# Patient Record
Sex: MALE | Race: BLACK OR AFRICAN AMERICAN | Employment: OTHER | URBAN - METROPOLITAN AREA
[De-identification: names, ages, dates, MRNs, and addresses within clinical notes are randomized per-mention and may not be internally consistent; named-entity substitution may affect disease eponyms.]

---

## 2018-01-16 PROBLEM — R31.9 HEMATURIA: Status: ACTIVE | Noted: 2018-01-16

## 2018-01-16 PROBLEM — N13.8 HYPERTROPHY OF PROSTATE WITH URINARY OBSTRUCTION: Status: ACTIVE | Noted: 2018-01-16

## 2018-01-16 PROBLEM — N40.1 HYPERTROPHY OF PROSTATE WITH URINARY OBSTRUCTION: Status: ACTIVE | Noted: 2018-01-16

## 2018-01-16 PROBLEM — Z12.5 SCREENING FOR PROSTATE CANCER: Status: ACTIVE | Noted: 2018-01-16

## 2018-04-11 PROBLEM — Z12.5 SCREENING FOR PROSTATE CANCER: Status: RESOLVED | Noted: 2018-01-16 | Resolved: 2018-04-11

## 2021-02-14 ENCOUNTER — APPOINTMENT (OUTPATIENT)
Dept: GENERAL RADIOLOGY | Age: 71
DRG: 661 | End: 2021-02-14
Payer: MEDICARE

## 2021-02-14 ENCOUNTER — APPOINTMENT (OUTPATIENT)
Dept: CT IMAGING | Age: 71
DRG: 661 | End: 2021-02-14
Payer: MEDICARE

## 2021-02-14 ENCOUNTER — HOSPITAL ENCOUNTER (INPATIENT)
Age: 71
LOS: 5 days | Discharge: HOME | DRG: 661 | End: 2021-02-19
Attending: EMERGENCY MEDICINE | Admitting: INTERNAL MEDICINE
Payer: MEDICARE

## 2021-02-14 DIAGNOSIS — D64.9 ANEMIA REQUIRING TRANSFUSIONS: ICD-10-CM

## 2021-02-14 DIAGNOSIS — N17.9 ACUTE KIDNEY INJURY (HCC): Primary | ICD-10-CM

## 2021-02-14 DIAGNOSIS — R19.00 PELVIC MASS: ICD-10-CM

## 2021-02-14 DIAGNOSIS — N13.30 HYDRONEPHROSIS, UNSPECIFIED HYDRONEPHROSIS TYPE: ICD-10-CM

## 2021-02-14 LAB
ALBUMIN SERPL-MCNC: 3.4 G/DL (ref 3.5–5.2)
ALP BLD-CCNC: 99 U/L (ref 40–129)
ALT SERPL-CCNC: <5 U/L (ref 0–40)
ANION GAP SERPL CALCULATED.3IONS-SCNC: 13 MMOL/L (ref 7–16)
AST SERPL-CCNC: 10 U/L (ref 0–39)
BILIRUB SERPL-MCNC: 0.3 MG/DL (ref 0–1.2)
BUN BLDV-MCNC: 43 MG/DL (ref 8–23)
CALCIUM SERPL-MCNC: 8.9 MG/DL (ref 8.6–10.2)
CHLORIDE BLD-SCNC: 98 MMOL/L (ref 98–107)
CO2: 22 MMOL/L (ref 22–29)
CREAT SERPL-MCNC: 2.5 MG/DL (ref 0.7–1.2)
GFR AFRICAN AMERICAN: 31
GFR NON-AFRICAN AMERICAN: 31 ML/MIN/1.73
GLUCOSE BLD-MCNC: 123 MG/DL (ref 74–99)
INR BLD: 1.2
LACTIC ACID: 2.7 MMOL/L (ref 0.5–2.2)
POTASSIUM REFLEX MAGNESIUM: 4.2 MMOL/L (ref 3.5–5)
PRO-BNP: 2859 PG/ML (ref 0–125)
PROTHROMBIN TIME: 14.7 SEC (ref 9.3–12.4)
SODIUM BLD-SCNC: 133 MMOL/L (ref 132–146)
TOTAL PROTEIN: 7.4 G/DL (ref 6.4–8.3)
TROPONIN: <0.01 NG/ML (ref 0–0.03)
TSH SERPL DL<=0.05 MIU/L-ACNC: 3.63 UIU/ML (ref 0.27–4.2)

## 2021-02-14 PROCEDURE — 93005 ELECTROCARDIOGRAM TRACING: CPT | Performed by: EMERGENCY MEDICINE

## 2021-02-14 PROCEDURE — 80307 DRUG TEST PRSMV CHEM ANLYZR: CPT

## 2021-02-14 PROCEDURE — 85610 PROTHROMBIN TIME: CPT

## 2021-02-14 PROCEDURE — 80143 DRUG ASSAY ACETAMINOPHEN: CPT

## 2021-02-14 PROCEDURE — 99284 EMERGENCY DEPT VISIT MOD MDM: CPT

## 2021-02-14 PROCEDURE — 71045 X-RAY EXAM CHEST 1 VIEW: CPT

## 2021-02-14 PROCEDURE — 84443 ASSAY THYROID STIM HORMONE: CPT

## 2021-02-14 PROCEDURE — 70450 CT HEAD/BRAIN W/O DYE: CPT

## 2021-02-14 PROCEDURE — 1200000000 HC SEMI PRIVATE

## 2021-02-14 PROCEDURE — 87040 BLOOD CULTURE FOR BACTERIA: CPT

## 2021-02-14 PROCEDURE — 83880 ASSAY OF NATRIURETIC PEPTIDE: CPT

## 2021-02-14 PROCEDURE — 82533 TOTAL CORTISOL: CPT

## 2021-02-14 PROCEDURE — 83605 ASSAY OF LACTIC ACID: CPT

## 2021-02-14 PROCEDURE — 85025 COMPLETE CBC W/AUTO DIFF WBC: CPT

## 2021-02-14 PROCEDURE — 84484 ASSAY OF TROPONIN QUANT: CPT

## 2021-02-14 PROCEDURE — 0T768DZ DILATION OF RIGHT URETER WITH INTRALUMINAL DEVICE, VIA NATURAL OR ARTIFICIAL OPENING ENDOSCOPIC: ICD-10-PCS | Performed by: UROLOGY

## 2021-02-14 PROCEDURE — BT1D1ZZ FLUOROSCOPY OF RIGHT KIDNEY, URETER AND BLADDER USING LOW OSMOLAR CONTRAST: ICD-10-PCS | Performed by: UROLOGY

## 2021-02-14 PROCEDURE — U0002 COVID-19 LAB TEST NON-CDC: HCPCS

## 2021-02-14 PROCEDURE — 80179 DRUG ASSAY SALICYLATE: CPT

## 2021-02-14 PROCEDURE — 82077 ASSAY SPEC XCP UR&BREATH IA: CPT

## 2021-02-14 PROCEDURE — 80053 COMPREHEN METABOLIC PANEL: CPT

## 2021-02-15 ENCOUNTER — APPOINTMENT (OUTPATIENT)
Dept: CT IMAGING | Age: 71
DRG: 661 | End: 2021-02-15
Payer: MEDICARE

## 2021-02-15 ENCOUNTER — APPOINTMENT (OUTPATIENT)
Dept: ULTRASOUND IMAGING | Age: 71
DRG: 661 | End: 2021-02-15
Payer: MEDICARE

## 2021-02-15 LAB
ABO/RH: NORMAL
ACETAMINOPHEN LEVEL: <5 MCG/ML (ref 10–30)
ANION GAP SERPL CALCULATED.3IONS-SCNC: 11 MMOL/L (ref 7–16)
ANTIBODY SCREEN: NORMAL
BACTERIA: NORMAL /HPF
BASOPHILS ABSOLUTE: 0.04 E9/L (ref 0–0.2)
BASOPHILS RELATIVE PERCENT: 0.4 % (ref 0–2)
BILIRUBIN URINE: NEGATIVE
BLOOD BANK DISPENSE STATUS: NORMAL
BLOOD BANK DISPENSE STATUS: NORMAL
BLOOD BANK PRODUCT CODE: NORMAL
BLOOD BANK PRODUCT CODE: NORMAL
BLOOD, URINE: NEGATIVE
BPU ID: NORMAL
BPU ID: NORMAL
BUN BLDV-MCNC: 30 MG/DL (ref 8–23)
CALCIUM SERPL-MCNC: 8.9 MG/DL (ref 8.6–10.2)
CHLORIDE BLD-SCNC: 103 MMOL/L (ref 98–107)
CLARITY: CLEAR
CO2: 23 MMOL/L (ref 22–29)
COLOR: YELLOW
CORTISOL TOTAL: 30.63 MCG/DL (ref 2.68–18.4)
CREAT SERPL-MCNC: 1.9 MG/DL (ref 0.7–1.2)
DESCRIPTION BLOOD BANK: NORMAL
DESCRIPTION BLOOD BANK: NORMAL
EKG ATRIAL RATE: 63 BPM
EKG P AXIS: 119 DEGREES
EKG P-R INTERVAL: 152 MS
EKG Q-T INTERVAL: 460 MS
EKG QRS DURATION: 80 MS
EKG QTC CALCULATION (BAZETT): 470 MS
EKG R AXIS: 83 DEGREES
EKG T AXIS: 71 DEGREES
EKG VENTRICULAR RATE: 63 BPM
EOSINOPHILS ABSOLUTE: 0.38 E9/L (ref 0.05–0.5)
EOSINOPHILS RELATIVE PERCENT: 3.6 % (ref 0–6)
ETHANOL: <10 MG/DL (ref 0–0.08)
GFR AFRICAN AMERICAN: 43
GFR NON-AFRICAN AMERICAN: 43 ML/MIN/1.73
GLUCOSE BLD-MCNC: 118 MG/DL (ref 74–99)
GLUCOSE URINE: NEGATIVE MG/DL
HCT VFR BLD CALC: 20.9 % (ref 37–54)
HCT VFR BLD CALC: 22.1 % (ref 37–54)
HEMOGLOBIN: 6.2 G/DL (ref 12.5–16.5)
HEMOGLOBIN: 6.7 G/DL (ref 12.5–16.5)
IMMATURE GRANULOCYTES #: 0.06 E9/L
IMMATURE GRANULOCYTES %: 0.6 % (ref 0–5)
KETONES, URINE: NEGATIVE MG/DL
LACTIC ACID: 2.2 MMOL/L (ref 0.5–2.2)
LEUKOCYTE ESTERASE, URINE: NEGATIVE
LYMPHOCYTES ABSOLUTE: 1.02 E9/L (ref 1.5–4)
LYMPHOCYTES RELATIVE PERCENT: 9.8 % (ref 20–42)
MAGNESIUM: 1.9 MG/DL (ref 1.6–2.6)
MCH RBC QN AUTO: 26.6 PG (ref 26–35)
MCHC RBC AUTO-ENTMCNC: 30.3 % (ref 32–34.5)
MCV RBC AUTO: 87.7 FL (ref 80–99.9)
MONOCYTES ABSOLUTE: 0.88 E9/L (ref 0.1–0.95)
MONOCYTES RELATIVE PERCENT: 8.4 % (ref 2–12)
NEUTROPHILS ABSOLUTE: 8.07 E9/L (ref 1.8–7.3)
NEUTROPHILS RELATIVE PERCENT: 77.2 % (ref 43–80)
NITRITE, URINE: NEGATIVE
PDW BLD-RTO: 15.4 FL (ref 11.5–15)
PH UA: 5.5 (ref 5–9)
PLATELET # BLD: 237 E9/L (ref 130–450)
PMV BLD AUTO: 9.1 FL (ref 7–12)
POTASSIUM SERPL-SCNC: 4.2 MMOL/L (ref 3.5–5)
PROCALCITONIN: 0.14 NG/ML (ref 0–0.08)
PROTEIN UA: NEGATIVE MG/DL
RBC # BLD: 2.52 E12/L (ref 3.8–5.8)
RBC UA: NORMAL /HPF (ref 0–2)
SALICYLATE, SERUM: <0.3 MG/DL (ref 0–30)
SARS-COV-2, NAAT: NOT DETECTED
SODIUM BLD-SCNC: 137 MMOL/L (ref 132–146)
SPECIFIC GRAVITY UA: 1.02 (ref 1–1.03)
T4 FREE: 1.43 NG/DL (ref 0.93–1.7)
TRICYCLIC ANTIDEPRESSANTS SCREEN SERUM: NEGATIVE NG/ML
UROBILINOGEN, URINE: 0.2 E.U./DL
WBC # BLD: 10.5 E9/L (ref 4.5–11.5)
WBC UA: NORMAL /HPF (ref 0–5)

## 2021-02-15 PROCEDURE — 51798 US URINE CAPACITY MEASURE: CPT

## 2021-02-15 PROCEDURE — 6370000000 HC RX 637 (ALT 250 FOR IP): Performed by: INTERNAL MEDICINE

## 2021-02-15 PROCEDURE — 86901 BLOOD TYPING SEROLOGIC RH(D): CPT

## 2021-02-15 PROCEDURE — 86850 RBC ANTIBODY SCREEN: CPT

## 2021-02-15 PROCEDURE — 36415 COLL VENOUS BLD VENIPUNCTURE: CPT

## 2021-02-15 PROCEDURE — 6360000002 HC RX W HCPCS: Performed by: INTERNAL MEDICINE

## 2021-02-15 PROCEDURE — 80048 BASIC METABOLIC PNL TOTAL CA: CPT

## 2021-02-15 PROCEDURE — 86923 COMPATIBILITY TEST ELECTRIC: CPT

## 2021-02-15 PROCEDURE — 84145 PROCALCITONIN (PCT): CPT

## 2021-02-15 PROCEDURE — 84439 ASSAY OF FREE THYROXINE: CPT

## 2021-02-15 PROCEDURE — 83605 ASSAY OF LACTIC ACID: CPT

## 2021-02-15 PROCEDURE — 2580000003 HC RX 258: Performed by: INTERNAL MEDICINE

## 2021-02-15 PROCEDURE — 81001 URINALYSIS AUTO W/SCOPE: CPT

## 2021-02-15 PROCEDURE — P9016 RBC LEUKOCYTES REDUCED: HCPCS

## 2021-02-15 PROCEDURE — 1200000000 HC SEMI PRIVATE

## 2021-02-15 PROCEDURE — 74176 CT ABD & PELVIS W/O CONTRAST: CPT

## 2021-02-15 PROCEDURE — 36430 TRANSFUSION BLD/BLD COMPNT: CPT

## 2021-02-15 PROCEDURE — 86900 BLOOD TYPING SEROLOGIC ABO: CPT

## 2021-02-15 PROCEDURE — C9113 INJ PANTOPRAZOLE SODIUM, VIA: HCPCS | Performed by: INTERNAL MEDICINE

## 2021-02-15 PROCEDURE — 88112 CYTOPATH CELL ENHANCE TECH: CPT

## 2021-02-15 PROCEDURE — 85014 HEMATOCRIT: CPT

## 2021-02-15 PROCEDURE — 85018 HEMOGLOBIN: CPT

## 2021-02-15 PROCEDURE — 83735 ASSAY OF MAGNESIUM: CPT

## 2021-02-15 PROCEDURE — 76856 US EXAM PELVIC COMPLETE: CPT

## 2021-02-15 RX ORDER — DEXTROSE MONOHYDRATE 25 G/50ML
12.5 INJECTION, SOLUTION INTRAVENOUS PRN
Status: DISCONTINUED | OUTPATIENT
Start: 2021-02-15 | End: 2021-02-19 | Stop reason: HOSPADM

## 2021-02-15 RX ORDER — 0.9 % SODIUM CHLORIDE 0.9 %
1000 INTRAVENOUS SOLUTION INTRAVENOUS ONCE
Status: COMPLETED | OUTPATIENT
Start: 2021-02-15 | End: 2021-02-15

## 2021-02-15 RX ORDER — ACETAMINOPHEN 325 MG/1
650 TABLET ORAL EVERY 6 HOURS PRN
Status: DISCONTINUED | OUTPATIENT
Start: 2021-02-15 | End: 2021-02-19 | Stop reason: HOSPADM

## 2021-02-15 RX ORDER — FERROUS SULFATE 325(65) MG
325 TABLET ORAL 2 TIMES DAILY
Status: DISCONTINUED | OUTPATIENT
Start: 2021-02-15 | End: 2021-02-19 | Stop reason: HOSPADM

## 2021-02-15 RX ORDER — UBIDECARENONE 75 MG
50 CAPSULE ORAL DAILY
Status: DISCONTINUED | OUTPATIENT
Start: 2021-02-15 | End: 2021-02-19 | Stop reason: HOSPADM

## 2021-02-15 RX ORDER — SODIUM CHLORIDE 9 MG/ML
INJECTION, SOLUTION INTRAVENOUS CONTINUOUS
Status: ACTIVE | OUTPATIENT
Start: 2021-02-15 | End: 2021-02-16

## 2021-02-15 RX ORDER — NICOTINE POLACRILEX 4 MG
15 LOZENGE BUCCAL PRN
Status: DISCONTINUED | OUTPATIENT
Start: 2021-02-15 | End: 2021-02-19 | Stop reason: HOSPADM

## 2021-02-15 RX ORDER — SODIUM CHLORIDE 9 MG/ML
INJECTION, SOLUTION INTRAVENOUS PRN
Status: DISCONTINUED | OUTPATIENT
Start: 2021-02-15 | End: 2021-02-19 | Stop reason: HOSPADM

## 2021-02-15 RX ORDER — SODIUM CHLORIDE 0.9 % (FLUSH) 0.9 %
10 SYRINGE (ML) INJECTION PRN
Status: DISCONTINUED | OUTPATIENT
Start: 2021-02-15 | End: 2021-02-19 | Stop reason: HOSPADM

## 2021-02-15 RX ORDER — PANTOPRAZOLE SODIUM 40 MG/10ML
40 INJECTION, POWDER, LYOPHILIZED, FOR SOLUTION INTRAVENOUS ONCE
Status: DISCONTINUED | OUTPATIENT
Start: 2021-02-15 | End: 2021-02-15

## 2021-02-15 RX ORDER — UBIDECARENONE 75 MG
50 CAPSULE ORAL DAILY
COMMUNITY

## 2021-02-15 RX ORDER — TAMSULOSIN HYDROCHLORIDE 0.4 MG/1
0.4 CAPSULE ORAL DAILY
Status: DISCONTINUED | OUTPATIENT
Start: 2021-02-15 | End: 2021-02-19 | Stop reason: HOSPADM

## 2021-02-15 RX ORDER — PRAVASTATIN SODIUM 20 MG
20 TABLET ORAL NIGHTLY
Status: DISCONTINUED | OUTPATIENT
Start: 2021-02-15 | End: 2021-02-19 | Stop reason: HOSPADM

## 2021-02-15 RX ORDER — SOTALOL HYDROCHLORIDE 80 MG/1
40 TABLET ORAL 2 TIMES DAILY
Status: DISCONTINUED | OUTPATIENT
Start: 2021-02-15 | End: 2021-02-19 | Stop reason: HOSPADM

## 2021-02-15 RX ORDER — ACETAMINOPHEN 650 MG/1
650 SUPPOSITORY RECTAL EVERY 6 HOURS PRN
Status: DISCONTINUED | OUTPATIENT
Start: 2021-02-15 | End: 2021-02-19 | Stop reason: HOSPADM

## 2021-02-15 RX ORDER — POLYETHYLENE GLYCOL 3350 17 G/17G
17 POWDER, FOR SOLUTION ORAL DAILY PRN
Status: DISCONTINUED | OUTPATIENT
Start: 2021-02-15 | End: 2021-02-19 | Stop reason: HOSPADM

## 2021-02-15 RX ORDER — 0.9 % SODIUM CHLORIDE 0.9 %
500 INTRAVENOUS SOLUTION INTRAVENOUS ONCE
Status: DISCONTINUED | OUTPATIENT
Start: 2021-02-15 | End: 2021-02-18

## 2021-02-15 RX ORDER — SODIUM CHLORIDE 0.9 % (FLUSH) 0.9 %
10 SYRINGE (ML) INJECTION EVERY 12 HOURS SCHEDULED
Status: DISCONTINUED | OUTPATIENT
Start: 2021-02-15 | End: 2021-02-19 | Stop reason: HOSPADM

## 2021-02-15 RX ORDER — SENNA AND DOCUSATE SODIUM 50; 8.6 MG/1; MG/1
1 TABLET, FILM COATED ORAL 2 TIMES DAILY
COMMUNITY

## 2021-02-15 RX ORDER — DEXTROSE MONOHYDRATE 50 MG/ML
100 INJECTION, SOLUTION INTRAVENOUS PRN
Status: DISCONTINUED | OUTPATIENT
Start: 2021-02-15 | End: 2021-02-19 | Stop reason: HOSPADM

## 2021-02-15 RX ADMIN — SODIUM CHLORIDE, PRESERVATIVE FREE 10 ML: 5 INJECTION INTRAVENOUS at 09:35

## 2021-02-15 RX ADMIN — FERROUS SULFATE TAB 325 MG (65 MG ELEMENTAL FE) 325 MG: 325 (65 FE) TAB at 19:50

## 2021-02-15 RX ADMIN — VITAM B12 50 MCG: 100 TAB at 09:34

## 2021-02-15 RX ADMIN — PRAVASTATIN SODIUM 20 MG: 20 TABLET ORAL at 21:24

## 2021-02-15 RX ADMIN — PANTOPRAZOLE SODIUM 8 MG/HR: 40 INJECTION, POWDER, FOR SOLUTION INTRAVENOUS at 01:37

## 2021-02-15 RX ADMIN — SOTALOL HYDROCHLORIDE 40 MG: 80 TABLET ORAL at 21:25

## 2021-02-15 RX ADMIN — SODIUM CHLORIDE 1000 ML: 9 INJECTION, SOLUTION INTRAVENOUS at 00:30

## 2021-02-15 RX ADMIN — FERROUS SULFATE TAB 325 MG (65 MG ELEMENTAL FE) 325 MG: 325 (65 FE) TAB at 09:34

## 2021-02-15 RX ADMIN — SODIUM CHLORIDE: 9 INJECTION, SOLUTION INTRAVENOUS at 14:47

## 2021-02-15 RX ADMIN — PANTOPRAZOLE SODIUM 8 MG/HR: 40 INJECTION, POWDER, FOR SOLUTION INTRAVENOUS at 12:20

## 2021-02-15 RX ADMIN — TAMSULOSIN HYDROCHLORIDE 0.4 MG: 0.4 CAPSULE ORAL at 09:35

## 2021-02-15 RX ADMIN — PANTOPRAZOLE SODIUM 8 MG/HR: 40 INJECTION, POWDER, FOR SOLUTION INTRAVENOUS at 22:17

## 2021-02-15 RX ADMIN — SODIUM CHLORIDE 80 MG: 9 INJECTION, SOLUTION INTRAVENOUS at 01:31

## 2021-02-15 ASSESSMENT — ENCOUNTER SYMPTOMS
SINUS PRESSURE: 0
VOMITING: 0
BACK PAIN: 0
ABDOMINAL PAIN: 0
EYE PAIN: 0
NAUSEA: 0
SORE THROAT: 0
SHORTNESS OF BREATH: 0
EYE REDNESS: 0
EYE DISCHARGE: 0
COUGH: 0
DIARRHEA: 0
WHEEZING: 0

## 2021-02-15 ASSESSMENT — PAIN SCALES - GENERAL
PAINLEVEL_OUTOF10: 0
PAINLEVEL_OUTOF10: 0

## 2021-02-15 NOTE — ED NOTES
Bed: 04  Expected date:   Expected time:   Means of arrival:   Comments:     Melanie Morillo RN  02/14/21 4563

## 2021-02-15 NOTE — PROGRESS NOTES
New order received for varner catheter, told patient about order. He stated  \"You can take this as a formal order  . Luke Cantu \" NO\" \" now please exit my room.

## 2021-02-15 NOTE — ED TRIAGE NOTES
Pt arrived from home via EMS. Family called EMS and reported pt was unresponsive. Pt arrived to ED A&Ox4. Pt reported he felt like he was going to pass out and and has been feeling dizzy intermittently for the last week.

## 2021-02-15 NOTE — PROGRESS NOTES
Department of Internal Medicine  PN    PCP: Dr. Charlane Meckel  Admitting Physician: Dr. Charlane Meckel  Consultants: Dr. Bela Abbott: Lightheadedness    HISTORY OF PRESENT ILLNESS:    Patient is 80-year-old male who presented to the ED due to lightheadedness. Patient states that he has been feeling lightheaded for the past 1 to 2 weeks. However today he took Viagra and felt extra lightheaded. As such he presented to the ED. He does also complain of some epigastric pain that has moved to the right lower quadrant. He he denies fever or chills. He denies nausea or vomiting. He denies diarrhea or constipation. 2/15/2021  Patient seen examined on telemetry floor. Patient still with some lightheadedness but is receiving his second unit of packed RBC currently. Patient overall feels little bit better. Patient still has little bit of the right flank pain. Patient adamantly denies any change in his stool color with no evidence of any devora bleeding. Patient's had some dark bowel movements for 2 years secondary to his laxative. Patient denies any gross hematuria. CT the abdomen showed 3.5 cm mass or fluid collection. Patient's baseline BUN/creatinine 24/1.18 back in March 2020. Hemoglobin 6.2 this morning and patient has received 2 units of packed cells. Temperature 97.3 with heart rate of 67. Blood pressure ranges 97//60. O2 sats 97% on room air at rest.  ECG reviewed and was sinus rhythm.     PAST MEDICAL Hx:  Past Medical History:   Diagnosis Date    Anxiety     Arthritis     Coronary artery disease     Hyperlipidemia     Hypertension     Pneumonia     Sinus tachycardia 01/01/2002       PAST SURGICAL Hx:   Past Surgical History:   Procedure Laterality Date    ABLATION OF DYSRHYTHMIC FOCUS      AORTA SURGERY      aortic valve replacement    AORTIC VALVE REPLACEMENT      MITRAL VALVE REPLACEMENT      OTHER SURGICAL HISTORY  08/12/2016    CT Myelogram, Dr. Zoraida White 2002    PACEMAKER PLACEMENT  2014 new battery    last checked Dr Terry Amor 1/2016?  TONSILLECTOMY      UPPER GASTROINTESTINAL ENDOSCOPY      reflux    VASECTOMY         FAMILY Hx:  Family History   Problem Relation Age of Onset    Diabetes Mother     Stroke Mother     Diabetes Father     Heart Disease Father     Brain Cancer Sister     Stroke Sister     Stroke Brother     Cancer Maternal Grandfather        HOME MEDICATIONS:  Prior to Admission medications    Medication Sig Start Date End Date Taking? Authorizing Provider   vitamin B-12 (CYANOCOBALAMIN) 100 MCG tablet Take 50 mcg by mouth daily   Yes Historical Provider, MD   sennosides-docusate sodium (SENOKOT-S) 8.6-50 MG tablet Take 1 tablet by mouth 2 times daily   Yes Historical Provider, MD   sotalol (BETAPACE) 80 MG tablet Take 40 mg by mouth 4/10/17  Yes Historical Provider, MD   tamsulosin (FLOMAX) 0.4 MG capsule Take 0.4 mg by mouth daily  1/4/18  Yes Historical Provider, MD   ferrous sulfate 325 (65 Fe) MG tablet Take 325 mg by mouth 2 times daily  1/8/18  Yes Historical Provider, MD   albuterol sulfate  (90 BASE) MCG/ACT inhaler Inhale 2 puffs into the lungs as needed for Wheezing   Yes Historical Provider, MD   esomeprazole Magnesium (NEXIUM) 20 MG PACK Take 20 mg by mouth daily   Yes Historical Provider, MD   lisinopril (PRINIVIL;ZESTRIL) 40 MG tablet Take 40 mg by mouth daily   Yes Historical Provider, MD   HYDROCHLOROTHIAZIDE PO Take 25 mg by mouth daily    Yes Historical Provider, MD   PRAVASTATIN SODIUM PO Take 20 mg by mouth daily    Yes Historical Provider, MD   aspirin 325 MG EC tablet Take 325 mg by mouth daily Last dose 3/1/16   Yes Historical Provider, MD   albuterol (PROAIR HFA) 108 (90 BASE) MCG/ACT inhaler Inhale 2 puffs into the lungs every 6 hours as needed for Wheezing for up to 7 days.  2/9/15 2/15/21 Yes Reyna Artist, PA   XARELTO 20 MG TABS tablet daily (with breakfast)  12/21/17   Historical Provider, MD Diann Yoder any chest pain, irregular heartbeats, or palpitations. No paroxysmal nocturnal dyspnea. Respiratory:   Denies shortness of breath, coughing, sputum production, hemoptysis, or wheezing. No orthopnea. Gastrointestinal:   Denies nausea, vomiting, diarrhea, or constipation. Denies any abdominal pain. Denies change in bowel habits or stools. Genito-Urinary:    Denies any urgency, frequency, hematuria. Voiding without difficulty. Musculoskeletal:   Denies joint pain, joint stiffness, joint swelling or muscle pain    Neurology:    Denies any headache or focal neurological deficits. No weakness or paresthesia. Derm:    Denies any rashes, ulcers, or excoriations. Denies bruising. Extremities:   Denies any lower extremity swelling or edema. PHYSICAL EXAM:  VITALS:  Vitals:    02/15/21 1033   BP: (!) 97/51   Pulse: 67   Resp: 16   Temp: 97.3 °F (36.3 °C)   SpO2: 97%         CONSTITUTIONAL:    Awake, alert, cooperative, no apparent distress, and appears stated age    EYES:    PERRL, EOMI, sclera clear, conjunctiva normal    ENT:    Normocephalic, atraumatic, sinuses nontender on palpation. External ears without lesions. NECK:    Supple, symmetrical, trachea midline, no adenopathy, thyroid symmetric, not enlarged and no tenderness, skin normal, no bruits, no JVD    HEMATOLOGIC/LYMPHATICS:    No cervical lymphadenopathy and no supraclavicular lymphadenopathy    LUNGS:    Symmetric. No increased work of breathing, good air exchange, clear to auscultation bilaterally, no wheezes, rhonchi, or rales,     CARDIOVASCULAR:    Murmur is present and heart rhythm irregular    ABDOMEN:    No scars, normal bowel sounds, soft, non-distended, non-tender, no masses palpated, no hepatosplenomegaly, no rebound or guarding elicited on palpation     MUSCULOSKELETAL:    There is no redness, warmth, or swelling of the joints. Full range of motion noted. Motor strength is 5 out of 5 all extremities bilaterally. Tone is normal.    NEUROLOGIC:    Awake, alert, oriented to name, place and time. Cranial nerves II-XII are grossly intact. Motor is 5 out of 5 bilaterally. SKIN:    No bruising or bleeding. No redness, warmth, or swelling    EXTREMITIES:    Peripheral pulses present. No edema, cyanosis, or swelling. OSTEOPATHIC:    Examined in seated and supine positions. Normal thoracic kyphosis and lumbar lordosis. No acute somatic dysfunction. LINES/CATHETERS     LABORATORY DATA:  CBC with Differential:    Lab Results   Component Value Date    WBC 10.5 02/14/2021    RBC 2.52 02/14/2021    HGB 6.2 02/15/2021    HCT 20.9 02/15/2021     02/14/2021    MCV 87.7 02/14/2021    MCH 26.6 02/14/2021    MCHC 30.3 02/14/2021    RDW 15.4 02/14/2021    LYMPHOPCT 9.8 02/14/2021    MONOPCT 8.4 02/14/2021    BASOPCT 0.4 02/14/2021    MONOSABS 0.88 02/14/2021    LYMPHSABS 1.02 02/14/2021    EOSABS 0.38 02/14/2021    BASOSABS 0.04 02/14/2021     CMP:    Lab Results   Component Value Date     02/14/2021    K 4.2 02/14/2021    CL 98 02/14/2021    CO2 22 02/14/2021    BUN 43 02/14/2021    CREATININE 2.5 02/14/2021    GFRAA 31 02/14/2021    LABGLOM 31 02/14/2021    GLUCOSE 123 02/14/2021    GLUCOSE 100 01/28/2011    PROT 7.4 02/14/2021    LABALBU 3.4 02/14/2021    LABALBU 3.9 01/28/2011    CALCIUM 8.9 02/14/2021    BILITOT 0.3 02/14/2021    ALKPHOS 99 02/14/2021    AST 10 02/14/2021    ALT <5 02/14/2021       ASSESSMENT/PLAN:  1. Severe normocytic anemia secondary to possible GI bleed  2. SUNNY  3. Severe right hydronephrosis  4. Pelvic mass-3.5 cm right pelvis  5. Prostatomegaly  6. Coronary artery disease status post CABG  7. Aortic valve replacement  8. Mitral valve replacement  9. Hypertension  10. Hyperlipidemia  11. Pacemaker in situ with recent lead adjustment  12. Sick sinus syndrome  13. History of paroxysmal atrial fibrillation on Xarelto  14. History of tobacco abuse  15.  Currently vapes    Patient has been feeling lightheaded and has had intermittent epigastric and right lower quadrant abdominal pain. Patient states she has history of hiatal hernia and possible PUD. Blood work showed hemoglobin of 6.7 on admission with repeat of 6.2. He was started on Protonix drip and given IV fluid bolus. 2 units of blood was ordered for transfusion and are currently running. Continue to monitor H&H.    GI consulted. Patient also had lead adjustment for his pacemaker recently and follows up with cardiologist outside of [facility]. We will have pacemaker interrogated. Patient also was found to have SUNNY and right hydronephrosis with CT of the abdomen showing 3.5 cm mass/fluid collection in the right pelvis. Sow catheter will be replaced. Patient had repeat H&H 1 hour after last transfusion along with a repeat BMP. Patient will have repeat BMP and CBC in a.m. B12 level in a.m.        Felicita Park D.O.  10:44 AM  2/15/2021

## 2021-02-15 NOTE — ED PROVIDER NOTES
ED  Provider Note  Admit Date/RoomTime: 2/14/2021 10:03 PM  ED Room: 04/04     HPI:   Pooja Bender is a 72 y.o. male presenting to the ED for alteration in mental status, beginning a few hours ago ago. History comes primarily from the patient. Past medical history includes hyperlipidemia, hypertension, coronary artery disease, pacemaker placement. The complaint has been persistent, moderate in severity, improved by nothing and worsened by nothing. Associated symptoms include none. Elenita Pike had the wires of his pacemaker replaced as his old wires were approximately 8years old. This procedure was performed about a week and a half ago, and ever since that time he states that he has felt weak and fatigued. This evening, he took a Viagra as it was Marleni's Day, and shortly after doing so became very lightheaded and dizzy. He was noted to have slumped in his chair by family. When family tried to arouse him, the patient was generally somnolent. For this reason EMS was called. EMS transported to [address] [place] [address], and in transit the patient became more alert and responsive. On arrival, he is assessed with history, physical exam, imaging studies, laboratory studies, EKG, vital signs. Patient's vital signs were notable for an initial blood pressure of 79/40 that did respond to fluids. He was otherwise stable and afebrile. Review of Systems   Constitutional: Positive for activity change and appetite change. Negative for chills and fever. HENT: Negative for ear pain, sinus pressure and sore throat. Eyes: Negative for pain, discharge and redness. Respiratory: Negative for cough, shortness of breath and wheezing. Cardiovascular: Negative for chest pain. Gastrointestinal: Negative for abdominal pain, diarrhea, nausea and vomiting. Genitourinary: Negative for dysuria and frequency. Musculoskeletal: Negative for arthralgias and back pain. Skin: Negative for rash and wound. Neurological: Negative for weakness and headaches. Hematological: Negative for adenopathy. All other systems reviewed and are negative. Physical Exam  Vitals signs and nursing note reviewed. Constitutional:       General: He is not in acute distress. Appearance: He is well-developed. He is obese. He is not ill-appearing or diaphoretic. HENT:      Head: Normocephalic and atraumatic. Mouth/Throat:      Mouth: Mucous membranes are moist.      Dentition: Abnormal dentition. Eyes:      Pupils: Pupils are equal, round, and reactive to light. Neck:      Musculoskeletal: Normal range of motion. Vascular: No JVD. Trachea: No tracheal deviation. Cardiovascular:      Rate and Rhythm: Normal rate and regular rhythm. Heart sounds: No murmur. No friction rub. No gallop. Pulmonary:      Effort: Pulmonary effort is normal. No respiratory distress. Breath sounds: Normal breath sounds. No stridor. No wheezing or rales. Chest:      Chest wall: No tenderness. Abdominal:      General: Bowel sounds are normal. There is no distension. Palpations: Abdomen is soft. Tenderness: There is no abdominal tenderness. There is no guarding. Musculoskeletal: Normal range of motion. Skin:     General: Skin is warm and dry. Capillary Refill: Capillary refill takes less than 2 seconds. Neurological:      General: No focal deficit present. Mental Status: He is alert. Cranial Nerves: No cranial nerve deficit. Psychiatric:         Behavior: Behavior normal.          Procedures     MDM  Number of Diagnoses or Management Options  Acute kidney injury [facility])  Diagnosis management comments: Emergency department evaluation was notable for significant abnormalities in the setting of transient alteration of mental status. Patient was found to have an acute kidney injury with a renal function of 2.5, significantly up from the patient's baseline.   Patient also was noted to be anemic with a hemoglobin of 6.7, requiring transfusion. He was also very hypotensive on arrival, and given the patient's use of Viagra earlier today, it is thought that he may have suffered an ischemic insult to his kidneys. In addition, the patient was found to have severe right-sided hydronephrosis with a 3.5 x 3 x 5 cm soft tissue mass in his pelvis, and it is unclear at this time whether or not this mass is causing an obstructive process that may also be contributing to his renal dysfunction. For this reason he will require close observation and monitoring in the inpatient setting. This information was relayed to the patient who understood this plan of care and was amenable to the plan. Patient was discussed with the admitting service (Dr. Erin Almodovar for Dr. Patti Elkins) who concurred with the decision for admission, and have agreed to admit the patient to telemetry          --------------------------------------------- PAST HISTORY ---------------------------------------------  Past Medical History:  has a past medical history of Anxiety, Arthritis, Coronary artery disease, Hyperlipidemia, Hypertension, Pneumonia, and Sinus tachycardia. Past Surgical History:  has a past surgical history that includes Pacemaker insertion (2002); Tonsillectomy; Upper gastrointestinal endoscopy; pacemaker placement (2014 new battery); Vasectomy; other surgical history (08/12/2016); Aorta surgery; ablation of dysrhythmic focus; Aortic valve replacement; and Mitral valve replacement. Social History:  reports that he quit smoking about 6 years ago. He has a 44.00 pack-year smoking history. He has never used smokeless tobacco. He reports current alcohol use of about 2.0 standard drinks of alcohol per week. He reports that he does not use drugs.     Family History: family history includes Brain Cancer in his sister; Cancer in his maternal grandfather; Diabetes in his father and mother; Heart Disease in his father; Stroke in his brother, mother, and sister. The patients home medications have been reviewed. Allergies: Patient has no known allergies.     -------------------------------------------------- RESULTS -------------------------------------------------    LABS:  Results for orders placed or performed during the hospital encounter of 02/14/21   Comprehensive Metabolic Panel w/ Reflex to MG   Result Value Ref Range    Sodium 133 132 - 146 mmol/L    Potassium reflex Magnesium 4.2 3.5 - 5.0 mmol/L    Chloride 98 98 - 107 mmol/L    CO2 22 22 - 29 mmol/L    Anion Gap 13 7 - 16 mmol/L    Glucose 123 (H) 74 - 99 mg/dL    BUN 43 (H) 8 - 23 mg/dL    CREATININE 2.5 (H) 0.7 - 1.2 mg/dL    GFR Non-African American 31 >=60 mL/min/1.73    GFR African American 31     Calcium 8.9 8.6 - 10.2 mg/dL    Total Protein 7.4 6.4 - 8.3 g/dL    Albumin 3.4 (L) 3.5 - 5.2 g/dL    Total Bilirubin 0.3 0.0 - 1.2 mg/dL    Alkaline Phosphatase 99 40 - 129 U/L    ALT <5 0 - 40 U/L    AST 10 0 - 39 U/L   CBC Auto Differential   Result Value Ref Range    WBC 10.5 4.5 - 11.5 E9/L    RBC 2.52 (L) 3.80 - 5.80 E12/L    Hemoglobin 6.7 (LL) 12.5 - 16.5 g/dL    Hematocrit 22.1 (L) 37.0 - 54.0 %    MCV 87.7 80.0 - 99.9 fL    MCH 26.6 26.0 - 35.0 pg    MCHC 30.3 (L) 32.0 - 34.5 %    RDW 15.4 (H) 11.5 - 15.0 fL    Platelets 707 919 - 660 E9/L    MPV 9.1 7.0 - 12.0 fL    Neutrophils % 77.2 43.0 - 80.0 %    Immature Granulocytes % 0.6 0.0 - 5.0 %    Lymphocytes % 9.8 (L) 20.0 - 42.0 %    Monocytes % 8.4 2.0 - 12.0 %    Eosinophils % 3.6 0.0 - 6.0 %    Basophils % 0.4 0.0 - 2.0 %    Neutrophils Absolute 8.07 (H) 1.80 - 7.30 E9/L    Immature Granulocytes # 0.06 E9/L    Lymphocytes Absolute 1.02 (L) 1.50 - 4.00 E9/L    Monocytes Absolute 0.88 0.10 - 0.95 E9/L    Eosinophils Absolute 0.38 0.05 - 0.50 E9/L    Basophils Absolute 0.04 0.00 - 0.20 E9/L   Troponin   Result Value Ref Range    Troponin <0.01 0.00 - 0.03 ng/mL   CORTISOL   Result Value Ref Range    Cortisol 30.63 (H) 2.68 - 18.40 mcg/dL   TSH without Reflex   Result Value Ref Range    TSH 3.630 0.270 - 4.200 uIU/mL   Brain Natriuretic Peptide   Result Value Ref Range    Pro-BNP 2,859 (H) 0 - 125 pg/mL   Lactic Acid, Plasma   Result Value Ref Range    Lactic Acid 2.7 (H) 0.5 - 2.2 mmol/L   Protime-INR   Result Value Ref Range    Protime 14.7 (H) 9.3 - 12.4 sec    INR 1.2    Serum Drug Screen   Result Value Ref Range    Ethanol Lvl <10 mg/dL    Acetaminophen Level <5.0 (L) 10.0 - 56.1 mcg/mL    Salicylate, Serum <7.4 0.0 - 30.0 mg/dL    TCA Scrn NEGATIVE Cutoff:300 ng/mL   Urinalysis with Microscopic   Result Value Ref Range    Color, UA Yellow Straw/Yellow    Clarity, UA Clear Clear    Glucose, Ur Negative Negative mg/dL    Bilirubin Urine Negative Negative    Ketones, Urine Negative Negative mg/dL    Specific Gravity, UA 1.025 1.005 - 1.030    Blood, Urine Negative Negative    pH, UA 5.5 5.0 - 9.0    Protein, UA Negative Negative mg/dL    Urobilinogen, Urine 0.2 <2.0 E.U./dL    Nitrite, Urine Negative Negative    Leukocyte Esterase, Urine Negative Negative    WBC, UA 0-1 0 - 5 /HPF    RBC, UA 0-1 0 - 2 /HPF    Bacteria, UA NONE SEEN None Seen /HPF   COVID-19   Result Value Ref Range    SARS-CoV-2, NAAT Not Detected Not Detected   T4, free   Result Value Ref Range    T4 Free 1.43 0.93 - 1.70 ng/dL   Magnesium   Result Value Ref Range    Magnesium 1.9 1.6 - 2.6 mg/dL   Hemoglobin and Hematocrit, Blood   Result Value Ref Range    Hemoglobin 6.2 (L) 12.5 - 16.5 g/dL    Hematocrit 20.9 (L) 37.0 - 54.0 %   Procalcitonin   Result Value Ref Range    Procalcitonin 0.14 (H) 0.00 - 0.08 ng/mL   LACTIC ACID, PLASMA   Result Value Ref Range    Lactic Acid 2.2 0.5 - 2.2 mmol/L   Basic Metabolic Panel   Result Value Ref Range    Sodium 137 132 - 146 mmol/L    Potassium 4.2 3.5 - 5.0 mmol/L    Chloride 103 98 - 107 mmol/L    CO2 23 22 - 29 mmol/L    Anion Gap 11 7 - 16 mmol/L    Glucose 118 (H) 74 - 99 mg/dL    BUN 30 (H) 8 - 23 mg/dL CREATININE 1.9 (H) 0.7 - 1.2 mg/dL    GFR Non-African American 43 >=60 mL/min/1.73    GFR African American 43     Calcium 8.9 8.6 - 10.2 mg/dL   EKG 12 Lead   Result Value Ref Range    Ventricular Rate 63 BPM    Atrial Rate 63 BPM    P-R Interval 152 ms    QRS Duration 80 ms    Q-T Interval 460 ms    QTc Calculation (Bazett) 470 ms    P Axis 119 degrees    R Axis 83 degrees    T Axis 71 degrees   TYPE AND SCREEN   Result Value Ref Range    ABO/Rh O POS     Antibody Screen NEG    PREPARE RBC (CROSSMATCH), 2 Units   Result Value Ref Range    Product Code Blood Bank [id number]     Description Blood Bank Red Blood Cells, Leuko-reduced     Unit Number [id number]     Dispense Status Blood Bank issued     Product Code Blood Bank [id number]     Description Blood Bank Red Blood Cells, Leuko-reduced     Unit Number [id number]     Dispense Status Blood Bank issued        RADIOLOGY:  US PELVIS COMPLETE   Final Result   Irregular hypoechoic vascular mass in the right hemipelvis appears to   correspond to the findings on recent CT scan. (Probably represents abnormal lymphadenopathy. Imaging features are   concerning for malignancy.)   RECOMMENDATIONS:   Recommend CT scan of the abdomen and pelvis with IV and oral contrast.  In   particular recommend evaluation of the right hemicolon to exclude GI   malignancy. Ultimately tissue sampling would be warranted. CT ABDOMEN PELVIS WO CONTRAST Additional Contrast? None   Final Result   Normal appearing appendix. Severe right hydronephrosis and dilatation of the proximal right ureter. No   obstructing calculi. 3.5 cm x 3.5 cm soft tissue density/mass right mid   pelvis medial to the iliac vessels. Uncertain if this is the etiology of the   obstruction. Mild prostatomegaly. CT HEAD WO CONTRAST   Final Result   No acute intracranial abnormality. XR CHEST PORTABLE   Final Result   Early bibasilar infiltrates.       NM RENAL WITH LASIX    (Results Pending)       EKG #1:  Interpreted by emergency department physician unless otherwise noted. Time:  1537    Rate: 63 bpm  Rhythm: Paced rhythm. Interpretation: Pacemaker rhythm.          ------------------------- NURSING NOTES AND VITALS REVIEWED ---------------------------  Date / Time Roomed:  2/14/2021 10:03 PM  ED Bed Assignment:  [id number]    The nursing notes within the ED encounter and vital signs as below have been reviewed.      Patient Vitals for the past 24 hrs:   BP Temp Temp src Pulse Resp SpO2 Height Weight   02/15/21 1444 (!) 124/59 98.1 °F (36.7 °C) Oral 69 16 97 % -- --   02/15/21 1110 (!) 106/59 97.8 °F (36.6 °C) Oral 70 16 100 % -- --   02/15/21 1033 (!) 97/51 97.3 °F (36.3 °C) Oral 67 16 97 % -- --   02/15/21 1000 -- -- -- -- -- -- -- 214 lb 8 oz (97.3 kg)   02/15/21 0814 (!) 112/53 98 °F (36.7 °C) Oral 71 16 98 % -- --   02/15/21 0707 (!) 113/55 98.1 °F (36.7 °C) Oral 68 16 96 % -- --   02/15/21 0430 112/60 98.2 °F (36.8 °C) Oral 72 16 -- -- --   02/15/21 0410 (!) 90/50 98.3 °F (36.8 °C) Oral 70 15 96 % -- --   02/15/21 0226 (!) 105/52 98 °F (36.7 °C) Oral 70 14 96 % 5' 7\" (1.702 m) --   02/15/21 0118 (!) 108/51 -- -- 67 10 -- -- --   02/15/21 0049 116/63 -- -- 77 17 98 % -- --   02/15/21 0018 122/71 -- -- 73 22 100 % -- --   02/15/21 0004 117/68 -- -- 75 21 98 % -- --   02/14/21 2348 (!) 107/54 -- -- 83 14 96 % -- --   02/14/21 2333 (!) 100/55 -- -- 75 14 100 % -- --   02/14/21 2326 (!) 107/53 -- -- 69 12 91 % -- --   02/14/21 2319 (!) 75/38 -- -- 75 19 -- -- --   02/14/21 2222 97/62 -- -- 65 17 99 % -- --   02/14/21 2214 -- -- -- -- -- 99 % -- --       Oxygen Saturation Interpretation: Normal    ------------------------------------------ PROGRESS NOTES ------------------------------------------  Re-evaluation(s):  Patients symptoms show no change  Repeat physical examination is not changed    Counseling:  I have spoken with the patient and discussed todays results, in addition to providing specific details for the plan of care and counseling regarding the diagnosis and prognosis. Their questions are answered at this time and they are agreeable with the plan of admission.    --------------------------------- ADDITIONAL PROVIDER NOTES ---------------------------------  Consultations:  Spoke with Dr. Gus Darby for Dr. Nadine Cerrato. Discussed case. They will admit the patient. This patient's ED course included: a personal history and physicial examination, re-evaluation prior to disposition and multiple bedside re-evaluations    This patient has remained hemodynamically stable during their ED course. Diagnosis:  1. Acute kidney injury ([facility] [address]    2. Anemia requiring transfusions    3. Pelvic mass        Disposition:  Patient's disposition: Admit to telemetry  Patient's condition is fair.                    Linda Baires 44., DO  Resident  02/15/21 [id number]

## 2021-02-15 NOTE — CONSULTS
2/15/2021 10:00 AM  Service: Urology  Group: [facility] ([facility])    Wayne Bowden  [id number]     Chief Complaint:    Right Hydronephrosis     History of Present Illness: The patient is a 72 y.o. male patient who presented to the hospital yesterday for syncopal episode that occurred at home shortly after taking Viagra. He recently had pacemaker replacement at [facility] a few weeks ago. He complains of feeling fatigued and weak since that time. In the ED he was found to have SUNNY with a Creatinine of 2.5 and anemia with a hemoglobin of 6.7. He then had a CT abdomen pelvis performed that showed right hydronephrosis as well as a mass in the right mid pelvis. He does report intermittent right flank pain for the last few weeks. He denies any gross hematuria. He has seen Dr. Jeff Mcqueen at [facility] back in 2018. At that time he states he saw him for microscopic hematuria. He had an unremarkable office cystoscopy and was also diagnosed with BPH and prescribed Tamsulosin. He has not seen a Urologist since that time. He does still experience nocturia and occassional weak stream.     Past Medical History:   Diagnosis Date    Anxiety     Arthritis     Coronary artery disease     Hyperlipidemia     Hypertension     Pneumonia     Sinus tachycardia 01/01/2002         Past Surgical History:   Procedure Laterality Date    ABLATION OF DYSRHYTHMIC FOCUS      AORTA SURGERY      aortic valve replacement    AORTIC VALVE REPLACEMENT      MITRAL VALVE REPLACEMENT      OTHER SURGICAL HISTORY  08/12/2016    CT Myelogram, Dr. Argenis Ugarte  2014 new battery    last checked Dr Thaddeus Carrel 1/2016?     TONSILLECTOMY      UPPER GASTROINTESTINAL ENDOSCOPY      reflux    VASECTOMY         Medications Prior to Admission:    Medications Prior to Admission: vitamin B-12 (CYANOCOBALAMIN) 100 MCG tablet, Take 50 mcg by mouth daily  sennosides-docusate sodium (SENOKOT-S) 8.6-50 MG tablet, Take 1 tablet by mouth 2 times daily  sotalol (BETAPACE) 80 MG tablet, Take 40 mg by mouth  tamsulosin (FLOMAX) 0.4 MG capsule, Take 0.4 mg by mouth daily   ferrous sulfate 325 (65 Fe) MG tablet, Take 325 mg by mouth 2 times daily   albuterol sulfate  (90 BASE) MCG/ACT inhaler, Inhale 2 puffs into the lungs as needed for Wheezing  esomeprazole Magnesium (NEXIUM) 20 MG PACK, Take 20 mg by mouth daily  lisinopril (PRINIVIL;ZESTRIL) 40 MG tablet, Take 40 mg by mouth daily  HYDROCHLOROTHIAZIDE PO, Take 25 mg by mouth daily   PRAVASTATIN SODIUM PO, Take 20 mg by mouth daily   aspirin 325 MG EC tablet, Take 325 mg by mouth daily Last dose 3/1/16  albuterol (PROAIR HFA) 108 (90 BASE) MCG/ACT inhaler, Inhale 2 puffs into the lungs every 6 hours as needed for Wheezing for up to 7 days. XARELTO 20 MG TABS tablet, daily (with breakfast)   FLUZONE HIGH-DOSE 0.5 ML KOKO injection,   [DISCONTINUED] furosemide (LASIX) 40 MG tablet, Take 40 mg by mouth    Allergies:    Patient has no known allergies. Social History:    reports that he quit smoking about 6 years ago. He has a 44.00 pack-year smoking history. He has never used smokeless tobacco. He reports current alcohol use of about 2.0 standard drinks of alcohol per week. He reports that he does not use drugs. Family History:   Non-contributory to this Urological problem  family history includes Brain Cancer in his sister; Cancer in his maternal grandfather; Diabetes in his father and mother; Heart Disease in his father; Stroke in his brother, mother, and sister.     Review of Systems:  Constitutional: No fever or chills, weak   Respiratory: negative for cough and hemoptysis  Cardiovascular: negative for chest pain and dyspnea  Gastrointestinal: negative for abdominal pain, diarrhea, nausea and vomiting   Derm: negative for rash and skin lesion(s)  Neurological: negative for seizures and tremors, dizziness, lightheaded   Musculoskeletal: Negative Psychiatric: Negative   : As above in the HPI, otherwise negative  All other reviews are negative    Physical Exam:     Vitals:  BP (!) 112/53   Pulse 71   Temp 98 °F (36.7 °C) (Oral)   Resp 16   Ht 5' 7\" (1.702 m)   SpO2 98%   BMI 36.81 kg/m²     General:  Awake, alert, oriented X 3. No apparent distress. HEENT:  Normocephalic, atraumatic. Lungs:  Respirations symmetric and non-labored. Abdomen:  soft, nontender, no masses  Extremities:  No clubbing, cyanosis, or edema  Skin:  Warm and dry, no open lesions or rashes  Neuro: There are no motor or sensory deficits in the 4 quadrant extremities   Rectal: deferred  Genitourinary:  No varner     Labs:     Recent Labs     02/14/21 2216 02/15/21  0322   WBC 10.5  --    RBC 2.52*  --    HGB 6.7* 6.2*   HCT 22.1* 20.9*   MCV 87.7  --    MCH 26.6  --    MCHC 30.3*  --    RDW 15.4*  --      --    MPV 9.1  --          Recent Labs     02/14/21 2216   CREATININE 2.5*       Imaging:   Narrative   EXAMINATION:   CT OF THE ABDOMEN AND PELVIS WITHOUT CONTRAST 2/15/2021 12:04 am   TECHNIQUE:   CT of the abdomen and pelvis was performed without the administration of   intravenous contrast. Multiplanar reformatted images are provided for review. Dose modulation, iterative reconstruction, and/or weight based adjustment of   the mA/kV was utilized to reduce the radiation dose to as low as reasonably   achievable. COMPARISON:   None. HISTORY:   ORDERING SYSTEM PROVIDED HISTORY: [facility] abdominal pain   TECHNOLOGIST PROVIDED HISTORY:   Additional Contrast?->None   Reason for exam:->rlq abdominal pain   FINDINGS:   Lower Chest: The lung bases are unremarkable. Organs: The liver, spleen, adrenal glands, pancreas and gallbladder are   unremarkable.  Severe right hydronephrosis and dilatation of the proximal   right ureter.  No obstructing calculi.  3.5 cm x 3.5 cm soft tissue density   right mid pelvis medial to the iliac vessels. GI/Bowel:  The large small bowel and appendix are unremarkable. Pelvis: The urinary bladder is grossly normal.  Mild prostatomegaly. Peritoneum/Retroperitoneum: No free fluid or free air.  Calcified plaque   involving the abdominal aorta and iliac arteries. Bones/Soft Tissues: Degenerative changes thoracic spine and hip joints.       Impression   Normal appearing appendix. Severe right hydronephrosis and dilatation of the proximal right ureter.  No   obstructing calculi.  3.5 cm x 3.5 cm soft tissue density/mass right mid   pelvis medial to the iliac vessels.  Uncertain if this is the etiology of the   obstruction.    Mild prostatomegaly.                         Assessment/plan:  Right Hydronephrosis   Right sided pelvic mass   SUNNY   Anemia   ED    UA unremarkable  Urine cytology ordered  Cont to watch the creatinine  Cont to watch the hemoglobin  Transfusions per primary   GI following, EGD planned for 2/16 with cardiac clearance  CT reviewed, right hydronephrosis of unclear etiology, possible extrinsic compression from right sided pelvic mass  Bladder PVR ordered  MAG 3 Renal Lasix scan ordered to rule out obstruction   If evidence of high grade obstruction or worsening azotemia, will be added on for cysto, right stent insertion  This was explained in detail  He was advised to stop Viagra at this time until cleared by cardiology in the future   NPO after midnight  Will follow                     Electronically signed by TRACIE Tinsley CNP on 2/15/2021 at 10:00 AM   I have interviewed and examined the pt and agree with the above note and plan

## 2021-02-15 NOTE — CONSULTS
Cardiology Consult    Patient is 60-year-old male who presented to the ED due to lightheadedness. Patient states that he has been feeling lightheaded for the past 1 to 2 weeks. However today he took Viagra and felt extra lightheaded. As such he presented to the ED. He does also complain of some epigastric pain that has moved to the right lower quadrant. He he denies fever or chills. He denies nausea or vomiting. He denies diarrhea or constipation. Asked to see re MVR/AVR/ and recent pacemaker lead extraction and anticoagulation in face of severe blood loss anemia.     PAST MEDICAL Hx:  Past Medical History[]Expand by Default        Past Medical History:   Diagnosis Date    Anxiety      Arthritis      Hyperlipidemia      Hypertension      Pneumonia      Sinus tachycardia 2002            PAST SURGICAL Hx:   Past Surgical History[]Expand by Default         Past Surgical History:   Procedure Laterality Date    AORTA SURGERY        OTHER SURGICAL HISTORY   08/12/2016     CT Myelogram, Dr. Marino Kellogg   2014 new battery     last checked Dr Terry Amor 1/2016?  TONSILLECTOMY        UPPER GASTROINTESTINAL ENDOSCOPY         reflux    VASECTOMY                FAMILY Hx:  Family History[]Expand by Default         Family History   Problem Relation Age of Onset    Diabetes Mother      Stroke Mother      Diabetes Father      Heart Disease Father              HOME MEDICATIONS:  Home Medications[]Expand by Default           Prior to Admission medications    Medication Sig Start Date End Date Taking?  Authorizing Provider   furosemide (LASIX) 40 MG tablet Take 40 mg by mouth 4/17/17     Historical Provider, MD   sotalol (BETAPACE) 80 MG tablet Take 40 mg by mouth 4/10/17     Historical Provider, MD   XARELTO 20 MG TABS tablet   12/21/17     Historical Provider, MD   tamsulosin (FLOMAX) 0.4 MG capsule   1/4/18     Historical Provider, MD   FLUZONE HIGH-DOSE 0.5 ML KOKO injection   10/11/17     Historical Provider, MD   ferrous sulfate 325 (65 Fe) MG tablet   18     Historical Provider, MD   albuterol sulfate  (90 BASE) MCG/ACT inhaler Inhale 2 puffs into the lungs as needed for Wheezing       Historical Provider, MD   esomeprazole Magnesium (NEXIUM) 20 MG PACK Take 20 mg by mouth daily       Historical Provider, MD   lisinopril (PRINIVIL;ZESTRIL) 40 MG tablet Take 40 mg by mouth daily       Historical Provider, MD   HYDROCHLOROTHIAZIDE PO Take 25 mg by mouth daily        Historical Provider, MD   PRAVASTATIN SODIUM PO Take 20 mg by mouth daily        Historical Provider, MD   VERAPAMIL HCL PO Take 200 mg by mouth daily        Historical Provider, MD   aspirin 325 MG EC tablet Take 325 mg by mouth daily Last dose 3/1/16       Historical Provider, MD   albuterol (PROAIR HFA) 108 (90 BASE) MCG/ACT inhaler Inhale 2 puffs into the lungs every 6 hours as needed for Wheezing for up to 7 days. 2/9/15 2/16/15   VENITA Cagle            ALLERGIES:  Patient has no known allergies.     SOCIAL Hx:  Social History   []Expand by Default            Socioeconomic History    Marital status:        Spouse name: Not on file    Number of children: Not on file    Years of education: Not on file    Highest education level: Not on file   Occupational History    Not on file   Social Needs    Financial resource strain: Not on file    Food insecurity       Worry: Not on file       Inability: Not on file    Transportation needs       Medical: Not on file       Non-medical: Not on file   Tobacco Use    Smoking status: Former Smoker       Packs/day: 1.00       Years: 44.00       Pack years: 44.00       Quit date: 3/4/2014       Years since quittin.9    Smokeless tobacco: Never Used   Substance and Sexual Activity    Alcohol use:  Yes       Alcohol/week: 2.0 standard drinks       Types: 2 Shots of liquor per week       Comment: month    Drug use: No    Sexual activity: Not on file   Lifestyle    Physical activity       Days per week: Not on file       Minutes per session: Not on file    Stress: Not on file   Relationships    Social connections       Talks on phone: Not on file       Gets together: Not on file       Attends Church service: Not on file       Active member of club or organization: Not on file       Attends meetings of clubs or organizations: Not on file       Relationship status: Not on file    Intimate partner violence       Fear of current or ex partner: Not on file       Emotionally abused: Not on file       Physically abused: Not on file       Forced sexual activity: Not on file   Other Topics Concern    Not on file   Social History Narrative    Not on file            ROS: Positive in bold  General:   Denies chills, fatigue, fever, malaise, night sweats or weight loss     Psychological:   Denies anxiety, disorientation or hallucinations     ENT:    Denies epistaxis, headaches, vertigo or visual changes     Cardiovascular:   Denies any chest pain, irregular heartbeats, or palpitations. No paroxysmal nocturnal dyspnea.     Respiratory:   Denies shortness of breath, coughing, sputum production, hemoptysis, or wheezing. No orthopnea.     Gastrointestinal:   Denies nausea, vomiting, diarrhea, or constipation. Denies any abdominal pain. Denies change in bowel habits or stools.       Genito-Urinary:    Denies any urgency, frequency, hematuria. Voiding without difficulty.     Musculoskeletal:   Denies joint pain, joint stiffness, joint swelling or muscle pain     Neurology:    Denies any headache or focal neurological deficits. No weakness or paresthesia.     Derm:    Denies any rashes, ulcers, or excoriations. Denies bruising.       Extremities:   Denies any lower extremity swelling or edema.        PHYSICAL EXAM:  VITALS:      Vitals:     02/14/21 2222   BP: 97/62   Pulse: 65   Resp: 17   Temp:     SpO2: 99%            CONSTITUTIONAL:    Awake, alert, cooperative, no apparent distress, and appears stated age     EYES:    PERRL, EOMI, sclera clear, conjunctiva normal     ENT:    Normocephalic, atraumatic, sinuses nontender on palpation. External ears without lesions.      NECK:    Supple, symmetrical, trachea midline, no adenopathy, thyroid symmetric, not enlarged and no tenderness, skin normal, no bruits, no JVD     HEMATOLOGIC/LYMPHATICS:    No cervical lymphadenopathy and no supraclavicular lymphadenopathy     LUNGS:    Symmetric. No increased work of breathing, good air exchange, clear to auscultation bilaterally, no wheezes, rhonchi, or rales,      CARDIOVASCULAR:    Murmur is present and heart rhythm irregular     ABDOMEN:    No scars, normal bowel sounds, soft, non-distended, non-tender, no masses palpated, no hepatosplenomegaly, no rebound or guarding elicited on palpation      MUSCULOSKELETAL:    There is no redness, warmth, or swelling of the joints. Full range of motion noted. Motor strength is 5 out of 5 all extremities bilaterally. Tone is normal.     NEUROLOGIC:    Awake, alert, oriented to name, place and time. Cranial nerves II-XII are grossly intact. Motor is 5 out of 5 bilaterally.       SKIN:    No bruising or bleeding. No redness, warmth, or swelling     EXTREMITIES:    Peripheral pulses present.   No edema, cyanosis, or swelling.        LABORATORY DATA:  CBC with Differential:          Lab Results   Component Value Date     WBC 7.5 08/12/2016     RBC 4.26 08/12/2016     HGB 11.6 08/12/2016     HCT 35.2 08/12/2016      08/12/2016     MCV 82.5 08/12/2016     MCH 27.2 08/12/2016     MCHC 33.0 08/12/2016     RDW 14.1 08/12/2016     LYMPHOPCT 15 02/09/2015     MONOPCT 7 02/09/2015     BASOPCT 1 02/09/2015     MONOSABS 0.65 02/09/2015     LYMPHSABS 1.48 02/09/2015     EOSABS 0.24 02/09/2015     BASOSABS 0.05 02/09/2015      CMP:          Lab Results   Component Value Date      02/14/2021     K 4.2 02/14/2021     CL 98 02/14/2021     CO2 22 02/14/2021     BUN 43 02/14/2021     CREATININE 2.5 02/14/2021     GFRAA 31 02/14/2021     LABGLOM 31 02/14/2021     GLUCOSE 123 02/14/2021     GLUCOSE 100 01/28/2011     PROT 7.4 02/14/2021     LABALBU 3.4 02/14/2021     LABALBU 3.9 01/28/2011     CALCIUM 8.9 02/14/2021     BILITOT 0.3 02/14/2021     ALKPHOS 99 02/14/2021     AST 10 02/14/2021     ALT <5 02/14/2021 12/17/2020 [facility] ECHO:  LVH  MITRAL VALVE  Post mitral valve replacement. Biocor prosthetic valve size #29. There is trace   (trace - 1+) mitral valve regurgitation. The peak mitral valve gradient is 18     AORTIC VALVE  Trifecta prosthetic valve size #21. There is trace aortic valve regurgitation. The   peak gradient is 27 mmHg (peak velocity = 259.0 cm/s). The mean gradient is 13      Imp/Plan:  Anemia secondary to GI bleed  Was on DOAC (xarelto)  SUNNY  Right hydronephrosis  Pelvic mass  Prostatomegaly  Coronary artery disease status post CABG 2017 [facility]  Recent Lexiscan stress \"low risk small anteroapical defect\"  Aortic valve replacement-Trifecta #21 2017  Mitral valve replacement-Biocor #29; 2017; with mild MR  Hypertension  Hyperlipidemia  Pacemaker in situ with recent lead extractions and new RA and RV lead insertions and pulse generator change [facility]-12/18/20  Sick sinus syndrome  History of atrial fibrillation;  No recurrence on recent Pacer interrogation  History of tobacco abuse  Currently vapes    Low risk for endoscopy  Holding DOAC xarelto  No clear need for prophylactic Abx for EGD/Colonoscopy  Echocardiogram  Monitor closely  Restart anticoagulation once bleeding resolved

## 2021-02-15 NOTE — CONSULTS
[facility]  Dr. Krystin Brooks M.D., Dr. Thierry Callejas M.D., Dr. Ely Hunt D.O., Dr. Georges Zapata M.D., Dr. Izabel Braun D.O. Patient Name: Heriberto Phoenix  MRN: [id number]  : 1950 (72 y.o. male)  Allergies: has No Known Allergies. Date of Service: 2/15/2021       Reason for Consultation: Anemia    HISTORY OF PRESENT ILLNESS:    Patient is a 52-year-old male with a past medical history significant for history of sick sinus syndrome status post pacemaker placement, history of aortic valve replacement, history of mitral valve replacement, essential hypertension, BPH. History of A. fib. Patient presented emergency room main point of altered mental status that began several hours prior. Patient recently had pacemaker replaced approximately a week and a half ago. Patient states ever since that he has felt weak and fatigued. Patient also states night of discharge he did take Viagra approximately an hour prior to presenting to the emergency room and he felt very lightheaded and dizzy. Patient was found slumped in his chair by his family. In the ER patient underwent routine lab work patient found have a creatinine of 2.5 BUN of 43 CO2 22 chloride of 98 potassium of 4.2 and a sodium of 133 along with elevated proBNP 2859 with a troponin of less than 0.01. CBC was obtained patient was found to have a hemoglobin of 6.7 with an MCV of 87.7 and a platelet count of 983 with WBCs of 10,500. Patient underwent CT the abdomen which showed normal-appearing appendix with severe right hydronephrosis and dilation of the proximal right ureter and a 3.5 cm x 3.5 cm soft tissue density mass in the right mid pelvis. Patient was seen and evaluated on the fourth floor this AM.  Patient denies any melena hematochezia or hematemesis. Patient denies any dysphagia or reflux or heartburn at night. Patient denies any use of ibuprofen or Aleve at home.   Patient states he is on chronic Xarelto for history of A. fib at home with last dose approximately 2/13. Patient admits to remote history of EGD approximately 15 years prior unsure of results. Patient with colonoscopy in 2016 with Dr. Charles Mathias with 7 mm polyp found in the rectosigmoid junction. REVIEW OF SYSTEMS:   Constitutional: Denies fever, chills, or unintentional weight loss. HEENT: Denies double or blurry vision, headaches, ear pain or ringing in the ears. No drainage from the ears, nose or throat. Cardiovascular: Denies any chest pain, irregular heartbeats, or palpitations. Respiratory: Denies shortness of breath, coughing, sputum production, hemoptysis, or wheezing. Gastrointestinal: Denies nausea, vomiting, diarrhea, or constipation. Denies any abdominal pain, black or bloody stools. Genitourinary: Denies any urinary urgency, frequency, hematuria. Voiding without difficulty. Endocrine: Denies sensitivity to heat or cold. Denies changes in hair, skin or nails. Denies excessive thirst, hunger or going to the bathroom more frequently than usual.  Extremities: Denies swelling or calf pain. Musculoskeletal: Denies muscle or joint pain, stiffness, arthritis, gout, or instability. Dermatology / Skin: Denies any rashes, ulcers, or excoriations. Denies bruising. Neurology: Denies any bowel or bladder incontinence, headache or focal neurological deficits. No weakness or paresthesia. Denies numbness or tingling in the hands or feet. Psychiatric: Denies nervousness/anxiety, depression or memory loss.       Past Medical History:  Past Medical History:   Diagnosis Date    Anxiety     Arthritis     Coronary artery disease     Hyperlipidemia     Hypertension     Pneumonia     Sinus tachycardia 01/01/2002       Past Surgical History:  Past Surgical History:   Procedure Laterality Date    ABLATION OF DYSRHYTHMIC FOCUS      AORTA SURGERY      aortic valve replacement    AORTIC VALVE REPLACEMENT      MITRAL VALVE REPLACEMENT      OTHER SURGICAL HISTORY  08/12/2016    CT Myelogram, Dr. Bambi Chen  2014 new battery    last checked Dr Fawn Means 1/2016?  TONSILLECTOMY      UPPER GASTROINTESTINAL ENDOSCOPY      reflux    VASECTOMY         Home Medications:  Prior to Admission medications    Medication Sig Start Date End Date Taking? Authorizing Provider   vitamin B-12 (CYANOCOBALAMIN) 100 MCG tablet Take 50 mcg by mouth daily   Yes Historical Provider, MD   sennosides-docusate sodium (SENOKOT-S) 8.6-50 MG tablet Take 1 tablet by mouth 2 times daily   Yes Historical Provider, MD   sotalol (BETAPACE) 80 MG tablet Take 40 mg by mouth 4/10/17  Yes Historical Provider, MD   tamsulosin (FLOMAX) 0.4 MG capsule Take 0.4 mg by mouth daily  1/4/18  Yes Historical Provider, MD   ferrous sulfate 325 (65 Fe) MG tablet Take 325 mg by mouth 2 times daily  1/8/18  Yes Historical Provider, MD   albuterol sulfate  (90 BASE) MCG/ACT inhaler Inhale 2 puffs into the lungs as needed for Wheezing   Yes Historical Provider, MD   esomeprazole Magnesium (NEXIUM) 20 MG PACK Take 20 mg by mouth daily   Yes Historical Provider, MD   lisinopril (PRINIVIL;ZESTRIL) 40 MG tablet Take 40 mg by mouth daily   Yes Historical Provider, MD   HYDROCHLOROTHIAZIDE PO Take 25 mg by mouth daily    Yes Historical Provider, MD   PRAVASTATIN SODIUM PO Take 20 mg by mouth daily    Yes Historical Provider, MD   aspirin 325 MG EC tablet Take 325 mg by mouth daily Last dose 3/1/16   Yes Historical Provider, MD   albuterol (PROAIR HFA) 108 (90 BASE) MCG/ACT inhaler Inhale 2 puffs into the lungs every 6 hours as needed for Wheezing for up to 7 days. 2/9/15 2/15/21 Yes VENITA Bazzi   XARELTO 20 MG TABS tablet daily (with breakfast)  12/21/17   Historical Provider, MD   FLUZONE HIGH-DOSE 0.5 ML KOKO injection  10/11/17   Historical Provider, MD       Allergies: Patient has no known allergies.     Social History:  Social History     Socioeconomic History    Marital status:      Spouse name: Not on file    Number of children: Not on file    Years of education: Not on file    Highest education level: Not on file   Occupational History    Not on file   Social Needs    Financial resource strain: Not on file    Food insecurity     Worry: Not on file     Inability: Not on file    Transportation needs     Medical: Not on file     Non-medical: Not on file   Tobacco Use    Smoking status: Former Smoker     Packs/day: 1.00     Years: 44.00     Pack years: 44.00     Quit date: 3/4/2014     Years since quittin.9    Smokeless tobacco: Never Used   Substance and Sexual Activity    Alcohol use:  Yes     Alcohol/week: 2.0 standard drinks     Types: 2 Shots of liquor per week     Comment: month    Drug use: No    Sexual activity: Not on file   Lifestyle    Physical activity     Days per week: Not on file     Minutes per session: Not on file    Stress: Not on file   Relationships    Social connections     Talks on phone: Not on file     Gets together: Not on file     Attends Jainism service: Not on file     Active member of club or organization: Not on file     Attends meetings of clubs or organizations: Not on file     Relationship status: Not on file    Intimate partner violence     Fear of current or ex partner: Not on file     Emotionally abused: Not on file     Physically abused: Not on file     Forced sexual activity: Not on file   Other Topics Concern    Not on file   Social History Narrative    Not on file       Family History:  Family History   Problem Relation Age of Onset    Diabetes Mother     Stroke Mother     Diabetes Father     Heart Disease Father     Brain Cancer Sister     Stroke Sister     Stroke Brother     Cancer Maternal Grandfather          PHYSICAL EXAM:  Vital Signs: BP (!) 112/53   Pulse 71   Temp 98 °F (36.7 °C) (Oral)   Resp 16   Ht 5' 7\" (1.702 m)   SpO2 98%   BMI 36.81 kg/m²   GENERAL APPEARANCE: awake, alert, oriented, cooperative, and in no acute distress  EYES:  Lids and lashes normal, PERRLA, EOMI, sclera clear, conjunctiva normal  HENT:  Normocephalic, without obvious abnormality, atramatic, oral pharynx with moist mucus membranes, tonsils without erythema or exudates  NECK:  Supple with no carotid bruits, JVD or thyromegaly. No cervical adenopathy  LUNGS:  Clear to auscultation bilaterally with no wheezes, rales or rhonchi. No increased work of breathing, good air exchange. CARDIOVASCULAR: Regular rate and rhythm, no murmur  ABDOMEN:  normal bowel sounds in all 4 quadrants, soft, non-distended, non-tender, no masses palpated, no hepatosplenomegally  MUSCULOSKELETAL:  There is no redness, warmth, or swelling of the joints. Full range of motion noted. Motor strength is 5 out of 5 all extremities bilaterally. Tone is normal.  EXTREMITIES: No edema, 2+ pulses bilaterally (radial and dorsalis pedis)  NEUROLOGIC:  Awake, alert, oriented to name, place and time. Cranial nerves II-XII are grossly intact. Motor is 5 out of 5 bilaterally. SKIN: Normal skin color, texture, and turgor. There is no redness, warmth, or swelling. No bruising or bleeding, no mottling. PSYCH: Affect, behavior and insight are all within normal limits.       DATA:  Results for orders placed or performed during the hospital encounter of 02/14/21   Comprehensive Metabolic Panel w/ Reflex to MG   Result Value Ref Range    Sodium 133 132 - 146 mmol/L    Potassium reflex Magnesium 4.2 3.5 - 5.0 mmol/L    Chloride 98 98 - 107 mmol/L    CO2 22 22 - 29 mmol/L    Anion Gap 13 7 - 16 mmol/L    Glucose 123 (H) 74 - 99 mg/dL    BUN 43 (H) 8 - 23 mg/dL    CREATININE 2.5 (H) 0.7 - 1.2 mg/dL    GFR Non-African American 31 >=60 mL/min/1.73    GFR African American 31     Calcium 8.9 8.6 - 10.2 mg/dL    Total Protein 7.4 6.4 - 8.3 g/dL    Albumin 3.4 (L) 3.5 - 5.2 g/dL    Total Bilirubin 0.3 0.0 - 1.2 mg/dL    Alkaline Phosphatase 99 40 - 129 U/L    ALT <5 0 - 40 U/L    AST 10 0 - 39 U/L   CBC Auto Differential   Result Value Ref Range    WBC 10.5 4.5 - 11.5 E9/L    RBC 2.52 (L) 3.80 - 5.80 E12/L    Hemoglobin 6.7 (LL) 12.5 - 16.5 g/dL    Hematocrit 22.1 (L) 37.0 - 54.0 %    MCV 87.7 80.0 - 99.9 fL    MCH 26.6 26.0 - 35.0 pg    MCHC 30.3 (L) 32.0 - 34.5 %    RDW 15.4 (H) 11.5 - 15.0 fL    Platelets 236 677 - 725 E9/L    MPV 9.1 7.0 - 12.0 fL    Neutrophils % 77.2 43.0 - 80.0 %    Immature Granulocytes % 0.6 0.0 - 5.0 %    Lymphocytes % 9.8 (L) 20.0 - 42.0 %    Monocytes % 8.4 2.0 - 12.0 %    Eosinophils % 3.6 0.0 - 6.0 %    Basophils % 0.4 0.0 - 2.0 %    Neutrophils Absolute 8.07 (H) 1.80 - 7.30 E9/L    Immature Granulocytes # 0.06 E9/L    Lymphocytes Absolute 1.02 (L) 1.50 - 4.00 E9/L    Monocytes Absolute 0.88 0.10 - 0.95 E9/L    Eosinophils Absolute 0.38 0.05 - 0.50 E9/L    Basophils Absolute 0.04 0.00 - 0.20 E9/L   Troponin   Result Value Ref Range    Troponin <0.01 0.00 - 0.03 ng/mL   TSH without Reflex   Result Value Ref Range    TSH 3.630 0.270 - 4.200 uIU/mL   Brain Natriuretic Peptide   Result Value Ref Range    Pro-BNP 2,859 (H) 0 - 125 pg/mL   Lactic Acid, Plasma   Result Value Ref Range    Lactic Acid 2.7 (H) 0.5 - 2.2 mmol/L   Protime-INR   Result Value Ref Range    Protime 14.7 (H) 9.3 - 12.4 sec    INR 1.2    Serum Drug Screen   Result Value Ref Range    Ethanol Lvl <10 mg/dL    Acetaminophen Level <5.0 (L) 10.0 - 47.0 mcg/mL    Salicylate, Serum <3.8 0.0 - 30.0 mg/dL   Urinalysis with Microscopic   Result Value Ref Range    Color, UA Yellow Straw/Yellow    Clarity, UA Clear Clear    Glucose, Ur Negative Negative mg/dL    Bilirubin Urine Negative Negative    Ketones, Urine Negative Negative mg/dL    Specific Gravity, UA 1.025 1.005 - 1.030    Blood, Urine Negative Negative    pH, UA 5.5 5.0 - 9.0    Protein, UA Negative Negative mg/dL    Urobilinogen, Urine 0.2 <2.0 E.U./dL    Nitrite, Urine Negative Negative    Leukocyte Esterase, Urine Negative Negative    WBC, UA 0-1 0 - 5 /HPF    RBC, UA 0-1 0 - 2 /HPF    Bacteria, UA NONE SEEN None Seen /HPF   COVID-19   Result Value Ref Range    SARS-CoV-2, NAAT Not Detected Not Detected   T4, free   Result Value Ref Range    T4 Free 1.43 0.93 - 1.70 ng/dL   Magnesium   Result Value Ref Range    Magnesium 1.9 1.6 - 2.6 mg/dL   Hemoglobin and Hematocrit, Blood   Result Value Ref Range    Hemoglobin 6.2 (L) 12.5 - 16.5 g/dL    Hematocrit 20.9 (L) 37.0 - 54.0 %   EKG 12 Lead   Result Value Ref Range    Ventricular Rate 63 BPM    Atrial Rate 63 BPM    P-R Interval 152 ms    QRS Duration 80 ms    Q-T Interval 460 ms    QTc Calculation (Bazett) 470 ms    P Axis 119 degrees    R Axis 83 degrees    T Axis 71 degrees   TYPE AND SCREEN   Result Value Ref Range    ABO/Rh O POS     Antibody Screen NEG    PREPARE RBC (CROSSMATCH), 2 Units   Result Value Ref Range    Product Code Blood Bank [id number]     Description Blood Bank Red Blood Cells, Leuko-reduced     Unit Number [id number]     Dispense Status Blood Bank issued     Product Code Blood Bank [id number]     Description Blood Bank Red Blood Cells, Leuko-reduced     Unit Number [id number]     Dispense Status Blood Bank selected          IMAGING:  Ct Abdomen Pelvis Wo Contrast Additional Contrast? None    Result Date: 2/15/2021  EXAMINATION: CT OF THE ABDOMEN AND PELVIS WITHOUT CONTRAST 2/15/2021 12:04 am TECHNIQUE: CT of the abdomen and pelvis was performed without the administration of intravenous contrast. Multiplanar reformatted images are provided for review. Dose modulation, iterative reconstruction, and/or weight based adjustment of the mA/kV was utilized to reduce the radiation dose to as low as reasonably achievable. COMPARISON: None. HISTORY: ORDERING SYSTEM PROVIDED HISTORY: rlq abdominal pain TECHNOLOGIST PROVIDED HISTORY: Additional Contrast?->None Reason for exam:->rlq abdominal pain FINDINGS: Lower Chest: The lung bases are unremarkable. Organs:  The liver, spleen, adrenal glands, pancreas and gallbladder are unremarkable. Severe right hydronephrosis and dilatation of the proximal right ureter. No obstructing calculi. 3.5 cm x 3.5 cm soft tissue density right mid pelvis medial to the iliac vessels. GI/Bowel: The large small bowel and appendix are unremarkable. Pelvis: The urinary bladder is grossly normal.  Mild prostatomegaly. Peritoneum/Retroperitoneum: No free fluid or free air. Calcified plaque involving the abdominal aorta and iliac arteries. Bones/Soft Tissues: Degenerative changes thoracic spine and hip joints. Normal appearing appendix. Severe right hydronephrosis and dilatation of the proximal right ureter. No obstructing calculi. 3.5 cm x 3.5 cm soft tissue density/mass right mid pelvis medial to the iliac vessels. Uncertain if this is the etiology of the obstruction. Mild prostatomegaly. Ct Head Wo Contrast    Result Date: 2/14/2021  EXAMINATION: CT OF THE HEAD WITHOUT CONTRAST  2/14/2021 9:45 pm TECHNIQUE: CT of the head was performed without the administration of intravenous contrast. Dose modulation, iterative reconstruction, and/or weight based adjustment of the mA/kV was utilized to reduce the radiation dose to as low as reasonably achievable. COMPARISON: None. HISTORY: ORDERING SYSTEM PROVIDED HISTORY: mental status change TECHNOLOGIST PROVIDED HISTORY: Has a \"code stroke\" or \"stroke alert\" been called? ->No Reason for exam:->mental status change FINDINGS: BRAIN/VENTRICLES: There is no acute intracranial hemorrhage, mass effect or midline shift. No abnormal extra-axial fluid collection. The gray-white differentiation is maintained without evidence of an acute infarct. There is no evidence of hydrocephalus. Periventricular white matter changes consistent chronic microvascular disease. ORBITS: The visualized portion of the orbits demonstrate no acute abnormality.  SINUSES: The visualized paranasal sinuses and mastoid air cells demonstrate no acute abnormality. SOFT TISSUES/SKULL:  No acute abnormality of the visualized skull or soft tissues. No acute intracranial abnormality. Xr Chest Portable    Result Date: 2/14/2021  EXAMINATION: ONE XRAY VIEW OF THE CHEST 2/14/2021 10:29 pm COMPARISON: February 9, 2015 HISTORY: ORDERING SYSTEM PROVIDED HISTORY: Near Syncope TECHNOLOGIST PROVIDED HISTORY: Reason for exam:->Near Syncope FINDINGS: Mild opacity at the lung bases. The heart is not enlarged. Median sternotomy wires noted. Pacemaker present. No pneumothorax. The costophrenic angles are clear. Early bibasilar infiltrates. ASSESSMENT/PLAN:      1. Acute normocytic anemia  -Vital signs stable no overt signs of GI bleed on exam  -Hemoglobin 10.9 on 12/7/2020 at [facility]  - Hgb 6.7-->6.2 1 unit of PRBC, patient to receive additional unit this a.m.  -Iron studies likely of little value with recent blood transfusion  - Protonix gtt/q 6Hr Hgb transfuse for Hgb less than 8.0 per admitting/Elevate HOB   -Continue to hold Xarelto if feasible defer to admitting last dose 2/13  -Okay for clear liquid diet for today  -Tentatively plan on EGD 2/16 to evaluate would scope emergently if patient develops overt signs of GI bleed or develops hypotension     2. History of sick sinus syndrome status post pacemaker placement  -Per cardiology      3. History of aortic valve replacement/mitral valve replacement  -Per cardiology    4. 3.5 cm x 3.5 center soft tissue density mass in the right mid pelvis  -Per admitting            Patient will require cardiac clearance prior to any endoscopic evaluation with patient's significant cardiac history. Patient also with recent pacemaker exchange at [facility] at the end of December. Please see orders for further plan of care. Reviewed above with Dr. Shawn Salinas D.O.   GI Fellow  2/15/2021 at 8:56 AM    Pt seen and independently examined. Pertinent notes and lab work reviewed.   Monitor reviewed showing sinus rhythm. D/w Dr. Johny Michael with physical exam and A&P. Discussed with patient - all questions answered - agreeable with the plan as delineated. Thank you for the opportunity to see this patient in consultation.     Daphne Bearden MD  2/15/2021  1:39 PM

## 2021-02-15 NOTE — H&P
Department of Internal Medicine  History and Physical    PCP: Dr. James Sanchez  Admitting Physician: Dr. James Sanchez  Consultants: Dr. Ar Martinez: Lightheadedness    HISTORY OF PRESENT ILLNESS:    Patient is 71-year-old male who presented to the ED due to lightheadedness. Patient states that he has been feeling lightheaded for the past 1 to 2 weeks. However today he took Viagra and felt extra lightheaded. As such he presented to the ED. He does also complain of some epigastric pain that has moved to the right lower quadrant. He he denies fever or chills. He denies nausea or vomiting. He denies diarrhea or constipation. PAST MEDICAL Hx:  Past Medical History:   Diagnosis Date    Anxiety     Arthritis     Hyperlipidemia     Hypertension     Pneumonia     Sinus tachycardia 2002       PAST SURGICAL Hx:   Past Surgical History:   Procedure Laterality Date    AORTA SURGERY      OTHER SURGICAL HISTORY  08/12/2016    CT Myelogram, Dr. Jose Riley  2014 new battery    last checked Dr Alisson Garcia 1/2016?  TONSILLECTOMY      UPPER GASTROINTESTINAL ENDOSCOPY      reflux    VASECTOMY         FAMILY Hx:  Family History   Problem Relation Age of Onset    Diabetes Mother     Stroke Mother     Diabetes Father     Heart Disease Father        HOME MEDICATIONS:  Prior to Admission medications    Medication Sig Start Date End Date Taking?  Authorizing Provider   furosemide (LASIX) 40 MG tablet Take 40 mg by mouth 4/17/17   Historical Provider, MD   sotalol (BETAPACE) 80 MG tablet Take 40 mg by mouth 4/10/17   Historical Provider, MD   XARELTO 20 MG TABS tablet  12/21/17   Historical Provider, MD   tamsulosin (FLOMAX) 0.4 MG capsule  1/4/18   Historical Provider, MD   FLUZONE HIGH-DOSE 0.5 ML KOKO injection  10/11/17   Historical Provider, MD   ferrous sulfate 325 (65 Fe) MG tablet  1/8/18   Historical Provider, MD   albuterol sulfate  (90 BASE) MCG/ACT inhaler Inhale 2 puffs into the lungs as needed for Wheezing    Historical Provider, MD   esomeprazole Magnesium (NEXIUM) 20 MG PACK Take 20 mg by mouth daily    Historical Provider, MD   lisinopril (PRINIVIL;ZESTRIL) 40 MG tablet Take 40 mg by mouth daily    Historical Provider, MD   HYDROCHLOROTHIAZIDE PO Take 25 mg by mouth daily     Historical Provider, MD   PRAVASTATIN SODIUM PO Take 20 mg by mouth daily     Historical Provider, MD   VERAPAMIL HCL PO Take 200 mg by mouth daily     Historical Provider, MD   aspirin 325 MG EC tablet Take 325 mg by mouth daily Last dose 3/1/16    Historical Provider, MD   albuterol (PROAIR HFA) 108 (90 BASE) MCG/ACT inhaler Inhale 2 puffs into the lungs every 6 hours as needed for Wheezing for up to 7 days. 2/9/15 2/16/15  [place]       ALLERGIES:  Patient has no known allergies. SOCIAL Hx:  Social History     Socioeconomic History    Marital status:      Spouse name: Not on file    Number of children: Not on file    Years of education: Not on file    Highest education level: Not on file   Occupational History    Not on file   Social Needs    Financial resource strain: Not on file    Food insecurity     Worry: Not on file     Inability: Not on file    Transportation needs     Medical: Not on file     Non-medical: Not on file   Tobacco Use    Smoking status: Former Smoker     Packs/day: 1.00     Years: 44.00     Pack years: 44.00     Quit date: 3/4/2014     Years since quittin.9    Smokeless tobacco: Never Used   Substance and Sexual Activity    Alcohol use:  Yes     Alcohol/week: 2.0 standard drinks     Types: 2 Shots of liquor per week     Comment: month    Drug use: No    Sexual activity: Not on file   Lifestyle    Physical activity     Days per week: Not on file     Minutes per session: Not on file    Stress: Not on file   Relationships    Social connections     Talks on phone: Not on file     Gets together: Not on file     Attends Holiness service: Not on file     Active member of club or organization: Not on file     Attends meetings of clubs or organizations: Not on file     Relationship status: Not on file    Intimate partner violence     Fear of current or ex partner: Not on file     Emotionally abused: Not on file     Physically abused: Not on file     Forced sexual activity: Not on file   Other Topics Concern    Not on file   Social History Narrative    Not on file       ROS: Positive in bold  General:   Denies chills, fatigue, fever, malaise, night sweats or weight loss    Psychological:   Denies anxiety, disorientation or hallucinations    ENT:    Denies epistaxis, headaches, vertigo or visual changes    Cardiovascular:   Denies any chest pain, irregular heartbeats, or palpitations. No paroxysmal nocturnal dyspnea. Respiratory:   Denies shortness of breath, coughing, sputum production, hemoptysis, or wheezing. No orthopnea. Gastrointestinal:   Denies nausea, vomiting, diarrhea, or constipation. Denies any abdominal pain. Denies change in bowel habits or stools. Genito-Urinary:    Denies any urgency, frequency, hematuria. Voiding without difficulty. Musculoskeletal:   Denies joint pain, joint stiffness, joint swelling or muscle pain    Neurology:    Denies any headache or focal neurological deficits. No weakness or paresthesia. Derm:    Denies any rashes, ulcers, or excoriations. Denies bruising. Extremities:   Denies any lower extremity swelling or edema. PHYSICAL EXAM:  VITALS:  Vitals:    02/14/21 2222   BP: 97/62   Pulse: 65   Resp: 17   Temp:    SpO2: 99%         CONSTITUTIONAL:    Awake, alert, cooperative, no apparent distress, and appears stated age    EYES:    PERRL, EOMI, sclera clear, conjunctiva normal    ENT:    Normocephalic, atraumatic, sinuses nontender on palpation. External ears without lesions.      NECK:    Supple, symmetrical, trachea midline, no adenopathy, thyroid symmetric, not enlarged and no tenderness, skin normal, no bruits, no JVD    HEMATOLOGIC/LYMPHATICS:    No cervical lymphadenopathy and no supraclavicular lymphadenopathy    LUNGS:    Symmetric. No increased work of breathing, good air exchange, clear to auscultation bilaterally, no wheezes, rhonchi, or rales,     CARDIOVASCULAR:    Murmur is present and heart rhythm irregular    ABDOMEN:    No scars, normal bowel sounds, soft, non-distended, non-tender, no masses palpated, no hepatosplenomegaly, no rebound or guarding elicited on palpation     MUSCULOSKELETAL:    There is no redness, warmth, or swelling of the joints. Full range of motion noted. Motor strength is 5 out of 5 all extremities bilaterally. Tone is normal.    NEUROLOGIC:    Awake, alert, oriented to name, place and time. Cranial nerves II-XII are grossly intact. Motor is 5 out of 5 bilaterally. SKIN:    No bruising or bleeding. No redness, warmth, or swelling    EXTREMITIES:    Peripheral pulses present. No edema, cyanosis, or swelling. OSTEOPATHIC:    Examined in seated and supine positions. Normal thoracic kyphosis and lumbar lordosis. No acute somatic dysfunction.     LINES/CATHETERS     LABORATORY DATA:  CBC with Differential:    Lab Results   Component Value Date    WBC 7.5 08/12/2016    RBC 4.26 08/12/2016    HGB 11.6 08/12/2016    HCT 35.2 08/12/2016     08/12/2016    MCV 82.5 08/12/2016    MCH 27.2 08/12/2016    MCHC 33.0 08/12/2016    RDW 14.1 08/12/2016    LYMPHOPCT 15 02/09/2015    MONOPCT 7 02/09/2015    BASOPCT 1 02/09/2015    MONOSABS 0.65 02/09/2015    LYMPHSABS 1.48 02/09/2015    EOSABS 0.24 02/09/2015    BASOSABS 0.05 02/09/2015     CMP:    Lab Results   Component Value Date     02/14/2021    K 4.2 02/14/2021    CL 98 02/14/2021    CO2 22 02/14/2021    BUN 43 02/14/2021    CREATININE 2.5 02/14/2021    GFRAA 31 02/14/2021    LABGLOM 31 02/14/2021    GLUCOSE 123 02/14/2021    GLUCOSE 100 01/28/2011    PROT 7.4 02/14/2021 LABALBU 3.4 02/14/2021    LABALBU 3.9 01/28/2011    CALCIUM 8.9 02/14/2021    BILITOT 0.3 02/14/2021    ALKPHOS 99 02/14/2021    AST 10 02/14/2021    ALT <5 02/14/2021       ASSESSMENT/PLAN:  1. Anemia secondary to GI bleed  2. SUNNY  3. Right hydronephrosis  4. Pelvic mass  5. Prostatomegaly  6. Coronary artery disease status post CABG  7. Aortic valve replacement  8. Mitral valve replacement  9. Hypertension  10. Hyperlipidemia  11. Pacemaker in situ with recent lead adjustment  12. Sick sinus syndrome  13. History of atrial fibrillation  14. History of tobacco abuse  15. Currently vapes    Patient has been feeling lightheaded and has had intermittent abdominal pain. Patient states she has history of hiatal hernia and possible PUD. Blood work showed hemoglobin of 6.7. He was started on Protonix drip and given IV fluid bolus. 2 units of blood was ordered for transfusion. Continue to monitor H&H. GI will be consulted. Patient also had lead adjustment for his pacemaker recently. We will have pacemaker interrogated. Patient also was found to have SUNNY and right hydronephrosis. Sow catheter will be replaced. Rahul Nuno D.O.  11:37 PM  2/14/2021    Electronically signed by Rahul Nuno DO on 2/14/21 at 11:37 PM EST    Telehealth visit completed via physician liaison, Dr. Elias Guerra,  who personally saw and examined the patient. I personally participated in the case including review of pertinent history as augmented in the above medical record and medical decision making on the date of service and I agree with all pertinent clinical formation unless otherwise noted.     Rubi King D.O., F.A.C.O.I.  2/15/2021  11:53 PM

## 2021-02-16 ENCOUNTER — ANESTHESIA EVENT (OUTPATIENT)
Dept: ENDOSCOPY | Age: 71
DRG: 661 | End: 2021-02-16
Payer: MEDICARE

## 2021-02-16 ENCOUNTER — APPOINTMENT (OUTPATIENT)
Dept: NUCLEAR MEDICINE | Age: 71
DRG: 661 | End: 2021-02-16
Payer: MEDICARE

## 2021-02-16 ENCOUNTER — ANESTHESIA (OUTPATIENT)
Dept: ENDOSCOPY | Age: 71
DRG: 661 | End: 2021-02-16
Payer: MEDICARE

## 2021-02-16 ENCOUNTER — ANESTHESIA EVENT (OUTPATIENT)
Dept: OPERATING ROOM | Age: 71
DRG: 661 | End: 2021-02-16
Payer: MEDICARE

## 2021-02-16 VITALS
OXYGEN SATURATION: 98 % | RESPIRATION RATE: 17 BRPM | SYSTOLIC BLOOD PRESSURE: 103 MMHG | DIASTOLIC BLOOD PRESSURE: 67 MMHG

## 2021-02-16 LAB
ANION GAP SERPL CALCULATED.3IONS-SCNC: 13 MMOL/L (ref 7–16)
BASOPHILS ABSOLUTE: 0.04 E9/L (ref 0–0.2)
BASOPHILS RELATIVE PERCENT: 0.5 % (ref 0–2)
BUN BLDV-MCNC: 22 MG/DL (ref 8–23)
CALCIUM SERPL-MCNC: 8.8 MG/DL (ref 8.6–10.2)
CHLORIDE BLD-SCNC: 105 MMOL/L (ref 98–107)
CO2: 21 MMOL/L (ref 22–29)
CREAT SERPL-MCNC: 1.8 MG/DL (ref 0.7–1.2)
EOSINOPHILS ABSOLUTE: 0.37 E9/L (ref 0.05–0.5)
EOSINOPHILS RELATIVE PERCENT: 4.9 % (ref 0–6)
FOLATE: 9.1 NG/ML (ref 4.8–24.2)
GFR AFRICAN AMERICAN: 45
GFR NON-AFRICAN AMERICAN: 45 ML/MIN/1.73
GLUCOSE BLD-MCNC: 90 MG/DL (ref 74–99)
HCT VFR BLD CALC: 25.7 % (ref 37–54)
HCT VFR BLD CALC: 27.4 % (ref 37–54)
HCT VFR BLD CALC: 27.8 % (ref 37–54)
HEMOGLOBIN: 8 G/DL (ref 12.5–16.5)
HEMOGLOBIN: 8.6 G/DL (ref 12.5–16.5)
IMMATURE GRANULOCYTES #: 0.04 E9/L
IMMATURE GRANULOCYTES %: 0.5 % (ref 0–5)
IMMATURE RETIC FRACT: 23 % (ref 2.3–13.4)
LYMPHOCYTES ABSOLUTE: 1.02 E9/L (ref 1.5–4)
LYMPHOCYTES RELATIVE PERCENT: 13.6 % (ref 20–42)
MCH RBC QN AUTO: 27.4 PG (ref 26–35)
MCHC RBC AUTO-ENTMCNC: 31.1 % (ref 32–34.5)
MCV RBC AUTO: 88 FL (ref 80–99.9)
MONOCYTES ABSOLUTE: 0.81 E9/L (ref 0.1–0.95)
MONOCYTES RELATIVE PERCENT: 10.8 % (ref 2–12)
NEUTROPHILS ABSOLUTE: 5.22 E9/L (ref 1.8–7.3)
NEUTROPHILS RELATIVE PERCENT: 69.7 % (ref 43–80)
PDW BLD-RTO: 14.7 FL (ref 11.5–15)
PHOSPHORUS: 2.6 MG/DL (ref 2.5–4.5)
PLATELET # BLD: 210 E9/L (ref 130–450)
PMV BLD AUTO: 9.5 FL (ref 7–12)
POTASSIUM SERPL-SCNC: 4.1 MMOL/L (ref 3.5–5)
RBC # BLD: 2.92 E12/L (ref 3.8–5.8)
RETIC HGB EQUIVALENT: 25.5 PG (ref 28.2–36.6)
RETICULOCYTE ABSOLUTE COUNT: 0.1 E12/L
RETICULOCYTE COUNT PCT: 3.4 % (ref 0.4–1.9)
SODIUM BLD-SCNC: 139 MMOL/L (ref 132–146)
VITAMIN B-12: 1106 PG/ML (ref 211–946)
WBC # BLD: 7.5 E9/L (ref 4.5–11.5)

## 2021-02-16 PROCEDURE — 78708 K FLOW/FUNCT IMAGE W/DRUG: CPT

## 2021-02-16 PROCEDURE — 3700000000 HC ANESTHESIA ATTENDED CARE: Performed by: INTERNAL MEDICINE

## 2021-02-16 PROCEDURE — 3609012400 HC EGD TRANSORAL BIOPSY SINGLE/MULTIPLE: Performed by: INTERNAL MEDICINE

## 2021-02-16 PROCEDURE — A9562 TC99M MERTIATIDE: HCPCS | Performed by: RADIOLOGY

## 2021-02-16 PROCEDURE — 36415 COLL VENOUS BLD VENIPUNCTURE: CPT

## 2021-02-16 PROCEDURE — 88342 IMHCHEM/IMCYTCHM 1ST ANTB: CPT

## 2021-02-16 PROCEDURE — 82746 ASSAY OF FOLIC ACID SERUM: CPT

## 2021-02-16 PROCEDURE — 2580000003 HC RX 258: Performed by: NURSE ANESTHETIST, CERTIFIED REGISTERED

## 2021-02-16 PROCEDURE — 3430000000 HC RX DIAGNOSTIC RADIOPHARMACEUTICAL: Performed by: RADIOLOGY

## 2021-02-16 PROCEDURE — 85045 AUTOMATED RETICULOCYTE COUNT: CPT

## 2021-02-16 PROCEDURE — 2580000003 HC RX 258: Performed by: INTERNAL MEDICINE

## 2021-02-16 PROCEDURE — 0DB78ZX EXCISION OF STOMACH, PYLORUS, VIA NATURAL OR ARTIFICIAL OPENING ENDOSCOPIC, DIAGNOSTIC: ICD-10-PCS | Performed by: INTERNAL MEDICINE

## 2021-02-16 PROCEDURE — 84100 ASSAY OF PHOSPHORUS: CPT

## 2021-02-16 PROCEDURE — 3700000001 HC ADD 15 MINUTES (ANESTHESIA): Performed by: INTERNAL MEDICINE

## 2021-02-16 PROCEDURE — 85014 HEMATOCRIT: CPT

## 2021-02-16 PROCEDURE — 6360000002 HC RX W HCPCS: Performed by: NURSE ANESTHETIST, CERTIFIED REGISTERED

## 2021-02-16 PROCEDURE — 88305 TISSUE EXAM BY PATHOLOGIST: CPT

## 2021-02-16 PROCEDURE — 83550 IRON BINDING TEST: CPT

## 2021-02-16 PROCEDURE — 6370000000 HC RX 637 (ALT 250 FOR IP): Performed by: INTERNAL MEDICINE

## 2021-02-16 PROCEDURE — 2709999900 HC NON-CHARGEABLE SUPPLY: Performed by: INTERNAL MEDICINE

## 2021-02-16 PROCEDURE — 83540 ASSAY OF IRON: CPT

## 2021-02-16 PROCEDURE — 99222 1ST HOSP IP/OBS MODERATE 55: CPT | Performed by: SURGERY

## 2021-02-16 PROCEDURE — 6360000002 HC RX W HCPCS: Performed by: RADIOLOGY

## 2021-02-16 PROCEDURE — 7100000011 HC PHASE II RECOVERY - ADDTL 15 MIN: Performed by: INTERNAL MEDICINE

## 2021-02-16 PROCEDURE — 1200000000 HC SEMI PRIVATE

## 2021-02-16 PROCEDURE — 85018 HEMOGLOBIN: CPT

## 2021-02-16 PROCEDURE — 7100000010 HC PHASE II RECOVERY - FIRST 15 MIN: Performed by: INTERNAL MEDICINE

## 2021-02-16 PROCEDURE — 80048 BASIC METABOLIC PNL TOTAL CA: CPT

## 2021-02-16 PROCEDURE — 85025 COMPLETE CBC W/AUTO DIFF WBC: CPT

## 2021-02-16 PROCEDURE — 82607 VITAMIN B-12: CPT

## 2021-02-16 RX ORDER — PROPOFOL 10 MG/ML
INJECTION, EMULSION INTRAVENOUS PRN
Status: DISCONTINUED | OUTPATIENT
Start: 2021-02-16 | End: 2021-02-16 | Stop reason: SDUPTHER

## 2021-02-16 RX ORDER — SODIUM CHLORIDE 9 MG/ML
INJECTION, SOLUTION INTRAVENOUS CONTINUOUS PRN
Status: DISCONTINUED | OUTPATIENT
Start: 2021-02-16 | End: 2021-02-16 | Stop reason: SDUPTHER

## 2021-02-16 RX ORDER — MIDAZOLAM HYDROCHLORIDE 1 MG/ML
INJECTION INTRAMUSCULAR; INTRAVENOUS PRN
Status: DISCONTINUED | OUTPATIENT
Start: 2021-02-16 | End: 2021-02-16 | Stop reason: SDUPTHER

## 2021-02-16 RX ORDER — PANTOPRAZOLE SODIUM 40 MG/1
40 TABLET, DELAYED RELEASE ORAL
Status: DISCONTINUED | OUTPATIENT
Start: 2021-02-17 | End: 2021-02-19 | Stop reason: HOSPADM

## 2021-02-16 RX ORDER — SODIUM CHLORIDE 9 MG/ML
INJECTION, SOLUTION INTRAVENOUS CONTINUOUS
Status: ACTIVE | OUTPATIENT
Start: 2021-02-16 | End: 2021-02-17

## 2021-02-16 RX ADMIN — SOTALOL HYDROCHLORIDE 40 MG: 80 TABLET ORAL at 20:26

## 2021-02-16 RX ADMIN — PRAVASTATIN SODIUM 20 MG: 20 TABLET ORAL at 20:26

## 2021-02-16 RX ADMIN — FERROUS SULFATE TAB 325 MG (65 MG ELEMENTAL FE) 325 MG: 325 (65 FE) TAB at 20:26

## 2021-02-16 RX ADMIN — Medication 10 MILLICURIE: at 07:29

## 2021-02-16 RX ADMIN — PROPOFOL 100 MG: 10 INJECTION, EMULSION INTRAVENOUS at 14:09

## 2021-02-16 RX ADMIN — FUROSEMIDE 40 MG: 10 INJECTION, SOLUTION INTRAMUSCULAR; INTRAVENOUS at 07:52

## 2021-02-16 RX ADMIN — SODIUM CHLORIDE: 9 INJECTION, SOLUTION INTRAVENOUS at 15:42

## 2021-02-16 RX ADMIN — MIDAZOLAM 2 MG: 1 INJECTION INTRAMUSCULAR; INTRAVENOUS at 14:08

## 2021-02-16 RX ADMIN — SODIUM CHLORIDE: 9 INJECTION, SOLUTION INTRAVENOUS at 14:04

## 2021-02-16 ASSESSMENT — PAIN SCALES - GENERAL
PAINLEVEL_OUTOF10: 0
PAINLEVEL_OUTOF10: 0

## 2021-02-16 NOTE — PROGRESS NOTES
Per MRI-per the pacemaker rep-there is no one available to come for MRI tomorrow. They will have someone available at 1200 on Thursday 2-18-21. Charge nurse notified.   Electronically signed by Elfego Matute RN on 2/16/2021 at 5:47 PM

## 2021-02-16 NOTE — PROGRESS NOTES
P Quality Flow/Interdisciplinary Rounds Progress Note        Quality Flow Rounds held on February 16, 2021    Disciplines Attending:  Bedside Nurse, ,  and Nursing Unit Leadership    Heriberto Phoenix was admitted on 2/14/2021 10:03 PM    Anticipated Discharge Date:  Expected Discharge Date: 02/17/21    Disposition:    Osman Score:  Osman Scale Score: 22    Readmission Risk              Risk of Unplanned Readmission:        14           Discussed patient goal for the day, patient clinical progression, and barriers to discharge.   The following Goal(s) of the Day/Commitment(s) have been identified:  EGD today and NM Renal with lasix, Protonix gtt      Damien Mancilla  February 16, 2021

## 2021-02-16 NOTE — H&P
Interval H&P   Patient seen and evaluated on the fourth floor this AM.  Patient denies any melena medic easier hematemesis. H&P reviewed no changes. Vitals:    02/16/21 0810   BP: (!) 117/57   Pulse: 64   Resp: 18   Temp: 98.1 °F (36.7 °C)   SpO2: 97%        Plan: Proceed with EGD this afternoon to rule out upper source of patient's anemia.      Leslie Simms DO   GI Fellow   9:42 AM

## 2021-02-16 NOTE — PROGRESS NOTES
2/16/2021 7:58 AM  Service: Urology  Group: [facility] ([facility])    Andre Thorne  [id number]    Subjective:    Awake and alert  Just returned from [facility] Renal Scan   Still having some mild right flank   No fever or chills     Review of Systems  Constitutional: No fever or chills   Respiratory: negative for cough and hemoptysis  Cardiovascular: negative for chest pain and dyspnea  Gastrointestinal: negative for abdominal pain, diarrhea, nausea and vomiting   : See above  Derm: negative for rash and skin lesion(s)  Neurological: negative for seizures and tremors  Musculoskeletal: Negative    Psychiatric: Negative   All other reviews are negative      Scheduled Meds:   sodium chloride flush  10 mL Intravenous 2 times per day    sodium chloride  500 mL Intravenous Once    sotalol  40 mg Oral BID    tamsulosin  0.4 mg Oral Daily    vitamin B-12  50 mcg Oral Daily    pravastatin  20 mg Oral Nightly    ferrous sulfate  325 mg Oral BID       Objective:  Vitals:    02/15/21 1444   BP: (!) 124/59   Pulse: 69   Resp: 16   Temp: 98.1 °F (36.7 °C)   SpO2: 97%         Allergies: Patient has no known allergies. General Appearance: alert and oriented to person, place and time and in no acute distress  Skin: no rash or erythema  Head: normocephalic and atraumatic  Pulmonary/Chest: normal air movement, no respiratory distress  Abdomen: soft, non-tender, non-distended  Genitourinary: no varner   Extremities: no cyanosis, clubbing or edema         Labs:     Recent Labs     02/16/21  0538      K 4.1      CO2 21*   BUN 22   CREATININE 1.8*   GLUCOSE 90   CALCIUM 8.8       Lab Results   Component Value Date    HGB 8.0 02/16/2021    HCT 25.7 02/16/2021     Narrative   EXAMINATION:   NUCLEAR MEDICINE RENAL SCAN   2/16/2021   TECHNIQUE:   Following hydration, 10.7 mCi of Tc-99m-MAG3 was administered intravenously.    40 mg of lasix was administered at approximately 20 mins.  Dynamic flow and   standard sequential images of the kidneys were acquired in posterior   projection. Renograms of kidneys and cortices were generated and analyzed. COMPARISON:   CT abdomen and pelvis dated 02/15/2021   HISTORY:   ORDERING SYSTEM PROVIDED HISTORY: RULE OUT OBSTRUCTION   TECHNOLOGIST PROVIDED HISTORY:   MAG-3 LASIX RENAL SCAN.  PLEASE CALCULATE T1/2. Reason for exam:->RULE OUT OBSTRUCTION   What reading provider will be dictating this exam?->CRC   FINDINGS:   LEFT:   Prompt perfusion is present.  Prompt uptake and normal concentration into   normal caliber collecting systems.  There is normal time to peak activity of   3.7 minutes.  Spontaneous excretion and augmentation to Lasix is present. Half emptying time prior to Lasix administration is about 15 minutes.  Half   emptying time for the left kidney is 13 mins following Lasix administration. No significant cortical retention. RIGHT:   Delayed perfusion is present. Priya Spine is persistent increasing uptake in the   cortex with cortical retention.  No excretion into the collecting system is   demonstrated.  The time activity curve shows progressively increasing   activity within the right kidney with no washout.  Further increase is seen   following Lasix administration.  Half emptying time for the right kidney   could not be calculated. Split renal uptake is 76.6% on the left and 23.4% on the right.       Impression   Findings consistent with high-grade obstruction of the right kidney with no   washout and no augmentation with Lasix. Split function calculation shows 76.6% left and 23.4% right renal function. Normal flow and function of left kidney without obstruction.          Assessment/Plan:  Right hydronephrosis   Azotemia   Right sided pelvic mass  Anemia   ED    Urine cytology pending  Cont to watch the creatinine  MAG3 renal lasix scan +obstruction  NPO after midnight  Treatment consent for cystoscopy, retrograde pyelogram right stent insertion to be done tomorrow with Dr. Arin Sanchez surgery following, possible biopsy of right pelvic mass?    PVR yesterday 23ml  Continue without varner  We will cont to follow       TRACIE Jj - CNP   [facility]

## 2021-02-16 NOTE — PROCEDURES
Paula Garcia is a 72 y.o. male patient. 1. Acute kidney injury ([address]    2. Anemia requiring transfusions    3. Pelvic mass      Past Medical History:   Diagnosis Date    Anxiety     Arthritis     Coronary artery disease     Hyperlipidemia     Hypertension     Pneumonia     Sinus tachycardia 01/01/2002     Blood pressure (!) 117/57, pulse 64, temperature 98.1 °F (36.7 °C), temperature source Oral, resp. rate 18, height 5' 7\" (1.702 m), weight 216 lb 1.6 oz (98 kg), SpO2 97 %. Procedures       Procedure:  Esophagogastroduodenoscopy    Indication:  Acute on Chronic Normocytic Anemia, Chronic Anticoagulation     Consent:   Informed consent was obtained from the patient including and not limited to risk of perforation, aspiration of gastric contents or teeth, bleeding, infection, dental breakage, ileus, need for surgery, or worst case death. Sedation:  MAC    Estimated Blood Loss: <5cc     Endoscope was advanced easily through mouth to second portion of duodenum      Oropharynx views are limited but grossly normal.    Esophagus:   Mucosa is normal.  GEJ at 40 cm. Stomach:   Antrum with mild gastritis present, biopsy taken to rule out H. Pylori     Gastric body is normal.    Retroflexed views show normal fundus and cardia. Duodenum: Bulb with mild duodenitis present     Second portion of duodenum is normal.    No fresh or old blood present    IMPRESSION AND PLAN:     1. Antrum with mild gastritis present, biopsy taken to rule out H. Pylori . Can DC IV Protonix placed on oral Protonix 40 mg daily. 2.  Mild duodenitis present biopsies as above. 3.  No source of patient's acute anemia was identified on EGD. 4.  Patient will require inpatient versus outpatient colonoscopy pending on hospital course and tentative other procedures from  other consultants.         Pt was seen and procedure was performed with Dr. Nusrat Phelps present for the entire procedure    Drew Rubalcava DO   GI Fellow  2/16/2021     I was present for entire duration of procedure; discussed findings with resident and agree with recommendations above    Brittney Ashley MD  Gastroenterology

## 2021-02-16 NOTE — ANESTHESIA POSTPROCEDURE EVALUATION
Department of Anesthesiology  Postprocedure Note    Patient: Yoshi Roy  MRN: [id number]  YOB: 1950  Date of evaluation: 2/16/2021  Time:  3:17 PM     Procedure Summary     Date: 02/16/21 Room / Location: [facility] ENDO 01 / [address]    Anesthesia Start: 3372 Anesthesia Stop: 5255    Procedure: EGD BIOPSY (N/A ) Diagnosis: (GI BLEED)    Surgeons: Jose Solis MD Responsible Provider: Elena Nation MD    Anesthesia Type: MAC ASA Status: 3          Anesthesia Type: MAC    Effie Phase I:      Effie Phase II: Effie Score: 10    Last vitals: Reviewed and per EMR flowsheets.        Anesthesia Post Evaluation    Patient location during evaluation: PACU  Patient participation: complete - patient participated  Level of consciousness: awake and alert  Pain score: 0  Airway patency: patent  Nausea & Vomiting: no nausea and no vomiting  Complications: no  Cardiovascular status: hemodynamically stable  Respiratory status: acceptable  Hydration status: euvolemic

## 2021-02-16 NOTE — PROGRESS NOTES
Department of Internal Medicine  PN    PCP: Dr. Yuni Shannon  Admitting Physician: Dr. Yuni Shannon  Consultants: Dr. Mulu Alcaraz: Lightheadedness    HISTORY OF PRESENT ILLNESS:    Patient is 78-year-old male who presented to the ED due to lightheadedness. Patient states that he has been feeling lightheaded for the past 1 to 2 weeks. However today he took Viagra and felt extra lightheaded. As such he presented to the ED. He does also complain of some epigastric pain that has moved to the right lower quadrant. He he denies fever or chills. He denies nausea or vomiting. He denies diarrhea or constipation. 2/15/2021  Patient seen examined on telemetry floor. Patient still with some lightheadedness but is receiving his second unit of packed RBC currently. Patient overall feels little bit better. Patient still has little bit of the right flank pain. Patient adamantly denies any change in his stool color with no evidence of any devora bleeding. Patient's had some dark bowel movements for 2 years secondary to his laxative. Patient denies any gross hematuria. CT the abdomen showed 3.5 cm mass or fluid collection. Patient's baseline BUN/creatinine 24/1.18 back in March 2020. Hemoglobin 6.2 this morning and patient has received 2 units of packed cells. Temperature 97.3 with heart rate of 67. Blood pressure ranges 97//60. O2 sats 97% on room air at rest.  ECG reviewed and was sinus rhythm. 2/16/2021  Patient seen and examined on telemetry floor. Patient with multiple questions again today. Lab work and test reviewed with patient today. Patient states his abdominal and right flank pain has improved but still there off and on. Patient with poor appetite but denies any epigastric pain or any chest pain. There is no unusual shortness of breath. Case discussed with GI today. Patient set up for EGD. Urology and general surgery note reviewed.   BUN/creatinine is improved to 22/1.8 but is not back to baseline. Procalcitonin was 0.14. Hemoglobin was 8.0 this morning after receiving 2 units of packed RBC yesterday with a WBC 7.4. Temperature is 98.1 with heart rate of 64. Blood pressure currently 117/57. O2 sats 97% on room air at rest.  Urine output is adequate. PAST MEDICAL Hx:  Past Medical History:   Diagnosis Date    Anxiety     Arthritis     Coronary artery disease     Hyperlipidemia     Hypertension     Pneumonia     Sinus tachycardia 01/01/2002       PAST SURGICAL Hx:   Past Surgical History:   Procedure Laterality Date    ABLATION OF DYSRHYTHMIC FOCUS      AORTA SURGERY      aortic valve replacement    AORTIC VALVE REPLACEMENT      MITRAL VALVE REPLACEMENT      OTHER SURGICAL HISTORY  08/12/2016    CT Myelogram, Dr. Chanell Frias  2014 new battery    last checked Dr Vinicio Spence 1/2016?  TONSILLECTOMY      UPPER GASTROINTESTINAL ENDOSCOPY      reflux    VASECTOMY         FAMILY Hx:  Family History   Problem Relation Age of Onset    Diabetes Mother     Stroke Mother     Diabetes Father     Heart Disease Father     Brain Cancer Sister     Stroke Sister     Stroke Brother     Cancer Maternal Grandfather        HOME MEDICATIONS:  Prior to Admission medications    Medication Sig Start Date End Date Taking?  Authorizing Provider   vitamin B-12 (CYANOCOBALAMIN) 100 MCG tablet Take 50 mcg by mouth daily   Yes Historical Provider, MD   sennosides-docusate sodium (SENOKOT-S) 8.6-50 MG tablet Take 1 tablet by mouth 2 times daily   Yes Historical Provider, MD   sotalol (BETAPACE) 80 MG tablet Take 40 mg by mouth 4/10/17  Yes Historical Provider, MD   tamsulosin (FLOMAX) 0.4 MG capsule Take 0.4 mg by mouth daily  1/4/18  Yes Historical Provider, MD   ferrous sulfate 325 (65 Fe) MG tablet Take 325 mg by mouth 2 times daily  1/8/18  Yes Historical Provider, MD   albuterol sulfate  (90 BASE) MCG/ACT inhaler Inhale 2 puffs into the lungs as needed for Wheezing   Yes Historical Provider, MD   esomeprazole Magnesium (NEXIUM) 20 MG PACK Take 20 mg by mouth daily   Yes Historical Provider, MD   lisinopril (PRINIVIL;ZESTRIL) 40 MG tablet Take 40 mg by mouth daily   Yes Historical Provider, MD   HYDROCHLOROTHIAZIDE PO Take 25 mg by mouth daily    Yes Historical Provider, MD   PRAVASTATIN SODIUM PO Take 20 mg by mouth daily    Yes Historical Provider, MD   aspirin 325 MG EC tablet Take 325 mg by mouth daily Last dose 3/1/16   Yes Historical Provider, MD   albuterol (PROAIR HFA) 108 (90 BASE) MCG/ACT inhaler Inhale 2 puffs into the lungs every 6 hours as needed for Wheezing for up to 7 days. 2/9/15 2/15/21 Yes VENITA Gardiner   XARELTO 20 MG TABS tablet daily (with breakfast)  17   Historical Provider, MD   FLUZONE HIGH-DOSE 0.5 ML KOKO injection  10/11/17   Historical Provider, MD       ALLERGIES:  Patient has no known allergies. SOCIAL Hx:  Social History     Socioeconomic History    Marital status:      Spouse name: Not on file    Number of children: Not on file    Years of education: Not on file    Highest education level: Not on file   Occupational History    Not on file   Social Needs    Financial resource strain: Not on file    Food insecurity     Worry: Not on file     Inability: Not on file    Transportation needs     Medical: Not on file     Non-medical: Not on file   Tobacco Use    Smoking status: Former Smoker     Packs/day: 1.00     Years: 44.00     Pack years: 44.00     Quit date: 3/4/2014     Years since quittin.9    Smokeless tobacco: Never Used   Substance and Sexual Activity    Alcohol use:  Yes     Alcohol/week: 2.0 standard drinks     Types: 2 Shots of liquor per week     Comment: month    Drug use: No    Sexual activity: Not on file   Lifestyle    Physical activity     Days per week: Not on file     Minutes per session: Not on file    Stress: Not on file   Relationships    Social connections Talks on phone: Not on file     Gets together: Not on file     Attends Evangelical service: Not on file     Active member of club or organization: Not on file     Attends meetings of clubs or organizations: Not on file     Relationship status: Not on file    Intimate partner violence     Fear of current or ex partner: Not on file     Emotionally abused: Not on file     Physically abused: Not on file     Forced sexual activity: Not on file   Other Topics Concern    Not on file   Social History Narrative    Not on file       ROS: Positive in bold  General:   Denies chills, fatigue, fever, malaise, night sweats or weight loss    Psychological:   Denies anxiety, disorientation or hallucinations    ENT:    Denies epistaxis, headaches, vertigo or visual changes    Cardiovascular:   Denies any chest pain, irregular heartbeats, or palpitations. No paroxysmal nocturnal dyspnea. Respiratory:   Denies shortness of breath, coughing, sputum production, hemoptysis, or wheezing. No orthopnea. Gastrointestinal:   Denies nausea, vomiting, diarrhea, or constipation. Denies any abdominal pain. Denies change in bowel habits or stools. Genito-Urinary:    Denies any urgency, frequency, hematuria. Voiding without difficulty. Musculoskeletal:   Denies joint pain, joint stiffness, joint swelling or muscle pain    Neurology:    Denies any headache or focal neurological deficits. No weakness or paresthesia. Derm:    Denies any rashes, ulcers, or excoriations. Denies bruising. Extremities:   Denies any lower extremity swelling or edema. PHYSICAL EXAM:  VITALS:  Vitals:    02/16/21 0810   BP: (!) 117/57   Pulse: 64   Resp: 18   Temp: 98.1 °F (36.7 °C)   SpO2: 97%         CONSTITUTIONAL:    Awake, alert, cooperative, no apparent distress, and appears stated age    EYES:    PERRL, EOMI, sclera clear, conjunctiva normal    ENT:    Normocephalic, atraumatic, sinuses nontender on palpation.  External ears without lesions. NECK:    Supple, symmetrical, trachea midline, no adenopathy, thyroid symmetric, not enlarged and no tenderness, skin normal, no bruits, no JVD    HEMATOLOGIC/LYMPHATICS:    No cervical lymphadenopathy and no supraclavicular lymphadenopathy    LUNGS:    Symmetric. No increased work of breathing, good air exchange, clear to auscultation bilaterally, no wheezes, rhonchi, or rales,     CARDIOVASCULAR:    Murmur is present and heart rhythm irregular    ABDOMEN:    No scars, normal bowel sounds, soft, non-distended, non-tender, no masses palpated, no hepatosplenomegaly, no rebound or guarding elicited on palpation     MUSCULOSKELETAL:    There is no redness, warmth, or swelling of the joints. Full range of motion noted. Motor strength is 5 out of 5 all extremities bilaterally. Tone is normal.    NEUROLOGIC:    Awake, alert, oriented to name, place and time. Cranial nerves II-XII are grossly intact. Motor is 5 out of 5 bilaterally. SKIN:    No bruising or bleeding. No redness, warmth, or swelling    EXTREMITIES:    Peripheral pulses present. No edema, cyanosis, or swelling. OSTEOPATHIC:    Examined in seated and supine positions. Normal thoracic kyphosis and lumbar lordosis. No acute somatic dysfunction.     LINES/CATHETERS     LABORATORY DATA:  CBC with Differential:    Lab Results   Component Value Date    WBC 7.5 02/16/2021    RBC 2.92 02/16/2021    HGB 8.0 02/16/2021    HCT 25.7 02/16/2021     02/16/2021    MCV 88.0 02/16/2021    MCH 27.4 02/16/2021    MCHC 31.1 02/16/2021    RDW 14.7 02/16/2021    LYMPHOPCT 13.6 02/16/2021    MONOPCT 10.8 02/16/2021    BASOPCT 0.5 02/16/2021    MONOSABS 0.81 02/16/2021    LYMPHSABS 1.02 02/16/2021    EOSABS 0.37 02/16/2021    BASOSABS 0.04 02/16/2021     CMP:    Lab Results   Component Value Date     02/16/2021    K 4.1 02/16/2021    K 4.2 02/14/2021     02/16/2021    CO2 21 02/16/2021    BUN 22 02/16/2021    CREATININE 1.8 02/16/2021 GFRAA 45 02/16/2021    LABGLOM 45 02/16/2021    GLUCOSE 90 02/16/2021    GLUCOSE 100 01/28/2011    PROT 7.4 02/14/2021    LABALBU 3.4 02/14/2021    LABALBU 3.9 01/28/2011    CALCIUM 8.8 02/16/2021    BILITOT 0.3 02/14/2021    ALKPHOS 99 02/14/2021    AST 10 02/14/2021    ALT <5 02/14/2021       ASSESSMENT/PLAN:  1. Severe normocytic anemia secondary to possible GI bleed  2. SUNNY  3. Severe right hydronephrosis  4. Pelvic mass-3.5 cm right pelvis  5. Prostatomegaly  6. Coronary artery disease status post CABG  7. Aortic valve replacement  8. Mitral valve replacement  9. Hypertension  10. Hyperlipidemia  11. Pacemaker in situ with recent lead adjustment  12. Sick sinus syndrome  13. History of paroxysmal atrial fibrillation on Xarelto  14. History of tobacco abuse  15. Currently vapes    Blood work showed hemoglobin of 6.7 on admission with repeat of 6.2. He was started on Protonix drip and given IV fluid bolus. 2 units of blood was admission and hemoglobin 8.0 on 2/16 a.m.  GI consulted. Patient also had lead adjustment for his pacemaker recently and follows up with cardiologist outside of [facility]. We will have pacemaker interrogated and cardiology consult. Patient also was found to have SUNNY and right hydronephrosis with CT of the abdomen showing 3.5 cm mass/fluid collection in the right pelvis. Patient was set up for cystoscopy possible stent placement 2/17     EGD today  Urology for cystoscopy tomorrow  General surgery following with possible laparoscopic evaluation for pelvic mass  BMP and H&H in a.m.   H&H 6 PM today         Tin Matthews D.O.  11:11 AM  2/16/2021

## 2021-02-16 NOTE — CONSULTS
GENERAL SURGERY  CONSULT NOTE  2/16/2021    Physician Consulted: Dr. Kati Walker  Reason for Consult: Pelvic Mass  Referring Physician: Dr. Karla GARNER  Elijah Saez is a 72 y.o. male with an extensive cardiac history with prior AVR, MVR, SSS s/p pacer and recent lead extraction, AFib on [address] who presented for AMS/Syncope. Work-up revealed anemia with dark stools, started on PPI gtt and GI consulted for GIB. Work-up also revealed an right pelvic mass in close proximity to ureter causing hydronephrosis and obstruction, urology planning on cysto today. Surgery consulted for Right Pelvic Mass. Pelvic US revealed ~4cm soft tissue vascular mass in right hemipelvis suggestive of lymphadenopathy. Afebrile, Hemodynamically stable. Hgb responded to transfusion. Complains of some vague lower abdominal pain and melena. Denies any personal history of cancer. Admits to over 10lb unintentional weight loss over the past month but no night sweats, fevers, chills. Last EGD 15 years ago. CScope 2016 with 7mm polyp rectosigmoid      Past Medical History:   Diagnosis Date    Anxiety     Arthritis     Coronary artery disease     Hyperlipidemia     Hypertension     Pneumonia     Sinus tachycardia 01/01/2002       Past Surgical History:   Procedure Laterality Date    ABLATION OF DYSRHYTHMIC FOCUS      AORTA SURGERY      aortic valve replacement    AORTIC VALVE REPLACEMENT      MITRAL VALVE REPLACEMENT      OTHER SURGICAL HISTORY  08/12/2016    CT Myelogram, Dr. Bambi Chen  2014 new battery    last checked Dr Fawn Means 1/2016?  TONSILLECTOMY      UPPER GASTROINTESTINAL ENDOSCOPY      reflux    VASECTOMY         Medications Prior to Admission    Prior to Admission medications    Medication Sig Start Date End Date Taking?  Authorizing Provider   vitamin B-12 (CYANOCOBALAMIN) 100 MCG tablet Take 50 mcg by mouth daily   Yes Historical Provider, MD   sennosides-docusate sodium (SENOKOT-S) 8.6-50 MG tablet Take 1 tablet by mouth 2 times daily   Yes Historical Provider, MD   sotalol (BETAPACE) 80 MG tablet Take 40 mg by mouth 4/10/17  Yes Historical Provider, MD   tamsulosin (FLOMAX) 0.4 MG capsule Take 0.4 mg by mouth daily  18  Yes Historical Provider, MD   ferrous sulfate 325 (65 Fe) MG tablet Take 325 mg by mouth 2 times daily  18  Yes Historical Provider, MD   albuterol sulfate  (90 BASE) MCG/ACT inhaler Inhale 2 puffs into the lungs as needed for Wheezing   Yes Historical Provider, MD   esomeprazole Magnesium (NEXIUM) 20 MG PACK Take 20 mg by mouth daily   Yes Historical Provider, MD   lisinopril (PRINIVIL;ZESTRIL) 40 MG tablet Take 40 mg by mouth daily   Yes Historical Provider, MD   HYDROCHLOROTHIAZIDE PO Take 25 mg by mouth daily    Yes Historical Provider, MD   PRAVASTATIN SODIUM PO Take 20 mg by mouth daily    Yes Historical Provider, MD   aspirin 325 MG EC tablet Take 325 mg by mouth daily Last dose 3/1/16   Yes Historical Provider, MD   albuterol (PROAIR HFA) 108 (90 BASE) MCG/ACT inhaler Inhale 2 puffs into the lungs every 6 hours as needed for Wheezing for up to 7 days. 2/9/15 2/15/21 Yes VENITA Johnson   XARELTO 20 MG TABS tablet daily (with breakfast)  17   Historical Provider, MD   FLUZONE HIGH-DOSE 0.5 ML KOKO injection  10/11/17   Historical Provider, MD       No Known Allergies    Family History   Problem Relation Age of Onset    Diabetes Mother     Stroke Mother     Diabetes Father     Heart Disease Father     Brain Cancer Sister     Stroke Sister     Stroke Brother     Cancer Maternal Grandfather        Social History     Tobacco Use    Smoking status: Former Smoker     Packs/day: 1.00     Years: 44.00     Pack years: 44.00     Quit date: 3/4/2014     Years since quittin.9    Smokeless tobacco: Never Used   Substance Use Topics    Alcohol use:  Yes     Alcohol/week: 2.0 standard drinks     Types: 2 Shots of liquor per week Comment: month    Drug use: No         Review of Systems: pertinent ROS listed in HPI, all others negative       PHYSICAL EXAM:    Vitals:    02/15/21 1444   BP: (!) 124/59   Pulse: 69   Resp: 16   Temp: 98.1 °F (36.7 °C)   SpO2: 97%       GENERAL:  NAD. A&Ox3. HEAD:  Normocephalic. Atraumatic. EYES:   No scleral icterus. PERRL. LUNGS:  No increased work of breathing. CARDIOVASCULAR: RR  ABDOMEN:  Soft, non-distended, non-tender. No guarding, rigidity, rebound. EXTREMITIES:   MAEx4. Atraumatic. No LE edema. SKIN:  Warm and dry  NEURO: GCS 15. ASSESSMENT/PLAN:  72 y.o. male with 4cm mass right hemipelvis causing obstruction and hydronephrosis. No personal history of cancer, no family history abdominal cancers  Urology following -- planning for cysto today  If cysto unrevealing or unable to sample mass then may require surgical biopsy of mass -- will continue to follow  GI following -- for EGD today  Significant Cardiac History -- cardiology following    Plan will be discussed with Dr. Flakita Rosen. Greg Martinez, DO  Resident, PGY-3  2/16/2021  6:35 AM    General Surgery Consult Note  Morgan Plascencia MD, MS    Patient's Name/Date of Birth: Dionne Gray / 1950    Date: February 16, 2021     Surgeon: Flakita Rosen MD    Requesting Physician:     Chief Complaint: right pelvic mass    Patient Active Problem List   Diagnosis    Hematuria    Hypertrophy of prostate with urinary obstruction    SUNNY (acute kidney injury) ([facility] [address]       Subjective: As above. I saw and examined the patient and discussed the above HPI with the resident and agree with documented positive and negative findings.      Objective:  BP (!) 115/57   Pulse 61   Temp 97.4 °F (36.3 °C) (Oral)   Resp 18   Ht 5' 7\" (1.702 m)   Wt 216 lb 1.6 oz (98 kg)   SpO2 97%   BMI 33.85 kg/m²   Labs:  Reviewed by me and relevant abnormalities noted       A complete 10 system review was performed and are otherwise negative unless mentioned in the above HPI. Specific negatives are listed below but may not include all those reviewed. General ROS: negative obtundation, AMS  ENT ROS: negative rhinorrhea, epistaxis  Allergy and Immunology ROS: negative itchy/watery eyes or nasal congestion  Hematological and Lymphatic ROS: negative spontaneous bleeding or bruising  Endocrine ROS: negative  lethargy, mood swings, palpitations or polydipsia/polyuria  Respiratory ROS: negative sputum changes, stridor, tachypnea or wheezing  Cardiovascular ROS: negative for - loss of consciousness, murmur or orthopnea  Gastrointestinal ROS: negative for - hematochezia or hematemesis  Genito-Urinary ROS: negative for -  genital discharge or hematuria  Musculoskeletal ROS: negative for - focal weakness, gangrene  Psych/Neuro ROS: negative for - visual or auditory hallucinations, suicidal ideation    Physical exam:   BP (!) 115/57   Pulse 61   Temp 97.4 °F (36.3 °C) (Oral)   Resp 18   Ht 5' 7\" (1.702 m)   Wt 216 lb 1.6 oz (98 kg)   SpO2 97%   BMI 33.85 kg/m²   General appearance:  NAD, appears stated age  Head: NCAT, PERRLA, EOMI, red conjunctiva  Neck: supple, no masses, trachea midline  Lungs: Equal chest rise bilateral, no retractions, no wheezing  Heart: Reg rate  Abdomen: soft, nontender  Skin; warm and dry, no cyanosis  Gu: no cva tenderness  Extremities: atraumatic, no focal motor deficits, no open wounds  Psych: No tremor, visual hallucinations        Radiology: I reviewed relevant abdominal imaging from this admission and that available in the EMR including CT abd/pel from admission.  My assessment is right pelvic mass, appears involved in ureter    Assessment/Plan:  Conrad Blanchard is a 72 y.o. male with right pelvic mass obstructive uropathy, SUNNY with severe anemia  Patient Active Problem List   Diagnosis    Hematuria    Hypertrophy of prostate with urinary obstruction    SUNNY (acute kidney injury) ([facility] [address]       We will await GI

## 2021-02-16 NOTE — ANESTHESIA PRE PROCEDURE
Facility-Administered Medications   Medication Dose Route Frequency Provider Last Rate Last Admin    0.9 % sodium chloride infusion   Intravenous PRN Garret Cradle, DO        pantoprazole (PROTONIX) 80 mg in sodium chloride 0.9 % 100 mL infusion  8 mg/hr Intravenous Continuous Garret Cradle, DO 10 mL/hr at 02/15/21 2217 8 mg/hr at 02/15/21 2217    glucose (GLUTOSE) 40 % oral gel 15 g  15 g Oral PRN Garret Cradle, DO        dextrose 50 % IV solution  12.5 g Intravenous PRN Garret Cradle, DO        glucagon (rDNA) injection 1 mg  1 mg Intramuscular PRN Garret Cradle, DO        dextrose 5 % solution  100 mL/hr Intravenous PRN Garret Cradle, DO        sodium chloride flush 0.9 % injection 10 mL  10 mL Intravenous 2 times per day Garret Cradle, DO   10 mL at 02/15/21 0935    sodium chloride flush 0.9 % injection 10 mL  10 mL Intravenous PRN Garret Cradle, DO        polyethylene glycol (GLYCOLAX) packet 17 g  17 g Oral Daily PRN Garret Cradle, DO        acetaminophen (TYLENOL) tablet 650 mg  650 mg Oral Q6H PRN Garret Cradle, DO        Or    acetaminophen (TYLENOL) suppository 650 mg  650 mg Rectal Q6H PRN Garret Cradle, DO        0.9 % sodium chloride bolus  500 mL Intravenous Once Garret Cradle, DO        sotalol (BETAPACE) tablet 40 mg  40 mg Oral BID [facility], DO   40 mg at 02/15/21 2125    tamsulosin (FLOMAX) capsule 0.4 mg  0.4 mg Oral Daily Garret Cradle, DO   0.4 mg at 02/15/21 0935    vitamin B-12 (CYANOCOBALAMIN) tablet 50 mcg  50 mcg Oral Daily Garret Cradle, DO   50 mcg at 02/15/21 7745    pravastatin (PRAVACHOL) tablet 20 mg  20 mg Oral Nightly Garret Cradle, DO   20 mg at 02/15/21 2124    ferrous sulfate (IRON 325) tablet 325 mg  325 mg Oral BID Garret Cradle, DO   325 mg at 02/15/21 1950       Allergies:  No Known Allergies    Problem List:    Patient Active Problem List   Diagnosis Code    Hematuria R31.9    Hypertrophy of prostate with urinary Date    WBC 7.5 02/16/2021    RBC 2.92 02/16/2021    HGB 8.0 02/16/2021    HCT 25.7 02/16/2021    MCV 88.0 02/16/2021    RDW 14.7 02/16/2021     02/16/2021       CMP:   Lab Results   Component Value Date     02/16/2021    K 4.1 02/16/2021    K 4.2 02/14/2021     02/16/2021    CO2 21 02/16/2021    BUN 22 02/16/2021    CREATININE 1.8 02/16/2021    GFRAA 45 02/16/2021    LABGLOM 45 02/16/2021    GLUCOSE 90 02/16/2021    GLUCOSE 100 01/28/2011    PROT 7.4 02/14/2021    CALCIUM 8.8 02/16/2021    BILITOT 0.3 02/14/2021    ALKPHOS 99 02/14/2021    AST 10 02/14/2021    ALT <5 02/14/2021       POC Tests: No results for input(s): POCGLU, POCNA, POCK, POCCL, POCBUN, POCHEMO, POCHCT in the last 72 hours. Coags:   Lab Results   Component Value Date    PROTIME 14.7 02/14/2021    INR 1.2 02/14/2021    APTT 34.0 08/12/2016       HCG (If Applicable): No results found for: PREGTESTUR, PREGSERUM, HCG, HCGQUANT     ABGs: No results found for: PHART, PO2ART, DSV4SOZ, OIP3VMT, BEART, K0WJHDUC     Type & Screen (If Applicable):  No results found for: LABABO, LABRH    Drug/Infectious Status (If Applicable):  No results found for: HIV, HEPCAB    COVID-19 Screening (If Applicable):   Lab Results   Component Value Date    COVID19 Not Detected 02/14/2021         Anesthesia Evaluation  Patient summary reviewed   history of anesthetic complications ( Difficult airway determined at the [facility].): difficult airway. Airway: Mallampati: III  TM distance: >3 FB   Neck ROM: full  Mouth opening: > = 3 FB Dental:      Comment: Dentition intact.     Pulmonary: breath sounds clear to auscultation  (+) pneumonia:                            ROS comment: Former smoker 1.5 PPD x 20 years quit 1990   Cardiovascular:  Exercise tolerance: good (>4 METS),   (+) hypertension:, pacemaker: pacemaker, CAD: obstructive, dysrhythmias (H/O Atrial fibrillation treated by cardiac ablation):, murmur (Grade 2/6 murmur),         Rhythm: regular  Rate: normal                    Neuro/Psych:   Negative Neuro/Psych ROS              GI/Hepatic/Renal:   (+) GERD:,          ROS comment: Anemia secondary to probable GI bleed. .   Endo/Other:                     Abdominal:   (+) obese,         Vascular: negative vascular ROS. Anesthesia Plan      MAC     ASA 3       Induction: intravenous. Anesthetic plan and risks discussed with patient. Plan discussed with CRNA.                   Huong Sam MD   2/16/2021

## 2021-02-17 ENCOUNTER — ANESTHESIA (OUTPATIENT)
Dept: OPERATING ROOM | Age: 71
DRG: 661 | End: 2021-02-17
Payer: MEDICARE

## 2021-02-17 ENCOUNTER — APPOINTMENT (OUTPATIENT)
Dept: GENERAL RADIOLOGY | Age: 71
DRG: 661 | End: 2021-02-17
Payer: MEDICARE

## 2021-02-17 VITALS
OXYGEN SATURATION: 100 % | DIASTOLIC BLOOD PRESSURE: 63 MMHG | SYSTOLIC BLOOD PRESSURE: 113 MMHG | RESPIRATION RATE: 22 BRPM

## 2021-02-17 LAB
ANION GAP SERPL CALCULATED.3IONS-SCNC: 11 MMOL/L (ref 7–16)
BUN BLDV-MCNC: 16 MG/DL (ref 8–23)
CALCIUM SERPL-MCNC: 8.7 MG/DL (ref 8.6–10.2)
CHLORIDE BLD-SCNC: 106 MMOL/L (ref 98–107)
CO2: 22 MMOL/L (ref 22–29)
CREAT SERPL-MCNC: 1.7 MG/DL (ref 0.7–1.2)
GFR AFRICAN AMERICAN: 48
GFR NON-AFRICAN AMERICAN: 48 ML/MIN/1.73
GLUCOSE BLD-MCNC: 89 MG/DL (ref 74–99)
HCT VFR BLD CALC: 26.5 % (ref 37–54)
HEMOGLOBIN: 8.2 G/DL (ref 12.5–16.5)
IRON SATURATION: 15 % (ref 20–55)
IRON: 38 MCG/DL (ref 59–158)
POTASSIUM SERPL-SCNC: 3.9 MMOL/L (ref 3.5–5)
SODIUM BLD-SCNC: 139 MMOL/L (ref 132–146)
TOTAL IRON BINDING CAPACITY: 248 MCG/DL (ref 250–450)

## 2021-02-17 PROCEDURE — C1769 GUIDE WIRE: HCPCS | Performed by: UROLOGY

## 2021-02-17 PROCEDURE — 2709999900 HC NON-CHARGEABLE SUPPLY: Performed by: UROLOGY

## 2021-02-17 PROCEDURE — 85014 HEMATOCRIT: CPT

## 2021-02-17 PROCEDURE — C1758 CATHETER, URETERAL: HCPCS | Performed by: UROLOGY

## 2021-02-17 PROCEDURE — 3700000000 HC ANESTHESIA ATTENDED CARE: Performed by: UROLOGY

## 2021-02-17 PROCEDURE — 3600000013 HC SURGERY LEVEL 3 ADDTL 15MIN: Performed by: UROLOGY

## 2021-02-17 PROCEDURE — 1200000000 HC SEMI PRIVATE

## 2021-02-17 PROCEDURE — 36415 COLL VENOUS BLD VENIPUNCTURE: CPT

## 2021-02-17 PROCEDURE — 87088 URINE BACTERIA CULTURE: CPT

## 2021-02-17 PROCEDURE — 7100000001 HC PACU RECOVERY - ADDTL 15 MIN: Performed by: UROLOGY

## 2021-02-17 PROCEDURE — 6370000000 HC RX 637 (ALT 250 FOR IP): Performed by: UROLOGY

## 2021-02-17 PROCEDURE — 2580000003 HC RX 258: Performed by: NURSE ANESTHETIST, CERTIFIED REGISTERED

## 2021-02-17 PROCEDURE — 88112 CYTOPATH CELL ENHANCE TECH: CPT

## 2021-02-17 PROCEDURE — 6360000002 HC RX W HCPCS: Performed by: NURSE ANESTHETIST, CERTIFIED REGISTERED

## 2021-02-17 PROCEDURE — 74400 UROGRAPHY IV +-KUB TOMOG: CPT

## 2021-02-17 PROCEDURE — 2580000003 HC RX 258: Performed by: UROLOGY

## 2021-02-17 PROCEDURE — 3700000001 HC ADD 15 MINUTES (ANESTHESIA): Performed by: UROLOGY

## 2021-02-17 PROCEDURE — 7100000000 HC PACU RECOVERY - FIRST 15 MIN: Performed by: UROLOGY

## 2021-02-17 PROCEDURE — 6370000000 HC RX 637 (ALT 250 FOR IP): Performed by: INTERNAL MEDICINE

## 2021-02-17 PROCEDURE — C2617 STENT, NON-COR, TEM W/O DEL: HCPCS | Performed by: UROLOGY

## 2021-02-17 PROCEDURE — 85018 HEMOGLOBIN: CPT

## 2021-02-17 PROCEDURE — 6360000004 HC RX CONTRAST MEDICATION: Performed by: UROLOGY

## 2021-02-17 PROCEDURE — 2700000000 HC OXYGEN THERAPY PER DAY

## 2021-02-17 PROCEDURE — 3600000003 HC SURGERY LEVEL 3 BASE: Performed by: UROLOGY

## 2021-02-17 PROCEDURE — 80048 BASIC METABOLIC PNL TOTAL CA: CPT

## 2021-02-17 DEVICE — UNIVERSA FIRM URETERAL STENT AND POSITIONER WITH HYDROPHILIC COATING
Type: IMPLANTABLE DEVICE | Site: URETER | Status: FUNCTIONAL
Brand: UNIVERSA

## 2021-02-17 RX ORDER — FENTANYL CITRATE 50 UG/ML
INJECTION, SOLUTION INTRAMUSCULAR; INTRAVENOUS PRN
Status: DISCONTINUED | OUTPATIENT
Start: 2021-02-17 | End: 2021-02-17 | Stop reason: SDUPTHER

## 2021-02-17 RX ORDER — CEFAZOLIN SODIUM 2 G/50ML
SOLUTION INTRAVENOUS PRN
Status: DISCONTINUED | OUTPATIENT
Start: 2021-02-17 | End: 2021-02-17 | Stop reason: SDUPTHER

## 2021-02-17 RX ORDER — CEFAZOLIN SODIUM 2 G/50ML
SOLUTION INTRAVENOUS
Status: COMPLETED
Start: 2021-02-17 | End: 2021-02-17

## 2021-02-17 RX ORDER — MIDAZOLAM HYDROCHLORIDE 1 MG/ML
INJECTION INTRAMUSCULAR; INTRAVENOUS PRN
Status: DISCONTINUED | OUTPATIENT
Start: 2021-02-17 | End: 2021-02-17 | Stop reason: SDUPTHER

## 2021-02-17 RX ORDER — POTASSIUM CHLORIDE 20 MEQ/1
20 TABLET, EXTENDED RELEASE ORAL 2 TIMES DAILY WITH MEALS
Status: DISCONTINUED | OUTPATIENT
Start: 2021-02-17 | End: 2021-02-18

## 2021-02-17 RX ORDER — ATROPA BELLADONNA AND OPIUM 16.2; 6 MG/1; MG/1
SUPPOSITORY RECTAL PRN
Status: DISCONTINUED | OUTPATIENT
Start: 2021-02-17 | End: 2021-02-17 | Stop reason: ALTCHOICE

## 2021-02-17 RX ORDER — LIDOCAINE HYDROCHLORIDE 20 MG/ML
INJECTION, SOLUTION INTRAVENOUS PRN
Status: DISCONTINUED | OUTPATIENT
Start: 2021-02-17 | End: 2021-02-17 | Stop reason: SDUPTHER

## 2021-02-17 RX ORDER — ONDANSETRON 2 MG/ML
INJECTION INTRAMUSCULAR; INTRAVENOUS PRN
Status: DISCONTINUED | OUTPATIENT
Start: 2021-02-17 | End: 2021-02-17 | Stop reason: SDUPTHER

## 2021-02-17 RX ORDER — PROPOFOL 10 MG/ML
INJECTION, EMULSION INTRAVENOUS CONTINUOUS PRN
Status: DISCONTINUED | OUTPATIENT
Start: 2021-02-17 | End: 2021-02-17 | Stop reason: SDUPTHER

## 2021-02-17 RX ORDER — SODIUM CHLORIDE 9 MG/ML
INJECTION, SOLUTION INTRAVENOUS CONTINUOUS PRN
Status: DISCONTINUED | OUTPATIENT
Start: 2021-02-17 | End: 2021-02-17 | Stop reason: SDUPTHER

## 2021-02-17 RX ADMIN — MIDAZOLAM 2 MG: 1 INJECTION INTRAMUSCULAR; INTRAVENOUS at 07:37

## 2021-02-17 RX ADMIN — ONDANSETRON 4 MG: 2 INJECTION INTRAMUSCULAR; INTRAVENOUS at 07:41

## 2021-02-17 RX ADMIN — LIDOCAINE HYDROCHLORIDE 50 MG: 20 INJECTION, SOLUTION INTRAVENOUS at 07:41

## 2021-02-17 RX ADMIN — FERROUS SULFATE TAB 325 MG (65 MG ELEMENTAL FE) 325 MG: 325 (65 FE) TAB at 20:15

## 2021-02-17 RX ADMIN — SOTALOL HYDROCHLORIDE 40 MG: 80 TABLET ORAL at 10:08

## 2021-02-17 RX ADMIN — SODIUM CHLORIDE: 9 INJECTION, SOLUTION INTRAVENOUS at 07:36

## 2021-02-17 RX ADMIN — FENTANYL CITRATE 50 MCG: 50 INJECTION, SOLUTION INTRAMUSCULAR; INTRAVENOUS at 07:41

## 2021-02-17 RX ADMIN — TAMSULOSIN HYDROCHLORIDE 0.4 MG: 0.4 CAPSULE ORAL at 10:08

## 2021-02-17 RX ADMIN — SODIUM CHLORIDE: 9 INJECTION, SOLUTION INTRAVENOUS at 07:46

## 2021-02-17 RX ADMIN — SODIUM CHLORIDE, PRESERVATIVE FREE 10 ML: 5 INJECTION INTRAVENOUS at 20:17

## 2021-02-17 RX ADMIN — FENTANYL CITRATE 50 MCG: 50 INJECTION, SOLUTION INTRAMUSCULAR; INTRAVENOUS at 07:53

## 2021-02-17 RX ADMIN — VITAM B12 50 MCG: 100 TAB at 10:09

## 2021-02-17 RX ADMIN — PROPOFOL 90 MCG/KG/MIN: 10 INJECTION, EMULSION INTRAVENOUS at 07:41

## 2021-02-17 RX ADMIN — SODIUM CHLORIDE, PRESERVATIVE FREE 10 ML: 5 INJECTION INTRAVENOUS at 10:09

## 2021-02-17 RX ADMIN — POTASSIUM CHLORIDE 20 MEQ: 1500 TABLET, EXTENDED RELEASE ORAL at 17:55

## 2021-02-17 RX ADMIN — PRAVASTATIN SODIUM 20 MG: 20 TABLET ORAL at 20:15

## 2021-02-17 RX ADMIN — SOTALOL HYDROCHLORIDE 40 MG: 80 TABLET ORAL at 20:15

## 2021-02-17 RX ADMIN — POTASSIUM CHLORIDE 20 MEQ: 1500 TABLET, EXTENDED RELEASE ORAL at 12:56

## 2021-02-17 RX ADMIN — CEFAZOLIN SODIUM 2 G: 2 SOLUTION INTRAVENOUS at 07:41

## 2021-02-17 ASSESSMENT — PAIN SCALES - GENERAL
PAINLEVEL_OUTOF10: 0

## 2021-02-17 ASSESSMENT — PULMONARY FUNCTION TESTS
PIF_VALUE: 0
PIF_VALUE: 1
PIF_VALUE: 0
PIF_VALUE: 1
PIF_VALUE: 0
PIF_VALUE: 1

## 2021-02-17 NOTE — ANESTHESIA PRE PROCEDURE
Department of Anesthesiology  Preprocedure Note       Name:  Elijah Saez   Age:  72 y.o.  :  1950                                          MRN:  [id number]         Date:  2021      Surgeon: Haresh Hoffmann):  Gaudencio Magallanes MD    Procedure: Procedure(s):  CYSTOSCOPY RETROGRADE PYELOGRAM RIGHT STENT INSERTION    Medications prior to admission:   Prior to Admission medications    Medication Sig Start Date End Date Taking? Authorizing Provider   vitamin B-12 (CYANOCOBALAMIN) 100 MCG tablet Take 50 mcg by mouth daily   Yes Historical Provider, MD   sennosides-docusate sodium (SENOKOT-S) 8.6-50 MG tablet Take 1 tablet by mouth 2 times daily   Yes Historical Provider, MD   sotalol (BETAPACE) 80 MG tablet Take 40 mg by mouth 4/10/17  Yes Historical Provider, MD   tamsulosin (FLOMAX) 0.4 MG capsule Take 0.4 mg by mouth daily  18  Yes Historical Provider, MD   ferrous sulfate 325 (65 Fe) MG tablet Take 325 mg by mouth 2 times daily  18  Yes Historical Provider, MD   albuterol sulfate  (90 BASE) MCG/ACT inhaler Inhale 2 puffs into the lungs as needed for Wheezing   Yes Historical Provider, MD   esomeprazole Magnesium (NEXIUM) 20 MG PACK Take 20 mg by mouth daily   Yes Historical Provider, MD   lisinopril (PRINIVIL;ZESTRIL) 40 MG tablet Take 40 mg by mouth daily   Yes Historical Provider, MD   HYDROCHLOROTHIAZIDE PO Take 25 mg by mouth daily    Yes Historical Provider, MD   PRAVASTATIN SODIUM PO Take 20 mg by mouth daily    Yes Historical Provider, MD   aspirin 325 MG EC tablet Take 325 mg by mouth daily Last dose 3/1/16   Yes Historical Provider, MD   albuterol (PROAIR HFA) 108 (90 BASE) MCG/ACT inhaler Inhale 2 puffs into the lungs every 6 hours as needed for Wheezing for up to 7 days.  2/9/15 2/15/21 Yes VENITA Bazzi   XARELTO 20 MG TABS tablet daily (with breakfast)  17   Historical Provider, MD   FLUZONE HIGH-DOSE 0.5 ML KOKO injection  10/11/17   Historical Provider, MD       Current medications:    Current Facility-Administered Medications   Medication Dose Route Frequency Provider Last Rate Last Admin    pantoprazole (PROTONIX) tablet 40 mg  40 mg Oral QAM AC Neville Garcia, DO        0.9 % sodium chloride infusion   Intravenous Continuous Awanda Picket,  mL/hr at 02/16/21 1542 New Bag at 02/16/21 1542    0.9 % sodium chloride infusion   Intravenous PRN Coretha Gunning, DO        glucose (GLUTOSE) 40 % oral gel 15 g  15 g Oral PRN Coretha Gunning, DO        dextrose 50 % IV solution  12.5 g Intravenous PRN Coretha Gunning, DO        glucagon (rDNA) injection 1 mg  1 mg Intramuscular PRN [facility]        dextrose 5 % solution  100 mL/hr Intravenous PRN [facility] DO        sodium chloride flush 0.9 % injection 10 mL  10 mL Intravenous 2 times per day [facility], [facility]   10 mL at 02/15/21 0935    sodium chloride flush 0.9 % injection 10 mL  10 mL Intravenous PRN [facility], DO        polyethylene glycol (GLYCOLAX) packet 17 g  17 g Oral Daily PRN Coretha Gunning, DO        acetaminophen (TYLENOL) tablet 650 mg  650 mg Oral Q6H PRN [facility], [facility]        Or    acetaminophen (TYLENOL) suppository 650 mg  650 mg Rectal Q6H PRN Coretha Gunning, DO        0.9 % sodium chloride bolus  500 mL Intravenous Once Coretha Gunning, DO        sotalol (BETAPACE) tablet 40 mg  40 mg Oral BID Awanda Picket, DO   40 mg at 02/16/21 2026    tamsulosin (FLOMAX) capsule 0.4 mg  0.4 mg Oral Daily Coretha Gunning, DO   0.4 mg at 02/15/21 0935    vitamin B-12 (CYANOCOBALAMIN) tablet 50 mcg  50 mcg Oral Daily Coretha Gunning, DO   50 mcg at 02/15/21 6902    pravastatin (PRAVACHOL) tablet 20 mg  20 mg Oral Nightly Coretha Gunning, DO   20 mg at 02/16/21 2026    ferrous sulfate (IRON 325) tablet 325 mg  325 mg Oral BID Coretha Gunning, DO   325 mg at 02/16/21 2026       Allergies:  No Known Allergies    Problem List:    Patient Active Problem List   Diagnosis Code  Hematuria R31.9    Hypertrophy of prostate with urinary obstruction N40.1, N13.8    SUNNY (acute kidney injury) ([facility] [address] N17.9       Past Medical History:        Diagnosis Date    Anxiety     Arthritis     Coronary artery disease     Hyperlipidemia     Hypertension     Pneumonia     Sinus tachycardia 2002       Past Surgical History:        Procedure Laterality Date    ABLATION OF DYSRHYTHMIC FOCUS      AORTA SURGERY      aortic valve replacement    AORTIC VALVE REPLACEMENT      MITRAL VALVE REPLACEMENT      OTHER SURGICAL HISTORY  2016    CT Myelogram, Dr. Jasbir Castillo   new battery    last checked Dr Kelby Arguelles 2016?  TONSILLECTOMY      UPPER GASTROINTESTINAL ENDOSCOPY      reflux    VASECTOMY         Social History:    Social History     Tobacco Use    Smoking status: Former Smoker     Packs/day: 1.00     Years: 44.00     Pack years: 44.00     Quit date: 3/4/2014     Years since quittin.9    Smokeless tobacco: Never Used   Substance Use Topics    Alcohol use:  Yes     Alcohol/week: 2.0 standard drinks     Types: 2 Shots of liquor per week     Comment: month                                Counseling given: Not Answered      Vital Signs (Current):   Vitals:    21 1454 21 1540 21 2026 21 0602   BP: 105/72 (!) 115/57 (!) 104/54    Pulse: 72 61 82    Resp: 23 18 18    Temp:  97.4 °F (36.3 °C) 99 °F (37.2 °C)    TempSrc:  Oral Oral    SpO2: 99% 97% 99%    Weight:    213 lb 9.6 oz (96.9 kg)   Height:                                                  BP Readings from Last 3 Encounters:   21 (!) 104/54   21 103/67   18 (!) 106/90       NPO Status:                                                                                 BMI:   Wt Readings from Last 3 Encounters:   21 213 lb 9.6 oz (96.9 kg)   18 235 lb (106.6 kg)   16 237 lb (107.5 kg)     Body mass index is 33.45 kg/m².    CBC:   Lab Results   Component Value Date    WBC 7.5 02/16/2021    RBC 2.92 02/16/2021    HGB 8.6 02/16/2021    HCT 27.8 02/16/2021    MCV 88.0 02/16/2021    RDW 14.7 02/16/2021     02/16/2021       CMP:   Lab Results   Component Value Date     02/16/2021    K 4.1 02/16/2021    K 4.2 02/14/2021     02/16/2021    CO2 21 02/16/2021    BUN 22 02/16/2021    CREATININE 1.8 02/16/2021    GFRAA 45 02/16/2021    LABGLOM 45 02/16/2021    GLUCOSE 90 02/16/2021    GLUCOSE 100 01/28/2011    PROT 7.4 02/14/2021    CALCIUM 8.8 02/16/2021    BILITOT 0.3 02/14/2021    ALKPHOS 99 02/14/2021    AST 10 02/14/2021    ALT <5 02/14/2021       POC Tests: No results for input(s): POCGLU, POCNA, POCK, POCCL, POCBUN, POCHEMO, POCHCT in the last 72 hours. Coags:   Lab Results   Component Value Date    PROTIME 14.7 02/14/2021    INR 1.2 02/14/2021    APTT 34.0 08/12/2016       HCG (If Applicable): No results found for: PREGTESTUR, PREGSERUM, HCG, HCGQUANT     ABGs: No results found for: PHART, PO2ART, ULJ5AJY, LCP8KXB, BEART, N4GVOSCK     Type & Screen (If Applicable):  No results found for: LABABO, LABRH    Drug/Infectious Status (If Applicable):  No results found for: HIV, HEPCAB    COVID-19 Screening (If Applicable):   Lab Results   Component Value Date    COVID19 Not Detected 02/14/2021         Anesthesia Evaluation  Patient summary reviewed  Airway: Mallampati: III  TM distance: >3 FB   Neck ROM: full  Mouth opening: > = 3 FB Dental: normal exam     Comment: Dentition intact. Patient denies any loose teeth.     Pulmonary:normal exam  breath sounds clear to auscultation  (+) pneumonia:                            ROS comment: Former 1 PPD x 44 years quit 2014   Cardiovascular:  Exercise tolerance: good (>4 METS),   (+) hypertension:, valvular problems/murmurs ( AORTIC VALVE REPLACEMENT MITRAL VALVE REPLACEMENT):, pacemaker: pacemaker, CAD: obstructive, dysrhythmias ( ABLATION OF DYSRHYTHMIC FOCUS):, hyperlipidemia    Murmur: Grade 2/6 murmur. Rhythm: regular  Rate: normal                    Neuro/Psych:   Negative Neuro/Psych ROS              GI/Hepatic/Renal:   (+) GERD:, renal disease (SUNNY, BPH with urinary obstructiuon):,          ROS comment: Anemia secondary to probable GI bleed. .. Endo/Other: Negative Endo/Other ROS   (+) : arthritis: OA., .                 Abdominal:   (+) obese,         Vascular: negative vascular ROS. Anesthesia Plan      MAC     ASA 3       Induction: intravenous. Anesthetic plan and risks discussed with patient. Plan discussed with CRNA.                   Edmund Berry MD   2/17/2021

## 2021-02-17 NOTE — PROGRESS NOTES
Subjective: The patient is awake and alert. No problems overnight. Denies chest pain, angina, and dyspnea. Denies abdominal pain. Tolerating diet. No nausea or vomiting. Current Facility-Administered Medications: potassium chloride (KLOR-CON M) extended release tablet 20 mEq, 20 mEq, Oral, BID WC  pantoprazole (PROTONIX) tablet 40 mg, 40 mg, Oral, QAM AC  0.9 % sodium chloride infusion, , Intravenous, PRN  glucose (GLUTOSE) 40 % oral gel 15 g, 15 g, Oral, PRN  dextrose 50 % IV solution, 12.5 g, Intravenous, PRN  glucagon (rDNA) injection 1 mg, 1 mg, Intramuscular, PRN  dextrose 5 % solution, 100 mL/hr, Intravenous, PRN  sodium chloride flush 0.9 % injection 10 mL, 10 mL, Intravenous, 2 times per day  sodium chloride flush 0.9 % injection 10 mL, 10 mL, Intravenous, PRN  polyethylene glycol (GLYCOLAX) packet 17 g, 17 g, Oral, Daily PRN  acetaminophen (TYLENOL) tablet 650 mg, 650 mg, Oral, Q6H PRN **OR** acetaminophen (TYLENOL) suppository 650 mg, 650 mg, Rectal, Q6H PRN  0.9 % sodium chloride bolus, 500 mL, Intravenous, Once  sotalol (BETAPACE) tablet 40 mg, 40 mg, Oral, BID  tamsulosin (FLOMAX) capsule 0.4 mg, 0.4 mg, Oral, Daily  vitamin B-12 (CYANOCOBALAMIN) tablet 50 mcg, 50 mcg, Oral, Daily  pravastatin (PRAVACHOL) tablet 20 mg, 20 mg, Oral, Nightly  ferrous sulfate (IRON 325) tablet 325 mg, 325 mg, Oral, BID    Objective:    BP (!) 110/55   Pulse 60   Temp 98 °F (36.7 °C) (Oral)   Resp 12   Ht 5' 7\" (1.702 m)   Wt 213 lb 9.6 oz (96.9 kg)   SpO2 97%   BMI 33.45 kg/m²   In: 1260 [P.O.:360; I.V.:900]  Out: 750    In: 1260   Out: 750 [Urine:750]   RRR, no murmurs, no gallops, or rubs.   CTA bilaterally, no wheeze, rales or rhonchi  bowel sounds present, nontender, nondistended, no masses  No clubbing, cyanosis, or edema  No neuro changes     CBC:   Lab Results   Component Value Date    WBC 7.5 02/16/2021    RBC 2.92 02/16/2021    HGB 8.2 02/17/2021    HCT 26.5 02/17/2021    MCV 88.0 02/16/2021 MCH 27.4 02/16/2021    MCHC 31.1 02/16/2021    RDW 14.7 02/16/2021     02/16/2021    MPV 9.5 02/16/2021     CBC with Differential:    Lab Results   Component Value Date    WBC 7.5 02/16/2021    RBC 2.92 02/16/2021    HGB 8.2 02/17/2021    HCT 26.5 02/17/2021     02/16/2021    MCV 88.0 02/16/2021    MCH 27.4 02/16/2021    MCHC 31.1 02/16/2021    RDW 14.7 02/16/2021    LYMPHOPCT 13.6 02/16/2021    MONOPCT 10.8 02/16/2021    BASOPCT 0.5 02/16/2021    MONOSABS 0.81 02/16/2021    LYMPHSABS 1.02 02/16/2021    EOSABS 0.37 02/16/2021    BASOSABS 0.04 02/16/2021     Hemoglobin/Hematocrit:    Lab Results   Component Value Date    HGB 8.2 02/17/2021    HCT 26.5 02/17/2021     CMP:    Lab Results   Component Value Date     02/17/2021    K 3.9 02/17/2021    K 4.2 02/14/2021     02/17/2021    CO2 22 02/17/2021    BUN 16 02/17/2021    CREATININE 1.7 02/17/2021    GFRAA 48 02/17/2021    LABGLOM 48 02/17/2021    GLUCOSE 89 02/17/2021    GLUCOSE 100 01/28/2011    PROT 7.4 02/14/2021    LABALBU 3.4 02/14/2021    LABALBU 3.9 01/28/2011    CALCIUM 8.7 02/17/2021    BILITOT 0.3 02/14/2021    ALKPHOS 99 02/14/2021    AST 10 02/14/2021    ALT <5 02/14/2021     BMP:    Lab Results   Component Value Date     02/17/2021    K 3.9 02/17/2021    K 4.2 02/14/2021     02/17/2021    CO2 22 02/17/2021    BUN 16 02/17/2021    LABALBU 3.4 02/14/2021    LABALBU 3.9 01/28/2011    CREATININE 1.7 02/17/2021    CALCIUM 8.7 02/17/2021    GFRAA 48 02/17/2021    LABGLOM 48 02/17/2021    GLUCOSE 89 02/17/2021    GLUCOSE 100 01/28/2011     Albumin:    Lab Results   Component Value Date    LABALBU 3.4 02/14/2021    LABALBU 3.9 01/28/2011     FLP:  No results found for: TRIG, HDL, LDLCALC, LDLDIRECT, LABVLDL  TSH:    Lab Results   Component Value Date    TSH 3.630 02/14/2021     VITAMIN B12: No components found for: B12  FOLATE:    Lab Results   Component Value Date    FOLATE 9.1 02/16/2021     IRON:    Lab Results

## 2021-02-17 NOTE — PROGRESS NOTES
Department of Internal Medicine  PN    PCP: Dr. Abril Marin  Admitting Physician: Dr. Abril Marin  Consultants: Dr. Patricio Swenson: Lightheadedness    HISTORY OF PRESENT ILLNESS:    Patient is 81-year-old male who presented to the ED due to lightheadedness. Patient states that he has been feeling lightheaded for the past 1 to 2 weeks. However today he took Viagra and felt extra lightheaded. As such he presented to the ED. He does also complain of some epigastric pain that has moved to the right lower quadrant. He he denies fever or chills. He denies nausea or vomiting. He denies diarrhea or constipation. 2/15/2021  Patient seen examined on telemetry floor. Patient still with some lightheadedness but is receiving his second unit of packed RBC currently. Patient overall feels little bit better. Patient still has little bit of the right flank pain. Patient adamantly denies any change in his stool color with no evidence of any devora bleeding. Patient's had some dark bowel movements for 2 years secondary to his laxative. Patient denies any gross hematuria. CT the abdomen showed 3.5 cm mass or fluid collection. Patient's baseline BUN/creatinine 24/1.18 back in March 2020. Hemoglobin 6.2 this morning and patient has received 2 units of packed cells. Temperature 97.3 with heart rate of 67. Blood pressure ranges 97//60. O2 sats 97% on room air at rest.  ECG reviewed and was sinus rhythm. 2/16/2021  Patient seen and examined on telemetry floor. Patient with multiple questions again today. Lab work and test reviewed with patient today. Patient states his abdominal and right flank pain has improved but still there off and on. Patient with poor appetite but denies any epigastric pain or any chest pain. There is no unusual shortness of breath. Case discussed with GI today. Patient set up for EGD. Urology and general surgery note reviewed.   BUN/creatinine is improved to 22/1.8 but is not back to baseline. Procalcitonin was 0.14. Hemoglobin was 8.0 this morning after receiving 2 units of packed RBC yesterday with a WBC 7.4. Temperature is 98.1 with heart rate of 64. Blood pressure currently 117/57. O2 sats 97% on room air at rest.  Urine output is adequate. 2/17/2021  Patient seen and examined on telemetry floor. Patient feels little bit better today. He denies any flank pain or abdominal pain today. Patient's urine still looks concentrated or may be related to the dry blood. Case discussed with urologist this morning. Patient had cystoscopy today with right stent insertion. BUN/creatinine was 16/1.7 today with hemoglobin 8.2 today. Temperature was 98 with heart rate of 60 and blood pressure currently 110/55. O2 sats 98% on room air at rest.  Cardiology and general surgery notes reviewed. PAST MEDICAL Hx:  Past Medical History:   Diagnosis Date    Anxiety     Arthritis     Coronary artery disease     Hyperlipidemia     Hypertension     Pneumonia     Sinus tachycardia 01/01/2002       PAST SURGICAL Hx:   Past Surgical History:   Procedure Laterality Date    ABLATION OF DYSRHYTHMIC FOCUS      AORTA SURGERY      aortic valve replacement    AORTIC VALVE REPLACEMENT      MITRAL VALVE REPLACEMENT      OTHER SURGICAL HISTORY  08/12/2016    CT Myelogram, Dr. Ignacio Townsend  2014 new battery    last checked Dr Dagmar Toribio 1/2016?     TONSILLECTOMY      UPPER GASTROINTESTINAL ENDOSCOPY      reflux    UPPER GASTROINTESTINAL ENDOSCOPY N/A 2/16/2021    EGD BIOPSY performed by Lili Guerra MD at [address] Hx:  Family History   Problem Relation Age of Onset    Diabetes Mother     Stroke Mother     Diabetes Father     Heart Disease Father     Brain Cancer Sister     Stroke Sister     Stroke Brother     Cancer Maternal Grandfather        HOME MEDICATIONS:  Prior to Admission medications    Medication Sig Start Date End Date Taking? Authorizing Provider   vitamin B-12 (CYANOCOBALAMIN) 100 MCG tablet Take 50 mcg by mouth daily   Yes Historical Provider, MD   sennosides-docusate sodium (SENOKOT-S) 8.6-50 MG tablet Take 1 tablet by mouth 2 times daily   Yes Historical Provider, MD   sotalol (BETAPACE) 80 MG tablet Take 40 mg by mouth 4/10/17  Yes Historical Provider, MD   tamsulosin (FLOMAX) 0.4 MG capsule Take 0.4 mg by mouth daily  1/4/18  Yes Historical Provider, MD   ferrous sulfate 325 (65 Fe) MG tablet Take 325 mg by mouth 2 times daily  1/8/18  Yes Historical Provider, MD   albuterol sulfate  (90 BASE) MCG/ACT inhaler Inhale 2 puffs into the lungs as needed for Wheezing   Yes Historical Provider, MD   esomeprazole Magnesium (NEXIUM) 20 MG PACK Take 20 mg by mouth daily   Yes Historical Provider, MD   lisinopril (PRINIVIL;ZESTRIL) 40 MG tablet Take 40 mg by mouth daily   Yes Historical Provider, MD   HYDROCHLOROTHIAZIDE PO Take 25 mg by mouth daily    Yes Historical Provider, MD   PRAVASTATIN SODIUM PO Take 20 mg by mouth daily    Yes Historical Provider, MD   aspirin 325 MG EC tablet Take 325 mg by mouth daily Last dose 3/1/16   Yes Historical Provider, MD   albuterol (PROAIR HFA) 108 (90 BASE) MCG/ACT inhaler Inhale 2 puffs into the lungs every 6 hours as needed for Wheezing for up to 7 days. 2/9/15 2/15/21 Yes VENITA Vizcaino   XARELTO 20 MG TABS tablet daily (with breakfast)  12/21/17   Historical Provider, MD   FLUZONE HIGH-DOSE 0.5 ML KOKO injection  10/11/17   Historical Provider, MD       ALLERGIES:  Patient has no known allergies.     SOCIAL Hx:  Social History     Socioeconomic History    Marital status:      Spouse name: Not on file    Number of children: Not on file    Years of education: Not on file    Highest education level: Not on file   Occupational History    Not on file   Social Needs    Financial resource strain: Not on file    Food insecurity     Worry: Not on file

## 2021-02-17 NOTE — PROGRESS NOTES
Unable to have MRI due to pacer. Once Cr improved would do CT with IV and oral contrast pelvis to better qualify mass relation to vessels.      Nimisha MD Elaine, MS  Minimally Invasive and Bariatric Surgery  [phone] (p)  2/17/2021  8:25 AM

## 2021-02-17 NOTE — ANESTHESIA POSTPROCEDURE EVALUATION
Department of Anesthesiology  Postprocedure Note    Patient: Michelle Cordova  MRN: [id number]  YOB: 1950  Date of evaluation: 2/17/2021  Time:  9:12 AM     Procedure Summary     Date: 02/17/21 Room / Location: [facility] [address]  [address] [facility]    Anesthesia Start: 6239 Anesthesia Stop: 2419    Procedure: CYSTOSCOPY BILATERAL RETROGRADE PYELOGRAM RIGHT STENT INSERTION (Right Bladder) Diagnosis: (RIGHT URETERAL STONE)    Surgeons: Agnel Campos MD Responsible Provider: Cam Todd MD    Anesthesia Type: MAC ASA Status: 3          Anesthesia Type: MAC    Effie Phase I: Effie Score: 10    Effie Phase II: Effie Score: 10    Last vitals: Reviewed and per EMR flowsheets.        Anesthesia Post Evaluation    Patient location during evaluation: bedside  Patient participation: complete - patient participated  Level of consciousness: awake and alert  Pain score: 0  Airway patency: patent  Nausea & Vomiting: no nausea and no vomiting  Complications: no  Cardiovascular status: hemodynamically stable  Respiratory status: acceptable  Hydration status: euvolemic

## 2021-02-17 NOTE — OP NOTE
[address]                                OPERATIVE REPORT    PATIENT NAME: Simran Tejada                    :        1950  MED REC NO:   [id number]                            ROOM:       0421  ACCOUNT NO:   [de-identified]                           ADMIT DATE: 2021  PROVIDER:     Talon Garcia MD    DATE OF PROCEDURE:  2021    PREOPERATIVE DIAGNOSES:  Right hydronephrosis with pain, extrinsic mass  in the right pelvis accounting for the obstruction, assess the left side  for obstruction. POSTOPERATIVE DIAGNOSES:  Normal left collecting system, right  hydronephrosis with obstruction in the true pelvis. OPERATION PERFORMED:  Cystopanendoscopy, bilateral retrograde  pyelograms, and right stent insertion. ANESTHESIA:  Monitored sedation and B and O suppository at the end of  the case. CONDITION:  Stable. BLOOD LOSS:  Minimal, less than 5 mL. DISPOSITION:  Back to his room without a Sow catheter with stent and  no string. SURGEON:  Talon Garcia MD    OPERATIVE PROCEDURE:  The time-out was read by me, the anesthesia and  operating staff reviewed history, physical, allergy and medication, all  were in agreement. A 2 gm of Ancef was given upon induction. Placed in  lithotomy position with no undue tension at hips, knees or buttocks. A  #21 panendoscope with 30-degree angle lens under direct vision with  obturator was used to examine the urethra. No strictures, false  passage, abrasions, ulcerations, cystic or solid lesions. Verumontanum  was intact. Lateral lobes were not obstructing the midline. No median  lobe hypertrophy. No bladder urine was sent for culture. No stones,  clots or foreign bodies in the lumen. Mild trabeculation of cellule or  diverticular formation.   Bladder capacity is over 300 mL which is  normal.  No cystitis cystica, enterovesical fistula, extrinsic imprints. The trigone was well developed. Both ureteral orifices appear to be  singular and non-refluxing.  film of the abdomen demonstrates no  stones in the estimated area of kidneys and ureters. Left retrograde  pyelogram was eventually done and showed no intrinsic or extrinsic  abnormalities of the ureters, the UPJ, the infundibulum, the calyces. The calyces were delicate as were the infundibula. No splaying. Normal  left collecting system. On the right side, five open-ended catheter and Glidewire met  obstruction at about 10 cm and then was able to navigate beyond this to  get the ureteral catheter into the renal pelvis. The urine was grossly  clear. 20 mL of urine was sent for culture and cytology. Retrograde  pyelogram demonstrates significant hydronephrosis. There was _____  clubbing of the calyces and dilatation of the infundibulum but no  splaying. The patient then had a 6 x 28 Universal stent inserted. It  was very tight at the pelvic outlet area. I was able to advance this 25  cm renal in position with fluoroscopy of the bladder under direct  visualization. Bladder was emptied. Rectal, good anal tone. No  hemorrhoids, no masses, no impaction. Prostate is about 25 gm size,  normal 15-20. There are no nodules. No clinical suspicion of prostatic  cancer. Blood loss in this case was minimal, less than 5 mL. The  patient was back to the ICU setting in satisfactory condition.         Abundio Lorenzo MD    D: 02/17/2021 8:19:26       T: 02/17/2021 8:26:01     [email or web address]  Job#: [id number]     Doc#: [id number]    CC:  Alex Walters MD

## 2021-02-17 NOTE — PROGRESS NOTES
Subjective: The patient is awake and alert. No problems overnight. Denies chest pain, angina, and dyspnea. Denies abdominal pain. Tolerating diet. No nausea or vomiting. Current Facility-Administered Medications: [START ON 2/17/2021] pantoprazole (PROTONIX) tablet 40 mg, 40 mg, Oral, QAM AC  0.9 % sodium chloride infusion, , Intravenous, Continuous  0.9 % sodium chloride infusion, , Intravenous, PRN  glucose (GLUTOSE) 40 % oral gel 15 g, 15 g, Oral, PRN  dextrose 50 % IV solution, 12.5 g, Intravenous, PRN  glucagon (rDNA) injection 1 mg, 1 mg, Intramuscular, PRN  dextrose 5 % solution, 100 mL/hr, Intravenous, PRN  sodium chloride flush 0.9 % injection 10 mL, 10 mL, Intravenous, 2 times per day  sodium chloride flush 0.9 % injection 10 mL, 10 mL, Intravenous, PRN  polyethylene glycol (GLYCOLAX) packet 17 g, 17 g, Oral, Daily PRN  acetaminophen (TYLENOL) tablet 650 mg, 650 mg, Oral, Q6H PRN **OR** acetaminophen (TYLENOL) suppository 650 mg, 650 mg, Rectal, Q6H PRN  0.9 % sodium chloride bolus, 500 mL, Intravenous, Once  sotalol (BETAPACE) tablet 40 mg, 40 mg, Oral, BID  tamsulosin (FLOMAX) capsule 0.4 mg, 0.4 mg, Oral, Daily  vitamin B-12 (CYANOCOBALAMIN) tablet 50 mcg, 50 mcg, Oral, Daily  pravastatin (PRAVACHOL) tablet 20 mg, 20 mg, Oral, Nightly  ferrous sulfate (IRON 325) tablet 325 mg, 325 mg, Oral, BID    Objective:    BP (!) 115/57   Pulse 61   Temp 97.4 °F (36.3 °C) (Oral)   Resp 18   Ht 5' 7\" (1.702 m)   Wt 216 lb 1.6 oz (98 kg)   SpO2 97%   BMI 33.85 kg/m²   In: 368 [I.V.:368]  Out: 600    In: 368   Out: 600 [Urine:600]   RRR, no murmurs, no gallops, or rubs.   CTA bilaterally, no wheeze, rales or rhonchi  bowel sounds present, nontender, nondistended, no masses  No clubbing, cyanosis, or edema  No neuro changes     CBC:   Lab Results   Component Value Date    WBC 7.5 02/16/2021    RBC 2.92 02/16/2021    HGB 8.6 02/16/2021    HCT 27.4 02/16/2021    MCV 88.0 02/16/2021    MCH 27.4 02/16/2021 MCHC 31.1 02/16/2021    RDW 14.7 02/16/2021     02/16/2021    MPV 9.5 02/16/2021     CBC with Differential:    Lab Results   Component Value Date    WBC 7.5 02/16/2021    RBC 2.92 02/16/2021    HGB 8.6 02/16/2021    HCT 27.4 02/16/2021     02/16/2021    MCV 88.0 02/16/2021    MCH 27.4 02/16/2021    MCHC 31.1 02/16/2021    RDW 14.7 02/16/2021    LYMPHOPCT 13.6 02/16/2021    MONOPCT 10.8 02/16/2021    BASOPCT 0.5 02/16/2021    MONOSABS 0.81 02/16/2021    LYMPHSABS 1.02 02/16/2021    EOSABS 0.37 02/16/2021    BASOSABS 0.04 02/16/2021     Hemoglobin/Hematocrit:    Lab Results   Component Value Date    HGB 8.6 02/16/2021    HCT 27.4 02/16/2021     CMP:    Lab Results   Component Value Date     02/16/2021    K 4.1 02/16/2021    K 4.2 02/14/2021     02/16/2021    CO2 21 02/16/2021    BUN 22 02/16/2021    CREATININE 1.8 02/16/2021    GFRAA 45 02/16/2021    LABGLOM 45 02/16/2021    GLUCOSE 90 02/16/2021    GLUCOSE 100 01/28/2011    PROT 7.4 02/14/2021    LABALBU 3.4 02/14/2021    LABALBU 3.9 01/28/2011    CALCIUM 8.8 02/16/2021    BILITOT 0.3 02/14/2021    ALKPHOS 99 02/14/2021    AST 10 02/14/2021    ALT <5 02/14/2021     BMP:    Lab Results   Component Value Date     02/16/2021    K 4.1 02/16/2021    K 4.2 02/14/2021     02/16/2021    CO2 21 02/16/2021    BUN 22 02/16/2021    LABALBU 3.4 02/14/2021    LABALBU 3.9 01/28/2011    CREATININE 1.8 02/16/2021    CALCIUM 8.8 02/16/2021    GFRAA 45 02/16/2021    LABGLOM 45 02/16/2021    GLUCOSE 90 02/16/2021    GLUCOSE 100 01/28/2011     Albumin:    Lab Results   Component Value Date    LABALBU 3.4 02/14/2021    LABALBU 3.9 01/28/2011     FLP:  No results found for: TRIG, HDL, LDLCALC, LDLDIRECT, LABVLDL  TSH:    Lab Results   Component Value Date    TSH 3.630 02/14/2021     VITAMIN B12: No components found for: B12  FOLATE:    Lab Results   Component Value Date    FOLATE 9.1 02/16/2021     IRON:  No results found for: IRON  Iron Saturation: No components found for: PERCENTFE  TIBC:  No results found for: TIBC         Patient Active Problem List   Diagnosis    Hematuria    Hypertrophy of prostate with urinary obstruction    SUNNY (acute kidney injury) ([address]     Imp/Plan:  Anemia secondary to GI bleed  Was on DOAC (xarelto)  SUNNY  Right hydronephrosis  Pelvic mass  Prostatomegaly  Coronary artery disease status post CABG 2017 [facility]  Recent Lexiscan stress \"low risk small anteroapical defect\"  Aortic valve replacement-Trifecta #21 2017  Mitral valve replacement-Biocor #29; 2017; with mild MR  Hypertension  Hyperlipidemia  Pacemaker in situ with recent lead extractions and new RA and RV lead insertions and pulse generator change [facility]-12/18/20  Sick sinus syndrome  History of atrial fibrillation;  No recurrence on recent Pacer interrogation  History of tobacco abuse  Currently vapes     Low risk for endoscopy  Holding DOAC xarelto  No clear need for prohospitalphylactic Abx for EGD/Colonoscopy should have in   Echocardiogram  Monitor closely  Restart anticoagulation once bleeding resolved  Check iron/retics  Cystoscopy for severe R hydronephrosis  Pacer magnet check by me shows nl funx  DOO mode rate 100

## 2021-02-17 NOTE — PROGRESS NOTES
PROGRESS NOTE    By Makayla Umaña D.O GI Fellow    [facility]  Dr. James Araiza MD, Dr. Gely Moura MD, Dr David Paez, Dr. Michael Gerber MD, Dr. Roxy Robertson, DO      Irl Muta  72 y.o.  male    SUBJECTIVE:    Patient resting comfortably this AM.  Patient denies any melena medic easier hematemesis. Findings on EGD explained to the patient in detail he voiced understanding. OBJECTIVE:    BP (!) 110/55   Pulse 60   Temp 98 °F (36.7 °C) (Oral)   Resp 12   Ht 5' 7\" (1.702 m)   Wt 213 lb 9.6 oz (96.9 kg)   SpO2 97%   BMI 33.45 kg/m²     Gen: NAD, AAO x 3  HEENT:PEERL, no icterus  Heart: RRR, no M/R/G  Lungs: CTAB  Abd.: soft, NT, ND, BS +, no G/R, no HSM  Extr.: no C/C/E, no bruising         Lab Results   Component Value Date    WBC 7.5 02/16/2021    WBC 10.5 02/14/2021    WBC 7.5 08/12/2016    HGB 8.2 02/17/2021    HGB 8.6 02/16/2021    HGB 8.0 02/16/2021    HCT 26.5 02/17/2021    MCV 88.0 02/16/2021    RDW 14.7 02/16/2021     02/16/2021     02/14/2021     08/12/2016     Lab Results   Component Value Date     02/17/2021    K 3.9 02/17/2021    K 4.2 02/14/2021     02/17/2021    CO2 22 02/17/2021    BUN 16 02/17/2021    CREATININE 1.7 02/17/2021    CALCIUM 8.7 02/17/2021    PROT 7.4 02/14/2021    LABALBU 3.4 02/14/2021    LABALBU 3.9 01/28/2011    BILITOT 0.3 02/14/2021    BILITOT 0.3 01/28/2011    ALKPHOS 99 02/14/2021    ALKPHOS 111 01/28/2011    AST 10 02/14/2021    AST 17 01/28/2011    ALT <5 02/14/2021    ALT 11 01/28/2011     No results found for: LIPASE  No results found for: AMYLASE      ASSESSMENT/PLAN:    1.  Acute normocytic anemia  -Vital signs stable no overt signs of GI bleed on exam  -Hemoglobin 10.9 on 12/7/2020 at [facility]  -  Hgb stable at 8.2 this a.m. 2 units packed red blood cells for the admission  -EGD 2/16 antral gastritis biopsy taken out H. pylori no source of anemia identified on EGD  -Okay for Protonix 40 mg oral

## 2021-02-17 NOTE — BRIEF OP NOTE
Brief Postoperative Note      Patient: Phong Ramos  YOB: 1950  MRN: [id number]    Date of Procedure: 2/17/2021    Pre-Op Diagnosis: right ureteral obstruction due to extrinsic pelvic mass/hydronephrosis and flank pain    Post-Op Diagnosis: same       Procedure: cysto:retrograde pyelograms/right stent insertion    Surgeon(s):  Jerryl Homans, MD    Assistant:  none    Anesthesia: Monitor Anesthesia Care and a B&O supp at the end of the case    Estimated Blood Loss (mL): Minimal less than 5 cc    Complications: none    Specimens:   Urine from right renal pelvis for C&S    Implants:  none      Drains: 6x28 Universa stent  Findings: as above    Electronically signed by Jerryl Homans, MD on 2/17/2021 at 7:43 AM

## 2021-02-18 ENCOUNTER — ANESTHESIA EVENT (OUTPATIENT)
Dept: ENDOSCOPY | Age: 71
DRG: 661 | End: 2021-02-18
Payer: MEDICARE

## 2021-02-18 ENCOUNTER — APPOINTMENT (OUTPATIENT)
Dept: CT IMAGING | Age: 71
DRG: 661 | End: 2021-02-18
Payer: MEDICARE

## 2021-02-18 LAB
ANION GAP SERPL CALCULATED.3IONS-SCNC: 10 MMOL/L (ref 7–16)
BUN BLDV-MCNC: 13 MG/DL (ref 8–23)
CALCIUM SERPL-MCNC: 8.9 MG/DL (ref 8.6–10.2)
CHLORIDE BLD-SCNC: 105 MMOL/L (ref 98–107)
CO2: 23 MMOL/L (ref 22–29)
CREAT SERPL-MCNC: 1.5 MG/DL (ref 0.7–1.2)
GFR AFRICAN AMERICAN: 56
GFR NON-AFRICAN AMERICAN: 56 ML/MIN/1.73
GLUCOSE BLD-MCNC: 88 MG/DL (ref 74–99)
HCT VFR BLD CALC: 27.6 % (ref 37–54)
HEMOGLOBIN: 8.5 G/DL (ref 12.5–16.5)
POTASSIUM SERPL-SCNC: 4.5 MMOL/L (ref 3.5–5)
SODIUM BLD-SCNC: 138 MMOL/L (ref 132–146)

## 2021-02-18 PROCEDURE — 2580000003 HC RX 258: Performed by: UROLOGY

## 2021-02-18 PROCEDURE — 1200000000 HC SEMI PRIVATE

## 2021-02-18 PROCEDURE — 6370000000 HC RX 637 (ALT 250 FOR IP): Performed by: HOSPITALIST

## 2021-02-18 PROCEDURE — 80048 BASIC METABOLIC PNL TOTAL CA: CPT

## 2021-02-18 PROCEDURE — 36415 COLL VENOUS BLD VENIPUNCTURE: CPT

## 2021-02-18 PROCEDURE — 6370000000 HC RX 637 (ALT 250 FOR IP): Performed by: UROLOGY

## 2021-02-18 PROCEDURE — 99232 SBSQ HOSP IP/OBS MODERATE 35: CPT | Performed by: SURGERY

## 2021-02-18 PROCEDURE — 6360000004 HC RX CONTRAST MEDICATION: Performed by: RADIOLOGY

## 2021-02-18 PROCEDURE — 2580000003 HC RX 258: Performed by: INTERNAL MEDICINE

## 2021-02-18 PROCEDURE — 85014 HEMATOCRIT: CPT

## 2021-02-18 PROCEDURE — 6370000000 HC RX 637 (ALT 250 FOR IP): Performed by: INTERNAL MEDICINE

## 2021-02-18 PROCEDURE — 85018 HEMOGLOBIN: CPT

## 2021-02-18 PROCEDURE — 74177 CT ABD & PELVIS W/CONTRAST: CPT

## 2021-02-18 RX ORDER — SODIUM CHLORIDE 9 MG/ML
INJECTION, SOLUTION INTRAVENOUS CONTINUOUS
Status: DISCONTINUED | OUTPATIENT
Start: 2021-02-18 | End: 2021-02-19

## 2021-02-18 RX ORDER — POTASSIUM CHLORIDE 20 MEQ/1
20 TABLET, EXTENDED RELEASE ORAL
Status: DISCONTINUED | OUTPATIENT
Start: 2021-02-19 | End: 2021-02-19 | Stop reason: HOSPADM

## 2021-02-18 RX ORDER — POLYETHYLENE GLYCOL 3350, SODIUM CHLORIDE, SODIUM BICARBONATE, POTASSIUM CHLORIDE 420; 11.2; 5.72; 1.48 G/4L; G/4L; G/4L; G/4L
4000 POWDER, FOR SOLUTION ORAL ONCE
Status: COMPLETED | OUTPATIENT
Start: 2021-02-18 | End: 2021-02-18

## 2021-02-18 RX ADMIN — PANTOPRAZOLE SODIUM 40 MG: 40 TABLET, DELAYED RELEASE ORAL at 05:30

## 2021-02-18 RX ADMIN — FERROUS SULFATE TAB 325 MG (65 MG ELEMENTAL FE) 325 MG: 325 (65 FE) TAB at 21:41

## 2021-02-18 RX ADMIN — IOPAMIDOL 75 ML: 755 INJECTION, SOLUTION INTRAVENOUS at 15:56

## 2021-02-18 RX ADMIN — VITAM B12 50 MCG: 100 TAB at 07:59

## 2021-02-18 RX ADMIN — TAMSULOSIN HYDROCHLORIDE 0.4 MG: 0.4 CAPSULE ORAL at 07:59

## 2021-02-18 RX ADMIN — BISACODYL 10 MG: 5 TABLET, COATED ORAL at 16:15

## 2021-02-18 RX ADMIN — IOHEXOL 50 ML: 240 INJECTION, SOLUTION INTRATHECAL; INTRAVASCULAR; INTRAVENOUS; ORAL at 15:56

## 2021-02-18 RX ADMIN — SOTALOL HYDROCHLORIDE 40 MG: 80 TABLET ORAL at 07:59

## 2021-02-18 RX ADMIN — POLYETHYLENE GLYCOL 3350, SODIUM CHLORIDE, SODIUM BICARBONATE AND POTASSIUM CHLORIDE 4000 ML: 420; 5.72; 11.2; 1.48 POWDER, FOR SOLUTION ORAL at 16:16

## 2021-02-18 RX ADMIN — PRAVASTATIN SODIUM 20 MG: 20 TABLET ORAL at 21:41

## 2021-02-18 RX ADMIN — SOTALOL HYDROCHLORIDE 40 MG: 80 TABLET ORAL at 21:41

## 2021-02-18 RX ADMIN — SODIUM CHLORIDE, PRESERVATIVE FREE 10 ML: 5 INJECTION INTRAVENOUS at 08:02

## 2021-02-18 RX ADMIN — FERROUS SULFATE TAB 325 MG (65 MG ELEMENTAL FE) 325 MG: 325 (65 FE) TAB at 07:59

## 2021-02-18 RX ADMIN — SODIUM CHLORIDE: 9 INJECTION, SOLUTION INTRAVENOUS at 23:57

## 2021-02-18 RX ADMIN — SODIUM CHLORIDE: 9 INJECTION, SOLUTION INTRAVENOUS at 14:29

## 2021-02-18 ASSESSMENT — PAIN SCALES - GENERAL: PAINLEVEL_OUTOF10: 0

## 2021-02-18 NOTE — ANESTHESIA PRE PROCEDURE
Department of Anesthesiology  Preprocedure Note       Name:  Maite Arroyo   Age:  72 y.o.  :  1950                                          MRN:  [id number]         Date:  2021      Surgeon: Yahaira Dutta):  Jayda Zurita DO    Procedure: Procedure(s):  COLONOSCOPY    Medications prior to admission:   Prior to Admission medications    Medication Sig Start Date End Date Taking? Authorizing Provider   vitamin B-12 (CYANOCOBALAMIN) 100 MCG tablet Take 50 mcg by mouth daily    Historical Provider, MD   sennosides-docusate sodium (SENOKOT-S) 8.6-50 MG tablet Take 1 tablet by mouth 2 times daily    Historical Provider, MD   sotalol (BETAPACE) 80 MG tablet Take 40 mg by mouth 4/10/17   Historical Provider, MD   XARELTO 20 MG TABS tablet daily (with breakfast)  17   Historical Provider, MD   tamsulosin (FLOMAX) 0.4 MG capsule Take 0.4 mg by mouth daily  18   Historical Provider, MD   FLUZONE HIGH-DOSE 0.5 ML KOKO injection  10/11/17   Historical Provider, MD   ferrous sulfate 325 (65 Fe) MG tablet Take 325 mg by mouth 2 times daily  18   Historical Provider, MD   albuterol sulfate  (90 BASE) MCG/ACT inhaler Inhale 2 puffs into the lungs as needed for Wheezing    Historical Provider, MD   esomeprazole Magnesium (NEXIUM) 20 MG PACK Take 20 mg by mouth daily    Historical Provider, MD   lisinopril (PRINIVIL;ZESTRIL) 40 MG tablet Take 40 mg by mouth daily    Historical Provider, MD   HYDROCHLOROTHIAZIDE PO Take 25 mg by mouth daily     Historical Provider, MD   PRAVASTATIN SODIUM PO Take 20 mg by mouth daily     Historical Provider, MD   aspirin 325 MG EC tablet Take 325 mg by mouth daily Last dose 3/1/16    Historical Provider, MD   albuterol (PROAIR HFA) 108 (90 BASE) MCG/ACT inhaler Inhale 2 puffs into the lungs every 6 hours as needed for Wheezing for up to 7 days. 2/9/15 2/15/21  VENITA Catherine       Current medications:    No current facility-administered medications for this visit. No current outpatient medications on file.      Facility-Administered Medications Ordered in Other Visits   Medication Dose Route Frequency Provider Last Rate Last Admin    [START ON 2/19/2021] potassium chloride (KLOR-CON M) extended release tablet 20 mEq  20 mEq Oral Daily with breakfast Buelah Loge, DO        0.9 % sodium chloride infusion   Intravenous Continuous Buelah Loge,  mL/hr at 02/18/21 1429 New Bag at 02/18/21 1429    bisacodyl (DULCOLAX) EC tablet 10 mg  10 mg Oral Once Natanael Rivas MD        pantoprazole (PROTONIX) tablet 40 mg  40 mg Oral QAM AC Pepe Magallanes MD   40 mg at 02/18/21 0530    0.9 % sodium chloride infusion   Intravenous PRN Marleny Lyle MD        glucose (GLUTOSE) 40 % oral gel 15 g  15 g Oral PRN Marleny Lyle MD        dextrose 50 % IV solution  12.5 g Intravenous PRN Marleny Lyle MD        glucagon (rDNA) injection 1 mg  1 mg Intramuscular PRN Marleny Lyle MD        dextrose 5 % solution  100 mL/hr Intravenous PRN Marleny Lyle MD        sodium chloride flush 0.9 % injection 10 mL  10 mL Intravenous 2 times per day Marleny Lyle MD   10 mL at 02/18/21 0802    sodium chloride flush 0.9 % injection 10 mL  10 mL Intravenous PRN Marleny Lyle MD        polyethylene glycol (GLYCOLAX) packet 17 g  17 g Oral Daily PRN Marleny Lyle MD        acetaminophen (TYLENOL) tablet 650 mg  650 mg Oral Q6H PRN Marleny Lyle MD        Or    acetaminophen (TYLENOL) suppository 650 mg  650 mg Rectal Q6H PRN Marleny Lyle MD        sotalol (BETAPACE) tablet 40 mg  40 mg Oral BID Marleny Lyle MD   40 mg at 02/18/21 0759    tamsulosin (FLOMAX) capsule 0.4 mg  0.4 mg Oral Daily Iris Magallanes MD   0.4 mg at 02/18/21 0759    vitamin B-12 (CYANOCOBALAMIN) tablet 50 mcg  50 mcg Oral Daily Iris Magallanes MD   50 mcg at 02/18/21 0759    pravastatin (PRAVACHOL) tablet 20 mg  20 mg Oral Nightly Marleny Lyle MD   20 mg at 02/17/21 2015  ferrous sulfate (IRON 325) tablet 325 mg  325 mg Oral BID Jak Eric MD   325 mg at 21 7913       Allergies:  No Known Allergies    Problem List:    Patient Active Problem List   Diagnosis Code    Hematuria R31.9    Hypertrophy of prostate with urinary obstruction N40.1, N13.8    SUNNY (acute kidney injury) ([facility] [address] N17.9       Past Medical History:        Diagnosis Date    Anxiety     Arthritis     Coronary artery disease     Hyperlipidemia     Hypertension     Pneumonia     Sinus tachycardia 2002       Past Surgical History:        Procedure Laterality Date    ABLATION OF DYSRHYTHMIC FOCUS      AORTA SURGERY      aortic valve replacement    AORTIC VALVE REPLACEMENT      BLADDER SURGERY Right 2021    CYSTOSCOPY BILATERAL RETROGRADE PYELOGRAM RIGHT STENT INSERTION performed by Jak Eric MD at [facility] OTHER SURGICAL HISTORY  2016    CT Myelogram, Dr. Nura Evangelista   new battery    last checked Dr Reena Centeno 2016?  TONSILLECTOMY      UPPER GASTROINTESTINAL ENDOSCOPY      reflux    UPPER GASTROINTESTINAL ENDOSCOPY N/A 2021    EGD BIOPSY performed by Marjan Olivier MD at [address] History:    Social History     Tobacco Use    Smoking status: Former Smoker     Packs/day: 1.00     Years: 44.00     Pack years: 44.00     Quit date: 3/4/2014     Years since quittin.9    Smokeless tobacco: Never Used   Substance Use Topics    Alcohol use: Yes     Alcohol/week: 2.0 standard drinks     Types: 2 Shots of liquor per week     Comment: month                                Counseling given: Not Answered      Vital Signs (Current): There were no vitals filed for this visit.                                            BP Readings from Last 3 Encounters:   21 129/60   21 113/63   21 103/67       NPO Status: BMI:   Wt Readings from Last 3 Encounters:   02/18/21 214 lb 6.4 oz (97.3 kg)   01/16/18 235 lb (106.6 kg)   08/12/16 237 lb (107.5 kg)     There is no height or weight on file to calculate BMI.    CBC:   Lab Results   Component Value Date    WBC 7.5 02/16/2021    RBC 2.92 02/16/2021    HGB 8.5 02/18/2021    HCT 27.6 02/18/2021    MCV 88.0 02/16/2021    RDW 14.7 02/16/2021     02/16/2021       CMP:   Lab Results   Component Value Date     02/18/2021    K 4.5 02/18/2021    K 4.2 02/14/2021     02/18/2021    CO2 23 02/18/2021    BUN 13 02/18/2021    CREATININE 1.5 02/18/2021    GFRAA 56 02/18/2021    LABGLOM 56 02/18/2021    GLUCOSE 88 02/18/2021    GLUCOSE 100 01/28/2011    PROT 7.4 02/14/2021    CALCIUM 8.9 02/18/2021    BILITOT 0.3 02/14/2021    ALKPHOS 99 02/14/2021    AST 10 02/14/2021    ALT <5 02/14/2021       POC Tests: No results for input(s): POCGLU, POCNA, POCK, POCCL, POCBUN, POCHEMO, POCHCT in the last 72 hours. Coags:   Lab Results   Component Value Date    PROTIME 14.7 02/14/2021    INR 1.2 02/14/2021    APTT 34.0 08/12/2016       HCG (If Applicable): No results found for: PREGTESTUR, PREGSERUM, HCG, HCGQUANT     ABGs: No results found for: PHART, PO2ART, AJH4CLM, FRM4HHL, BEART, C8OWBPWA     Type & Screen (If Applicable):  No results found for: LABABO, LABRH    Drug/Infectious Status (If Applicable):  No results found for: HIV, HEPCAB    COVID-19 Screening (If Applicable):   Lab Results   Component Value Date    COVID19 Not Detected 02/14/2021         Anesthesia Evaluation  Patient summary reviewed  Airway: Mallampati: III  TM distance: >3 FB   Neck ROM: full  Mouth opening: > = 3 FB Dental: normal exam     Comment: Dentition intact. Patient denies any loose teeth.     Pulmonary:normal exam  breath sounds clear to auscultation  (+) pneumonia:                            ROS comment: Former 1 PPD x 44 years quit 2014 Cardiovascular:  Exercise tolerance: good (>4 METS),   (+) hypertension:, valvular problems/murmurs ( AORTIC VALVE REPLACEMENT MITRAL VALVE REPLACEMENT):, pacemaker: pacemaker, CAD: obstructive, dysrhythmias ( ABLATION OF DYSRHYTHMIC FOCUS):, hyperlipidemia    Murmur: Grade 2/6 murmur. Rhythm: regular  Rate: normal                    Neuro/Psych:   Negative Neuro/Psych ROS              GI/Hepatic/Renal:   (+) GERD:, renal disease (SUNNY, BPH with urinary obstructiuon):, bowel prep,          ROS comment: Anemia secondary to probable GI bleed. .. Endo/Other: Negative Endo/Other ROS   (+) blood dyscrasia: anemia, arthritis: OA., .                 Abdominal:   (+) obese,         Vascular: negative vascular ROS. Anesthesia Plan      MAC     ASA 3       Induction: intravenous. Anesthetic plan and risks discussed with patient. Plan discussed with CRNA. PAT Chart Review:  Chart reviewed per routine on February 18, 2021 at 4:55 PM by Yoon Palencia DO.  (Final assessment and plan per day of surgery team.)      DOS STAFF ADDENDUM:    Pt seen and examined, chart reviewed (including anesthesia, drug and allergy history). Anesthetic plan, risks, benefits, alternatives, and personnel involved discussed with patient. Patient verbalized an understanding and agrees to proceed. Plan discussed with care team members and agreed upon.     Yvonne Candelario MD  Staff Anesthesiologist  2:59 PM

## 2021-02-18 NOTE — PROGRESS NOTES
General Surgery Progress Note  Sanford King MD, MS    Patient's Name/Date of Birth: Michelle Cordova / 1950    Date: February 18, 2021     Surgeon: Uli Lainez MD    Chief Complaint: anemia, pelvic mass    Patient Active Problem List   Diagnosis    Hematuria    Hypertrophy of prostate with urinary obstruction    SUNNY (acute kidney injury) ([facility] [address]       Subjective: Denies bleeding denies abdominal pain doing well after stent placement  Objective:  /60   Pulse 72   Temp 97.3 °F (36.3 °C) (Infrared)   Resp 16   Ht 5' 7\" (1.702 m)   Wt 214 lb 6.4 oz (97.3 kg)   SpO2 98%   BMI 33.58 kg/m²   Labs:  Recent Labs     02/16/21  0538 02/16/21  1839 02/16/21  2102 02/17/21  0607 02/18/21  0555   WBC 7.5  --   --   --   --    HGB 8.0* 8.6*  --  8.2* 8.5*   HCT 25.7* 27.4* 27.8* 26.5* 27.6*     Lab Results   Component Value Date    CREATININE 1.5 (H) 02/18/2021    BUN 13 02/18/2021     02/18/2021    K 4.5 02/18/2021     02/18/2021    CO2 23 02/18/2021     No results for input(s): LIPASE, AMYLASE in the last 72 hours.       General appearance:  NAD  Head: NCAT, PERRLA, EOMI, red conjunctiva  Neck: supple, no masses  Lungs: CTAB, equal chest rise bilateral  Heart: Reg rate  Abdomen: soft, nondistended, nontender  Skin; no lesions  Gu: no cva tenderness  Extremities: extremities normal, atraumatic, no cyanosis or edema    CT abdomen pelvis with right-sided colon mass intricately involving pelvic lesion in the sidewall surrounding the iliac artery and vein as well as the right ureter, concerning for colon malignancy with local advancement and metastasis    Assessment/Plan:  Michelle Cordova is a 72 y.o. male with pelvic mass and now findings on CT concerning from right colon mass, anemia    We will await colonoscopy tomorrow per GI  If biopsy obtained from right colon mass would avoid diagnostic lap or pelvic biopsy  PET scan would be more favorable after tissue biopsy obtained  We will follow  I discussed and reviewed the CT scan with the patient and family member and they both understand and agree with the plan proceeding forward, they both visualized the images on the CT scan with explanation    Physician Signature: Electronically signed by Dr. Marck Kraft  [phone] (p)  2/18/2021  4:32 PM

## 2021-02-18 NOTE — PROGRESS NOTES
PROGRESS NOTE  By Laurence Shaver M.D. [facility]  Dr. Sia Shaw M.D.,  Dr. Brittney Ashley M.D.,   Dr. Grant Slater D.O.,  Dr. Chanel Perez M.D.,  Dr. Smitha Moseley D.O.,  Nick Salazar D.O. Jamia Manual  72 y.o.  male    SUBJECTIVE:  Denies abdominal pain. Denies nausea vomiting    OBJECTIVE:    /61   Pulse 78   Temp 98 °F (36.7 °C) (Infrared)   Resp 18   Ht 5' 7\" (1.702 m)   Wt 214 lb 6.4 oz (97.3 kg)   SpO2 96%   BMI 33.58 kg/m²     General: NAD/-American male  HEENT: Anicteric sclera/moist oral mucosa  Neck: Supple/trachea midline   Chest:symmetrical excursion/nonlabored respirations  Cor: Irregular  Abd.: Soft/NT  Extr.: Decreased muscle tone and bulk throughout  Skin: Warm and dry      DATA:    Monitor data reviewed - AFib noted.     Stool (measured) : 0 mL  Lab Results   Component Value Date    WBC 7.5 02/16/2021    RBC 2.92 02/16/2021    HGB 8.5 02/18/2021    HCT 27.6 02/18/2021    MCV 88.0 02/16/2021    MCH 27.4 02/16/2021    MCHC 31.1 02/16/2021    RDW 14.7 02/16/2021     02/16/2021    MPV 9.5 02/16/2021     Lab Results   Component Value Date     02/18/2021    K 4.5 02/18/2021    K 4.2 02/14/2021     02/18/2021    CO2 23 02/18/2021    BUN 13 02/18/2021    CREATININE 1.5 02/18/2021    CALCIUM 8.9 02/18/2021    PROT 7.4 02/14/2021    LABALBU 3.4 02/14/2021    LABALBU 3.9 01/28/2011    BILITOT 0.3 02/14/2021    ALKPHOS 99 02/14/2021    AST 10 02/14/2021    ALT <5 02/14/2021     No results found for: LIPASE  No results found for: AMYLASE      ASSESSMENT/PLAN:  1. Acute normocytic anemia  -Vital signs stable no overt signs of GI bleed on exam  -Hemoglobin 10.9 on 12/7/2020 at [facility]  -  Hgb stable at 8.2 this a.m. 2 units packed red blood cells for the admission  -EGD 2/16 antral gastritis biopsy taken out H. pylori no source of anemia identified on EGD  -Okay for Protonix 40 mg oral daily  -Require inpatient versus outpatient colonoscopy depending on hospital course         2.  History of sick sinus syndrome status post pacemaker placement  -Per cardiology      3.  History of aortic valve replacement/mitral valve replacement  -Per cardiology     4.  3.5 cm x 3.5 center soft tissue density mass in the right mid pelvis  -Repeat CT abdomen IV and oral contrast to be obtained once patient's kidney function is improved  -Timing of colonoscopy depend on this testing  -Possible laparoscopic evaluation by GEN surge for tissue biopsy    Discussed with patient and his wife over the phone need for colonoscopy because of acute on chronic anemia, EGD negative for bleeding source and need for oral anticoagulation. Patient and his wife will discuss it further and will let us know regarding timing of colonoscopy. Alexandre Richey MD  2/18/2021  12:32 PM    NOTE:  This report was transcribed using voice recognition software. Every effort was made to ensure accuracy; however, inadvertent computerized transcription errors may be present.

## 2021-02-18 NOTE — PROGRESS NOTES
Department of Internal Medicine  PN    PCP: Dr. Kb Lee  Admitting Physician: Dr. Kb Lee  Consultants: Dr. Lexie Mckoy: Lightheadedness    HISTORY OF PRESENT ILLNESS:    Patient is 67-year-old male who presented to the ED due to lightheadedness. Patient states that he has been feeling lightheaded for the past 1 to 2 weeks. However today he took Viagra and felt extra lightheaded. As such he presented to the ED. He does also complain of some epigastric pain that has moved to the right lower quadrant. He he denies fever or chills. He denies nausea or vomiting. He denies diarrhea or constipation. 2/15/2021  Patient seen examined on telemetry floor. Patient still with some lightheadedness but is receiving his second unit of packed RBC currently. Patient overall feels little bit better. Patient still has little bit of the right flank pain. Patient adamantly denies any change in his stool color with no evidence of any devora bleeding. Patient's had some dark bowel movements for 2 years secondary to his laxative. Patient denies any gross hematuria. CT the abdomen showed 3.5 cm mass or fluid collection. Patient's baseline BUN/creatinine 24/1.18 back in March 2020. Hemoglobin 6.2 this morning and patient has received 2 units of packed cells. Temperature 97.3 with heart rate of 67. Blood pressure ranges 97//60. O2 sats 97% on room air at rest.  ECG reviewed and was sinus rhythm. 2/16/2021  Patient seen and examined on telemetry floor. Patient with multiple questions again today. Lab work and test reviewed with patient today. Patient states his abdominal and right flank pain has improved but still there off and on. Patient with poor appetite but denies any epigastric pain or any chest pain. There is no unusual shortness of breath. Case discussed with GI today. Patient set up for EGD. Urology and general surgery note reviewed.   BUN/creatinine is improved to 22/1.8 but is not back to baseline. Procalcitonin was 0.14. Hemoglobin was 8.0 this morning after receiving 2 units of packed RBC yesterday with a WBC 7.4. Temperature is 98.1 with heart rate of 64. Blood pressure currently 117/57. O2 sats 97% on room air at rest.  Urine output is adequate. 2/17/2021  Patient seen and examined on telemetry floor. Patient feels little bit better today. He denies any flank pain or abdominal pain today. Patient's urine still looks concentrated or may be related to the dry blood. Case discussed with urologist this morning. Patient had cystoscopy today with right stent insertion. BUN/creatinine was 16/1.7 today with hemoglobin 8.2 today. Temperature was 98 with heart rate of 60 and blood pressure currently 110/55. O2 sats 98% on room air at rest.  Cardiology and general surgery notes reviewed. 2/18/2021  Patient seen and examined on telemetry floor. Patient feels little bit better again today. Patient denies any chest or abdominal pain. Reviewed case again with with the patient and did discuss case with GI today. BUN/creatinine 13/1.5 today. Serum iron is only 38 with hemoglobin 8.5. Urine output is inaccurate. Temperature 98.3 with heart rate 78 patient sinus rhythm. Blood pressure currently 123/61. O2 sats 96% on room air at rest.  Currently waiting for general surgery to evaluate to see if patient will have a CT with IV contrast.  The patient is consented to have colonoscopy today.     PAST MEDICAL Hx:  Past Medical History:   Diagnosis Date    Anxiety     Arthritis     Coronary artery disease     Hyperlipidemia     Hypertension     Pneumonia     Sinus tachycardia 01/01/2002       PAST SURGICAL Hx:   Past Surgical History:   Procedure Laterality Date    ABLATION OF DYSRHYTHMIC FOCUS      AORTA SURGERY      aortic valve replacement    AORTIC VALVE REPLACEMENT      BLADDER SURGERY Right 2/17/2021    CYSTOSCOPY BILATERAL RETROGRADE PYELOGRAM RIGHT STENT INSERTION performed by Emilee Sands MD at [address] [facility] OTHER SURGICAL HISTORY  08/12/2016    CT Myelogram, Dr. Lorraine Riggs  2014 new battery    last checked Dr Lula Jacobsen 1/2016?  TONSILLECTOMY      UPPER GASTROINTESTINAL ENDOSCOPY      reflux    UPPER GASTROINTESTINAL ENDOSCOPY N/A 2/16/2021    EGD BIOPSY performed by Karyle Broody, MD at [address] Hx:  Family History   Problem Relation Age of Onset    Diabetes Mother     Stroke Mother     Diabetes Father     Heart Disease Father     Brain Cancer Sister     Stroke Sister     Stroke Brother     Cancer Maternal Grandfather        HOME MEDICATIONS:  Prior to Admission medications    Medication Sig Start Date End Date Taking?  Authorizing Provider   vitamin B-12 (CYANOCOBALAMIN) 100 MCG tablet Take 50 mcg by mouth daily   Yes Historical Provider, MD   sennosides-docusate sodium (SENOKOT-S) 8.6-50 MG tablet Take 1 tablet by mouth 2 times daily   Yes Historical Provider, MD   sotalol (BETAPACE) 80 MG tablet Take 40 mg by mouth 4/10/17  Yes Historical Provider, MD   tamsulosin (FLOMAX) 0.4 MG capsule Take 0.4 mg by mouth daily  1/4/18  Yes Historical Provider, MD   ferrous sulfate 325 (65 Fe) MG tablet Take 325 mg by mouth 2 times daily  1/8/18  Yes Historical Provider, MD   albuterol sulfate  (90 BASE) MCG/ACT inhaler Inhale 2 puffs into the lungs as needed for Wheezing   Yes Historical Provider, MD   esomeprazole Magnesium (NEXIUM) 20 MG PACK Take 20 mg by mouth daily   Yes Historical Provider, MD   lisinopril (PRINIVIL;ZESTRIL) 40 MG tablet Take 40 mg by mouth daily   Yes Historical Provider, MD   HYDROCHLOROTHIAZIDE PO Take 25 mg by mouth daily    Yes Historical Provider, MD   PRAVASTATIN SODIUM PO Take 20 mg by mouth daily    Yes Historical Provider, MD   aspirin 325 MG EC tablet Take 325 mg by mouth daily Last dose 3/1/16   Yes Historical Provider, MD file       ROS: Positive in bold  General:   Denies chills, fatigue, fever, malaise, night sweats or weight loss    Psychological:   Denies anxiety, disorientation or hallucinations    ENT:    Denies epistaxis, headaches, vertigo or visual changes    Cardiovascular:   Denies any chest pain, irregular heartbeats, or palpitations. No paroxysmal nocturnal dyspnea. Respiratory:   Denies shortness of breath, coughing, sputum production, hemoptysis, or wheezing. No orthopnea. Gastrointestinal:   Denies nausea, vomiting, diarrhea, or constipation. Denies any abdominal pain. Denies change in bowel habits or stools. Genito-Urinary:    Denies any urgency, frequency, hematuria. Voiding without difficulty. Musculoskeletal:   Denies joint pain, joint stiffness, joint swelling or muscle pain    Neurology:    Denies any headache or focal neurological deficits. No weakness or paresthesia. Derm:    Denies any rashes, ulcers, or excoriations. Denies bruising. Extremities:   Denies any lower extremity swelling or edema. PHYSICAL EXAM:  VITALS:  Vitals:    02/18/21 0820   BP: 123/61   Pulse: 78   Resp: 18   Temp: 98 °F (36.7 °C)   SpO2: 96%         CONSTITUTIONAL:    Awake, alert, cooperative, no apparent distress, and appears stated age    EYES:    PERRL, EOMI, sclera clear, conjunctiva normal    ENT:    Normocephalic, atraumatic, sinuses nontender on palpation. External ears without lesions. NECK:    Supple, symmetrical, trachea midline, no adenopathy, thyroid symmetric, not enlarged and no tenderness, skin normal, no bruits, no JVD    HEMATOLOGIC/LYMPHATICS:    No cervical lymphadenopathy and no supraclavicular lymphadenopathy    LUNGS:    Symmetric.  No increased work of breathing, good air exchange, clear to auscultation bilaterally, no wheezes, rhonchi, or rales,     CARDIOVASCULAR:    Murmur is present and heart rhythm irregular    ABDOMEN:    No scars, normal bowel sounds, soft, non-distended, non-tender, no masses palpated, no hepatosplenomegaly, no rebound or guarding elicited on palpation     MUSCULOSKELETAL:    There is no redness, warmth, or swelling of the joints. Full range of motion noted. Motor strength is 5 out of 5 all extremities bilaterally. Tone is normal.    NEUROLOGIC:    Awake, alert, oriented to name, place and time. Cranial nerves II-XII are grossly intact. Motor is 5 out of 5 bilaterally. SKIN:    No bruising or bleeding. No redness, warmth, or swelling    EXTREMITIES:    Peripheral pulses present. No edema, cyanosis, or swelling. OSTEOPATHIC:    Examined in seated and supine positions. Normal thoracic kyphosis and lumbar lordosis. No acute somatic dysfunction. LINES/CATHETERS     LABORATORY DATA:  CBC with Differential:    Lab Results   Component Value Date    WBC 7.5 02/16/2021    RBC 2.92 02/16/2021    HGB 8.5 02/18/2021    HCT 27.6 02/18/2021     02/16/2021    MCV 88.0 02/16/2021    MCH 27.4 02/16/2021    MCHC 31.1 02/16/2021    RDW 14.7 02/16/2021    LYMPHOPCT 13.6 02/16/2021    MONOPCT 10.8 02/16/2021    BASOPCT 0.5 02/16/2021    MONOSABS 0.81 02/16/2021    LYMPHSABS 1.02 02/16/2021    EOSABS 0.37 02/16/2021    BASOSABS 0.04 02/16/2021     CMP:    Lab Results   Component Value Date     02/18/2021    K 4.5 02/18/2021    K 4.2 02/14/2021     02/18/2021    CO2 23 02/18/2021    BUN 13 02/18/2021    CREATININE 1.5 02/18/2021    GFRAA 56 02/18/2021    LABGLOM 56 02/18/2021    GLUCOSE 88 02/18/2021    GLUCOSE 100 01/28/2011    PROT 7.4 02/14/2021    LABALBU 3.4 02/14/2021    LABALBU 3.9 01/28/2011    CALCIUM 8.9 02/18/2021    BILITOT 0.3 02/14/2021    ALKPHOS 99 02/14/2021    AST 10 02/14/2021    ALT <5 02/14/2021       ASSESSMENT/PLAN:  1. Severe normocytic anemia secondary to possible GI bleed  2. SUNNY  3. Severe right hydronephrosis  4. Pelvic mass-3.5 cm right pelvis  5. Prostatomegaly  6. Coronary artery disease status post CABG  7.  Aortic valve replacement  8. Mitral valve replacement  9. Hypertension  10. Hyperlipidemia  11. Pacemaker in situ with recent lead adjustment  12. Sick sinus syndrome  13. History of paroxysmal atrial fibrillation on Xarelto  14. History of tobacco abuse  15. Currently vapes    Blood work showed hemoglobin of 6.7 on admission with repeat of 6.2. He was started on Protonix drip and given IV fluid bolus. 2 units of blood was admission and hemoglobin 8.0 on 2/16 a.m.  GI consulted. Patient also had lead adjustment for his pacemaker recently and follows up with cardiologist outside of [facility]. We will have pacemaker interrogated and cardiology consult. Patient also was found to have SUNNY and right hydronephrosis with CT of the abdomen showing 3.5 cm mass/fluid collection in the right pelvis. Patient was set up for cystoscopy possible stent placement 2/17     EGD today  Cystoscopy 2/17/2021-extrinsic pressure cause obstruction of right ureter    General surgery following   BMP and H&H in a.m.   IV fluids normal saline 100 cc an hour-restart  Colonoscopy scheduled for tomorrow  Possible CT with IV contrast of abdomen pelvis today or tomorrow         Jorge Luis Chaudhary D.O.  10:31 AM  2/18/2021

## 2021-02-19 ENCOUNTER — ANESTHESIA (OUTPATIENT)
Dept: ENDOSCOPY | Age: 71
DRG: 661 | End: 2021-02-19
Payer: MEDICARE

## 2021-02-19 VITALS
RESPIRATION RATE: 21 BRPM | DIASTOLIC BLOOD PRESSURE: 66 MMHG | OXYGEN SATURATION: 100 % | SYSTOLIC BLOOD PRESSURE: 121 MMHG

## 2021-02-19 VITALS
RESPIRATION RATE: 18 BRPM | DIASTOLIC BLOOD PRESSURE: 94 MMHG | SYSTOLIC BLOOD PRESSURE: 138 MMHG | HEIGHT: 67 IN | WEIGHT: 214.6 LBS | OXYGEN SATURATION: 100 % | TEMPERATURE: 98 F | BODY MASS INDEX: 33.68 KG/M2 | HEART RATE: 71 BPM

## 2021-02-19 LAB
ANION GAP SERPL CALCULATED.3IONS-SCNC: 11 MMOL/L (ref 7–16)
BUN BLDV-MCNC: 11 MG/DL (ref 8–23)
CALCIUM SERPL-MCNC: 8.6 MG/DL (ref 8.6–10.2)
CEA: 1 NG/ML (ref 0–5.2)
CEA: 1 NG/ML (ref 0–5.2)
CHLORIDE BLD-SCNC: 104 MMOL/L (ref 98–107)
CO2: 22 MMOL/L (ref 22–29)
CREAT SERPL-MCNC: 1.2 MG/DL (ref 0.7–1.2)
GFR AFRICAN AMERICAN: >60
GFR NON-AFRICAN AMERICAN: >60 ML/MIN/1.73
GLUCOSE BLD-MCNC: 82 MG/DL (ref 74–99)
HCT VFR BLD CALC: 27.4 % (ref 37–54)
HEMOGLOBIN: 8.4 G/DL (ref 12.5–16.5)
POTASSIUM SERPL-SCNC: 3.7 MMOL/L (ref 3.5–5)
SODIUM BLD-SCNC: 137 MMOL/L (ref 132–146)
URINE CULTURE, ROUTINE: NORMAL

## 2021-02-19 PROCEDURE — 88305 TISSUE EXAM BY PATHOLOGIST: CPT

## 2021-02-19 PROCEDURE — 3609010200 HC COLONOSCOPY ABLATION TUMOR POLYP/OTHER LES: Performed by: INTERNAL MEDICINE

## 2021-02-19 PROCEDURE — 85014 HEMATOCRIT: CPT

## 2021-02-19 PROCEDURE — 36415 COLL VENOUS BLD VENIPUNCTURE: CPT

## 2021-02-19 PROCEDURE — 6370000000 HC RX 637 (ALT 250 FOR IP): Performed by: UROLOGY

## 2021-02-19 PROCEDURE — 3609010300 HC COLONOSCOPY W/BIOPSY SINGLE/MULTIPLE: Performed by: INTERNAL MEDICINE

## 2021-02-19 PROCEDURE — 80048 BASIC METABOLIC PNL TOTAL CA: CPT

## 2021-02-19 PROCEDURE — 2709999900 HC NON-CHARGEABLE SUPPLY: Performed by: INTERNAL MEDICINE

## 2021-02-19 PROCEDURE — 7100000011 HC PHASE II RECOVERY - ADDTL 15 MIN: Performed by: INTERNAL MEDICINE

## 2021-02-19 PROCEDURE — 3700000000 HC ANESTHESIA ATTENDED CARE: Performed by: INTERNAL MEDICINE

## 2021-02-19 PROCEDURE — 82378 CARCINOEMBRYONIC ANTIGEN: CPT

## 2021-02-19 PROCEDURE — 6360000002 HC RX W HCPCS: Performed by: INTERNAL MEDICINE

## 2021-02-19 PROCEDURE — 85018 HEMOGLOBIN: CPT

## 2021-02-19 PROCEDURE — 6360000002 HC RX W HCPCS: Performed by: NURSE ANESTHETIST, CERTIFIED REGISTERED

## 2021-02-19 PROCEDURE — 3700000001 HC ADD 15 MINUTES (ANESTHESIA): Performed by: INTERNAL MEDICINE

## 2021-02-19 PROCEDURE — 7100000010 HC PHASE II RECOVERY - FIRST 15 MIN: Performed by: INTERNAL MEDICINE

## 2021-02-19 PROCEDURE — 99232 SBSQ HOSP IP/OBS MODERATE 35: CPT | Performed by: SURGERY

## 2021-02-19 PROCEDURE — 6370000000 HC RX 637 (ALT 250 FOR IP): Performed by: INTERNAL MEDICINE

## 2021-02-19 PROCEDURE — 2580000003 HC RX 258: Performed by: INTERNAL MEDICINE

## 2021-02-19 PROCEDURE — 0DBK8ZX EXCISION OF ASCENDING COLON, VIA NATURAL OR ARTIFICIAL OPENING ENDOSCOPIC, DIAGNOSTIC: ICD-10-PCS | Performed by: INTERNAL MEDICINE

## 2021-02-19 RX ORDER — PROPOFOL 10 MG/ML
INJECTION, EMULSION INTRAVENOUS PRN
Status: DISCONTINUED | OUTPATIENT
Start: 2021-02-19 | End: 2021-02-19 | Stop reason: SDUPTHER

## 2021-02-19 RX ORDER — SODIUM CHLORIDE AND POTASSIUM CHLORIDE .9; .15 G/100ML; G/100ML
SOLUTION INTRAVENOUS CONTINUOUS
Status: DISCONTINUED | OUTPATIENT
Start: 2021-02-19 | End: 2021-02-19 | Stop reason: HOSPADM

## 2021-02-19 RX ADMIN — POTASSIUM CHLORIDE AND SODIUM CHLORIDE: 900; 150 INJECTION, SOLUTION INTRAVENOUS at 14:21

## 2021-02-19 RX ADMIN — PROPOFOL 30 MG: 10 INJECTION, EMULSION INTRAVENOUS at 15:19

## 2021-02-19 RX ADMIN — TAMSULOSIN HYDROCHLORIDE 0.4 MG: 0.4 CAPSULE ORAL at 08:17

## 2021-02-19 RX ADMIN — PROPOFOL 20 MG: 10 INJECTION, EMULSION INTRAVENOUS at 15:30

## 2021-02-19 RX ADMIN — PROPOFOL 20 MG: 10 INJECTION, EMULSION INTRAVENOUS at 15:20

## 2021-02-19 RX ADMIN — PROPOFOL 20 MG: 10 INJECTION, EMULSION INTRAVENOUS at 15:22

## 2021-02-19 RX ADMIN — POTASSIUM CHLORIDE 20 MEQ: 1500 TABLET, EXTENDED RELEASE ORAL at 08:17

## 2021-02-19 RX ADMIN — PROPOFOL 30 MG: 10 INJECTION, EMULSION INTRAVENOUS at 15:28

## 2021-02-19 RX ADMIN — PROPOFOL 30 MG: 10 INJECTION, EMULSION INTRAVENOUS at 15:17

## 2021-02-19 RX ADMIN — SODIUM CHLORIDE: 9 INJECTION, SOLUTION INTRAVENOUS at 08:21

## 2021-02-19 RX ADMIN — PROPOFOL 80 MG: 10 INJECTION, EMULSION INTRAVENOUS at 15:14

## 2021-02-19 RX ADMIN — PANTOPRAZOLE SODIUM 40 MG: 40 TABLET, DELAYED RELEASE ORAL at 06:01

## 2021-02-19 RX ADMIN — SOTALOL HYDROCHLORIDE 40 MG: 80 TABLET ORAL at 08:18

## 2021-02-19 RX ADMIN — PROPOFOL 20 MG: 10 INJECTION, EMULSION INTRAVENOUS at 15:24

## 2021-02-19 RX ADMIN — FERROUS SULFATE TAB 325 MG (65 MG ELEMENTAL FE) 325 MG: 325 (65 FE) TAB at 08:18

## 2021-02-19 RX ADMIN — VITAM B12 50 MCG: 100 TAB at 08:18

## 2021-02-19 RX ADMIN — PROPOFOL 30 MG: 10 INJECTION, EMULSION INTRAVENOUS at 15:26

## 2021-02-19 RX ADMIN — PROPOFOL 20 MG: 10 INJECTION, EMULSION INTRAVENOUS at 15:18

## 2021-02-19 RX ADMIN — PROPOFOL 30 MG: 10 INJECTION, EMULSION INTRAVENOUS at 15:15

## 2021-02-19 ASSESSMENT — PAIN SCALES - GENERAL: PAINLEVEL_OUTOF10: 0

## 2021-02-19 NOTE — PROGRESS NOTES
Subjective: The patient is awake and alert. No problems overnight. Denies chest pain, angina, and dyspnea. Denies abdominal pain. Tolerating diet. No nausea or vomiting. Current Facility-Administered Medications: 0.9% NaCl with KCl 20 mEq infusion, , Intravenous, Continuous  potassium chloride (KLOR-CON M) extended release tablet 20 mEq, 20 mEq, Oral, Daily with breakfast  bisacodyl (DULCOLAX) EC tablet 10 mg, 10 mg, Oral, Once  pantoprazole (PROTONIX) tablet 40 mg, 40 mg, Oral, QAM AC  0.9 % sodium chloride infusion, , Intravenous, PRN  glucose (GLUTOSE) 40 % oral gel 15 g, 15 g, Oral, PRN  dextrose 50 % IV solution, 12.5 g, Intravenous, PRN  glucagon (rDNA) injection 1 mg, 1 mg, Intramuscular, PRN  dextrose 5 % solution, 100 mL/hr, Intravenous, PRN  sodium chloride flush 0.9 % injection 10 mL, 10 mL, Intravenous, 2 times per day  sodium chloride flush 0.9 % injection 10 mL, 10 mL, Intravenous, PRN  polyethylene glycol (GLYCOLAX) packet 17 g, 17 g, Oral, Daily PRN  acetaminophen (TYLENOL) tablet 650 mg, 650 mg, Oral, Q6H PRN **OR** acetaminophen (TYLENOL) suppository 650 mg, 650 mg, Rectal, Q6H PRN  sotalol (BETAPACE) tablet 40 mg, 40 mg, Oral, BID  tamsulosin (FLOMAX) capsule 0.4 mg, 0.4 mg, Oral, Daily  vitamin B-12 (CYANOCOBALAMIN) tablet 50 mcg, 50 mcg, Oral, Daily  pravastatin (PRAVACHOL) tablet 20 mg, 20 mg, Oral, Nightly  ferrous sulfate (IRON 325) tablet 325 mg, 325 mg, Oral, BID    Objective:    /66   Pulse 80   Temp 97.5 °F (36.4 °C) (Oral)   Resp 18   Ht 5' 7\" (1.702 m)   Wt 214 lb 9.6 oz (97.3 kg)   SpO2 97%   BMI 33.61 kg/m²   In: 723.3 [P.O.:10; I.V.:713.3]  Out: 600    In: 723.3   Out: 600 [Urine:600]   RRR, no murmurs, no gallops, or rubs.   CTA bilaterally, no wheeze, rales or rhonchi  bowel sounds present, nontender, nondistended, no masses  No clubbing, cyanosis, or edema  No neuro changes     CBC:   Lab Results   Component Value Date    WBC 7.5 02/16/2021 RBC 2.92 02/16/2021    HGB 8.4 02/19/2021    HCT 27.4 02/19/2021    MCV 88.0 02/16/2021    MCH 27.4 02/16/2021    MCHC 31.1 02/16/2021    RDW 14.7 02/16/2021     02/16/2021    MPV 9.5 02/16/2021     CBC with Differential:    Lab Results   Component Value Date    WBC 7.5 02/16/2021    RBC 2.92 02/16/2021    HGB 8.4 02/19/2021    HCT 27.4 02/19/2021     02/16/2021    MCV 88.0 02/16/2021    MCH 27.4 02/16/2021    MCHC 31.1 02/16/2021    RDW 14.7 02/16/2021    LYMPHOPCT 13.6 02/16/2021    MONOPCT 10.8 02/16/2021    BASOPCT 0.5 02/16/2021    MONOSABS 0.81 02/16/2021    LYMPHSABS 1.02 02/16/2021    EOSABS 0.37 02/16/2021    BASOSABS 0.04 02/16/2021     Hemoglobin/Hematocrit:    Lab Results   Component Value Date    HGB 8.4 02/19/2021    HCT 27.4 02/19/2021     CMP:    Lab Results   Component Value Date     02/19/2021    K 3.7 02/19/2021    K 4.2 02/14/2021     02/19/2021    CO2 22 02/19/2021    BUN 11 02/19/2021    CREATININE 1.2 02/19/2021    GFRAA >60 02/19/2021    LABGLOM >60 02/19/2021    GLUCOSE 82 02/19/2021    GLUCOSE 100 01/28/2011    PROT 7.4 02/14/2021    LABALBU 3.4 02/14/2021    LABALBU 3.9 01/28/2011    CALCIUM 8.6 02/19/2021    BILITOT 0.3 02/14/2021    ALKPHOS 99 02/14/2021    AST 10 02/14/2021    ALT <5 02/14/2021     BMP:    Lab Results   Component Value Date     02/19/2021    K 3.7 02/19/2021    K 4.2 02/14/2021     02/19/2021    CO2 22 02/19/2021    BUN 11 02/19/2021    LABALBU 3.4 02/14/2021    LABALBU 3.9 01/28/2011    CREATININE 1.2 02/19/2021    CALCIUM 8.6 02/19/2021    GFRAA >60 02/19/2021    LABGLOM >60 02/19/2021    GLUCOSE 82 02/19/2021    GLUCOSE 100 01/28/2011     Albumin:    Lab Results   Component Value Date    LABALBU 3.4 02/14/2021    LABALBU 3.9 01/28/2011     FLP:  No results found for: TRIG, HDL, LDLCALC, LDLDIRECT, LABVLDL  TSH:    Lab Results   Component Value Date    TSH 3.630 02/14/2021     VITAMIN B12: No components found for: B12  FOLATE: Lab Results   Component Value Date    FOLATE 9.1 02/16/2021     IRON:    Lab Results   Component Value Date    IRON 38 02/16/2021     Iron Saturation:  No components found for: PERCENTFE  TIBC:    Lab Results   Component Value Date    TIBC 248 02/16/2021            Patient Active Problem List   Diagnosis    Hematuria    Hypertrophy of prostate with urinary obstruction    SUNNY (acute kidney injury) ([facility] [address]     Imp/Plan:  Anemia secondary to GI bleed  Was on DOAC (xarelto)  SUNNY  Right hydronephrosis  ? Colonic mass with pelvic extension  Prostatomegaly  Coronary artery disease status post CABG 2017 [facility]  Recent Lexiscan stress \"low risk small anteroapical defect\"  Aortic valve replacement-Trifecta #21 2017  Mitral valve replacement-Biocor #29; 2017; with mild MR  Hypertension  Hyperlipidemia  Pacemaker in situ with recent lead extractions and new RA and RV lead insertions and pulse generator change [facility]-12/18/20  Sick sinus syndrome  History of atrial fibrillation; No recurrence on recent Pacer interrogation  History of tobacco abuse  Currently vapes  [place] PAF with bundle/aberrancy 2/17       Holding DOAC xarelto  No clear need for prophylactic Abx for EGD/Colonoscopy    Monitor closely  Iron low; retics elevated  Iron started  Ureteral stent placed by Dr Jemal Tierney for severe R hydronephrosis  Pacer magnet check by me shows nl funx  DOO mode rate 100  Sinus rhythm  Low risk for colonoscopy  Pt says he is going home after colonoscopy  CV stable with no new issues  Restart anticoagulation once bleeding resolved  I will sign off.  Call if any problems  Can see in office followup if needed

## 2021-02-19 NOTE — H&P
Interval H&P  H&P reviewed no changes CT the abdomen concerning for possible cecal mass. Proceed with colonoscopy this afternoon    Vitals:    02/19/21 0820   BP: 137/66   Pulse: 80   Resp: 18   Temp: 97.5 °F (36.4 °C)   SpO2: 97%        Plan: Proceed with colonoscopy as afternoon to evaluate abnormal CT abdomen.        Adriana Simmons DO   GI Fellow   10:06 AM

## 2021-02-19 NOTE — DISCHARGE SUMMARY
Department of Internal Medicine  PN    PCP: Dr. Stephan Ott  Admitting Physician: Dr. Stephan Ott  Consultants: Dr. Anayeli Torres: Lightheadedness    HISTORY OF PRESENT ILLNESS:    Patient is 61-year-old male who presented to the ED due to lightheadedness. Patient states that he has been feeling lightheaded for the past 1 to 2 weeks. However today he took Viagra and felt extra lightheaded. As such he presented to the ED. He does also complain of some epigastric pain that has moved to the right lower quadrant. He he denies fever or chills. He denies nausea or vomiting. He denies diarrhea or constipation. 2/15/2021  Patient seen examined on telemetry floor. Patient still with some lightheadedness but is receiving his second unit of packed RBC currently. Patient overall feels little bit better. Patient still has little bit of the right flank pain. Patient adamantly denies any change in his stool color with no evidence of any devora bleeding. Patient's had some dark bowel movements for 2 years secondary to his laxative. Patient denies any gross hematuria. CT the abdomen showed 3.5 cm mass or fluid collection. Patient's baseline BUN/creatinine 24/1.18 back in March 2020. Hemoglobin 6.2 this morning and patient has received 2 units of packed cells. Temperature 97.3 with heart rate of 67. Blood pressure ranges 97//60. O2 sats 97% on room air at rest.  ECG reviewed and was sinus rhythm. 2/16/2021  Patient seen and examined on telemetry floor. Patient with multiple questions again today. Lab work and test reviewed with patient today. Patient states his abdominal and right flank pain has improved but still there off and on. Patient with poor appetite but denies any epigastric pain or any chest pain. There is no unusual shortness of breath. Case discussed with GI today. Patient set up for EGD. Urology and general surgery note reviewed.   BUN/creatinine is improved to 22/1.8 but is not back to baseline. Procalcitonin was 0.14. Hemoglobin was 8.0 this morning after receiving 2 units of packed RBC yesterday with a WBC 7.4. Temperature is 98.1 with heart rate of 64. Blood pressure currently 117/57. O2 sats 97% on room air at rest.  Urine output is adequate. 2/17/2021  Patient seen and examined on telemetry floor. Patient feels little bit better today. He denies any flank pain or abdominal pain today. Patient's urine still looks concentrated or may be related to the dry blood. Case discussed with urologist this morning. Patient had cystoscopy today with right stent insertion. BUN/creatinine was 16/1.7 today with hemoglobin 8.2 today. Temperature was 98 with heart rate of 60 and blood pressure currently 110/55. O2 sats 98% on room air at rest.  Cardiology and general surgery notes reviewed. 2/18/2021  Patient seen and examined on telemetry floor. Patient feels little bit better again today. Patient denies any chest or abdominal pain. Reviewed case again with with the patient and did discuss case with GI today. BUN/creatinine 13/1.5 today. Serum iron is only 38 with hemoglobin 8.5. Urine output is inaccurate. Temperature 98.3 with heart rate 78 patient sinus rhythm. Blood pressure currently 123/61. O2 sats 96% on room air at rest.  Currently waiting for general surgery to evaluate to see if patient will have a CT with IV contrast.  The patient is consented to have colonoscopy today. 2/19/2021  Patient seen and examined on telemetry floor. CT the abdomen pelvis with IV and oral contrast yesterday afternoon now shows large mass involving the cecum and ascending colon measuring up to 7.4 x 6.3 x 5.3 cm suspicion for colon CA. There is a right-sided pelvic mass noted medial to the iliac vessels measuring 4.2 x 3.8 cm. The case was discussed with the patient in detail again today. Patient is very anxious at this time awaiting for colonoscopy.   Patient's last colonoscopy was March 2016 was unremarkable. BUN/creatinine was 11/1.2 today with hemoglobin 8.4. Patient had colonoscopy performed Case discussed with GI. Patient had a large obstructing lesion in the proximal ascending colon. Biopsy was obtained. Case also discussed with general surgery. Everybody is okay for patient be discharged home. My office will get in touch with pathology and when the path report comes back we will contact the insurance company we will set up a PET scan outpatient. The patient at that time will decide if he wants a second opinion or have the surgery done at [facility]  Patient was discussed about the risk and benefit of taking the Xarelto with the large ascending colon mass with his hemoglobin still 8.4. Patient has been in sinus rhythm so after talking with him we have decided to hold the Xarelto at this time. Patient stable for discharge    PAST MEDICAL Hx:  Past Medical History:   Diagnosis Date    Anxiety     Arthritis     Coronary artery disease     Hyperlipidemia     Hypertension     Pneumonia     Sinus tachycardia 01/01/2002       PAST SURGICAL Hx:   Past Surgical History:   Procedure Laterality Date    ABLATION OF DYSRHYTHMIC FOCUS      AORTA SURGERY      aortic valve replacement    AORTIC VALVE REPLACEMENT      BLADDER SURGERY Right 2/17/2021    CYSTOSCOPY BILATERAL RETROGRADE PYELOGRAM RIGHT STENT INSERTION performed by Ceasar Bernardo MD at [facility] OTHER SURGICAL HISTORY  08/12/2016    CT Myelogram, Dr. Rachana Dominique  2014 new battery    last checked Dr Kenroy Bradley 1/2016?     TONSILLECTOMY      UPPER GASTROINTESTINAL ENDOSCOPY      reflux    UPPER GASTROINTESTINAL ENDOSCOPY N/A 2/16/2021    EGD BIOPSY performed by Jose Solis MD at [address] Hx:  Family History   Problem Relation Age of Onset    Diabetes Mother     Stroke Mother     Diabetes Father  Heart Disease Father     Brain Cancer Sister     Stroke Sister     Stroke Brother     Cancer Maternal Grandfather        HOME MEDICATIONS:  Prior to Admission medications    Medication Sig Start Date End Date Taking? Authorizing Provider   vitamin B-12 (CYANOCOBALAMIN) 100 MCG tablet Take 50 mcg by mouth daily   Yes Historical Provider, MD   sennosides-docusate sodium (SENOKOT-S) 8.6-50 MG tablet Take 1 tablet by mouth 2 times daily   Yes Historical Provider, MD   sotalol (BETAPACE) 80 MG tablet Take 40 mg by mouth 4/10/17  Yes Historical Provider, MD   tamsulosin (FLOMAX) 0.4 MG capsule Take 0.4 mg by mouth daily  1/4/18  Yes Historical Provider, MD   ferrous sulfate 325 (65 Fe) MG tablet Take 325 mg by mouth 2 times daily  1/8/18  Yes Historical Provider, MD   albuterol sulfate  (90 BASE) MCG/ACT inhaler Inhale 2 puffs into the lungs as needed for Wheezing   Yes Historical Provider, MD   esomeprazole Magnesium (NEXIUM) 20 MG PACK Take 20 mg by mouth daily   Yes Historical Provider, MD   lisinopril (PRINIVIL;ZESTRIL) 40 MG tablet Take 40 mg by mouth daily   Yes Historical Provider, MD   HYDROCHLOROTHIAZIDE PO Take 25 mg by mouth daily    Yes Historical Provider, MD   PRAVASTATIN SODIUM PO Take 20 mg by mouth daily    Yes Historical Provider, MD   aspirin 325 MG EC tablet Take 325 mg by mouth daily Last dose 3/1/16   Yes Historical Provider, MD   albuterol (PROAIR HFA) 108 (90 BASE) MCG/ACT inhaler Inhale 2 puffs into the lungs every 6 hours as needed for Wheezing for up to 7 days. 2/9/15 2/15/21 Yes VENITA Beard   FLUZONE HIGH-DOSE 0.5 ML KOKO injection  10/11/17   Historical Provider, MD       ALLERGIES:  Patient has no known allergies.     SOCIAL Hx:  Social History     Socioeconomic History    Marital status:      Spouse name: Not on file    Number of children: Not on file    Years of education: Not on file    Highest education level: Not on file   Occupational History    Not on file   Social Needs    Financial resource strain: Not on file    Food insecurity     Worry: Not on file     Inability: Not on file    Transportation needs     Medical: Not on file     Non-medical: Not on file   Tobacco Use    Smoking status: Former Smoker     Packs/day: 1.00     Years: 44.00     Pack years: 44.00     Quit date: 3/4/2014     Years since quittin.9    Smokeless tobacco: Never Used   Substance and Sexual Activity    Alcohol use: Yes     Alcohol/week: 2.0 standard drinks     Types: 2 Shots of liquor per week     Comment: month    Drug use: No    Sexual activity: Not on file   Lifestyle    Physical activity     Days per week: Not on file     Minutes per session: Not on file    Stress: Not on file   Relationships    Social connections     Talks on phone: Not on file     Gets together: Not on file     Attends Restorationist service: Not on file     Active member of club or organization: Not on file     Attends meetings of clubs or organizations: Not on file     Relationship status: Not on file    Intimate partner violence     Fear of current or ex partner: Not on file     Emotionally abused: Not on file     Physically abused: Not on file     Forced sexual activity: Not on file   Other Topics Concern    Not on file   Social History Narrative    Not on file       ROS: Positive in bold  General:   Denies chills, fatigue, fever, malaise, night sweats or weight loss    Psychological:   Denies anxiety, disorientation or hallucinations    ENT:    Denies epistaxis, headaches, vertigo or visual changes    Cardiovascular:   Denies any chest pain, irregular heartbeats, or palpitations. No paroxysmal nocturnal dyspnea. Respiratory:   Denies shortness of breath, coughing, sputum production, hemoptysis, or wheezing. No orthopnea. Gastrointestinal:   Denies nausea, vomiting, diarrhea, or constipation. Denies any abdominal pain. Denies change in bowel habits or stools.       Genito-Urinary: Denies any urgency, frequency, hematuria. Voiding without difficulty. Musculoskeletal:   Denies joint pain, joint stiffness, joint swelling or muscle pain    Neurology:    Denies any headache or focal neurological deficits. No weakness or paresthesia. Derm:    Denies any rashes, ulcers, or excoriations. Denies bruising. Extremities:   Denies any lower extremity swelling or edema. PHYSICAL EXAM:  VITALS:  Vitals:    02/19/21 1615   BP:    Pulse: 71   Resp: 18   Temp: 98 °F (36.7 °C)   SpO2: 100%         CONSTITUTIONAL:    Awake, alert, cooperative, no apparent distress, and appears stated age    EYES:    PERRL, EOMI, sclera clear, conjunctiva normal    ENT:    Normocephalic, atraumatic, sinuses nontender on palpation. External ears without lesions. NECK:    Supple, symmetrical, trachea midline, no adenopathy, thyroid symmetric, not enlarged and no tenderness, skin normal, no bruits, no JVD    HEMATOLOGIC/LYMPHATICS:    No cervical lymphadenopathy and no supraclavicular lymphadenopathy    LUNGS:    Symmetric. No increased work of breathing, good air exchange, clear to auscultation bilaterally, no wheezes, rhonchi, or rales,     CARDIOVASCULAR:    Murmur is present and heart rhythm irregular    ABDOMEN:    No scars, normal bowel sounds, soft, non-distended, non-tender, no masses palpated, no hepatosplenomegaly, no rebound or guarding elicited on palpation     MUSCULOSKELETAL:    There is no redness, warmth, or swelling of the joints. Full range of motion noted. Motor strength is 5 out of 5 all extremities bilaterally. Tone is normal.    NEUROLOGIC:    Awake, alert, oriented to name, place and time. Cranial nerves II-XII are grossly intact. Motor is 5 out of 5 bilaterally. SKIN:    No bruising or bleeding. No redness, warmth, or swelling    EXTREMITIES:    Peripheral pulses present. No edema, cyanosis, or swelling. OSTEOPATHIC:    Examined in seated and supine positions.   Normal thoracic kyphosis and lumbar lordosis. No acute somatic dysfunction. LINES/CATHETERS     LABORATORY DATA:  CBC with Differential:    Lab Results   Component Value Date    WBC 7.5 02/16/2021    RBC 2.92 02/16/2021    HGB 8.4 02/19/2021    HCT 27.4 02/19/2021     02/16/2021    MCV 88.0 02/16/2021    MCH 27.4 02/16/2021    MCHC 31.1 02/16/2021    RDW 14.7 02/16/2021    LYMPHOPCT 13.6 02/16/2021    MONOPCT 10.8 02/16/2021    BASOPCT 0.5 02/16/2021    MONOSABS 0.81 02/16/2021    LYMPHSABS 1.02 02/16/2021    EOSABS 0.37 02/16/2021    BASOSABS 0.04 02/16/2021     CMP:    Lab Results   Component Value Date     02/19/2021    K 3.7 02/19/2021    K 4.2 02/14/2021     02/19/2021    CO2 22 02/19/2021    BUN 11 02/19/2021    CREATININE 1.2 02/19/2021    GFRAA >60 02/19/2021    LABGLOM >60 02/19/2021    GLUCOSE 82 02/19/2021    GLUCOSE 100 01/28/2011    PROT 7.4 02/14/2021    LABALBU 3.4 02/14/2021    LABALBU 3.9 01/28/2011    CALCIUM 8.6 02/19/2021    BILITOT 0.3 02/14/2021    ALKPHOS 99 02/14/2021    AST 10 02/14/2021    ALT <5 02/14/2021       ASSESSMENT/PLAN:  1. Severe normocytic anemia secondary to GI bleed from ascending colon tumor  2. SUNNY  3. Severe right hydronephrosis secondary to obstructing mass in the colon causing extrinsic pressure on the ureter  4. Pelvic mass-3.5 cm right pelvis and Right sided colon tumor  5. Prostatomegaly  6. Coronary artery disease status post CABG  7. Aortic valve replacement  8. Mitral valve replacement  9. Hypertension  10. Hyperlipidemia  11. Pacemaker in situ with recent lead adjustment  12. Sick sinus syndrome  13. History of paroxysmal atrial fibrillation on Xarelto-currently sinus rhythm  14. History of tobacco abuse  15.  Currently vapes    Plan:  Discharge home today  Hold Xarelto at this time  Continue other home medication    Patient will be set up with outpatient oncology and outpatient PET scan once pathology report is back    CBC, BMP next week Leeann Plascencia D.O.  5:46 PM  2/19/2021

## 2021-02-19 NOTE — PROCEDURES
Michelle Cordova is a 72 y.o. male patient. 1. Acute kidney injury ([address]    2. Anemia requiring transfusions    3. Pelvic mass    4. Hydronephrosis, unspecified hydronephrosis type      Past Medical History:   Diagnosis Date    Anxiety     Arthritis     Coronary artery disease     Hyperlipidemia     Hypertension     Pneumonia     Sinus tachycardia 01/01/2002     Blood pressure 137/66, pulse 80, temperature 97.5 °F (36.4 °C), temperature source Oral, resp. rate 18, height 5' 7\" (1.702 m), weight 214 lb 9.6 oz (97.3 kg), SpO2 97 %. Procedures     Colonoscopy note    Indication: Acute on chronic anemia abnormal CT abdomen    Sedation:  MAC    Estimated Blood Loss: Less than 5 cc    Endoscope was advanced through anus to 80cm  most likely the hepatic flexure/ascending colon         Preparation is fair large amount of liquid stool present this was with successfully irrigated and suctioned. Patient tolerated procedure well. Ascending colon at 80cm there is a lumen obstructing mass overtly malignant appearing that unable to be transversed with the colonoscope. 5cm distally was tattooed with 5 cc of spot ink multiple biopsies were taken  Transverse colon is normal  Descending colon is normal  Sigmoid colon have few small widely scattered diverticula  Rectum direct views are normal  Retroflexion in rectum shows normal mucosa and dentate line    No fresh or old blood present    IMPRESSION AND PLAN:     1. Ascending colon at 80 cm there is a lumen obstructing mass that is overtly malignant appearing there is unable to be traversed with the colonoscope, 5 cm distal to the mass was tattooed with 5 cc of spot ink with multiple biopsies taken. This correlates with the abnormal CT abdomen findings. 2.  Patient with require evaluation by oncology for neoadjuvant chemotherapy. 3.  Okay to DC from GI point of view patient will require close outpatient follow-up with oncology and general surgery.        Pt was seen and procedure was performed with Dr. Francesca Hawkins present for the entire procedure. Mukul Clark DO   GI Fellow   2/19/2021     I was present for entire duration of procedure and participated in key and non-key portions. Discussed findings with the fellow and agree with recommendations above.     Pattie King DO  2/19/2021  3:48 PM

## 2021-02-19 NOTE — PROGRESS NOTES
P Quality Flow/Interdisciplinary Rounds Progress Note        Quality Flow Rounds held on February 19, 2021    Disciplines Attending:  Bedside Nurse, ,  and Nursing Unit Leadership    Gage Saab was admitted on 2/14/2021 10:03 PM    Anticipated Discharge Date:  Expected Discharge Date: 02/18/21    Disposition:    Osman Score:  Osman Scale Score: 22    Readmission Risk              Risk of Unplanned Readmission:        12           Discussed patient goal for the day, patient clinical progression, and barriers to discharge.   The following Goal(s) of the Day/Commitment(s) have been identified:  colonoscopy today      Claire Crawley  February 19, 2021

## 2021-02-19 NOTE — PROGRESS NOTES
Nutrition Assessment     Type and Reason for Visit: Initial, RD Nutrition Re-Screen/LOS    Nutrition Recommendations/Plan: Continue with current diet and monitor. Nutrition Assessment:  Pt admits w/Dx:Acute kidney injury r/o Pelvic Mass, s/p Rt Ureteral stent 2/17 PMH: HLD, HTN. Pt tolerating diet w/ improved meal intakes >75%.  Will continue to monitor    Malnutrition Assessment:  Malnutrition Status: No malnutrition    Nutrition Related Findings: A/ox4, +BS, Abd soft/round, No edema, +I/O +3.0L      Current Nutrition Therapies:    Diet NPO, After Midnight Exceptions are: Sips with Meds      Nutrition Diagnosis:   No nutrition diagnosis at this time     Nutrition Interventions:   Food and/or Nutrient Delivery:  Continue Current Diet  Nutrition Education/Counseling:  No recommendation at this time   Coordination of Nutrition Care:  Continue to monitor while inpatient    Goals:  Continue to consume >75% most meals       Nutrition Monitoring and Evaluation:   Behavioral-Environmental Outcomes:  None Identified   Food/Nutrient Intake Outcomes:  Food and Nutrient Intake  Physical Signs/Symptoms Outcomes:  Biochemical Data, Fluid Status or Edema, Nutrition Focused Physical Findings, Skin, Weight     Discharge Planning:    Continue current diet     Electronically signed by Juan Carlos Camargo RD, LD on 2/19/21 at 9:19 AM EST    Contact: [phone]

## 2021-02-19 NOTE — PROGRESS NOTES
2/19/2021 7:37 AM  Service: Urology  Group: [facility] ([facility])    Jamia Manual  [id number]    Subjective:    Awake and alert  Pending colonoscopy today  Hematuria improving, urine tea colored this morning  Denies any right flank pain  No fever or chills    Review of Systems  Constitutional: No fever or chills   Respiratory: negative for cough and hemoptysis  Cardiovascular: negative for chest pain and dyspnea  Gastrointestinal: negative for abdominal pain, diarrhea, nausea and vomiting   : See above  Derm: negative for rash and skin lesion(s)  Neurological: negative for seizures and tremors  Musculoskeletal: Negative    Psychiatric: Negative   All other reviews are negative      Scheduled Meds:   potassium chloride  20 mEq Oral Daily with breakfast    bisacodyl  10 mg Oral Once    pantoprazole  40 mg Oral QAM AC    sodium chloride flush  10 mL Intravenous 2 times per day    sotalol  40 mg Oral BID    tamsulosin  0.4 mg Oral Daily    vitamin B-12  50 mcg Oral Daily    pravastatin  20 mg Oral Nightly    ferrous sulfate  325 mg Oral BID       Objective:  Vitals:    02/18/21 2140   BP: (!) 140/72   Pulse:    Resp:    Temp:    SpO2:          Allergies: Patient has no known allergies.     General Appearance: alert and oriented to person, place and time and in no acute distress  Skin: no rash or erythema  Head: normocephalic and atraumatic  Pulmonary/Chest: normal air movement, no respiratory distress  Abdomen: soft, non-tender, non-distended  Genitourinary: No Sow  Extremities: no cyanosis, clubbing or edema         Labs:     Recent Labs     02/19/21  0536      K 3.7      CO2 22   BUN 11   CREATININE 1.2   GLUCOSE 82   CALCIUM 8.6       Lab Results   Component Value Date    HGB 8.4 02/19/2021    HCT 27.4 02/19/2021         Assessment/Plan:  POD#2 cystoscopy, retrograde pyelogram, right stent insertion   Right hydronephrosis secondary to extrinsic compression on ureter from right pelvic mass      -Creatinine continues to trend down s/p stent placement   -hemoglobin stable   -Urine Cx no growth  -Antibiotics per primary   -CT concerning for right sided colon mass  -General surgery is following   -GI following, colonoscopy planned for today, possible biopsy at that time.   -Cont the right ureteral stent  -This will be left indwelling and changed vs removed in 4 to 6 months pending definitive management of mass/biopsy results  -There are no further  interventions planned at this time  -Please call us with further questions or concerns   -Thank you for allowing us to participate in his care     Maurilio Villalta, [address] [place] [address]  Urology

## 2021-02-19 NOTE — PROGRESS NOTES
Subjective: The patient is awake and alert. No problems overnight. Denies chest pain, angina, and dyspnea. Denies abdominal pain. Tolerating diet. No nausea or vomiting. Current Facility-Administered Medications: [START ON 2/19/2021] potassium chloride (KLOR-CON M) extended release tablet 20 mEq, 20 mEq, Oral, Daily with breakfast  0.9 % sodium chloride infusion, , Intravenous, Continuous  bisacodyl (DULCOLAX) EC tablet 10 mg, 10 mg, Oral, Once  pantoprazole (PROTONIX) tablet 40 mg, 40 mg, Oral, QAM AC  0.9 % sodium chloride infusion, , Intravenous, PRN  glucose (GLUTOSE) 40 % oral gel 15 g, 15 g, Oral, PRN  dextrose 50 % IV solution, 12.5 g, Intravenous, PRN  glucagon (rDNA) injection 1 mg, 1 mg, Intramuscular, PRN  dextrose 5 % solution, 100 mL/hr, Intravenous, PRN  sodium chloride flush 0.9 % injection 10 mL, 10 mL, Intravenous, 2 times per day  sodium chloride flush 0.9 % injection 10 mL, 10 mL, Intravenous, PRN  polyethylene glycol (GLYCOLAX) packet 17 g, 17 g, Oral, Daily PRN  acetaminophen (TYLENOL) tablet 650 mg, 650 mg, Oral, Q6H PRN **OR** acetaminophen (TYLENOL) suppository 650 mg, 650 mg, Rectal, Q6H PRN  sotalol (BETAPACE) tablet 40 mg, 40 mg, Oral, BID  tamsulosin (FLOMAX) capsule 0.4 mg, 0.4 mg, Oral, Daily  vitamin B-12 (CYANOCOBALAMIN) tablet 50 mcg, 50 mcg, Oral, Daily  pravastatin (PRAVACHOL) tablet 20 mg, 20 mg, Oral, Nightly  ferrous sulfate (IRON 325) tablet 325 mg, 325 mg, Oral, BID    Objective:    /60   Pulse 72   Temp 97.3 °F (36.3 °C) (Infrared)   Resp 16   Ht 5' 7\" (1.702 m)   Wt 214 lb 6.4 oz (97.3 kg)   SpO2 98%   BMI 33.58 kg/m²   In: 1460 [P.O.:1460]  Out: 1040    In: 1460   Out: 1040 [Urine:1040]   RRR, no murmurs, no gallops, or rubs.   CTA bilaterally, no wheeze, rales or rhonchi  bowel sounds present, nontender, nondistended, no masses  No clubbing, cyanosis, or edema  No neuro changes     CBC:   Lab Results   Component Value Date    WBC 7.5 02/16/2021 RBC 2.92 02/16/2021    HGB 8.5 02/18/2021    HCT 27.6 02/18/2021    MCV 88.0 02/16/2021    MCH 27.4 02/16/2021    MCHC 31.1 02/16/2021    RDW 14.7 02/16/2021     02/16/2021    MPV 9.5 02/16/2021     CBC with Differential:    Lab Results   Component Value Date    WBC 7.5 02/16/2021    RBC 2.92 02/16/2021    HGB 8.5 02/18/2021    HCT 27.6 02/18/2021     02/16/2021    MCV 88.0 02/16/2021    MCH 27.4 02/16/2021    MCHC 31.1 02/16/2021    RDW 14.7 02/16/2021    LYMPHOPCT 13.6 02/16/2021    MONOPCT 10.8 02/16/2021    BASOPCT 0.5 02/16/2021    MONOSABS 0.81 02/16/2021    LYMPHSABS 1.02 02/16/2021    EOSABS 0.37 02/16/2021    BASOSABS 0.04 02/16/2021     Hemoglobin/Hematocrit:    Lab Results   Component Value Date    HGB 8.5 02/18/2021    HCT 27.6 02/18/2021     CMP:    Lab Results   Component Value Date     02/18/2021    K 4.5 02/18/2021    K 4.2 02/14/2021     02/18/2021    CO2 23 02/18/2021    BUN 13 02/18/2021    CREATININE 1.5 02/18/2021    GFRAA 56 02/18/2021    LABGLOM 56 02/18/2021    GLUCOSE 88 02/18/2021    GLUCOSE 100 01/28/2011    PROT 7.4 02/14/2021    LABALBU 3.4 02/14/2021    LABALBU 3.9 01/28/2011    CALCIUM 8.9 02/18/2021    BILITOT 0.3 02/14/2021    ALKPHOS 99 02/14/2021    AST 10 02/14/2021    ALT <5 02/14/2021     BMP:    Lab Results   Component Value Date     02/18/2021    K 4.5 02/18/2021    K 4.2 02/14/2021     02/18/2021    CO2 23 02/18/2021    BUN 13 02/18/2021    LABALBU 3.4 02/14/2021    LABALBU 3.9 01/28/2011    CREATININE 1.5 02/18/2021    CALCIUM 8.9 02/18/2021    GFRAA 56 02/18/2021    LABGLOM 56 02/18/2021    GLUCOSE 88 02/18/2021    GLUCOSE 100 01/28/2011     Albumin:    Lab Results   Component Value Date    LABALBU 3.4 02/14/2021    LABALBU 3.9 01/28/2011     FLP:  No results found for: TRIG, HDL, LDLCALC, LDLDIRECT, LABVLDL  TSH:    Lab Results   Component Value Date    TSH 3.630 02/14/2021     VITAMIN B12: No components found for: B12  FOLATE:    Lab Results   Component Value Date    FOLATE 9.1 02/16/2021     IRON:    Lab Results   Component Value Date    IRON 38 02/16/2021     Iron Saturation:  No components found for: PERCENTFE  TIBC:    Lab Results   Component Value Date    TIBC 248 02/16/2021            Patient Active Problem List   Diagnosis    Hematuria    Hypertrophy of prostate with urinary obstruction    SUNNY (acute kidney injury) ([facility] [address]     Imp/Plan:  Anemia secondary to GI bleed  Was on DOAC (xarelto)  SUNNY  Right hydronephrosis  ? Colonic mass with pelvic extension  Prostatomegaly  Coronary artery disease status post CABG 2017 [facility]  Recent Lexiscan stress \"low risk small anteroapical defect\"  Aortic valve replacement-Trifecta #21 2017  Mitral valve replacement-Biocor #29; 2017; with mild MR  Hypertension  Hyperlipidemia  Pacemaker in situ with recent lead extractions and new RA and RV lead insertions and pulse generator change [facility]-12/18/20  Sick sinus syndrome  History of atrial fibrillation;  No recurrence on recent Pacer interrogation  History of tobacco abuse  Currently vapes  [place] PAF with bundle  Post op-none further       Holding DOAC xarelto  No clear need for prophylactic Abx for EGD/Colonoscopy    Monitor closely  Restart anticoagulation once bleeding resolved  Iron low; retics elevated  Iron started  Ureteral stent placed by Dr Oglesby Soldpoornima for severe R hydronephrosis  Pacer magnet check by me shows nl funx  DOO mode rate 100  Sinus rhythm  Low risk for colonoscopy

## 2021-02-19 NOTE — PROGRESS NOTES
colon mass, anemia    Once biopsy is done from colonoscopy would be okay from surgical standpoint for discharge and outpatient work-up once pathology results  He would need likely a PET scan prior to surgical intervention to evaluate for evidence of metastasis in the pelvis  Signs of obstruction or bleeding would result in earlier surgical resection  Given the fact that his blood count has remained stable and he really has no obstructive symptoms and tolerated his prep I would anticipate short-term follow-up as appropriate  We will await colonoscopy results discussed with GI    Physician Signature: Electronically signed by Dr. Mary Kate Marcelino  [phone] (p)  2/19/2021  4:44 PM

## 2021-02-19 NOTE — CARE COORDINATION
2/15/21 1100 CM note: COVID (-) 2/14/21. Met with patient at the bedside to discuss transition of care at discharge. Patient resides with his fiance in a 1 floor with basement home, 3 steps to enter, @ 10 steps to the basement. Patient is independent with ADLs, drives, and has no DME. Pts PCP is Dr Mary Gonzalez and his pharmacy is [facility] in [place]. Patient has no hx HHC or BESSY. Discharge plan is home and patient does not anticipate any needs. Pts fiance will provide transportation home.  Electronically signed by Sonia Isbell RN on 2/15/2021 at 11:04 AM
2/16/21 1457 CM note: COVID (-) 2/14/21. Plan for EGD today and cystoscopy tomorrow. General surgery following for possible laposcopic eval of pelvic mass. Discharge plan remains home and patient does not anticipate any needs at this time. Pts fiance will provide transportation home.  Electronically signed by Felicita De La O RN on 2/16/2021 at 3:00 PM
2/17/21 1518 CM note: COVID (-) 2/14/21. Patient had cystoscopy with R stent insertion today. General surgery following for possible laposcopic eval of pelvic mass. Discharge plan remains home and patient does not anticipate any needs at this time. Pts fiance will provide transportation home.  Electronically signed by King Mancia RN on 2/17/2021 at 3:22 PM
2/19/21 1600 CM note: COVID (-) 2/14/21. Attempted to meet with patient in his room; however he is off the unit for c-scope; patient has obstructing mass. Patient had cystoscopy with R stent insertion 2/17/21. Discharge plan is home and patient does not anticipate any needs. Pts fiance will provide transportation home.  Electronically signed by Josafat Burton RN on 2/19/2021 at 4:04 PM
Patient Needs Assistance to Leave Residence...

## 2021-02-19 NOTE — PROGRESS NOTES
Department of Internal Medicine  PN    PCP: Dr. Stoddard Records  Admitting Physician: Dr. Stoddard Records  Consultants: Dr. Calvin Ronquillo: Lightheadedness    HISTORY OF PRESENT ILLNESS:    Patient is 71-year-old male who presented to the ED due to lightheadedness. Patient states that he has been feeling lightheaded for the past 1 to 2 weeks. However today he took Viagra and felt extra lightheaded. As such he presented to the ED. He does also complain of some epigastric pain that has moved to the right lower quadrant. He he denies fever or chills. He denies nausea or vomiting. He denies diarrhea or constipation. 2/15/2021  Patient seen examined on telemetry floor. Patient still with some lightheadedness but is receiving his second unit of packed RBC currently. Patient overall feels little bit better. Patient still has little bit of the right flank pain. Patient adamantly denies any change in his stool color with no evidence of any devora bleeding. Patient's had some dark bowel movements for 2 years secondary to his laxative. Patient denies any gross hematuria. CT the abdomen showed 3.5 cm mass or fluid collection. Patient's baseline BUN/creatinine 24/1.18 back in March 2020. Hemoglobin 6.2 this morning and patient has received 2 units of packed cells. Temperature 97.3 with heart rate of 67. Blood pressure ranges 97//60. O2 sats 97% on room air at rest.  ECG reviewed and was sinus rhythm. 2/16/2021  Patient seen and examined on telemetry floor. Patient with multiple questions again today. Lab work and test reviewed with patient today. Patient states his abdominal and right flank pain has improved but still there off and on. Patient with poor appetite but denies any epigastric pain or any chest pain. There is no unusual shortness of breath. Case discussed with GI today. Patient set up for EGD. Urology and general surgery note reviewed.   BUN/creatinine is improved to 22/1.8 but is not back to baseline. Procalcitonin was 0.14. Hemoglobin was 8.0 this morning after receiving 2 units of packed RBC yesterday with a WBC 7.4. Temperature is 98.1 with heart rate of 64. Blood pressure currently 117/57. O2 sats 97% on room air at rest.  Urine output is adequate. 2/17/2021  Patient seen and examined on telemetry floor. Patient feels little bit better today. He denies any flank pain or abdominal pain today. Patient's urine still looks concentrated or may be related to the dry blood. Case discussed with urologist this morning. Patient had cystoscopy today with right stent insertion. BUN/creatinine was 16/1.7 today with hemoglobin 8.2 today. Temperature was 98 with heart rate of 60 and blood pressure currently 110/55. O2 sats 98% on room air at rest.  Cardiology and general surgery notes reviewed. 2/18/2021  Patient seen and examined on telemetry floor. Patient feels little bit better again today. Patient denies any chest or abdominal pain. Reviewed case again with with the patient and did discuss case with GI today. BUN/creatinine 13/1.5 today. Serum iron is only 38 with hemoglobin 8.5. Urine output is inaccurate. Temperature 98.3 with heart rate 78 patient sinus rhythm. Blood pressure currently 123/61. O2 sats 96% on room air at rest.  Currently waiting for general surgery to evaluate to see if patient will have a CT with IV contrast.  The patient is consented to have colonoscopy today. 2/19/2021  Patient seen and examined on telemetry floor. CT the abdomen pelvis with IV and oral contrast yesterday afternoon now shows large mass involving the cecum and ascending colon measuring up to 7.4 x 6.3 x 5.3 cm suspicion for colon CA. There is a right-sided pelvic mass noted medial to the iliac vessels measuring 4.2 x 3.8 cm. The case was discussed with the patient in detail again today. Patient is very anxious at this time awaiting for colonoscopy.   Patient's last colonoscopy was March 2016 was unremarkable. BUN/creatinine was 11/1.2 today with hemoglobin 8.4. PAST MEDICAL Hx:  Past Medical History:   Diagnosis Date    Anxiety     Arthritis     Coronary artery disease     Hyperlipidemia     Hypertension     Pneumonia     Sinus tachycardia 01/01/2002       PAST SURGICAL Hx:   Past Surgical History:   Procedure Laterality Date    ABLATION OF DYSRHYTHMIC FOCUS      AORTA SURGERY      aortic valve replacement    AORTIC VALVE REPLACEMENT      BLADDER SURGERY Right 2/17/2021    CYSTOSCOPY BILATERAL RETROGRADE PYELOGRAM RIGHT STENT INSERTION performed by Danny Callahan MD at [address] OTHER SURGICAL HISTORY  08/12/2016    CT Myelogram, Dr. Gay Chavira  2014 new battery    last checked Dr Fariba Coates 1/2016?  TONSILLECTOMY      UPPER GASTROINTESTINAL ENDOSCOPY      reflux    UPPER GASTROINTESTINAL ENDOSCOPY N/A 2/16/2021    EGD BIOPSY performed by Bridger Kaplan MD at [address] Hx:  Family History   Problem Relation Age of Onset    Diabetes Mother     Stroke Mother     Diabetes Father     Heart Disease Father     Brain Cancer Sister     Stroke Sister     Stroke Brother     Cancer Maternal Grandfather        HOME MEDICATIONS:  Prior to Admission medications    Medication Sig Start Date End Date Taking?  Authorizing Provider   vitamin B-12 (CYANOCOBALAMIN) 100 MCG tablet Take 50 mcg by mouth daily   Yes Historical Provider, MD   sennosides-docusate sodium (SENOKOT-S) 8.6-50 MG tablet Take 1 tablet by mouth 2 times daily   Yes Historical Provider, MD   sotalol (BETAPACE) 80 MG tablet Take 40 mg by mouth 4/10/17  Yes Historical Provider, MD   tamsulosin (FLOMAX) 0.4 MG capsule Take 0.4 mg by mouth daily  1/4/18  Yes Historical Provider, MD   ferrous sulfate 325 (65 Fe) MG tablet Take 325 mg by mouth 2 times daily  1/8/18  Yes Historical Provider, MD   albuterol sulfate  (90 BASE) MCG/ACT inhaler Inhale 2 puffs into the lungs as needed for Wheezing   Yes Historical Provider, MD   esomeprazole Magnesium (NEXIUM) 20 MG PACK Take 20 mg by mouth daily   Yes Historical Provider, MD   lisinopril (PRINIVIL;ZESTRIL) 40 MG tablet Take 40 mg by mouth daily   Yes Historical Provider, MD   HYDROCHLOROTHIAZIDE PO Take 25 mg by mouth daily    Yes Historical Provider, MD   PRAVASTATIN SODIUM PO Take 20 mg by mouth daily    Yes Historical Provider, MD   aspirin 325 MG EC tablet Take 325 mg by mouth daily Last dose 3/1/16   Yes Historical Provider, MD   albuterol (PROAIR HFA) 108 (90 BASE) MCG/ACT inhaler Inhale 2 puffs into the lungs every 6 hours as needed for Wheezing for up to 7 days. 2/9/15 2/15/21 Yes VENITA Solomon   XARELTO 20 MG TABS tablet daily (with breakfast)  17   Historical Provider, MD   FLUZONE HIGH-DOSE 0.5 ML KOKO injection  10/11/17   Historical Provider, MD       ALLERGIES:  Patient has no known allergies. SOCIAL Hx:  Social History     Socioeconomic History    Marital status:      Spouse name: Not on file    Number of children: Not on file    Years of education: Not on file    Highest education level: Not on file   Occupational History    Not on file   Social Needs    Financial resource strain: Not on file    Food insecurity     Worry: Not on file     Inability: Not on file    Transportation needs     Medical: Not on file     Non-medical: Not on file   Tobacco Use    Smoking status: Former Smoker     Packs/day: 1.00     Years: 44.00     Pack years: 44.00     Quit date: 3/4/2014     Years since quittin.9    Smokeless tobacco: Never Used   Substance and Sexual Activity    Alcohol use:  Yes     Alcohol/week: 2.0 standard drinks     Types: 2 Shots of liquor per week     Comment: month    Drug use: No    Sexual activity: Not on file   Lifestyle    Physical activity     Days per week: Not on file     Minutes per session: Not on file    Stress: Not on file   Relationships    Social connections     Talks on phone: Not on file     Gets together: Not on file     Attends Orthodoxy service: Not on file     Active member of club or organization: Not on file     Attends meetings of clubs or organizations: Not on file     Relationship status: Not on file    Intimate partner violence     Fear of current or ex partner: Not on file     Emotionally abused: Not on file     Physically abused: Not on file     Forced sexual activity: Not on file   Other Topics Concern    Not on file   Social History Narrative    Not on file       ROS: Positive in bold  General:   Denies chills, fatigue, fever, malaise, night sweats or weight loss    Psychological:   Denies anxiety, disorientation or hallucinations    ENT:    Denies epistaxis, headaches, vertigo or visual changes    Cardiovascular:   Denies any chest pain, irregular heartbeats, or palpitations. No paroxysmal nocturnal dyspnea. Respiratory:   Denies shortness of breath, coughing, sputum production, hemoptysis, or wheezing. No orthopnea. Gastrointestinal:   Denies nausea, vomiting, diarrhea, or constipation. Denies any abdominal pain. Denies change in bowel habits or stools. Genito-Urinary:    Denies any urgency, frequency, hematuria. Voiding without difficulty. Musculoskeletal:   Denies joint pain, joint stiffness, joint swelling or muscle pain    Neurology:    Denies any headache or focal neurological deficits. No weakness or paresthesia. Derm:    Denies any rashes, ulcers, or excoriations. Denies bruising. Extremities:   Denies any lower extremity swelling or edema.       PHYSICAL EXAM:  VITALS:  Vitals:    02/19/21 0820   BP: 137/66   Pulse: 80   Resp: 18   Temp: 97.5 °F (36.4 °C)   SpO2: 97%         CONSTITUTIONAL:    Awake, alert, cooperative, no apparent distress, and appears stated age    EYES:    PERRL, EOMI, sclera clear, conjunctiva normal    ENT:    Normocephalic, atraumatic, sinuses nontender on palpation. External ears without lesions. NECK:    Supple, symmetrical, trachea midline, no adenopathy, thyroid symmetric, not enlarged and no tenderness, skin normal, no bruits, no JVD    HEMATOLOGIC/LYMPHATICS:    No cervical lymphadenopathy and no supraclavicular lymphadenopathy    LUNGS:    Symmetric. No increased work of breathing, good air exchange, clear to auscultation bilaterally, no wheezes, rhonchi, or rales,     CARDIOVASCULAR:    Murmur is present and heart rhythm irregular    ABDOMEN:    No scars, normal bowel sounds, soft, non-distended, non-tender, no masses palpated, no hepatosplenomegaly, no rebound or guarding elicited on palpation     MUSCULOSKELETAL:    There is no redness, warmth, or swelling of the joints. Full range of motion noted. Motor strength is 5 out of 5 all extremities bilaterally. Tone is normal.    NEUROLOGIC:    Awake, alert, oriented to name, place and time. Cranial nerves II-XII are grossly intact. Motor is 5 out of 5 bilaterally. SKIN:    No bruising or bleeding. No redness, warmth, or swelling    EXTREMITIES:    Peripheral pulses present. No edema, cyanosis, or swelling. OSTEOPATHIC:    Examined in seated and supine positions. Normal thoracic kyphosis and lumbar lordosis. No acute somatic dysfunction.     LINES/CATHETERS     LABORATORY DATA:  CBC with Differential:    Lab Results   Component Value Date    WBC 7.5 02/16/2021    RBC 2.92 02/16/2021    HGB 8.4 02/19/2021    HCT 27.4 02/19/2021     02/16/2021    MCV 88.0 02/16/2021    MCH 27.4 02/16/2021    MCHC 31.1 02/16/2021    RDW 14.7 02/16/2021    LYMPHOPCT 13.6 02/16/2021    MONOPCT 10.8 02/16/2021    BASOPCT 0.5 02/16/2021    MONOSABS 0.81 02/16/2021    LYMPHSABS 1.02 02/16/2021    EOSABS 0.37 02/16/2021    BASOSABS 0.04 02/16/2021     CMP:    Lab Results   Component Value Date     02/19/2021    K 3.7 02/19/2021    K 4.2 02/14/2021     02/19/2021    CO2 22 02/19/2021    BUN 11 02/19/2021    CREATININE 1.2 02/19/2021    GFRAA >60 02/19/2021    LABGLOM >60 02/19/2021    GLUCOSE 82 02/19/2021    GLUCOSE 100 01/28/2011    PROT 7.4 02/14/2021    LABALBU 3.4 02/14/2021    LABALBU 3.9 01/28/2011    CALCIUM 8.6 02/19/2021    BILITOT 0.3 02/14/2021    ALKPHOS 99 02/14/2021    AST 10 02/14/2021    ALT <5 02/14/2021       ASSESSMENT/PLAN:  1. Severe normocytic anemia secondary to possible GI bleed  2. SUNNY  3. Severe right hydronephrosis  4. Pelvic mass-3.5 cm right pelvis and Right sided colon mass  5. Prostatomegaly  6. Coronary artery disease status post CABG  7. Aortic valve replacement  8. Mitral valve replacement  9. Hypertension  10. Hyperlipidemia  11. Pacemaker in situ with recent lead adjustment  12. Sick sinus syndrome  13. History of paroxysmal atrial fibrillation on Xarelto  14. History of tobacco abuse  15. Currently vapes    Blood work showed hemoglobin of 6.7 on admission with repeat of 6.2. He was started on Protonix drip and given IV fluid bolus. 2 units of blood was admission and hemoglobin 8.0 on 2/16 a.m.  GI consulted. Patient also had lead adjustment for his pacemaker recently and follows up with cardiologist outside of [facility]. We will have pacemaker interrogated and cardiology consult. Patient also was found to have SUNNY and right hydronephrosis with CT of the abdomen showing 3.5 cm mass/fluid collection in the right pelvis.   Patient was set up for cystoscopy possible stent placement 2/17     EGD today  Cystoscopy 2/17/2021-extrinsic pressure cause obstruction of right ureter    General surgery following   CT of the abdomen pelvis with IV and oral contrast-see progress note 2/19  IV fluids normal saline 100 cc an hour-restart  Colonoscopy scheduled for today         Jorge Luis Chaudhary D.O.  1:48 PM  2/19/2021

## 2021-02-20 LAB
BLOOD CULTURE, ROUTINE: NORMAL
CULTURE, BLOOD 2: NORMAL

## 2021-03-01 ENCOUNTER — OFFICE VISIT (OUTPATIENT)
Dept: SURGERY | Age: 71
End: 2021-03-01
Payer: MEDICARE

## 2021-03-01 ENCOUNTER — TELEPHONE (OUTPATIENT)
Dept: SURGERY | Age: 71
End: 2021-03-01

## 2021-03-01 VITALS
HEIGHT: 67 IN | RESPIRATION RATE: 16 BRPM | BODY MASS INDEX: 33.59 KG/M2 | TEMPERATURE: 97.5 F | HEART RATE: 67 BPM | WEIGHT: 214 LBS | DIASTOLIC BLOOD PRESSURE: 66 MMHG | SYSTOLIC BLOOD PRESSURE: 142 MMHG

## 2021-03-01 DIAGNOSIS — C18.2 MALIGNANT NEOPLASM OF ASCENDING COLON (HCC): Primary | ICD-10-CM

## 2021-03-01 PROCEDURE — 99214 OFFICE O/P EST MOD 30 MIN: CPT | Performed by: SURGERY

## 2021-03-01 RX ORDER — ASPIRIN 81 MG/1
81 TABLET ORAL DAILY
COMMUNITY
End: 2021-06-15

## 2021-03-01 NOTE — TELEPHONE ENCOUNTER
Attempted to reach patient to schedule consult for colon mass. VM left with call back information.   Electronically signed by Blas Agrawal RN on 3/1/2021 at 10:51 AM

## 2021-03-01 NOTE — PROGRESS NOTES
General Surgery History and Physical    Patient's Name/Date of Birth: Jyoti Moreno / 1950    Date: 2021     Surgeon: Edie Alejandre M.D.    PCP: Gopi Moseley DO     Chief Complaint: colon mass likely cancer    HPI:   Jyoti Moreno is a 72 y.o. male who presents for evaluation of colon mass that was found on recent colonoscopy and is located at ascending colon. Does  have other symptoms. Past Medical History:   Diagnosis Date    Anxiety     Arthritis     Coronary artery disease     Hyperlipidemia     Hypertension     Pneumonia     Sinus tachycardia 2002       Past Surgical History:   Procedure Laterality Date    ABLATION OF DYSRHYTHMIC FOCUS      AORTA SURGERY      aortic valve replacement    AORTIC VALVE REPLACEMENT      BLADDER SURGERY Right 2021    CYSTOSCOPY BILATERAL RETROGRADE PYELOGRAM RIGHT STENT INSERTION performed by Carolina Spangler MD at [address] [place] [address]  2021    COLONOSCOPY WITH BIOPSY performed by Jared Cameron DO at [facility]  2021    COLONOSCOPY W/ ENDOSCOPIC MUCOSAL RESECTION performed by Jared Cameron DO at [place] [address] OTHER SURGICAL HISTORY  2016    CT Myelogram, Dr. Chuck Agarwal  2014 new battery    last checked Dr Lizabeth Hall 2016?     TONSILLECTOMY      UPPER GASTROINTESTINAL ENDOSCOPY      reflux    UPPER GASTROINTESTINAL ENDOSCOPY N/A 2021    EGD BIOPSY performed by Hanh Ortega MD at [facility]         Current Outpatient Medications   Medication Sig Dispense Refill    aspirin 81 MG EC tablet Take 81 mg by mouth daily      vitamin B-12 (CYANOCOBALAMIN) 100 MCG tablet Take 50 mcg by mouth daily      sennosides-docusate sodium (SENOKOT-S) 8.6-50 MG tablet Take 1 tablet by mouth 2 times daily      sotalol (BETAPACE) 80 MG tablet Take 40 mg by mouth      tamsulosin (FLOMAX) 0.4 MG capsule Take 0.4 mg by mouth daily       FLUZONE HIGH-DOSE 0.5 ML KOKO injection       ferrous sulfate 325 (65 Fe) MG tablet Take 325 mg by mouth 2 times daily       albuterol sulfate  (90 BASE) MCG/ACT inhaler Inhale 2 puffs into the lungs as needed for Wheezing      esomeprazole Magnesium (NEXIUM) 20 MG PACK Take 20 mg by mouth daily      lisinopril (PRINIVIL;ZESTRIL) 40 MG tablet Take 40 mg by mouth daily      PRAVASTATIN SODIUM PO Take 20 mg by mouth daily        No current facility-administered medications for this visit. No Known Allergies    The patient has a family history that is negative for severe cardiovascular or respiratory issues, negative for reaction to anesthesia. Social History     Socioeconomic History    Marital status:      Spouse name: Not on file    Number of children: Not on file    Years of education: Not on file    Highest education level: Not on file   Occupational History    Not on file   Social Needs    Financial resource strain: Not on file    Food insecurity     Worry: Not on file     Inability: Not on file    Transportation needs     Medical: Not on file     Non-medical: Not on file   Tobacco Use    Smoking status: Former Smoker     Packs/day: 1.00     Years: 44.00     Pack years: 44.00     Quit date: 3/4/2014     Years since quittin.9    Smokeless tobacco: Never Used   Substance and Sexual Activity    Alcohol use:  Yes     Alcohol/week: 2.0 standard drinks     Types: 2 Shots of liquor per week     Comment: month    Drug use: No    Sexual activity: Not on file   Lifestyle    Physical activity     Days per week: Not on file     Minutes per session: Not on file    Stress: Not on file   Relationships    Social connections     Talks on phone: Not on file     Gets together: Not on file     Attends [employer] service: Not on file     Active member of club or organization: Not on file     Attends meetings of clubs or organizations: Not on file Relationship status: Not on file    Intimate partner violence     Fear of current or ex partner: Not on file     Emotionally abused: Not on file     Physically abused: Not on file     Forced sexual activity: Not on file   Other Topics Concern    Not on file   Social History Narrative    Not on file           Review of Systems  Review of Systems -  General ROS: negative for - chills, fatigue or malaise  ENT ROS: negative for - hearing change, nasal congestion or nasal discharge  Allergy and Immunology ROS: negative for - hives, itchy/watery eyes or nasal congestion  Hematological and Lymphatic ROS: negative for - blood clots, blood transfusions, bruising or fatigue  Endocrine ROS: negative for - malaise/lethargy, mood swings, palpitations or polydipsia/polyuria  Respiratory ROS: negative for - sputum changes, stridor, tachypnea or wheezing  Cardiovascular ROS: negative for - irregular heartbeat, loss of consciousness, murmur or orthopnea  Gastrointestinal ROS: negative for - constipation, diarrhea, gas/bloating, heartburn or hematemesis  Genito-Urinary ROS: negative for -  genital discharge, genital ulcers or hematuria  Musculoskeletal ROS: negative for - gait disturbance, muscle pain or muscular weakness    Physical exam:   BP (!) 142/66   Pulse 67   Temp 97.5 °F (36.4 °C) (Temporal)   Resp 16   Ht 5' 7\" (1.702 m)   Wt 214 lb (97.1 kg)   BMI 33.52 kg/m²   General appearance:  NAD  Pyscho/social: negative for tremors and hallucinations  Head: NCAT, PERRLA, EOMI, red conjunctiva  Neck: supple, no masses  Lungs: CTAB, equal chest rise bilateral  Heart: Reg rate  Abdomen: soft, nontender, nondistended  Skin; no lesions  Gu: no cva tenderness  Extremities: extremities normal, atraumatic, no cyanosis or edema      Radiology:  CT abdomen/pelvis: colon mass    Assessment:  72 y.o. male with colon mass likely cancer at ascending colon    Plan:   Will plan for lap resection possible open possible ostomy  Discussed the risk, benefits and alternatives of surgery including wound infections, bleeding, scar and hernia formation and the risks of general anesthetic including MI, CVA, sudden death or reactions to anesthetic medications. The patient understands the risks and alternatives and the possibility of converting to an open procedure. All questions were answered to the patient's satisfaction and they freely signed the consent.       Clementina Kaplan MD  2:42 PM  3/1/2021

## 2021-03-02 ENCOUNTER — TELEPHONE (OUTPATIENT)
Dept: SURGERY | Age: 71
End: 2021-03-02

## 2021-03-02 NOTE — TELEPHONE ENCOUNTER
Patient provided with surgery instructions during office visit. Patient scheduled for follow up visit in [facility] with Dr. Zohra Agrawal on 03/25/2021. Surgery scheduling form faxed and confirmation received.     Electronically signed by Emy Berrios MA on 3/2/2021 at 2:20 PM

## 2021-03-04 ENCOUNTER — PREP FOR PROCEDURE (OUTPATIENT)
Dept: SURGERY | Age: 71
End: 2021-03-04

## 2021-03-04 RX ORDER — SODIUM CHLORIDE 0.9 % (FLUSH) 0.9 %
10 SYRINGE (ML) INJECTION PRN
Status: CANCELLED | OUTPATIENT
Start: 2021-03-04

## 2021-03-04 RX ORDER — SODIUM CHLORIDE, SODIUM LACTATE, POTASSIUM CHLORIDE, CALCIUM CHLORIDE 600; 310; 30; 20 MG/100ML; MG/100ML; MG/100ML; MG/100ML
INJECTION, SOLUTION INTRAVENOUS CONTINUOUS
Status: CANCELLED | OUTPATIENT
Start: 2021-03-04

## 2021-03-04 RX ORDER — SODIUM CHLORIDE 0.9 % (FLUSH) 0.9 %
10 SYRINGE (ML) INJECTION EVERY 12 HOURS SCHEDULED
Status: CANCELLED | OUTPATIENT
Start: 2021-03-04

## 2021-03-05 ENCOUNTER — HOSPITAL ENCOUNTER (OUTPATIENT)
Age: 71
Discharge: HOME | End: 2021-03-07
Payer: MEDICARE

## 2021-03-05 DIAGNOSIS — Z01.818 PREOP TESTING: ICD-10-CM

## 2021-03-05 PROCEDURE — U0003 INFECTIOUS AGENT DETECTION BY NUCLEIC ACID (DNA OR RNA); SEVERE ACUTE RESPIRATORY SYNDROME CORONAVIRUS 2 (SARS-COV-2) (CORONAVIRUS DISEASE [COVID-19]), AMPLIFIED PROBE TECHNIQUE, MAKING USE OF HIGH THROUGHPUT TECHNOLOGIES AS DESCRIBED BY CMS-2020-01-R: HCPCS

## 2021-03-07 LAB
SARS-COV-2: NOT DETECTED
SOURCE: NORMAL

## 2021-03-08 ENCOUNTER — HOSPITAL ENCOUNTER (OUTPATIENT)
Dept: PREADMISSION TESTING | Age: 71
Discharge: HOME | DRG: 821 | End: 2021-03-08
Payer: MEDICARE

## 2021-03-08 VITALS
TEMPERATURE: 97.5 F | SYSTOLIC BLOOD PRESSURE: 103 MMHG | RESPIRATION RATE: 18 BRPM | HEART RATE: 76 BPM | OXYGEN SATURATION: 97 % | DIASTOLIC BLOOD PRESSURE: 55 MMHG | HEIGHT: 67 IN | WEIGHT: 213 LBS | BODY MASS INDEX: 33.43 KG/M2

## 2021-03-08 LAB
ANION GAP SERPL CALCULATED.3IONS-SCNC: 9 MMOL/L (ref 7–16)
BUN BLDV-MCNC: 21 MG/DL (ref 8–23)
CALCIUM SERPL-MCNC: 9 MG/DL (ref 8.6–10.2)
CHLORIDE BLD-SCNC: 99 MMOL/L (ref 98–107)
CO2: 30 MMOL/L (ref 22–29)
CREAT SERPL-MCNC: 1.6 MG/DL (ref 0.7–1.2)
GFR AFRICAN AMERICAN: 52
GFR NON-AFRICAN AMERICAN: 52 ML/MIN/1.73
GLUCOSE BLD-MCNC: 115 MG/DL (ref 74–99)
HCT VFR BLD CALC: 29.1 % (ref 37–54)
HEMOGLOBIN: 8.7 G/DL (ref 12.5–16.5)
MCH RBC QN AUTO: 25.3 PG (ref 26–35)
MCHC RBC AUTO-ENTMCNC: 29.9 % (ref 32–34.5)
MCV RBC AUTO: 84.6 FL (ref 80–99.9)
PDW BLD-RTO: 14.2 FL (ref 11.5–15)
PLATELET # BLD: 289 E9/L (ref 130–450)
PMV BLD AUTO: 9.4 FL (ref 7–12)
POTASSIUM SERPL-SCNC: 3.1 MMOL/L (ref 3.5–5)
RBC # BLD: 3.44 E12/L (ref 3.8–5.8)
SODIUM BLD-SCNC: 138 MMOL/L (ref 132–146)
WBC # BLD: 7.3 E9/L (ref 4.5–11.5)

## 2021-03-08 PROCEDURE — 87081 CULTURE SCREEN ONLY: CPT

## 2021-03-08 PROCEDURE — 36415 COLL VENOUS BLD VENIPUNCTURE: CPT

## 2021-03-08 PROCEDURE — 85027 COMPLETE CBC AUTOMATED: CPT

## 2021-03-08 PROCEDURE — 80048 BASIC METABOLIC PNL TOTAL CA: CPT

## 2021-03-08 RX ORDER — LISINOPRIL AND HYDROCHLOROTHIAZIDE 12.5; 1 MG/1; MG/1
1 TABLET ORAL DAILY
Status: ON HOLD | COMMUNITY
End: 2021-09-03 | Stop reason: HOSPADM

## 2021-03-08 RX ORDER — AMLODIPINE BESYLATE 5 MG/1
5 TABLET ORAL DAILY
Status: ON HOLD | COMMUNITY
End: 2021-09-03 | Stop reason: SDUPTHER

## 2021-03-08 NOTE — PROGRESS NOTES
Abnormal lab results reviewed with Dr. Madeleine Sands. Orders placed for repeat CBC and BMP on morning of surgery.

## 2021-03-08 NOTE — PROGRESS NOTES
Have you been tested for COVID  Yes           Have you been told you were positive for COVID No  Have you had any known exposure to someone that is positive for COVID No  Do you have a cough                   No              Do you have shortness of breath No                 Do you have a sore throat            No                Are you having chills                    No                Are you having muscle aches. No                    Please come to the hospital wearing a mask and have your significant other wear a mask as well. Both of you should check your temperature before leaving to come here,  if it is 100 or higher please call [phone] for instruction. Lucy PRE-ADMISSION TESTING INSTRUCTIONS    The Preadmission Testing patient is instructed accordingly using the following criteria (check applicable):    ARRIVAL INSTRUCTIONS:  [x] Parking the day of Surgery is located in the Main Entrance lot. Upon entering the door, make an immediate right to the surgery reception desk    [x] Bring photo ID and insurance card    [] Bring in a copy of Living will or Durable Power of  papers.     [x] Please be sure to arrange for responsible adult to provide transportation to and from the hospital    [x] Please arrange for responsible adult to be with you for the 24 hour period post procedure due to having anesthesia      GENERAL INSTRUCTIONS:    [x] Nothing by mouth after midnight, including gum, candy, mints or water    [x] You may brush your teeth, but do not swallow any water    [x] Take medications as instructed with 1-2 oz of water    [x] Stop herbal supplements and vitamins 5 days prior to procedure    [x] Follow preop dosing of blood thinners per physician instructions    [] Take 1/2 dose of evening insulin, but no insulin after midnight    [] No oral diabetic medications after midnight    [] If diabetic and have low blood sugar or feel symptomatic, take 1-2oz apple

## 2021-03-09 LAB — MRSA CULTURE ONLY: NORMAL

## 2021-03-09 RX ORDER — NEOMYCIN SULFATE 500 MG/1
TABLET ORAL
Qty: 6 TABLET | Refills: 0 | Status: ON HOLD
Start: 2021-03-09 | End: 2021-03-13 | Stop reason: HOSPADM

## 2021-03-09 RX ORDER — ERYTHROMYCIN 500 MG/1
TABLET, COATED ORAL
Qty: 6 TABLET | Refills: 0 | Status: ON HOLD
Start: 2021-03-09 | End: 2021-03-13 | Stop reason: HOSPADM

## 2021-03-11 ENCOUNTER — ANESTHESIA (OUTPATIENT)
Dept: OPERATING ROOM | Age: 71
DRG: 821 | End: 2021-03-11
Payer: MEDICARE

## 2021-03-11 ENCOUNTER — ANESTHESIA EVENT (OUTPATIENT)
Dept: OPERATING ROOM | Age: 71
DRG: 821 | End: 2021-03-11
Payer: MEDICARE

## 2021-03-11 ENCOUNTER — HOSPITAL ENCOUNTER (INPATIENT)
Age: 71
LOS: 2 days | Discharge: HOME | DRG: 821 | End: 2021-03-13
Attending: SURGERY | Admitting: SURGERY
Payer: MEDICARE

## 2021-03-11 VITALS
DIASTOLIC BLOOD PRESSURE: 55 MMHG | RESPIRATION RATE: 2 BRPM | SYSTOLIC BLOOD PRESSURE: 93 MMHG | OXYGEN SATURATION: 100 % | TEMPERATURE: 98.1 F

## 2021-03-11 DIAGNOSIS — Z01.818 PREOP TESTING: Primary | ICD-10-CM

## 2021-03-11 PROBLEM — K63.89 COLONIC MASS: Status: ACTIVE | Noted: 2021-03-11

## 2021-03-11 PROBLEM — N17.9 AKI (ACUTE KIDNEY INJURY) (HCC): Status: RESOLVED | Noted: 2021-02-14 | Resolved: 2021-03-11

## 2021-03-11 PROBLEM — N40.0 BPH (BENIGN PROSTATIC HYPERPLASIA): Status: ACTIVE | Noted: 2018-01-16

## 2021-03-11 LAB
ANION GAP SERPL CALCULATED.3IONS-SCNC: 12 MMOL/L (ref 7–16)
BUN BLDV-MCNC: 16 MG/DL (ref 8–23)
CALCIUM SERPL-MCNC: 9.3 MG/DL (ref 8.6–10.2)
CHLORIDE BLD-SCNC: 95 MMOL/L (ref 98–107)
CO2: 25 MMOL/L (ref 22–29)
CREAT SERPL-MCNC: 1.5 MG/DL (ref 0.7–1.2)
GFR AFRICAN AMERICAN: 56
GFR NON-AFRICAN AMERICAN: 56 ML/MIN/1.73
GLUCOSE BLD-MCNC: 109 MG/DL (ref 74–99)
HCT VFR BLD CALC: 33.8 % (ref 37–54)
HEMOGLOBIN: 10.7 G/DL (ref 12.5–16.5)
MCH RBC QN AUTO: 25.9 PG (ref 26–35)
MCHC RBC AUTO-ENTMCNC: 31.7 % (ref 32–34.5)
MCV RBC AUTO: 81.8 FL (ref 80–99.9)
PDW BLD-RTO: 13.9 FL (ref 11.5–15)
PLATELET # BLD: 357 E9/L (ref 130–450)
PMV BLD AUTO: 9.5 FL (ref 7–12)
POTASSIUM SERPL-SCNC: 4.2 MMOL/L (ref 3.5–5)
RBC # BLD: 4.13 E12/L (ref 3.8–5.8)
SODIUM BLD-SCNC: 132 MMOL/L (ref 132–146)
WBC # BLD: 12.3 E9/L (ref 4.5–11.5)

## 2021-03-11 PROCEDURE — 6360000002 HC RX W HCPCS: Performed by: ANESTHESIOLOGY

## 2021-03-11 PROCEDURE — 2780000010 HC IMPLANT OTHER: Performed by: SURGERY

## 2021-03-11 PROCEDURE — 6360000002 HC RX W HCPCS: Performed by: SURGERY

## 2021-03-11 PROCEDURE — 2709999900 HC NON-CHARGEABLE SUPPLY: Performed by: SURGERY

## 2021-03-11 PROCEDURE — 88360 TUMOR IMMUNOHISTOCHEM/MANUAL: CPT

## 2021-03-11 PROCEDURE — 36415 COLL VENOUS BLD VENIPUNCTURE: CPT

## 2021-03-11 PROCEDURE — 2580000003 HC RX 258: Performed by: NURSE ANESTHETIST, CERTIFIED REGISTERED

## 2021-03-11 PROCEDURE — 7100000000 HC PACU RECOVERY - FIRST 15 MIN: Performed by: SURGERY

## 2021-03-11 PROCEDURE — 3700000000 HC ANESTHESIA ATTENDED CARE: Performed by: SURGERY

## 2021-03-11 PROCEDURE — 44207 L COLECTOMY/COLOPROCTOSTOMY: CPT | Performed by: SURGERY

## 2021-03-11 PROCEDURE — 6360000002 HC RX W HCPCS: Performed by: NURSE ANESTHETIST, CERTIFIED REGISTERED

## 2021-03-11 PROCEDURE — 88342 IMHCHEM/IMCYTCHM 1ST ANTB: CPT

## 2021-03-11 PROCEDURE — 85027 COMPLETE CBC AUTOMATED: CPT

## 2021-03-11 PROCEDURE — 3600000013 HC SURGERY LEVEL 3 ADDTL 15MIN: Performed by: SURGERY

## 2021-03-11 PROCEDURE — 88341 IMHCHEM/IMCYTCHM EA ADD ANTB: CPT

## 2021-03-11 PROCEDURE — 6370000000 HC RX 637 (ALT 250 FOR IP): Performed by: INTERNAL MEDICINE

## 2021-03-11 PROCEDURE — 80048 BASIC METABOLIC PNL TOTAL CA: CPT

## 2021-03-11 PROCEDURE — 6360000002 HC RX W HCPCS: Performed by: STUDENT IN AN ORGANIZED HEALTH CARE EDUCATION/TRAINING PROGRAM

## 2021-03-11 PROCEDURE — 6360000002 HC RX W HCPCS: Performed by: INTERNAL MEDICINE

## 2021-03-11 PROCEDURE — 1200000000 HC SEMI PRIVATE

## 2021-03-11 PROCEDURE — 7100000001 HC PACU RECOVERY - ADDTL 15 MIN: Performed by: SURGERY

## 2021-03-11 PROCEDURE — 49905 OMENTAL FLAP INTRA-ABDOM: CPT | Performed by: SURGERY

## 2021-03-11 PROCEDURE — 2580000003 HC RX 258: Performed by: SURGERY

## 2021-03-11 PROCEDURE — 2500000003 HC RX 250 WO HCPCS: Performed by: SURGERY

## 2021-03-11 PROCEDURE — 2720000010 HC SURG SUPPLY STERILE: Performed by: SURGERY

## 2021-03-11 PROCEDURE — 6370000000 HC RX 637 (ALT 250 FOR IP): Performed by: SURGERY

## 2021-03-11 PROCEDURE — 0DTF4ZZ RESECTION OF RIGHT LARGE INTESTINE, PERCUTANEOUS ENDOSCOPIC APPROACH: ICD-10-PCS | Performed by: SURGERY

## 2021-03-11 PROCEDURE — 88309 TISSUE EXAM BY PATHOLOGIST: CPT

## 2021-03-11 PROCEDURE — 0WUF47Z SUPPLEMENT ABDOMINAL WALL WITH AUTOLOGOUS TISSUE SUBSTITUTE, PERCUTANEOUS ENDOSCOPIC APPROACH: ICD-10-PCS | Performed by: SURGERY

## 2021-03-11 PROCEDURE — 88275 CYTOGENETICS 100-300: CPT

## 2021-03-11 PROCEDURE — 3700000001 HC ADD 15 MINUTES (ANESTHESIA): Performed by: SURGERY

## 2021-03-11 PROCEDURE — 2500000003 HC RX 250 WO HCPCS: Performed by: NURSE ANESTHETIST, CERTIFIED REGISTERED

## 2021-03-11 PROCEDURE — 88271 CYTOGENETICS DNA PROBE: CPT

## 2021-03-11 PROCEDURE — 3600000003 HC SURGERY LEVEL 3 BASE: Performed by: SURGERY

## 2021-03-11 RX ORDER — SODIUM CHLORIDE 0.9 % (FLUSH) 0.9 %
10 SYRINGE (ML) INJECTION EVERY 12 HOURS SCHEDULED
Status: DISCONTINUED | OUTPATIENT
Start: 2021-03-11 | End: 2021-03-13 | Stop reason: HOSPADM

## 2021-03-11 RX ORDER — OXYCODONE HYDROCHLORIDE 5 MG/1
5 TABLET ORAL EVERY 4 HOURS PRN
Status: DISCONTINUED | OUTPATIENT
Start: 2021-03-11 | End: 2021-03-13 | Stop reason: HOSPADM

## 2021-03-11 RX ORDER — DIPHENHYDRAMINE HYDROCHLORIDE 50 MG/ML
12.5 INJECTION INTRAMUSCULAR; INTRAVENOUS
Status: DISCONTINUED | OUTPATIENT
Start: 2021-03-11 | End: 2021-03-11 | Stop reason: HOSPADM

## 2021-03-11 RX ORDER — FENTANYL CITRATE 50 UG/ML
INJECTION, SOLUTION INTRAMUSCULAR; INTRAVENOUS PRN
Status: DISCONTINUED | OUTPATIENT
Start: 2021-03-11 | End: 2021-03-11 | Stop reason: SDUPTHER

## 2021-03-11 RX ORDER — ONDANSETRON 2 MG/ML
INJECTION INTRAMUSCULAR; INTRAVENOUS PRN
Status: DISCONTINUED | OUTPATIENT
Start: 2021-03-11 | End: 2021-03-11 | Stop reason: SDUPTHER

## 2021-03-11 RX ORDER — SODIUM CHLORIDE 0.9 % (FLUSH) 0.9 %
10 SYRINGE (ML) INJECTION PRN
Status: DISCONTINUED | OUTPATIENT
Start: 2021-03-11 | End: 2021-03-13 | Stop reason: HOSPADM

## 2021-03-11 RX ORDER — AMLODIPINE BESYLATE 5 MG/1
5 TABLET ORAL DAILY
Status: DISCONTINUED | OUTPATIENT
Start: 2021-03-11 | End: 2021-03-12

## 2021-03-11 RX ORDER — ALBUTEROL SULFATE 2.5 MG/3ML
2.5 SOLUTION RESPIRATORY (INHALATION) EVERY 4 HOURS PRN
Status: DISCONTINUED | OUTPATIENT
Start: 2021-03-11 | End: 2021-03-13 | Stop reason: HOSPADM

## 2021-03-11 RX ORDER — MORPHINE SULFATE 2 MG/ML
2 INJECTION, SOLUTION INTRAMUSCULAR; INTRAVENOUS
Status: DISCONTINUED | OUTPATIENT
Start: 2021-03-11 | End: 2021-03-13 | Stop reason: HOSPADM

## 2021-03-11 RX ORDER — SODIUM CHLORIDE, SODIUM LACTATE, POTASSIUM CHLORIDE, CALCIUM CHLORIDE 600; 310; 30; 20 MG/100ML; MG/100ML; MG/100ML; MG/100ML
INJECTION, SOLUTION INTRAVENOUS CONTINUOUS
Status: DISCONTINUED | OUTPATIENT
Start: 2021-03-11 | End: 2021-03-13 | Stop reason: HOSPADM

## 2021-03-11 RX ORDER — ONDANSETRON 2 MG/ML
4 INJECTION INTRAMUSCULAR; INTRAVENOUS EVERY 6 HOURS PRN
Status: DISCONTINUED | OUTPATIENT
Start: 2021-03-11 | End: 2021-03-11

## 2021-03-11 RX ORDER — PANTOPRAZOLE SODIUM 40 MG/1
40 TABLET, DELAYED RELEASE ORAL DAILY
Status: DISCONTINUED | OUTPATIENT
Start: 2021-03-11 | End: 2021-03-13 | Stop reason: HOSPADM

## 2021-03-11 RX ORDER — KETOROLAC TROMETHAMINE 30 MG/ML
15 INJECTION, SOLUTION INTRAMUSCULAR; INTRAVENOUS EVERY 6 HOURS
Status: COMPLETED | OUTPATIENT
Start: 2021-03-11 | End: 2021-03-12

## 2021-03-11 RX ORDER — SODIUM CHLORIDE, SODIUM LACTATE, POTASSIUM CHLORIDE, CALCIUM CHLORIDE 600; 310; 30; 20 MG/100ML; MG/100ML; MG/100ML; MG/100ML
INJECTION, SOLUTION INTRAVENOUS CONTINUOUS PRN
Status: DISCONTINUED | OUTPATIENT
Start: 2021-03-11 | End: 2021-03-11 | Stop reason: SDUPTHER

## 2021-03-11 RX ORDER — OXYCODONE HYDROCHLORIDE 5 MG/1
10 TABLET ORAL EVERY 4 HOURS PRN
Status: DISCONTINUED | OUTPATIENT
Start: 2021-03-11 | End: 2021-03-13 | Stop reason: HOSPADM

## 2021-03-11 RX ORDER — MORPHINE SULFATE 4 MG/ML
4 INJECTION, SOLUTION INTRAMUSCULAR; INTRAVENOUS
Status: DISCONTINUED | OUTPATIENT
Start: 2021-03-11 | End: 2021-03-13 | Stop reason: HOSPADM

## 2021-03-11 RX ORDER — LIDOCAINE HYDROCHLORIDE 20 MG/ML
INJECTION, SOLUTION EPIDURAL; INFILTRATION; INTRACAUDAL; PERINEURAL PRN
Status: DISCONTINUED | OUTPATIENT
Start: 2021-03-11 | End: 2021-03-11 | Stop reason: SDUPTHER

## 2021-03-11 RX ORDER — MEPERIDINE HYDROCHLORIDE 25 MG/ML
12.5 INJECTION INTRAMUSCULAR; INTRAVENOUS; SUBCUTANEOUS EVERY 5 MIN PRN
Status: DISCONTINUED | OUTPATIENT
Start: 2021-03-11 | End: 2021-03-11 | Stop reason: HOSPADM

## 2021-03-11 RX ORDER — ALBUTEROL SULFATE 90 UG/1
2 AEROSOL, METERED RESPIRATORY (INHALATION) PRN
Status: DISCONTINUED | OUTPATIENT
Start: 2021-03-11 | End: 2021-03-11 | Stop reason: CLARIF

## 2021-03-11 RX ORDER — ONDANSETRON 4 MG/1
4 TABLET, ORALLY DISINTEGRATING ORAL EVERY 8 HOURS PRN
Status: DISCONTINUED | OUTPATIENT
Start: 2021-03-11 | End: 2021-03-11

## 2021-03-11 RX ORDER — PRAVASTATIN SODIUM 20 MG
20 TABLET ORAL NIGHTLY
Status: DISCONTINUED | OUTPATIENT
Start: 2021-03-11 | End: 2021-03-13 | Stop reason: HOSPADM

## 2021-03-11 RX ORDER — SODIUM CHLORIDE 0.9 % (FLUSH) 0.9 %
10 SYRINGE (ML) INJECTION PRN
Status: DISCONTINUED | OUTPATIENT
Start: 2021-03-11 | End: 2021-03-11 | Stop reason: HOSPADM

## 2021-03-11 RX ORDER — SODIUM CHLORIDE 9 MG/ML
INJECTION, SOLUTION INTRAVENOUS CONTINUOUS PRN
Status: DISCONTINUED | OUTPATIENT
Start: 2021-03-11 | End: 2021-03-11 | Stop reason: SDUPTHER

## 2021-03-11 RX ORDER — ROCURONIUM BROMIDE 10 MG/ML
INJECTION, SOLUTION INTRAVENOUS PRN
Status: DISCONTINUED | OUTPATIENT
Start: 2021-03-11 | End: 2021-03-11 | Stop reason: SDUPTHER

## 2021-03-11 RX ORDER — SOTALOL HYDROCHLORIDE 80 MG/1
40 TABLET ORAL DAILY
Status: DISCONTINUED | OUTPATIENT
Start: 2021-03-11 | End: 2021-03-13 | Stop reason: HOSPADM

## 2021-03-11 RX ORDER — PROPOFOL 10 MG/ML
INJECTION, EMULSION INTRAVENOUS PRN
Status: DISCONTINUED | OUTPATIENT
Start: 2021-03-11 | End: 2021-03-11 | Stop reason: SDUPTHER

## 2021-03-11 RX ORDER — BUPIVACAINE HYDROCHLORIDE AND EPINEPHRINE 2.5; 5 MG/ML; UG/ML
INJECTION, SOLUTION EPIDURAL; INFILTRATION; INTRACAUDAL; PERINEURAL PRN
Status: DISCONTINUED | OUTPATIENT
Start: 2021-03-11 | End: 2021-03-11 | Stop reason: ALTCHOICE

## 2021-03-11 RX ORDER — TAMSULOSIN HYDROCHLORIDE 0.4 MG/1
0.4 CAPSULE ORAL DAILY
Status: DISCONTINUED | OUTPATIENT
Start: 2021-03-11 | End: 2021-03-13 | Stop reason: HOSPADM

## 2021-03-11 RX ORDER — CIPROFLOXACIN 2 MG/ML
400 INJECTION, SOLUTION INTRAVENOUS EVERY 12 HOURS
Status: COMPLETED | OUTPATIENT
Start: 2021-03-11 | End: 2021-03-11

## 2021-03-11 RX ORDER — SODIUM CHLORIDE, SODIUM LACTATE, POTASSIUM CHLORIDE, CALCIUM CHLORIDE 600; 310; 30; 20 MG/100ML; MG/100ML; MG/100ML; MG/100ML
INJECTION, SOLUTION INTRAVENOUS CONTINUOUS
Status: DISCONTINUED | OUTPATIENT
Start: 2021-03-11 | End: 2021-03-11

## 2021-03-11 RX ORDER — SODIUM CHLORIDE 0.9 % (FLUSH) 0.9 %
10 SYRINGE (ML) INJECTION EVERY 12 HOURS SCHEDULED
Status: DISCONTINUED | OUTPATIENT
Start: 2021-03-11 | End: 2021-03-11 | Stop reason: HOSPADM

## 2021-03-11 RX ADMIN — PHENYLEPHRINE HYDROCHLORIDE 100 MCG: 10 INJECTION INTRAVENOUS at 12:59

## 2021-03-11 RX ADMIN — FENTANYL CITRATE 50 MCG: 50 INJECTION, SOLUTION INTRAMUSCULAR; INTRAVENOUS at 13:14

## 2021-03-11 RX ADMIN — PHENYLEPHRINE HYDROCHLORIDE 100 MCG: 10 INJECTION INTRAVENOUS at 13:21

## 2021-03-11 RX ADMIN — PHENYLEPHRINE HYDROCHLORIDE 200 MCG: 10 INJECTION INTRAVENOUS at 13:54

## 2021-03-11 RX ADMIN — SODIUM CHLORIDE: 9 INJECTION, SOLUTION INTRAVENOUS at 11:57

## 2021-03-11 RX ADMIN — SODIUM CHLORIDE, POTASSIUM CHLORIDE, SODIUM LACTATE AND CALCIUM CHLORIDE: 600; 310; 30; 20 INJECTION, SOLUTION INTRAVENOUS at 15:56

## 2021-03-11 RX ADMIN — TRIMETHOBENZAMIDE HYDROCHLORIDE 200 MG: 100 INJECTION INTRAMUSCULAR at 18:30

## 2021-03-11 RX ADMIN — METRONIDAZOLE 500 MG: 500 INJECTION, SOLUTION INTRAVENOUS at 16:11

## 2021-03-11 RX ADMIN — SUGAMMADEX 350 MG: 100 INJECTION, SOLUTION INTRAVENOUS at 14:19

## 2021-03-11 RX ADMIN — PROPOFOL 30 MG: 10 INJECTION, EMULSION INTRAVENOUS at 13:16

## 2021-03-11 RX ADMIN — ROCURONIUM BROMIDE 40 MG: 10 INJECTION, SOLUTION INTRAVENOUS at 12:53

## 2021-03-11 RX ADMIN — KETOROLAC TROMETHAMINE 15 MG: 30 INJECTION, SOLUTION INTRAMUSCULAR; INTRAVENOUS at 16:08

## 2021-03-11 RX ADMIN — PHENYLEPHRINE HYDROCHLORIDE 100 MCG: 10 INJECTION INTRAVENOUS at 13:56

## 2021-03-11 RX ADMIN — ONDANSETRON 4 MG: 2 INJECTION INTRAMUSCULAR; INTRAVENOUS at 13:08

## 2021-03-11 RX ADMIN — AMLODIPINE BESYLATE 5 MG: 5 TABLET ORAL at 18:31

## 2021-03-11 RX ADMIN — PHENYLEPHRINE HYDROCHLORIDE 100 MCG: 10 INJECTION INTRAVENOUS at 13:10

## 2021-03-11 RX ADMIN — PROPOFOL 170 MG: 10 INJECTION, EMULSION INTRAVENOUS at 12:53

## 2021-03-11 RX ADMIN — TAMSULOSIN HYDROCHLORIDE 0.4 MG: 0.4 CAPSULE ORAL at 18:31

## 2021-03-11 RX ADMIN — HYDROMORPHONE HYDROCHLORIDE 0.5 MG: 1 INJECTION, SOLUTION INTRAMUSCULAR; INTRAVENOUS; SUBCUTANEOUS at 15:17

## 2021-03-11 RX ADMIN — FENTANYL CITRATE 50 MCG: 50 INJECTION, SOLUTION INTRAMUSCULAR; INTRAVENOUS at 14:11

## 2021-03-11 RX ADMIN — LIDOCAINE HYDROCHLORIDE 100 MG: 20 INJECTION, SOLUTION EPIDURAL; INFILTRATION; INTRACAUDAL; PERINEURAL at 12:53

## 2021-03-11 RX ADMIN — KETOROLAC TROMETHAMINE 15 MG: 30 INJECTION, SOLUTION INTRAMUSCULAR; INTRAVENOUS at 23:00

## 2021-03-11 RX ADMIN — FENTANYL CITRATE 50 MCG: 50 INJECTION, SOLUTION INTRAMUSCULAR; INTRAVENOUS at 13:16

## 2021-03-11 RX ADMIN — PRAVASTATIN SODIUM 20 MG: 20 TABLET ORAL at 23:01

## 2021-03-11 RX ADMIN — FENTANYL CITRATE 100 MCG: 50 INJECTION, SOLUTION INTRAMUSCULAR; INTRAVENOUS at 12:53

## 2021-03-11 RX ADMIN — PHENYLEPHRINE HYDROCHLORIDE 100 MCG: 10 INJECTION INTRAVENOUS at 13:49

## 2021-03-11 RX ADMIN — Medication 2000 MG: at 12:47

## 2021-03-11 RX ADMIN — SODIUM CHLORIDE, POTASSIUM CHLORIDE, SODIUM LACTATE AND CALCIUM CHLORIDE: 600; 310; 30; 20 INJECTION, SOLUTION INTRAVENOUS at 14:05

## 2021-03-11 RX ADMIN — PANTOPRAZOLE SODIUM 40 MG: 40 TABLET, DELAYED RELEASE ORAL at 16:07

## 2021-03-11 RX ADMIN — CIPROFLOXACIN 400 MG: 2 INJECTION, SOLUTION INTRAVENOUS at 16:12

## 2021-03-11 ASSESSMENT — PAIN - FUNCTIONAL ASSESSMENT: PAIN_FUNCTIONAL_ASSESSMENT: 0-10

## 2021-03-11 ASSESSMENT — PULMONARY FUNCTION TESTS
PIF_VALUE: 23
PIF_VALUE: 20
PIF_VALUE: 22
PIF_VALUE: 1
PIF_VALUE: 1
PIF_VALUE: 23
PIF_VALUE: 0
PIF_VALUE: 1
PIF_VALUE: 1
PIF_VALUE: 22
PIF_VALUE: 17
PIF_VALUE: 25
PIF_VALUE: 24
PIF_VALUE: 27
PIF_VALUE: 3
PIF_VALUE: 8
PIF_VALUE: 17
PIF_VALUE: 25
PIF_VALUE: 18
PIF_VALUE: 23
PIF_VALUE: 26
PIF_VALUE: 26
PIF_VALUE: 18
PIF_VALUE: 17
PIF_VALUE: 18
PIF_VALUE: 26
PIF_VALUE: 16
PIF_VALUE: 27
PIF_VALUE: 26
PIF_VALUE: 18
PIF_VALUE: 17
PIF_VALUE: 26
PIF_VALUE: 26
PIF_VALUE: 27
PIF_VALUE: 22
PIF_VALUE: 24
PIF_VALUE: 26
PIF_VALUE: 16
PIF_VALUE: 18
PIF_VALUE: 25
PIF_VALUE: 1
PIF_VALUE: 18
PIF_VALUE: 27
PIF_VALUE: 27
PIF_VALUE: 17
PIF_VALUE: 24
PIF_VALUE: 17
PIF_VALUE: 17
PIF_VALUE: 27
PIF_VALUE: 18
PIF_VALUE: 27
PIF_VALUE: 17
PIF_VALUE: 16

## 2021-03-11 ASSESSMENT — PAIN SCALES - GENERAL: PAINLEVEL_OUTOF10: 2

## 2021-03-11 ASSESSMENT — PAIN DESCRIPTION - LOCATION: LOCATION: ABDOMEN

## 2021-03-11 ASSESSMENT — PAIN DESCRIPTION - FREQUENCY: FREQUENCY: CONTINUOUS

## 2021-03-11 ASSESSMENT — PAIN DESCRIPTION - PAIN TYPE: TYPE: SURGICAL PAIN

## 2021-03-11 ASSESSMENT — PAIN DESCRIPTION - DESCRIPTORS: DESCRIPTORS: ACHING;DISCOMFORT;DULL

## 2021-03-11 NOTE — OP NOTE
DATE OF PROCEDURE: 3/11/2021  SURGEON: FABIO Beauchamp M.D. PREOPERATIVE DIAGNOSIS: Right colon mass. POSTOPERATIVE DIAGNOSIS: same. Pelvic retroperitoneal mass  OPERATION:   1)Laparoscopic right hemicolectomy. 2)mobilization and placement of omental flap  ANESTHESIA: General.  ESTIMATED BLOOD LOSS: 100 mL. COMPLICATIONS: None. FLUIDS: Crystalloid. Specimen: right colon  DISPOSITION: To be admitted in for routine postoperative care. INDICATION: This is a 81-year-old male with the aforementioned diagnosis. I explained the risks, benefits, potential outcomes, and alternative  treatment to the aforementioned procedure, and she agreed to proceed  understanding those risks and potential outcomes. PROCEDURE: The patient was brought into the operative suite, was placed  under general anesthesia, had bilateral PCDs placed, was given preoperative  antibiotics within 30 minutes of incision, had a Sow catheter placed. Once  this was done, a 5-mm incision was made supraumbilically after local  anesthetic was infiltrated into the abdominal wall. A Veress needle was  passed into the peritoneum. CO2 was used to insufflate the abdomen to a  pressure of 15 mmHg. At this time, the Veress needle was removed and a 5-mm  trocar was put in its place. The laparoscope was introduced through this  trocar and 2 additional trocars were placed; 1 in the suprapubic, 1 in the  left lower quadrant of the abdomen. An additional trocar was placed in the  left upper quadrant of the abdomen. Once this was done, we were able to identify the ileocolic vessel and this  was exposed and skeletonized with the hook cautery. This was taken then with  the LigaSure device. We then bluntly dissected from the mediolateral  approach around the right colon, exposing up to the hepatic flexure and  taking that down as well with the LigaSure device.  There was a large amount of desmoplastic changes and hard mass grown into the

## 2021-03-11 NOTE — H&P
General Surgery History and Physical    Patient's Name/Date of Birth: Nathaniel Garcia / 1950    Date: March 11, 2021     Surgeon: Simón Rowan M.D.    PCP: Sergo Stockton DO     Chief Complaint: colon mass likely cancer    HPI:   Nathaniel Garcia is a 72 y.o. male who presents for evaluation of colon mass that was found on recent colonoscopy and is located at ascending colon. Does  have other symptoms. Past Medical History:   Diagnosis Date    Arthritis     KNEES HIPS BACK    Coronary artery disease     Difficult airway     PT STATES HE WAS TOLD THIS TWICE AT [facility]    Difficult intubation 04/2017    note in care everywhere \"Clinical Summary\" 03/10/2021    Hyperlipidemia     Hypertension     Sinus tachycardia 01/01/2002       Past Surgical History:   Procedure Laterality Date    ABLATION OF DYSRHYTHMIC FOCUS      AORTA SURGERY      aortic valve replacement    AORTIC VALVE REPLACEMENT      BLADDER SURGERY Right 2/17/2021    CYSTOSCOPY BILATERAL RETROGRADE PYELOGRAM RIGHT STENT INSERTION performed by Airam Dennis MD at [facility]  2/19/2021    COLONOSCOPY WITH BIOPSY performed by Laverne Hernandez DO at [facility]  2/19/2021    COLONOSCOPY W/ ENDOSCOPIC MUCOSAL RESECTION performed by Laverne Hernandez DO at [place] [address] OTHER SURGICAL HISTORY  08/12/2016    CT Myelogram, Dr. Shashank Naqvi  2014 new battery    last checked Dr Michelle Sharma 1/2016?     TONSILLECTOMY      UPPER GASTROINTESTINAL ENDOSCOPY      reflux    UPPER GASTROINTESTINAL ENDOSCOPY N/A 2/16/2021    EGD BIOPSY performed by Teddy Schaumann, MD at [facility]         Current Facility-Administered Medications   Medication Dose Route Frequency Provider Last Rate Last Admin    lactated ringers infusion   Intravenous Continuous Clementina Kaplan MD   New Bag at 03/11/21 1157    sodium chloride flush 0.9 % injection 10 mL  10 mL Intravenous 2 times per day Arsalan Rojas MD        sodium chloride flush 0.9 % injection 10 mL  10 mL Intravenous PRN Arsalan Rojas MD        meperidine (DEMEROL) injection 12.5 mg  12.5 mg Intravenous Q5 Min PRN Ezzard Seat, MD        diphenhydrAMINE (BENADRYL) injection 12.5 mg  12.5 mg Intravenous Once PRN Ezzard Seat, MD        HYDROmorphone (DILAUDID) injection 0.5 mg  0.5 mg Intravenous Q5 Min PRN Ezzard Seat, MD        HYDROmorphone (DILAUDID) injection 0.25 mg  0.25 mg Intravenous Q5 Min PRN Ezzard Seat, MD           No Known Allergies    The patient has a family history that is negative for severe cardiovascular or respiratory issues, negative for reaction to anesthesia.     Social History     Socioeconomic History    Marital status:      Spouse name: Not on file    Number of children: Not on file    Years of education: Not on file    Highest education level: Not on file   Occupational History    Not on file   Social Needs    Financial resource strain: Not on file    Food insecurity     Worry: Not on file     Inability: Not on file    Transportation needs     Medical: Not on file     Non-medical: Not on file   Tobacco Use    Smoking status: Former Smoker     Packs/day: 1.00     Years: 44.00     Pack years: 44.00     Quit date: 3/4/2014     Years since quittin.0    Smokeless tobacco: Never Used   Substance and Sexual Activity    Alcohol use: Not Currently     Alcohol/week: 2.0 standard drinks     Types: 2 Shots of liquor per week     Comment: month    Drug use: No    Sexual activity: Not on file   Lifestyle    Physical activity     Days per week: Not on file     Minutes per session: Not on file    Stress: Not on file   Relationships    Social connections     Talks on phone: Not on file     Gets together: Not on file     Attends Jew service: Not on file     Active member of club or organization: Not on file     Attends meetings of clubs or organizations: Not on file     Relationship status: Not on file    Intimate partner violence     Fear of current or ex partner: Not on file     Emotionally abused: Not on file     Physically abused: Not on file     Forced sexual activity: Not on file   Other Topics Concern    Not on file   Social History Narrative    Not on file           Review of Systems  Review of Systems -  General ROS: negative for - chills, fatigue or malaise  ENT ROS: negative for - hearing change, nasal congestion or nasal discharge  Allergy and Immunology ROS: negative for - hives, itchy/watery eyes or nasal congestion  Hematological and Lymphatic ROS: negative for - blood clots, blood transfusions, bruising or fatigue  Endocrine ROS: negative for - malaise/lethargy, mood swings, palpitations or polydipsia/polyuria  Respiratory ROS: negative for - sputum changes, stridor, tachypnea or wheezing  Cardiovascular ROS: negative for - irregular heartbeat, loss of consciousness, murmur or orthopnea  Gastrointestinal ROS: negative for - constipation, diarrhea, gas/bloating, heartburn or hematemesis  Genito-Urinary ROS: negative for -  genital discharge, genital ulcers or hematuria  Musculoskeletal ROS: negative for - gait disturbance, muscle pain or muscular weakness    Physical exam:   BP (!) 94/51   Pulse 61   Temp 96.6 °F (35.9 °C) (Temporal)   Resp 17   Ht 5' 7\" (1.702 m)   Wt 215 lb (97.5 kg)   SpO2 97%   BMI 33.67 kg/m²   General appearance:  NAD  Pyscho/social: negative for tremors and hallucinations  Head: NCAT, PERRLA, EOMI, red conjunctiva  Neck: supple, no masses  Lungs: CTAB, equal chest rise bilateral  Heart: Reg rate  Abdomen: soft, nontender, nondistended  Skin; no lesions  Gu: no cva tenderness  Extremities: extremities normal, atraumatic, no cyanosis or edema      Radiology:  CT abdomen/pelvis: colon mass    Assessment:  72 y.o. male with colon mass likely cancer at ascending colon    Plan:   Will plan for lap resection possible open possible ostomy  Discussed the risk, benefits and alternatives of surgery including wound infections, bleeding, scar and hernia formation and the risks of general anesthetic including MI, CVA, sudden death or reactions to anesthetic medications. The patient understands the risks and alternatives and the possibility of converting to an open procedure. All questions were answered to the patient's satisfaction and they freely signed the consent.       Susan Oliveira MD  12:25 PM  3/11/2021

## 2021-03-11 NOTE — ANESTHESIA POSTPROCEDURE EVALUATION
Department of Anesthesiology  Postprocedure Note    Patient: Jo Ann Willams  MRN: [id number]  YOB: 1950  Date of evaluation: 3/11/2021  Time:  6:59 PM     Procedure Summary     Date: 03/11/21 Room / Location: [facility] [place] 09 / [facility]    Anesthesia Start: 1247 Anesthesia Stop: 2974    Procedure: LAPAROSCOPIC RIGHT HEMICOLECTOMY (N/A Abdomen) Diagnosis: (COLON CANCER)    Surgeons: Azeem Todd MD Responsible Provider: Spenser Ojeda MD    Anesthesia Type: general ASA Status: 4          Anesthesia Type: general    Effie Phase I: Effie Score: 8    Effie Phase II:      Last vitals: Reviewed and per EMR flowsheets.        Anesthesia Post Evaluation    Patient location during evaluation: PACU  Patient participation: complete - patient participated  Level of consciousness: awake and alert  Airway patency: patent  Nausea & Vomiting: no nausea and no vomiting  Complications: no  Cardiovascular status: hemodynamically stable  Respiratory status: acceptable  Hydration status: euvolemic

## 2021-03-11 NOTE — ANESTHESIA PRE PROCEDURE
Department of Anesthesiology  Preprocedure Note       Name:  Jo Ann Willams   Age:  72 y.o.  :  1950                                          MRN:  [id number]         Date:  3/11/2021      Surgeon: Vicki Hernandez):  Azeem Todd MD    Procedure: Procedure(s):  LAPAROSCOPIC POSSIBLE OPEN RIGHT HEMICOLECTOMY POSSIBLE OSTOMY    Medications prior to admission:   Prior to Admission medications    Medication Sig Start Date End Date Taking? Authorizing Provider   erythromycin base (E-MYCIN) 500 MG tablet Take 2 tabs by mouth at 1pm, 2pm, and 10pm the day before your surgery. 3/9/21   Azeem Todd MD   neomycin (MYCIFRADIN) 500 MG tablet Take 2 tabs by mouth at 1pm, 2pm, and 10pm the day before your surgery.  3/9/21   Azeem Todd MD   lisinopril-hydroCHLOROthiazide (PRINZIDE;ZESTORETIC) 10-12.5 MG per tablet Take 1 tablet by mouth daily    Historical Provider, MD   amLODIPine (NORVASC) 5 MG tablet Take 5 mg by mouth daily    Historical Provider, MD   aspirin 81 MG EC tablet Take 81 mg by mouth daily    Historical Provider, MD   vitamin B-12 (CYANOCOBALAMIN) 100 MCG tablet Take 50 mcg by mouth daily    Historical Provider, MD   sennosides-docusate sodium (SENOKOT-S) 8.6-50 MG tablet Take 1 tablet by mouth 2 times daily    Historical Provider, MD   sotalol (BETAPACE) 80 MG tablet Take 40 mg by mouth 4/10/17   Historical Provider, MD   tamsulosin (FLOMAX) 0.4 MG capsule Take 0.4 mg by mouth daily  18   Historical Provider, MD   ferrous sulfate 325 (65 Fe) MG tablet Take 325 mg by mouth 2 times daily  18   Historical Provider, MD   albuterol sulfate  (90 BASE) MCG/ACT inhaler Inhale 2 puffs into the lungs as needed for Wheezing    Historical Provider, MD   esomeprazole Magnesium (NEXIUM) 20 MG PACK Take 20 mg by mouth daily    Historical Provider, MD   PRAVASTATIN SODIUM PO Take 20 mg by mouth daily     Historical Provider, MD       Current medications:    No current facility-administered medications for this visit. No current outpatient medications on file.      Facility-Administered Medications Ordered in Other Visits   Medication Dose Route Frequency Provider Last Rate Last Admin    lactated ringers infusion   Intravenous Continuous Zachariah Sanchez MD        sodium chloride flush 0.9 % injection 10 mL  10 mL Intravenous 2 times per day Zachariah Sanchez MD        sodium chloride flush 0.9 % injection 10 mL  10 mL Intravenous PRN Zachariah Sanchez MD        meperidine (DEMEROL) injection 12.5 mg  12.5 mg Intravenous Q5 Min PRN Riki Fish MD        diphenhydrAMINE (BENADRYL) injection 12.5 mg  12.5 mg Intravenous Once PRN Riki Fish MD        HYDROmorphone (DILAUDID) injection 0.5 mg  0.5 mg Intravenous Q5 Min PRN Riki Fish MD        HYDROmorphone (DILAUDID) injection 0.25 mg  0.25 mg Intravenous Q5 Min PRN Riki Fish MD           Allergies:  No Known Allergies    Problem List:    Patient Active Problem List   Diagnosis Code    Hematuria R31.9    Hypertrophy of prostate with urinary obstruction N40.1, N13.8    SUNNY (acute kidney injury) ([facility] [address] N17.9       Past Medical History:        Diagnosis Date    Arthritis     KNEES HIPS BACK    Coronary artery disease     Difficult airway     PT STATES HE WAS TOLD THIS TWICE AT F    Difficult intubation 04/2017    note in care everywhere \"Clinical Summary\" 03/10/2021    Hyperlipidemia     Hypertension     Sinus tachycardia 01/01/2002       Past Surgical History:        Procedure Laterality Date    ABLATION OF DYSRHYTHMIC FOCUS      AORTA SURGERY      aortic valve replacement    AORTIC VALVE REPLACEMENT      BLADDER SURGERY Right 2/17/2021    CYSTOSCOPY BILATERAL RETROGRADE PYELOGRAM RIGHT STENT INSERTION performed by Desiree Reynaga MD at [address] [facility]  2/19/2021    COLONOSCOPY WITH BIOPSY performed by Gertrudis Richter DO at [facility]  2/19/2021    COLONOSCOPY W/ ENDOSCOPIC MUCOSAL RESECTION performed by Astrid Perez DO at [place] [address] OTHER SURGICAL HISTORY  2016    CT Myelogram, Dr. Ese Alex   new battery    last checked Dr Jodie Gardiner 2016?  TONSILLECTOMY      UPPER GASTROINTESTINAL ENDOSCOPY      reflux    UPPER GASTROINTESTINAL ENDOSCOPY N/A 2021    EGD BIOPSY performed by Lucero Arguelles MD at [address] History:    Social History     Tobacco Use    Smoking status: Former Smoker     Packs/day: 1.00     Years: 44.00     Pack years: 44.00     Quit date: 3/4/2014     Years since quittin.0    Smokeless tobacco: Never Used   Substance Use Topics    Alcohol use: Not Currently     Alcohol/week: 2.0 standard drinks     Types: 2 Shots of liquor per week     Comment: month                                Counseling given: Not Answered      Vital Signs (Current): There were no vitals filed for this visit.                                            BP Readings from Last 3 Encounters:   21 (!) 94/51   21 (!) 103/55   21 (!) 142/66       NPO Status:                                                                                 BMI:   Wt Readings from Last 3 Encounters:   21 215 lb (97.5 kg)   21 213 lb (96.6 kg)   21 214 lb (97.1 kg)     There is no height or weight on file to calculate BMI.    CBC:   Lab Results   Component Value Date    WBC 12.3 2021    RBC 4.13 2021    HGB 10.7 2021    HCT 33.8 2021    MCV 81.8 2021    RDW 13.9 2021     2021       CMP:   Lab Results   Component Value Date     2021    K 3.1 2021    K 4.2 2021    CL 99 2021    CO2 30 2021    BUN 21 2021    CREATININE 1.6 2021    GFRAA 52 2021    LABGLOM 52 2021    GLUCOSE 115 2021    GLUCOSE 100 2011    PROT 7.4 2021    CALCIUM 9.0 03/08/2021    BILITOT 0.3 02/14/2021    ALKPHOS 99 02/14/2021    AST 10 02/14/2021    ALT <5 02/14/2021       POC Tests: No results for input(s): POCGLU, POCNA, POCK, POCCL, POCBUN, POCHEMO, POCHCT in the last 72 hours. Coags:   Lab Results   Component Value Date    PROTIME 14.7 02/14/2021    INR 1.2 02/14/2021    APTT 34.0 08/12/2016       HCG (If Applicable): No results found for: PREGTESTUR, PREGSERUM, HCG, HCGQUANT     ABGs: No results found for: PHART, PO2ART, SOW0HPO, TIB3XZD, BEART, U9DCWDTI     Type & Screen (If Applicable):  No results found for: LABABO, LABRH    Drug/Infectious Status (If Applicable):  No results found for: HIV, HEPCAB    COVID-19 Screening (If Applicable):   Lab Results   Component Value Date    COVID19 Not Detected 03/05/2021         Anesthesia Evaluation  Patient summary reviewed   history of anesthetic complications: difficult airway. Airway: Mallampati: III  TM distance: >3 FB   Neck ROM: full  Mouth opening: > = 3 FB Dental: normal exam     Comment: Dentition intact. Patient denies any loose teeth. Pulmonary: breath sounds clear to auscultation  (+) pneumonia:                            ROS comment: Former 1 PPD x 44 years quit 2014   Cardiovascular:  Exercise tolerance: good (>4 METS),   (+) hypertension:, valvular problems/murmurs ( AORTIC VALVE REPLACEMENT MITRAL VALVE REPLACEMENT):, pacemaker: pacemaker, CAD: obstructive, dysrhythmias ( ABLATION OF DYSRHYTHMIC FOCUS):, hyperlipidemia    Murmur: Grade 2/6 murmur. ECG reviewed  Rhythm: regular  Rate: normal                    Neuro/Psych:   Negative Neuro/Psych ROS              GI/Hepatic/Renal:   (+) GERD:, renal disease (SUNNY, BPH with urinary obstructiuon):, bowel prep,          ROS comment: Anemia secondary to probable GI bleed. .. Endo/Other:    (+) blood dyscrasia: anemia, arthritis: OA., .                  ROS comment: obese Abdominal:           Vascular: negative vascular ROS. Anesthesia Plan      general     ASA 4     (Glidescope)  Induction: intravenous. arterial line  MIPS: Postoperative opioids intended and Prophylactic antiemetics administered. Anesthetic plan and risks discussed with patient. Plan discussed with CRNA.               Hiren Clifton MD  Staff Anesthesiologist  12:13 PM

## 2021-03-12 LAB
ALBUMIN SERPL-MCNC: 2.6 G/DL (ref 3.5–5.2)
ALP BLD-CCNC: 77 U/L (ref 40–129)
ALT SERPL-CCNC: <5 U/L (ref 0–40)
ANION GAP SERPL CALCULATED.3IONS-SCNC: 11 MMOL/L (ref 7–16)
AST SERPL-CCNC: 13 U/L (ref 0–39)
BASOPHILS ABSOLUTE: 0.04 E9/L (ref 0–0.2)
BASOPHILS RELATIVE PERCENT: 0.4 % (ref 0–2)
BILIRUB SERPL-MCNC: 0.3 MG/DL (ref 0–1.2)
BUN BLDV-MCNC: 18 MG/DL (ref 8–23)
CALCIUM SERPL-MCNC: 8.2 MG/DL (ref 8.6–10.2)
CHLORIDE BLD-SCNC: 98 MMOL/L (ref 98–107)
CO2: 25 MMOL/L (ref 22–29)
CREAT SERPL-MCNC: 1.6 MG/DL (ref 0.7–1.2)
EOSINOPHILS ABSOLUTE: 0.09 E9/L (ref 0.05–0.5)
EOSINOPHILS RELATIVE PERCENT: 1 % (ref 0–6)
GFR AFRICAN AMERICAN: 52
GFR NON-AFRICAN AMERICAN: 52 ML/MIN/1.73
GLUCOSE BLD-MCNC: 96 MG/DL (ref 74–99)
HCT VFR BLD CALC: 25.7 % (ref 37–54)
HCT VFR BLD CALC: 27 % (ref 37–54)
HEMOGLOBIN: 7.6 G/DL (ref 12.5–16.5)
HEMOGLOBIN: 8.2 G/DL (ref 12.5–16.5)
IMMATURE GRANULOCYTES #: 0.05 E9/L
IMMATURE GRANULOCYTES %: 0.5 % (ref 0–5)
LYMPHOCYTES ABSOLUTE: 0.64 E9/L (ref 1.5–4)
LYMPHOCYTES RELATIVE PERCENT: 6.9 % (ref 20–42)
MCH RBC QN AUTO: 24.8 PG (ref 26–35)
MCHC RBC AUTO-ENTMCNC: 29.6 % (ref 32–34.5)
MCV RBC AUTO: 83.7 FL (ref 80–99.9)
MONOCYTES ABSOLUTE: 1.05 E9/L (ref 0.1–0.95)
MONOCYTES RELATIVE PERCENT: 11.4 % (ref 2–12)
NEUTROPHILS ABSOLUTE: 7.34 E9/L (ref 1.8–7.3)
NEUTROPHILS RELATIVE PERCENT: 79.8 % (ref 43–80)
PDW BLD-RTO: 14 FL (ref 11.5–15)
PLATELET # BLD: 273 E9/L (ref 130–450)
PMV BLD AUTO: 9.4 FL (ref 7–12)
POTASSIUM REFLEX MAGNESIUM: 3.6 MMOL/L (ref 3.5–5)
RBC # BLD: 3.07 E12/L (ref 3.8–5.8)
SODIUM BLD-SCNC: 134 MMOL/L (ref 132–146)
TOTAL PROTEIN: 6.1 G/DL (ref 6.4–8.3)
WBC # BLD: 9.2 E9/L (ref 4.5–11.5)

## 2021-03-12 PROCEDURE — 1200000000 HC SEMI PRIVATE

## 2021-03-12 PROCEDURE — 85025 COMPLETE CBC W/AUTO DIFF WBC: CPT

## 2021-03-12 PROCEDURE — 6370000000 HC RX 637 (ALT 250 FOR IP): Performed by: SURGERY

## 2021-03-12 PROCEDURE — 85014 HEMATOCRIT: CPT

## 2021-03-12 PROCEDURE — 6360000002 HC RX W HCPCS: Performed by: SURGERY

## 2021-03-12 PROCEDURE — 6370000000 HC RX 637 (ALT 250 FOR IP): Performed by: INTERNAL MEDICINE

## 2021-03-12 PROCEDURE — 85018 HEMOGLOBIN: CPT

## 2021-03-12 PROCEDURE — 36415 COLL VENOUS BLD VENIPUNCTURE: CPT

## 2021-03-12 PROCEDURE — 2500000003 HC RX 250 WO HCPCS: Performed by: SURGERY

## 2021-03-12 PROCEDURE — 6360000002 HC RX W HCPCS: Performed by: INTERNAL MEDICINE

## 2021-03-12 PROCEDURE — 2580000003 HC RX 258: Performed by: INTERNAL MEDICINE

## 2021-03-12 PROCEDURE — 80053 COMPREHEN METABOLIC PANEL: CPT

## 2021-03-12 PROCEDURE — 97161 PT EVAL LOW COMPLEX 20 MIN: CPT

## 2021-03-12 RX ORDER — DOCUSATE SODIUM 100 MG/1
100 CAPSULE, LIQUID FILLED ORAL DAILY
Status: DISCONTINUED | OUTPATIENT
Start: 2021-03-12 | End: 2021-03-13 | Stop reason: HOSPADM

## 2021-03-12 RX ORDER — SODIUM CHLORIDE, SODIUM LACTATE, POTASSIUM CHLORIDE, AND CALCIUM CHLORIDE .6; .31; .03; .02 G/100ML; G/100ML; G/100ML; G/100ML
1000 INJECTION, SOLUTION INTRAVENOUS ONCE
Status: COMPLETED | OUTPATIENT
Start: 2021-03-12 | End: 2021-03-12

## 2021-03-12 RX ADMIN — KETOROLAC TROMETHAMINE 15 MG: 30 INJECTION, SOLUTION INTRAMUSCULAR; INTRAVENOUS at 17:41

## 2021-03-12 RX ADMIN — ENOXAPARIN SODIUM 40 MG: 100 INJECTION SUBCUTANEOUS at 09:35

## 2021-03-12 RX ADMIN — KETOROLAC TROMETHAMINE 15 MG: 30 INJECTION, SOLUTION INTRAMUSCULAR; INTRAVENOUS at 11:01

## 2021-03-12 RX ADMIN — METRONIDAZOLE 500 MG: 500 INJECTION, SOLUTION INTRAVENOUS at 01:05

## 2021-03-12 RX ADMIN — DOCUSATE SODIUM 100 MG: 100 CAPSULE, LIQUID FILLED ORAL at 11:01

## 2021-03-12 RX ADMIN — TRIMETHOBENZAMIDE HYDROCHLORIDE 200 MG: 100 INJECTION INTRAMUSCULAR at 05:07

## 2021-03-12 RX ADMIN — SODIUM CHLORIDE, POTASSIUM CHLORIDE, SODIUM LACTATE AND CALCIUM CHLORIDE 1000 ML: 600; 310; 30; 20 INJECTION, SOLUTION INTRAVENOUS at 02:20

## 2021-03-12 RX ADMIN — PRAVASTATIN SODIUM 20 MG: 20 TABLET ORAL at 21:42

## 2021-03-12 RX ADMIN — KETOROLAC TROMETHAMINE 15 MG: 30 INJECTION, SOLUTION INTRAMUSCULAR; INTRAVENOUS at 04:57

## 2021-03-12 RX ADMIN — SOTALOL HYDROCHLORIDE 40 MG: 80 TABLET ORAL at 09:35

## 2021-03-12 RX ADMIN — TAMSULOSIN HYDROCHLORIDE 0.4 MG: 0.4 CAPSULE ORAL at 09:35

## 2021-03-12 RX ADMIN — PANTOPRAZOLE SODIUM 40 MG: 40 TABLET, DELAYED RELEASE ORAL at 09:36

## 2021-03-12 ASSESSMENT — PAIN DESCRIPTION - ORIENTATION: ORIENTATION: MID

## 2021-03-12 ASSESSMENT — PAIN SCALES - GENERAL
PAINLEVEL_OUTOF10: 5
PAINLEVEL_OUTOF10: 3

## 2021-03-12 ASSESSMENT — PAIN DESCRIPTION - DESCRIPTORS: DESCRIPTORS: ACHING;SORE;DISCOMFORT

## 2021-03-12 ASSESSMENT — PAIN DESCRIPTION - PAIN TYPE: TYPE: SURGICAL PAIN

## 2021-03-12 ASSESSMENT — PAIN DESCRIPTION - FREQUENCY: FREQUENCY: CONTINUOUS

## 2021-03-12 NOTE — PLAN OF CARE
Problem: Inadequate oral food/beverage intake (NI-2.1)  Goal: Food and/or Nutrient Delivery  Continue Diet. Will Start Ensure High Nii ONS BID. Description: Individualized approach for food/nutrient provision.   Outcome: Met This Shift

## 2021-03-12 NOTE — PLAN OF CARE
Problem: Pain:  Goal: Pain level will decrease  Description: Pain level will decrease  Outcome: Met This Shift  Goal: Control of acute pain  Description: Control of acute pain  Outcome: Met This Shift     Problem: Falls - Risk of:  Goal: Will remain free from falls  Description: Will remain free from falls  Outcome: Met This Shift  Goal: Absence of physical injury  Description: Absence of physical injury  Outcome: Met This Shift     Problem: Skin Integrity:  Goal: Will show no infection signs and symptoms  Description: Will show no infection signs and symptoms  Outcome: Met This Shift

## 2021-03-12 NOTE — CARE COORDINATION
3/12/2021  Social Work Discharge Planning:SW spoke to Pt. He resides with sergio in a  1 story home. Pt is on room air and forsees no needs at discharge. Independent. Pharmacy is [facility] and PCP is Dr.D. Austin Irwin.  Electronically signed by JOSE Adan on 3/12/2021 at 3:14 PM

## 2021-03-12 NOTE — CONSULTS
Internal Medicine Consultation    Name: Denise Carbone  : 1950  Chief Complaint: Colon mass  Primary Care Physician: Duc Reich DO  Admission date: 3/11/2021  Date of service: 3/12/2021     History of Present Illness  Tio Farrar is a 72y.o. year old male with history of BPH, hypertension, hyperlipidemia, coronary artery disease, with history of CABG. he presented to the hospital yesterday for elective surgical resection of colonic mass. Patient states that he has had about a year of intermittent right-sided flank pain that has been worse for the past 4 months. He states that about 2 weeks ago he had an episode of lightheadedness, disorientation, presented to the ED at [facility] for concern of possible stroke, was found to be anemic requiring transfusions, and this was when colon mass was found. Right flank pain was constant, initially dull and achy, became sharper over the past couple weeks. The pain is worsened with pressure, states that wearing pants and belt became painful and uncomfortable. Patient had surgical intervention with resection of mass yesterday, today has intermittent pain and nausea that is well controlled with medications. He has not yet passed gas, feels tired but well. Patient has history of pacemaker placement, previous CABG, follows with Dr. Bridger Johnson for cardiology, states that he is retiring. Denies any chest pain, shortness of breath, fever, chills at this time. There are no family or friends at bedside. The history is provided by the patient, medical records. Patient is felt to be a good historian.       Past Medical History:   Diagnosis Date    Arthritis     KNEES HIPS BACK    BPH (benign prostatic hyperplasia)     Coronary artery disease     Difficult airway     PT STATES HE WAS TOLD THIS TW[facility] AT [facility]    Difficult intubation 2017    note in care everywhere \"Clinical Summary\" 03/10/2021    Hyperlipidemia     Hypertension     Sinus tachycardia 01/01/2002       Past Surgical History:   Procedure Laterality Date    ABLATION OF DYSRHYTHMIC FOCUS      AORTA SURGERY      aortic valve replacement    AORTIC VALVE REPLACEMENT      BLADDER SURGERY Right 2/17/2021    CYSTOSCOPY BILATERAL RETROGRADE PYELOGRAM RIGHT STENT INSERTION performed by Juan Salazar MD at [address]  2/19/2021    COLONOSCOPY WITH BIOPSY performed by Yessy Del Real DO at [address]  2/19/2021    COLONOSCOPY W/ ENDOSCOPIC MUCOSAL RESECTION performed by Yessy Del Real DO at [address] N/A 3/11/2021    LAPAROSCOPIC RIGHT HEMICOLECTOMY performed by Chun Matson MD at [address] OTHER SURGICAL HISTORY  08/12/2016    CT Myelogram, Dr. Cagle Post  2014 new battery    last checked Dr Eduin Mcbride 1/2016?  TONSILLECTOMY      UPPER GASTROINTESTINAL ENDOSCOPY      reflux    UPPER GASTROINTESTINAL ENDOSCOPY N/A 2/16/2021    EGD BIOPSY performed by Ravinder Matta MD at [facility]         Family History   Problem Relation Age of Onset    Diabetes Mother     Stroke Mother     Diabetes Father     Heart Disease Father     Brain Cancer Sister     Stroke Sister     Stroke Brother     Cancer Maternal Grandfather        Social History  Patient lives at home with his Alex Bui. Employment: Retired in December 4710  Illicit drug use- denies  TOBACCO:   reports that he quit smoking about 7 years ago. He has a 44.00 pack-year smoking history. He has never used smokeless tobacco.  He states he occasionally vapes nicotine  ETOH:   reports previous alcohol use of about 2.0 standard drinks of alcohol per week. Denies any current alcohol use    Home Medications  Prior to Admission medications    Medication Sig Start Date End Date Taking?  Authorizing Provider   erythromycin base (E-MYCIN) 500 MG tablet Take 2 tabs by mouth at 1pm, 2pm, dysuria, hematuria, increased frequency  Musculoskeletal: lower extremity edema, myalgias, arthralgias, back pain  Integumentary: rashes, itching   Neurological: headache, lightheadedness, dizziness, confusion, syncope, numbness, tingling, focal weakness  Psychiatric: depression, suicidal ideation, anxiety  Endocrine: unintentional weight change  Hematologic/Lymphatic: lymphadenopathy, easy bruising, easy bleeding   Allergic/Immunologic: recurrent infections      Objective  VITALS:  BP (!) 105/55   Pulse 79   Temp 97.7 °F (36.5 °C) (Oral)   Resp 16   Ht 5' 7\" (1.702 m)   Wt 215 lb (97.5 kg)   SpO2 98%   BMI 33.67 kg/m²     Physical Exam:  General: awake, alert, oriented to person, place, time, and purpose, appears stated age, cooperative, no acute distress, pleasant, appropriate mood  Eyes: conjunctivae/corneas clear, sclera non icteric, EOMI  Ears: no obvious scars, no lesions, no masses, hearing intact  Mouth: mucous membranes moist, no obvious oral sores  Head: normocephalic, atraumatic  Neck: no JVD, no adenopathy, no thyromegaly, neck is supple, trachea is midline  Back: ROM normal, no CVA tenderness. Chest: no pain on palpation, pacemaker noted left upper chest, sternotomy scar noted.   Lungs: clear to auscultation bilaterally, without rhonchi, crackle, wheezing, or rale, no retractions or use of accessory muscles  Heart: regular rate and regular rhythm, no murmur, normal S1, S2  Abdomen: post-surgical changes for right hemicolectomy, soft, non-tender; bowel sounds normal; no masses, no organomegaly  : Deferred   Extremities: no lower extremity edema, extremities atraumatic, no cyanosis, no clubbing, 2+ pedal pulses palpated  Skin: normal color, normal texture, normal turgor, no rashes, no lesions  Neurologic:5/5 muscle strength throughout, normal muscle tone throughout, face symmetric, hearing intact, tongue midline, speech appropriate without slurring, sensation to fine touch intact in upper and was transcribed using voice recognition software. Every effort was made to ensure accuracy; however, inadvertent computerized transcription errors may be present. Addendum: I have personally participated in a face-to-face history and physical exam on the date of service with the patient. I have discussed the case with the resident. I also participated in medical decision making with the resident on the date of service and I agree with all of the pertinent clinical information unless indicated in my editing of the note. I have reviewed and edited the note above based on my findings during my history, exam, and decision making on the same day of service. My additional thoughts:    Recheck hemoglobin later today   Advance diet per general surgery recommendations   Follow blood pressure closely-this improved after bolus early this morning   PT/OT    Electronically signed by Lanny Martinez DO on 3/12/2021 at 1:06 PM    I can be reached through [employer].

## 2021-03-13 VITALS
DIASTOLIC BLOOD PRESSURE: 53 MMHG | HEART RATE: 88 BPM | HEIGHT: 67 IN | WEIGHT: 219.6 LBS | TEMPERATURE: 98.2 F | OXYGEN SATURATION: 96 % | BODY MASS INDEX: 34.47 KG/M2 | RESPIRATION RATE: 16 BRPM | SYSTOLIC BLOOD PRESSURE: 103 MMHG

## 2021-03-13 LAB
ALBUMIN SERPL-MCNC: 2.8 G/DL (ref 3.5–5.2)
ALP BLD-CCNC: 81 U/L (ref 40–129)
ALT SERPL-CCNC: <5 U/L (ref 0–40)
ANION GAP SERPL CALCULATED.3IONS-SCNC: 8 MMOL/L (ref 7–16)
AST SERPL-CCNC: 14 U/L (ref 0–39)
BASOPHILS ABSOLUTE: 0.05 E9/L (ref 0–0.2)
BASOPHILS RELATIVE PERCENT: 0.6 % (ref 0–2)
BILIRUB SERPL-MCNC: 0.3 MG/DL (ref 0–1.2)
BUN BLDV-MCNC: 15 MG/DL (ref 8–23)
CALCIUM SERPL-MCNC: 8.7 MG/DL (ref 8.6–10.2)
CHLORIDE BLD-SCNC: 99 MMOL/L (ref 98–107)
CO2: 27 MMOL/L (ref 22–29)
CREAT SERPL-MCNC: 1.4 MG/DL (ref 0.7–1.2)
EOSINOPHILS ABSOLUTE: 0.23 E9/L (ref 0.05–0.5)
EOSINOPHILS RELATIVE PERCENT: 2.7 % (ref 0–6)
GFR AFRICAN AMERICAN: >60
GFR NON-AFRICAN AMERICAN: >60 ML/MIN/1.73
GLUCOSE BLD-MCNC: 97 MG/DL (ref 74–99)
HCT VFR BLD CALC: 27.1 % (ref 37–54)
HEMOGLOBIN: 8.3 G/DL (ref 12.5–16.5)
IMMATURE GRANULOCYTES #: 0.07 E9/L
IMMATURE GRANULOCYTES %: 0.8 % (ref 0–5)
LYMPHOCYTES ABSOLUTE: 0.73 E9/L (ref 1.5–4)
LYMPHOCYTES RELATIVE PERCENT: 8.6 % (ref 20–42)
MAGNESIUM: 2.1 MG/DL (ref 1.6–2.6)
MCH RBC QN AUTO: 25.4 PG (ref 26–35)
MCHC RBC AUTO-ENTMCNC: 30.6 % (ref 32–34.5)
MCV RBC AUTO: 82.9 FL (ref 80–99.9)
MONOCYTES ABSOLUTE: 1.05 E9/L (ref 0.1–0.95)
MONOCYTES RELATIVE PERCENT: 12.4 % (ref 2–12)
NEUTROPHILS ABSOLUTE: 6.33 E9/L (ref 1.8–7.3)
NEUTROPHILS RELATIVE PERCENT: 74.9 % (ref 43–80)
PDW BLD-RTO: 14 FL (ref 11.5–15)
PLATELET # BLD: 243 E9/L (ref 130–450)
PMV BLD AUTO: 9.3 FL (ref 7–12)
POTASSIUM REFLEX MAGNESIUM: 3.4 MMOL/L (ref 3.5–5)
RBC # BLD: 3.27 E12/L (ref 3.8–5.8)
SODIUM BLD-SCNC: 134 MMOL/L (ref 132–146)
TOTAL PROTEIN: 6.6 G/DL (ref 6.4–8.3)
WBC # BLD: 8.5 E9/L (ref 4.5–11.5)

## 2021-03-13 PROCEDURE — 6370000000 HC RX 637 (ALT 250 FOR IP): Performed by: SURGERY

## 2021-03-13 PROCEDURE — 85025 COMPLETE CBC W/AUTO DIFF WBC: CPT

## 2021-03-13 PROCEDURE — 83735 ASSAY OF MAGNESIUM: CPT

## 2021-03-13 PROCEDURE — 2580000003 HC RX 258: Performed by: SURGERY

## 2021-03-13 PROCEDURE — 6360000002 HC RX W HCPCS: Performed by: SURGERY

## 2021-03-13 PROCEDURE — 99024 POSTOP FOLLOW-UP VISIT: CPT | Performed by: SURGERY

## 2021-03-13 PROCEDURE — 36415 COLL VENOUS BLD VENIPUNCTURE: CPT

## 2021-03-13 PROCEDURE — 6370000000 HC RX 637 (ALT 250 FOR IP): Performed by: INTERNAL MEDICINE

## 2021-03-13 PROCEDURE — 80053 COMPREHEN METABOLIC PANEL: CPT

## 2021-03-13 RX ORDER — IBUPROFEN 800 MG/1
800 TABLET ORAL EVERY 6 HOURS PRN
Qty: 20 TABLET | Refills: 0 | Status: SHIPPED | OUTPATIENT
Start: 2021-03-13 | End: 2021-03-15

## 2021-03-13 RX ORDER — ONDANSETRON 4 MG/1
4 TABLET, ORALLY DISINTEGRATING ORAL EVERY 8 HOURS PRN
Qty: 30 TABLET | Refills: 0 | Status: SHIPPED | OUTPATIENT
Start: 2021-03-13 | End: 2021-09-13

## 2021-03-13 RX ORDER — POTASSIUM CHLORIDE 20 MEQ/1
40 TABLET, EXTENDED RELEASE ORAL ONCE
Status: COMPLETED | OUTPATIENT
Start: 2021-03-13 | End: 2021-03-13

## 2021-03-13 RX ORDER — IBUPROFEN 800 MG/1
800 TABLET ORAL EVERY 6 HOURS PRN
Qty: 20 TABLET | Refills: 0 | Status: SHIPPED | OUTPATIENT
Start: 2021-03-13 | End: 2021-03-13 | Stop reason: SDUPTHER

## 2021-03-13 RX ADMIN — ENOXAPARIN SODIUM 40 MG: 100 INJECTION SUBCUTANEOUS at 10:40

## 2021-03-13 RX ADMIN — POTASSIUM CHLORIDE 40 MEQ: 1500 TABLET, EXTENDED RELEASE ORAL at 11:46

## 2021-03-13 RX ADMIN — TAMSULOSIN HYDROCHLORIDE 0.4 MG: 0.4 CAPSULE ORAL at 10:41

## 2021-03-13 RX ADMIN — PANTOPRAZOLE SODIUM 40 MG: 40 TABLET, DELAYED RELEASE ORAL at 10:41

## 2021-03-13 RX ADMIN — SOTALOL HYDROCHLORIDE 40 MG: 80 TABLET ORAL at 11:46

## 2021-03-13 RX ADMIN — SODIUM CHLORIDE, POTASSIUM CHLORIDE, SODIUM LACTATE AND CALCIUM CHLORIDE: 600; 310; 30; 20 INJECTION, SOLUTION INTRAVENOUS at 07:33

## 2021-03-13 RX ADMIN — DOCUSATE SODIUM 100 MG: 100 CAPSULE, LIQUID FILLED ORAL at 10:41

## 2021-03-13 NOTE — DISCHARGE SUMMARY
Physician Discharge Summary     Sam Tellez  [id number]    Admit date: 3/11/2021    Discharge date and time: No discharge date for patient encounter. Admitting Physician: Jakob Patten MD     Admission Diagnoses: Colonic mass [K63.89]    Condition at discharge: stable   Huber Clause   Home Medication Instructions BWN:[id number]    Printed on:03/13/21 9190   Medication Information                      albuterol sulfate  (90 BASE) MCG/ACT inhaler  Inhale 2 puffs into the lungs as needed for Wheezing             amLODIPine (NORVASC) 5 MG tablet  Take 5 mg by mouth daily             aspirin 81 MG EC tablet  Take 81 mg by mouth daily             esomeprazole Magnesium (NEXIUM) 20 MG PACK  Take 20 mg by mouth daily             ferrous sulfate 325 (65 Fe) MG tablet  Take 325 mg by mouth 2 times daily              ibuprofen (ADVIL;MOTRIN) 800 MG tablet  Take 1 tablet by mouth every 6 hours as needed for Pain             lisinopril-hydroCHLOROthiazide (PRINZIDE;ZESTORETIC) 10-12.5 MG per tablet  Take 1 tablet by mouth daily             PRAVASTATIN SODIUM PO  Take 20 mg by mouth daily              sennosides-docusate sodium (SENOKOT-S) 8.6-50 MG tablet  Take 1 tablet by mouth 2 times daily             sotalol (BETAPACE) 80 MG tablet  Take 40 mg by mouth             tamsulosin (FLOMAX) 0.4 MG capsule  Take 0.4 mg by mouth daily              vitamin B-12 (CYANOCOBALAMIN) 100 MCG tablet  Take 50 mcg by mouth daily                   Hospital Course: Had uncomplicated lap right hemicolectomy and uncomplicated post operative course and was discharged tolerating a general diet, having good bowel function, ambulating independently and with adequate analgesia.     Discharge Exam: BP (!) 148/69   Pulse 83   Temp 97.7 °F (36.5 °C) (Temporal)   Resp 16   Ht 5' 7\" (1.702 m)   Wt 219 lb 9.6 oz (99.6 kg)   SpO2 96%   BMI 34.39 kg/m²     General appearance: alert, appears stated age and cooperative  Head:

## 2021-03-13 NOTE — PROGRESS NOTES
Consult for Medical Management called to Dr. Lalitha Joaquin
Contacted Dr. Faby Martin regarding blood pressure of 88/46, see new orders.
Discharge teaching done at bedside.
Dr. Kim Yarbrough notified of diffucult airway. Note reviewed from [facility] 03/10/2021. No further orders.
Educated patient on the importance of Incentive Spirometer, Patient returned demonstration of  1500, tolerated well.
GENERAL SURGERY  DAILY PROGRESS NOTE  3/12/2021    Subjective:  Patient states he is feeling ok, a little light headed and had a little nausea overnight. Became hypotensive to 80s/40s but responded with a 2L fluid bolus. He denies passing gas or BM. Hgb this morning 7.6 from 10.7. Objective:  BP (!) 105/55   Pulse 79   Temp 97.7 °F (36.5 °C) (Oral)   Resp 16   Ht 5' 7\" (1.702 m)   Wt 215 lb (97.5 kg)   SpO2 98%   BMI 33.67 kg/m²     GENERAL:  Laying in bed, awake, alert, cooperative, no apparent distress  LUNGS:  No increased work of breathing  CARDIOVASCULAR:  RR  ABDOMEN:  Soft, appropriately tender, non-distended, incisions C/D/I  : Varner w/ clear urin  EXTREMITIES: No edema or swelling  SKIN: Warm and dry    Assessment/Plan:  72 y.o. male s/p R hemicolectomy for ascending colon mass 3/11    - Advance to general diet  - Remove varner  - Pain/nausea control prn  - ambulate, OOB    Electronically signed by Fei Angulo MD on 3/12/2021 at 5:23 AM     As above  Varner out  Advance diet, mild ileus with hiccups today I discussed self limitation for his diet until hiccups resolve and flatus begins  Ambulate  Acute on chronic anemia suspect secondary to intraoperative loss but his hemoglobin was as low as 8 during his previous admission.   Suspect baseline is somewhere around 8-1/2  I discussed at length the intraoperative findings and plan to wait for pathology final diagnosis  Patient is in understanding and agrees    Serene Alvarenga MD, MS  Minimally Invasive and Bariatric Surgery  [phone] (p)  3/12/2021  7:34 AM
Notified Dr. Solis Ramirez for Hgb level of 7.6
Notified surgical residents of hemoglobin of 7.6
Nursing Transfer Note    Data:  Summary of patients progress: general anesthesia recovery    Reason for transfer: PACU discharge criteria met, transferred to next level of care. Action:  Explained reason for transfer to Patient/Family  Report given to:rn, using RN Handoff Navigator.   Mode of transportation: Cart    Response:  RN Recommendations:continuation of care and pain control
Reviewed with Dr Calixto Howard, pt cardiac hx as documented , recent hospitalization as documented in epic, cariology clearance for colonoscopy per Dr Maya Dempsey 02/18/21, [facility] notes. Per Dr Kia Lee, be sure cardiology aware of hemicolectomy and are there further cardiac needs. Lu Caceres at Dr Carline Gordon office notified of above  And need for clearance.
Ideal Body Weight     · BMI: 33.7  · Adjusted Body Weight:  ; No Adjustment   · Adjusted BMI:      · BMI Categories: Obese Class 1 (BMI 30.0-34. 9)       Nutrition Diagnosis:   · Inadequate oral intake related to catabolic GYWUDUP(5/5 Colonic Mass w/ suspected Colon CA (Bx Pend)) as evidenced by intake 0-25%, poor intake prior to admission, weight loss, nausea      Nutrition Interventions:   Food and/or Nutrient Delivery:  Continue Current Diet, Start Oral Nutrition Supplement(Continue Diet. Will Start Ensure High Nii ONS BID.)  Nutrition Education/Counseling:  No recommendation at this time   Coordination of Nutrition Care:  Continue to monitor while inpatient    Goals:  PO intake >75% of meals/ONS. Nutrition Monitoring and Evaluation:   Behavioral-Environmental Outcomes:  None Identified   Food/Nutrient Intake Outcomes:  Diet Advancement/Tolerance, Food and Nutrient Intake, Supplement Intake  Physical Signs/Symptoms Outcomes:  Biochemical Data, Chewing or Swallowing, Constipation, GI Status, Nausea or Vomiting, Fluid Status or Edema, Nutrition Focused Physical Findings, Skin, Weight     Discharge Planning:     Too soon to determine     Electronically signed by Matti Sarmiento RD, LD on 3/12/21 at 2:27 PM EST    Contact: ext [phone]
provided in accordance with the objectives noted above. Exercises and functional mobility practice will be used as well as appropriate assistive devices or modalities to obtain goals. Patient and family education will also be administered as needed. Frequency of treatments: 2-5x/week x 1-2 weeks. Time in  15:00  Time out  15:15    Evaluation Time includes thorough review of current medical information, gathering information on past medical history/social history and prior level of function, completion of standardized testing/informal observation of tasks, assessment of data and education on plan of care and goals. CPT codes:  [x] Low Complexity PT evaluation 56548  [] Moderate Complexity PT evaluation 65420  [] High Complexity PT evaluation 13924  [] PT Re-evaluation 82174  [] Gait training 28112 ** minutes  [] Manual therapy 65676 ** minutes  [] Therapeutic activities 26823 ** minutes  [] Therapeutic exercises 64412 ** minutes  [] Neuromuscular reeducation 80351 ** minutes     Chun Arambula., P.T.   License Number: [id number]

## 2021-03-13 NOTE — CONSULTS
Internal Medicine Consultation Progress Note    Name: Bertrum Saint  : 1950  Chief Complaint: Colon mass  Primary Care Physician: Mary Johnson DO  Admission date: 3/11/2021  Date of service: 3/13/2021     Subjective: Overall he feels great. Improving, wants to go home. Past Medical History:   Diagnosis Date    Arthritis     KNEES HIPS BACK    BPH (benign prostatic hyperplasia)     Coronary artery disease     Difficult airway     PT STATES HE WAS TOLD THIS TWICE AT [facility]    Difficult intubation 2017    note in care everywhere \"Clinical Summary\" 03/10/2021    Hyperlipidemia     Hypertension     Sinus tachycardia 2002       Past Surgical History:   Procedure Laterality Date    ABLATION OF DYSRHYTHMIC FOCUS      AORTA SURGERY      aortic valve replacement    AORTIC VALVE REPLACEMENT      BLADDER SURGERY Right 2021    CYSTOSCOPY BILATERAL RETROGRADE PYELOGRAM RIGHT STENT INSERTION performed by Radha Lagunas MD at [facility]  2021    COLONOSCOPY WITH BIOPSY performed by April Coon DO at [address]  2021    COLONOSCOPY W/ ENDOSCOPIC MUCOSAL RESECTION performed by April Coon DO at [address] N/A 3/11/2021    LAPAROSCOPIC RIGHT HEMICOLECTOMY performed by Dipak Dunbar MD at [address] OTHER SURGICAL HISTORY  2016    CT Myelogram, Dr. Maral Hinojosa   new battery    last checked Dr Nicolette Lanier 2016?     TONSILLECTOMY      UPPER GASTROINTESTINAL ENDOSCOPY      reflux    UPPER GASTROINTESTINAL ENDOSCOPY N/A 2021    EGD BIOPSY performed by Melvin Young MD at [facility]         Family History   Problem Relation Age of Onset    Diabetes Mother     Stroke Mother     Diabetes Father     Heart Disease Father     Brain Cancer Sister     Stroke Sister     Stroke Brother     Cancer Maternal Grandfather        Social History  Patient lives at home with his Katie Frias. Employment: Retired in December 2512  Illicit drug use- denies  TOBACCO:   reports that he quit smoking about 7 years ago. He has a 44.00 pack-year smoking history. He has never used smokeless tobacco.  He states he occasionally vapes nicotine  ETOH:   reports previous alcohol use of about 2.0 standard drinks of alcohol per week. Denies any current alcohol use    Home Medications  Prior to Admission medications    Medication Sig Start Date End Date Taking?  Authorizing Provider   ibuprofen (ADVIL;MOTRIN) 800 MG tablet Take 1 tablet by mouth every 6 hours as needed for Pain 3/13/21  Yes Shahnaz Arthur MD   ondansetron (ZOFRAN ODT) 4 MG disintegrating tablet Take 1 tablet by mouth every 8 hours as needed for Nausea or Vomiting 3/13/21  Yes Shahnaz Arthur MD   amLODIPine (NORVASC) 5 MG tablet Take 5 mg by mouth daily   Yes Historical Provider, MD   sotalol (BETAPACE) 80 MG tablet Take 40 mg by mouth 4/10/17  Yes Historical Provider, MD   albuterol sulfate  (90 BASE) MCG/ACT inhaler Inhale 2 puffs into the lungs as needed for Wheezing   Yes Historical Provider, MD   esomeprazole Magnesium (NEXIUM) 20 MG PACK Take 20 mg by mouth daily   Yes Historical Provider, MD   lisinopril-hydroCHLOROthiazide (PRINZIDE;ZESTORETIC) 10-12.5 MG per tablet Take 1 tablet by mouth daily    Historical Provider, MD   aspirin 81 MG EC tablet Take 81 mg by mouth daily    Historical Provider, MD   vitamin B-12 (CYANOCOBALAMIN) 100 MCG tablet Take 50 mcg by mouth daily    Historical Provider, MD   sennosides-docusate sodium (SENOKOT-S) 8.6-50 MG tablet Take 1 tablet by mouth 2 times daily    Historical Provider, MD   tamsulosin (FLOMAX) 0.4 MG capsule Take 0.4 mg by mouth daily  1/4/18   Historical Provider, MD   ferrous sulfate 325 (65 Fe) MG tablet Take 325 mg by mouth 2 times daily  1/8/18   Historical Provider, MD   PRAVASTATIN SODIUM PO Take 20 mg by mouth daily     Historical Provider, MD       Allergies  No Known Allergies    Review of Systems  Please see HPI above. All bolded are positive. All un-bolded are negative. Constitutional Symptoms: fever, chills, fatigue, generalized weakness, diaphoresis, increase in thirst, loss of appetite  Eyes: vision change   Ears, Nose, Mouth, Throat: hearing loss, nasal congestion, sores in the mouth  Cardiovascular: chest pain, chest heaviness, palpitations  Respiratory: shortness of breath, wheezing, coughing  Gastrointestinal: abdominal pain, nausea, vomiting, diarrhea, constipation, melena, hematochezia, hematemesis  Genitourinary: dysuria, hematuria, increased frequency  Musculoskeletal: lower extremity edema, myalgias, arthralgias, back pain  Integumentary: rashes, itching   Neurological: headache, lightheadedness, dizziness, confusion, syncope, numbness, tingling, focal weakness  Psychiatric: depression, suicidal ideation, anxiety  Endocrine: unintentional weight change  Hematologic/Lymphatic: lymphadenopathy, easy bruising, easy bleeding   Allergic/Immunologic: recurrent infections      Objective  VITALS:  BP (!) 103/53   Pulse 88   Temp 98.2 °F (36.8 °C) (Oral)   Resp 16   Ht 5' 7\" (1.702 m)   Wt 219 lb 9.6 oz (99.6 kg)   SpO2 96%   BMI 34.39 kg/m²     Physical Exam:  General: awake, alert, oriented to person, place, time, and purpose, appears stated age, cooperative, no acute distress, pleasant, appropriate mood  Eyes: conjunctivae/corneas clear, sclera non icteric, EOMI  Ears: no obvious scars, no lesions, no masses, hearing intact  Mouth: mucous membranes moist, no obvious oral sores  Head: normocephalic, atraumatic  Neck: no JVD, no adenopathy, no thyromegaly, neck is supple, trachea is midline  Back: ROM normal, no CVA tenderness. Chest: no pain on palpation, pacemaker noted left upper chest, sternotomy scar noted.   Lungs: clear to auscultation bilaterally, without rhonchi, crackle, wheezing, or rale, no retractions or use of accessory muscles  Heart: regular rate and regular rhythm, no murmur, normal S1, S2  Abdomen: post-surgical changes for right hemicolectomy, soft, non-tender; bowel sounds normal; no masses, no organomegaly  : Deferred   Extremities: no lower extremity edema, extremities atraumatic, no cyanosis, no clubbing, 2+ pedal pulses palpated  Skin: normal color, normal texture, normal turgor, no rashes, no lesions  Neurologic:5/5 muscle strength throughout, normal muscle tone throughout, face symmetric, hearing intact, tongue midline, speech appropriate without slurring, sensation to fine touch intact in upper and lower extremities    Labs-   Lab Results   Component Value Date    WBC 8.5 03/13/2021    HGB 8.3 (L) 03/13/2021    HCT 27.1 (L) 03/13/2021     03/13/2021     03/13/2021    K 3.4 (L) 03/13/2021    CL 99 03/13/2021    CREATININE 1.4 (H) 03/13/2021    BUN 15 03/13/2021    CO2 27 03/13/2021    GLUCOSE 97 03/13/2021    ALT <5 03/13/2021    AST 14 03/13/2021    INR 1.2 02/14/2021     Lab Results   Component Value Date    TROPONINI <0.01 02/14/2021       Recent Radiological Studies:  No orders to display       Assessment  Active Hospital Problems    Diagnosis    Colonic mass [K63.89]    Hypertension [I10]    Hyperlipidemia [E78.5]    Coronary artery disease [I25.10]    BPH (benign prostatic hyperplasia) [N40.0]       Patient Active Problem List    Diagnosis Date Noted    Colonic mass 03/11/2021    Hypertension     Hyperlipidemia     Coronary artery disease     Hematuria 01/16/2018    BPH (benign prostatic hyperplasia) 01/16/2018       Plan  · Ascending colon mass: General surgery admitted patient. SP right hemicolectomy on 3/11-biopsy report pending. Postoperative pain medications per general surgery. Advance diet per general surgery recommendations. · Relative hypotension, h/o HTN:  Resuming home medications.    · Acute blood loss anemia, postsurgical: Stable Follow CBC. Transfuse as needed. · Continue home medications otherwise  · PT/OT  · Follow labs  · DVT prophylaxis. · Please see orders for further management and care. ·  for discharge planning  · Discharge plan:   OK to discharge home from medicine standpoint        Electronically signed by Fei Negrete MD on 3/13/2021 at 1:34 PM    I can be reached through [employer].

## 2021-03-15 RX ORDER — IBUPROFEN 800 MG/1
800 TABLET ORAL EVERY 6 HOURS PRN
Qty: 20 TABLET | Refills: 0 | Status: SHIPPED
Start: 2021-03-15 | End: 2021-03-24

## 2021-03-24 RX ORDER — IBUPROFEN 800 MG/1
800 TABLET ORAL EVERY 6 HOURS PRN
Qty: 20 TABLET | Refills: 0 | Status: SHIPPED
Start: 2021-03-24 | End: 2021-03-26

## 2021-03-26 RX ORDER — IBUPROFEN 800 MG/1
800 TABLET ORAL EVERY 6 HOURS PRN
Qty: 20 TABLET | Refills: 0 | Status: SHIPPED
Start: 2021-03-26 | End: 2021-06-15

## 2021-03-29 ENCOUNTER — OFFICE VISIT (OUTPATIENT)
Dept: SURGERY | Age: 71
End: 2021-03-29

## 2021-03-29 ENCOUNTER — HOSPITAL ENCOUNTER (INPATIENT)
Age: 71
LOS: 2 days | Discharge: ANOTHER HOSPITAL | DRG: 683 | End: 2021-03-31
Attending: SURGERY | Admitting: SURGERY
Payer: MEDICARE

## 2021-03-29 ENCOUNTER — APPOINTMENT (OUTPATIENT)
Dept: CT IMAGING | Age: 71
DRG: 683 | End: 2021-03-29
Attending: SURGERY
Payer: MEDICARE

## 2021-03-29 VITALS
TEMPERATURE: 98.2 F | SYSTOLIC BLOOD PRESSURE: 71 MMHG | HEIGHT: 67 IN | BODY MASS INDEX: 34.37 KG/M2 | HEART RATE: 64 BPM | DIASTOLIC BLOOD PRESSURE: 49 MMHG | WEIGHT: 219 LBS

## 2021-03-29 DIAGNOSIS — C18.2 MALIGNANT NEOPLASM OF ASCENDING COLON (HCC): Primary | ICD-10-CM

## 2021-03-29 PROBLEM — R94.31 PROLONGED Q-T INTERVAL ON ECG: Status: ACTIVE | Noted: 2021-03-29

## 2021-03-29 PROBLEM — N18.30 CKD (CHRONIC KIDNEY DISEASE) STAGE 3, GFR 30-59 ML/MIN (HCC): Status: ACTIVE | Noted: 2021-03-29

## 2021-03-29 PROBLEM — I48.91 ATRIAL FIBRILLATION (HCC): Chronic | Status: ACTIVE | Noted: 2021-03-29

## 2021-03-29 PROBLEM — I95.9 HYPOTENSION: Status: ACTIVE | Noted: 2021-03-29

## 2021-03-29 PROBLEM — E86.0 DEHYDRATION: Status: ACTIVE | Noted: 2021-03-29

## 2021-03-29 LAB
ALBUMIN SERPL-MCNC: 2.4 G/DL (ref 3.5–5.2)
ALP BLD-CCNC: 73 U/L (ref 40–129)
ALT SERPL-CCNC: <5 U/L (ref 0–40)
ANION GAP SERPL CALCULATED.3IONS-SCNC: 11 MMOL/L (ref 7–16)
AST SERPL-CCNC: 11 U/L (ref 0–39)
BASOPHILS ABSOLUTE: 0.05 E9/L (ref 0–0.2)
BASOPHILS RELATIVE PERCENT: 0.6 % (ref 0–2)
BILIRUB SERPL-MCNC: 0.3 MG/DL (ref 0–1.2)
BUN BLDV-MCNC: 28 MG/DL (ref 8–23)
CALCIUM SERPL-MCNC: 8.1 MG/DL (ref 8.6–10.2)
CHLORIDE BLD-SCNC: 96 MMOL/L (ref 98–107)
CO2: 26 MMOL/L (ref 22–29)
CREAT SERPL-MCNC: 1.7 MG/DL (ref 0.7–1.2)
EOSINOPHILS ABSOLUTE: 0.18 E9/L (ref 0.05–0.5)
EOSINOPHILS RELATIVE PERCENT: 2.2 % (ref 0–6)
GFR AFRICAN AMERICAN: 48
GFR NON-AFRICAN AMERICAN: 48 ML/MIN/1.73
GLUCOSE BLD-MCNC: 95 MG/DL (ref 74–99)
HCT VFR BLD CALC: 23.9 % (ref 37–54)
HEMOGLOBIN: 7.2 G/DL (ref 12.5–16.5)
IMMATURE GRANULOCYTES #: 0.07 E9/L
IMMATURE GRANULOCYTES %: 0.9 % (ref 0–5)
LACTIC ACID: 1.8 MMOL/L (ref 0.5–2.2)
LYMPHOCYTES ABSOLUTE: 0.73 E9/L (ref 1.5–4)
LYMPHOCYTES RELATIVE PERCENT: 9 % (ref 20–42)
MAGNESIUM: 1.9 MG/DL (ref 1.6–2.6)
MCH RBC QN AUTO: 24.7 PG (ref 26–35)
MCHC RBC AUTO-ENTMCNC: 30.1 % (ref 32–34.5)
MCV RBC AUTO: 81.8 FL (ref 80–99.9)
MONOCYTES ABSOLUTE: 0.88 E9/L (ref 0.1–0.95)
MONOCYTES RELATIVE PERCENT: 10.8 % (ref 2–12)
NEUTROPHILS ABSOLUTE: 6.23 E9/L (ref 1.8–7.3)
NEUTROPHILS RELATIVE PERCENT: 76.5 % (ref 43–80)
PDW BLD-RTO: 15 FL (ref 11.5–15)
PLATELET # BLD: 293 E9/L (ref 130–450)
PMV BLD AUTO: 9.4 FL (ref 7–12)
POTASSIUM REFLEX MAGNESIUM: 4.6 MMOL/L (ref 3.5–5)
RBC # BLD: 2.92 E12/L (ref 3.8–5.8)
SODIUM BLD-SCNC: 133 MMOL/L (ref 132–146)
TOTAL PROTEIN: 6.1 G/DL (ref 6.4–8.3)
WBC # BLD: 8.1 E9/L (ref 4.5–11.5)

## 2021-03-29 PROCEDURE — 85025 COMPLETE CBC W/AUTO DIFF WBC: CPT

## 2021-03-29 PROCEDURE — 83605 ASSAY OF LACTIC ACID: CPT

## 2021-03-29 PROCEDURE — 83735 ASSAY OF MAGNESIUM: CPT

## 2021-03-29 PROCEDURE — 99024 POSTOP FOLLOW-UP VISIT: CPT | Performed by: SURGERY

## 2021-03-29 PROCEDURE — 2580000003 HC RX 258: Performed by: INTERNAL MEDICINE

## 2021-03-29 PROCEDURE — 6360000002 HC RX W HCPCS: Performed by: SURGERY

## 2021-03-29 PROCEDURE — 2580000003 HC RX 258: Performed by: SURGERY

## 2021-03-29 PROCEDURE — 74177 CT ABD & PELVIS W/CONTRAST: CPT

## 2021-03-29 PROCEDURE — 36415 COLL VENOUS BLD VENIPUNCTURE: CPT

## 2021-03-29 PROCEDURE — 80053 COMPREHEN METABOLIC PANEL: CPT

## 2021-03-29 PROCEDURE — 6360000004 HC RX CONTRAST MEDICATION: Performed by: RADIOLOGY

## 2021-03-29 PROCEDURE — C9113 INJ PANTOPRAZOLE SODIUM, VIA: HCPCS | Performed by: SURGERY

## 2021-03-29 PROCEDURE — 93005 ELECTROCARDIOGRAM TRACING: CPT | Performed by: INTERNAL MEDICINE

## 2021-03-29 PROCEDURE — 1200000000 HC SEMI PRIVATE

## 2021-03-29 RX ORDER — ONDANSETRON 4 MG/1
4 TABLET, ORALLY DISINTEGRATING ORAL EVERY 8 HOURS PRN
Status: DISCONTINUED | OUTPATIENT
Start: 2021-03-29 | End: 2021-03-29

## 2021-03-29 RX ORDER — SODIUM CHLORIDE, SODIUM LACTATE, POTASSIUM CHLORIDE, CALCIUM CHLORIDE 600; 310; 30; 20 MG/100ML; MG/100ML; MG/100ML; MG/100ML
INJECTION, SOLUTION INTRAVENOUS CONTINUOUS
Status: DISCONTINUED | OUTPATIENT
Start: 2021-03-29 | End: 2021-03-31 | Stop reason: HOSPADM

## 2021-03-29 RX ORDER — SODIUM CHLORIDE, SODIUM LACTATE, POTASSIUM CHLORIDE, AND CALCIUM CHLORIDE .6; .31; .03; .02 G/100ML; G/100ML; G/100ML; G/100ML
1000 INJECTION, SOLUTION INTRAVENOUS ONCE
Status: COMPLETED | OUTPATIENT
Start: 2021-03-29 | End: 2021-03-29

## 2021-03-29 RX ORDER — SODIUM CHLORIDE 0.9 % (FLUSH) 0.9 %
10 SYRINGE (ML) INJECTION EVERY 12 HOURS SCHEDULED
Status: DISCONTINUED | OUTPATIENT
Start: 2021-03-29 | End: 2021-03-31 | Stop reason: HOSPADM

## 2021-03-29 RX ORDER — SODIUM CHLORIDE 9 MG/ML
25 INJECTION, SOLUTION INTRAVENOUS PRN
Status: DISCONTINUED | OUTPATIENT
Start: 2021-03-29 | End: 2021-03-31 | Stop reason: HOSPADM

## 2021-03-29 RX ORDER — SODIUM CHLORIDE 0.9 % (FLUSH) 0.9 %
10 SYRINGE (ML) INJECTION PRN
Status: DISCONTINUED | OUTPATIENT
Start: 2021-03-29 | End: 2021-03-31 | Stop reason: HOSPADM

## 2021-03-29 RX ORDER — OXYCODONE HYDROCHLORIDE 5 MG/1
5 TABLET ORAL EVERY 4 HOURS PRN
Status: DISCONTINUED | OUTPATIENT
Start: 2021-03-29 | End: 2021-03-31 | Stop reason: HOSPADM

## 2021-03-29 RX ORDER — OXYCODONE HYDROCHLORIDE 5 MG/1
10 TABLET ORAL EVERY 4 HOURS PRN
Status: DISCONTINUED | OUTPATIENT
Start: 2021-03-29 | End: 2021-03-31 | Stop reason: HOSPADM

## 2021-03-29 RX ORDER — ACETAMINOPHEN 325 MG/1
650 TABLET ORAL EVERY 4 HOURS PRN
Status: DISCONTINUED | OUTPATIENT
Start: 2021-03-29 | End: 2021-03-31 | Stop reason: HOSPADM

## 2021-03-29 RX ORDER — PANTOPRAZOLE SODIUM 40 MG/10ML
40 INJECTION, POWDER, LYOPHILIZED, FOR SOLUTION INTRAVENOUS DAILY
Status: DISCONTINUED | OUTPATIENT
Start: 2021-03-29 | End: 2021-03-31 | Stop reason: HOSPADM

## 2021-03-29 RX ORDER — SOTALOL HYDROCHLORIDE 80 MG/1
40 TABLET ORAL 2 TIMES DAILY
Status: DISCONTINUED | OUTPATIENT
Start: 2021-03-29 | End: 2021-03-31 | Stop reason: HOSPADM

## 2021-03-29 RX ORDER — ONDANSETRON 2 MG/ML
4 INJECTION INTRAMUSCULAR; INTRAVENOUS EVERY 6 HOURS PRN
Status: DISCONTINUED | OUTPATIENT
Start: 2021-03-29 | End: 2021-03-29

## 2021-03-29 RX ORDER — SODIUM CHLORIDE 9 MG/ML
10 INJECTION INTRAVENOUS DAILY
Status: DISCONTINUED | OUTPATIENT
Start: 2021-03-29 | End: 2021-03-31 | Stop reason: HOSPADM

## 2021-03-29 RX ADMIN — SODIUM CHLORIDE, POTASSIUM CHLORIDE, SODIUM LACTATE AND CALCIUM CHLORIDE 1000 ML: 600; 310; 30; 20 INJECTION, SOLUTION INTRAVENOUS at 18:30

## 2021-03-29 RX ADMIN — IOPAMIDOL 75 ML: 755 INJECTION, SOLUTION INTRAVENOUS at 22:27

## 2021-03-29 RX ADMIN — IOHEXOL 50 ML: 240 INJECTION, SOLUTION INTRATHECAL; INTRAVASCULAR; INTRAVENOUS; ORAL at 22:26

## 2021-03-29 RX ADMIN — Medication 10 ML: at 18:25

## 2021-03-29 RX ADMIN — ENOXAPARIN SODIUM 40 MG: 40 INJECTION SUBCUTANEOUS at 20:08

## 2021-03-29 RX ADMIN — PANTOPRAZOLE SODIUM 40 MG: 40 INJECTION, POWDER, FOR SOLUTION INTRAVENOUS at 20:08

## 2021-03-29 RX ADMIN — SODIUM CHLORIDE, PRESERVATIVE FREE 10 ML: 5 INJECTION INTRAVENOUS at 20:08

## 2021-03-29 RX ADMIN — SODIUM CHLORIDE, POTASSIUM CHLORIDE, SODIUM LACTATE AND CALCIUM CHLORIDE: 600; 310; 30; 20 INJECTION, SOLUTION INTRAVENOUS at 20:07

## 2021-03-29 NOTE — LETTER
Jose Randhawa accompanied Karol White to the emergency department on 3/29/2021 where he was admitted to [facility] until further notice. If you have any questions or concerns, please don't hesitate to call.       [address]

## 2021-03-29 NOTE — PROGRESS NOTES
Sent perfect serv to DR. Micah Cantu on call.   Patient is a Direct Admit   Need admission orders per RN TM

## 2021-03-29 NOTE — PROGRESS NOTES
Surgery Progress Note            Chief complaint:   Chief Complaint   Patient presents with    Post-Op Check    Nausea    Dizziness      Patient Active Problem List   Diagnosis    Hematuria    BPH (benign prostatic hyperplasia)    Colonic mass    Hypertension    Hyperlipidemia    Coronary artery disease       S: having increased pain and fatigue, not eating    O:   Vitals:    03/29/21 1447   BP: (!) 71/49   Pulse: 64   Temp: 98.2 °F (36.8 °C)     No intake or output data in the 24 hours ending 03/29/21 1509        Labs:  Lab Results   Component Value Date    WBC 8.5 03/13/2021    WBC 9.2 03/12/2021    WBC 12.3 03/11/2021    HGB 8.3 03/13/2021    HGB 8.2 03/12/2021    HGB 7.6 03/12/2021    HCT 27.1 03/13/2021    HCT 27.0 03/12/2021    HCT 25.7 03/12/2021     Lab Results   Component Value Date    CREATININE 1.4 (H) 03/13/2021    BUN 15 03/13/2021     03/13/2021    K 3.4 (L) 03/13/2021    CL 99 03/13/2021    CO2 27 03/13/2021     No results found for: LIPASE, AMYLASE      Physical exam:   BP (!) 71/49   Pulse 64   Temp 98.2 °F (36.8 °C) (Temporal)   Ht 5' 7\" (1.702 m)   Wt 219 lb (99.3 kg)   BMI 34.30 kg/m²   General appearance: NAD  Head: NCAT  Neck: supple, no masses  Lungs: equal chest rise bilateral  Heart: S1S2 present  Abdomen: soft, nontender, nondistended,  Incisions clean and intact  Skin; no lesions  Gu: no cva tenderness  Extremities: extremities normal, atraumatic, no cyanosis or edema    A:  1. Malignant neoplasm of ascending colon (HCC)       P: will have patient admitted to hospital for labs and CT and oncology evaluation.     Hunter Peter MD  3/29/2021

## 2021-03-30 ENCOUNTER — APPOINTMENT (OUTPATIENT)
Dept: GENERAL RADIOLOGY | Age: 71
DRG: 683 | End: 2021-03-30
Attending: SURGERY
Payer: MEDICARE

## 2021-03-30 PROBLEM — C83.30 DIFFUSE LARGE B-CELL LYMPHOMA (HCC): Status: ACTIVE | Noted: 2021-03-30

## 2021-03-30 PROBLEM — N18.30 CKD (CHRONIC KIDNEY DISEASE) STAGE 3, GFR 30-59 ML/MIN (HCC): Chronic | Status: ACTIVE | Noted: 2021-03-29

## 2021-03-30 PROBLEM — I48.0 PAROXYSMAL ATRIAL FIBRILLATION (HCC): Status: ACTIVE | Noted: 2021-03-29

## 2021-03-30 LAB
ALBUMIN SERPL-MCNC: 2.6 G/DL (ref 3.5–5.2)
ALP BLD-CCNC: 73 U/L (ref 40–129)
ALT SERPL-CCNC: <5 U/L (ref 0–40)
ANION GAP SERPL CALCULATED.3IONS-SCNC: 11 MMOL/L (ref 7–16)
AST SERPL-CCNC: 13 U/L (ref 0–39)
BASOPHILS ABSOLUTE: 0.07 E9/L (ref 0–0.2)
BASOPHILS RELATIVE PERCENT: 0.8 % (ref 0–2)
BILIRUB SERPL-MCNC: 0.3 MG/DL (ref 0–1.2)
BILIRUBIN DIRECT: <0.2 MG/DL (ref 0–0.3)
BILIRUBIN, INDIRECT: ABNORMAL MG/DL (ref 0–1)
BUN BLDV-MCNC: 26 MG/DL (ref 8–23)
CALCIUM SERPL-MCNC: 8.9 MG/DL (ref 8.6–10.2)
CHLORIDE BLD-SCNC: 95 MMOL/L (ref 98–107)
CO2: 27 MMOL/L (ref 22–29)
CREAT SERPL-MCNC: 1.6 MG/DL (ref 0.7–1.2)
EKG ATRIAL RATE: 75 BPM
EKG ATRIAL RATE: 76 BPM
EKG P AXIS: 77 DEGREES
EKG P AXIS: 89 DEGREES
EKG P-R INTERVAL: 146 MS
EKG P-R INTERVAL: 158 MS
EKG Q-T INTERVAL: 420 MS
EKG Q-T INTERVAL: 458 MS
EKG QRS DURATION: 78 MS
EKG QRS DURATION: 80 MS
EKG QTC CALCULATION (BAZETT): 472 MS
EKG QTC CALCULATION (BAZETT): 511 MS
EKG R AXIS: 86 DEGREES
EKG R AXIS: 86 DEGREES
EKG T AXIS: 62 DEGREES
EKG T AXIS: 62 DEGREES
EKG VENTRICULAR RATE: 75 BPM
EKG VENTRICULAR RATE: 76 BPM
EOSINOPHILS ABSOLUTE: 0.25 E9/L (ref 0.05–0.5)
EOSINOPHILS RELATIVE PERCENT: 2.9 % (ref 0–6)
GFR AFRICAN AMERICAN: 52
GFR NON-AFRICAN AMERICAN: 52 ML/MIN/1.73
GLUCOSE BLD-MCNC: 79 MG/DL (ref 74–99)
HCT VFR BLD CALC: 27.5 % (ref 37–54)
HEMOGLOBIN: 8.4 G/DL (ref 12.5–16.5)
IMMATURE GRANULOCYTES #: 0.07 E9/L
IMMATURE GRANULOCYTES %: 0.8 % (ref 0–5)
LYMPHOCYTES ABSOLUTE: 0.84 E9/L (ref 1.5–4)
LYMPHOCYTES RELATIVE PERCENT: 9.8 % (ref 20–42)
MCH RBC QN AUTO: 24.6 PG (ref 26–35)
MCHC RBC AUTO-ENTMCNC: 30.5 % (ref 32–34.5)
MCV RBC AUTO: 80.6 FL (ref 80–99.9)
MONOCYTES ABSOLUTE: 1 E9/L (ref 0.1–0.95)
MONOCYTES RELATIVE PERCENT: 11.7 % (ref 2–12)
NEUTROPHILS ABSOLUTE: 6.31 E9/L (ref 1.8–7.3)
NEUTROPHILS RELATIVE PERCENT: 74 % (ref 43–80)
PDW BLD-RTO: 14.9 FL (ref 11.5–15)
PLATELET # BLD: 321 E9/L (ref 130–450)
PMV BLD AUTO: 9.8 FL (ref 7–12)
POTASSIUM REFLEX MAGNESIUM: 4.7 MMOL/L (ref 3.5–5)
RBC # BLD: 3.41 E12/L (ref 3.8–5.8)
SODIUM BLD-SCNC: 133 MMOL/L (ref 132–146)
TOTAL PROTEIN: 6.1 G/DL (ref 6.4–8.3)
WBC # BLD: 8.5 E9/L (ref 4.5–11.5)

## 2021-03-30 PROCEDURE — 80076 HEPATIC FUNCTION PANEL: CPT

## 2021-03-30 PROCEDURE — 36415 COLL VENOUS BLD VENIPUNCTURE: CPT

## 2021-03-30 PROCEDURE — 80048 BASIC METABOLIC PNL TOTAL CA: CPT

## 2021-03-30 PROCEDURE — 99222 1ST HOSP IP/OBS MODERATE 55: CPT | Performed by: SURGERY

## 2021-03-30 PROCEDURE — 74018 RADEX ABDOMEN 1 VIEW: CPT

## 2021-03-30 PROCEDURE — 6360000002 HC RX W HCPCS: Performed by: SURGERY

## 2021-03-30 PROCEDURE — 1200000000 HC SEMI PRIVATE

## 2021-03-30 PROCEDURE — 99223 1ST HOSP IP/OBS HIGH 75: CPT | Performed by: INTERNAL MEDICINE

## 2021-03-30 PROCEDURE — 2580000003 HC RX 258: Performed by: SURGERY

## 2021-03-30 PROCEDURE — 93010 ELECTROCARDIOGRAM REPORT: CPT | Performed by: INTERNAL MEDICINE

## 2021-03-30 PROCEDURE — C9113 INJ PANTOPRAZOLE SODIUM, VIA: HCPCS | Performed by: SURGERY

## 2021-03-30 PROCEDURE — 93005 ELECTROCARDIOGRAM TRACING: CPT | Performed by: NURSE PRACTITIONER

## 2021-03-30 PROCEDURE — APPSS180 APP SPLIT SHARED TIME > 60 MINUTES: Performed by: NURSE PRACTITIONER

## 2021-03-30 PROCEDURE — 6370000000 HC RX 637 (ALT 250 FOR IP): Performed by: SURGERY

## 2021-03-30 PROCEDURE — 85025 COMPLETE CBC W/AUTO DIFF WBC: CPT

## 2021-03-30 RX ORDER — LANOLIN ALCOHOL/MO/W.PET/CERES
4.5 CREAM (GRAM) TOPICAL NIGHTLY PRN
Status: DISCONTINUED | OUTPATIENT
Start: 2021-03-30 | End: 2021-03-31 | Stop reason: HOSPADM

## 2021-03-30 RX ADMIN — SODIUM CHLORIDE, PRESERVATIVE FREE 10 ML: 5 INJECTION INTRAVENOUS at 08:07

## 2021-03-30 RX ADMIN — PANTOPRAZOLE SODIUM 40 MG: 40 INJECTION, POWDER, FOR SOLUTION INTRAVENOUS at 08:05

## 2021-03-30 RX ADMIN — ACETAMINOPHEN 650 MG: 325 TABLET ORAL at 17:31

## 2021-03-30 RX ADMIN — Medication 4.5 MG: at 21:00

## 2021-03-30 RX ADMIN — Medication 10 ML: at 08:05

## 2021-03-30 RX ADMIN — ENOXAPARIN SODIUM 40 MG: 40 INJECTION SUBCUTANEOUS at 21:00

## 2021-03-30 ASSESSMENT — PAIN DESCRIPTION - LOCATION: LOCATION: ABDOMEN;FLANK

## 2021-03-30 ASSESSMENT — PAIN SCALES - GENERAL
PAINLEVEL_OUTOF10: 8
PAINLEVEL_OUTOF10: 9

## 2021-03-30 NOTE — CONSULTS
LAD. RHS consistent with Severe pulmonary venous HTN. 8. 4/2017 CABG x 1 LIMA to LAD with AVR (Triflecta prosthetic #21). MVR (Bicor #29), MAZE and LAAC at [facility]  9. PAF ? diagnosis 2017. On Sotalol  10. [address] with Xarelto  11. Patient denies any DCCV  12. 12/14/2020 TTE [facility]: EF 66%. MVR with Peak gradient 21 and Mean gradient 6. AVR with P gradient 34 and M gradient 19 with DI 0.36  13. 12/14/2020 Lexiscan MPS; Mild ischemia in LAD territory (<10%). EF 71%. Low risk scan  14. 2/17/2021 R hydronephrosis  S/p R renal stenting  15. 2/19/2021 Colonoscopy; obstructing mass in ascending colon  16. COVDI Vaccine completed 2/2021  17. 3/14/2021 R hemicolectomy--> B-cell lymphoma        Medications Prior to admit:  Prior to Admission medications    Medication Sig Start Date End Date Taking?  Authorizing Provider   ibuprofen (ADVIL;MOTRIN) 800 MG tablet TAKE 1 TABLET BY MOUTH EVERY 6 HOURS AS NEEDED FOR PAIN 3/26/21  Yes Silvia Babcock MD   ondansetron (ZOFRAN ODT) 4 MG disintegrating tablet Take 1 tablet by mouth every 8 hours as needed for Nausea or Vomiting 3/13/21  Yes Silvia Babcock MD   lisinopril-hydroCHLOROthiazide (PRINZIDE;ZESTORETIC) 10-12.5 MG per tablet Take 1 tablet by mouth daily   Yes Historical Provider, MD   amLODIPine (NORVASC) 5 MG tablet Take 5 mg by mouth daily   Yes Historical Provider, MD   aspirin 81 MG EC tablet Take 81 mg by mouth daily   Yes Historical Provider, MD   vitamin B-12 (CYANOCOBALAMIN) 100 MCG tablet Take 50 mcg by mouth daily   Yes Historical Provider, MD   sennosides-docusate sodium (SENOKOT-S) 8.6-50 MG tablet Take 1 tablet by mouth 2 times daily   Yes Historical Provider, MD   sotalol (BETAPACE) 80 MG tablet Take 40 mg by mouth 4/10/17  Yes Historical Provider, MD   tamsulosin (FLOMAX) 0.4 MG capsule Take 0.4 mg by mouth daily  1/4/18  Yes Historical Provider, MD   ferrous sulfate 325 (65 Fe) MG tablet Take 325 mg by mouth 2 times daily  1/8/18  Yes Historical Provider, MD esomeprazole Magnesium (NEXIUM) 20 MG PACK Take 20 mg by mouth daily   Yes Historical Provider, MD   PRAVASTATIN SODIUM PO Take 20 mg by mouth daily    Yes Historical Provider, MD   albuterol sulfate  (90 BASE) MCG/ACT inhaler Inhale 2 puffs into the lungs as needed for Wheezing    Historical Provider, MD       Current Medications:    Current Facility-Administered Medications: melatonin tablet 4.5 mg, 4.5 mg, Oral, Nightly PRN  perflutren lipid microspheres (DEFINITY) injection 1.65 mg, 1.5 mL, Intravenous, ONCE PRN  lactated ringers infusion, , Intravenous, Continuous  sodium chloride flush 0.9 % injection 10 mL, 10 mL, Intravenous, 2 times per day  sodium chloride flush 0.9 % injection 10 mL, 10 mL, Intravenous, PRN  0.9 % sodium chloride infusion, 25 mL, Intravenous, PRN  pantoprazole (PROTONIX) injection 40 mg, 40 mg, Intravenous, Daily **AND** sodium chloride (PF) 0.9 % injection 10 mL, 10 mL, Intravenous, Daily  enoxaparin (LOVENOX) injection 40 mg, 40 mg, Subcutaneous, Daily  oxyCODONE (ROXICODONE) immediate release tablet 5 mg, 5 mg, Oral, Q4H PRN **OR** oxyCODONE (ROXICODONE) immediate release tablet 10 mg, 10 mg, Oral, Q4H PRN  acetaminophen (TYLENOL) tablet 650 mg, 650 mg, Oral, Q4H PRN  [Held by provider] sotalol (BETAPACE) tablet 40 mg, 40 mg, Oral, BID  trimethobenzamide (TIGAN) injection 200 mg, 200 mg, Intramuscular, Q6H PRN    Allergies:  NKDA per patient report    Social History:    25 pack years quit in 2014  Denies ETOH  Using friend's marijuana gummies over last couple of weeks  Caffeine: denies  Activity: Lives with wife and 22 yo step daughter in 1 story home with basement. Unsteady gait since surgery uses golf umbrella when out walking  Code Status: full Code      Family History: Non contributory secondary to age      REVIEW OF SYSTEMS:     · Constitutional: + fatigue, weight loss.  Denies fevers, chills or night sweats  · Eyes: Denies visual changes or drainage  · ENT: Denies person, place and time. Speech clear and appropriate. Follows all commands. Flat affect     DATA:    ECG as per Dr Adal Sorensen interpretation  Tele strips: non monitored floor    Diagnostic:    CT Abdomen/Pelvis W IV 3/30/2021: Extensive new/worsened lymphadenopathy throughout the retroperitoneum, in the mesentery and celiac region. Kreg Bull is also large mass in the iliac region of the pelvis which is larger as compared to prior.  Findings likely relate to provided history of lymphoma. Innumerable masses involving the small bowel which are new since the prior.  This also probably reflects lymphoma. Short segment intussusception involving small bowel in the anterior left   abdomen.  No associated obstruction. Worsened right hydronephrosis. This likely indicates malfunction of the right ureteral stent.  Note that the pelvic mass indicated above surrounds the distal right ureter/stent. New 11 mm pleural base nodule in the left lower lobe could be related to   metastatic disease/malignancy. KUB 3/30/3021:Right ureteral stent in place with no calcification noted along its course. Residual contrast in nondilated colon     Intake/Output Summary (Last 24 hours) at 3/30/2021 1847  Last data filed at 3/30/2021 0600  Gross per 24 hour   Intake 2100 ml   Output 1600 ml   Net 500 ml       Labs:   CBC:   Recent Labs     03/29/21  1855 03/30/21  0443   WBC 8.1 8.5   HGB 7.2* 8.4*   HCT 23.9* 27.5*    321     BMP:   Recent Labs     03/29/21  1855 03/30/21  0443    133   K 4.6 4.7   CO2 26 27   BUN 28* 26*   CREATININE 1.7* 1.6*   LABGLOM 48 52   CALCIUM 8.1* 8.9     Mag:   Recent Labs     03/29/21  1855   MG 1.9       FASTING LIPID PANEL:  Lab Results   Component Value Date    CHOL 136 01/28/2011     LIVER PROFILE:  Recent Labs     03/29/21  1855 03/30/21  0443   AST 11 13   ALT <5 <5   LABALBU 2.4* 2.6*   Electronically signed by Samantha Arita.  NICK Umanzor on 3/30/2021 at 6:47 PM     The above documentation has been prepared under my direction and personally reviewed by me in its entirety. I confirm that the note above accurately reflects all work, treatment, procedures, and medical decision making performed by me.     The patient's history was independently obtained. The patient was independently examined. Electrocardiogram, prior and present cardiovascular assessment, and laboratory studies were reviewed.     The patient is a 78-year-old black male with no previous association to [address] [place] [address] Cardiology and previous cardiovascular care predominately provided by [facility] as well as that of [address] [place] [address]. The details of his prior cardiovascular care are incompletely available for review with previous combined surgical revascularization inclusive of a left internal mammary graft to left anterior descending and bioprosthetic aortic and mitral valve replacement with associated modified Maze procedure and left atrial appendage occlusion. He additionally has a history of paroxysmal atrial fibrillation with associated sinus node dysfunction and previous placement of a dual-chamber pacemaker. He has most recently undergone objective assessment inclusive of a gated vasodilator myocardial perfusion imaging study in December, 2020 suggesting evidence of mild ischemia of the left anterior sending coronary distribution in the face of normal left ventricular systolic function on echocardiogram demonstrating evidence of normal left ventricular systolic function with appropriate function of both his mitral and aortic bioprosthesis with mean gradients of 6 and 19 mmHg respectively. He additionally has a previous history of paroxysmal atrial fibrillation presently maintained on sotalol. He had recently been hospitalized for abdominal discomfort with surgical resection of a colonic mass with pathology demonstrating evidence of a B-cell lymphoma with pending oncology evaluation.   His present hospitalization was prompted by follow-up with his surgeon at which time nausea, anorexia and lightheadedness were noted with additional evaluation recommended and time of emergency room evaluation evidence of a progressive anemia as well as that of a component of acute kidney injury and hypoalbuminemia were noted. He was initially noted to be hypotensive with subsequent stabilization with fluid administration. He denies any active cardiovascular symptoms of anginal-like chest discomfort or other ischemic equivalents, decompensated left ventricular systolic dysfunction or volume overload and most recent pacemaker interrogation within the past 2 months demonstrated no evidence of atrial arrhythmias. A resting electrocardiogram time evaluation reviewed at the time of his assessment demonstrates evidence sinus rhythm occasional supraventricular ectopy with a prolonged corrected QT interval approximately 510 ms increased from that of his most recent electrocardiographic tracing.     At time of his evaluation, his medications and allergies were reviewed as well as that of his past medical history and review of systems as documented.     On examination, he presently appears in no discomfort nor distress and is hemodynamically stable with vital signs as documented. Jugular venous pressure appears normal with no identified carotid bruits. Lung fields are clear to auscultation. Cardiac examination is no for a regular rate and rhythm with no gallop rhythm and the presence of an early peaking systolic ejection murmur at the right upper sternal border. A benign abdominal examination is present no peripheral edema identified.     Diagnostic Assessment and Plan: On a clinical basis, the patient presently appears compensated from a cardiovascular standpoint with no significant atrial arrhythmias.   On this basis in view of the extremely low dose of his sotalol, its discontinuation appears appropriate with ongoing arrhythmia monitoring in part completed in conjunction with follow-up with his permanent pacemaker. At the time of his present evaluation, no additional cardiovascular assessment he is indicated and we will further evaluate him during hospitalization should additional cardiovascular difficulties or concerns arise with additional management deferred to primary care as well as that of the surgical service and oncology service. On long-term basis, continued aggressive risk factor modification of blood pressure and serum lipids remain essential to reducing risk of future atherosclerotic development as well as that of appropriate antibiotic prophylaxis time of all dental interventions to reduce risk of bacterial endocarditis. Arrangements will be made for outpatient cardiovascular follow-up following his hospital discharge including management of his permanent pacemaker.     Thank you for allowing me to participate in your patient's care. Please feel free to contact me if you have any questions or concerns.     Jerson Nelson, [address]

## 2021-03-30 NOTE — H&P
 TONSILLECTOMY      UPPER GASTROINTESTINAL ENDOSCOPY      reflux    UPPER GASTROINTESTINAL ENDOSCOPY N/A 2/16/2021    EGD BIOPSY performed by Jakub Gonzalez MD at [facility]          Medications Prior to Admission     Prior to Admission medications    Medication Sig Start Date End Date Taking? Authorizing Provider   ibuprofen (ADVIL;MOTRIN) 800 MG tablet TAKE 1 TABLET BY MOUTH EVERY 6 HOURS AS NEEDED FOR PAIN 3/26/21  Yes Stephanie Carlos MD   ondansetron (ZOFRAN ODT) 4 MG disintegrating tablet Take 1 tablet by mouth every 8 hours as needed for Nausea or Vomiting 3/13/21  Yes Stephanie Carlos MD   lisinopril-hydroCHLOROthiazide (PRINZIDE;ZESTORETIC) 10-12.5 MG per tablet Take 1 tablet by mouth daily   Yes Historical Provider, MD   amLODIPine (NORVASC) 5 MG tablet Take 5 mg by mouth daily   Yes Historical Provider, MD   aspirin 81 MG EC tablet Take 81 mg by mouth daily   Yes Historical Provider, MD   vitamin B-12 (CYANOCOBALAMIN) 100 MCG tablet Take 50 mcg by mouth daily   Yes Historical Provider, MD   sennosides-docusate sodium (SENOKOT-S) 8.6-50 MG tablet Take 1 tablet by mouth 2 times daily   Yes Historical Provider, MD   sotalol (BETAPACE) 80 MG tablet Take 40 mg by mouth 4/10/17  Yes Historical Provider, MD   tamsulosin (FLOMAX) 0.4 MG capsule Take 0.4 mg by mouth daily  1/4/18  Yes Historical Provider, MD   ferrous sulfate 325 (65 Fe) MG tablet Take 325 mg by mouth 2 times daily  1/8/18  Yes Historical Provider, MD   esomeprazole Magnesium (NEXIUM) 20 MG PACK Take 20 mg by mouth daily   Yes Historical Provider, MD   PRAVASTATIN SODIUM PO Take 20 mg by mouth daily    Yes Historical Provider, MD   albuterol sulfate  (90 BASE) MCG/ACT inhaler Inhale 2 puffs into the lungs as needed for Wheezing    Historical Provider, MD        Allergies   Patient has no known allergies.     Social History     Social History     Tobacco History     Smoking Status  Former Smoker Quit date  3/4/2014 Smoking Frequency  1 pack/day for 44 years (40 pk yrs)    Smokeless Tobacco Use  Never Used          Alcohol History     Alcohol Use Status  Not Currently Drinks/Week  2 Shots of liquor per week Amount  2.0 standard drinks of alcohol/wk Comment  month          Drug Use     Drug Use Status  No          Sexual Activity     Sexually Active  Not Asked                Family History     Family History   Problem Relation Age of Onset    Diabetes Mother     Stroke Mother     Diabetes Father     Heart Disease Father     Brain Cancer Sister     Stroke Sister     Stroke Brother     Cancer Maternal Grandfather        Review of Systems   Pertinent ROS listed in HPI, all others negative    Physical Exam   BP (!) 91/55   Pulse 88   Temp 97.8 °F (36.6 °C) (Oral)   Resp 19   Ht 5' 7\" (1.702 m)   Wt 200 lb 1.6 oz (90.8 kg)   SpO2 98%   BMI 31.34 kg/m²     GENERAL:  No acute distress. Alert and conversational.   HEAD:  Normocephalic, atraumatic. EYES:  No scleral icterus. Conjugate gaze. ENT/NECK:  Trachea midline, mucous membranes dry. LUNGS:  No cough. Nonlabored breathing on room air. CARDIOVASC:  Normal rate, no cyanosis. ABDOMEN:  Soft, non-distended, non-tender. No guarding / rigidity / rebound. LIMBS:  No deformities, no edema. NEURO:  Face symmetric, moves all extremities  SKIN:  Warm, dry. Labs    CBC  Recent Labs     03/30/21  0443   WBC 8.5   HGB 8.4*   HCT 27.5*        BMP  Recent Labs     03/29/21  1855      K 4.6   CL 96*   CO2 26   BUN 28*   CREATININE 1.7*   CALCIUM 8.1*     Liver Function  Recent Labs     03/29/21  1855   BILITOT 0.3   AST 11   ALT <5   ALKPHOS 73   PROT 6.1*   LABALBU 2.4*     No results for input(s): LACTATE in the last 72 hours. No results for input(s): INR, PTT in the last 72 hours.     Invalid input(s): PT    Imaging/Diagnostics Last 24 Hours   Ct Abdomen Pelvis W Iv Contrast Additional Contrast? Oral    Result Date: 3/30/2021  EXAMINATION: CT OF THE ABDOMEN AND PELVIS WITH CONTRAST 3/29/2021 10:28 pm TECHNIQUE: CT of the abdomen and pelvis was performed with the administration of intravenous contrast. Multiplanar reformatted images are provided for review. Dose modulation, iterative reconstruction, and/or weight based adjustment of the mA/kV was utilized to reduce the radiation dose to as low as reasonably achievable. COMPARISON: 02/18/2021 HISTORY: ORDERING SYSTEM PROVIDED HISTORY: eval postop right hemicolectomy for lymphoma, rule out leak, dehydration TECHNOLOGIST PROVIDED HISTORY: Reason for exam:->eval postop right hemicolectomy for lymphoma, rule out leak, dehydration Additional Contrast?->Oral FINDINGS: Lower Chest: There is pleural base nodularity in the left lower lobe which is not seen on the prior. I cannot exclude a metastatic lesion. This measures about 11 mm. Abdomen: The liver, spleen, pancreas, adrenal glands and left kidney are unremarkable. There is moderate right hydronephrosis which has worsened. There is a right ureteral stent present. This extends into the bladder. Worsening hydronephrosis suggest stent malfunction. There are innumerable nodular masslike areas of density involving small bowel loops. Findings are consistent with multiple masses. There is a short segment small bowel intussusception in the anterior left abdomen. There are no findings of intestinal obstruction. Patient is status post right hemicolectomy. There is extensive worsened lymphadenopathy in the retroperitoneum, celiac region, mesentery. There are aortoiliac atherosclerotic calcification. There is no abdominal aortic aneurysm. There is no free intraperitoneal air. There is no abnormal fluid collection. Pelvis:  Bladder is unremarkable in appearance. There is a large mass in the right iliac region surrounding the right ureter/stent. This is probably lymphadenopathy given patient's history of lymphoma. Bones/Soft Tissues: No acute abnormality     1. Extensive new/worsened lymphadenopathy throughout the retroperitoneum, in the mesentery and celiac region. There is also large mass in the iliac region of the pelvis which is larger as compared to prior. Findings likely relate to provided history of lymphoma. 2. Innumerable masses involving the small bowel which are new since the prior. This also probably reflects lymphoma. 3. Short segment intussusception involving small bowel in the anterior left abdomen. No associated obstruction. 4. Worsened right hydronephrosis. This likely indicates malfunction of the right ureteral stent. Note that the pelvic mass indicated above surrounds the distal right ureter/stent. 5. New 11 mm pleural base nodule in the left lower lobe could be related to metastatic disease/malignancy. Assessment      Hospital Problems           Last Modified POA    * (Principal) Dehydration 3/29/2021 Yes    Coronary artery disease 3/29/2021 Yes    Prolonged Q-T interval on ECG 3/29/2021 Yes    Hypotension 3/29/2021 Yes    CKD (chronic kidney disease) stage 3, GFR 30-59 ml/min 3/29/2021 Yes    Atrial fibrillation (HCC) (Chronic) 3/29/2021 Yes          80 y.o. male with diffuse large B-cell lymphoma, admitted for SUNNY/dehydration 2.5 weeks after right hemicolectomy. R hydronephrosis    Plan   - follow labs & resuscitate  - heme/onc recommendations appreciated  - Urology consult for hydronephrosis, reportedly had stenting already    Plan will be discussed with Dr. Zakia Calderón.     Electronically signed by Ame Cutler MD on 3/30/21 at 5:28 AM EDT    General Surgery Progress Note  [facility]    Patient's Name/Date of Birth: Aniyah Salinas / 1950    Date: March 30, 2021     Surgeon: Magan Carolina MD    Chief Complaint: Dehydration    Patient Active Problem List   Diagnosis    Hematuria    BPH (benign prostatic hyperplasia)    Colonic mass    Hypertension    Hyperlipidemia    Coronary artery disease    Dehydration    Prolonged Q-T interval on ECG    Hypotension    CKD (chronic kidney disease) stage 3, GFR 30-59 ml/min    Paroxysmal atrial fibrillation (HCC)    Diffuse large B-cell lymphoma (HCC)       Subjective: Feels better today. Having adequate urine output. Nausea is improving. Minimal abdominal pain. Objective:  BP (!) 105/49   Pulse 78   Temp 98.1 °F (36.7 °C) (Oral)   Resp 18   Ht 5' 7\" (1.702 m)   Wt 200 lb 1.6 oz (90.8 kg)   SpO2 100%   BMI 31.34 kg/m²   Labs:  Recent Labs     03/29/21  1855 03/30/21  0443   WBC 8.1 8.5   HGB 7.2* 8.4*   HCT 23.9* 27.5*     Lab Results   Component Value Date    CREATININE 1.6 (H) 03/30/2021    BUN 26 (H) 03/30/2021     03/30/2021    K 4.7 03/30/2021    CL 95 (L) 03/30/2021    CO2 27 03/30/2021     No results for input(s): LIPASE, AMYLASE in the last 72 hours. CBC with Differential:    Lab Results   Component Value Date    WBC 8.5 03/30/2021    RBC 3.41 03/30/2021    HGB 8.4 03/30/2021    HCT 27.5 03/30/2021     03/30/2021    MCV 80.6 03/30/2021    MCH 24.6 03/30/2021    MCHC 30.5 03/30/2021    RDW 14.9 03/30/2021    LYMPHOPCT 9.8 03/30/2021    MONOPCT 11.7 03/30/2021    BASOPCT 0.8 03/30/2021    MONOSABS 1.00 03/30/2021    LYMPHSABS 0.84 03/30/2021    EOSABS 0.25 03/30/2021    BASOSABS 0.07 03/30/2021     BMP:    Lab Results   Component Value Date     03/30/2021    K 4.7 03/30/2021    CL 95 03/30/2021    CO2 27 03/30/2021    BUN 26 03/30/2021    LABALBU 2.6 03/30/2021    LABALBU 3.9 01/28/2011    CREATININE 1.6 03/30/2021    CALCIUM 8.9 03/30/2021    GFRAA 52 03/30/2021    LABGLOM 52 03/30/2021    GLUCOSE 79 03/30/2021    GLUCOSE 100 01/28/2011       General appearance:  NAD  Head: NCAT, PERRLA, EOMI, red conjunctiva  Neck: supple, no masses  Lungs: CTAB, equal chest rise bilateral  Heart: Reg rate  Abdomen: soft, nondistended, mild right abdomen tenderness.   Incisions healing well  Skin; no lesions  Gu: no cva tenderness  Extremities: extremities normal, atraumatic, no cyanosis or edema      Assessment/Plan:  Beti Galan is a 80 y.o. male with diffuse large B-cell lymphoma with extensive abdominal component, hydronephrosis, dehydration    Diet as tolerated  Continue hydration and monitor urine output and creatinine  Await oncology recommendations  Mediport prior to DC versus outpatient pending oncology recommendations    Devon Ramirez MD  3/30/2021  3:31 PM

## 2021-03-30 NOTE — PROGRESS NOTES
Pt is AAO X 4. VSS. Pt is on RA. IVF infusing. Cardiology consult was called; this Ginger Divine spoke to Dr Kalin Kilgore who is covering for Dr Ellen Brooks. Dr Kalin Kilgore stated that patient will be seen in the morning. Consult for urology as well as were also called. EKG showed prolong QT interval; April, NP notified. Sotalol held due to prolong QT interval per provider's order. Will continue to monitor patient.

## 2021-03-30 NOTE — CONSULTS
3/30/2021 9:34 AM  Service: Urology  Group: [facility])    Daniel Galeana  [id number]     Chief Complaint:    Worsening hydronephrosis    History of Present Illness: The patient is a 80 y.o. male patient who was admitted with complaints of nausea, no appetite, and dizziness. He was admitted for SUNNY/dehydration. He was seen by our practice on 2/15/21 where he was admitted for syncope. It was during this admission that he was found to have a right pelvic mass that was placing extrinsic compression on the right ureter. We then placed a right ureteral stent on 2/17/21. He then had colonoscopy performed by GI on 2/19/21 that showed colonic mass. He recently underwent a right hemicolectomy on 3/11 with general surgery. Pathology from this revealed large B-cell lymphoma. CT performed yesterday shows extensive worsening lymphadenopathy throughout the retroperitoneum since surgery, a mass in the iliac region that is larger when compared to previous studies, and worsening right hydronephrosis with right ureteral stent in good position.      Past Medical History:   Diagnosis Date    Arthritis     KNEES HIPS BACK    BPH (benign prostatic hyperplasia)     Coronary artery disease     Difficult airway     PT STATES HE WAS TOLD THIS TWICE AT [facility]    Difficult intubation 04/2017    note in care everywhere \"Clinical Summary\" 03/10/2021    Hyperlipidemia     Hypertension     Sinus tachycardia 01/01/2002         Past Surgical History:   Procedure Laterality Date    ABLATION OF DYSRHYTHMIC FOCUS      AORTA SURGERY      aortic valve replacement    AORTIC VALVE REPLACEMENT      BLADDER SURGERY Right 2/17/2021    CYSTOSCOPY BILATERAL RETROGRADE PYELOGRAM RIGHT STENT INSERTION performed by Cleveland Levy MD at [facility]  2/19/2021    COLONOSCOPY WITH BIOPSY performed by Brenda Juares DO at [address] [place]  2/19/2021    COLONOSCOPY W/ ENDOSCOPIC MUCOSAL RESECTION reports that he does not use drugs. Family History:   Non-contributory to this Urological problem  family history includes Brain Cancer in his sister; Cancer in his maternal grandfather; Diabetes in his father and mother; Heart Disease in his father; Stroke in his brother, mother, and sister. Review of Systems:  Constitutional: No fever or chills, no appetite, weight loss, weak    Respiratory: negative for cough and hemoptysis  Cardiovascular: negative for chest pain and dyspnea  Gastrointestinal: generalized abdominal pain with nausea   Derm: negative for rash and skin lesion(s)  Neurological: negative for seizures and tremors  Musculoskeletal: Negative    Psychiatric: Negative   : As above in the HPI, otherwise negative  All other reviews are negative    Physical Exam:     Vitals:  BP (!) 105/49   Pulse 78   Temp 98.1 °F (36.7 °C) (Oral)   Resp 18   Ht 5' 7\" (1.702 m)   Wt 200 lb 1.6 oz (90.8 kg)   SpO2 100%   BMI 31.34 kg/m²     General:  Awake, alert, oriented X 3. No apparent distress. HEENT:  Normocephalic, atraumatic. Lungs:  Respirations symmetric and non-labored. Abdomen:  soft, nontender, no masses  Extremities:  No clubbing, cyanosis, or edema  Skin:  Warm and dry, no open lesions or rashes  Neuro: There are no motor or sensory deficits in the 4 quadrant extremities   Rectal: deferred  Genitourinary:  No varner, urine yellow in urinal     Labs:     Recent Labs     03/29/21  1855 03/30/21  0443   WBC 8.1 8.5   RBC 2.92* 3.41*   HGB 7.2* 8.4*   HCT 23.9* 27.5*   MCV 81.8 80.6   MCH 24.7* 24.6*   MCHC 30.1* 30.5*   RDW 15.0 14.9    321   MPV 9.4 9.8         Recent Labs     03/29/21  1855   CREATININE 1.7*   Diagnosis:   Right hemicolectomy: Diffuse large B-cell lymphoma, non-germinal center   (non-GCB) type, involving large intestine and regional lymph nodes, see   comment.      Comment:   Diffuse large B-cell lymphoma involves the colonic mucosa, the   full thickness of the bowel wall, mesentery and at least 13 regional   lymph nodes.  Ileum and appendix are uninvolved.  Proximal and distal   margins of excision are uninvolved. By immunohistochemistry the neoplastic cells are negative for   pancytokeratin, CD5, CD3, cyclin D1, CD23, CD10 and C-Myc.  The cells are   positive for leukocyte common antigen, CD20, BCL-2, and MUM-1; Ki-67 rate   is >90%. The morphology and this immunostain pattern support the above   interpretation.  Specimen will be submitted for double/triple hit   molecular assay and separately reported.  Intradepartmental consultation   is obtained. Imaging:   Narrative   EXAMINATION:   CT OF THE ABDOMEN AND PELVIS WITH CONTRAST 3/29/2021 10:28 pm       TECHNIQUE:   CT of the abdomen and pelvis was performed with the administration of   intravenous contrast. Multiplanar reformatted images are provided for review. Dose modulation, iterative reconstruction, and/or weight based adjustment of   the mA/kV was utilized to reduce the radiation dose to as low as reasonably   achievable.       COMPARISON:   02/18/2021       HISTORY:   ORDERING SYSTEM PROVIDED HISTORY: eval postop right hemicolectomy for   lymphoma, rule out leak, dehydration   TECHNOLOGIST PROVIDED HISTORY:   Reason for exam:->eval postop right hemicolectomy for lymphoma, rule out   leak, dehydration   Additional Contrast?->Oral       FINDINGS:   Lower Chest: There is pleural base nodularity in the left lower lobe which is   not seen on the prior.  I cannot exclude a metastatic lesion.  This measures   about 11 mm.       Abdomen:  The liver, spleen, pancreas, adrenal glands and left kidney are   unremarkable.       There is moderate right hydronephrosis which has worsened. Cleo Renee is a right   ureteral stent present.  This extends into the bladder.  Worsening   hydronephrosis suggest stent malfunction.       There are innumerable nodular masslike areas of density involving small bowel   loops.  Findings are consistent with multiple masses. Yudy Cifuentes is a short   segment small bowel intussusception in the anterior left abdomen.  There are   no findings of intestinal obstruction.  Patient is status post right   hemicolectomy.       There is extensive worsened lymphadenopathy in the retroperitoneum, celiac   region, mesentery.       There are aortoiliac atherosclerotic calcification.  There is no abdominal   aortic aneurysm.  There is no free intraperitoneal air.  There is no abnormal   fluid collection.       Pelvis:  Bladder is unremarkable in appearance.  There is a large mass in the   right iliac region surrounding the right ureter/stent.  This is probably   lymphadenopathy given patient's history of lymphoma.       Bones/Soft Tissues: No acute abnormality           Impression   1. Extensive new/worsened lymphadenopathy throughout the retroperitoneum, in   the mesentery and celiac region. Yudy Cifuentes is also large mass in the iliac   region of the pelvis which is larger as compared to prior.  Findings likely   relate to provided history of lymphoma. 2. Innumerable masses involving the small bowel which are new since the   prior.  This also probably reflects lymphoma. 3. Short segment intussusception involving small bowel in the anterior left   abdomen.  No associated obstruction. 4. Worsened right hydronephrosis.  This likely indicates malfunction of the   right ureteral stent.  Note that the pelvic mass indicated above surrounds   the distal right ureter/stent.    5. New 11 mm pleural base nodule in the left lower lobe could be related to   metastatic disease/malignancy.                       Assessment/plan:  Right hydronephrosis secondary to extrinsic compression of right ureter from pelvic mass s/p right stent insertion 2/17/21  S/p right hemicolectomy 3/11  Large B-cell Lymphoma   Azotemia     -general surgery following  -oncology has been consulted  -Cont to watch the creatinine, appears to be in baseline range -Cont the IVFs  -CT abdomen pelvis reviewed, right ureteral stent in good position. There is worsening right hydronephrosis when compared to previous imaging. The mass in the pelvis is larger when compared to previous studies and likely places compression on distal right ureter.  -He does not report significant right flank pain at this time  -Hold on stent change at this time  -If he has worsening azotemia or worsening right flank pain, stent exchange would likely not be of benefit due to the increased size of the mass and continued extrinsic compression on t he ureter. He would be better managed with a right percutaneous nephrostomy tube should he have worsening azotemia.   -For now, we will cont to follow, monitoring the creatinine        Electronically signed by TRACIE Escamilla CNP on 3/30/2021 at 9:34 AM     I agree with the assessment and plan of Crista Cotto CNP-NICK. I personally evaluated the patient and made any changes to reflect my impression and plan. Will check a KUB for stent encrustation. However the more likely scenario is that the stent is not encrusted and that extrinsic compression on the ureter is leading to hydronephrosis despite the presence of a stent. In this situation and nephrostomy tube might be beneficial now or in the future.

## 2021-03-30 NOTE — PLAN OF CARE
Problem: Falls - Risk of:  Goal: Will remain free from falls  Description: Will remain free from falls  Outcome: Met This Shift  Goal: Absence of physical injury  Description: Absence of physical injury  Outcome: Met This Shift     Problem: Cardiac Output - Decreased:  Goal: Hemodynamic stability will improve  Description: Hemodynamic stability will improve  Outcome: Met This Shift     Problem: Fluid Volume - Imbalance:  Goal: Absence of imbalanced fluid volume signs and symptoms  Description: Absence of imbalanced fluid volume signs and symptoms  Outcome: Met This Shift     Problem: Tissue Perfusion - Cardiopulmonary, Altered:  Goal: Absence of angina  Description: Absence of angina  Outcome: Met This Shift  Goal: Hemodynamic stability will improve  Description: Hemodynamic stability will improve  Outcome: Met This Shift

## 2021-03-30 NOTE — CONSULTS
The above documentation has been prepared under my direction and personally reviewed by me in its entirety. I confirm that the note above accurately reflects all work, treatment, procedures, and medical decision making performed by me. The patient's history was independently obtained. The patient was independently examined. Electrocardiogram, prior and present cardiovascular assessment, and laboratory studies were reviewed. The patient is a 71-year-old black male with no previous association to [address] [place] [address] Cardiology and previous cardiovascular care predominately provided by [facility] as well as that of [address] [place] [address]. The details of his prior cardiovascular care are incompletely available for review with previous combined surgical revascularization inclusive of a left internal mammary graft to left anterior descending and bioprosthetic aortic and mitral valve replacement with associated modified Maze procedure and left atrial appendage occlusion. He additionally has a history of paroxysmal atrial fibrillation with associated sinus node dysfunction and previous placement of a dual-chamber pacemaker. He has most recently undergone objective assessment inclusive of a gated vasodilator myocardial perfusion imaging study in December, 2020 suggesting evidence of mild ischemia of the left anterior sending coronary distribution in the face of normal left ventricular systolic function on echocardiogram demonstrating evidence of normal left ventricular systolic function with appropriate function of both his mitral and aortic bioprosthesis with mean gradients of 6 and 19 mmHg respectively. He additionally has a previous history of paroxysmal atrial fibrillation presently maintained on sotalol. He had recently been hospitalized for abdominal discomfort with surgical resection of a colonic mass with pathology demonstrating evidence of a B-cell lymphoma with pending oncology evaluation.   His present hospitalization was prompted by follow-up with his surgeon at which time nausea, anorexia and lightheadedness were noted with additional evaluation recommended and time of emergency room evaluation evidence of a progressive anemia as well as that of a component of acute kidney injury and hypoalbuminemia were noted. He was initially noted to be hypotensive with subsequent stabilization with fluid administration. He denies any active cardiovascular symptoms of anginal-like chest discomfort or other ischemic equivalents, decompensated left ventricular systolic dysfunction or volume overload and most recent pacemaker interrogation within the past 2 months demonstrated no evidence of atrial arrhythmias. A resting electrocardiogram time evaluation reviewed at the time of his assessment demonstrates evidence sinus rhythm occasional supraventricular ectopy with a prolonged corrected QT interval approximately 510 ms increased from that of his most recent electrocardiographic tracing. At time of his evaluation, his medications and allergies were reviewed as well as that of his past medical history and review of systems as documented. On examination, he presently appears in no discomfort nor distress and is hemodynamically stable with vital signs as documented. Jugular venous pressure appears normal with no identified carotid bruits. Lung fields are clear to auscultation. Cardiac examination is no for a regular rate and rhythm with no gallop rhythm and the presence of an early peaking systolic ejection murmur at the right upper sternal border. A benign abdominal examination is present no peripheral edema identified. Diagnostic Assessment and Plan: On a clinical basis, the patient presently appears compensated from a cardiovascular standpoint with no significant atrial arrhythmias.   On this basis in view of the extremely low dose of his sotalol, its discontinuation appears appropriate with ongoing arrhythmia monitoring in part completed in conjunction with follow-up with his permanent pacemaker. At the time of his present evaluation, no additional cardiovascular assessment he is indicated and we will further evaluate him during hospitalization should additional cardiovascular difficulties or concerns arise with additional management deferred to primary care as well as that of the surgical service and oncology service. On long-term basis, continued aggressive risk factor modification of blood pressure and serum lipids remain essential to reducing risk of future atherosclerotic development as well as that of appropriate antibiotic prophylaxis time of all dental interventions to reduce risk of bacterial endocarditis. Arrangements will be made for outpatient cardiovascular follow-up following his hospital discharge including management of his permanent pacemaker. Thank you for allowing me to participate in your patient's care. Please feel free to contact me if you have any questions or concerns. Allyson Pretty.  Devon Harvey, [address] [place] [address] Cardiology

## 2021-03-30 NOTE — CONSULTS
[facility]  Hematology/Oncology  Consult      Patient Name: Rhoda Rey  YOB: 1950  PCP: Prem Montgomery DO   Referring Provider: [address] [address]     Reason for Consultation: No chief complaint on file. History of Present Illness: This is a 26-year-old male patient with a PMH history of BPH, HTN, and CAD who presented with with nausea as a direct admission by surgery. Patient has history of recent right hemicolectomy for colon mass on 3/11/21. Final pathology came back consistent with diffuse large B-cell lymphoma. Patient is now having nausea and dizziness since procedure. CT A/P showed extensive new/worsened lymphadenopathy throughout the retroperitoneum, in the mesentery and celiac region. There is also large mass in the iliac region of the pelvis which is larger as compared to prior. Innumerable masses involving the small bowel which are new since the prior. Short segment intussusception involving small bowel in the anterior left abdomen with no associated obstruction. Worsened right hydronephrosis. Note that the pelvic mass indicated above surrounds the distal right ureter/stent. New 11 mm pleural base nodule in the left lower lobe. Consultation for evaluation and recommendations for new dx of lymphoma.        Diagnostic Data:     Past Medical History:   Diagnosis Date    Arthritis     KNEES HIPS BACK    BPH (benign prostatic hyperplasia)     Coronary artery disease     Difficult airway     PT STATES HE WAS TOLD THIS TWICE AT [facility]    Difficult intubation 04/2017    note in care everywhere \"Clinical Summary\" 03/10/2021    Hyperlipidemia     Hypertension     Sinus tachycardia 01/01/2002       Patient Active Problem List    Diagnosis Date Noted    Diffuse large B-cell lymphoma ([facility] [address] 03/30/2021    Dehydration 03/29/2021    Prolonged Q-T interval on ECG 03/29/2021    Hypotension 03/29/2021    CKD (chronic kidney disease) stage 3, GFR 30-59 ml/min 03/29/2021    Paroxysmal atrial fibrillation ([address] 03/29/2021    Colonic mass 03/11/2021    Hypertension     Hyperlipidemia     Coronary artery disease     Hematuria 01/16/2018    BPH (benign prostatic hyperplasia) 01/16/2018        Past Surgical History:   Procedure Laterality Date    ABLATION OF DYSRHYTHMIC FOCUS      AORTA SURGERY      aortic valve replacement    AORTIC VALVE REPLACEMENT      BLADDER SURGERY Right 2/17/2021    CYSTOSCOPY BILATERAL RETROGRADE PYELOGRAM RIGHT STENT INSERTION performed by Doretha Rodriguez MD at [address] [facility]  2/19/2021    COLONOSCOPY WITH BIOPSY performed by Yassine Wall DO at [address]  2/19/2021    COLONOSCOPY W/ ENDOSCOPIC MUCOSAL RESECTION performed by Yassine Wall DO at [address] N/A 3/11/2021    LAPAROSCOPIC RIGHT HEMICOLECTOMY performed by Oniel Byers MD at [address] OTHER SURGICAL HISTORY  08/12/2016    CT Myelogram, Dr. Anupam Ornelas  2014 new battery    last checked Dr Jose Acosta 1/2016?  TONSILLECTOMY      UPPER GASTROINTESTINAL ENDOSCOPY      reflux    UPPER GASTROINTESTINAL ENDOSCOPY N/A 2/16/2021    EGD BIOPSY performed by Raymundo Spencer MD at [facility]         Family History  Family History   Problem Relation Age of Onset    Diabetes Mother     Stroke Mother     Diabetes Father     Heart Disease Father     Brain Cancer Sister     Stroke Sister     Stroke Brother     Cancer Maternal Grandfather        Social History    TOBACCO:   reports that he quit smoking about 7 years ago. He has a 44.00 pack-year smoking history. He has never used smokeless tobacco.  ETOH:   reports previous alcohol use of about 2.0 standard drinks of alcohol per week. Home Medications  Prior to Admission medications    Medication Sig Start Date End Date Taking?  Authorizing Provider   ibuprofen (ADVIL;MOTRIN) 800 MG tablet TAKE 1 TABLET BY MOUTH EVERY 6 HOURS AS NEEDED FOR PAIN 3/26/21  Yes Yuniel Alegria MD   ondansetron (ZOFRAN ODT) 4 MG disintegrating tablet Take 1 tablet by mouth every 8 hours as needed for Nausea or Vomiting 3/13/21  Yes Yuniel Alegria MD   lisinopril-hydroCHLOROthiazide (PRINZIDE;ZESTORETIC) 10-12.5 MG per tablet Take 1 tablet by mouth daily   Yes Historical Provider, MD   amLODIPine (NORVASC) 5 MG tablet Take 5 mg by mouth daily   Yes Historical Provider, MD   aspirin 81 MG EC tablet Take 81 mg by mouth daily   Yes Historical Provider, MD   vitamin B-12 (CYANOCOBALAMIN) 100 MCG tablet Take 50 mcg by mouth daily   Yes Historical Provider, MD   sennosides-docusate sodium (SENOKOT-S) 8.6-50 MG tablet Take 1 tablet by mouth 2 times daily   Yes Historical Provider, MD   sotalol (BETAPACE) 80 MG tablet Take 40 mg by mouth 4/10/17  Yes Historical Provider, MD   tamsulosin (FLOMAX) 0.4 MG capsule Take 0.4 mg by mouth daily  1/4/18  Yes Historical Provider, MD   ferrous sulfate 325 (65 Fe) MG tablet Take 325 mg by mouth 2 times daily  1/8/18  Yes Historical Provider, MD   esomeprazole Magnesium (NEXIUM) 20 MG PACK Take 20 mg by mouth daily   Yes Historical Provider, MD   PRAVASTATIN SODIUM PO Take 20 mg by mouth daily    Yes Historical Provider, MD   albuterol sulfate  (90 BASE) MCG/ACT inhaler Inhale 2 puffs into the lungs as needed for Wheezing    Historical Provider, MD       Allergies  No Known Allergies    Review of Systems: +Fatigue, weakness, abdominal discomfort and tenderness, nausea, dizziness and lightheadedness.  Constitutional:  No fever chills or rigors.  Eyes: No changes in vision, discharge, or pain   ENT: No Headaches, hearing loss or vertigo. No mouth sores or sore throat. No change in taste or smell.  Cardiovascular: No chest discomfort, dyspnea on exertion, palpitations or loss of consciousness. or phlebitis.     Respiratory: Has no cough or wheezing, Has no sputum production. Has no hemoptysis, Has no pleuritic pain, .  Gastrointestinal: No abdominal pain, appetite loss, blood in stools. No change in bowel habits. No hematemesis    Genitourinary: Patient acknowledges no dysuria, trouble voiding, or hematuria. No nocturia or increased frequency.  Musculoskeletal: No gait disturbance, weakness or joint complaints.  Integumentary: No rash or pruritis.  Neurological: No headache, diplopia, change in muscle strength, numbness or tingling. No change in gait, balance, coordination, mood, affect, memory, mentation, behavior.  Psychiatric: No anxiety, or depression.  Endocrine: No temperature intolerance. No excessive thirst, fluid intake, or urination. No tremor.  Hematologic/Lymphatic: No abnormal bruising or bleeding, blood clots or swollen lymph nodes.  Allergic/Immunologic: No nasal congestion or hives. Objective  BP (!) 105/49   Pulse 78   Temp 98.1 °F (36.7 °C) (Oral)   Resp 18   Ht 5' 7\" (1.702 m)   Wt 200 lb 1.6 oz (90.8 kg)   SpO2 100%   BMI 31.34 kg/m²     Physical Exam:     General: AAO to person, place, time, in no acute distress,   Head and neck : PERRLA, EOMI . Sclera non icteric. Oropharynx : Clear  Neck: no JVD,  no adenopathy  Heart: Regular rate and regular rhythm, no murmur  Lungs: Clear to auscultation   Extremities: No edema,no cyanosis, no clubbing. Abdomen: Soft, tenderness;no masses, no organomegaly  Skin:  No rash very dry and flaky. Neurologic:Cranial nerves grossly intact. No focal motor or sensory deficits .     Recent Laboratory Data-   Lab Results   Component Value Date    WBC 8.5 03/30/2021    HGB 8.4 (L) 03/30/2021    HCT 27.5 (L) 03/30/2021    MCV 80.6 03/30/2021     03/30/2021    LYMPHOPCT 9.8 (L) 03/30/2021    RBC 3.41 (L) 03/30/2021    MCH 24.6 (L) 03/30/2021    MCHC 30.5 (L) 03/30/2021    RDW 14.9 03/30/2021    NEUTOPHILPCT 74.0 03/30/2021    MONOPCT 11.7 03/30/2021    BASOPCT 0.8 03/30/2021    NEUTROABS 6.31 03/30/2021    LYMPHSABS 0.84 (L) 03/30/2021    MONOSABS 1.00 (H) 03/30/2021    EOSABS 0.25 03/30/2021    BASOSABS 0.07 03/30/2021       Lab Results   Component Value Date     03/29/2021    K 4.6 03/29/2021    CL 96 (L) 03/29/2021    CO2 26 03/29/2021    BUN 28 (H) 03/29/2021    CREATININE 1.7 (H) 03/29/2021    GLUCOSE 95 03/29/2021    CALCIUM 8.1 (L) 03/29/2021    PROT 6.1 (L) 03/29/2021    LABALBU 2.4 (L) 03/29/2021    BILITOT 0.3 03/29/2021    ALKPHOS 73 03/29/2021    AST 11 03/29/2021    ALT <5 03/29/2021    LABGLOM 48 03/29/2021    GFRAA 48 03/29/2021       Lab Results   Component Value Date    IRON 38 (L) 02/16/2021    TIBC 248 (L) 02/16/2021           Radiology-    Ct Abdomen Pelvis W Iv Contrast Additional Contrast? Oral    Result Date: 3/30/2021  EXAMINATION: CT OF THE ABDOMEN AND PELVIS WITH CONTRAST 3/29/2021 10:28 pm TECHNIQUE: CT of the abdomen and pelvis was performed with the administration of intravenous contrast. Multiplanar reformatted images are provided for review. Dose modulation, iterative reconstruction, and/or weight based adjustment of the mA/kV was utilized to reduce the radiation dose to as low as reasonably achievable. COMPARISON: 02/18/2021 HISTORY: ORDERING SYSTEM PROVIDED HISTORY: eval postop right hemicolectomy for lymphoma, rule out leak, dehydration TECHNOLOGIST PROVIDED HISTORY: Reason for exam:->eval postop right hemicolectomy for lymphoma, rule out leak, dehydration Additional Contrast?->Oral FINDINGS: Lower Chest: There is pleural base nodularity in the left lower lobe which is not seen on the prior. I cannot exclude a metastatic lesion. This measures about 11 mm. Abdomen: The liver, spleen, pancreas, adrenal glands and left kidney are unremarkable. There is moderate right hydronephrosis which has worsened. There is a right ureteral stent present. This extends into the bladder. Worsening hydronephrosis suggest stent malfunction.  There are innumerable nodular masslike areas of density involving small bowel loops. Findings are consistent with multiple masses. There is a short segment small bowel intussusception in the anterior left abdomen. There are no findings of intestinal obstruction. Patient is status post right hemicolectomy. There is extensive worsened lymphadenopathy in the retroperitoneum, celiac region, mesentery. There are aortoiliac atherosclerotic calcification. There is no abdominal aortic aneurysm. There is no free intraperitoneal air. There is no abnormal fluid collection. Pelvis:  Bladder is unremarkable in appearance. There is a large mass in the right iliac region surrounding the right ureter/stent. This is probably lymphadenopathy given patient's history of lymphoma. Bones/Soft Tissues: No acute abnormality     1. Extensive new/worsened lymphadenopathy throughout the retroperitoneum, in the mesentery and celiac region. There is also large mass in the iliac region of the pelvis which is larger as compared to prior. Findings likely relate to provided history of lymphoma. 2. Innumerable masses involving the small bowel which are new since the prior. This also probably reflects lymphoma. 3. Short segment intussusception involving small bowel in the anterior left abdomen. No associated obstruction. 4. Worsened right hydronephrosis. This likely indicates malfunction of the right ureteral stent. Note that the pelvic mass indicated above surrounds the distal right ureter/stent. 5. New 11 mm pleural base nodule in the left lower lobe could be related to metastatic disease/malignancy. ASSESSMENT/PLAN :  62-year-old male     -BPH, HTN, and CAD  -Newly dx DLBCL    -S/p right hemicolectomy for colon mass on 3/11/21.    -Final pathology consistent with diffuse large B-cell lymphoma.   Non-germinal cell type    -CT A/P showed extensive new/worsened lymphadenopathy throughout the retroperitoneum, in the mesentery and celiac

## 2021-03-30 NOTE — PROGRESS NOTES
Dr. Fernanda Fagan, covering for Dr. William Plunkett, notified of Dr. Curt Montgomery orders to transfer pt to [facility] to oncology unit 8W extension.

## 2021-03-30 NOTE — CONSULTS
maternal grandfather; Diabetes in his father and mother; Heart Disease in his father; Stroke in his brother, mother, and sister. REVIEW OF SYSTEMS:  As above in the HPI, otherwise negative    PHYSICAL EXAM:    Vitals:  BP (!) 91/55   Pulse 88   Temp 97.8 °F (36.6 °C) (Oral)   Resp 19   Ht 5' 7\" (1.702 m)   Wt 200 lb 1.6 oz (90.8 kg)   SpO2 98%   BMI 31.34 kg/m²     General:  Awake, alert, oriented X 3. Well developed, well nourished, well groomed. No apparent distress. HEENT:  Normocephalic, atraumatic. Pupils equal, round, reactive to light. No scleral icterus. No conjunctival injection. Normal lips, teeth, and gums. No nasal discharge. Neck:  Supple  Heart:  RRR, no murmurs, gallops, rubs  Lungs:  CTA bilaterally, bilat symmetrical expansion, no wheeze, rales, or rhonchi  Abdomen:   Bowel sounds present, soft, nontender, no masses, no organomegaly, no peritoneal signs  Extremities:  No clubbing, cyanosis, or edema  Skin:  Warm and dry, no open lesions or rash  Neuro:  Cranial nerves 2-12 intact, no focal deficits  Breast: deferred  Rectal: deferred  Genitalia:  deferred    LABS:    CBC with Differential:    Lab Results   Component Value Date    WBC 8.5 03/30/2021    RBC 3.41 03/30/2021    HGB 8.4 03/30/2021    HCT 27.5 03/30/2021     03/30/2021    MCV 80.6 03/30/2021    MCH 24.6 03/30/2021    MCHC 30.5 03/30/2021    RDW 14.9 03/30/2021    LYMPHOPCT 9.8 03/30/2021    MONOPCT 11.7 03/30/2021    BASOPCT 0.8 03/30/2021    MONOSABS 1.00 03/30/2021    LYMPHSABS 0.84 03/30/2021    EOSABS 0.25 03/30/2021    BASOSABS 0.07 03/30/2021     CMP:    Lab Results   Component Value Date     03/29/2021    K 4.6 03/29/2021    CL 96 03/29/2021    CO2 26 03/29/2021    BUN 28 03/29/2021    CREATININE 1.7 03/29/2021    GFRAA 48 03/29/2021    LABGLOM 48 03/29/2021    GLUCOSE 95 03/29/2021    GLUCOSE 100 01/28/2011    PROT 6.1 03/29/2021    LABALBU 2.4 03/29/2021    LABALBU 3.9 01/28/2011    CALCIUM 8.1 03/29/2021    BILITOT 0.3 03/29/2021    ALKPHOS 73 03/29/2021    AST 11 03/29/2021    ALT <5 03/29/2021     Magnesium:    Lab Results   Component Value Date    MG 1.9 03/29/2021     Phosphorus:    Lab Results   Component Value Date    PHOS 2.6 02/16/2021     Troponin:    Lab Results   Component Value Date    TROPONINI <0.01 02/14/2021     U/A:    Lab Results   Component Value Date    NITRITE NEG 01/16/2018    COLORU Yellow 02/15/2021    PROTEINU Negative 02/15/2021    PHUR 5.5 02/15/2021    WBCUA 0-1 02/15/2021    RBCUA 0-1 02/15/2021    BACTERIA NONE SEEN 02/15/2021    CLARITYU Clear 02/15/2021    SPECGRAV 1.025 02/15/2021    LEUKOCYTESUR Negative 02/15/2021    UROBILINOGEN 0.2 02/15/2021    BILIRUBINUR Negative 02/15/2021    BILIRUBINUR NEG 01/16/2018    BLOODU Negative 02/15/2021    GLUCOSEU Negative 02/15/2021     HgBA1c:  No results found for: LABA1C  FLP:  No results found for: TRIG, HDL, LDLCALC, LDLDIRECT, LABVLDL  TSH:    Lab Results   Component Value Date    TSH 3.630 02/14/2021       ASSESSMENT:      Principal Problem:    Dehydration  Active Problems:    Coronary artery disease    Prolonged Q-T interval on ECG    Hypotension    CKD (chronic kidney disease) stage 3, GFR 30-59 ml/min    Paroxysmal atrial fibrillation (HCC)    Diffuse large B-cell lymphoma ([facility] [address]  Resolved Problems:    * No resolved hospital problems. *      PLAN:    28-year-old male history of recent right colectomy for colonic mass found to be diffuse B-cell lymphoma    Admitted to surgery service  IV fluids  Monitor labs closely   Antiemetics  Pain control  Bowel regimen  Oncology has been consulted for lymphoma  Urology for hydronephrosis  PT/OT  Sotalol currently held for prolonged QT  Cardiology consult  Medications for other co morbidities cont as appropriate w dosage adjustments as necessary     Thank you for allowing me to participate in the care of this patient.       Rebcea Felix MD  7:50 AM  3/30/2021

## 2021-03-30 NOTE — PROGRESS NOTES
Paged April Machelle Pruitt NP, covering for Dr. Senia Huntley, to notify of 7.2 hgb. States will notify Dr. Senia Huntley and place any new orders if needed.

## 2021-03-31 ENCOUNTER — HOSPITAL ENCOUNTER (INPATIENT)
Age: 71
LOS: 3 days | Discharge: HOME | DRG: 829 | End: 2021-04-03
Attending: SURGERY | Admitting: INTERNAL MEDICINE
Payer: MEDICARE

## 2021-03-31 VITALS
TEMPERATURE: 97.8 F | OXYGEN SATURATION: 95 % | SYSTOLIC BLOOD PRESSURE: 138 MMHG | WEIGHT: 200.1 LBS | RESPIRATION RATE: 20 BRPM | HEIGHT: 67 IN | DIASTOLIC BLOOD PRESSURE: 76 MMHG | BODY MASS INDEX: 31.4 KG/M2 | HEART RATE: 90 BPM

## 2021-03-31 DIAGNOSIS — C83.30 DIFFUSE LARGE B-CELL LYMPHOMA, UNSPECIFIED BODY REGION (HCC): Primary | ICD-10-CM

## 2021-03-31 LAB
ALBUMIN SERPL-MCNC: 2.5 G/DL (ref 3.5–5.2)
ALP BLD-CCNC: 75 U/L (ref 40–129)
ALT SERPL-CCNC: <5 U/L (ref 0–40)
ANION GAP SERPL CALCULATED.3IONS-SCNC: 9 MMOL/L (ref 7–16)
AST SERPL-CCNC: 19 U/L (ref 0–39)
BASOPHILS ABSOLUTE: 0.07 E9/L (ref 0–0.2)
BASOPHILS RELATIVE PERCENT: 0.7 % (ref 0–2)
BILIRUB SERPL-MCNC: 0.3 MG/DL (ref 0–1.2)
BILIRUBIN DIRECT: <0.2 MG/DL (ref 0–0.3)
BILIRUBIN, INDIRECT: ABNORMAL MG/DL (ref 0–1)
BUN BLDV-MCNC: 18 MG/DL (ref 8–23)
CALCIUM SERPL-MCNC: 8.9 MG/DL (ref 8.6–10.2)
CHLORIDE BLD-SCNC: 97 MMOL/L (ref 98–107)
CO2: 28 MMOL/L (ref 22–29)
CREAT SERPL-MCNC: 1.3 MG/DL (ref 0.7–1.2)
EOSINOPHILS ABSOLUTE: 0.23 E9/L (ref 0.05–0.5)
EOSINOPHILS RELATIVE PERCENT: 2.4 % (ref 0–6)
GFR AFRICAN AMERICAN: >60
GFR NON-AFRICAN AMERICAN: >60 ML/MIN/1.73
GLUCOSE BLD-MCNC: 101 MG/DL (ref 74–99)
HCT VFR BLD CALC: 28.8 % (ref 37–54)
HEMOGLOBIN: 8.9 G/DL (ref 12.5–16.5)
IMMATURE GRANULOCYTES #: 0.04 E9/L
IMMATURE GRANULOCYTES %: 0.4 % (ref 0–5)
LYMPHOCYTES ABSOLUTE: 0.65 E9/L (ref 1.5–4)
LYMPHOCYTES RELATIVE PERCENT: 6.8 % (ref 20–42)
MCH RBC QN AUTO: 24.6 PG (ref 26–35)
MCHC RBC AUTO-ENTMCNC: 30.9 % (ref 32–34.5)
MCV RBC AUTO: 79.6 FL (ref 80–99.9)
MONOCYTES ABSOLUTE: 1.09 E9/L (ref 0.1–0.95)
MONOCYTES RELATIVE PERCENT: 11.5 % (ref 2–12)
NEUTROPHILS ABSOLUTE: 7.41 E9/L (ref 1.8–7.3)
NEUTROPHILS RELATIVE PERCENT: 78.2 % (ref 43–80)
PDW BLD-RTO: 14.8 FL (ref 11.5–15)
PLATELET # BLD: 333 E9/L (ref 130–450)
PMV BLD AUTO: 8.9 FL (ref 7–12)
POTASSIUM REFLEX MAGNESIUM: 4.8 MMOL/L (ref 3.5–5)
RBC # BLD: 3.62 E12/L (ref 3.8–5.8)
SODIUM BLD-SCNC: 134 MMOL/L (ref 132–146)
TOTAL PROTEIN: 6.3 G/DL (ref 6.4–8.3)
WBC # BLD: 9.5 E9/L (ref 4.5–11.5)

## 2021-03-31 PROCEDURE — 1200000000 HC SEMI PRIVATE

## 2021-03-31 PROCEDURE — 6360000002 HC RX W HCPCS: Performed by: SURGERY

## 2021-03-31 PROCEDURE — 36415 COLL VENOUS BLD VENIPUNCTURE: CPT

## 2021-03-31 PROCEDURE — 85025 COMPLETE CBC W/AUTO DIFF WBC: CPT

## 2021-03-31 PROCEDURE — 2580000003 HC RX 258: Performed by: NURSE PRACTITIONER

## 2021-03-31 PROCEDURE — 6370000000 HC RX 637 (ALT 250 FOR IP): Performed by: SURGERY

## 2021-03-31 PROCEDURE — 2580000003 HC RX 258: Performed by: SURGERY

## 2021-03-31 PROCEDURE — 80076 HEPATIC FUNCTION PANEL: CPT

## 2021-03-31 PROCEDURE — 80048 BASIC METABOLIC PNL TOTAL CA: CPT

## 2021-03-31 PROCEDURE — C9113 INJ PANTOPRAZOLE SODIUM, VIA: HCPCS | Performed by: SURGERY

## 2021-03-31 RX ORDER — SODIUM CHLORIDE 9 MG/ML
25 INJECTION, SOLUTION INTRAVENOUS PRN
Status: CANCELLED | OUTPATIENT
Start: 2021-03-31

## 2021-03-31 RX ORDER — SODIUM CHLORIDE, SODIUM LACTATE, POTASSIUM CHLORIDE, CALCIUM CHLORIDE 600; 310; 30; 20 MG/100ML; MG/100ML; MG/100ML; MG/100ML
INJECTION, SOLUTION INTRAVENOUS CONTINUOUS
Status: DISCONTINUED | OUTPATIENT
Start: 2021-03-31 | End: 2021-04-03 | Stop reason: HOSPADM

## 2021-03-31 RX ORDER — SODIUM CHLORIDE 9 MG/ML
10 INJECTION INTRAVENOUS DAILY
Status: CANCELLED | OUTPATIENT
Start: 2021-03-31

## 2021-03-31 RX ORDER — OXYCODONE HYDROCHLORIDE 5 MG/1
5 TABLET ORAL EVERY 4 HOURS PRN
Status: DISCONTINUED | OUTPATIENT
Start: 2021-03-31 | End: 2021-04-03 | Stop reason: HOSPADM

## 2021-03-31 RX ORDER — LANOLIN ALCOHOL/MO/W.PET/CERES
4.5 CREAM (GRAM) TOPICAL NIGHTLY PRN
Status: CANCELLED | OUTPATIENT
Start: 2021-03-31

## 2021-03-31 RX ORDER — SODIUM CHLORIDE 0.9 % (FLUSH) 0.9 %
10 SYRINGE (ML) INJECTION PRN
Status: CANCELLED | OUTPATIENT
Start: 2021-03-31

## 2021-03-31 RX ORDER — ACETAMINOPHEN 325 MG/1
650 TABLET ORAL EVERY 4 HOURS PRN
Status: DISCONTINUED | OUTPATIENT
Start: 2021-03-31 | End: 2021-04-03 | Stop reason: HOSPADM

## 2021-03-31 RX ORDER — SODIUM CHLORIDE 9 MG/ML
10 INJECTION INTRAVENOUS DAILY
Status: DISCONTINUED | OUTPATIENT
Start: 2021-04-01 | End: 2021-04-03 | Stop reason: HOSPADM

## 2021-03-31 RX ORDER — LANOLIN ALCOHOL/MO/W.PET/CERES
4.5 CREAM (GRAM) TOPICAL NIGHTLY PRN
Status: DISCONTINUED | OUTPATIENT
Start: 2021-03-31 | End: 2021-04-03 | Stop reason: HOSPADM

## 2021-03-31 RX ORDER — SODIUM CHLORIDE 0.9 % (FLUSH) 0.9 %
10 SYRINGE (ML) INJECTION EVERY 12 HOURS SCHEDULED
Status: DISCONTINUED | OUTPATIENT
Start: 2021-03-31 | End: 2021-04-03 | Stop reason: HOSPADM

## 2021-03-31 RX ORDER — SODIUM CHLORIDE 0.9 % (FLUSH) 0.9 %
10 SYRINGE (ML) INJECTION PRN
Status: DISCONTINUED | OUTPATIENT
Start: 2021-03-31 | End: 2021-04-03 | Stop reason: HOSPADM

## 2021-03-31 RX ORDER — SODIUM CHLORIDE 9 MG/ML
25 INJECTION, SOLUTION INTRAVENOUS PRN
Status: DISCONTINUED | OUTPATIENT
Start: 2021-03-31 | End: 2021-04-03 | Stop reason: HOSPADM

## 2021-03-31 RX ORDER — SODIUM CHLORIDE, SODIUM LACTATE, POTASSIUM CHLORIDE, CALCIUM CHLORIDE 600; 310; 30; 20 MG/100ML; MG/100ML; MG/100ML; MG/100ML
INJECTION, SOLUTION INTRAVENOUS CONTINUOUS
Status: CANCELLED | OUTPATIENT
Start: 2021-03-31

## 2021-03-31 RX ORDER — OXYCODONE HYDROCHLORIDE 5 MG/1
10 TABLET ORAL EVERY 4 HOURS PRN
Status: CANCELLED | OUTPATIENT
Start: 2021-03-31

## 2021-03-31 RX ORDER — PANTOPRAZOLE SODIUM 40 MG/10ML
40 INJECTION, POWDER, LYOPHILIZED, FOR SOLUTION INTRAVENOUS DAILY
Status: CANCELLED | OUTPATIENT
Start: 2021-03-31

## 2021-03-31 RX ORDER — SOTALOL HYDROCHLORIDE 80 MG/1
40 TABLET ORAL 2 TIMES DAILY
Status: DISCONTINUED | OUTPATIENT
Start: 2021-03-31 | End: 2021-04-03 | Stop reason: HOSPADM

## 2021-03-31 RX ORDER — OXYCODONE HYDROCHLORIDE 5 MG/1
5 TABLET ORAL EVERY 4 HOURS PRN
Status: CANCELLED | OUTPATIENT
Start: 2021-03-31

## 2021-03-31 RX ORDER — PANTOPRAZOLE SODIUM 40 MG/10ML
40 INJECTION, POWDER, LYOPHILIZED, FOR SOLUTION INTRAVENOUS DAILY
Status: DISCONTINUED | OUTPATIENT
Start: 2021-04-01 | End: 2021-04-03 | Stop reason: HOSPADM

## 2021-03-31 RX ORDER — SODIUM CHLORIDE 0.9 % (FLUSH) 0.9 %
10 SYRINGE (ML) INJECTION EVERY 12 HOURS SCHEDULED
Status: CANCELLED | OUTPATIENT
Start: 2021-03-31

## 2021-03-31 RX ORDER — SOTALOL HYDROCHLORIDE 80 MG/1
40 TABLET ORAL 2 TIMES DAILY
Status: CANCELLED | OUTPATIENT
Start: 2021-03-31

## 2021-03-31 RX ORDER — ACETAMINOPHEN 325 MG/1
650 TABLET ORAL EVERY 4 HOURS PRN
Status: CANCELLED | OUTPATIENT
Start: 2021-03-31

## 2021-03-31 RX ORDER — OXYCODONE HYDROCHLORIDE 10 MG/1
10 TABLET ORAL EVERY 4 HOURS PRN
Status: DISCONTINUED | OUTPATIENT
Start: 2021-03-31 | End: 2021-04-03 | Stop reason: HOSPADM

## 2021-03-31 RX ADMIN — ACETAMINOPHEN 650 MG: 325 TABLET ORAL at 03:12

## 2021-03-31 RX ADMIN — SODIUM CHLORIDE, POTASSIUM CHLORIDE, SODIUM LACTATE AND CALCIUM CHLORIDE: 600; 310; 30; 20 INJECTION, SOLUTION INTRAVENOUS at 06:21

## 2021-03-31 RX ADMIN — PANTOPRAZOLE SODIUM 40 MG: 40 INJECTION, POWDER, FOR SOLUTION INTRAVENOUS at 08:49

## 2021-03-31 RX ADMIN — SODIUM CHLORIDE, PRESERVATIVE FREE 10 ML: 5 INJECTION INTRAVENOUS at 08:49

## 2021-03-31 RX ADMIN — ACETAMINOPHEN 650 MG: 325 TABLET ORAL at 19:40

## 2021-03-31 RX ADMIN — SOTALOL HYDROCHLORIDE 40 MG: 80 TABLET ORAL at 22:01

## 2021-03-31 RX ADMIN — SODIUM CHLORIDE, POTASSIUM CHLORIDE, SODIUM LACTATE AND CALCIUM CHLORIDE: 600; 310; 30; 20 INJECTION, SOLUTION INTRAVENOUS at 19:28

## 2021-03-31 RX ADMIN — ACETAMINOPHEN 650 MG: 325 TABLET ORAL at 12:51

## 2021-03-31 ASSESSMENT — PAIN SCALES - GENERAL
PAINLEVEL_OUTOF10: 7
PAINLEVEL_OUTOF10: 5
PAINLEVEL_OUTOF10: 0
PAINLEVEL_OUTOF10: 7
PAINLEVEL_OUTOF10: 9
PAINLEVEL_OUTOF10: 0
PAINLEVEL_OUTOF10: 5
PAINLEVEL_OUTOF10: 7
PAINLEVEL_OUTOF10: 5

## 2021-03-31 ASSESSMENT — PAIN DESCRIPTION - FREQUENCY
FREQUENCY: CONTINUOUS
FREQUENCY: CONTINUOUS

## 2021-03-31 ASSESSMENT — PAIN DESCRIPTION - PROGRESSION
CLINICAL_PROGRESSION: NOT CHANGED

## 2021-03-31 ASSESSMENT — PAIN DESCRIPTION - PAIN TYPE
TYPE: ACUTE PAIN
TYPE: ACUTE PAIN

## 2021-03-31 ASSESSMENT — PAIN DESCRIPTION - LOCATION
LOCATION: FLANK
LOCATION: FLANK

## 2021-03-31 ASSESSMENT — PAIN DESCRIPTION - ONSET: ONSET: ON-GOING

## 2021-03-31 ASSESSMENT — PAIN DESCRIPTION - ORIENTATION: ORIENTATION: RIGHT

## 2021-03-31 NOTE — PLAN OF CARE
Problem: Falls - Risk of:  Goal: Will remain free from falls  Description: Will remain free from falls  Outcome: Ongoing  Goal: Absence of physical injury  Description: Absence of physical injury  Outcome: Ongoing     Problem: Fluid Volume - Imbalance:  Goal: Absence of imbalanced fluid volume signs and symptoms  Description: Absence of imbalanced fluid volume signs and symptoms  Outcome: Ongoing   Aox4, Vss stable. Patient complained of significant pain however, he declined narcotic pain meds. He was visible uncomfortable, constantly changing positions to try and find comfort. Ice packs used as non-pharmacological interventions. Nursing cares continues.

## 2021-03-31 NOTE — PLAN OF CARE
Problem: Falls - Risk of:  Goal: Will remain free from falls  Description: Will remain free from falls  3/31/2021 0930 by Ara Garcia RN  Outcome: Met This Shift     Problem: Falls - Risk of:  Goal: Absence of physical injury  Description: Absence of physical injury  3/31/2021 0930 by Ara Garcia RN  Outcome: Met This Shift     Problem: Discharge Planning:  Goal: Discharged to appropriate level of care  Description: Discharged to appropriate level of care  Outcome: Met This Shift     Problem: Fluid Volume - Imbalance:  Goal: Absence of imbalanced fluid volume signs and symptoms  Description: Absence of imbalanced fluid volume signs and symptoms  3/31/2021 0930 by Ara Garcia RN  Outcome: Met This Shift     Problem: Tissue Perfusion, Altered:  Goal: Circulatory function within specified parameters  Description: Circulatory function within specified parameters  Outcome: Met This Shift     Problem: Tissue Perfusion - Cardiopulmonary, Altered:  Goal: Absence of angina  Description: Absence of angina  Outcome: Met This Shift     Problem: Pain:  Goal: Pain level will decrease  Description: Pain level will decrease  Outcome: Met This Shift

## 2021-03-31 NOTE — PROGRESS NOTES
Called transfer/access line for update, stated they had everything they needed and would be in touch with a bed when available

## 2021-03-31 NOTE — CARE COORDINATION
Prepared face sheet and ambulance transfer form for patient's transfer to [facility]. Placed in lite chart. Ambulance form routed to physician's ambulance via [address].  Otis MSN, RN  [facility] Case Management  [phone]

## 2021-03-31 NOTE — PROGRESS NOTES
3/31/2021 10:14 AM  Service: Urology  Group: [facility] (Elpidio/David Batista)    Luis Carlos [place]  [id number]    Chief urologic compliant: Right hydronephrosis  HPI:  Patient is doing ok    Review of Systems:  Respiratory: negative for cough and hemoptysis  Cardiovascular: negative for chest pain and dyspnea  Gastrointestinal: negative for abdominal pain, diarrhea, nausea and vomiting  Derm: negative for rash and skin lesion(s)  Neurological: negative for seizures and tremors  Endocrine: negative for diabetic symptoms including polydipsia and polyuria  : As above in the HPI, otherwise negative  All other reviews are negative     Allergies: Patient has no known allergies.     Objective:   Vitals:    03/31/21 0800   BP: 122/61   Pulse: 83   Resp: 18   Temp: 98.4 °F (36.9 °C)   SpO2: 95%       Neuro: A/A/O x3  Respiratory: non labored breathing  ABD: soft non-tender, non-distended  : varner no  Ext: no clubbing, cyanosis, edema    Labs:   Recent Labs     03/29/21 1855 03/30/21 0443 03/31/21  0323   WBC 8.1 8.5 9.5   RBC 2.92* 3.41* 3.62*   HGB 7.2* 8.4* 8.9*   HCT 23.9* 27.5* 28.8*   MCV 81.8 80.6 79.6*   MCH 24.7* 24.6* 24.6*   MCHC 30.1* 30.5* 30.9*   RDW 15.0 14.9 14.8    321 333   MPV 9.4 9.8 8.9       Recent Labs     03/29/21 1855 03/30/21 0443 03/31/21  0323   CREATININE 1.7* 1.6* 1.3*       Assessment: Josieord [place] 80 y.o. male     Right hydronephrosis post right stent secondary to mass from B cell lymphoma   ARF, improving    Plan:    CT was reviewed  Cont the stent  Cont to watch the Cr  Hold on PNT  Chemo per oncology     Tabatha Bullard, DO   [facility]

## 2021-03-31 NOTE — DISCHARGE SUMMARY
Physician Discharge Summary     Patient ID:  Rika Dk  [id number]  32 y.o.  1950    Admit date: 3/29/2021  Discharge date and time: 3/31/21    Admitting Physician: Oniel Byers MD     Admission Diagnoses: Dehydration [E86.0]  Discharge Diagnoses: Principal Problem:    Dehydration  Active Problems:    Coronary artery disease    Prolonged Q-T interval on ECG    Hypotension    CKD (chronic kidney disease) stage 3, GFR 30-59 ml/min    Paroxysmal atrial fibrillation (HCC)    Diffuse large B-cell lymphoma ([facility] [address]  Resolved Problems:    * No resolved hospital problems. *      Admission Condition: fair  Discharged Condition: stable    Indication for Admission: SUNNY     Hospital Course/Procedures/Operation/Treatments:   Patient presented 3/29/21 for SUNNY after laparoscopic right hemicolectomy on 3/13/2021. He was also found to have worsening hydronephrosis and increasing abdominal tumor burden. Medicine/cardiology was consulted to aid in medical management. Urology was consulted who recommended potential nephrostomy tube placement pending labs and urine output. Oncology was consulted who were concerned for third hit and risk of developing tumor lysis syndrome, so patient is being transferred downtown to oncology unit and planning port placement for inpatient chemo as soon as able. Surgeries/Procedures Performed:       Consults:   IP CONSULT TO HOSPITALIST  IP CONSULT TO ONCOLOGY  IP CONSULT TO CARDIOLOGY  IP CONSULT TO UROLOGY  IP CONSULT TO HOSPITALIST  IP CONSULT TO UROLOGY    Time Spent on Discharge:  20 minutes were spent in patient examination, evaluation, counseling as well as medication reconciliation, prescriptions for required medications, discharge plan and follow up.       Significant Diagnostic Studies:   Recent Labs:  CBC:   Lab Results   Component Value Date    WBC 9.5 03/31/2021    RBC 3.62 03/31/2021    HGB 8.9 03/31/2021    HCT 28.8 03/31/2021    MCV 79.6 03/31/2021    MCH 24.6 03/31/2021 MCHC 30.9 03/31/2021    RDW 14.8 03/31/2021     03/31/2021     BMP:    Lab Results   Component Value Date    GLUCOSE 101 03/31/2021    GLUCOSE 100 01/28/2011     03/31/2021    K 4.8 03/31/2021    CL 97 03/31/2021    CO2 28 03/31/2021    ANIONGAP 9 03/31/2021    BUN 18 03/31/2021    CREATININE 1.3 03/31/2021    CALCIUM 8.9 03/31/2021    LABGLOM >60 03/31/2021    GFRAA >60 03/31/2021     Radiology:  Xr Abdomen (kub) (single Ap View)    Result Date: 3/30/2021  EXAMINATION: ONE SUPINE XRAY VIEW(S) OF THE ABDOMEN 3/30/2021 3:24 pm COMPARISON: Correlation is made with the prior CT of the abdomen and pelvis HISTORY: ORDERING SYSTEM PROVIDED HISTORY: Assess for calcification/encrustation of ureteral stent TECHNOLOGIST PROVIDED HISTORY: Reason for exam:->Assess for calcification/encrustation of ureteral stent FINDINGS: Right ureteral stent in place. No calcification noted along the course of the stent. Residual contrast in nondilated colon. Atherosclerotic calcifications. Prominent degenerative changes of the lumbar spine. Right ureteral stent in place with no calcification noted along its course. Residual contrast in nondilated colon. Ct Abdomen Pelvis W Iv Contrast Additional Contrast? Oral    Result Date: 3/30/2021  EXAMINATION: CT OF THE ABDOMEN AND PELVIS WITH CONTRAST 3/29/2021 10:28 pm TECHNIQUE: CT of the abdomen and pelvis was performed with the administration of intravenous contrast. Multiplanar reformatted images are provided for review. Dose modulation, iterative reconstruction, and/or weight based adjustment of the mA/kV was utilized to reduce the radiation dose to as low as reasonably achievable.  COMPARISON: 02/18/2021 HISTORY: ORDERING SYSTEM PROVIDED HISTORY: eval postop right hemicolectomy for lymphoma, rule out leak, dehydration TECHNOLOGIST PROVIDED HISTORY: Reason for exam:->eval postop right hemicolectomy for lymphoma, rule out leak, dehydration Additional Contrast?->Oral FINDINGS: Lower Chest: There is pleural base nodularity in the left lower lobe which is not seen on the prior. I cannot exclude a metastatic lesion. This measures about 11 mm. Abdomen: The liver, spleen, pancreas, adrenal glands and left kidney are unremarkable. There is moderate right hydronephrosis which has worsened. There is a right ureteral stent present. This extends into the bladder. Worsening hydronephrosis suggest stent malfunction. There are innumerable nodular masslike areas of density involving small bowel loops. Findings are consistent with multiple masses. There is a short segment small bowel intussusception in the anterior left abdomen. There are no findings of intestinal obstruction. Patient is status post right hemicolectomy. There is extensive worsened lymphadenopathy in the retroperitoneum, celiac region, mesentery. There are aortoiliac atherosclerotic calcification. There is no abdominal aortic aneurysm. There is no free intraperitoneal air. There is no abnormal fluid collection. Pelvis:  Bladder is unremarkable in appearance. There is a large mass in the right iliac region surrounding the right ureter/stent. This is probably lymphadenopathy given patient's history of lymphoma. Bones/Soft Tissues: No acute abnormality     1. Extensive new/worsened lymphadenopathy throughout the retroperitoneum, in the mesentery and celiac region. There is also large mass in the iliac region of the pelvis which is larger as compared to prior. Findings likely relate to provided history of lymphoma. 2. Innumerable masses involving the small bowel which are new since the prior. This also probably reflects lymphoma. 3. Short segment intussusception involving small bowel in the anterior left abdomen. No associated obstruction. 4. Worsened right hydronephrosis. This likely indicates malfunction of the right ureteral stent.   Note that the pelvic mass indicated above surrounds the distal right ureter/stent. 5. New 11 mm pleural base nodule in the left lower lobe could be related to metastatic disease/malignancy. Discharge Exam:  /74   Pulse 81   Temp 97.6 °F (36.4 °C) (Axillary)   Resp 20   Ht 5' 7\" (1.702 m)   Wt 200 lb 1.6 oz (90.8 kg)   SpO2 97%   BMI 31.34 kg/m²   Temp (24hrs), Av.9 °F (36.6 °C), Min:97.6 °F (36.4 °C), Max:98.1 °F (36.7 °C)        I/O (24Hr):  I/O last 3 completed shifts: In: 2100 [I.V.:2100]  Out: 1400 [Urine:1400]      GENERAL:  No acute distress. Alert and interactive. LUNGS:  No cough. Nonlabored breathing on room air. CARDIOVASC:  Normal rate, no cyanosis. ABDOMEN:  Soft, non-distended, R flank tender. EXTREMITIES:  No edema, no deformities. Disposition: [facility] oncology unit    In process/preliminary results:  Outstanding Order Results     No orders found from 2021 to 3/30/2021.           Patient Instructions:   Current Discharge Medication List           Details   ibuprofen (ADVIL;MOTRIN) 800 MG tablet TAKE 1 TABLET BY MOUTH EVERY 6 HOURS AS NEEDED FOR PAIN  Qty: 20 tablet, Refills: 0      ondansetron (ZOFRAN ODT) 4 MG disintegrating tablet Take 1 tablet by mouth every 8 hours as needed for Nausea or Vomiting  Qty: 30 tablet, Refills: 0      lisinopril-hydroCHLOROthiazide (PRINZIDE;ZESTORETIC) 10-12.5 MG per tablet Take 1 tablet by mouth daily      amLODIPine (NORVASC) 5 MG tablet Take 5 mg by mouth daily      aspirin 81 MG EC tablet Take 81 mg by mouth daily      vitamin B-12 (CYANOCOBALAMIN) 100 MCG tablet Take 50 mcg by mouth daily      sennosides-docusate sodium (SENOKOT-S) 8.6-50 MG tablet Take 1 tablet by mouth 2 times daily      sotalol (BETAPACE) 80 MG tablet Take 40 mg by mouth      tamsulosin (FLOMAX) 0.4 MG capsule Take 0.4 mg by mouth daily       ferrous sulfate 325 (65 Fe) MG tablet Take 325 mg by mouth 2 times daily       esomeprazole Magnesium (NEXIUM) 20 MG PACK Take 20 mg by mouth daily      PRAVASTATIN SODIUM PO Take 20 mg by mouth daily       albuterol sulfate  (90 BASE) MCG/ACT inhaler Inhale 2 puffs into the lungs as needed for Wheezing           Discharge/readmit.     Signed:  David Marrero MD  3/31/2021  7:19 AM

## 2021-03-31 NOTE — CARE COORDINATION
[place] Department Medicaid  CERTIFICATION OF NECESSITY  FOR NON-EMERGENCY TRANSPORTATION   BY GROUND AMBULANCE      Individual Information   1. Name: Phillip Gutierrez 2. [place] Medicaid Billing Number:     3. Address: [address] [place] [address]      Transportation Provider Information   4. Provider Name:     5. [place] Medicaid Provider Number:   National Provider Identifier (NPI):       Certification  7. Criteria:  During transport, this individual requires:  [x] Medical treatment or continuous     supervision by an EMT. [] The administration or regulation of oxygen by another person. [] Supervised protective restraint. 8. Period Beginning Date: 3/31/21   9. Length  [x] Not more than 1 day(s)  [] One Year     Additional Information Relevant to Certification   10. Comments or Explanations, If Necessary or Appropriate     Acute hospital to hospital transfer, Diffuse B-Cell [address] Practitioner Information   11. Name of Practitioner: Dr. Cody Frias   12. [place] Medicaid Provider Number, If Applicable:   [address] Provider Identifier (NPI): [id number]     Signature Information   14. Date of Signature: 3/31/21 15. Name of Person Signing: Maine Petersen RN   16. Signature and Professional Designation: Sada Berg, MSN, RN  [facility] Case Management - Discharge Planner  [phone]       [facility] [address]  Rev. 7/2015

## 2021-03-31 NOTE — PROGRESS NOTES
Dr Nicho Hussein notified that access center still had questions regarding transfer and is awaiting a call back for information. Number provided to call.

## 2021-03-31 NOTE — PROGRESS NOTES
GENERAL SURGERY  DAILY PROGRESS NOTE    Date:3/31/2021       LJED:[id number]  Patient Name:Khoi Chaudhary     YOB: 1950     Age:71 y.o. Chief Complaint:  Nausea, dizziness    Subjective:  Still having the same right flank pain. Had a bowel movement. Objective:  /74   Pulse 81   Temp 97.6 °F (36.4 °C) (Axillary)   Resp 20   Ht 5' 7\" (1.702 m)   Wt 200 lb 1.6 oz (90.8 kg)   SpO2 97%   BMI 31.34 kg/m²   Temp (24hrs), Av.9 °F (36.6 °C), Min:97.6 °F (36.4 °C), Max:98.1 °F (36.7 °C)      I/O (24Hr):  I/O last 3 completed shifts: In: 2100 [I.V.:2100]  Out: 1400 [Urine:1400]     GENERAL:  No acute distress. Alert and interactive. LUNGS:  No cough. Nonlabored breathing on room air. CARDIOVASC:  Normal rate, no cyanosis. ABDOMEN:  Soft, non-distended, R flank tender. EXTREMITIES:  No edema, no deformities. Cr almost normalized, BUN is clear  Urine output 0.4 per shift    Assessment:  80 y.o. male with diffuse large B-cell lymphoma with extensive abdominal component, hydronephrosis, dehydration/SUNNY, s/p R hemicolectomy 3/13.     Plan:  Diet as tolerated, NPO today for possible procedure  Possible nephrostomy pending progress, per urology  Needs mediport placed as inpatient as soon as able, per oncology  Transferring downtown to oncology unit  Appreciate medicine/cardiology aid in management    Electronically signed by [place] MD on 3/31/2021 at 6:37 AM

## 2021-03-31 NOTE — PROGRESS NOTES
Room number and phone number provided by access line. Attempted to call nurse to nurse to room 718 A. Stated they already have a patient in that room. Called access line to update. apologized for miscommunication and stated they would call back with a new room number when available.

## 2021-03-31 NOTE — PROGRESS NOTES
[facility]  Hematology/Oncology  Consult      Patient Name: Rhoda Rey  YOB: 1950  PCP: Prem Montgomery DO   Referring Provider: [place] [facility] [address] [facility] [id number]     Reason for Consultation: No chief complaint on file. Subjective: Feels ok today. Still with back and abdominal pain. He is also very fatigued. History of Present Illness: This is a 71-year-old male patient with a PMH history of BPH, HTN, and CAD who presented with with nausea as a direct admission by surgery. Patient has history of recent right hemicolectomy for colon mass on 3/11/21. Final pathology came back consistent with diffuse large B-cell lymphoma. Patient is now having nausea and dizziness since procedure. CT A/P showed extensive new/worsened lymphadenopathy throughout the retroperitoneum, in the mesentery and celiac region. There is also large mass in the iliac region of the pelvis which is larger as compared to prior. Innumerable masses involving the small bowel which are new since the prior. Short segment intussusception involving small bowel in the anterior left abdomen with no associated obstruction. Worsened right hydronephrosis. Note that the pelvic mass indicated above surrounds the distal right ureter/stent. New 11 mm pleural base nodule in the left lower lobe. Consultation for evaluation and recommendations for new dx of lymphoma.        Diagnostic Data:     Past Medical History:   Diagnosis Date    Arthritis     KNEES HIPS BACK    BPH (benign prostatic hyperplasia)     Coronary artery disease     Difficult airway     PT STATES HE WAS TOLD THIS TWICE AT [facility]    Difficult intubation 04/2017    note in care everywhere \"Clinical Summary\" 03/10/2021    Hyperlipidemia     Hypertension     Sinus tachycardia 01/01/2002       Patient Active Problem List    Diagnosis Date Noted    Diffuse large B cell lymphoma ([facility] [address] 03/31/2021    Diffuse large B-cell lymphoma ([facility] [address] 03/30/2021    Dehydration 03/29/2021    Prolonged Q-T interval on ECG 03/29/2021    Hypotension 03/29/2021    CKD (chronic kidney disease) stage 3, GFR 30-59 ml/min 03/29/2021    Paroxysmal atrial fibrillation ([address] 03/29/2021    Colonic mass 03/11/2021    Hypertension     Hyperlipidemia     Coronary artery disease     Hematuria 01/16/2018    BPH (benign prostatic hyperplasia) 01/16/2018        Past Surgical History:   Procedure Laterality Date    ABLATION OF DYSRHYTHMIC FOCUS      AORTA SURGERY      aortic valve replacement    AORTIC VALVE REPLACEMENT      BLADDER SURGERY Right 2/17/2021    CYSTOSCOPY BILATERAL RETROGRADE PYELOGRAM RIGHT STENT INSERTION performed by Alex Tinsley MD at [address]  2/19/2021    COLONOSCOPY WITH BIOPSY performed by Vane Lira DO at [facility] 9  2/19/2021    COLONOSCOPY W/ ENDOSCOPIC MUCOSAL RESECTION performed by Vane Lira DO at [address] N/A 3/11/2021    LAPAROSCOPIC RIGHT HEMICOLECTOMY performed by Amisha Ca MD at [address] OTHER SURGICAL HISTORY  08/12/2016    CT Myelogram, Dr. Ben Lorenzo  2014 new battery    last checked Dr Rodriguez [place] 1/2016?  TONSILLECTOMY      UPPER GASTROINTESTINAL ENDOSCOPY      reflux    UPPER GASTROINTESTINAL ENDOSCOPY N/A 2/16/2021    EGD BIOPSY performed by Veva Paget, MD at [address] [facility]         Family History  Family History   Problem Relation Age of Onset    Diabetes Mother     Stroke Mother     Diabetes Father     Heart Disease Father     Brain Cancer Sister     Stroke Sister     Stroke Brother     Cancer Maternal Grandfather        Social History    TOBACCO:   reports that he quit smoking about 7 years ago. He has a 44.00 pack-year smoking history.  He has never used smokeless tobacco.  ETOH:   reports previous alcohol use of about 2.0 standard drinks of  Cardiovascular: No chest discomfort, dyspnea on exertion, palpitations or loss of consciousness. or phlebitis.  Respiratory: Has no cough or wheezing, Has no sputum production. Has no hemoptysis, Has no pleuritic pain, .  Gastrointestinal: No abdominal pain, appetite loss, blood in stools. No change in bowel habits. No hematemesis    Genitourinary: Patient acknowledges no dysuria, trouble voiding, or hematuria. No nocturia or increased frequency.  Musculoskeletal: No gait disturbance, weakness or joint complaints.  Integumentary: No rash or pruritis.  Neurological: No headache, diplopia, change in muscle strength, numbness or tingling. No change in gait, balance, coordination, mood, affect, memory, mentation, behavior.  Psychiatric: No anxiety, or depression.  Endocrine: No temperature intolerance. No excessive thirst, fluid intake, or urination. No tremor.  Hematologic/Lymphatic: No abnormal bruising or bleeding, blood clots or swollen lymph nodes.  Allergic/Immunologic: No nasal congestion or hives. Objective  /76   Pulse 90   Temp 97.8 °F (36.6 °C) (Oral)   Resp 20   Ht 5' 7\" (1.702 m)   Wt 200 lb 1.6 oz (90.8 kg)   SpO2 95%   BMI 31.34 kg/m²     Physical Exam:     General: AAO to person, place, time, in no acute distress,   Head and neck : PERRLA, EOMI . Sclera non icteric. Oropharynx : Clear  Neck: no JVD,  no adenopathy  Heart: Regular rate and regular rhythm, no murmur  Lungs: Clear to auscultation   Extremities: No edema,no cyanosis, no clubbing. Abdomen: Soft, tenderness;no masses, no organomegaly  Skin:  No rash very dry and flaky. Neurologic:Cranial nerves grossly intact. No focal motor or sensory deficits .     Recent Laboratory Data-   Lab Results   Component Value Date    WBC 9.5 03/31/2021    HGB 8.9 (L) 03/31/2021    HCT 28.8 (L) 03/31/2021    MCV 79.6 (L) 03/31/2021     03/31/2021    LYMPHOPCT 6.8 (L) 03/31/2021    RBC 3.62 (L) 03/31/2021 MCH 24.6 (L) 03/31/2021    MCHC 30.9 (L) 03/31/2021    RDW 14.8 03/31/2021    NEUTOPHILPCT 78.2 03/31/2021    MONOPCT 11.5 03/31/2021    BASOPCT 0.7 03/31/2021    NEUTROABS 7.41 (H) 03/31/2021    LYMPHSABS 0.65 (L) 03/31/2021    MONOSABS 1.09 (H) 03/31/2021    EOSABS 0.23 03/31/2021    BASOSABS 0.07 03/31/2021       Lab Results   Component Value Date     03/31/2021    K 4.8 03/31/2021    CL 97 (L) 03/31/2021    CO2 28 03/31/2021    BUN 18 03/31/2021    CREATININE 1.3 (H) 03/31/2021    GLUCOSE 101 (H) 03/31/2021    CALCIUM 8.9 03/31/2021    PROT 6.3 (L) 03/31/2021    LABALBU 2.5 (L) 03/31/2021    BILITOT 0.3 03/31/2021    ALKPHOS 75 03/31/2021    AST 19 03/31/2021    ALT <5 03/31/2021    LABGLOM >60 03/31/2021    GFRAA >60 03/31/2021       Lab Results   Component Value Date    IRON 38 (L) 02/16/2021    TIBC 248 (L) 02/16/2021           Radiology-    Ct Abdomen Pelvis W Iv Contrast Additional Contrast? Oral    Result Date: 3/30/2021  EXAMINATION: CT OF THE ABDOMEN AND PELVIS WITH CONTRAST 3/29/2021 10:28 pm TECHNIQUE: CT of the abdomen and pelvis was performed with the administration of intravenous contrast. Multiplanar reformatted images are provided for review. Dose modulation, iterative reconstruction, and/or weight based adjustment of the mA/kV was utilized to reduce the radiation dose to as low as reasonably achievable. COMPARISON: 02/18/2021 HISTORY: ORDERING SYSTEM PROVIDED HISTORY: eval postop right hemicolectomy for lymphoma, rule out leak, dehydration TECHNOLOGIST PROVIDED HISTORY: Reason for exam:->eval postop right hemicolectomy for lymphoma, rule out leak, dehydration Additional Contrast?->Oral FINDINGS: Lower Chest: There is pleural base nodularity in the left lower lobe which is not seen on the prior. I cannot exclude a metastatic lesion. This measures about 11 mm. Abdomen: The liver, spleen, pancreas, adrenal glands and left kidney are unremarkable.  There is moderate right hydronephrosis which has worsened. There is a right ureteral stent present. This extends into the bladder. Worsening hydronephrosis suggest stent malfunction. There are innumerable nodular masslike areas of density involving small bowel loops. Findings are consistent with multiple masses. There is a short segment small bowel intussusception in the anterior left abdomen. There are no findings of intestinal obstruction. Patient is status post right hemicolectomy. There is extensive worsened lymphadenopathy in the retroperitoneum, celiac region, mesentery. There are aortoiliac atherosclerotic calcification. There is no abdominal aortic aneurysm. There is no free intraperitoneal air. There is no abnormal fluid collection. Pelvis:  Bladder is unremarkable in appearance. There is a large mass in the right iliac region surrounding the right ureter/stent. This is probably lymphadenopathy given patient's history of lymphoma. Bones/Soft Tissues: No acute abnormality     1. Extensive new/worsened lymphadenopathy throughout the retroperitoneum, in the mesentery and celiac region. There is also large mass in the iliac region of the pelvis which is larger as compared to prior. Findings likely relate to provided history of lymphoma. 2. Innumerable masses involving the small bowel which are new since the prior. This also probably reflects lymphoma. 3. Short segment intussusception involving small bowel in the anterior left abdomen. No associated obstruction. 4. Worsened right hydronephrosis. This likely indicates malfunction of the right ureteral stent. Note that the pelvic mass indicated above surrounds the distal right ureter/stent. 5. New 11 mm pleural base nodule in the left lower lobe could be related to metastatic disease/malignancy.          ASSESSMENT/PLAN :  67-year-old male     -BPH, HTN, and CAD  -Newly dx DLBCL    -S/p right hemicolectomy for colon mass on 3/11/21.    -Final pathology consistent with diffuse large B-cell 3/31/2021 at 1:33 PM   Patient seen and examined, note edited to reflect above.   Tip Lawrence MD

## 2021-04-01 ENCOUNTER — ANESTHESIA (OUTPATIENT)
Dept: OPERATING ROOM | Age: 71
DRG: 829 | End: 2021-04-01
Payer: MEDICARE

## 2021-04-01 ENCOUNTER — ANESTHESIA EVENT (OUTPATIENT)
Dept: OPERATING ROOM | Age: 71
DRG: 829 | End: 2021-04-01
Payer: MEDICARE

## 2021-04-01 ENCOUNTER — APPOINTMENT (OUTPATIENT)
Dept: GENERAL RADIOLOGY | Age: 71
DRG: 829 | End: 2021-04-01
Attending: SURGERY
Payer: MEDICARE

## 2021-04-01 VITALS — SYSTOLIC BLOOD PRESSURE: 127 MMHG | DIASTOLIC BLOOD PRESSURE: 71 MMHG | OXYGEN SATURATION: 100 %

## 2021-04-01 PROBLEM — E43 SEVERE PROTEIN-CALORIE MALNUTRITION (HCC): Status: ACTIVE | Noted: 2021-04-01

## 2021-04-01 LAB
ALBUMIN SERPL-MCNC: 2.3 G/DL (ref 3.5–5.2)
ALP BLD-CCNC: 62 U/L (ref 40–129)
ALT SERPL-CCNC: <5 U/L (ref 0–40)
ANION GAP SERPL CALCULATED.3IONS-SCNC: 15 MMOL/L (ref 7–16)
AST SERPL-CCNC: 13 U/L (ref 0–39)
BASOPHILS ABSOLUTE: 0.05 E9/L (ref 0–0.2)
BASOPHILS RELATIVE PERCENT: 0.5 % (ref 0–2)
BILIRUB SERPL-MCNC: 0.4 MG/DL (ref 0–1.2)
BILIRUBIN DIRECT: <0.2 MG/DL (ref 0–0.3)
BILIRUBIN, INDIRECT: ABNORMAL MG/DL (ref 0–1)
BUN BLDV-MCNC: 16 MG/DL (ref 8–23)
CALCIUM SERPL-MCNC: 8.7 MG/DL (ref 8.6–10.2)
CHLORIDE BLD-SCNC: 97 MMOL/L (ref 98–107)
CO2: 25 MMOL/L (ref 22–29)
CREAT SERPL-MCNC: 1.2 MG/DL (ref 0.7–1.2)
EOSINOPHILS ABSOLUTE: 0.22 E9/L (ref 0.05–0.5)
EOSINOPHILS RELATIVE PERCENT: 2.2 % (ref 0–6)
GFR AFRICAN AMERICAN: >60
GFR NON-AFRICAN AMERICAN: >60 ML/MIN/1.73
GLUCOSE BLD-MCNC: 63 MG/DL (ref 74–99)
HCT VFR BLD CALC: 28.5 % (ref 37–54)
HEMOGLOBIN: 8.7 G/DL (ref 12.5–16.5)
IMMATURE GRANULOCYTES #: 0.08 E9/L
IMMATURE GRANULOCYTES %: 0.8 % (ref 0–5)
LACTATE DEHYDROGENASE: 328 U/L (ref 135–225)
LYMPHOCYTES ABSOLUTE: 0.7 E9/L (ref 1.5–4)
LYMPHOCYTES RELATIVE PERCENT: 7.1 % (ref 20–42)
MCH RBC QN AUTO: 24.9 PG (ref 26–35)
MCHC RBC AUTO-ENTMCNC: 30.5 % (ref 32–34.5)
MCV RBC AUTO: 81.7 FL (ref 80–99.9)
MONOCYTES ABSOLUTE: 1.12 E9/L (ref 0.1–0.95)
MONOCYTES RELATIVE PERCENT: 11.4 % (ref 2–12)
NEUTROPHILS ABSOLUTE: 7.64 E9/L (ref 1.8–7.3)
NEUTROPHILS RELATIVE PERCENT: 78 % (ref 43–80)
PDW BLD-RTO: 14.8 FL (ref 11.5–15)
PHOSPHORUS: 3.1 MG/DL (ref 2.5–4.5)
PLATELET # BLD: 310 E9/L (ref 130–450)
PMV BLD AUTO: 9.6 FL (ref 7–12)
POTASSIUM REFLEX MAGNESIUM: 4.9 MMOL/L (ref 3.5–5)
RBC # BLD: 3.49 E12/L (ref 3.8–5.8)
SODIUM BLD-SCNC: 137 MMOL/L (ref 132–146)
TOTAL PROTEIN: 6 G/DL (ref 6.4–8.3)
URIC ACID, SERUM: 9 MG/DL (ref 3.4–7)
WBC # BLD: 9.8 E9/L (ref 4.5–11.5)

## 2021-04-01 PROCEDURE — B518ZZZ FLUOROSCOPY OF SUPERIOR VENA CAVA: ICD-10-PCS | Performed by: SURGERY

## 2021-04-01 PROCEDURE — 36415 COLL VENOUS BLD VENIPUNCTURE: CPT

## 2021-04-01 PROCEDURE — 2500000003 HC RX 250 WO HCPCS: Performed by: SURGERY

## 2021-04-01 PROCEDURE — 2709999900 HC NON-CHARGEABLE SUPPLY: Performed by: SURGERY

## 2021-04-01 PROCEDURE — 6360000002 HC RX W HCPCS: Performed by: NURSE ANESTHETIST, CERTIFIED REGISTERED

## 2021-04-01 PROCEDURE — 71045 X-RAY EXAM CHEST 1 VIEW: CPT

## 2021-04-01 PROCEDURE — 84100 ASSAY OF PHOSPHORUS: CPT

## 2021-04-01 PROCEDURE — 83615 LACTATE (LD) (LDH) ENZYME: CPT

## 2021-04-01 PROCEDURE — 02HV33Z INSERTION OF INFUSION DEVICE INTO SUPERIOR VENA CAVA, PERCUTANEOUS APPROACH: ICD-10-PCS | Performed by: SURGERY

## 2021-04-01 PROCEDURE — 6360000002 HC RX W HCPCS: Performed by: SURGERY

## 2021-04-01 PROCEDURE — C9113 INJ PANTOPRAZOLE SODIUM, VIA: HCPCS | Performed by: SURGERY

## 2021-04-01 PROCEDURE — 1200000000 HC SEMI PRIVATE

## 2021-04-01 PROCEDURE — 2580000003 HC RX 258: Performed by: NURSE ANESTHETIST, CERTIFIED REGISTERED

## 2021-04-01 PROCEDURE — 6360000004 HC RX CONTRAST MEDICATION

## 2021-04-01 PROCEDURE — 6370000000 HC RX 637 (ALT 250 FOR IP): Performed by: STUDENT IN AN ORGANIZED HEALTH CARE EDUCATION/TRAINING PROGRAM

## 2021-04-01 PROCEDURE — 2580000003 HC RX 258: Performed by: SURGERY

## 2021-04-01 PROCEDURE — 2580000003 HC RX 258: Performed by: INTERNAL MEDICINE

## 2021-04-01 PROCEDURE — 3600000003 HC SURGERY LEVEL 3 BASE: Performed by: SURGERY

## 2021-04-01 PROCEDURE — 85025 COMPLETE CBC W/AUTO DIFF WBC: CPT

## 2021-04-01 PROCEDURE — 7100000001 HC PACU RECOVERY - ADDTL 15 MIN: Performed by: SURGERY

## 2021-04-01 PROCEDURE — 3209999900 FLUORO FOR SURGICAL PROCEDURES

## 2021-04-01 PROCEDURE — 6370000000 HC RX 637 (ALT 250 FOR IP): Performed by: INTERNAL MEDICINE

## 2021-04-01 PROCEDURE — 3600000013 HC SURGERY LEVEL 3 ADDTL 15MIN: Performed by: SURGERY

## 2021-04-01 PROCEDURE — 7100000000 HC PACU RECOVERY - FIRST 15 MIN: Performed by: SURGERY

## 2021-04-01 PROCEDURE — 80076 HEPATIC FUNCTION PANEL: CPT

## 2021-04-01 PROCEDURE — 84550 ASSAY OF BLOOD/URIC ACID: CPT

## 2021-04-01 PROCEDURE — 6360000004 HC RX CONTRAST MEDICATION: Performed by: INTERNAL MEDICINE

## 2021-04-01 PROCEDURE — 0JH60WZ INSERTION OF TOTALLY IMPLANTABLE VASCULAR ACCESS DEVICE INTO CHEST SUBCUTANEOUS TISSUE AND FASCIA, OPEN APPROACH: ICD-10-PCS | Performed by: SURGERY

## 2021-04-01 PROCEDURE — 6360000002 HC RX W HCPCS: Performed by: STUDENT IN AN ORGANIZED HEALTH CARE EDUCATION/TRAINING PROGRAM

## 2021-04-01 PROCEDURE — 71046 X-RAY EXAM CHEST 2 VIEWS: CPT

## 2021-04-01 PROCEDURE — 3700000001 HC ADD 15 MINUTES (ANESTHESIA): Performed by: SURGERY

## 2021-04-01 PROCEDURE — C1788 PORT, INDWELLING, IMP: HCPCS | Performed by: SURGERY

## 2021-04-01 PROCEDURE — 80048 BASIC METABOLIC PNL TOTAL CA: CPT

## 2021-04-01 PROCEDURE — 6360000002 HC RX W HCPCS: Performed by: INTERNAL MEDICINE

## 2021-04-01 PROCEDURE — 6370000000 HC RX 637 (ALT 250 FOR IP): Performed by: SURGERY

## 2021-04-01 PROCEDURE — 3700000000 HC ANESTHESIA ATTENDED CARE: Performed by: SURGERY

## 2021-04-01 PROCEDURE — 93308 TTE F-UP OR LMTD: CPT

## 2021-04-01 PROCEDURE — 2580000003 HC RX 258: Performed by: STUDENT IN AN ORGANIZED HEALTH CARE EDUCATION/TRAINING PROGRAM

## 2021-04-01 DEVICE — PORT INFUS 8FR PWR INJ CT FOR VASC ACCS CATH: Type: IMPLANTABLE DEVICE | Site: CHEST | Status: FUNCTIONAL

## 2021-04-01 RX ORDER — FENTANYL CITRATE 50 UG/ML
INJECTION, SOLUTION INTRAMUSCULAR; INTRAVENOUS PRN
Status: DISCONTINUED | OUTPATIENT
Start: 2021-04-01 | End: 2021-04-01 | Stop reason: SDUPTHER

## 2021-04-01 RX ORDER — MEPERIDINE HYDROCHLORIDE 25 MG/ML
12.5 INJECTION INTRAMUSCULAR; INTRAVENOUS; SUBCUTANEOUS ONCE
Status: DISCONTINUED | OUTPATIENT
Start: 2021-04-01 | End: 2021-04-01

## 2021-04-01 RX ORDER — SODIUM CHLORIDE 9 MG/ML
INJECTION, SOLUTION INTRAVENOUS CONTINUOUS PRN
Status: DISCONTINUED | OUTPATIENT
Start: 2021-04-01 | End: 2021-04-01 | Stop reason: SDUPTHER

## 2021-04-01 RX ORDER — DEXAMETHASONE SODIUM PHOSPHATE 4 MG/ML
10 INJECTION, SOLUTION INTRA-ARTICULAR; INTRALESIONAL; INTRAMUSCULAR; INTRAVENOUS; SOFT TISSUE ONCE
Status: COMPLETED | OUTPATIENT
Start: 2021-04-01 | End: 2021-04-01

## 2021-04-01 RX ORDER — ACETAMINOPHEN 325 MG/1
650 TABLET ORAL ONCE
Status: DISCONTINUED | OUTPATIENT
Start: 2021-04-01 | End: 2021-04-01

## 2021-04-01 RX ORDER — MIDAZOLAM HYDROCHLORIDE 1 MG/ML
INJECTION INTRAMUSCULAR; INTRAVENOUS PRN
Status: DISCONTINUED | OUTPATIENT
Start: 2021-04-01 | End: 2021-04-01 | Stop reason: SDUPTHER

## 2021-04-01 RX ORDER — ACETAMINOPHEN 325 MG/1
650 TABLET ORAL ONCE
Status: COMPLETED | OUTPATIENT
Start: 2021-04-01 | End: 2021-04-01

## 2021-04-01 RX ORDER — DIPHENHYDRAMINE HYDROCHLORIDE 50 MG/ML
25 INJECTION INTRAMUSCULAR; INTRAVENOUS ONCE
Status: COMPLETED | OUTPATIENT
Start: 2021-04-01 | End: 2021-04-01

## 2021-04-01 RX ORDER — DEXAMETHASONE SODIUM PHOSPHATE 4 MG/ML
10 INJECTION, SOLUTION INTRA-ARTICULAR; INTRALESIONAL; INTRAMUSCULAR; INTRAVENOUS; SOFT TISSUE ONCE
Status: DISCONTINUED | OUTPATIENT
Start: 2021-04-01 | End: 2021-04-01

## 2021-04-01 RX ORDER — PREDNISONE 20 MG/1
100 TABLET ORAL DAILY
Status: DISCONTINUED | OUTPATIENT
Start: 2021-04-01 | End: 2021-04-01

## 2021-04-01 RX ORDER — SODIUM CHLORIDE 9 MG/ML
20 INJECTION, SOLUTION INTRAVENOUS ONCE
Status: DISCONTINUED | OUTPATIENT
Start: 2021-04-01 | End: 2021-04-01

## 2021-04-01 RX ORDER — BUPIVACAINE HYDROCHLORIDE 2.5 MG/ML
INJECTION, SOLUTION EPIDURAL; INFILTRATION; INTRACAUDAL PRN
Status: DISCONTINUED | OUTPATIENT
Start: 2021-04-01 | End: 2021-04-01 | Stop reason: ALTCHOICE

## 2021-04-01 RX ORDER — PROPOFOL 10 MG/ML
INJECTION, EMULSION INTRAVENOUS CONTINUOUS PRN
Status: DISCONTINUED | OUTPATIENT
Start: 2021-04-01 | End: 2021-04-01 | Stop reason: SDUPTHER

## 2021-04-01 RX ORDER — SODIUM CHLORIDE 9 MG/ML
INJECTION, SOLUTION INTRAVENOUS CONTINUOUS
Status: DISCONTINUED | OUTPATIENT
Start: 2021-04-01 | End: 2021-04-01

## 2021-04-01 RX ORDER — PALONOSETRON HYDROCHLORIDE 0.05 MG/ML
0.25 INJECTION, SOLUTION INTRAVENOUS ONCE
Status: DISCONTINUED | OUTPATIENT
Start: 2021-04-01 | End: 2021-04-01

## 2021-04-01 RX ORDER — SODIUM CHLORIDE 9 MG/ML
20 INJECTION, SOLUTION INTRAVENOUS ONCE
Status: COMPLETED | OUTPATIENT
Start: 2021-04-01 | End: 2021-04-02

## 2021-04-01 RX ORDER — PREDNISONE 20 MG/1
100 TABLET ORAL DAILY
Status: DISCONTINUED | OUTPATIENT
Start: 2021-04-01 | End: 2021-04-03 | Stop reason: HOSPADM

## 2021-04-01 RX ORDER — DOXORUBICIN HYDROCHLORIDE 2 MG/ML
50 INJECTION, SOLUTION INTRAVENOUS ONCE
Status: DISCONTINUED | OUTPATIENT
Start: 2021-04-01 | End: 2021-04-01

## 2021-04-01 RX ORDER — DIPHENHYDRAMINE HYDROCHLORIDE 50 MG/ML
50 INJECTION INTRAMUSCULAR; INTRAVENOUS ONCE
Status: DISCONTINUED | OUTPATIENT
Start: 2021-04-01 | End: 2021-04-01

## 2021-04-01 RX ORDER — DIPHENHYDRAMINE HYDROCHLORIDE 50 MG/ML
25 INJECTION INTRAMUSCULAR; INTRAVENOUS ONCE
Status: DISCONTINUED | OUTPATIENT
Start: 2021-04-01 | End: 2021-04-01

## 2021-04-01 RX ADMIN — MIDAZOLAM 2 MG: 1 INJECTION INTRAMUSCULAR; INTRAVENOUS at 16:12

## 2021-04-01 RX ADMIN — PANTOPRAZOLE SODIUM 40 MG: 40 INJECTION, POWDER, FOR SOLUTION INTRAVENOUS at 10:28

## 2021-04-01 RX ADMIN — SODIUM CHLORIDE, PRESERVATIVE FREE 10 ML: 5 INJECTION INTRAVENOUS at 10:28

## 2021-04-01 RX ADMIN — SODIUM CHLORIDE 800 MG: 9 INJECTION, SOLUTION INTRAVENOUS at 18:12

## 2021-04-01 RX ADMIN — SODIUM CHLORIDE 6 MG: 9 INJECTION, SOLUTION INTRAVENOUS at 11:47

## 2021-04-01 RX ADMIN — ACETAMINOPHEN 650 MG: 325 TABLET ORAL at 14:03

## 2021-04-01 RX ADMIN — FENTANYL CITRATE 50 MCG: 50 INJECTION, SOLUTION INTRAMUSCULAR; INTRAVENOUS at 16:15

## 2021-04-01 RX ADMIN — SODIUM CHLORIDE 20 ML/HR: 9 INJECTION, SOLUTION INTRAVENOUS at 11:48

## 2021-04-01 RX ADMIN — Medication 2 G: at 16:19

## 2021-04-01 RX ADMIN — DIPHENHYDRAMINE HYDROCHLORIDE 25 MG: 50 INJECTION, SOLUTION INTRAMUSCULAR; INTRAVENOUS at 14:02

## 2021-04-01 RX ADMIN — PREDNISONE 100 MG: 20 TABLET ORAL at 18:12

## 2021-04-01 RX ADMIN — TRIMETHOBENZAMIDE HYDROCHLORIDE 200 MG: 100 INJECTION INTRAMUSCULAR at 20:31

## 2021-04-01 RX ADMIN — PROPOFOL 50 MCG/KG/MIN: 10 INJECTION, EMULSION INTRAVENOUS at 16:15

## 2021-04-01 RX ADMIN — SODIUM CHLORIDE: 9 INJECTION, SOLUTION INTRAVENOUS at 16:10

## 2021-04-01 RX ADMIN — PERFLUTREN 0.35 MG: 6.52 INJECTION, SUSPENSION INTRAVENOUS at 13:19

## 2021-04-01 RX ADMIN — ACETAMINOPHEN 650 MG: 325 TABLET ORAL at 07:53

## 2021-04-01 RX ADMIN — FENTANYL CITRATE 50 MCG: 50 INJECTION, SOLUTION INTRAMUSCULAR; INTRAVENOUS at 16:24

## 2021-04-01 RX ADMIN — SOTALOL HYDROCHLORIDE 40 MG: 80 TABLET ORAL at 10:28

## 2021-04-01 RX ADMIN — SOTALOL HYDROCHLORIDE 40 MG: 80 TABLET ORAL at 20:33

## 2021-04-01 RX ADMIN — SODIUM CHLORIDE, PRESERVATIVE FREE 10 ML: 5 INJECTION INTRAVENOUS at 20:33

## 2021-04-01 RX ADMIN — DEXAMETHASONE SODIUM PHOSPHATE 10 MG: 4 INJECTION, SOLUTION INTRA-ARTICULAR; INTRALESIONAL; INTRAMUSCULAR; INTRAVENOUS; SOFT TISSUE at 14:02

## 2021-04-01 ASSESSMENT — PAIN SCALES - GENERAL
PAINLEVEL_OUTOF10: 0
PAINLEVEL_OUTOF10: 3
PAINLEVEL_OUTOF10: 0

## 2021-04-01 ASSESSMENT — PULMONARY FUNCTION TESTS
PIF_VALUE: 0
PIF_VALUE: 1
PIF_VALUE: 0

## 2021-04-01 ASSESSMENT — PAIN DESCRIPTION - PAIN TYPE: TYPE: ACUTE PAIN

## 2021-04-01 ASSESSMENT — PAIN DESCRIPTION - ONSET: ONSET: ON-GOING

## 2021-04-01 ASSESSMENT — PAIN DESCRIPTION - ORIENTATION: ORIENTATION: LOWER

## 2021-04-01 ASSESSMENT — PAIN DESCRIPTION - PROGRESSION
CLINICAL_PROGRESSION: GRADUALLY WORSENING

## 2021-04-01 ASSESSMENT — PAIN - FUNCTIONAL ASSESSMENT: PAIN_FUNCTIONAL_ASSESSMENT: ACTIVITIES ARE NOT PREVENTED

## 2021-04-01 ASSESSMENT — LIFESTYLE VARIABLES: SMOKING_STATUS: 0

## 2021-04-01 ASSESSMENT — PAIN DESCRIPTION - DESCRIPTORS: DESCRIPTORS: ACHING;CONSTANT;DISCOMFORT;SORE

## 2021-04-01 ASSESSMENT — PAIN DESCRIPTION - LOCATION: LOCATION: ABDOMEN

## 2021-04-01 ASSESSMENT — PAIN DESCRIPTION - FREQUENCY: FREQUENCY: INTERMITTENT

## 2021-04-01 NOTE — PROGRESS NOTES
[facility]  Hematology/Oncology  Consult      Patient Name: Antione Gandara  YOB: 1950  PCP: Leandra Pena DO   Referring Provider: [address] [place] [address],[address] / Mobile [id number]     Reason for Consultation: No chief complaint on file. Subjective: Continues with the abdominal pain and fatigue. He is very frustrated with having to put things on hold today. History of Present Illness: This is a 81-year-old male patient with a PMH history of BPH, HTN, and CAD who presented with with nausea as a direct admission by surgery. Patient has history of recent right hemicolectomy for colon mass on 3/11/21. Final pathology came back consistent with diffuse large B-cell lymphoma. Patient is now having nausea and dizziness since procedure. CT A/P showed extensive new/worsened lymphadenopathy throughout the retroperitoneum, in the mesentery and celiac region. There is also large mass in the iliac region of the pelvis which is larger as compared to prior. Innumerable masses involving the small bowel which are new since the prior. Short segment intussusception involving small bowel in the anterior left abdomen with no associated obstruction. Worsened right hydronephrosis. Note that the pelvic mass indicated above surrounds the distal right ureter/stent. New 11 mm pleural base nodule in the left lower lobe. Consultation for evaluation and recommendations for new dx of lymphoma.        Diagnostic Data:     Past Medical History:   Diagnosis Date    Arthritis     KNEES HIPS BACK    BPH (benign prostatic hyperplasia)     Coronary artery disease     Difficult airway     PT STATES HE WAS TOLD THIS TWICE AT [facility]    Difficult intubation 04/2017    note in care everywhere \"Clinical Summary\" 03/10/2021    Hyperlipidemia     Hypertension     Sinus tachycardia 01/01/2002       Patient Active Problem List    Diagnosis Date Noted    Diffuse large B cell lymphoma ([facility] 03/31/2021    Diffuse large B-cell lymphoma ([facility] 03/30/2021    Dehydration 03/29/2021    Prolonged Q-T interval on ECG 03/29/2021    Hypotension 03/29/2021    CKD (chronic kidney disease) stage 3, GFR 30-59 ml/min 03/29/2021    Paroxysmal atrial fibrillation ([facility] 03/29/2021    Colonic mass 03/11/2021    Hypertension     Hyperlipidemia     Coronary artery disease     SUNNY (acute kidney injury) ([facility] 02/14/2021    Hematuria 01/16/2018    BPH (benign prostatic hyperplasia) 01/16/2018        Past Surgical History:   Procedure Laterality Date    ABLATION OF DYSRHYTHMIC FOCUS      AORTA SURGERY      aortic valve replacement    AORTIC VALVE REPLACEMENT      BLADDER SURGERY Right 2/17/2021    CYSTOSCOPY BILATERAL RETROGRADE PYELOGRAM RIGHT STENT INSERTION performed by Cuba Gaston MD at [facility]  2/19/2021    COLONOSCOPY WITH BIOPSY performed by Gulshan Lopez DO at [address]  2/19/2021    COLONOSCOPY W/ ENDOSCOPIC MUCOSAL RESECTION performed by Gulshan Lopez DO at [address] [place] [address] N/A 3/11/2021    LAPAROSCOPIC RIGHT HEMICOLECTOMY performed by Tania Vazquez MD at [address] OTHER SURGICAL HISTORY  08/12/2016    CT Myelogram, Dr. Chadwick Sousa  2014 new battery    last checked Dr Gregoria Arana 1/2016?  TONSILLECTOMY      UPPER GASTROINTESTINAL ENDOSCOPY      reflux    UPPER GASTROINTESTINAL ENDOSCOPY N/A 2/16/2021    EGD BIOPSY performed by Eulalia Landeros MD at [facility]         Family History  Family History   Problem Relation Age of Onset    Diabetes Mother     Stroke Mother     Diabetes Father     Heart Disease Father     Brain Cancer Sister     Stroke Sister     Stroke Brother     Cancer Maternal Grandfather        Social History    TOBACCO:   reports that he quit smoking about 7 years ago. He has a 44.00 pack-year smoking history.  He has never used smokeless tobacco.  ETOH:   reports previous alcohol use of about 2.0 standard drinks of alcohol per week. Home Medications  Prior to Admission medications    Medication Sig Start Date End Date Taking? Authorizing Provider   ibuprofen (ADVIL;MOTRIN) 800 MG tablet TAKE 1 TABLET BY MOUTH EVERY 6 HOURS AS NEEDED FOR PAIN 3/26/21  Yes Dianelys Washburn MD   ondansetron (ZOFRAN ODT) 4 MG disintegrating tablet Take 1 tablet by mouth every 8 hours as needed for Nausea or Vomiting 3/13/21  Yes Dianelys Washburn MD   lisinopril-hydroCHLOROthiazide (PRINZIDE;ZESTORETIC) 10-12.5 MG per tablet Take 1 tablet by mouth daily   Yes Historical Provider, MD   amLODIPine (NORVASC) 5 MG tablet Take 5 mg by mouth daily   Yes Historical Provider, MD   aspirin 81 MG EC tablet Take 81 mg by mouth daily   Yes Historical Provider, MD   vitamin B-12 (CYANOCOBALAMIN) 100 MCG tablet Take 50 mcg by mouth daily   Yes Historical Provider, MD   sennosides-docusate sodium (SENOKOT-S) 8.6-50 MG tablet Take 1 tablet by mouth 2 times daily   Yes Historical Provider, MD   sotalol (BETAPACE) 80 MG tablet Take 40 mg by mouth 4/10/17  Yes Historical Provider, MD   tamsulosin (FLOMAX) 0.4 MG capsule Take 0.4 mg by mouth daily  1/4/18  Yes Historical Provider, MD   ferrous sulfate 325 (65 Fe) MG tablet Take 325 mg by mouth 2 times daily  1/8/18  Yes Historical Provider, MD   albuterol sulfate  (90 BASE) MCG/ACT inhaler Inhale 2 puffs into the lungs as needed for Wheezing   Yes Historical Provider, MD   esomeprazole Magnesium (NEXIUM) 20 MG PACK Take 20 mg by mouth daily   Yes Historical Provider, MD   PRAVASTATIN SODIUM PO Take 20 mg by mouth daily    Yes Historical Provider, MD       Allergies  No Known Allergies    Review of Systems: +Fatigue, weakness, abdominal discomfort and tenderness, nausea, dizziness and lightheadedness.  Constitutional:  No fever chills or rigors.     Eyes: No changes in vision, discharge, or pain   ENT: No Headaches, hearing loss or vertigo. No mouth sores or sore throat. No change in taste or smell.  Cardiovascular: No chest discomfort, dyspnea on exertion, palpitations or loss of consciousness. or phlebitis.  Respiratory: Has no cough or wheezing, Has no sputum production. Has no hemoptysis, Has no pleuritic pain, .  Gastrointestinal: No abdominal pain, appetite loss, blood in stools. No change in bowel habits. No hematemesis    Genitourinary: Patient acknowledges no dysuria, trouble voiding, or hematuria. No nocturia or increased frequency.  Musculoskeletal: No gait disturbance, weakness or joint complaints.  Integumentary: No rash or pruritis.  Neurological: No headache, diplopia, change in muscle strength, numbness or tingling. No change in gait, balance, coordination, mood, affect, memory, mentation, behavior.  Psychiatric: No anxiety, or depression.  Endocrine: No temperature intolerance. No excessive thirst, fluid intake, or urination. No tremor.  Hematologic/Lymphatic: No abnormal bruising or bleeding, blood clots or swollen lymph nodes.  Allergic/Immunologic: No nasal congestion or hives. Objective  /64   Pulse 74   Temp 98.2 °F (36.8 °C) (Temporal)   Resp 18   Ht 5' 7\" (1.702 m)   Wt 200 lb (90.7 kg)   SpO2 98%   BMI 31.32 kg/m²     Physical Exam:     General: AAO to person, place, time, in no acute distress,   Head and neck : PERRLA, EOMI . Sclera non icteric. Oropharynx : Clear  Neck: no JVD,  no adenopathy  Heart: Regular rate and regular rhythm, no murmur  Lungs: Clear to auscultation   Extremities: No edema,no cyanosis, no clubbing. Abdomen: Soft, tenderness;no masses, no organomegaly  Skin:  No rash very dry and flaky. Neurologic:Cranial nerves grossly intact. No focal motor or sensory deficits .     Recent Laboratory Data-   Lab Results   Component Value Date    WBC 9.8 04/01/2021    HGB 8.7 (L) 04/01/2021    HCT 28.5 (L) 04/01/2021    MCV 81.7 04/01/2021     04/01/2021    LYMPHOPCT 7.1 (L) 04/01/2021    RBC 3.49 (L) 04/01/2021    MCH 24.9 (L) 04/01/2021    MCHC 30.5 (L) 04/01/2021    RDW 14.8 04/01/2021    NEUTOPHILPCT 78.0 04/01/2021    MONOPCT 11.4 04/01/2021    BASOPCT 0.5 04/01/2021    NEUTROABS 7.64 (H) 04/01/2021    LYMPHSABS 0.70 (L) 04/01/2021    MONOSABS 1.12 (H) 04/01/2021    EOSABS 0.22 04/01/2021    BASOSABS 0.05 04/01/2021       Lab Results   Component Value Date     04/01/2021    K 4.9 04/01/2021    CL 97 (L) 04/01/2021    CO2 25 04/01/2021    BUN 16 04/01/2021    CREATININE 1.2 04/01/2021    GLUCOSE 63 (L) 04/01/2021    CALCIUM 8.7 04/01/2021    PROT 6.0 (L) 04/01/2021    LABALBU 2.3 (L) 04/01/2021    BILITOT 0.4 04/01/2021    ALKPHOS 62 04/01/2021    AST 13 04/01/2021    ALT <5 04/01/2021    LABGLOM >60 04/01/2021    GFRAA >60 04/01/2021       Lab Results   Component Value Date    IRON 38 (L) 02/16/2021    TIBC 248 (L) 02/16/2021           Radiology-    Ct Abdomen Pelvis W Iv Contrast Additional Contrast? Oral    Result Date: 3/30/2021  EXAMINATION: CT OF THE ABDOMEN AND PELVIS WITH CONTRAST 3/29/2021 10:28 pm TECHNIQUE: CT of the abdomen and pelvis was performed with the administration of intravenous contrast. Multiplanar reformatted images are provided for review. Dose modulation, iterative reconstruction, and/or weight based adjustment of the mA/kV was utilized to reduce the radiation dose to as low as reasonably achievable. COMPARISON: 02/18/2021 HISTORY: ORDERING SYSTEM PROVIDED HISTORY: eval postop right hemicolectomy for lymphoma, rule out leak, dehydration TECHNOLOGIST PROVIDED HISTORY: Reason for exam:->eval postop right hemicolectomy for lymphoma, rule out leak, dehydration Additional Contrast?->Oral FINDINGS: Lower Chest: There is pleural base nodularity in the left lower lobe which is not seen on the prior. I cannot exclude a metastatic lesion. This measures about 11 mm. Abdomen:  The liver, spleen, pancreas, adrenal glands and left tertiary care center    4/1/2021  - CBC with continued mild anemia  - SUNNY resolved. Has ureteral stent. Urology following, to remain until chemotherapy completed  - Patient for Mediport placement and to begin chemotherapy today. Was started  - TTE with LVEF 65-70%  - Prophylaxis for TLS, IV hydration and Elitek. - R-CHOP/lenalidomide pending FISH interphase.   - If double hit or triple hit lymphoma confirmed he will require DA R EPOCH and we will then transfer him to a tertiary care center      TRACIE Acosta - CNP  Electronically signed 4/1/2021 at 10:52 AM   Patient seen and examined, note edited to reflect above.   Zuleika Bills MD

## 2021-04-01 NOTE — CONSULTS
[address] [facility]  History and Physical      REASON FOR CONSULTATION:  Medical management    ATTENDING/ADMITTING [email or web address]     HISTORY OF PRESENT ILLNESS:      The patient is a 80 y.o. male patient of HTN, CAD, BPH, and hyperlipidemia who presents with diffuse large B-cell lymphoma. In February patient was not feeling well and he went to the [address] [place] [address] on 2/14/2021 for lightheadedness. During that admission patient's hemoglobin was low and he was transfused with blood. Patient had a colonoscopy and a lesion was present and biopsy was obtained. On 3/11 patient was diagnosed with diffuse large B-cell lymphoma involving the colonic mucosa. Patient was also diagnosed with hydronephrosis and had stent placement by [facility] urology. Patient is present today for a Mediport placement and to receive first chemotherapy treatment inpatient. We will continue to follow for any medical needs during this admission.     Past Medical History:    Past Medical History:   Diagnosis Date    Arthritis     KNEES HIPS BACK    BPH (benign prostatic hyperplasia)     Coronary artery disease     Difficult airway     PT STATES HE WAS TOLD THIS TWICE AT [facility]    Difficult intubation 04/2017    note in care everywhere \"Clinical Summary\" 03/10/2021    Hyperlipidemia     Hypertension     Sinus tachycardia 01/01/2002       Past Surgical History:    Past Surgical History:   Procedure Laterality Date    ABLATION OF DYSRHYTHMIC FOCUS      AORTA SURGERY      aortic valve replacement    AORTIC VALVE REPLACEMENT      BLADDER SURGERY Right 2/17/2021    CYSTOSCOPY BILATERAL RETROGRADE PYELOGRAM RIGHT STENT INSERTION performed by Buddy Cerna MD at [address] [place] [address]  2/19/2021    COLONOSCOPY WITH BIOPSY performed by Simon July, DO at [place] [address]  2/19/2021    COLONOSCOPY W/ ENDOSCOPIC MUCOSAL RESECTION performed by Simon July, DO at [address] N/A 3/11/2021 LAPAROSCOPIC RIGHT HEMICOLECTOMY performed by Rosario Jaramillo MD at [facility] OTHER SURGICAL HISTORY  08/12/2016    CT Myelogram, Dr. Doris Sarmiento  2014 new battery    last checked Dr Carolyne Easton 1/2016?  TONSILLECTOMY      UPPER GASTROINTESTINAL ENDOSCOPY      reflux    UPPER GASTROINTESTINAL ENDOSCOPY N/A 2/16/2021    EGD BIOPSY performed by Emy Larkin MD at [place] [address]         Medications Prior to Admission:    Medications Prior to Admission: ibuprofen (ADVIL;MOTRIN) 800 MG tablet, TAKE 1 TABLET BY MOUTH EVERY 6 HOURS AS NEEDED FOR PAIN  ondansetron (ZOFRAN ODT) 4 MG disintegrating tablet, Take 1 tablet by mouth every 8 hours as needed for Nausea or Vomiting  lisinopril-hydroCHLOROthiazide (PRINZIDE;ZESTORETIC) 10-12.5 MG per tablet, Take 1 tablet by mouth daily  amLODIPine (NORVASC) 5 MG tablet, Take 5 mg by mouth daily  aspirin 81 MG EC tablet, Take 81 mg by mouth daily  vitamin B-12 (CYANOCOBALAMIN) 100 MCG tablet, Take 50 mcg by mouth daily  sennosides-docusate sodium (SENOKOT-S) 8.6-50 MG tablet, Take 1 tablet by mouth 2 times daily  sotalol (BETAPACE) 80 MG tablet, Take 40 mg by mouth  tamsulosin (FLOMAX) 0.4 MG capsule, Take 0.4 mg by mouth daily   ferrous sulfate 325 (65 Fe) MG tablet, Take 325 mg by mouth 2 times daily   albuterol sulfate  (90 BASE) MCG/ACT inhaler, Inhale 2 puffs into the lungs as needed for Wheezing  esomeprazole Magnesium (NEXIUM) 20 MG PACK, Take 20 mg by mouth daily  PRAVASTATIN SODIUM PO, Take 20 mg by mouth daily     Allergies:    Patient has no known allergies. Social History:    reports that he quit smoking about 7 years ago. He has a 44.00 pack-year smoking history. He has never used smokeless tobacco. He reports previous alcohol use of about 2.0 standard drinks of alcohol per week. He reports that he does not use drugs.     Family History:   family history includes Brain Cancer in his sister; Cancer in his maternal grandfather; Diabetes in his father and mother; Heart Disease in his father; Stroke in his brother, mother, and sister. REVIEW OF SYSTEMS:  As above in the HPI, otherwise negative    PHYSICAL EXAM:    Vitals:  BP (!) 141/92   Pulse 93   Temp 97.9 °F (36.6 °C) (Temporal)   Resp 18   Ht 5' 7\" (1.702 m)   Wt 200 lb (90.7 kg)   SpO2 96%   BMI 31.32 kg/m²     General:  Awake, alert, oriented X 3. Well developed, well nourished, well groomed. No apparent distress. HEENT:  Normocephalic, atraumatic. Pupils equal, round, reactive to light. No scleral icterus. No conjunctival injection. Normal lips, teeth, and gums. No nasal discharge. Neck:  Supple  Heart:  RRR, no murmurs, gallops, rubs, pacemaker  Lungs:  CTA bilaterally, bilat symmetrical expansion, no wheeze, rales, or rhonchi  Abdomen:   Bowel sounds present, soft, nontender, no masses, no organomegaly, no peritoneal signs  Extremities:  No clubbing, cyanosis, or edema  Skin:  Warm and dry, no open lesions or rash  Neuro:  Cranial nerves 2-12 intact, no focal deficits    LABS:    CBC:   Lab Results   Component Value Date    WBC 9.8 04/01/2021    RBC 3.49 04/01/2021    HGB 8.7 04/01/2021    HCT 28.5 04/01/2021    MCV 81.7 04/01/2021    MCH 24.9 04/01/2021    MCHC 30.5 04/01/2021    RDW 14.8 04/01/2021     04/01/2021    MPV 9.6 04/01/2021     CMP:    Lab Results   Component Value Date     04/01/2021    K 4.9 04/01/2021    CL 97 04/01/2021    CO2 25 04/01/2021    BUN 16 04/01/2021    CREATININE 1.2 04/01/2021    GFRAA >60 04/01/2021    LABGLOM >60 04/01/2021    GLUCOSE 63 04/01/2021    GLUCOSE 100 01/28/2011    PROT 6.0 04/01/2021    LABALBU 2.3 04/01/2021    LABALBU 3.9 01/28/2011    CALCIUM 8.7 04/01/2021    BILITOT 0.4 04/01/2021    ALKPHOS 62 04/01/2021    AST 13 04/01/2021    ALT <5 04/01/2021       ASSESSMENT:      Principal Problem:    Diffuse large B cell lymphoma (HCC)  Active

## 2021-04-01 NOTE — OP NOTE
Operative Note      Patient: Nick Quispe  YOB: 1950  MRN: [id number]    Date of Procedure: 4/1/2021    Pre-Op Diagnosis: Lymphoma    Post-Op Diagnosis: Same       Procedure(s): Insertion of right internal jugular vein central venous catheter with subcutaneous reservoir, fluoroscopy    Surgeon(s):  Arabella Nichols MD    Assistant:   Resident: Carter Parra MD PGY4    Anesthesia: General    Estimated Blood Loss (mL): 5    Complications: None    Specimens:   * No specimens in log *    Implants:  Implant Name Type Inv. Item Serial No.  Lot No. LRB No. Used Action   PORT INFUS 8FR PWR INJ CT FOR VASC ACCS CATH  PORT INFUS 8FR PWR INJ CT FOR VASC ACCS CATH  [employer] [id number] Right 1 Implanted       Drains: none    Findings: right internal jugular mediport    History: This is a 80year old male diagnosed with lymphoma who presents for port insertion for urgent chemotherapy. Risks, benefits, and alternatives were discussed with the patient who understood and gave informed consent    Detailed Description of Procedure: The patient was identified and the procedure was confirmed. The neck and chest were prepped and draped in the usual sterile fashion. Next, 0.25% Marcaine was used for local anesthesia. The right internal jugular vein was percutaneously entered and a guidewire was advanced into the superior vena cava under fluoroscopic guidance. A small incision was made around the wire. A second skin incision was made below the clavicle and a pocket was made in the subcutaneous tissue for the reservoir. The catheter was pulled through a subcutaneous tunnel from the inferior chest incision to the neck incision. The introducer was passed over the wire then the catheter was passed through the introducer and the tip was positioned in the superior vena cava right atrial junction under fluoroscopic guidance. The catheter was connected to the reservoir and the locking hub was engaged.   The port was noted to withdraw and flush blood easily and was flushed with heparinized saline solution. The reservoir was secured in the pocket with a Vicryl suture. Hemostasis was assured. The incisions were approximated in layers with deep dermal Vicryl and subcuticular Monocryl and Dermabond was applied to the incisions in the operating room. Needle, sponge, and instrument counts were reported as correct. The patient tolerated the procedure and was transferred to the recovery area in satisfactory condition. A chest xray will be obtained in PACU. Dr. Nata Cadena was present for the procedure.     Electronically signed by Denice García MD on 4/1/2021 at 5:05 PM

## 2021-04-01 NOTE — CONSULTS
[address]                  [address] [facility], [address] [place] [address]                                  CONSULTATION    PATIENT NAME: Sanford Bagley                  :        1950  MED REC NO:   [id number]                            ROOM:       [id number]  ACCOUNT NO:   [de-identified]                           ADMIT DATE: 2021  PROVIDER:     Lynne Fallon MD    CONSULT DATE:  2021    CHIEF COMPLAINT:  Left ureteral stent. HISTORY OF PRESENT ILLNESS:  This 80-year-old male who has been  diagnosed with a B-cell lymphoma was seen in 2021, when he was found  to have left hydronephrosis. A double-J stent was put in place. The  patient is now being prepared for chemotherapy. A CAT scan that was  done on the  showed the stent to be in good position. The patient  is voiding without any difficulty. His serum creatinine is 1.2 and his  urine is clear. PHYSICAL EXAMINATION:  ABDOMEN:  Soft. There is no apparent tenderness. IMPRESSION:  Stable left ureteral obstruction, status post stenting. PLAN:  The plan is to continue with the stent until the patient's  chemotherapy has been completed. He will require stent removal or  exchange in 2021 or 2021.         Pee Chopra MD    D: 2021 14:08:36       T: 2021 14:38:48     [email or web address]  Job#: [id number]     Doc#: [id number]    CC:

## 2021-04-01 NOTE — PROGRESS NOTES
A limited 2 decho  Was performed along with 2 cc of imaging agent definitty given by the [facility]

## 2021-04-01 NOTE — ANESTHESIA PRE PROCEDURE
Medication Dose Route Frequency Provider Last Rate Last Admin    ceFAZolin (ANCEF) 2000 mg in sterile water 20 mL IV syringe  2,000 mg Intravenous See Admin Instructions Micaela Oconnor MD        0.9 % sodium chloride infusion  20 mL/hr Intravenous Once Kiki Larry MD 20 mL/hr at 04/01/21 1148 20 mL/hr at 04/01/21 1148    riTUXimab-pvvr ([facility]) 800 mg in sodium chloride 0.9 % chemo IVPB  800 mg Intravenous Titrated Kiki Larry MD        predniSONE (DELTASONE) tablet 100 mg  100 mg Oral Daily Kiki Larry MD        0.9 % sodium chloride infusion  25 mL Intravenous PRN Jenise Butler MD        acetaminophen (TYLENOL) tablet 650 mg  650 mg Oral Q4H PRN Jenise Butler MD   650 mg at 04/01/21 0753    enoxaparin (LOVENOX) injection 40 mg  40 mg Subcutaneous Daily Jenise Butler MD        lactated ringers infusion   Intravenous Continuous Jenise Butler MD 75 mL/hr at 03/31/21 1928 New Bag at 03/31/21 1928    oxyCODONE (ROXICODONE) immediate release tablet 5 mg  5 mg Oral Q4H PRN Jenise Butler MD        Or   [facility] oxyCODONE HCl (OXY-IR) immediate release tablet 10 mg  10 mg Oral Q4H PRN Jenise Butler MD        pantoprazole (PROTONIX) injection 40 mg  40 mg Intravenous Daily Jenise Butler MD   40 mg at 04/01/21 1028    And    sodium chloride (PF) 0.9 % injection 10 mL  10 mL Intravenous Daily Jenise Butler MD   10 mL at 04/01/21 1028    sodium chloride flush 0.9 % injection 10 mL  10 mL Intravenous 2 times per day Jenise Butler MD   10 mL at 04/01/21 1028    sodium chloride flush 0.9 % injection 10 mL  10 mL Intravenous PRN Jenise Butler MD        melatonin tablet 4.5 mg  4.5 mg Oral Nightly PRN Jenise Butler MD        perflutren lipid microspheres (DEFINITY) injection 1.65 mg  1.5 mL Intravenous ONCE PRN Jenise Butler MD        sotalol (BETAPACE) tablet 40 mg  40 mg Oral BID Jenise Butler MD   40 mg at 04/01/21 1028    trimethobenzamide (TIGAN) injection 200 mg  200 mg Intramuscular Q6H PRN Jenise Butler MD Allergies:  No Known Allergies    Problem List:    Patient Active Problem List   Diagnosis Code    Hematuria R31.9    BPH (benign prostatic hyperplasia) N40.0    SUNNY (acute kidney injury) ([facility] [address] N17.9    Colonic mass K63.89    Hypertension I10    Hyperlipidemia E78.5    Coronary artery disease I25.10    Dehydration E86.0    Prolonged Q-T interval on ECG R94.31    Hypotension I95.9    CKD (chronic kidney disease) stage 3, GFR 30-59 ml/min N18.30    Paroxysmal atrial fibrillation (HCC) I48.0    Diffuse large B-cell lymphoma (HCC) C83.30    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    Severe protein-calorie malnutrition ([facility] [address] E43       Past Medical History:        Diagnosis Date    Arthritis     KNEES HIPS BACK    BPH (benign prostatic hyperplasia)     Coronary artery disease     Difficult airway     PT STATES HE WAS TOLD THIS TWICE AT [facility]    Difficult intubation 04/2017    note in care everywhere \"Clinical Summary\" 03/10/2021    Hyperlipidemia     Hypertension     Sinus tachycardia 01/01/2002       Past Surgical History:        Procedure Laterality Date    ABLATION OF DYSRHYTHMIC FOCUS      AORTA SURGERY      aortic valve replacement    AORTIC VALVE REPLACEMENT      BLADDER SURGERY Right 2/17/2021    CYSTOSCOPY BILATERAL RETROGRADE PYELOGRAM RIGHT STENT INSERTION performed by Serena Ann MD at [facility]  2/19/2021    COLONOSCOPY WITH BIOPSY performed by Pablo Land DO at [facility]  2/19/2021    COLONOSCOPY W/ ENDOSCOPIC MUCOSAL RESECTION performed by Pablo Land DO at [address]  N/A 3/11/2021    LAPAROSCOPIC RIGHT HEMICOLECTOMY performed by Meredith Saleem MD at [address] OTHER SURGICAL HISTORY  08/12/2016    CT Myelogram, Dr. Reymundo Lopez  2014 new battery    last checked Dr Macy Bryan 1/2016?     TONSILLECTOMY      UPPER GASTROINTESTINAL GLUCOSE 100 01/28/2011    PROT 6.0 04/01/2021    CALCIUM 8.7 04/01/2021    BILITOT 0.4 04/01/2021    ALKPHOS 62 04/01/2021    AST 13 04/01/2021    ALT <5 04/01/2021       POC Tests: No results for input(s): POCGLU, POCNA, POCK, POCCL, POCBUN, POCHEMO, POCHCT in the last 72 hours.     Coags:   Lab Results   Component Value Date    PROTIME 14.7 02/14/2021    INR 1.2 02/14/2021    APTT 34.0 08/12/2016       HCG (If Applicable): No results found for: PREGTESTUR, PREGSERUM, HCG, HCGQUANT     ABGs: No results found for: PHART, PO2ART, NKH1YIH, AHW0OSX, BEART, T6CDPXPO     Type & Screen (If Applicable):  No results found for: LABABO, LABRH    Drug/Infectious Status (If Applicable):  No results found for: HIV, HEPCAB    COVID-19 Screening (If Applicable):   Lab Results   Component Value Date    COVID19 Not Detected 03/05/2021       EKG:   3/30/2021  4:53 PM - Bhupendra, Mhy Incoming Ekg Results From [facility]    Component Value Ref Range & Units Status Collected Lab   Ventricular Rate 76  BPM Final 03/30/2021  9:30 AM [facility]   Atrial Rate 76  BPM Final 03/30/2021  9:30 AM [facility]   P-R Interval 146  ms Final 03/30/2021  9:30 AM [facility]   QRS Duration 78  ms Final 03/30/2021  9:30 AM [facility]   Q-T Interval 420  ms Final 03/30/2021  9:30 AM [facility]   QTc Calculation (Bazett) 472  ms Final 03/30/2021  9:30 AM [facility]   P Amalia 89  degrees Final 03/30/2021  9:30 AM [facility]   R Amalia 86  degrees Final 03/30/2021  9:30 AM [facility]   T Amalia 62  degrees Final 03/30/2021  9:30 AM [facility]   Testing Performed By    Lab - [address] Name Director Address Valid Date Range   360-[facility] MUSE Unknown Unknown 04/18/16 0721-Present   Narrative & Impression    Sinus rhythm with premature supraventricular complexes  Abnormal ECG  When compared with ECG of 29-MAR-2021 20:34,  No significant change was found  Confirmed by Aspen Banks ([id number]) on 3/30/2021 4:53:45 PM         Anesthesia Evaluation  Patient summary reviewed and Nursing notes reviewed   history of anesthetic complications: difficult airway. Airway: Mallampati: II  TM distance: >3 FB   Neck ROM: limited  Mouth opening: > = 3 FB Dental:          Pulmonary:   (+) decreased breath sounds,      (-) not a current smoker          Patient did not smoke on day of surgery. Cardiovascular:  Exercise tolerance: poor (<4 METS),   (+) hypertension:, CAD:, CABG/stent (in 2017 3 vessel, 1 stent, ):, dysrhythmias (history of afib ): atrial fibrillation, murmur, hyperlipidemia      ECG reviewed  Rhythm: regular  Rate: normal           Beta Blocker:  Dose within 24 Hrs      ROS comment: Ablation done      Neuro/Psych:   Negative Neuro/Psych ROS              GI/Hepatic/Renal:   (+) GERD: well controlled, renal disease (recent stent): CRI,           Endo/Other:    (+) : arthritis: OA., malignancy/cancer. Pt had no PAT visit       Abdominal:           Vascular: negative vascular ROS. Anesthesia Plan      MAC     ASA 4       Induction: intravenous. Anesthetic plan and risks discussed with patient. Plan discussed with CRNA and attending. Tania Vergara RN   4/1/2021    Pt seen, examined, chart reviewed, plan discussed.   [facility] [place]  4/1/2021  3:38 PM

## 2021-04-01 NOTE — H&P
GENERAL SURGERY  HISTORY AND PHYSICAL  3/31/2021    No chief complaint on file. PATSY Hines is a 80 y.o. male with recent R hemicolectomy on 3/13 for a mass that was found to be lymphoma who was transferred from [address] [place] [address] for mediport placement so he may start chemotherapy. He was seen by oncology and nephrology at [facility] for dehydration after postop follow up in clinic on 3/29. He reports nausea, but no vomiting. He has been having BMs. Oncology would like to start chemotherapy as soon as possible, so he was transferred to [facility] for placement of mediport ASAP. Past Medical History:   Diagnosis Date    Arthritis     KNEES HIPS BACK    BPH (benign prostatic hyperplasia)     Coronary artery disease     Difficult airway     PT STATES HE WAS TOLD THIS TWICE AT [facility]    Difficult intubation 04/2017    note in care everywhere \"Clinical Summary\" 03/10/2021    Hyperlipidemia     Hypertension     Sinus tachycardia 01/01/2002       Past Surgical History:   Procedure Laterality Date    ABLATION OF DYSRHYTHMIC FOCUS      AORTA SURGERY      aortic valve replacement    AORTIC VALVE REPLACEMENT      BLADDER SURGERY Right 2/17/2021    CYSTOSCOPY BILATERAL RETROGRADE PYELOGRAM RIGHT STENT INSERTION performed by Duncan Odonnell MD at [facility]  2/19/2021    COLONOSCOPY WITH BIOPSY performed by Alejandro Sheridan DO at [address]  2/19/2021    COLONOSCOPY W/ ENDOSCOPIC MUCOSAL RESECTION performed by Alejandro Sheridan DO at [address] N/A 3/11/2021    LAPAROSCOPIC RIGHT HEMICOLECTOMY performed by Rosario Jaramillo MD at [address] OTHER SURGICAL HISTORY  08/12/2016    CT Myelogram, Dr. Doris Sarmiento  2014 new battery    last checked Dr Carolyne Easton 1/2016?     TONSILLECTOMY      UPPER GASTROINTESTINAL ENDOSCOPY      reflux    UPPER GASTROINTESTINAL ENDOSCOPY N/A 2/16/2021    EGD BIOPSY performed by Vishal Paige MD at [facility]         Prior to Admission medications    Medication Sig Start Date End Date Taking?  Authorizing Provider   ibuprofen (ADVIL;MOTRIN) 800 MG tablet TAKE 1 TABLET BY MOUTH EVERY 6 HOURS AS NEEDED FOR PAIN 3/26/21  Yes Lili Cuenca MD   ondansetron (ZOFRAN ODT) 4 MG disintegrating tablet Take 1 tablet by mouth every 8 hours as needed for Nausea or Vomiting 3/13/21  Yes Lili Cuenca MD   lisinopril-hydroCHLOROthiazide (PRINZIDE;ZESTORETIC) 10-12.5 MG per tablet Take 1 tablet by mouth daily   Yes Historical Provider, MD   amLODIPine (NORVASC) 5 MG tablet Take 5 mg by mouth daily   Yes Historical Provider, MD   aspirin 81 MG EC tablet Take 81 mg by mouth daily   Yes Historical Provider, MD   vitamin B-12 (CYANOCOBALAMIN) 100 MCG tablet Take 50 mcg by mouth daily   Yes Historical Provider, MD   sennosides-docusate sodium (SENOKOT-S) 8.6-50 MG tablet Take 1 tablet by mouth 2 times daily   Yes Historical Provider, MD   sotalol (BETAPACE) 80 MG tablet Take 40 mg by mouth 4/10/17  Yes Historical Provider, MD   tamsulosin (FLOMAX) 0.4 MG capsule Take 0.4 mg by mouth daily  1/4/18  Yes Historical Provider, MD   ferrous sulfate 325 (65 Fe) MG tablet Take 325 mg by mouth 2 times daily  1/8/18  Yes Historical Provider, MD   albuterol sulfate  (90 BASE) MCG/ACT inhaler Inhale 2 puffs into the lungs as needed for Wheezing   Yes Historical Provider, MD   esomeprazole Magnesium (NEXIUM) 20 MG PACK Take 20 mg by mouth daily   Yes Historical Provider, MD   PRAVASTATIN SODIUM PO Take 20 mg by mouth daily    Yes Historical Provider, MD       No Known Allergies    Family History   Problem Relation Age of Onset    Diabetes Mother     Stroke Mother     Diabetes Father     Heart Disease Father     Brain Cancer Sister     Stroke Sister     Stroke Brother     Cancer Maternal Grandfather        Social History     Tobacco Use    Smoking status: Former Smoker Packs/day: 1.00     Years: 44.00     Pack years: 44.00     Quit date: 3/4/2014     Years since quittin.0    Smokeless tobacco: Never Used   Substance Use Topics    Alcohol use: Not Currently     Alcohol/week: 2.0 standard drinks     Types: 2 Shots of liquor per week     Comment: month    Drug use: No         Review of Systems - History obtained from the patient  General ROS: negative  Psychological ROS: negative  Ophthalmic ROS: negative  ENT ROS: negative  Allergy and Immunology ROS: negative  Hematological and Lymphatic ROS: negative  Endocrine ROS: negative  Breast ROS: negative  Respiratory ROS: negative  Cardiovascular ROS: negative  Gastrointestinal ROS: As above  Genito-Urinary ROS: negative  Musculoskeletal ROS: negative  Neurological ROS: negative  Dermatological ROS: negative      PHYSICAL EXAM:    Vitals:    21 2315   BP: (!) 141/92   Pulse: 93   Resp: 18   Temp: 97.9 °F (36.6 °C)   SpO2: 96%       General Appearance:  awake, alert, oriented, in no acute distress  Skin:  Skin color, texture, turgor normal. No rashes or lesions. Head/face:  NCAT  Eyes:  No gross abnormalities. Lungs:  No increased work of breathing on room air. Pacemaker implanted on left side  Heart:  RR and normotensive  Abdomen:  Soft, nontender, nondistended  Extremities: Extremities warm to touch, pink, with no edema. LABS:  CBC  Recent Labs     21  0323   WBC 9.5   HGB 8.9*   HCT 28.8*        BMP  Recent Labs     21  0323      K 4.8   CL 97*   CO2 28   BUN 18   CREATININE 1.3*   CALCIUM 8.9     Liver Function  Recent Labs     21  0323   BILITOT 0.3   BILIDIR <0.2   AST 19   ALT <5   ALKPHOS 75   PROT 6.3*   LABALBU 2.5*     No results for input(s): LACTATE in the last 72 hours. No results for input(s): INR, PTT in the last 72 hours. Invalid input(s): PT    RADIOLOGY    No results found.       ASSESSMENT:  80 y.o. male with diffuse large B cell lymphoma in need of mediport placement    PLAN:  - NPO, IVFs  - Plan for Mediport placement 4/1  - Obtain consent  - Preop Abx  - Discussed with Dr. Tasneem Jackson     Electronically signed by Frida Mata MD on 3/31/21 at 11:46 PM EDT

## 2021-04-01 NOTE — PROGRESS NOTES
GENERAL SURGERY  DAILY PROGRESS NOTE  4/1/2021    Subjective:  Patient feeling ok this morning. Agreeable to port placement today, all questions answered.     Objective:  BP (!) 141/92   Pulse 93   Temp 97.9 °F (36.6 °C) (Temporal)   Resp 18   Ht 5' 7\" (1.702 m)   Wt 200 lb (90.7 kg)   SpO2 96%   BMI 31.32 kg/m²     GENERAL:  Laying in bed, awake, alert, cooperative, no apparent distress  HEAD: Normocephalic, atraumatic  EYES: No sclera icterus, pupils equal  LUNGS:  No increased work of breathing  CARDIOVASCULAR:  RR  ABDOMEN:  Soft, non-tender, non-distended  EXTREMITIES: No edema or swelling  SKIN: Warm and dry    Assessment/Plan:  80 y.o. male with diffuse large B-cell lymphoma requiring urgent chemotherapy    - for port placement today  - NPO for procedure  - can advance diet after procedure    Electronically signed by Rj Moore MD on 4/1/2021 at 6:50 AM

## 2021-04-01 NOTE — H&P
GENERAL SURGERY  HISTORY AND PHYSICAL  4/1/2021        HPI  Yasmany Mcfadden is a 80 y.o. male with newly diagnosed diffuse large B-cell lymphoma s/p R hemicolectomy for colon mass on 3/11/21 who was transferred from [facility] for Mediport placement for inpatient chemotherapy. Pt was admitted to [facility] for dehydration on 3/29/21. Urology and Oncology were consulted for worsening hydronephrosis and increasing abdominal tumor burden, respectively. Oncology recommended Mediport placement for chemo ASAP with concern for third hit. Pt c/o nausea, but denies vomiting, constipation, diarrhea, changes in stools, fever, chills, CP or SOB. Past Medical History:   Diagnosis Date    Arthritis     KNEES HIPS BACK    BPH (benign prostatic hyperplasia)     Coronary artery disease     Difficult airway     PT STATES HE WAS TOLD THIS TW[facility] AT [facility]    Difficult intubation 04/2017    note in care everywhere \"Clinical Summary\" 03/10/2021    Hyperlipidemia     Hypertension     Sinus tachycardia 01/01/2002       Past Surgical History:   Procedure Laterality Date    ABLATION OF DYSRHYTHMIC FOCUS      AORTA SURGERY      aortic valve replacement    AORTIC VALVE REPLACEMENT      BLADDER SURGERY Right 2/17/2021    CYSTOSCOPY BILATERAL RETROGRADE PYELOGRAM RIGHT STENT INSERTION performed by Santy Castro MD at [facility]  2/19/2021    [facility]  2/19[facility]2021    COLONOSCOPY W/ ENDOSCOPIC MUCOSAL RESECTION performed by Josué White DO at [address] N/A 3/11/2021    LAPAROSCOPIC RIGHT HEMICOLECTOMY performed by Kobi Mayes MD at [address] OTHER SURGICAL HISTORY  08/12/2016    CT Myelogram, Dr. Teddy Gore  2014 new battery    last checked Dr Rosio Adan 1/2016?     TONSILLECTOMY      UPPER GASTROINTESTINAL ENDOSCOPY      reflux    UPPER GASTROINTESTINAL ENDOSCOPY N/A 2/16/2021    EGD BIOPSY performed by Opal Acevedo MD at [facility]         Prior to Admission medications    Medication Sig Start Date End Date Taking?  Authorizing Provider   ibuprofen (ADVIL;MOTRIN) 800 MG tablet TAKE 1 TABLET BY MOUTH EVERY 6 HOURS AS NEEDED FOR PAIN 3/26/21  Yes Nazario Ac MD   ondansetron (ZOFRAN ODT) 4 MG disintegrating tablet Take 1 tablet by mouth every 8 hours as needed for Nausea or Vomiting 3/13/21  Yes Nazario Ac MD   lisinopril-hydroCHLOROthiazide (PRINZIDE;ZESTORETIC) 10-12.5 MG per tablet Take 1 tablet by mouth daily   Yes Historical Provider, MD   amLODIPine (NORVASC) 5 MG tablet Take 5 mg by mouth daily   Yes Historical Provider, MD   aspirin 81 MG EC tablet Take 81 mg by mouth daily   Yes Historical Provider, MD   vitamin B-12 (CYANOCOBALAMIN) 100 MCG tablet Take 50 mcg by mouth daily   Yes Historical Provider, MD   sennosides-docusate sodium (SENOKOT-S) 8.6-50 MG tablet Take 1 tablet by mouth 2 times daily   Yes Historical Provider, MD   sotalol (BETAPACE) 80 MG tablet Take 40 mg by mouth 4/10/17  Yes Historical Provider, MD   tamsulosin (FLOMAX) 0.4 MG capsule Take 0.4 mg by mouth daily  1/4/18  Yes Historical Provider, MD   ferrous sulfate 325 (65 Fe) MG tablet Take 325 mg by mouth 2 times daily  1/8/18  Yes Historical Provider, MD   albuterol sulfate  (90 BASE) MCG/ACT inhaler Inhale 2 puffs into the lungs as needed for Wheezing   Yes Historical Provider, MD   esomeprazole Magnesium (NEXIUM) 20 MG PACK Take 20 mg by mouth daily   Yes Historical Provider, MD   PRAVASTATIN SODIUM PO Take 20 mg by mouth daily    Yes Historical Provider, MD       No Known Allergies    Family History   Problem Relation Age of Onset    Diabetes Mother     Stroke Mother     Diabetes Father     Heart Disease Father     Brain Cancer Sister     Stroke Sister     Stroke Brother     Cancer Maternal Grandfather        Social History Tobacco Use    Smoking status: Former Smoker     Packs/day: 1.00     Years: 44.00     Pack years: 44.00     Quit date: 3/4/2014     Years since quittin.0    Smokeless tobacco: Never Used   Substance Use Topics    Alcohol use: Not Currently     Alcohol/week: 2.0 standard drinks     Types: 2 Shots of liquor per week     Comment: month    Drug use: No         Review of Systems - History obtained from the patient  General ROS: negative for - chills or fever  Hematological and Lymphatic ROS: negative for - bleeding problems, blood clots or bruising  Endocrine ROS: negative   Breast ROS: negative  Respiratory ROS: no cough, shortness of breath, or wheezing  Cardiovascular ROS: no chest pain or dyspnea on exertion  Gastrointestinal ROS: no abdominal pain, change in bowel habits, or black or bloody stools  Genito-Urinary ROS: no dysuria, trouble voiding, or hematuria  Musculoskeletal ROS: negative  Neurological ROS: negative  Dermatological ROS: negative      PHYSICAL EXAM:    Vitals:    21 2315   BP: (!) 141/92   Pulse: 93   Resp: 18   Temp: 97.9 °F (36.6 °C)   SpO2: 96%       General Appearance:  awake, alert, oriented, in no acute distress  Skin:  No rashes  Head/face:  NCAT  Eyes:  No gross abnormalities. Lungs:  Breathing Pattern: regular, no distress  Heart:  Heart regular rate and rhythm  Abdomen:  Soft, diffuse mild tenderness to palpation, no organomegaly  Extremities: Extremities warm to touch, pink, with no edema.   Neurologic:  negative      LABS:  CBC  Recent Labs     21  0323   WBC 9.5   HGB 8.9*   HCT 28.8*        BMP  Recent Labs     21  0323      K 4.8   CL 97*   CO2 28   BUN 18   CREATININE 1.3*   CALCIUM 8.9     Liver Function  Recent Labs     21  0323   BILITOT 0.3   BILIDIR <0.2   AST 19   ALT <5   ALKPHOS 75   PROT 6.3*   LABALBU 2.5*     Lab Results   Component Value Date    LACTA 1.8 2021         No results for input(s): INR, PTT in the last 72 hours.    Invalid input(s): PT    RADIOLOGY  No results found. ASSESSMENT:  80 y.o. male with newly diagnosed diffuse large B-cell lymphoma s/p R hemicolectomy for colon mass on 3/11/21 who was transferred from [facility] for Mediport placement for inpatient chemotherapy    PLAN:  1. Plan for Mediport placement today 04/01/21  2. Keep pt NPO  3. Continue IV LR at 75 cc/hr  4.  Preop abx     DVT prophylaxis: Lovenox  GI prophylaxis: Protonix    Electronically signed by Naya Willams on 4/1/21 at 5:51 AM EDT

## 2021-04-01 NOTE — CONSULTS
Comprehensive Nutrition Assessment    Type and Reason for Visit:  Initial, Consult    Nutrition Recommendations/Plan: Advance nutrition as medically appropriate    Nutrition Assessment:  Pt severely malnourished w/ new lymphoma pending chemo initiation. Pt s/p recent R hemicolectomy for colon mass. Will monitor for nutrition progression    Malnutrition Assessment:  Malnutrition Status:  Severe malnutrition    Context:  Acute Illness     Findings of the 6 clinical characteristics of malnutrition:  Energy Intake:  7 - 50% or less of estimated energy requirements for 5 or more days  Weight Loss:  7 - Greater than 5% over 1 month     Body Fat Loss:  Unable to assess     Muscle Mass Loss:  Unable to assess    Fluid Accumulation:  No significant fluid accumulation     Strength:  Not Performed    Estimated Daily Nutrient Needs:  Energy (kcal):  (MSJ 1626 x 1.2 SF); Weight Used for Energy Requirements:  Current     Protein (g):  ; Weight Used for Protein Requirements:  Ideal(1.3-1.5)        Fluid (ml/day):  ; Method Used for Fluid Requirements:  1 ml/kcal      Nutrition Related Findings:  pt alert, abd tenderness, active BS, no noted edema, fluids WNL      Wounds:  None       Current Nutrition Therapies:    Diet NPO Effective Now Exceptions are: Ice Chips, Sips of Water with Meds    Anthropometric Measures:  · Height: 5' 7\" (170.2 cm)  · Current Body Weight: 200 lb (90.7 kg)(measured @ [facility] 3/29/21, UTO updated wt)   · Usual Body Weight: 214 lb (97.1 kg)(actual per EMR 02/2021)     · Ideal Body Weight: 148 lbs; % Ideal Body Weight 135.1 %   · BMI: 31.3  · BMI Categories: Obese Class 1 (BMI 30.0-34. 9)       Nutrition Diagnosis:   · Severe malnutrition, In context of acute illness or injury related to catabolic illness(new lymphoma) as evidenced by NPO or clear liquid status due to medical condition, poor intake prior to admission, weight loss greater than or equal to 5% in 1 month      Nutrition Interventions:   Food and/or Nutrient Delivery:  Continue NPO(ADAT)  Nutrition Education/Counseling:  Education not indicated   Coordination of Nutrition Care:  Continue to monitor while inpatient    Goals:  Nutrition progression       Nutrition Monitoring and Evaluation:   Food/Nutrient Intake Outcomes:  Diet Advancement/Tolerance  Physical Signs/Symptoms Outcomes:  Biochemical Data, GI Status, Fluid Status or Edema, Nutrition Focused Physical Findings, Skin, Weight     Discharge Planning:     Too soon to determine     Electronically signed by Silvia Conley MS, RD, LD on 4/1/21 at 12:39 PM EDT    Contact: [phone]

## 2021-04-01 NOTE — PLAN OF CARE
Problem: Falls - Risk of:  Goal: Will remain free from falls  Description: Will remain free from falls  4/1/2021 1046 by Narinder Inman RN  Outcome: Met This Shift  3/31/2021 2324 by Laura Wiggins RN  Outcome: Met This Shift  Goal: Absence of physical injury  Description: Absence of physical injury  4/1/2021 1046 by Narinder Inman RN  Outcome: Met This Shift  3/31/2021 2324 by Laura Wiggins RN  Outcome: Met This Shift

## 2021-04-02 LAB
ANION GAP SERPL CALCULATED.3IONS-SCNC: 14 MMOL/L (ref 7–16)
BASOPHILS ABSOLUTE: 0.01 E9/L (ref 0–0.2)
BASOPHILS RELATIVE PERCENT: 0.1 % (ref 0–2)
BUN BLDV-MCNC: 22 MG/DL (ref 8–23)
BURR CELLS: ABNORMAL
CALCIUM SERPL-MCNC: 9 MG/DL (ref 8.6–10.2)
CHLORIDE BLD-SCNC: 98 MMOL/L (ref 98–107)
CO2: 25 MMOL/L (ref 22–29)
CREAT SERPL-MCNC: 1.3 MG/DL (ref 0.7–1.2)
EOSINOPHILS ABSOLUTE: 0 E9/L (ref 0.05–0.5)
EOSINOPHILS RELATIVE PERCENT: 0 % (ref 0–6)
GFR AFRICAN AMERICAN: >60
GFR NON-AFRICAN AMERICAN: >60 ML/MIN/1.73
GLUCOSE BLD-MCNC: 121 MG/DL (ref 74–99)
HCT VFR BLD CALC: 29.9 % (ref 37–54)
HEMOGLOBIN: 8.9 G/DL (ref 12.5–16.5)
HYPOCHROMIA: ABNORMAL
IMMATURE GRANULOCYTES #: 0.08 E9/L
IMMATURE GRANULOCYTES %: 0.6 % (ref 0–5)
LYMPHOCYTES ABSOLUTE: 0.4 E9/L (ref 1.5–4)
LYMPHOCYTES RELATIVE PERCENT: 3.1 % (ref 20–42)
MCH RBC QN AUTO: 23.9 PG (ref 26–35)
MCHC RBC AUTO-ENTMCNC: 29.8 % (ref 32–34.5)
MCV RBC AUTO: 80.4 FL (ref 80–99.9)
MONOCYTES ABSOLUTE: 0.54 E9/L (ref 0.1–0.95)
MONOCYTES RELATIVE PERCENT: 4.2 % (ref 2–12)
NEUTROPHILS ABSOLUTE: 11.92 E9/L (ref 1.8–7.3)
NEUTROPHILS RELATIVE PERCENT: 92 % (ref 43–80)
OVALOCYTES: ABNORMAL
PDW BLD-RTO: 14.8 FL (ref 11.5–15)
PHOSPHORUS: 2.8 MG/DL (ref 2.5–4.5)
PLATELET # BLD: 336 E9/L (ref 130–450)
PMV BLD AUTO: 8.9 FL (ref 7–12)
POIKILOCYTES: ABNORMAL
POLYCHROMASIA: ABNORMAL
POTASSIUM SERPL-SCNC: 4.5 MMOL/L (ref 3.5–5)
RBC # BLD: 3.72 E12/L (ref 3.8–5.8)
SCHISTOCYTES: ABNORMAL
SODIUM BLD-SCNC: 137 MMOL/L (ref 132–146)
URIC ACID, SERUM: 2.6 MG/DL (ref 3.4–7)
WBC # BLD: 13 E9/L (ref 4.5–11.5)

## 2021-04-02 PROCEDURE — 6360000002 HC RX W HCPCS: Performed by: INTERNAL MEDICINE

## 2021-04-02 PROCEDURE — 84100 ASSAY OF PHOSPHORUS: CPT

## 2021-04-02 PROCEDURE — 1200000000 HC SEMI PRIVATE

## 2021-04-02 PROCEDURE — 80048 BASIC METABOLIC PNL TOTAL CA: CPT

## 2021-04-02 PROCEDURE — 6370000000 HC RX 637 (ALT 250 FOR IP): Performed by: STUDENT IN AN ORGANIZED HEALTH CARE EDUCATION/TRAINING PROGRAM

## 2021-04-02 PROCEDURE — 2580000003 HC RX 258: Performed by: STUDENT IN AN ORGANIZED HEALTH CARE EDUCATION/TRAINING PROGRAM

## 2021-04-02 PROCEDURE — 84550 ASSAY OF BLOOD/URIC ACID: CPT

## 2021-04-02 PROCEDURE — 85025 COMPLETE CBC W/AUTO DIFF WBC: CPT

## 2021-04-02 PROCEDURE — 2580000003 HC RX 258: Performed by: INTERNAL MEDICINE

## 2021-04-02 PROCEDURE — C9113 INJ PANTOPRAZOLE SODIUM, VIA: HCPCS | Performed by: STUDENT IN AN ORGANIZED HEALTH CARE EDUCATION/TRAINING PROGRAM

## 2021-04-02 PROCEDURE — 6360000002 HC RX W HCPCS: Performed by: STUDENT IN AN ORGANIZED HEALTH CARE EDUCATION/TRAINING PROGRAM

## 2021-04-02 PROCEDURE — 77001 FLUOROGUIDE FOR VEIN DEVICE: CPT | Performed by: SURGERY

## 2021-04-02 PROCEDURE — 36561 INSERT TUNNELED CV CATH: CPT | Performed by: SURGERY

## 2021-04-02 PROCEDURE — 36415 COLL VENOUS BLD VENIPUNCTURE: CPT

## 2021-04-02 RX ORDER — DOXORUBICIN HYDROCHLORIDE 2 MG/ML
50 INJECTION, SOLUTION INTRAVENOUS ONCE
Status: COMPLETED | OUTPATIENT
Start: 2021-04-02 | End: 2021-04-02

## 2021-04-02 RX ORDER — DIPHENHYDRAMINE HYDROCHLORIDE 50 MG/ML
50 INJECTION INTRAMUSCULAR; INTRAVENOUS ONCE
Status: CANCELLED | OUTPATIENT
Start: 2021-04-02 | End: 2021-04-02

## 2021-04-02 RX ORDER — SODIUM CHLORIDE 9 MG/ML
20 INJECTION, SOLUTION INTRAVENOUS ONCE
Status: COMPLETED | OUTPATIENT
Start: 2021-04-02 | End: 2021-04-03

## 2021-04-02 RX ORDER — SODIUM CHLORIDE 9 MG/ML
INJECTION, SOLUTION INTRAVENOUS CONTINUOUS
Status: CANCELLED | OUTPATIENT
Start: 2021-04-02

## 2021-04-02 RX ORDER — SODIUM CHLORIDE 0.9 % (FLUSH) 0.9 %
10 SYRINGE (ML) INJECTION PRN
Status: CANCELLED | OUTPATIENT
Start: 2021-04-02

## 2021-04-02 RX ORDER — HEPARIN SODIUM (PORCINE) LOCK FLUSH IV SOLN 100 UNIT/ML 100 UNIT/ML
500 SOLUTION INTRAVENOUS PRN
Status: ACTIVE | OUTPATIENT
Start: 2021-04-02 | End: 2021-04-03

## 2021-04-02 RX ORDER — METHYLPREDNISOLONE SODIUM SUCCINATE 125 MG/2ML
125 INJECTION, POWDER, LYOPHILIZED, FOR SOLUTION INTRAMUSCULAR; INTRAVENOUS ONCE
Status: CANCELLED | OUTPATIENT
Start: 2021-04-02 | End: 2021-04-02

## 2021-04-02 RX ORDER — MEPERIDINE HYDROCHLORIDE 25 MG/ML
12.5 INJECTION INTRAMUSCULAR; INTRAVENOUS; SUBCUTANEOUS ONCE
Status: CANCELLED | OUTPATIENT
Start: 2021-04-02 | End: 2021-04-02

## 2021-04-02 RX ORDER — EPINEPHRINE 1 MG/ML
0.3 INJECTION, SOLUTION, CONCENTRATE INTRAVENOUS PRN
Status: CANCELLED | OUTPATIENT
Start: 2021-04-02

## 2021-04-02 RX ORDER — SODIUM CHLORIDE 9 MG/ML
25 INJECTION, SOLUTION INTRAVENOUS PRN
Status: CANCELLED | OUTPATIENT
Start: 2021-04-02

## 2021-04-02 RX ORDER — SODIUM CHLORIDE 0.9 % (FLUSH) 0.9 %
5 SYRINGE (ML) INJECTION PRN
Status: CANCELLED | OUTPATIENT
Start: 2021-04-02

## 2021-04-02 RX ORDER — DEXAMETHASONE SODIUM PHOSPHATE 4 MG/ML
8 INJECTION, SOLUTION INTRA-ARTICULAR; INTRALESIONAL; INTRAMUSCULAR; INTRAVENOUS; SOFT TISSUE ONCE
Status: COMPLETED | OUTPATIENT
Start: 2021-04-02 | End: 2021-04-02

## 2021-04-02 RX ORDER — PALONOSETRON HYDROCHLORIDE 0.05 MG/ML
0.25 INJECTION, SOLUTION INTRAVENOUS ONCE
Status: COMPLETED | OUTPATIENT
Start: 2021-04-02 | End: 2021-04-02

## 2021-04-02 RX ADMIN — CYCLOPHOSPHAMIDE 1500 MG: 200 INJECTION, SOLUTION INTRAVENOUS at 13:15

## 2021-04-02 RX ADMIN — SODIUM CHLORIDE 20 ML/HR: 9 INJECTION, SOLUTION INTRAVENOUS at 11:15

## 2021-04-02 RX ADMIN — PANTOPRAZOLE SODIUM 40 MG: 40 INJECTION, POWDER, FOR SOLUTION INTRAVENOUS at 09:02

## 2021-04-02 RX ADMIN — VINCRISTINE SULFATE 2 MG: 1 INJECTION, SOLUTION INTRAVENOUS at 15:47

## 2021-04-02 RX ADMIN — DOXORUBICIN HYDROCHLORIDE 100 MG: 2 INJECTION, SOLUTION INTRAVENOUS at 14:26

## 2021-04-02 RX ADMIN — SOTALOL HYDROCHLORIDE 40 MG: 80 TABLET ORAL at 22:06

## 2021-04-02 RX ADMIN — DEXAMETHASONE SODIUM PHOSPHATE 8 MG: 4 INJECTION, SOLUTION INTRA-ARTICULAR; INTRALESIONAL; INTRAMUSCULAR; INTRAVENOUS; SOFT TISSUE at 12:07

## 2021-04-02 RX ADMIN — PALONOSETRON 0.25 MG: 0.25 INJECTION, SOLUTION INTRAVENOUS at 12:07

## 2021-04-02 RX ADMIN — FOSAPREPITANT 150 MG: 150 INJECTION, POWDER, LYOPHILIZED, FOR SOLUTION INTRAVENOUS at 12:07

## 2021-04-02 RX ADMIN — SODIUM CHLORIDE, PRESERVATIVE FREE 10 ML: 5 INJECTION INTRAVENOUS at 09:06

## 2021-04-02 RX ADMIN — SODIUM CHLORIDE, PRESERVATIVE FREE 10 ML: 5 INJECTION INTRAVENOUS at 09:02

## 2021-04-02 RX ADMIN — SOTALOL HYDROCHLORIDE 40 MG: 80 TABLET ORAL at 09:02

## 2021-04-02 RX ADMIN — PREDNISONE 100 MG: 20 TABLET ORAL at 09:02

## 2021-04-02 NOTE — PROGRESS NOTES
GENERAL SURGERY  DAILY PROGRESS NOTE  4/2/2021    Subjective:  Patient doing well today after port placement. No issues overnight. Objective:  /74   Pulse 90   Temp 97.5 °F (36.4 °C) (Temporal)   Resp 16   Ht 5' 7\" (1.702 m)   Wt 200 lb (90.7 kg)   SpO2 98%   BMI 31.32 kg/m²     GENERAL:  Laying in bed, awake, alert, cooperative, no apparent distress  HEAD: Normocephalic, atraumatic  EYES: No sclera icterus, pupils equal  LUNGS:  No increased work of breathing  CARDIOVASCULAR:  RR  ABDOMEN:  Soft, non-tender, non-distended  EXTREMITIES: No edema or swelling  SKIN: Warm and dry.  R IJ port in place, no surrounding erythema, incision intact     Assessment/Plan:  80 y.o. male with diffuse large B-cell lymphoma s/p port placement 4/1    - ok to use port as needed  - overall care per primary team and consultants  - surgery will be available as needed    Electronically signed by Andre Nash MD on 4/2/2021 at 6:45 AM      As above  No pain at the port, functioning well  We will see as needed  Follow-up in office as needed    Jared Bernal MD, MS  Minimally Invasive and Bariatric Surgery  [phone] (p)  4/2/2021  1:25 PM

## 2021-04-02 NOTE — CARE COORDINATION
Social Work Discharge Planning:  SW met with patient discussed transition of care. Patient lives in a one story home with significant other and stepdaughter. PTA patient independent with no DME. Patient has no hx with [facility] or [facility]. Patient's PCP is Dr. Trini Merida and Pharmacy is [facility] in [address] [place] [address]. Patient's plan is home with no needs. SW following.   Electronically signed by JOSE Colvin on 4/2/2021 at 11:22 AM

## 2021-04-02 NOTE — PLAN OF CARE
Problem: Pain:  Goal: Pain level will decrease  Description: Pain level will decrease  Outcome: Met This Shift     Problem: Nausea/Vomiting:  Goal: Absence of nausea/vomiting  Description: Absence of nausea/vomiting  Outcome: Met This Shift     Problem: Nausea/Vomiting:  Goal: Able to drink  Description: Able to drink  Outcome: Met This Shift     Problem: Nausea/Vomiting:  Goal: Able to eat  Description: Able to eat  Outcome: Met This Shift

## 2021-04-02 NOTE — PLAN OF CARE
Problem: Falls - Risk of:  Goal: Will remain free from falls  Description: Will remain free from falls  4/1/2021 2331 by Saintclair Klippel, RN  Outcome: Met This Shift  4/1/2021 2041 by Otto Phoenix RN  Outcome: Met This Shift  4/1/2021 1046 by Otto Phoenix RN  Outcome: Met This Shift  Goal: Absence of physical injury  Description: Absence of physical injury  4/1/2021 2331 by Saintclair Klippel, RN  Outcome: Met This Shift  4/1/2021 2041 by Otto Phoenix RN  Outcome: Met This Shift  4/1/2021 1046 by Otto Phoenix RN  Outcome: Met This Shift

## 2021-04-02 NOTE — H&P
[address]  Consultants  History and Physical      CHIEF COMPLAINT:    Patient of Abel McclellandDO presents with:  Diffuse large B cell lymphoma ([address]    History of Present Illness:   Patient reports several weeks of generalized nonradiating dull, vague moderate abdominal pain as well as poor appetite and associated 15 pounds of weight loss. Subsequent work-up revealed diffuse large B-cell lymphoma. He is admitted for port placement and initiation of chemotherapy. REVIEW OF SYSTEMS:  Pertinent negatives are above in HPI. 10 point ROS otherwise negative. Past Medical History:   Diagnosis Date    Arthritis     KNEES HIPS BACK    BPH (benign prostatic hyperplasia)     Coronary artery disease     Difficult airway     PT STATES HE WAS TOLD THIS TW[facility] AT [facility]    Difficult intubation 04/2017    note in care everywhere \"Clinical Summary\" 03/10/2021    Hyperlipidemia     Hypertension     Sinus tachycardia 01/01/2002         Past Surgical History:   Procedure Laterality Date    ABLATION OF DYSRHYTHMIC FOCUS      AORTA SURGERY      aortic valve replacement    AORTIC VALVE REPLACEMENT      BLADDER SURGERY Right 2/17/2021    CYSTOSCOPY BILATERAL RETROGRADE PYELOGRAM RIGHT STENT INSERTION performed by German Ruelas MD at [address]  2/19/2021    COLONOSCOPY WITH BIOPSY performed by Hermilo Devi DO at [address] [facility]  2/19/2021    COLONOSCOPY W/ ENDOSCOPIC MUCOSAL RESECTION performed by Hermilo Devi DO at [address]  N/A 3/11/2021    LAPAROSCOPIC RIGHT HEMICOLECTOMY performed by Grady Carpenter MD at [address] [facility] OTHER SURGICAL HISTORY  08/12/2016    CT Myelogram, Dr. Jim Tamayo  2014 new battery    last checked Dr Sharron Hamlin 1/2016?     PORT SURGERY Right 4/1/2021    MEDI PORT INSERTION performed by Grady Carpenter MD at [facility] GASTROINTESTINAL ENDOSCOPY      reflux    UPPER GASTROINTESTINAL ENDOSCOPY N/A 2/16/2021    EGD BIOPSY performed by Richard Gaspar MD at [address] [facility]         Medications Prior to Admission:    Medications Prior to Admission: ibuprofen (ADVIL;MOTRIN) 800 MG tablet, TAKE 1 TABLET BY MOUTH EVERY 6 HOURS AS NEEDED FOR PAIN  ondansetron (ZOFRAN ODT) 4 MG disintegrating tablet, Take 1 tablet by mouth every 8 hours as needed for Nausea or Vomiting  lisinopril-hydroCHLOROthiazide (PRINZIDE;ZESTORETIC) 10-12.5 MG per tablet, Take 1 tablet by mouth daily  amLODIPine (NORVASC) 5 MG tablet, Take 5 mg by mouth daily  aspirin 81 MG EC tablet, Take 81 mg by mouth daily  vitamin B-12 (CYANOCOBALAMIN) 100 MCG tablet, Take 50 mcg by mouth daily  sennosides-docusate sodium (SENOKOT-S) 8.6-50 MG tablet, Take 1 tablet by mouth 2 times daily  sotalol (BETAPACE) 80 MG tablet, Take 40 mg by mouth  tamsulosin (FLOMAX) 0.4 MG capsule, Take 0.4 mg by mouth daily   ferrous sulfate 325 (65 Fe) MG tablet, Take 325 mg by mouth 2 times daily   albuterol sulfate  (90 BASE) MCG/ACT inhaler, Inhale 2 puffs into the lungs as needed for Wheezing  esomeprazole Magnesium (NEXIUM) 20 MG PACK, Take 20 mg by mouth daily  PRAVASTATIN SODIUM PO, Take 20 mg by mouth daily     Note that the patient's home medications were reviewed and the above list is accurate to the best of my knowledge at the time of the exam.    Allergies:    Patient has no known allergies. Social History:    reports that he quit smoking about 7 years ago. He has a 44.00 pack-year smoking history. He has never used smokeless tobacco. He reports previous alcohol use of about 2.0 standard drinks of alcohol per week. He reports that he does not use drugs.     Family History:   family history includes Brain Cancer in his sister; Cancer in his maternal grandfather; Diabetes in his father and mother; Heart Disease in his father; Stroke in his brother, mother, and sister. PHYSICAL EXAM:    Vitals:  /74   Pulse 90   Temp 97.5 °F (36.4 °C) (Temporal)   Resp 16   Ht 5' 7\" (1.702 m)   Wt 200 lb (90.7 kg)   SpO2 98%   BMI 31.32 kg/m²       General appearance: NAD, conversant  Eyes: Sclerae anicteric, PERRLA  HEENT: AT/NC, MMM  Neck: FROM, supple, no thyromegaly  Lymph: No cervical / supraclavicular lymphadenopathy  Lungs: Clear to auscultation, WOB normal  CV: RRR, no MRGs, no lower extremity edema  Abdomen: Soft, non-tender; no masses or HSM, +BS  Extremities: FROM without synovitis. No clubbing or cyanosis of the hands. Skin: no rash, induration, lesions, or ulcers  Psych: Calm and cooperative. Normal judgement and insight. Normal mood and affect. Neuro: Alert and interactive, face symmetric, speech fluent. LABS:  All labs reviewed. Of note:  CBC with Differential:    Lab Results   Component Value Date    WBC 9.8 04/01/2021    RBC 3.49 04/01/2021    HGB 8.7 04/01/2021    HCT 28.5 04/01/2021     04/01/2021    MCV 81.7 04/01/2021    MCH 24.9 04/01/2021    MCHC 30.5 04/01/2021    RDW 14.8 04/01/2021    LYMPHOPCT 7.1 04/01/2021    MONOPCT 11.4 04/01/2021    BASOPCT 0.5 04/01/2021    MONOSABS 1.12 04/01/2021    LYMPHSABS 0.70 04/01/2021    EOSABS 0.22 04/01/2021    BASOSABS 0.05 04/01/2021     CMP:    Lab Results   Component Value Date     04/01/2021    K 4.9 04/01/2021    CL 97 04/01/2021    CO2 25 04/01/2021    BUN 16 04/01/2021    CREATININE 1.2 04/01/2021    GFRAA >60 04/01/2021    LABGLOM >60 04/01/2021    GLUCOSE 63 04/01/2021    GLUCOSE 100 01/28/2011    PROT 6.0 04/01/2021    LABALBU 2.3 04/01/2021    LABALBU 3.9 01/28/2011    CALCIUM 8.7 04/01/2021    BILITOT 0.4 04/01/2021    ALKPHOS 62 04/01/2021    AST 13 04/01/2021    ALT <5 04/01/2021       I've personally reviewed the patient's CT AP  1.  Extensive new/worsened lymphadenopathy throughout the retroperitoneum, in   the mesentery and celiac region. Irisa Tomas is also large mass in the iliac   region of the pelvis which is larger as compared to prior.  Findings likely   relate to provided history of lymphoma. 2. Innumerable masses involving the small bowel which are new since the   prior.  This also probably reflects lymphoma. 3. Short segment intussusception involving small bowel in the anterior left   abdomen.  No associated obstruction. 4. Worsened right hydronephrosis.  This likely indicates malfunction of the   right ureteral stent.  Note that the pelvic mass indicated above surrounds   the distal right ureter/stent. 5. New 11 mm pleural base nodule in the left lower lobe could be related to   metastatic disease/malignancy.      I've personally reviewed the patient's EKG: NSR  QTc 470 no acute ischemic changes. ASSESSMENT/PLAN:  Principal Problem:    Diffuse large B cell lymphoma (HCC)  Active Problems:    SUNNY (acute kidney injury) ([facility]    CKD (chronic kidney disease) stage 3, GFR 30-59 ml/min    Paroxysmal atrial fibrillation (HCC)    Severe protein-calorie malnutrition (HCC)  Resolved Problems:    * No resolved hospital problems.  *         Port placement today     Urgent initiation of inpatient chemo     Monitor for tumor lysis upon initiation of chemo     SUNNY improving; possible stent malfunction with hydronephrosis.  Urology consult.     Continue sotalol; not anticoagulated at baseline?     Hold lisinopril/HCTZ and hold ibuprofen     Code status: Full  Requires inpatient level of care  Angel Bashir    7:02 AM  4/1/21

## 2021-04-02 NOTE — PROGRESS NOTES
[facility]  Hematology/Oncology  Consult      Patient Name: Pricila Huertas  YOB: 1950  PCP: Doretha Robbins DO   Referring Provider: [address] [place] [address],[address] / Mobile [id number]     Reason for Consultation: No chief complaint on file. Subjective: Tolerated PORT placement. Tolerating therapy well so far     History of Present Illness: This is a 78-year-old male patient with a PMH history of BPH, HTN, and CAD who presented with with nausea as a direct admission by surgery. Patient has history of recent right hemicolectomy for colon mass on 3/11/21. Final pathology came back consistent with diffuse large B-cell lymphoma. Patient is now having nausea and dizziness since procedure. CT A/P showed extensive new/worsened lymphadenopathy throughout the retroperitoneum, in the mesentery and celiac region. There is also large mass in the iliac region of the pelvis which is larger as compared to prior. Innumerable masses involving the small bowel which are new since the prior. Short segment intussusception involving small bowel in the anterior left abdomen with no associated obstruction. Worsened right hydronephrosis. Note that the pelvic mass indicated above surrounds the distal right ureter/stent. New 11 mm pleural base nodule in the left lower lobe. Consultation for evaluation and recommendations for new dx of lymphoma.        Diagnostic Data:     Past Medical History:   Diagnosis Date    Arthritis     KNEES HIPS BACK    BPH (benign prostatic hyperplasia)     Coronary artery disease     Difficult airway     PT STATES HE WAS TOLD THIS TWICE AT [facility]    Difficult intubation 04/2017    note in care everywhere \"Clinical Summary\" 03/10/2021    Hyperlipidemia     Hypertension     Sinus tachycardia 01/01/2002       Patient Active Problem List    Diagnosis Date Noted    Severe protein-calorie malnutrition ([facility] 04/01/2021    Diffuse large B cell lymphoma ([facility] 03/31/2021    Diffuse large B-cell lymphoma ([facility] 03/30/2021    Dehydration 03/29/2021    Prolonged Q-T interval on ECG 03/29/2021    Hypotension 03/29/2021    CKD (chronic kidney disease) stage 3, GFR 30-59 ml/min 03/29/2021    Paroxysmal atrial fibrillation ([facility] 03/29/2021    Colonic mass 03/11/2021    Hypertension     Hyperlipidemia     Coronary artery disease     SUNNY (acute kidney injury) ([facility] 02/14/2021    Hematuria 01/16/2018    BPH (benign prostatic hyperplasia) 01/16/2018        Past Surgical History:   Procedure Laterality Date    ABLATION OF DYSRHYTHMIC FOCUS      AORTA SURGERY      aortic valve replacement    AORTIC VALVE REPLACEMENT      BLADDER SURGERY Right 2/17/2021    CYSTOSCOPY BILATERAL RETROGRADE PYELOGRAM RIGHT STENT INSERTION performed by Lanny Teague MD at [address] [facility]  2/19/2021    COLONOSCOPY WITH BIOPSY performed by Isac Puckett DO at [address]  2/19/2021    COLONOSCOPY W/ ENDOSCOPIC MUCOSAL RESECTION performed by Isac Puckett DO at [address] N/A 3/11/2021    LAPAROSCOPIC RIGHT HEMICOLECTOMY performed by Laverne Bradley MD at [address] OTHER SURGICAL HISTORY  08/12/2016    CT Myelogram, Dr. Lorne Garza  2014 new battery    last checked Dr Jp Beltran 1/2016?     PORT SURGERY Right 4/1/2021    MEDI PORT INSERTION performed by Laverne Bradley MD at [address]  [facility] ENDOSCOPY      reflux    UPPER GASTROINTESTINAL ENDOSCOPY N/A 2/16/2021    EGD BIOPSY performed by Juwan Garcia MD at [address] [facility]         Family History  Family History   Problem Relation Age of Onset    Diabetes Mother     Stroke Mother     Diabetes Father     Heart Disease Father     Brain Cancer Sister     Stroke Sister     Stroke Brother     Cancer Maternal Grandfather        Social History    TOBACCO:   reports that he quit smoking about 7 years ago. He has a 44.00 pack-year smoking history. He has never used smokeless tobacco.  ETOH:   reports previous alcohol use of about 2.0 standard drinks of alcohol per week. Home Medications  Prior to Admission medications    Medication Sig Start Date End Date Taking? Authorizing Provider   ibuprofen (ADVIL;MOTRIN) 800 MG tablet TAKE 1 TABLET BY MOUTH EVERY 6 HOURS AS NEEDED FOR PAIN 3/26/21  Yes Manuel Bowles MD   ondansetron (ZOFRAN ODT) 4 MG disintegrating tablet Take 1 tablet by mouth every 8 hours as needed for Nausea or Vomiting 3/13/21  Yes Manuel Bowles MD   lisinopril-hydroCHLOROthiazide (PRINZIDE;ZESTORETIC) 10-12.5 MG per tablet Take 1 tablet by mouth daily   Yes Historical Provider, MD   amLODIPine (NORVASC) 5 MG tablet Take 5 mg by mouth daily   Yes Historical Provider, MD   aspirin 81 MG EC tablet Take 81 mg by mouth daily   Yes Historical Provider, MD   vitamin B-12 (CYANOCOBALAMIN) 100 MCG tablet Take 50 mcg by mouth daily   Yes Historical Provider, MD   sennosides-docusate sodium (SENOKOT-S) 8.6-50 MG tablet Take 1 tablet by mouth 2 times daily   Yes Historical Provider, MD   sotalol (BETAPACE) 80 MG tablet Take 40 mg by mouth 4/10/17  Yes Historical Provider, MD   tamsulosin (FLOMAX) 0.4 MG capsule Take 0.4 mg by mouth daily  1/4/18  Yes Historical Provider, MD   ferrous sulfate 325 (65 Fe) MG tablet Take 325 mg by mouth 2 times daily  1/8/18  Yes Historical Provider, MD   albuterol sulfate  (90 BASE) MCG/ACT inhaler Inhale 2 puffs into the lungs as needed for Wheezing   Yes Historical Provider, MD   esomeprazole Magnesium (NEXIUM) 20 MG PACK Take 20 mg by mouth daily   Yes Historical Provider, MD   PRAVASTATIN SODIUM PO Take 20 mg by mouth daily    Yes Historical Provider, MD       Allergies  No Known Allergies    Review of Systems: +Fatigue, weakness, abdominal discomfort and tenderness, nausea, dizziness and lightheadedness.       Constitutional:  No fever chills or rigors.  Eyes: No changes in vision, discharge, or pain   ENT: No Headaches, hearing loss or vertigo. No mouth sores or sore throat. No change in taste or smell.  Cardiovascular: No chest discomfort, dyspnea on exertion, palpitations or loss of consciousness. or phlebitis.  Respiratory: Has no cough or wheezing, Has no sputum production. Has no hemoptysis, Has no pleuritic pain, .  Gastrointestinal: No abdominal pain, appetite loss, blood in stools. No change in bowel habits. No hematemesis    Genitourinary: Patient acknowledges no dysuria, trouble voiding, or hematuria. No nocturia or increased frequency.  Musculoskeletal: No gait disturbance, weakness or joint complaints.  Integumentary: No rash or pruritis.  Neurological: No headache, diplopia, change in muscle strength, numbness or tingling. No change in gait, balance, coordination, mood, affect, memory, mentation, behavior.  Psychiatric: No anxiety, or depression.  Endocrine: No temperature intolerance. No excessive thirst, fluid intake, or urination. No tremor.  Hematologic/Lymphatic: No abnormal bruising or bleeding, blood clots or swollen lymph nodes.  Allergic/Immunologic: No nasal congestion or hives. Objective  /77   Pulse 70   Temp 96.8 °F (36 °C) (Temporal)   Resp 18   Ht 5' 7\" (1.702 m)   Wt 200 lb (90.7 kg)   SpO2 95%   BMI 31.32 kg/m²     Physical Exam:     General: AAO to person, place, time, in no acute distress,   Head and neck : PERRLA, EOMI . Sclera non icteric. Oropharynx : Clear  Neck: no JVD,  no adenopathy  Heart: Regular rate and regular rhythm, no murmur  Lungs: Clear to auscultation   Extremities: No edema,no cyanosis, no clubbing. Abdomen: Soft, tenderness;no masses, no organomegaly  Skin:  No rash very dry and flaky. Neurologic:Cranial nerves grossly intact. No focal motor or sensory deficits .     Recent Laboratory Data-   Lab Results   Component Value Date    WBC 13.0 (H) 04/02/2021    HGB 8.9 (L) 04/02/2021    HCT 29.9 (L) 04/02/2021    MCV 80.4 04/02/2021     04/02/2021    LYMPHOPCT 7.1 (L) 04/01/2021    RBC 3.72 (L) 04/02/2021    MCH 23.9 (L) 04/02/2021    MCHC 29.8 (L) 04/02/2021    RDW 14.8 04/02/2021    NEUTOPHILPCT 78.0 04/01/2021    MONOPCT 11.4 04/01/2021    BASOPCT 0.5 04/01/2021    NEUTROABS 7.64 (H) 04/01/2021    LYMPHSABS 0.70 (L) 04/01/2021    MONOSABS 1.12 (H) 04/01/2021    EOSABS 0.22 04/01/2021    BASOSABS 0.05 04/01/2021       Lab Results   Component Value Date     04/01/2021    K 4.9 04/01/2021    CL 97 (L) 04/01/2021    CO2 25 04/01/2021    BUN 16 04/01/2021    CREATININE 1.2 04/01/2021    GLUCOSE 63 (L) 04/01/2021    CALCIUM 8.7 04/01/2021    PROT 6.0 (L) 04/01/2021    LABALBU 2.3 (L) 04/01/2021    BILITOT 0.4 04/01/2021    ALKPHOS 62 04/01/2021    AST 13 04/01/2021    ALT <5 04/01/2021    LABGLOM >60 04/01/2021    GFRAA >60 04/01/2021       Lab Results   Component Value Date    IRON 38 (L) 02/16/2021    TIBC 248 (L) 02/16/2021           Radiology-    Ct Abdomen Pelvis W Iv Contrast Additional Contrast? Oral    Result Date: 3/30/2021  EXAMINATION: CT OF THE ABDOMEN AND PELVIS WITH CONTRAST 3/29/2021 10:28 pm TECHNIQUE: CT of the abdomen and pelvis was performed with the administration of intravenous contrast. Multiplanar reformatted images are provided for review. Dose modulation, iterative reconstruction, and/or weight based adjustment of the mA/kV was utilized to reduce the radiation dose to as low as reasonably achievable. COMPARISON: 02/18/2021 HISTORY: ORDERING SYSTEM PROVIDED HISTORY: eval postop right hemicolectomy for lymphoma, rule out leak, dehydration TECHNOLOGIST PROVIDED HISTORY: Reason for exam:->eval postop right hemicolectomy for lymphoma, rule out leak, dehydration Additional Contrast?->Oral FINDINGS: Lower Chest: There is pleural base nodularity in the left lower lobe which is not seen on the prior.   I cannot exclude a metastatic lesion. This measures about 11 mm. Abdomen: The liver, spleen, pancreas, adrenal glands and left kidney are unremarkable. There is moderate right hydronephrosis which has worsened. There is a right ureteral stent present. This extends into the bladder. Worsening hydronephrosis suggest stent malfunction. There are innumerable nodular masslike areas of density involving small bowel loops. Findings are consistent with multiple masses. There is a short segment small bowel intussusception in the anterior left abdomen. There are no findings of intestinal obstruction. Patient is status post right hemicolectomy. There is extensive worsened lymphadenopathy in the retroperitoneum, celiac region, mesentery. There are aortoiliac atherosclerotic calcification. There is no abdominal aortic aneurysm. There is no free intraperitoneal air. There is no abnormal fluid collection. Pelvis:  Bladder is unremarkable in appearance. There is a large mass in the right iliac region surrounding the right ureter/stent. This is probably lymphadenopathy given patient's history of lymphoma. Bones/Soft Tissues: No acute abnormality     1. Extensive new/worsened lymphadenopathy throughout the retroperitoneum, in the mesentery and celiac region. There is also large mass in the iliac region of the pelvis which is larger as compared to prior. Findings likely relate to provided history of lymphoma. 2. Innumerable masses involving the small bowel which are new since the prior. This also probably reflects lymphoma. 3. Short segment intussusception involving small bowel in the anterior left abdomen. No associated obstruction. 4. Worsened right hydronephrosis. This likely indicates malfunction of the right ureteral stent. Note that the pelvic mass indicated above surrounds the distal right ureter/stent. 5. New 11 mm pleural base nodule in the left lower lobe could be related to metastatic disease/malignancy.          ASSESSMENT/PLAN :  76-year-old male     -BPH, HTN, and CAD  -Newly dx DLBCL    -S/p right hemicolectomy for colon mass on 3/11/21.    -Final pathology consistent with diffuse large B-cell lymphoma. Non-germinal cell type    -CT A/P showed extensive new/worsened lymphadenopathy throughout the retroperitoneum, in the mesentery and celiac region. There is also large mass in the iliac region of the pelvis which is larger as compared to prior. Innumerable masses involving the small bowel which are new since the prior. Short segment intussusception involving small bowel in the anterior left abdomen with no associated obstruction. Worsened right hydronephrosis. Note that the pelvic mass indicated above surrounds the distal right ureter/stent. New 11 mm pleural base nodule in the left lower lobe. Patient with very aggressive and bulky stage IV diffuse large B-cell lymphoma, ABC non-germinal cell type. In view of rapid disease progression and aggressive behavior must rule out double hit or triple hit lymphoma. FISH interphase still pending    Patient with nonfunctioning right ureteral stent and worsening right-sided hydronephrosis. He will need Mediport placement as soon as possible for initiation of systemic chemotherapy to be done as inpatient prior to discharge he will also need a 2D echo to assess his LVEF. He will be at risk of tumor lysis syndrome and will require IV hydration and Elitek. Would initially recommend R-CHOP/lenalidomide pending FISH interphase  If double hit or triple hit lymphoma confirmed he will require DA R EPOCH and we will then transfer him to a tertiary care center    To transfer to [place] [address] for chemo      3/31/21  - CBC with stable anemia. Kidney function improving  - Patient still needs Mediport placement  - To transfer to [facility] for chemo   - He will be at risk of tumor lysis syndrome and will require IV hydration and Elitek. - R-CHOP/lenalidomide pending FISH interphase.  If double hit or triple hit lymphoma confirmed he will require DA R EPOCH and we will then transfer him to a tertiary care center    4/1/2021  - CBC with continued mild anemia  - SUNNY resolved. Has ureteral stent. Urology following, to remain until chemotherapy completed  - Patient for Mediport placement and to begin chemotherapy today. Was started  - TTE with LVEF 65-70%  - Prophylaxis for TLS, IV hydration and Elitek. - R-CHOP/lenalidomide pending FISH interphase.   - If double hit or triple hit lymphoma confirmed he will require DA R EPOCH and we will then transfer him to a tertiary care center    4/2/21  - CBC with continued mild anemia  - SUNNY resolved. Has ureteral stent. Urology following, to remain until chemotherapy completed  - S/P Mediport placement  - Received Rituxan yesterday. To continue treatment today with CHOP  - TTE with LVEF 65-70%  - Prophylaxis for TLS with IV hydration and prn Elitek  - R-CHOP/lenalidomide for ABC. FISH interphase still pending      TRACIE Hinojosa - CNP  Electronically signed 4/2/2021 at 10:10 AM   Patient seen and examined, note edited to reflect above.   Larissa Kaiser MD

## 2021-04-02 NOTE — PLAN OF CARE
Problem: Falls - Risk of:  Goal: Will remain free from falls  Description: Will remain free from falls  4/1/2021 2041 by Candance Carota, RN  Outcome: Met This Shift  4/1/2021 1046 by Candance Carota, RN  Outcome: Met This Shift  Goal: Absence of physical injury  Description: Absence of physical injury  4/1/2021 2041 by Candance Carota, RN  Outcome: Met This Shift  4/1/2021 1046 by Candance Carota, RN  Outcome: Met This Shift

## 2021-04-02 NOTE — PROGRESS NOTES
Chief Complaint:    Diffuse large B cell lymphoma ([facility]     Subjective:  Patient sitting up in bed receiving chemo through Mediport  Patient denies any pain  Patient denies any chest pain, fever, chills, and shortness of breath      Objective:    BP (!) 127/90   Pulse 74   Temp 97.3 °F (36.3 °C) (Temporal)   Resp 20   Ht 5' 7\" (1.702 m)   Wt 200 lb (90.7 kg)   SpO2 97%   BMI 31.32 kg/m²     Current medications that patient is taking have been reviewed.     General appearance: NAD, conversant, pleasant  HEENT: AT/NC, MMM  Neck: FROM, supple  Lungs: Clear to auscultation  CV: RRR, no MRGs, pacemaker  Abdomen: Soft, non-tender; no masses or HSM, +BS  Extremities: No peripheral edema or digital cyanosis  Skin: no rash, lesions or ulcers  Psych: Calm and cooperative  Neuro: Alert and interactive, nonfocal    Labs:  CBC with Differential:    Lab Results   Component Value Date    WBC 13.0 04/02/2021    RBC 3.72 04/02/2021    HGB 8.9 04/02/2021    HCT 29.9 04/02/2021     04/02/2021    MCV 80.4 04/02/2021    MCH 23.9 04/02/2021    MCHC 29.8 04/02/2021    RDW 14.8 04/02/2021    LYMPHOPCT 3.1 04/02/2021    MONOPCT 4.2 04/02/2021    BASOPCT 0.1 04/02/2021    MONOSABS 0.54 04/02/2021    LYMPHSABS 0.40 04/02/2021    EOSABS 0.00 04/02/2021    BASOSABS 0.01 04/02/2021     BMP:    Lab Results   Component Value Date     04/01/2021    K 4.9 04/01/2021    CL 97 04/01/2021    CO2 25 04/01/2021    BUN 16 04/01/2021    LABALBU 2.3 04/01/2021    LABALBU 3.9 01/28/2011    CREATININE 1.2 04/01/2021    CALCIUM 8.7 04/01/2021    GFRAA >60 04/01/2021    LABGLOM >60 04/01/2021    GLUCOSE 63 04/01/2021    GLUCOSE 100 01/28/2011        Imaging:    CXR: Stable pacemaker    Assessment/Plan:  Principal Problem:    Diffuse large B cell lymphoma ([facility] [address]  Active Problems:    SUNNY (acute kidney injury) (HCC)    CKD (chronic kidney disease) stage 3, GFR 30-59 ml/min    Paroxysmal atrial fibrillation (HCC)    Severe protein-calorie malnutrition ([facility] [address] [address]  Resolved Problems:    * No resolved hospital problems.  *     Port placement 04/01/2021 accessed and receiving chemo     Urgent initiation of inpatient chemo     Monitor for tumor lysis upon initiation of chemo     SUNNY improving; possible stent malfunction with hydronephrosis.  Urology consult.     Continue sotalol; not anticoagulated at baseline?     Hold lisinopril/HCTZ and hold ibuprofen       Yulisasnow HODGES-CNP  2:27 PM  4/2/2021

## 2021-04-03 VITALS
DIASTOLIC BLOOD PRESSURE: 71 MMHG | RESPIRATION RATE: 18 BRPM | OXYGEN SATURATION: 99 % | HEIGHT: 67 IN | TEMPERATURE: 97.3 F | WEIGHT: 200 LBS | HEART RATE: 80 BPM | BODY MASS INDEX: 31.39 KG/M2 | SYSTOLIC BLOOD PRESSURE: 122 MMHG

## 2021-04-03 LAB
ALBUMIN SERPL-MCNC: 2.5 G/DL (ref 3.5–5.2)
ALP BLD-CCNC: 67 U/L (ref 40–129)
ALT SERPL-CCNC: 5 U/L (ref 0–40)
ANION GAP SERPL CALCULATED.3IONS-SCNC: 11 MMOL/L (ref 7–16)
ANISOCYTOSIS: ABNORMAL
AST SERPL-CCNC: 17 U/L (ref 0–39)
BASOPHILS ABSOLUTE: 0 E9/L (ref 0–0.2)
BASOPHILS RELATIVE PERCENT: 0.1 % (ref 0–2)
BILIRUB SERPL-MCNC: 0.2 MG/DL (ref 0–1.2)
BUN BLDV-MCNC: 32 MG/DL (ref 8–23)
CALCIUM SERPL-MCNC: 7.8 MG/DL (ref 8.6–10.2)
CHLORIDE BLD-SCNC: 100 MMOL/L (ref 98–107)
CO2: 27 MMOL/L (ref 22–29)
CREAT SERPL-MCNC: 1.2 MG/DL (ref 0.7–1.2)
EOSINOPHILS ABSOLUTE: 0 E9/L (ref 0.05–0.5)
EOSINOPHILS RELATIVE PERCENT: 0 % (ref 0–6)
GFR AFRICAN AMERICAN: >60
GFR NON-AFRICAN AMERICAN: >60 ML/MIN/1.73
GLUCOSE BLD-MCNC: 118 MG/DL (ref 74–99)
HCT VFR BLD CALC: 26.9 % (ref 37–54)
HEMOGLOBIN: 8.1 G/DL (ref 12.5–16.5)
HYPOCHROMIA: ABNORMAL
LYMPHOCYTES ABSOLUTE: 0 E9/L (ref 1.5–4)
LYMPHOCYTES RELATIVE PERCENT: 2.2 % (ref 20–42)
MCH RBC QN AUTO: 24.2 PG (ref 26–35)
MCHC RBC AUTO-ENTMCNC: 30.1 % (ref 32–34.5)
MCV RBC AUTO: 80.3 FL (ref 80–99.9)
MONOCYTES ABSOLUTE: 0.61 E9/L (ref 0.1–0.95)
MONOCYTES RELATIVE PERCENT: 4.3 % (ref 2–12)
NEUTROPHILS ABSOLUTE: 14.69 E9/L (ref 1.8–7.3)
NEUTROPHILS RELATIVE PERCENT: 95.7 % (ref 43–80)
PDW BLD-RTO: 14.6 FL (ref 11.5–15)
PHOSPHORUS: 3.2 MG/DL (ref 2.5–4.5)
PLATELET # BLD: 302 E9/L (ref 130–450)
PMV BLD AUTO: 9.5 FL (ref 7–12)
POIKILOCYTES: ABNORMAL
POLYCHROMASIA: ABNORMAL
POTASSIUM SERPL-SCNC: 4.4 MMOL/L (ref 3.5–5)
RBC # BLD: 3.35 E12/L (ref 3.8–5.8)
SODIUM BLD-SCNC: 138 MMOL/L (ref 132–146)
TARGET CELLS: ABNORMAL
TOTAL PROTEIN: 5.7 G/DL (ref 6.4–8.3)
URIC ACID, SERUM: 3.4 MG/DL (ref 3.4–7)
WBC # BLD: 15.3 E9/L (ref 4.5–11.5)

## 2021-04-03 PROCEDURE — 6360000002 HC RX W HCPCS: Performed by: STUDENT IN AN ORGANIZED HEALTH CARE EDUCATION/TRAINING PROGRAM

## 2021-04-03 PROCEDURE — 85025 COMPLETE CBC W/AUTO DIFF WBC: CPT

## 2021-04-03 PROCEDURE — 6370000000 HC RX 637 (ALT 250 FOR IP): Performed by: STUDENT IN AN ORGANIZED HEALTH CARE EDUCATION/TRAINING PROGRAM

## 2021-04-03 PROCEDURE — C9113 INJ PANTOPRAZOLE SODIUM, VIA: HCPCS | Performed by: STUDENT IN AN ORGANIZED HEALTH CARE EDUCATION/TRAINING PROGRAM

## 2021-04-03 PROCEDURE — 6360000002 HC RX W HCPCS: Performed by: INTERNAL MEDICINE

## 2021-04-03 PROCEDURE — 84550 ASSAY OF BLOOD/URIC ACID: CPT

## 2021-04-03 PROCEDURE — 80053 COMPREHEN METABOLIC PANEL: CPT

## 2021-04-03 PROCEDURE — 2580000003 HC RX 258: Performed by: STUDENT IN AN ORGANIZED HEALTH CARE EDUCATION/TRAINING PROGRAM

## 2021-04-03 PROCEDURE — 84100 ASSAY OF PHOSPHORUS: CPT

## 2021-04-03 RX ORDER — SOTALOL HYDROCHLORIDE 80 MG/1
40 TABLET ORAL 2 TIMES DAILY
Qty: 60 TABLET | Refills: 3 | Status: ON HOLD
Start: 2021-04-03 | End: 2021-07-14 | Stop reason: HOSPADM

## 2021-04-03 RX ORDER — HEPARIN SODIUM (PORCINE) LOCK FLUSH IV SOLN 100 UNIT/ML 100 UNIT/ML
100 SOLUTION INTRAVENOUS PRN
Status: DISCONTINUED | OUTPATIENT
Start: 2021-04-03 | End: 2021-04-03 | Stop reason: HOSPADM

## 2021-04-03 RX ORDER — PREDNISONE 50 MG/1
100 TABLET ORAL DAILY
Qty: 20 TABLET | Refills: 0 | Status: SHIPPED | OUTPATIENT
Start: 2021-04-04 | End: 2021-04-14

## 2021-04-03 RX ADMIN — PREDNISONE 100 MG: 20 TABLET ORAL at 10:19

## 2021-04-03 RX ADMIN — PANTOPRAZOLE SODIUM 40 MG: 40 INJECTION, POWDER, FOR SOLUTION INTRAVENOUS at 10:24

## 2021-04-03 RX ADMIN — SODIUM CHLORIDE, PRESERVATIVE FREE 10 ML: 5 INJECTION INTRAVENOUS at 10:24

## 2021-04-03 RX ADMIN — HEPARIN 100 UNITS: 100 SYRINGE at 14:56

## 2021-04-03 RX ADMIN — SOTALOL HYDROCHLORIDE 40 MG: 80 TABLET ORAL at 10:19

## 2021-04-03 ASSESSMENT — PAIN SCALES - GENERAL: PAINLEVEL_OUTOF10: 0

## 2021-04-03 NOTE — DISCHARGE SUMMARY
Physician Discharge Summary     Patient ID:  Myrtice Dakins  [id number]  1950  Admit date: 3/31/2021  Discharge date and time: No discharge date for patient encounter.   Admitting Physician: Laurel Velasquez MD   Admission Diagnoses:   Diffuse large B cell lymphoma ([facility] [C83.30]  Medications Prior to Admission:    Medications Prior to Admission: ibuprofen (ADVIL;MOTRIN) 800 MG tablet, TAKE 1 TABLET BY MOUTH EVERY 6 HOURS AS NEEDED FOR PAIN  ondansetron (ZOFRAN ODT) 4 MG disintegrating tablet, Take 1 tablet by mouth every 8 hours as needed for Nausea or Vomiting  lisinopril-hydroCHLOROthiazide (PRINZIDE;ZESTORETIC) 10-12.5 MG per tablet, Take 1 tablet by mouth daily  amLODIPine (NORVASC) 5 MG tablet, Take 5 mg by mouth daily  aspirin 81 MG EC tablet, Take 81 mg by mouth daily  vitamin B-12 (CYANOCOBALAMIN) 100 MCG tablet, Take 50 mcg by mouth daily  sennosides-docusate sodium (SENOKOT-S) 8.6-50 MG tablet, Take 1 tablet by mouth 2 times daily  sotalol (BETAPACE) 80 MG tablet, Take 40 mg by mouth  tamsulosin (FLOMAX) 0.4 MG capsule, Take 0.4 mg by mouth daily   ferrous sulfate 325 (65 Fe) MG tablet, Take 325 mg by mouth 2 times daily   albuterol sulfate  (90 BASE) MCG/ACT inhaler, Inhale 2 puffs into the lungs as needed for Wheezing  esomeprazole Magnesium (NEXIUM) 20 MG PACK, Take 20 mg by mouth daily  PRAVASTATIN SODIUM PO, Take 20 mg by mouth daily     Discharge Diagnoses:   Diffuse large B-cell lymphoma  Acute kidney injury  Atrial fibrillation  Hydronephrosis L  HTN    Discharge Medcations:      Medication List      ASK your doctor about these medications    albuterol sulfate  (90 Base) MCG/ACT inhaler     amLODIPine 5 MG tablet  Commonly known as: NORVASC     aspirin 81 MG EC tablet     esomeprazole Magnesium 20 MG Pack  Commonly known as: NEXIUM     ferrous sulfate 325 (65 Fe) MG tablet  Commonly known as: IRON 325     ibuprofen 800 MG tablet  Commonly known as: ADVIL;MOTRIN  TAKE 1 TABLET BY MOUTH EVERY 6 HOURS AS NEEDED FOR PAIN     lisinopril-hydroCHLOROthiazide 10-12.5 MG per tablet  Commonly known as: PRINZIDE;ZESTORETIC     ondansetron 4 MG disintegrating tablet  Commonly known as: Zofran ODT  Take 1 tablet by mouth every 8 hours as needed for Nausea or Vomiting     PRAVASTATIN SODIUM PO     sennosides-docusate sodium 8.6-50 MG tablet  Commonly known as: SENOKOT-S     sotalol 80 MG tablet  Commonly known as: BETAPACE     tamsulosin 0.4 MG capsule  Commonly known as: FLOMAX     vitamin B-12 100 MCG tablet  Commonly known as: CYANOCOBALAMIN          Admission Condition:Fair  Discharged Condition: stable  Physical Examination:  Patient Vitals for the past 24 hrs:   BP Temp Temp src Pulse Resp SpO2   04/03/21 0800 122/71 97.3 °F (36.3 °C) Temporal 80 18 99 %   04/03/21 0039 132/78 98 °F (36.7 °C) Temporal 73 17 98 %   04/02/21 1530 132/69 97.1 °F (36.2 °C) Temporal 80 18 98 %     HEAD: Atraumatic, normo cephalic  LUNGS:  Clear to auscultation bilaterally. CARDIOVASCULAR:  S1 and S2 regular to auscultate. ABDOMEN: Soft, non-tender. Bowel sounds present  NEUROLOGIC:  Awake, alert, oriented. No gross focal deficit  EXTREMETIES: unremarkable.     Hospital Course:u/r    Recent Labs     04/01/21 0630 04/02/21  0929 04/03/21  0648   WBC 9.8 13.0* 15.3*   HGB 8.7* 8.9* 8.1*    336 302     Recent Labs     04/01/21 0630 04/02/21  0929 04/03/21  0648    137 138   K 4.9 4.5 4.4   CL 97* 98 100   CO2 25 25 27   PHOS 3.1 2.8 3.2   BUN 16 22 32*   CREATININE 1.2 1.3* 1.2   GLUCOSE 63* 121* 118*     Recent Labs     04/01/21  0630 04/03/21  0648   AST 13 17   ALT <5 5   ALKPHOS 62 67   BILIDIR <0.2  --    BILITOT 0.4 0.2     Echo Limited    Result Date: 4/1/2021  Transthoracic Echocardiography Report (TTE)  Demographics   Patient Name    Crow Velazquez        Gender            Male                  [id number] Record  [id number]       Room Number       [address]  Number   Account #       [de-identified] pericardial effusion. Pleural Effusion  No evidence of pleural effusion. Aorta  Aorta was not clearly visualized. Miscellaneous  The inferior vena cava diameter is normal with normal respiratory  variation. Conclusions   Summary  Normal left ventricle size and systolic function. Ejection fraction is visually estimated at 65-70%. Septal motion consistent with post bypass surgery. Normal left ventricle wall thickness. Indeterminate diastolic function. Left atrium was not clearly visualized. Normal right ventricular size and function. 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 Physiologic and/or trace tricuspid regurgitation. Physiologic and/or trace tricuspid regurgitation. RVSP is 39 mmHg. Top normal PA systolic pressure. No evidence for hemodynamically significant pericardial effusion.    Signature   ----------------------------------------------------------------  Electronically signed by Debbi Monaco MD(Interpreting  physician) on 2021 02:12 PM  ----------------------------------------------------------------  M-Mode/2D Measurements & Calculations   LV Diastolic           LV Systolic Dimension: 2.7 cm  Dimension: 3.2 cm      LV Volume Diastolic: 10.7 ml  LV F.1 %           LV Volume Systolic: 72.7 ml  LV PW Diastolic: 1.2   LV EDV/LV EDV Index: 42.2 ml/21 ml/m^2LV ESV/LV  cm                     ESV Index: 27.5 ml/14ml/ m^2  LV PW Systolic: 1 cm   EF Calculated: 34.8 %  Septum Diastolic: 1.3  LV Mass Index: 63 l/min*m^2  cm                     LV Length: 7.8 cm  Septum Systolic: 0.9  cm   LV Mass: 127.42 g  Doppler Measurements & Calculations    AV Peak Velocity: 2.03 m/s   AV Peak Gradient: 16.55 mmHg               Estimated RVSP: 39 mmHg   AV Mean Velocity: 1.47 m/s                 Estimated RAP:3 mmHg   AV Mean Gradient: 9.5 mmHg   AV VTI: 44.5 cm                                              TR Velocity:3.01 m/s TR Gradient:36.24 mmHg    Estimated PASP: 39.24 mmHg  [email or web address] [id number]    Xr Chest (2 Vw)    Result Date: 4/1/2021  EXAMINATION: TWO XRAY VIEWS OF THE CHEST 4/1/2021 2:58 pm COMPARISON: February 14, 2021 HISTORY: ORDERING SYSTEM PROVIDED HISTORY: preop TECHNOLOGIST PROVIDED HISTORY: Reason for exam:->preop What reading provider will be dictating this exam?->CRC FINDINGS: Stable pacemaker and postoperative changes. The lungs are without acute focal process. There is no effusion or pneumothorax. The cardiomediastinal silhouette is without acute process. The osseous structures are without acute process. No acute process. Xr Chest Portable    Result Date: 4/1/2021  EXAMINATION: ONE XRAY VIEW OF THE CHEST 4/1/2021 5:28 pm COMPARISON: Comparison April 1st same-day performed early. HISTORY: ORDERING SYSTEM PROVIDED HISTORY: line placement TECHNOLOGIST PROVIDED HISTORY: Reason for exam:->line placement What reading provider will be dictating this exam?->CRC FINDINGS: Right internal jugular central venous catheter tip in the SVC. No pneumothorax on the right on the left. Heart is normal size. Patient has a permanent pacemaker with 2 wires placed left subclavian vein. Patient had previous mid sternotomy. There is a left atrial appendage clipping. Lungs are clear, no infiltrates, consolidates or pleural effusions. There is no perihilar vascular congestion. Right internal jugular central venous catheter tip in the SVC. No pneumothorax on the right on the left. No acute cardiopulmonary process. Fluoro For Surgical Procedures    Result Date: 4/1/2021  EXAMINATION: 1 X-RAY VIEW SPOT FLUOROSCOPIC IMAGES 4/1/2021 4:56 pm TECHNIQUE: Fluoroscopy was provided by the radiology department for procedure. Radiologist was not present during examination.  FLUOROSCOPY DOSE AND TYPE OR TIME AND EXPOSURES: 1 spot image upper chest, 65.4 seconds fluoroscopy time, COMPARISON: None HISTORY: ORDERING SYSTEM PROVIDED HISTORY: port placement TECHNOLOGIST PROVIDED HISTORY: Reason for exam:-> port placement What reading provider will be dictating this exam?-> CRC Intraprocedural imaging. FINDINGS: IJ MediPort placement. On this single spot image I cannot exclude kinking. Correlate with postoperative chest radiograph. Intraprocedural fluoroscopic spot images as above. See separate procedure report for more information. On this single spot image I cannot exclude kinking. Correlate with postoperative chest radiograph. Consults: Hematology oncology, urology, general surgery  Significant Diagnostic Studies: Started chemo, monitor labs  Treatments: Initiation of inpatient chemo, port  Disposition: Home  Patient Instructions: As tolerated  Activity: As tolerated  Diet[de-identified] Regular  Wound Care: Follow-up with Rudolph Szymanski DO within 1 week.   F/u blood work as advised/needed  Follow-up with consultants as recommended by them    Note that over 30 minutes was spent in preparing discharge papers, discussing discharge, medication review, prescription if needed etc.      Electronically signed by Karine Rosario MD on 4/3/2021 at 2:30 PM

## 2021-04-03 NOTE — PROGRESS NOTES
[facility]  Hematology/Oncology  Consult      Patient Name: Faith Graves  YOB: 1950  PCP: Concepcion Beckwith DO   Referring Provider: [address] [place] [address],[address] / [place] [id number]     Reason for Consultation: No chief complaint on file. Subjective: Tolerated PORT placement. Tolerated chemotherapy with R-CHOP well    History of Present Illness: This is a 71-year-old male patient with a PMH history of BPH, HTN, and CAD who presented with with nausea as a direct admission by surgery. Patient has history of recent right hemicolectomy for colon mass on 3/11/21. Final pathology came back consistent with diffuse large B-cell lymphoma. Patient is now having nausea and dizziness since procedure. CT A/P showed extensive new/worsened lymphadenopathy throughout the retroperitoneum, in the mesentery and celiac region. There is also large mass in the iliac region of the pelvis which is larger as compared to prior. Innumerable masses involving the small bowel which are new since the prior. Short segment intussusception involving small bowel in the anterior left abdomen with no associated obstruction. Worsened right hydronephrosis. Note that the pelvic mass indicated above surrounds the distal right ureter/stent. New 11 mm pleural base nodule in the left lower lobe. Consultation for evaluation and recommendations for new dx of lymphoma.        Diagnostic Data:     Past Medical History:   Diagnosis Date    Arthritis     KNEES HIPS BACK    BPH (benign prostatic hyperplasia)     Coronary artery disease     Difficult airway     PT STATES HE [facility] THIS TW[facility]    Difficult intubation 04/2017    note in care everywhere \"Clinical Summary\" 03/10/2021    Hyperlipidemia     Hypertension     Sinus tachycardia 01/01/2002       Patient Active Problem List    Diagnosis Date Noted    Severe protein-calorie malnutrition ([facility] 04/01/2021    Diffuse large B cell lymphoma ([facility] 03/31/2021    Diffuse large B-cell lymphoma ([facility] 03/30/2021    Dehydration 03/29/2021    Prolonged Q-T interval on ECG 03/29/2021    Hypotension 03/29/2021    CKD (chronic kidney disease) stage 3, GFR 30-59 ml/min 03/29/2021    Paroxysmal atrial fibrillation ([facility] 03/29/2021    Colonic mass 03/11/2021    Hypertension     Hyperlipidemia     Coronary artery disease     SUNNY (acute kidney injury) ([facility] 02/14/2021    Hematuria 01/16/2018    BPH (benign prostatic hyperplasia) 01/16/2018        Past Surgical History:   Procedure Laterality Date    ABLATION OF DYSRHYTHMIC FOCUS      AORTA SURGERY      aortic valve replacement    AORTIC VALVE REPLACEMENT      BLADDER SURGERY Right 2/17/2021    CYSTOSCOPY BILATERAL RETROGRADE PYELOGRAM RIGHT [facility]  2[facility]19[facility]2021    COLONOSCOPY WITH BIOPSY performed by Matt Queen DO at [address]  2/19/2021    COLONOSCOPY W/ ENDOSCOPIC MUCOSAL RESECTION performed by Matt Queen DO at [address] N/A 3/11/2021    LAPAROSCOPIC RIGHT HEMICOLECTOMY performed by Mildred Treviño MD at [address] OTHER SURGICAL HISTORY  08/12/2016    CT Myelogram, Dr. Jostin Sutton  2014 new battery    last checked Dr Sandra Almanzar 1/2016?     PORT SURGERY Right 4/1/2021    MEDI PORT INSERTION performed by Midlred Treviño MD at [address] ENDOSCOPY      reflux    UPPER GASTROINTESTINAL ENDOSCOPY N/A 2/16/2021    EGD BIOPSY performed by David Barahona MD at [facility]         Family History  Family History   Problem Relation Age of Onset    Diabetes Mother     Stroke Mother     Diabetes Father     Heart Disease Father     Brain Cancer Sister     Stroke Sister     Stroke Brother     Cancer Maternal Grandfather        Social History    TOBACCO: reports that he quit smoking about 7 years ago. He has a 44.00 pack-year smoking history. He has never used smokeless tobacco.  ETOH:   reports previous alcohol use of about 2.0 standard drinks of alcohol per week. Home Medications  Prior to Admission medications    Medication Sig Start Date End Date Taking? Authorizing Provider   ibuprofen (ADVIL;MOTRIN) 800 MG tablet TAKE 1 TABLET BY MOUTH EVERY 6 HOURS AS NEEDED FOR PAIN 3/26/21  Yes Lili Cuenca MD   ondansetron (ZOFRAN ODT) 4 MG disintegrating tablet Take 1 tablet by mouth every 8 hours as needed for Nausea or Vomiting 3/13/21  Yes Lili Cuenca MD   lisinopril-hydroCHLOROthiazide (PRINZIDE;ZESTORETIC) 10-12.5 MG per tablet Take 1 tablet by mouth daily   Yes Historical Provider, MD   amLODIPine (NORVASC) 5 MG tablet Take 5 mg by mouth daily   Yes Historical Provider, MD   aspirin 81 MG EC tablet Take 81 mg by mouth daily   Yes Historical Provider, MD   vitamin B-12 (CYANOCOBALAMIN) 100 MCG tablet Take 50 mcg by mouth daily   Yes Historical Provider, MD   sennosides-docusate sodium (SENOKOT-S) 8.6-50 MG tablet Take 1 tablet by mouth 2 times daily   Yes Historical Provider, MD   sotalol (BETAPACE) 80 MG tablet Take 40 mg by mouth 4/10/17  Yes Historical Provider, MD   tamsulosin (FLOMAX) 0.4 MG capsule Take 0.4 mg by mouth daily  1/4/18  Yes Historical Provider, MD   ferrous sulfate 325 (65 Fe) MG tablet Take 325 mg by mouth 2 times daily  1/8/18  Yes Historical Provider, MD   albuterol sulfate  (90 BASE) MCG/ACT inhaler Inhale 2 puffs into the lungs as needed for Wheezing   Yes Historical Provider, MD   esomeprazole Magnesium (NEXIUM) 20 MG PACK Take 20 mg by mouth daily   Yes Historical Provider, MD   PRAVASTATIN SODIUM PO Take 20 mg by mouth daily    Yes Historical Provider, MD       Allergies  No Known Allergies    Review of Systems: +Fatigue, weakness, abdominal discomfort and tenderness, nausea, dizziness and lightheadedness.  Constitutional:  No fever chills or rigors.  Eyes: No changes in vision, discharge, or pain   ENT: No Headaches, hearing loss or vertigo. No mouth sores or sore throat. No change in taste or smell.  Cardiovascular: No chest discomfort, dyspnea on exertion, palpitations or loss of consciousness. or phlebitis.  Respiratory: Has no cough or wheezing, Has no sputum production. Has no hemoptysis, Has no pleuritic pain, .  Gastrointestinal: No abdominal pain, appetite loss, blood in stools. No change in bowel habits. No hematemesis    Genitourinary: Patient acknowledges no dysuria, trouble voiding, or hematuria. No nocturia or increased frequency.  Musculoskeletal: No gait disturbance, weakness or joint complaints.  Integumentary: No rash or pruritis.  Neurological: No headache, diplopia, change in muscle strength, numbness or tingling. No change in gait, balance, coordination, mood, affect, memory, mentation, behavior.  Psychiatric: No anxiety, or depression.  Endocrine: No temperature intolerance. No excessive thirst, fluid intake, or urination. No tremor.  Hematologic/Lymphatic: No abnormal bruising or bleeding, blood clots or swollen lymph nodes.  Allergic/Immunologic: No nasal congestion or hives. Objective  /71   Pulse 80   Temp 97.3 °F (36.3 °C) (Temporal)   Resp 18   Ht 5' 7\" (1.702 m)   Wt 200 lb (90.7 kg)   SpO2 99%   BMI 31.32 kg/m²     Physical Exam:     General: AAO to person, place, time, in no acute distress,   Head and neck : PERRLA, EOMI . Sclera non icteric. Oropharynx : Clear  Neck: no JVD,  no adenopathy  Heart: Regular rate and regular rhythm, no murmur  Lungs: Clear to auscultation   Extremities: No edema,no cyanosis, no clubbing. Abdomen: Soft, tenderness;no masses, no organomegaly  Skin:  No rash very dry and flaky. Neurologic:Cranial nerves grossly intact. No focal motor or sensory deficits .     Recent Laboratory Data-   Lab Results on the prior. I cannot exclude a metastatic lesion. This measures about 11 mm. Abdomen: The liver, spleen, pancreas, adrenal glands and left kidney are unremarkable. There is moderate right hydronephrosis which has worsened. There is a right ureteral stent present. This extends into the bladder. Worsening hydronephrosis suggest stent malfunction. There are innumerable nodular masslike areas of density involving small bowel loops. Findings are consistent with multiple masses. There is a short segment small bowel intussusception in the anterior left abdomen. There are no findings of intestinal obstruction. Patient is status post right hemicolectomy. There is extensive worsened lymphadenopathy in the retroperitoneum, celiac region, mesentery. There are aortoiliac atherosclerotic calcification. There is no abdominal aortic aneurysm. There is no free intraperitoneal air. There is no abnormal fluid collection. Pelvis:  Bladder is unremarkable in appearance. There is a large mass in the right iliac region surrounding the right ureter/stent. This is probably lymphadenopathy given patient's history of lymphoma. Bones/Soft Tissues: No acute abnormality     1. Extensive new/worsened lymphadenopathy throughout the retroperitoneum, in the mesentery and celiac region. There is also large mass in the iliac region of the pelvis which is larger as compared to prior. Findings likely relate to provided history of lymphoma. 2. Innumerable masses involving the small bowel which are new since the prior. This also probably reflects lymphoma. 3. Short segment intussusception involving small bowel in the anterior left abdomen. No associated obstruction. 4. Worsened right hydronephrosis. This likely indicates malfunction of the right ureteral stent. Note that the pelvic mass indicated above surrounds the distal right ureter/stent.  5. New 11 mm pleural base nodule in the left lower lobe could be related to metastatic disease/malignancy. ASSESSMENT/PLAN :  71-year-old male     -BPH, HTN, and CAD  -Newly dx DLBCL    -S/p right hemicolectomy for colon mass on 3/11/21.    -Final pathology consistent with diffuse large B-cell lymphoma. Non-germinal cell type    -CT A/P showed extensive new/worsened lymphadenopathy throughout the retroperitoneum, in the mesentery and celiac region. There is also large mass in the iliac region of the pelvis which is larger as compared to prior. Innumerable masses involving the small bowel which are new since the prior. Short segment intussusception involving small bowel in the anterior left abdomen with no associated obstruction. Worsened right hydronephrosis. Note that the pelvic mass indicated above surrounds the distal right ureter/stent. New 11 mm pleural base nodule in the left lower lobe. Patient with very aggressive and bulky stage IV diffuse large B-cell lymphoma, ABC non-germinal cell type. In view of rapid disease progression and aggressive behavior must rule out double hit or triple hit lymphoma. FISH interphase still pending    Patient with nonfunctioning right ureteral stent and worsening right-sided hydronephrosis. He will need Mediport placement as soon as possible for initiation of systemic chemotherapy to be done as inpatient prior to discharge he will also need a 2D echo to assess his LVEF. He will be at risk of tumor lysis syndrome and will require IV hydration and Elitek. Would initially recommend R-CHOP/lenalidomide pending FISH interphase  If double hit or triple hit lymphoma confirmed he will require DA R EPOCH and we will then transfer him to a tertiary care center    To transfer to [place] [address] for chemo      3/31/21  - CBC with stable anemia. Kidney function improving  - Patient still needs Mediport placement  - To transfer to [facility]   - He will be at risk of tumor lysis syndrome and will require IV hydration and Elitek.   - R-CHOP/lenalidomide pending FISH interphase. If double hit or triple hit lymphoma confirmed he will require DA R EPOCH and we will then transfer him to a tertiary care center    4/1/2021  - CBC with continued mild anemia  - SUNNY resolved. Has ureteral stent. Urology following, to remain until chemotherapy completed  - Patient for Mediport placement and to begin chemotherapy today. Was started  - TTE with LVEF 65-70%  - Prophylaxis for TLS, IV hydration and Elitek. - R-CHOP/lenalidomide pending FISH interphase.   - If double hit or triple hit lymphoma confirmed he will require DA R EPOCH and we will then transfer him to a tertiary care center    4/2/21  - CBC with continued mild anemia  - SUNNY resolved. Has ureteral stent. Urology following, to remain until chemotherapy completed  - S/P Mediport placement  - Received Rituxan yesterday. To continue treatment today with CHOP  - TTE with LVEF 65-70%  - Prophylaxis for TLS with IV hydration and prn Elitek  - R-CHOP/lenalidomide for ABC. FISH interphase still pending    4/3/21  Tolerated chemo well. Renal function improving. Uric acid down to normal  Hemoglobin down to 8.1. Transient neutrophilic leukocytosis related to prednisone.       Attempt to procure him lenalidomide  FISH interphase still pending    For discharge home today, follow up in office Monday for G CSF prophylaxis    Evelyne Salcedo MD  Electronically signed 4/3/2021 at 11:02 AM

## 2021-04-05 ENCOUNTER — TELEPHONE (OUTPATIENT)
Dept: INFUSION THERAPY | Age: 71
End: 2021-04-05

## 2021-04-05 NOTE — TELEPHONE ENCOUNTER
Per Dr. Cuong Goldman, \"This patient has not decided where he would prefer treatment at [facility] or [address] [place] [address]. So I will let you know if he transfers to this building for further treatment. \" Voiced understanding.

## 2021-04-05 NOTE — PROGRESS NOTES
CLINICAL PHARMACY NOTE: MEDS TO [address] [facility] Patient?: No  Total # of Prescriptions Filled: 1   The following medications were delivered to the patient:  · Prednisone 50 mg  Total # of Interventions Completed: 2  Time Spent (min): 30    Additional Documentation:

## 2021-04-05 NOTE — TELEPHONE ENCOUNTER
Per Darrell Alvarez, from [facility] office, [phone], patient did have his injection at this office today but unknown to plan following today as patient lives closer to [address] \" Voiced understanding.

## 2021-04-16 ENCOUNTER — TELEPHONE (OUTPATIENT)
Dept: INFUSION THERAPY | Age: 71
End: 2021-04-16

## 2021-04-16 NOTE — TELEPHONE ENCOUNTER
Spoke to Aury from the [facility] office, [phone], with regards to patient's plan for outpatient chemotherapy. She voiced understanding and will speak to Dr. Marilyn Moseley for clarification and will return call to [phone]. Voiced understanding and awaiting return call.

## 2021-04-16 NOTE — TELEPHONE ENCOUNTER
Per blood and cancer office, Kassidy Yañez, \"This patient will have his treatment here at [facility] office. \" Voiced understanding.

## 2021-04-20 LAB
Lab: NORMAL
REPORT: NORMAL
THIS TEST SENT TO: NORMAL

## 2021-04-28 PROBLEM — E86.0 DEHYDRATION: Status: RESOLVED | Noted: 2021-03-29 | Resolved: 2021-04-28

## 2021-04-29 LAB — URINE CULTURE, ROUTINE: NORMAL

## 2021-05-06 DIAGNOSIS — D61.810 ANTINEOPLASTIC CHEMOTHERAPY INDUCED PANCYTOPENIA (HCC): ICD-10-CM

## 2021-05-06 DIAGNOSIS — T45.1X5A ANTINEOPLASTIC CHEMOTHERAPY INDUCED PANCYTOPENIA (HCC): ICD-10-CM

## 2021-05-06 PROBLEM — D64.9 ANEMIA: Status: ACTIVE | Noted: 2021-05-06

## 2021-05-06 LAB
ABO/RH: NORMAL
ANTIBODY SCREEN: NORMAL

## 2021-05-06 RX ORDER — DIPHENHYDRAMINE HCL 25 MG
25 TABLET ORAL ONCE
Status: CANCELLED | OUTPATIENT
Start: 2021-05-07 | End: 2021-05-07

## 2021-05-06 RX ORDER — DIPHENHYDRAMINE HYDROCHLORIDE 50 MG/ML
50 INJECTION INTRAMUSCULAR; INTRAVENOUS ONCE
Status: CANCELLED | OUTPATIENT
Start: 2021-05-06 | End: 2021-05-06

## 2021-05-06 RX ORDER — SODIUM CHLORIDE 9 MG/ML
INJECTION, SOLUTION INTRAVENOUS CONTINUOUS
Status: CANCELLED | OUTPATIENT
Start: 2021-05-06

## 2021-05-06 RX ORDER — METHYLPREDNISOLONE SODIUM SUCCINATE 125 MG/2ML
125 INJECTION, POWDER, LYOPHILIZED, FOR SOLUTION INTRAMUSCULAR; INTRAVENOUS ONCE
Status: CANCELLED | OUTPATIENT
Start: 2021-05-07 | End: 2021-05-07

## 2021-05-06 RX ORDER — EPINEPHRINE 1 MG/ML
0.3 INJECTION, SOLUTION, CONCENTRATE INTRAVENOUS PRN
Status: CANCELLED | OUTPATIENT
Start: 2021-05-07

## 2021-05-06 RX ORDER — DIPHENHYDRAMINE HYDROCHLORIDE 50 MG/ML
50 INJECTION INTRAMUSCULAR; INTRAVENOUS ONCE
Status: CANCELLED | OUTPATIENT
Start: 2021-05-07 | End: 2021-05-07

## 2021-05-06 RX ORDER — ACETAMINOPHEN 325 MG/1
650 TABLET ORAL ONCE
Status: CANCELLED | OUTPATIENT
Start: 2021-05-07 | End: 2021-05-07

## 2021-05-06 RX ORDER — EPINEPHRINE 1 MG/ML
0.3 INJECTION, SOLUTION, CONCENTRATE INTRAVENOUS PRN
Status: CANCELLED | OUTPATIENT
Start: 2021-05-06

## 2021-05-06 RX ORDER — SODIUM CHLORIDE 9 MG/ML
INJECTION, SOLUTION INTRAVENOUS CONTINUOUS
Status: CANCELLED | OUTPATIENT
Start: 2021-05-07

## 2021-05-06 RX ORDER — FUROSEMIDE 10 MG/ML
20 INJECTION INTRAMUSCULAR; INTRAVENOUS ONCE
Status: CANCELLED | OUTPATIENT
Start: 2021-05-07 | End: 2021-05-07

## 2021-05-06 RX ORDER — SODIUM CHLORIDE 9 MG/ML
25 INJECTION, SOLUTION INTRAVENOUS PRN
Status: CANCELLED | OUTPATIENT
Start: 2021-05-07

## 2021-05-06 RX ORDER — METHYLPREDNISOLONE SODIUM SUCCINATE 125 MG/2ML
125 INJECTION, POWDER, LYOPHILIZED, FOR SOLUTION INTRAMUSCULAR; INTRAVENOUS ONCE
Status: CANCELLED | OUTPATIENT
Start: 2021-05-06 | End: 2021-05-06

## 2021-05-06 RX ORDER — SODIUM CHLORIDE 0.9 % (FLUSH) 0.9 %
5-40 SYRINGE (ML) INJECTION PRN
Status: CANCELLED | OUTPATIENT
Start: 2021-05-07

## 2021-05-07 ENCOUNTER — HOSPITAL ENCOUNTER (OUTPATIENT)
Dept: INFUSION THERAPY | Age: 71
Discharge: HOME | End: 2021-05-07
Payer: MEDICARE

## 2021-05-07 VITALS
RESPIRATION RATE: 18 BRPM | HEART RATE: 76 BPM | DIASTOLIC BLOOD PRESSURE: 51 MMHG | SYSTOLIC BLOOD PRESSURE: 116 MMHG | TEMPERATURE: 98.4 F

## 2021-05-07 DIAGNOSIS — D61.810 ANTINEOPLASTIC CHEMOTHERAPY INDUCED PANCYTOPENIA (HCC): Primary | ICD-10-CM

## 2021-05-07 DIAGNOSIS — C83.33 DIFFUSE LARGE B-CELL LYMPHOMA OF INTRA-ABDOMINAL LYMPH NODES (HCC): ICD-10-CM

## 2021-05-07 DIAGNOSIS — T45.1X5A ANTINEOPLASTIC CHEMOTHERAPY INDUCED PANCYTOPENIA (HCC): Primary | ICD-10-CM

## 2021-05-07 LAB
BLOOD BANK DISPENSE STATUS: NORMAL
BLOOD BANK PRODUCT CODE: NORMAL
BPU ID: NORMAL
DESCRIPTION BLOOD BANK: NORMAL

## 2021-05-07 PROCEDURE — 36430 TRANSFUSION BLD/BLD COMPNT: CPT

## 2021-05-07 PROCEDURE — 96374 THER/PROPH/DIAG INJ IV PUSH: CPT

## 2021-05-07 PROCEDURE — 2580000003 HC RX 258: Performed by: INTERNAL MEDICINE

## 2021-05-07 PROCEDURE — 6370000000 HC RX 637 (ALT 250 FOR IP): Performed by: INTERNAL MEDICINE

## 2021-05-07 PROCEDURE — 96375 TX/PRO/DX INJ NEW DRUG ADDON: CPT

## 2021-05-07 PROCEDURE — 6360000002 HC RX W HCPCS: Performed by: INTERNAL MEDICINE

## 2021-05-07 PROCEDURE — P9016 RBC LEUKOCYTES REDUCED: HCPCS

## 2021-05-07 RX ORDER — SODIUM CHLORIDE 0.9 % (FLUSH) 0.9 %
5-40 SYRINGE (ML) INJECTION PRN
Status: DISCONTINUED | OUTPATIENT
Start: 2021-05-07 | End: 2021-05-08 | Stop reason: HOSPADM

## 2021-05-07 RX ORDER — DIPHENHYDRAMINE HCL 25 MG
25 TABLET ORAL ONCE
Status: CANCELLED | OUTPATIENT
Start: 2021-05-07 | End: 2021-05-07

## 2021-05-07 RX ORDER — DIPHENHYDRAMINE HYDROCHLORIDE 50 MG/ML
50 INJECTION INTRAMUSCULAR; INTRAVENOUS ONCE
Status: CANCELLED | OUTPATIENT
Start: 2021-05-07 | End: 2021-05-07

## 2021-05-07 RX ORDER — DIPHENHYDRAMINE HCL 25 MG
25 TABLET ORAL ONCE
Status: COMPLETED | OUTPATIENT
Start: 2021-05-07 | End: 2021-05-07

## 2021-05-07 RX ORDER — FUROSEMIDE 10 MG/ML
20 INJECTION INTRAMUSCULAR; INTRAVENOUS ONCE
Status: CANCELLED | OUTPATIENT
Start: 2021-05-07 | End: 2021-05-07

## 2021-05-07 RX ORDER — SODIUM CHLORIDE 9 MG/ML
25 INJECTION, SOLUTION INTRAVENOUS PRN
Status: CANCELLED | OUTPATIENT
Start: 2021-05-07

## 2021-05-07 RX ORDER — SODIUM CHLORIDE 9 MG/ML
25 INJECTION, SOLUTION INTRAVENOUS PRN
Status: DISCONTINUED | OUTPATIENT
Start: 2021-05-07 | End: 2021-05-08 | Stop reason: HOSPADM

## 2021-05-07 RX ORDER — SODIUM CHLORIDE 0.9 % (FLUSH) 0.9 %
5-40 SYRINGE (ML) INJECTION PRN
Status: CANCELLED | OUTPATIENT
Start: 2021-05-07

## 2021-05-07 RX ORDER — ACETAMINOPHEN 325 MG/1
650 TABLET ORAL ONCE
Status: CANCELLED | OUTPATIENT
Start: 2021-05-07 | End: 2021-05-07

## 2021-05-07 RX ORDER — ACETAMINOPHEN 325 MG/1
650 TABLET ORAL ONCE
Status: COMPLETED | OUTPATIENT
Start: 2021-05-07 | End: 2021-05-07

## 2021-05-07 RX ORDER — FUROSEMIDE 10 MG/ML
20 INJECTION INTRAMUSCULAR; INTRAVENOUS ONCE
Status: COMPLETED | OUTPATIENT
Start: 2021-05-07 | End: 2021-05-07

## 2021-05-07 RX ORDER — METHYLPREDNISOLONE SODIUM SUCCINATE 125 MG/2ML
125 INJECTION, POWDER, LYOPHILIZED, FOR SOLUTION INTRAMUSCULAR; INTRAVENOUS ONCE
Status: CANCELLED | OUTPATIENT
Start: 2021-05-07 | End: 2021-05-07

## 2021-05-07 RX ORDER — SODIUM CHLORIDE 9 MG/ML
INJECTION, SOLUTION INTRAVENOUS CONTINUOUS
Status: CANCELLED | OUTPATIENT
Start: 2021-05-07

## 2021-05-07 RX ORDER — EPINEPHRINE 1 MG/ML
0.3 INJECTION, SOLUTION, CONCENTRATE INTRAVENOUS PRN
Status: CANCELLED | OUTPATIENT
Start: 2021-05-07

## 2021-05-07 RX ORDER — HEPARIN SODIUM (PORCINE) LOCK FLUSH IV SOLN 100 UNIT/ML 100 UNIT/ML
5 SOLUTION INTRAVENOUS PRN
Status: DISCONTINUED | OUTPATIENT
Start: 2021-05-07 | End: 2021-05-08 | Stop reason: HOSPADM

## 2021-05-07 RX ADMIN — ACETAMINOPHEN 650 MG: 325 TABLET ORAL at 08:34

## 2021-05-07 RX ADMIN — SODIUM CHLORIDE 25 ML: 9 INJECTION, SOLUTION INTRAVENOUS at 08:32

## 2021-05-07 RX ADMIN — HEPARIN 500 UNITS: 100 SYRINGE at 11:17

## 2021-05-07 RX ADMIN — HYDROCORTISONE SODIUM SUCCINATE 25 MG: 100 INJECTION, POWDER, FOR SOLUTION INTRAMUSCULAR; INTRAVENOUS at 08:35

## 2021-05-07 RX ADMIN — DIPHENHYDRAMINE HYDROCHLORIDE 25 MG: 25 TABLET ORAL at 08:34

## 2021-05-07 RX ADMIN — SODIUM CHLORIDE, PRESERVATIVE FREE 10 ML: 5 INJECTION INTRAVENOUS at 08:32

## 2021-05-07 RX ADMIN — SODIUM CHLORIDE, PRESERVATIVE FREE 10 ML: 5 INJECTION INTRAVENOUS at 11:17

## 2021-05-07 RX ADMIN — FUROSEMIDE 20 MG: 10 INJECTION, SOLUTION INTRAMUSCULAR; INTRAVENOUS at 11:17

## 2021-05-07 NOTE — PROGRESS NOTES
Patient tolerated transfusion well without complaint. Port flushed per protocol and de-accessed. Patient discharged ambulatory.

## 2021-05-18 LAB
ABO/RH: NORMAL
ANTIBODY SCREEN: NORMAL

## 2021-05-20 ENCOUNTER — HOSPITAL ENCOUNTER (OUTPATIENT)
Dept: INFUSION THERAPY | Age: 71
Discharge: HOME | End: 2021-05-20
Payer: MEDICARE

## 2021-05-20 VITALS
RESPIRATION RATE: 16 BRPM | TEMPERATURE: 97.2 F | OXYGEN SATURATION: 99 % | HEART RATE: 78 BPM | SYSTOLIC BLOOD PRESSURE: 102 MMHG | DIASTOLIC BLOOD PRESSURE: 52 MMHG

## 2021-05-20 DIAGNOSIS — D61.810 ANTINEOPLASTIC CHEMOTHERAPY INDUCED PANCYTOPENIA (HCC): Primary | ICD-10-CM

## 2021-05-20 DIAGNOSIS — T45.1X5A ANTINEOPLASTIC CHEMOTHERAPY INDUCED PANCYTOPENIA (HCC): Primary | ICD-10-CM

## 2021-05-20 DIAGNOSIS — C83.33 DIFFUSE LARGE B-CELL LYMPHOMA OF INTRA-ABDOMINAL LYMPH NODES (HCC): ICD-10-CM

## 2021-05-20 PROCEDURE — 6370000000 HC RX 637 (ALT 250 FOR IP): Performed by: NURSE PRACTITIONER

## 2021-05-20 PROCEDURE — 36430 TRANSFUSION BLD/BLD COMPNT: CPT

## 2021-05-20 PROCEDURE — 6360000002 HC RX W HCPCS: Performed by: NURSE PRACTITIONER

## 2021-05-20 PROCEDURE — 6360000002 HC RX W HCPCS: Performed by: INTERNAL MEDICINE

## 2021-05-20 PROCEDURE — P9016 RBC LEUKOCYTES REDUCED: HCPCS

## 2021-05-20 PROCEDURE — 96374 THER/PROPH/DIAG INJ IV PUSH: CPT

## 2021-05-20 PROCEDURE — 2580000003 HC RX 258: Performed by: NURSE PRACTITIONER

## 2021-05-20 RX ORDER — METHYLPREDNISOLONE SODIUM SUCCINATE 125 MG/2ML
125 INJECTION, POWDER, LYOPHILIZED, FOR SOLUTION INTRAMUSCULAR; INTRAVENOUS ONCE
Status: CANCELLED | OUTPATIENT
Start: 2021-05-20 | End: 2021-05-20

## 2021-05-20 RX ORDER — DIPHENHYDRAMINE HCL 25 MG
25 TABLET ORAL ONCE
Status: CANCELLED | OUTPATIENT
Start: 2021-05-20 | End: 2021-05-20

## 2021-05-20 RX ORDER — DIPHENHYDRAMINE HYDROCHLORIDE 50 MG/ML
50 INJECTION INTRAMUSCULAR; INTRAVENOUS ONCE
Status: CANCELLED | OUTPATIENT
Start: 2021-05-20 | End: 2021-05-20

## 2021-05-20 RX ORDER — HEPARIN SODIUM (PORCINE) LOCK FLUSH IV SOLN 100 UNIT/ML 100 UNIT/ML
500 SOLUTION INTRAVENOUS PRN
Status: DISCONTINUED | OUTPATIENT
Start: 2021-05-20 | End: 2021-05-21 | Stop reason: HOSPADM

## 2021-05-20 RX ORDER — SODIUM CHLORIDE 0.9 % (FLUSH) 0.9 %
5-40 SYRINGE (ML) INJECTION PRN
Status: CANCELLED | OUTPATIENT
Start: 2021-05-20

## 2021-05-20 RX ORDER — ACETAMINOPHEN 325 MG/1
650 TABLET ORAL ONCE
Status: CANCELLED | OUTPATIENT
Start: 2021-05-20 | End: 2021-05-20

## 2021-05-20 RX ORDER — EPINEPHRINE 1 MG/ML
0.3 INJECTION, SOLUTION, CONCENTRATE INTRAVENOUS PRN
Status: CANCELLED | OUTPATIENT
Start: 2021-05-20

## 2021-05-20 RX ORDER — SODIUM CHLORIDE 9 MG/ML
INJECTION, SOLUTION INTRAVENOUS CONTINUOUS
Status: CANCELLED | OUTPATIENT
Start: 2021-05-20

## 2021-05-20 RX ORDER — SODIUM CHLORIDE 9 MG/ML
25 INJECTION, SOLUTION INTRAVENOUS PRN
Status: CANCELLED | OUTPATIENT
Start: 2021-05-20

## 2021-05-20 RX ORDER — ACETAMINOPHEN 325 MG/1
650 TABLET ORAL ONCE
Status: COMPLETED | OUTPATIENT
Start: 2021-05-20 | End: 2021-05-20

## 2021-05-20 RX ORDER — SODIUM CHLORIDE 9 MG/ML
25 INJECTION, SOLUTION INTRAVENOUS PRN
Status: DISCONTINUED | OUTPATIENT
Start: 2021-05-20 | End: 2021-05-21 | Stop reason: HOSPADM

## 2021-05-20 RX ORDER — DIPHENHYDRAMINE HCL 25 MG
25 TABLET ORAL ONCE
Status: COMPLETED | OUTPATIENT
Start: 2021-05-20 | End: 2021-05-20

## 2021-05-20 RX ORDER — SODIUM CHLORIDE 0.9 % (FLUSH) 0.9 %
5-40 SYRINGE (ML) INJECTION PRN
Status: DISCONTINUED | OUTPATIENT
Start: 2021-05-20 | End: 2021-05-21 | Stop reason: HOSPADM

## 2021-05-20 RX ORDER — HEPARIN SODIUM (PORCINE) LOCK FLUSH IV SOLN 100 UNIT/ML 100 UNIT/ML
500 SOLUTION INTRAVENOUS PRN
Status: CANCELLED | OUTPATIENT
Start: 2021-05-20

## 2021-05-20 RX ADMIN — HYDROCORTISONE SODIUM SUCCINATE 25 MG: 100 INJECTION, POWDER, FOR SOLUTION INTRAMUSCULAR; INTRAVENOUS at 09:16

## 2021-05-20 RX ADMIN — SODIUM CHLORIDE, PRESERVATIVE FREE 10 ML: 5 INJECTION INTRAVENOUS at 11:47

## 2021-05-20 RX ADMIN — DIPHENHYDRAMINE HYDROCHLORIDE 25 MG: 25 TABLET ORAL at 09:16

## 2021-05-20 RX ADMIN — HEPARIN 500 UNITS: 100 SYRINGE at 12:04

## 2021-05-20 RX ADMIN — SODIUM CHLORIDE, PRESERVATIVE FREE 10 ML: 5 INJECTION INTRAVENOUS at 09:17

## 2021-05-20 RX ADMIN — ACETAMINOPHEN 650 MG: 325 TABLET ORAL at 09:16

## 2021-05-27 LAB
ABO/RH: NORMAL
ANTIBODY SCREEN: NORMAL

## 2021-05-27 RX ORDER — SODIUM CHLORIDE 9 MG/ML
INJECTION, SOLUTION INTRAVENOUS CONTINUOUS
Status: CANCELLED | OUTPATIENT
Start: 2021-05-28

## 2021-05-27 RX ORDER — EPINEPHRINE 1 MG/ML
0.3 INJECTION, SOLUTION, CONCENTRATE INTRAVENOUS PRN
Status: CANCELLED | OUTPATIENT
Start: 2021-05-27

## 2021-05-27 RX ORDER — ACETAMINOPHEN 325 MG/1
650 TABLET ORAL ONCE
Status: CANCELLED | OUTPATIENT
Start: 2021-05-28 | End: 2021-05-28

## 2021-05-27 RX ORDER — SODIUM CHLORIDE 9 MG/ML
INJECTION, SOLUTION INTRAVENOUS CONTINUOUS
Status: CANCELLED | OUTPATIENT
Start: 2021-05-27

## 2021-05-27 RX ORDER — EPINEPHRINE 1 MG/ML
0.3 INJECTION, SOLUTION, CONCENTRATE INTRAVENOUS PRN
Status: CANCELLED | OUTPATIENT
Start: 2021-05-28

## 2021-05-27 RX ORDER — DIPHENHYDRAMINE HCL 25 MG
25 TABLET ORAL ONCE
Status: CANCELLED | OUTPATIENT
Start: 2021-05-28 | End: 2021-05-28

## 2021-05-27 RX ORDER — DIPHENHYDRAMINE HYDROCHLORIDE 50 MG/ML
50 INJECTION INTRAMUSCULAR; INTRAVENOUS ONCE
Status: CANCELLED | OUTPATIENT
Start: 2021-05-28 | End: 2021-05-28

## 2021-05-27 RX ORDER — SODIUM CHLORIDE 0.9 % (FLUSH) 0.9 %
5-40 SYRINGE (ML) INJECTION PRN
Status: CANCELLED | OUTPATIENT
Start: 2021-05-28

## 2021-05-27 RX ORDER — METHYLPREDNISOLONE SODIUM SUCCINATE 125 MG/2ML
125 INJECTION, POWDER, LYOPHILIZED, FOR SOLUTION INTRAMUSCULAR; INTRAVENOUS ONCE
Status: CANCELLED | OUTPATIENT
Start: 2021-05-27 | End: 2021-05-27

## 2021-05-27 RX ORDER — DIPHENHYDRAMINE HYDROCHLORIDE 50 MG/ML
50 INJECTION INTRAMUSCULAR; INTRAVENOUS ONCE
Status: CANCELLED | OUTPATIENT
Start: 2021-05-27 | End: 2021-05-27

## 2021-05-27 RX ORDER — SODIUM CHLORIDE 9 MG/ML
25 INJECTION, SOLUTION INTRAVENOUS PRN
Status: CANCELLED | OUTPATIENT
Start: 2021-05-28

## 2021-05-27 RX ORDER — METHYLPREDNISOLONE SODIUM SUCCINATE 125 MG/2ML
125 INJECTION, POWDER, LYOPHILIZED, FOR SOLUTION INTRAMUSCULAR; INTRAVENOUS ONCE
Status: CANCELLED | OUTPATIENT
Start: 2021-05-28 | End: 2021-05-28

## 2021-05-28 ENCOUNTER — HOSPITAL ENCOUNTER (OUTPATIENT)
Dept: INFUSION THERAPY | Age: 71
Discharge: HOME | End: 2021-05-28
Payer: MEDICARE

## 2021-05-28 VITALS
SYSTOLIC BLOOD PRESSURE: 135 MMHG | TEMPERATURE: 98.2 F | HEART RATE: 66 BPM | DIASTOLIC BLOOD PRESSURE: 71 MMHG | RESPIRATION RATE: 16 BRPM

## 2021-05-28 DIAGNOSIS — T45.1X5A ANTINEOPLASTIC CHEMOTHERAPY INDUCED PANCYTOPENIA (HCC): Primary | ICD-10-CM

## 2021-05-28 DIAGNOSIS — C83.30 DIFFUSE LARGE B-CELL LYMPHOMA, UNSPECIFIED BODY REGION (HCC): ICD-10-CM

## 2021-05-28 DIAGNOSIS — D61.810 ANTINEOPLASTIC CHEMOTHERAPY INDUCED PANCYTOPENIA (HCC): Primary | ICD-10-CM

## 2021-05-28 DIAGNOSIS — C83.33 DIFFUSE LARGE B-CELL LYMPHOMA OF INTRA-ABDOMINAL LYMPH NODES (HCC): ICD-10-CM

## 2021-05-28 PROCEDURE — 96374 THER/PROPH/DIAG INJ IV PUSH: CPT

## 2021-05-28 PROCEDURE — 6360000002 HC RX W HCPCS: Performed by: NURSE PRACTITIONER

## 2021-05-28 PROCEDURE — 96375 TX/PRO/DX INJ NEW DRUG ADDON: CPT

## 2021-05-28 PROCEDURE — 6370000000 HC RX 637 (ALT 250 FOR IP): Performed by: NURSE PRACTITIONER

## 2021-05-28 PROCEDURE — 2580000003 HC RX 258: Performed by: NURSE PRACTITIONER

## 2021-05-28 PROCEDURE — P9016 RBC LEUKOCYTES REDUCED: HCPCS

## 2021-05-28 PROCEDURE — 36430 TRANSFUSION BLD/BLD COMPNT: CPT

## 2021-05-28 RX ORDER — SODIUM CHLORIDE 0.9 % (FLUSH) 0.9 %
5-40 SYRINGE (ML) INJECTION PRN
Status: DISCONTINUED | OUTPATIENT
Start: 2021-05-28 | End: 2021-05-29 | Stop reason: HOSPADM

## 2021-05-28 RX ORDER — ACETAMINOPHEN 325 MG/1
650 TABLET ORAL ONCE
Status: CANCELLED | OUTPATIENT
Start: 2021-05-28 | End: 2021-05-28

## 2021-05-28 RX ORDER — SODIUM CHLORIDE 9 MG/ML
INJECTION, SOLUTION INTRAVENOUS CONTINUOUS
Status: CANCELLED | OUTPATIENT
Start: 2021-05-28

## 2021-05-28 RX ORDER — DIPHENHYDRAMINE HCL 25 MG
25 TABLET ORAL ONCE
Status: CANCELLED | OUTPATIENT
Start: 2021-05-28 | End: 2021-05-28

## 2021-05-28 RX ORDER — HEPARIN SODIUM (PORCINE) LOCK FLUSH IV SOLN 100 UNIT/ML 100 UNIT/ML
500 SOLUTION INTRAVENOUS PRN
Status: CANCELLED | OUTPATIENT
Start: 2021-05-28

## 2021-05-28 RX ORDER — DIPHENHYDRAMINE HYDROCHLORIDE 50 MG/ML
50 INJECTION INTRAMUSCULAR; INTRAVENOUS ONCE
Status: CANCELLED | OUTPATIENT
Start: 2021-05-28 | End: 2021-05-28

## 2021-05-28 RX ORDER — METHYLPREDNISOLONE SODIUM SUCCINATE 125 MG/2ML
125 INJECTION, POWDER, LYOPHILIZED, FOR SOLUTION INTRAMUSCULAR; INTRAVENOUS ONCE
Status: CANCELLED | OUTPATIENT
Start: 2021-05-28 | End: 2021-05-28

## 2021-05-28 RX ORDER — SODIUM CHLORIDE 9 MG/ML
25 INJECTION, SOLUTION INTRAVENOUS PRN
Status: DISCONTINUED | OUTPATIENT
Start: 2021-05-28 | End: 2021-05-29 | Stop reason: HOSPADM

## 2021-05-28 RX ORDER — HEPARIN SODIUM (PORCINE) LOCK FLUSH IV SOLN 100 UNIT/ML 100 UNIT/ML
500 SOLUTION INTRAVENOUS PRN
Status: DISCONTINUED | OUTPATIENT
Start: 2021-05-28 | End: 2021-05-29 | Stop reason: HOSPADM

## 2021-05-28 RX ORDER — SODIUM CHLORIDE 9 MG/ML
25 INJECTION, SOLUTION INTRAVENOUS PRN
Status: CANCELLED | OUTPATIENT
Start: 2021-05-28

## 2021-05-28 RX ORDER — SODIUM CHLORIDE 0.9 % (FLUSH) 0.9 %
5-40 SYRINGE (ML) INJECTION PRN
Status: CANCELLED | OUTPATIENT
Start: 2021-05-28

## 2021-05-28 RX ORDER — DIPHENHYDRAMINE HCL 25 MG
25 TABLET ORAL ONCE
Status: COMPLETED | OUTPATIENT
Start: 2021-05-28 | End: 2021-05-28

## 2021-05-28 RX ORDER — EPINEPHRINE 1 MG/ML
0.3 INJECTION, SOLUTION, CONCENTRATE INTRAVENOUS PRN
Status: CANCELLED | OUTPATIENT
Start: 2021-05-28

## 2021-05-28 RX ORDER — ACETAMINOPHEN 325 MG/1
650 TABLET ORAL ONCE
Status: COMPLETED | OUTPATIENT
Start: 2021-05-28 | End: 2021-05-28

## 2021-05-28 RX ADMIN — SODIUM CHLORIDE, PRESERVATIVE FREE 10 ML: 5 INJECTION INTRAVENOUS at 11:36

## 2021-05-28 RX ADMIN — DIPHENHYDRAMINE HYDROCHLORIDE 25 MG: 25 TABLET ORAL at 09:03

## 2021-05-28 RX ADMIN — HYDROCORTISONE SODIUM SUCCINATE 25 MG: 100 INJECTION, POWDER, FOR SOLUTION INTRAMUSCULAR; INTRAVENOUS at 09:04

## 2021-05-28 RX ADMIN — SODIUM CHLORIDE, PRESERVATIVE FREE 10 ML: 5 INJECTION INTRAVENOUS at 09:00

## 2021-05-28 RX ADMIN — SODIUM CHLORIDE 25 ML: 9 INJECTION, SOLUTION INTRAVENOUS at 09:02

## 2021-05-28 RX ADMIN — HEPARIN 500 UNITS: 100 SYRINGE at 11:38

## 2021-05-28 RX ADMIN — ACETAMINOPHEN 650 MG: 325 TABLET ORAL at 09:02

## 2021-06-01 ENCOUNTER — HOSPITAL ENCOUNTER (EMERGENCY)
Age: 71
Discharge: HOME | End: 2021-06-01
Attending: EMERGENCY MEDICINE
Payer: MEDICARE

## 2021-06-01 ENCOUNTER — APPOINTMENT (OUTPATIENT)
Dept: CT IMAGING | Age: 71
End: 2021-06-01
Payer: MEDICARE

## 2021-06-01 VITALS
SYSTOLIC BLOOD PRESSURE: 110 MMHG | HEART RATE: 108 BPM | RESPIRATION RATE: 22 BRPM | TEMPERATURE: 97 F | OXYGEN SATURATION: 98 % | DIASTOLIC BLOOD PRESSURE: 53 MMHG

## 2021-06-01 DIAGNOSIS — R13.19 ESOPHAGEAL DYSPHAGIA: Primary | ICD-10-CM

## 2021-06-01 LAB
ANION GAP SERPL CALCULATED.3IONS-SCNC: 9 MMOL/L (ref 7–16)
BUN BLDV-MCNC: 10 MG/DL (ref 6–23)
CALCIUM SERPL-MCNC: 8.8 MG/DL (ref 8.6–10.2)
CHLORIDE BLD-SCNC: 107 MMOL/L (ref 98–107)
CO2: 30 MMOL/L (ref 22–29)
CREAT SERPL-MCNC: 0.8 MG/DL (ref 0.7–1.2)
GFR AFRICAN AMERICAN: >60
GFR NON-AFRICAN AMERICAN: >60 ML/MIN/1.73
GLUCOSE BLD-MCNC: 100 MG/DL (ref 74–99)
POTASSIUM SERPL-SCNC: 3.4 MMOL/L (ref 3.5–5)
SODIUM BLD-SCNC: 146 MMOL/L (ref 132–146)

## 2021-06-01 PROCEDURE — 70491 CT SOFT TISSUE NECK W/DYE: CPT

## 2021-06-01 PROCEDURE — 80048 BASIC METABOLIC PNL TOTAL CA: CPT

## 2021-06-01 PROCEDURE — 6360000004 HC RX CONTRAST MEDICATION: Performed by: RADIOLOGY

## 2021-06-01 PROCEDURE — 99283 EMERGENCY DEPT VISIT LOW MDM: CPT

## 2021-06-01 PROCEDURE — 96360 HYDRATION IV INFUSION INIT: CPT

## 2021-06-01 PROCEDURE — 2580000003 HC RX 258: Performed by: EMERGENCY MEDICINE

## 2021-06-01 RX ORDER — 0.9 % SODIUM CHLORIDE 0.9 %
1000 INTRAVENOUS SOLUTION INTRAVENOUS ONCE
Status: COMPLETED | OUTPATIENT
Start: 2021-06-01 | End: 2021-06-01

## 2021-06-01 RX ORDER — HEPARIN SODIUM (PORCINE) LOCK FLUSH IV SOLN 100 UNIT/ML 100 UNIT/ML
SOLUTION INTRAVENOUS
Status: DISCONTINUED
Start: 2021-06-01 | End: 2021-06-01 | Stop reason: HOSPADM

## 2021-06-01 RX ADMIN — IOPAMIDOL 75 ML: 755 INJECTION, SOLUTION INTRAVENOUS at 15:10

## 2021-06-01 RX ADMIN — SODIUM CHLORIDE 1000 ML: 9 INJECTION, SOLUTION INTRAVENOUS at 13:49

## 2021-06-01 ASSESSMENT — ENCOUNTER SYMPTOMS
VOICE CHANGE: 0
SORE THROAT: 0
COUGH: 0
CHEST TIGHTNESS: 0
TROUBLE SWALLOWING: 1
BACK PAIN: 0
VOMITING: 0
ABDOMINAL PAIN: 0
NAUSEA: 0
WHEEZING: 0
DIARRHEA: 0
SHORTNESS OF BREATH: 0

## 2021-06-01 NOTE — ED PROVIDER NOTES
Chief complaint:  Dysphagia    HPI history provided by the patient  Patient comes in with a history of lymphoma. On chemo. Complains of several months of gradually worsening dysphagia, having trouble swallowing. Today felt like the pills got stuck for some time but have not cleared, states he is now having trouble swallowing liquids and even his own spit. This is been a progressive issue. Not seen GI for this yet. No neck swelling or pain otherwise. No facial droop. No speech difficulty. No extremity numbness or weakness or paresthesias. This is not a sudden onset, this is been a progressively worsening event for several months. No treatment prior to arrival.  Nothing makes it better or worse. No difficulty breathing. Review of Systems   Constitutional: Negative for chills, diaphoresis, fatigue and fever. HENT: Positive for trouble swallowing. Negative for congestion, sore throat and voice change. Respiratory: Negative for cough, chest tightness, shortness of breath and wheezing. Cardiovascular: Negative for chest pain. Gastrointestinal: Negative for abdominal pain, diarrhea, nausea and vomiting. Genitourinary: Negative for dysuria, flank pain and frequency. Musculoskeletal: Negative for arthralgias, back pain, gait problem, joint swelling, myalgias, neck pain and neck stiffness. Skin: Negative for rash and wound. Neurological: Negative for dizziness, seizures, syncope, weakness, light-headedness, numbness and headaches. Hematological: Negative for adenopathy. All other systems reviewed and are negative. Physical Exam  Vitals and nursing note reviewed. Constitutional:       General: He is not in acute distress. Appearance: He is well-developed. He is not ill-appearing, toxic-appearing or diaphoretic. HENT:      Head: Normocephalic and atraumatic. Comments: Airway patent. No trismus or stridor. Mouth/Throat:      Pharynx: Oropharynx is clear. Uvula midline. No pharyngeal swelling, oropharyngeal exudate, posterior oropharyngeal erythema or uvula swelling. Tonsils: No tonsillar exudate or tonsillar abscesses. Eyes:      Pupils: Pupils are equal, round, and reactive to light. Neck:      Trachea: Trachea and phonation normal. No tracheal tenderness or tracheal deviation. Cardiovascular:      Rate and Rhythm: Normal rate and regular rhythm. Heart sounds: Normal heart sounds. No murmur heard. Comments: Minimally tachycardic  Pulmonary:      Effort: Pulmonary effort is normal. No respiratory distress. Breath sounds: Normal breath sounds. No stridor, decreased air movement or transmitted upper airway sounds. No decreased breath sounds, wheezing, rhonchi or rales. Chest:      Chest wall: No tenderness. Abdominal:      General: Bowel sounds are normal. There is no distension. Palpations: Abdomen is soft. Tenderness: There is no abdominal tenderness. There is no right CVA tenderness, left CVA tenderness, guarding or rebound. Musculoskeletal:         General: No swelling, tenderness, deformity or signs of injury. Cervical back: Normal range of motion and neck supple. No signs of trauma or rigidity. No pain with movement, spinous process tenderness or muscular tenderness. Normal range of motion. Right lower leg: No edema. Left lower leg: No edema. Lymphadenopathy:      Cervical: No cervical adenopathy. Skin:     General: Skin is warm and dry. Coloration: Skin is not jaundiced or pale. Findings: No rash. Neurological:      General: No focal deficit present. Mental Status: He is alert and oriented to person, place, and time. GCS: GCS eye subscore is 4. GCS verbal subscore is 5. GCS motor subscore is 6. Cranial Nerves: No cranial nerve deficit.       Coordination: Coordination normal.          Procedures     MDM     ED Course as of Jun 01 1609   Tue Jun 01, 2021 1606 Case discussed with  Trenton Gamboa, detailed overview given, they will see the patient in the office at 9 AM tomorrow, n.p.o. after midnight and have a ride with anticipation of upper endoscopy. [NC]      ED Course User Index  [NC] Derek Del Cid DO        ED Course as of Jun 01 1609   Tue Jun 01, 2021   [address] [facility] [place] Dr. Trenton Gamboa, detailed overview given, they will see the patient in the office at 9 AM tomorrow, n.p.o. after midnight and have a ride with anticipation of upper endoscopy. [NC]      ED Course User Index  [NC] Tom Rowena Cruz        --------------------------------------------- PAST HISTORY ---------------------------------------------  Past Medical History:  has a past medical history of Arthritis, BPH (benign prostatic hyperplasia), Coronary artery disease, Difficult airway, Difficult intubation, Hyperlipidemia, Hypertension, and Sinus tachycardia. Past Surgical History:  has a past surgical history that includes Pacemaker insertion (2002); Tonsillectomy; Upper gastrointestinal endoscopy; pacemaker placement (2014 new battery); Vasectomy; other surgical history (08/12/2016); Aorta surgery; ablation of dysrhythmic focus; Aortic valve replacement; Mitral valve replacement; Upper gastrointestinal endoscopy (N/A, 2/16/2021); Bladder surgery (Right, 2/17/2021); Colonoscopy (2/19/2021); Colonoscopy (2/19/2021); hemicolectomy (N/A, 3/11/2021); and Port Surgery (Right, 4/1/2021). Social History:  reports that he quit smoking about 7 years ago. He has a 44.00 pack-year smoking history. He has never used smokeless tobacco. He reports previous alcohol use of about 2.0 standard drinks of alcohol per week. He reports that he does not use drugs. Family History: family history includes Brain Cancer in his sister; Cancer in his maternal grandfather; Diabetes in his father and mother; Heart Disease in his father; Stroke in his brother, mother, and sister.      The patients home medications have been reviewed. Allergies: Patient has no known allergies. -------------------------------------------------- RESULTS -------------------------------------------------  Labs:  Results for orders placed or performed during the hospital encounter of 65/92/76   Basic Metabolic Panel   Result Value Ref Range    Sodium 146 132 - 146 mmol/L    Potassium 3.4 (L) 3.5 - 5.0 mmol/L    Chloride 107 98 - 107 mmol/L    CO2 30 (H) 22 - 29 mmol/L    Anion Gap 9 7 - 16 mmol/L    Glucose 100 (H) 74 - 99 mg/dL    BUN 10 6 - 23 mg/dL    CREATININE 0.8 0.7 - 1.2 mg/dL    GFR Non-African American >60 >=60 mL/min/1.73    GFR African American >60     Calcium 8.8 8.6 - 10.2 mg/dL       Radiology:  CT SOFT TISSUE NECK W CONTRAST   Final Result   No acute abnormality of the soft tissue structures of the neck. .      Dense atherosclerotic plaque at the bifurcations bilaterally. Findings are   suspicious for bilateral significant stenoses of the origins of the left and   right internal carotid artery. Further evaluation with carotid ultrasound   recommended. ------------------------- NURSING NOTES AND VITALS REVIEWED ---------------------------  Date / Time Roomed:  6/1/2021 12:26 PM  ED Bed Assignment:  23/23    The nursing notes within the ED encounter and vital signs as below have been reviewed. BP (!) 110/53   Pulse 108   Temp 97 °F (36.1 °C)   Resp 22   SpO2 98%   Oxygen Saturation Interpretation: Normal      ------------------------------------------ PROGRESS NOTES ------------------------------------------  I have spoken with the patient and discussed todays results, in addition to providing specific details for the plan of care and counseling regarding the diagnosis and prognosis. Their questions are answered at this time and they are agreeable with the plan. I discussed at length with them reasons for immediate return here for re evaluation.  They will followup with primary care by calling their office tomorrow. --------------------------------- ADDITIONAL PROVIDER NOTES ---------------------------------  At this time the patient is without objective evidence of an acute process requiring hospitalization or inpatient management. They have remained hemodynamically stable throughout their entire ED visit and are stable for discharge with outpatient follow-up. The plan has been discussed in detail and they are aware of the specific conditions for emergent return, as well as the importance of follow-up. New Prescriptions    No medications on file       Diagnosis:  1. Esophageal dysphagia        Disposition:  Patient's disposition: Discharge to home  Patient's condition is stable.          Jefferson Quispe DO  06/01/21 [id number]

## 2021-06-01 NOTE — ED NOTES
Results of testing explained to patient. Patient verbalizes understanding of instructions regarding testing and need to follow up with PCP and/or specialist provider.         Vaishali Ramos RN  06/01/21 [id number]

## 2021-06-14 LAB
ABO/RH: NORMAL
ANTIBODY SCREEN: NORMAL

## 2021-06-14 RX ORDER — SODIUM CHLORIDE 9 MG/ML
20 INJECTION, SOLUTION INTRAVENOUS CONTINUOUS
Status: CANCELLED | OUTPATIENT
Start: 2021-06-15

## 2021-06-14 RX ORDER — SODIUM CHLORIDE 9 MG/ML
25 INJECTION, SOLUTION INTRAVENOUS PRN
Status: CANCELLED | OUTPATIENT
Start: 2021-06-14

## 2021-06-14 RX ORDER — DIPHENHYDRAMINE HYDROCHLORIDE 50 MG/ML
50 INJECTION INTRAMUSCULAR; INTRAVENOUS ONCE
Status: CANCELLED | OUTPATIENT
Start: 2021-06-14 | End: 2021-06-14

## 2021-06-14 RX ORDER — ACETAMINOPHEN 325 MG/1
650 TABLET ORAL ONCE
Status: CANCELLED | OUTPATIENT
Start: 2021-06-15 | End: 2021-06-15

## 2021-06-14 RX ORDER — SODIUM CHLORIDE 0.9 % (FLUSH) 0.9 %
5-40 SYRINGE (ML) INJECTION PRN
Status: CANCELLED | OUTPATIENT
Start: 2021-06-15

## 2021-06-14 RX ORDER — SODIUM CHLORIDE 9 MG/ML
25 INJECTION, SOLUTION INTRAVENOUS PRN
Status: CANCELLED | OUTPATIENT
Start: 2021-06-15

## 2021-06-14 RX ORDER — METHYLPREDNISOLONE SODIUM SUCCINATE 125 MG/2ML
125 INJECTION, POWDER, LYOPHILIZED, FOR SOLUTION INTRAMUSCULAR; INTRAVENOUS ONCE
Status: CANCELLED | OUTPATIENT
Start: 2021-06-14 | End: 2021-06-14

## 2021-06-14 RX ORDER — DIPHENHYDRAMINE HYDROCHLORIDE 50 MG/ML
50 INJECTION INTRAMUSCULAR; INTRAVENOUS ONCE
Status: CANCELLED | OUTPATIENT
Start: 2021-06-15 | End: 2021-06-15

## 2021-06-14 RX ORDER — EPINEPHRINE 1 MG/ML
0.3 INJECTION, SOLUTION, CONCENTRATE INTRAVENOUS PRN
Status: CANCELLED | OUTPATIENT
Start: 2021-06-15

## 2021-06-14 RX ORDER — SODIUM CHLORIDE 9 MG/ML
INJECTION, SOLUTION INTRAVENOUS CONTINUOUS
Status: CANCELLED | OUTPATIENT
Start: 2021-06-15

## 2021-06-14 RX ORDER — SODIUM CHLORIDE 9 MG/ML
INJECTION, SOLUTION INTRAVENOUS CONTINUOUS
Status: CANCELLED | OUTPATIENT
Start: 2021-06-14

## 2021-06-14 RX ORDER — DIPHENHYDRAMINE HCL 25 MG
25 TABLET ORAL ONCE
Status: CANCELLED | OUTPATIENT
Start: 2021-06-15 | End: 2021-06-15

## 2021-06-14 RX ORDER — METHYLPREDNISOLONE SODIUM SUCCINATE 125 MG/2ML
125 INJECTION, POWDER, LYOPHILIZED, FOR SOLUTION INTRAMUSCULAR; INTRAVENOUS ONCE
Status: CANCELLED | OUTPATIENT
Start: 2021-06-15 | End: 2021-06-15

## 2021-06-14 RX ORDER — EPINEPHRINE 1 MG/ML
0.3 INJECTION, SOLUTION, CONCENTRATE INTRAVENOUS PRN
Status: CANCELLED | OUTPATIENT
Start: 2021-06-14

## 2021-06-15 ENCOUNTER — NURSE ONLY (OUTPATIENT)
Dept: CARDIOLOGY CLINIC | Age: 71
End: 2021-06-15

## 2021-06-15 ENCOUNTER — OFFICE VISIT (OUTPATIENT)
Dept: CARDIOLOGY CLINIC | Age: 71
End: 2021-06-15
Payer: MEDICARE

## 2021-06-15 ENCOUNTER — HOSPITAL ENCOUNTER (OUTPATIENT)
Dept: INFUSION THERAPY | Age: 71
Discharge: HOME | End: 2021-06-15
Payer: MEDICARE

## 2021-06-15 VITALS
DIASTOLIC BLOOD PRESSURE: 79 MMHG | HEART RATE: 77 BPM | SYSTOLIC BLOOD PRESSURE: 165 MMHG | OXYGEN SATURATION: 99 % | RESPIRATION RATE: 18 BRPM | TEMPERATURE: 98.4 F

## 2021-06-15 VITALS
DIASTOLIC BLOOD PRESSURE: 62 MMHG | SYSTOLIC BLOOD PRESSURE: 120 MMHG | HEIGHT: 67 IN | WEIGHT: 210 LBS | HEART RATE: 84 BPM | BODY MASS INDEX: 32.96 KG/M2

## 2021-06-15 DIAGNOSIS — E78.00 PURE HYPERCHOLESTEROLEMIA: ICD-10-CM

## 2021-06-15 DIAGNOSIS — I10 ESSENTIAL HYPERTENSION: ICD-10-CM

## 2021-06-15 DIAGNOSIS — I25.10 CORONARY ARTERY DISEASE INVOLVING NATIVE CORONARY ARTERY OF NATIVE HEART WITHOUT ANGINA PECTORIS: Primary | Chronic | ICD-10-CM

## 2021-06-15 DIAGNOSIS — T45.1X5A ANTINEOPLASTIC CHEMOTHERAPY INDUCED PANCYTOPENIA (HCC): Primary | ICD-10-CM

## 2021-06-15 DIAGNOSIS — I48.0 PAROXYSMAL ATRIAL FIBRILLATION (HCC): ICD-10-CM

## 2021-06-15 DIAGNOSIS — I48.0 PAROXYSMAL ATRIAL FIBRILLATION (HCC): Primary | ICD-10-CM

## 2021-06-15 DIAGNOSIS — C83.33 DIFFUSE LARGE B-CELL LYMPHOMA OF INTRA-ABDOMINAL LYMPH NODES (HCC): ICD-10-CM

## 2021-06-15 DIAGNOSIS — C83.30 DIFFUSE LARGE B-CELL LYMPHOMA, UNSPECIFIED BODY REGION (HCC): ICD-10-CM

## 2021-06-15 DIAGNOSIS — D61.810 ANTINEOPLASTIC CHEMOTHERAPY INDUCED PANCYTOPENIA (HCC): Primary | ICD-10-CM

## 2021-06-15 DIAGNOSIS — Z95.0 ARTIFICIAL CARDIAC PACEMAKER: ICD-10-CM

## 2021-06-15 PROCEDURE — 96375 TX/PRO/DX INJ NEW DRUG ADDON: CPT

## 2021-06-15 PROCEDURE — 6370000000 HC RX 637 (ALT 250 FOR IP): Performed by: NURSE PRACTITIONER

## 2021-06-15 PROCEDURE — P9016 RBC LEUKOCYTES REDUCED: HCPCS

## 2021-06-15 PROCEDURE — 6360000002 HC RX W HCPCS: Performed by: INTERNAL MEDICINE

## 2021-06-15 PROCEDURE — 99215 OFFICE O/P EST HI 40 MIN: CPT | Performed by: INTERNAL MEDICINE

## 2021-06-15 PROCEDURE — 2580000003 HC RX 258: Performed by: NURSE PRACTITIONER

## 2021-06-15 PROCEDURE — 6360000002 HC RX W HCPCS: Performed by: NURSE PRACTITIONER

## 2021-06-15 PROCEDURE — 36430 TRANSFUSION BLD/BLD COMPNT: CPT

## 2021-06-15 PROCEDURE — 96374 THER/PROPH/DIAG INJ IV PUSH: CPT

## 2021-06-15 RX ORDER — RIVAROXABAN 20 MG/1
20 TABLET, FILM COATED ORAL
Status: ON HOLD | COMMUNITY
Start: 2021-04-04 | End: 2021-07-14 | Stop reason: HOSPADM

## 2021-06-15 RX ORDER — SODIUM CHLORIDE 0.9 % (FLUSH) 0.9 %
5-40 SYRINGE (ML) INJECTION PRN
Status: DISCONTINUED | OUTPATIENT
Start: 2021-06-15 | End: 2021-06-16 | Stop reason: HOSPADM

## 2021-06-15 RX ORDER — SODIUM CHLORIDE 9 MG/ML
20 INJECTION, SOLUTION INTRAVENOUS CONTINUOUS
Status: CANCELLED | OUTPATIENT
Start: 2021-06-15

## 2021-06-15 RX ORDER — METHYLPREDNISOLONE SODIUM SUCCINATE 125 MG/2ML
125 INJECTION, POWDER, LYOPHILIZED, FOR SOLUTION INTRAMUSCULAR; INTRAVENOUS ONCE
Status: CANCELLED | OUTPATIENT
Start: 2021-06-15 | End: 2021-06-15

## 2021-06-15 RX ORDER — SODIUM CHLORIDE 9 MG/ML
INJECTION, SOLUTION INTRAVENOUS CONTINUOUS
Status: CANCELLED | OUTPATIENT
Start: 2021-06-15

## 2021-06-15 RX ORDER — HEPARIN SODIUM (PORCINE) LOCK FLUSH IV SOLN 100 UNIT/ML 100 UNIT/ML
100 SOLUTION INTRAVENOUS PRN
Status: DISCONTINUED | OUTPATIENT
Start: 2021-06-15 | End: 2021-06-15

## 2021-06-15 RX ORDER — ACETAMINOPHEN 325 MG/1
650 TABLET ORAL ONCE
Status: CANCELLED | OUTPATIENT
Start: 2021-06-15 | End: 2021-06-15

## 2021-06-15 RX ORDER — EPINEPHRINE 1 MG/ML
0.3 INJECTION, SOLUTION, CONCENTRATE INTRAVENOUS PRN
Status: CANCELLED | OUTPATIENT
Start: 2021-06-15

## 2021-06-15 RX ORDER — ACETAMINOPHEN 325 MG/1
650 TABLET ORAL ONCE
Status: COMPLETED | OUTPATIENT
Start: 2021-06-15 | End: 2021-06-15

## 2021-06-15 RX ORDER — DIPHENHYDRAMINE HYDROCHLORIDE 50 MG/ML
50 INJECTION INTRAMUSCULAR; INTRAVENOUS ONCE
Status: CANCELLED | OUTPATIENT
Start: 2021-06-15 | End: 2021-06-15

## 2021-06-15 RX ORDER — SODIUM CHLORIDE 9 MG/ML
25 INJECTION, SOLUTION INTRAVENOUS PRN
Status: CANCELLED | OUTPATIENT
Start: 2021-06-15

## 2021-06-15 RX ORDER — SODIUM CHLORIDE 0.9 % (FLUSH) 0.9 %
5-40 SYRINGE (ML) INJECTION 2 TIMES DAILY
Status: DISCONTINUED | OUTPATIENT
Start: 2021-06-15 | End: 2021-06-16 | Stop reason: HOSPADM

## 2021-06-15 RX ORDER — DIPHENHYDRAMINE HCL 25 MG
25 TABLET ORAL ONCE
Status: COMPLETED | OUTPATIENT
Start: 2021-06-15 | End: 2021-06-15

## 2021-06-15 RX ORDER — SODIUM CHLORIDE 0.9 % (FLUSH) 0.9 %
5-40 SYRINGE (ML) INJECTION PRN
Status: CANCELLED | OUTPATIENT
Start: 2021-06-15

## 2021-06-15 RX ORDER — HEPARIN SODIUM (PORCINE) LOCK FLUSH IV SOLN 100 UNIT/ML 100 UNIT/ML
500 SOLUTION INTRAVENOUS PRN
Status: DISCONTINUED | OUTPATIENT
Start: 2021-06-15 | End: 2021-06-16 | Stop reason: HOSPADM

## 2021-06-15 RX ORDER — SODIUM CHLORIDE 9 MG/ML
20 INJECTION, SOLUTION INTRAVENOUS CONTINUOUS
Status: DISCONTINUED | OUTPATIENT
Start: 2021-06-15 | End: 2021-06-16 | Stop reason: HOSPADM

## 2021-06-15 RX ORDER — DIPHENHYDRAMINE HCL 25 MG
25 TABLET ORAL ONCE
Status: CANCELLED | OUTPATIENT
Start: 2021-06-15 | End: 2021-06-15

## 2021-06-15 RX ADMIN — SODIUM CHLORIDE 20 ML/HR: 9 INJECTION, SOLUTION INTRAVENOUS at 08:21

## 2021-06-15 RX ADMIN — SODIUM CHLORIDE, PRESERVATIVE FREE 10 ML: 5 INJECTION INTRAVENOUS at 08:14

## 2021-06-15 RX ADMIN — SODIUM CHLORIDE, PRESERVATIVE FREE 10 ML: 5 INJECTION INTRAVENOUS at 08:22

## 2021-06-15 RX ADMIN — SODIUM CHLORIDE, PRESERVATIVE FREE 10 ML: 5 INJECTION INTRAVENOUS at 10:42

## 2021-06-15 RX ADMIN — DIPHENHYDRAMINE HYDROCHLORIDE 25 MG: 25 TABLET ORAL at 08:20

## 2021-06-15 RX ADMIN — ACETAMINOPHEN 650 MG: 325 TABLET ORAL at 08:20

## 2021-06-15 RX ADMIN — HEPARIN 500 UNITS: 100 SYRINGE at 10:43

## 2021-06-15 RX ADMIN — HYDROCORTISONE SODIUM SUCCINATE 25 MG: 100 INJECTION, POWDER, FOR SOLUTION INTRAMUSCULAR; INTRAVENOUS at 08:20

## 2021-06-15 NOTE — PROGRESS NOTES
OUTPATIENT CARDIOLOGY FOLLOW-UP    Name: Rhoda Rey    Age: 80 y.o. Primary Care Physician: Prem Montgomery DO    Date of Service: 6/15/2021    Chief Complaint: Paroxysmal atrial fibrillation, sinus node dysfunction, hypertension, lymphoma, moderate obesity    Interim History: Since his evaluation during hospitalization, the patient appears compensated from a cardiovascular standpoint with no interim cardiovascular difficulties. The majority of his difficulties resolved from that of his lymphoma including gastrointestinal complications requiring reported esophageal dilatation and ongoing chemotherapy with associated recurrent anemia and transfusion needs. He denies any manifestations of anginal-like chest discomfort or other ischemic equivalents is noted no manifestations of decompensated left ventricular soft dysfunction or volume overload. He denies any bleeding in the face of ongoing anticoagulation. At time of evaluation, pacemaker interrogation demonstrates appropriate device function with an approximate 2% atrial fibrillation burden and no episode in excess of 30 minutes duration. Review of Systems: The remainder of a complete multisystem review including consitutional, central nervous, respiratory, circulatory, gastrointestinal, genitourinary, endocrinologic, hematologic, musculoskeletal and psychiatric are negative.     Past Medical History:  Past Medical History:   Diagnosis Date    Arthritis     KNEES HIPS BACK    BPH (benign prostatic hyperplasia)     Coronary artery disease     Difficult airway     PT STATES HE WAS TOLD THIS TWICE AT [facility]    Difficult intubation 04/2017    note in care everywhere \"Clinical Summary\" 03/10/2021    Hyperlipidemia     Hypertension     Sinus tachycardia 01/01/2002       Past Surgical History:  Past Surgical History:   Procedure Laterality Date    ABLATION OF DYSRHYTHMIC FOCUS      AORTA SURGERY      aortic valve replacement    AORTIC VALVE REPLACEMENT      BLADDER SURGERY Right 2021    CYSTOSCOPY BILATERAL RETROGRADE PYELOGRAM RIGHT STENT INSERTION performed by Mu Persaud MD at [facility]  2021    COLONOSCOPY WITH BIOPSY performed by Camille Hubbard DO at [address]  2021    COLONOSCOPY W/ ENDOSCOPIC MUCOSAL RESECTION performed by Camille Hubbard DO at [address] N/A 3/11/2021    LAPAROSCOPIC RIGHT HEMICOLECTOMY performed by Clarence Castro MD at [address] OTHER SURGICAL HISTORY  2016    CT Myelogram, Dr. Deejay Bowles  2014 new battery    last checked Dr Ariane Ley 2016?     PORT SURGERY Right 2021    MEDI PORT INSERTION performed by Clarence Castro MD at [address]  [facility] [place] ENDOSCOPY      reflux    UPPER GASTROINTESTINAL ENDOSCOPY N/A 2021    EGD BIOPSY performed by Caro Diana MD at [facility]         Family History:  Family History   Problem Relation Age of Onset   Mensha Diabetes Mother     Stroke Mother     Diabetes Father     Heart Disease Father     Brain Cancer Sister     Stroke Sister     Stroke Brother     Cancer Maternal Grandfather        Social History:  Social History     Socioeconomic History    Marital status:      Spouse name: Not on file    Number of children: Not on file    Years of education: Not on file    Highest education level: Not on file   Occupational History    Not on file   Tobacco Use    Smoking status: Former Smoker     Packs/day: 1.00     Years: 44.00     Pack years: 44.00     Quit date: 3/4/2014     Years since quittin.2    Smokeless tobacco: Never Used   Vaping Use    Vaping Use: Some days    Substances: Nicotine, Flavoring, 6% nicotine    Devices: Pre-filled or refillable cartridge   Substance and Sexual Activity    Alcohol use: Not Currently     Alcohol/week: 2.0 standard not taking: Reported on 6/15/2021) 60 tablet 3    lisinopril-hydroCHLOROthiazide (PRINZIDE;ZESTORETIC) 10-12.5 MG per tablet Take 1 tablet by mouth daily (Patient not taking: Reported on 6/15/2021)      amLODIPine (NORVASC) 5 MG tablet Take 5 mg by mouth daily (Patient not taking: Reported on 6/15/2021)      tamsulosin (FLOMAX) 0.4 MG capsule Take 0.4 mg by mouth daily  (Patient not taking: Reported on 6/15/2021)       No current facility-administered medications for this visit. Facility-Administered Medications Ordered in Other Visits   Medication Dose Route Frequency Provider Last Rate Last Admin    0.9 % sodium chloride infusion  20 mL/hr Intravenous Continuous TRACIE Bright CNP 20 mL/hr at 06/15/21 0821 20 mL/hr at 06/15/21 0821    sodium chloride flush 0.9 % injection 5-40 mL  5-40 mL Intravenous PRN TRACIE Bright - CNP   10 mL at 06/15/21 1042    sodium chloride flush 0.9 % injection 5-40 mL  5-40 mL Intravenous BID Pérez Saleem MD        heparin flush 100 UNIT/ML injection 500 Units  500 Units Intracatheter PRN Pérez Saleem MD   500 Units at 06/15/21 1043         Physical Exam:  /62 (Site: Left Upper Arm, Position: Sitting, Cuff Size: Large Adult)   Pulse 84   Ht 5' 7\" (1.702 m)   Wt 210 lb (95.3 kg)   BMI 32.89 kg/m²   Wt Readings from Last 3 Encounters:   06/15/21 210 lb (95.3 kg)   03/31/21 200 lb (90.7 kg)   03/29/21 200 lb 1.6 oz (90.8 kg)     The patient is awake, alert and in no discomfort or distress. No gross musculoskeletal deformity is present. No significant skin or nail changes are present. Gross examination of head, eyes, nose and throat are negative. Jugular venous pressure is normal and no carotid bruits are present. Normal respiratory effort is noted with no accessory muscle usage present. Lung fields demonstrate fibrotic rales in both lung bases to ascultation.  Cardiac examination is notable for a regular rate and rhythm with no palpable thrill. No gallop rhythm or cardiac murmur are identified. A benign abdominal examination is present with the exception of mild obesity and no masses or organomegaly. Intact pulses are present throughout all extremities and no peripheral edema is present. No focal neurologic deficits are present. Laboratory Tests:  Lab Results   Component Value Date    CREATININE 0.8 06/01/2021    BUN 10 06/01/2021     06/01/2021    K 3.4 (L) 06/01/2021     06/01/2021    CO2 30 (H) 06/01/2021     No results found for: BNP  Lab Results   Component Value Date    WBC 15.3 04/03/2021    RBC 3.35 04/03/2021    HGB 8.1 04/03/2021    HCT 26.9 04/03/2021    MCV 80.3 04/03/2021    MCH 24.2 04/03/2021    MCHC 30.1 04/03/2021    RDW 14.6 04/03/2021     04/03/2021    MPV 9.5 04/03/2021     No results for input(s): ALKPHOS, ALT, AST, PROT, BILITOT, BILIDIR, LABALBU in the last 72 hours. Lab Results   Component Value Date    MG 1.9 03/29/2021     Lab Results   Component Value Date    PROTIME 14.7 02/14/2021    INR 1.2 02/14/2021     Lab Results   Component Value Date    TSH 3.630 02/14/2021     No components found for: CHLPL  No results found for: TRIG  No results found for: HDL  No results found for: [address]    Cardiac Tests:  ECG: A resting electrocardiogram demonstrates evidence of sinus rhythm with occasional supraventricular ectopy with apparent conduction in addition to that of ventricular ectopy and intermittent ventricular pacing      ASSESSMENT / PLAN: On a clinical basis, the patient presently appears compensated from a cardiovascular standpoint in the face of his paroxysmal atrial fibrillation associated sinus node dysfunction with the majority of his present issues related to that of his lymphoma including the effects of his anemia and recurrent needs of transfusion.   Presently, I have maintained his existing cardiovascular medical regimen inclusive of anticoagulation in the absence of bleeding to reduce risk of embolic events in the face of his paroxysmal atrial fibrillation and based on the present findings of his permanent pacemaker and prior subtherapeutic antirhythmic dosing have not resumed his sotalol therapy. Continued appropriate symptom monitoring will be necessary as well as that of appropriate monitoring of renal function electrolytes to optimize dosing of his oral anticoagulation. Additional management of his lymphoma will be deferred to the oncology service with needs of assessment of his prognosis at the completion of his present chemotherapy. I presently plan his clinical reassessment inclusive of ongoing monitoring of his permanent pacemaker in approximately 3 months and would have to reevaluate him in the interim should additional cardiovascular difficulties or concerns arise. The patient's current medication list, allergies, problem list and results of all previously ordered testing were reviewed at today's visit. Follow-up office visit in 3 months      Note: This report was completed using computerized voice recognition software. Every effort has been made to ensure accuracy, however; inadvertent computerized transcription errors may be present. Karthikeyan Desai.  [place], [address] [place] [address] Cardiology    An electronic copy of this follow-up progress note was forwarded to Dr. Bony Bales and Bebeto Murray

## 2021-06-18 ENCOUNTER — HOSPITAL ENCOUNTER (OUTPATIENT)
Dept: NUCLEAR MEDICINE | Age: 71
Discharge: HOME | End: 2021-06-18
Payer: MEDICARE

## 2021-06-18 DIAGNOSIS — C83.38 DIFFUSE LARGE B-CELL LYMPHOMA OF LYMPH NODES OF MULTIPLE SITES (HCC): ICD-10-CM

## 2021-06-18 PROCEDURE — 78815 PET IMAGE W/CT SKULL-THIGH: CPT

## 2021-06-18 PROCEDURE — A9552 F18 FDG: HCPCS | Performed by: RADIOLOGY

## 2021-06-18 PROCEDURE — 3430000000 HC RX DIAGNOSTIC RADIOPHARMACEUTICAL: Performed by: RADIOLOGY

## 2021-06-18 RX ORDER — FLUDEOXYGLUCOSE F 18 200 MCI/ML
15 INJECTION, SOLUTION INTRAVENOUS
Status: COMPLETED | OUTPATIENT
Start: 2021-06-18 | End: 2021-06-18

## 2021-06-18 RX ADMIN — FLUDEOXYGLUCOSE F 18 15 MILLICURIE: 200 INJECTION, SOLUTION INTRAVENOUS at 17:06

## 2021-06-25 LAB
ABO/RH: NORMAL
ANTIBODY SCREEN: NORMAL

## 2021-06-25 RX ORDER — EPINEPHRINE 1 MG/ML
0.3 INJECTION, SOLUTION, CONCENTRATE INTRAVENOUS PRN
Status: CANCELLED | OUTPATIENT
Start: 2021-06-28

## 2021-06-25 RX ORDER — ACETAMINOPHEN 325 MG/1
650 TABLET ORAL ONCE
Status: CANCELLED | OUTPATIENT
Start: 2021-06-28 | End: 2021-06-28

## 2021-06-25 RX ORDER — DIPHENHYDRAMINE HYDROCHLORIDE 50 MG/ML
50 INJECTION INTRAMUSCULAR; INTRAVENOUS ONCE
Status: CANCELLED | OUTPATIENT
Start: 2021-06-25 | End: 2021-06-25

## 2021-06-25 RX ORDER — DIPHENHYDRAMINE HCL 25 MG
25 TABLET ORAL ONCE
Status: CANCELLED | OUTPATIENT
Start: 2021-06-28 | End: 2021-06-28

## 2021-06-25 RX ORDER — METHYLPREDNISOLONE SODIUM SUCCINATE 125 MG/2ML
125 INJECTION, POWDER, LYOPHILIZED, FOR SOLUTION INTRAMUSCULAR; INTRAVENOUS ONCE
Status: CANCELLED | OUTPATIENT
Start: 2021-06-28 | End: 2021-06-28

## 2021-06-25 RX ORDER — SODIUM CHLORIDE 0.9 % (FLUSH) 0.9 %
5-40 SYRINGE (ML) INJECTION PRN
Status: CANCELLED | OUTPATIENT
Start: 2021-06-28

## 2021-06-25 RX ORDER — SODIUM CHLORIDE 0.9 % (FLUSH) 0.9 %
5-40 SYRINGE (ML) INJECTION PRN
Status: CANCELLED | OUTPATIENT
Start: 2021-06-25

## 2021-06-25 RX ORDER — SODIUM CHLORIDE 9 MG/ML
25 INJECTION, SOLUTION INTRAVENOUS PRN
Status: CANCELLED | OUTPATIENT
Start: 2021-06-28

## 2021-06-25 RX ORDER — EPINEPHRINE 1 MG/ML
0.3 INJECTION, SOLUTION, CONCENTRATE INTRAVENOUS PRN
Status: CANCELLED | OUTPATIENT
Start: 2021-06-25

## 2021-06-25 RX ORDER — SODIUM CHLORIDE 9 MG/ML
INJECTION, SOLUTION INTRAVENOUS CONTINUOUS
Status: CANCELLED | OUTPATIENT
Start: 2021-06-28

## 2021-06-25 RX ORDER — DIPHENHYDRAMINE HYDROCHLORIDE 50 MG/ML
50 INJECTION INTRAMUSCULAR; INTRAVENOUS ONCE
Status: CANCELLED | OUTPATIENT
Start: 2021-06-28 | End: 2021-06-28

## 2021-06-25 RX ORDER — SODIUM CHLORIDE 9 MG/ML
20 INJECTION, SOLUTION INTRAVENOUS CONTINUOUS
Status: CANCELLED | OUTPATIENT
Start: 2021-06-28

## 2021-06-25 RX ORDER — METHYLPREDNISOLONE SODIUM SUCCINATE 125 MG/2ML
125 INJECTION, POWDER, LYOPHILIZED, FOR SOLUTION INTRAMUSCULAR; INTRAVENOUS ONCE
Status: CANCELLED | OUTPATIENT
Start: 2021-06-25 | End: 2021-06-25

## 2021-06-25 RX ORDER — SODIUM CHLORIDE 9 MG/ML
INJECTION, SOLUTION INTRAVENOUS CONTINUOUS
Status: CANCELLED | OUTPATIENT
Start: 2021-06-25

## 2021-06-28 ENCOUNTER — HOSPITAL ENCOUNTER (OUTPATIENT)
Dept: INFUSION THERAPY | Age: 71
Discharge: HOME | End: 2021-06-28
Payer: MEDICARE

## 2021-06-28 VITALS
TEMPERATURE: 98.9 F | OXYGEN SATURATION: 100 % | RESPIRATION RATE: 18 BRPM | SYSTOLIC BLOOD PRESSURE: 114 MMHG | HEART RATE: 70 BPM | DIASTOLIC BLOOD PRESSURE: 59 MMHG

## 2021-06-28 DIAGNOSIS — D61.810 ANTINEOPLASTIC CHEMOTHERAPY INDUCED PANCYTOPENIA (HCC): Primary | ICD-10-CM

## 2021-06-28 DIAGNOSIS — C83.33 DIFFUSE LARGE B-CELL LYMPHOMA OF INTRA-ABDOMINAL LYMPH NODES (HCC): ICD-10-CM

## 2021-06-28 DIAGNOSIS — C83.30 DIFFUSE LARGE B-CELL LYMPHOMA, UNSPECIFIED BODY REGION (HCC): ICD-10-CM

## 2021-06-28 DIAGNOSIS — T45.1X5A ANTINEOPLASTIC CHEMOTHERAPY INDUCED PANCYTOPENIA (HCC): Primary | ICD-10-CM

## 2021-06-28 PROCEDURE — 36430 TRANSFUSION BLD/BLD COMPNT: CPT

## 2021-06-28 PROCEDURE — 96374 THER/PROPH/DIAG INJ IV PUSH: CPT

## 2021-06-28 PROCEDURE — P9016 RBC LEUKOCYTES REDUCED: HCPCS

## 2021-06-28 PROCEDURE — 6370000000 HC RX 637 (ALT 250 FOR IP): Performed by: NURSE PRACTITIONER

## 2021-06-28 PROCEDURE — 96375 TX/PRO/DX INJ NEW DRUG ADDON: CPT

## 2021-06-28 PROCEDURE — 2580000003 HC RX 258: Performed by: NURSE PRACTITIONER

## 2021-06-28 PROCEDURE — 6360000002 HC RX W HCPCS: Performed by: NURSE PRACTITIONER

## 2021-06-28 RX ORDER — SODIUM CHLORIDE 9 MG/ML
25 INJECTION, SOLUTION INTRAVENOUS PRN
Status: CANCELLED | OUTPATIENT
Start: 2021-06-28

## 2021-06-28 RX ORDER — SODIUM CHLORIDE 0.9 % (FLUSH) 0.9 %
5-40 SYRINGE (ML) INJECTION PRN
Status: CANCELLED | OUTPATIENT
Start: 2021-06-28

## 2021-06-28 RX ORDER — SODIUM CHLORIDE 9 MG/ML
20 INJECTION, SOLUTION INTRAVENOUS CONTINUOUS
Status: CANCELLED | OUTPATIENT
Start: 2021-06-28

## 2021-06-28 RX ORDER — ACETAMINOPHEN 325 MG/1
650 TABLET ORAL ONCE
Status: CANCELLED | OUTPATIENT
Start: 2021-06-28 | End: 2021-06-28

## 2021-06-28 RX ORDER — SODIUM CHLORIDE 9 MG/ML
20 INJECTION, SOLUTION INTRAVENOUS CONTINUOUS
Status: DISCONTINUED | OUTPATIENT
Start: 2021-06-28 | End: 2021-06-29 | Stop reason: HOSPADM

## 2021-06-28 RX ORDER — METHYLPREDNISOLONE SODIUM SUCCINATE 125 MG/2ML
125 INJECTION, POWDER, LYOPHILIZED, FOR SOLUTION INTRAMUSCULAR; INTRAVENOUS ONCE
Status: CANCELLED | OUTPATIENT
Start: 2021-06-28 | End: 2021-06-28

## 2021-06-28 RX ORDER — SODIUM CHLORIDE 9 MG/ML
25 INJECTION, SOLUTION INTRAVENOUS PRN
Status: DISCONTINUED | OUTPATIENT
Start: 2021-06-28 | End: 2021-06-29 | Stop reason: HOSPADM

## 2021-06-28 RX ORDER — DIPHENHYDRAMINE HCL 25 MG
25 TABLET ORAL ONCE
Status: COMPLETED | OUTPATIENT
Start: 2021-06-28 | End: 2021-06-28

## 2021-06-28 RX ORDER — SODIUM CHLORIDE 0.9 % (FLUSH) 0.9 %
5-40 SYRINGE (ML) INJECTION PRN
Status: DISCONTINUED | OUTPATIENT
Start: 2021-06-28 | End: 2021-06-29 | Stop reason: HOSPADM

## 2021-06-28 RX ORDER — ACETAMINOPHEN 325 MG/1
650 TABLET ORAL ONCE
Status: COMPLETED | OUTPATIENT
Start: 2021-06-28 | End: 2021-06-28

## 2021-06-28 RX ORDER — DIPHENHYDRAMINE HCL 25 MG
25 TABLET ORAL ONCE
Status: CANCELLED | OUTPATIENT
Start: 2021-06-28 | End: 2021-06-28

## 2021-06-28 RX ORDER — DIPHENHYDRAMINE HYDROCHLORIDE 50 MG/ML
50 INJECTION INTRAMUSCULAR; INTRAVENOUS ONCE
Status: CANCELLED | OUTPATIENT
Start: 2021-06-28 | End: 2021-06-28

## 2021-06-28 RX ORDER — HEPARIN SODIUM (PORCINE) LOCK FLUSH IV SOLN 100 UNIT/ML 100 UNIT/ML
500 SOLUTION INTRAVENOUS PRN
Status: CANCELLED | OUTPATIENT
Start: 2021-06-28

## 2021-06-28 RX ORDER — EPINEPHRINE 1 MG/ML
0.3 INJECTION, SOLUTION, CONCENTRATE INTRAVENOUS PRN
Status: CANCELLED | OUTPATIENT
Start: 2021-06-28

## 2021-06-28 RX ORDER — SODIUM CHLORIDE 9 MG/ML
INJECTION, SOLUTION INTRAVENOUS CONTINUOUS
Status: CANCELLED | OUTPATIENT
Start: 2021-06-28

## 2021-06-28 RX ORDER — HEPARIN SODIUM (PORCINE) LOCK FLUSH IV SOLN 100 UNIT/ML 100 UNIT/ML
500 SOLUTION INTRAVENOUS PRN
Status: DISCONTINUED | OUTPATIENT
Start: 2021-06-28 | End: 2021-06-29 | Stop reason: HOSPADM

## 2021-06-28 RX ADMIN — ACETAMINOPHEN 650 MG: 325 TABLET ORAL at 08:52

## 2021-06-28 RX ADMIN — SODIUM CHLORIDE, PRESERVATIVE FREE 10 ML: 5 INJECTION INTRAVENOUS at 08:26

## 2021-06-28 RX ADMIN — DIPHENHYDRAMINE HYDROCHLORIDE 25 MG: 25 TABLET ORAL at 08:52

## 2021-06-28 RX ADMIN — SODIUM CHLORIDE 20 ML/HR: 9 INJECTION, SOLUTION INTRAVENOUS at 08:36

## 2021-06-28 RX ADMIN — SODIUM CHLORIDE, PRESERVATIVE FREE 10 ML: 5 INJECTION INTRAVENOUS at 08:47

## 2021-06-28 RX ADMIN — HEPARIN 500 UNITS: 100 SYRINGE at 11:18

## 2021-06-28 RX ADMIN — HYDROCORTISONE SODIUM SUCCINATE 25 MG: 100 INJECTION, POWDER, FOR SOLUTION INTRAMUSCULAR; INTRAVENOUS at 08:47

## 2021-06-28 RX ADMIN — SODIUM CHLORIDE, PRESERVATIVE FREE 10 ML: 5 INJECTION INTRAVENOUS at 11:17

## 2021-06-28 ASSESSMENT — PAIN SCALES - GENERAL: PAINLEVEL_OUTOF10: 0

## 2021-07-05 ENCOUNTER — APPOINTMENT (OUTPATIENT)
Dept: GENERAL RADIOLOGY | Age: 71
DRG: 314 | End: 2021-07-05
Payer: MEDICARE

## 2021-07-05 ENCOUNTER — HOSPITAL ENCOUNTER (INPATIENT)
Age: 71
LOS: 9 days | Discharge: HOME HEALTH | DRG: 314 | End: 2021-07-14
Attending: EMERGENCY MEDICINE | Admitting: INTERNAL MEDICINE
Payer: MEDICARE

## 2021-07-05 DIAGNOSIS — R31.9 HEMATURIA, UNSPECIFIED TYPE: Primary | ICD-10-CM

## 2021-07-05 DIAGNOSIS — D64.9 ANEMIA, UNSPECIFIED TYPE: ICD-10-CM

## 2021-07-05 DIAGNOSIS — D69.6 THROMBOCYTOPENIA (HCC): ICD-10-CM

## 2021-07-05 LAB
ABO/RH: NORMAL
ALBUMIN SERPL-MCNC: 3.3 G/DL (ref 3.5–5.2)
ALP BLD-CCNC: 67 U/L (ref 40–129)
ALT SERPL-CCNC: 19 U/L (ref 0–40)
ANION GAP SERPL CALCULATED.3IONS-SCNC: 6 MMOL/L (ref 7–16)
ANISOCYTOSIS: ABNORMAL
ANTIBODY SCREEN: NORMAL
AST SERPL-CCNC: 13 U/L (ref 0–39)
BACTERIA: ABNORMAL /HPF
BASOPHILS ABSOLUTE: 0 E9/L (ref 0–0.2)
BILIRUB SERPL-MCNC: 1 MG/DL (ref 0–1.2)
BILIRUBIN URINE: NEGATIVE
BLOOD BANK DISPENSE STATUS: NORMAL
BLOOD BANK PRODUCT CODE: NORMAL
BLOOD, URINE: ABNORMAL
BPU ID: NORMAL
BUN BLDV-MCNC: 30 MG/DL (ref 6–23)
BURR CELLS: ABNORMAL
CALCIUM SERPL-MCNC: 9.4 MG/DL (ref 8.6–10.2)
CHLORIDE BLD-SCNC: 108 MMOL/L (ref 98–107)
CLARITY: ABNORMAL
CO2: 32 MMOL/L (ref 22–29)
COLOR: ABNORMAL
CREAT SERPL-MCNC: 0.7 MG/DL (ref 0.7–1.2)
DESCRIPTION BLOOD BANK: NORMAL
EOSINOPHILS ABSOLUTE: 0.01 E9/L (ref 0.05–0.5)
EOSINOPHILS RELATIVE PERCENT: 1.7 % (ref 0–6)
GFR AFRICAN AMERICAN: >60
GFR NON-AFRICAN AMERICAN: >60 ML/MIN/1.73
GLUCOSE BLD-MCNC: 98 MG/DL (ref 74–99)
GLUCOSE URINE: NEGATIVE MG/DL
HCT VFR BLD CALC: 24.5 % (ref 37–54)
HEMOGLOBIN: 7.4 G/DL (ref 12.5–16.5)
HYPOCHROMIA: ABNORMAL
KETONES, URINE: NEGATIVE MG/DL
LACTIC ACID, SEPSIS: 1.4 MMOL/L (ref 0.5–1.9)
LACTIC ACID, SEPSIS: 2 MMOL/L (ref 0.5–1.9)
LEUKOCYTE ESTERASE, URINE: NEGATIVE
LYMPHOCYTES ABSOLUTE: 0.22 E9/L (ref 1.5–4)
LYMPHOCYTES RELATIVE PERCENT: 42.6 % (ref 20–42)
MCH RBC QN AUTO: 29.1 PG (ref 26–35)
MCHC RBC AUTO-ENTMCNC: 30.2 % (ref 32–34.5)
MCV RBC AUTO: 96.5 FL (ref 80–99.9)
MONOCYTES ABSOLUTE: 0 E9/L (ref 0.1–0.95)
MONOCYTES RELATIVE PERCENT: 0.9 % (ref 2–12)
NEUTROPHILS ABSOLUTE: 0.28 E9/L (ref 1.8–7.3)
NEUTROPHILS RELATIVE PERCENT: 54.8 % (ref 43–80)
NITRITE, URINE: NEGATIVE
NUCLEATED RED BLOOD CELLS: 0.9 /100 WBC
OVALOCYTES: ABNORMAL
PDW BLD-RTO: 18.4 FL (ref 11.5–15)
PH UA: 6.5 (ref 5–9)
PLATELET # BLD: 15 E9/L (ref 130–450)
PLATELET CONFIRMATION: NORMAL
PMV BLD AUTO: 12.9 FL (ref 7–12)
POIKILOCYTES: ABNORMAL
POLYCHROMASIA: ABNORMAL
POTASSIUM SERPL-SCNC: 3.3 MMOL/L (ref 3.5–5)
PROTEIN UA: >=300 MG/DL
RBC # BLD: 2.54 E12/L (ref 3.8–5.8)
RBC UA: >20 /HPF (ref 0–2)
SODIUM BLD-SCNC: 146 MMOL/L (ref 132–146)
SPECIFIC GRAVITY UA: 1.02 (ref 1–1.03)
TEAR DROP CELLS: ABNORMAL
TOTAL PROTEIN: 5.4 G/DL (ref 6.4–8.3)
UROBILINOGEN, URINE: 1 E.U./DL
WBC # BLD: 0.5 E9/L (ref 4.5–11.5)
WBC UA: ABNORMAL /HPF (ref 0–5)

## 2021-07-05 PROCEDURE — 87088 URINE BACTERIA CULTURE: CPT

## 2021-07-05 PROCEDURE — P9073 PLATELETS PHERESIS PATH REDU: HCPCS

## 2021-07-05 PROCEDURE — 86901 BLOOD TYPING SEROLOGIC RH(D): CPT

## 2021-07-05 PROCEDURE — 86923 COMPATIBILITY TEST ELECTRIC: CPT

## 2021-07-05 PROCEDURE — 2140000000 HC CCU INTERMEDIATE R&B

## 2021-07-05 PROCEDURE — 80053 COMPREHEN METABOLIC PANEL: CPT

## 2021-07-05 PROCEDURE — 71045 X-RAY EXAM CHEST 1 VIEW: CPT

## 2021-07-05 PROCEDURE — 2580000003 HC RX 258: Performed by: NURSE PRACTITIONER

## 2021-07-05 PROCEDURE — 36415 COLL VENOUS BLD VENIPUNCTURE: CPT

## 2021-07-05 PROCEDURE — 93005 ELECTROCARDIOGRAM TRACING: CPT | Performed by: NURSE PRACTITIONER

## 2021-07-05 PROCEDURE — 86900 BLOOD TYPING SEROLOGIC ABO: CPT

## 2021-07-05 PROCEDURE — 81001 URINALYSIS AUTO W/SCOPE: CPT

## 2021-07-05 PROCEDURE — 36430 TRANSFUSION BLD/BLD COMPNT: CPT

## 2021-07-05 PROCEDURE — P9016 RBC LEUKOCYTES REDUCED: HCPCS

## 2021-07-05 PROCEDURE — 83605 ASSAY OF LACTIC ACID: CPT

## 2021-07-05 PROCEDURE — P9035 PLATELET PHERES LEUKOREDUCED: HCPCS

## 2021-07-05 PROCEDURE — 86850 RBC ANTIBODY SCREEN: CPT

## 2021-07-05 PROCEDURE — 85025 COMPLETE CBC W/AUTO DIFF WBC: CPT

## 2021-07-05 PROCEDURE — 99284 EMERGENCY DEPT VISIT MOD MDM: CPT

## 2021-07-05 RX ORDER — AMLODIPINE BESYLATE 5 MG/1
5 TABLET ORAL DAILY
Status: DISCONTINUED | OUTPATIENT
Start: 2021-07-06 | End: 2021-07-14 | Stop reason: HOSPADM

## 2021-07-05 RX ORDER — SODIUM CHLORIDE 9 MG/ML
25 INJECTION, SOLUTION INTRAVENOUS PRN
Status: DISCONTINUED | OUTPATIENT
Start: 2021-07-05 | End: 2021-07-14 | Stop reason: HOSPADM

## 2021-07-05 RX ORDER — PROMETHAZINE HYDROCHLORIDE 25 MG/1
12.5 TABLET ORAL EVERY 6 HOURS PRN
Status: DISCONTINUED | OUTPATIENT
Start: 2021-07-05 | End: 2021-07-14 | Stop reason: HOSPADM

## 2021-07-05 RX ORDER — POTASSIUM CHLORIDE 20 MEQ/1
20 TABLET, EXTENDED RELEASE ORAL DAILY
Status: DISCONTINUED | OUTPATIENT
Start: 2021-07-06 | End: 2021-07-08

## 2021-07-05 RX ORDER — POLYETHYLENE GLYCOL 3350 17 G/17G
17 POWDER, FOR SOLUTION ORAL DAILY PRN
Status: DISCONTINUED | OUTPATIENT
Start: 2021-07-05 | End: 2021-07-14 | Stop reason: HOSPADM

## 2021-07-05 RX ORDER — ACETAMINOPHEN 325 MG/1
650 TABLET ORAL EVERY 6 HOURS PRN
Status: DISCONTINUED | OUTPATIENT
Start: 2021-07-05 | End: 2021-07-14 | Stop reason: HOSPADM

## 2021-07-05 RX ORDER — SODIUM CHLORIDE 0.9 % (FLUSH) 0.9 %
5-40 SYRINGE (ML) INJECTION EVERY 12 HOURS SCHEDULED
Status: DISCONTINUED | OUTPATIENT
Start: 2021-07-06 | End: 2021-07-14 | Stop reason: HOSPADM

## 2021-07-05 RX ORDER — SOTALOL HYDROCHLORIDE 80 MG/1
40 TABLET ORAL 2 TIMES DAILY
Status: DISCONTINUED | OUTPATIENT
Start: 2021-07-06 | End: 2021-07-06

## 2021-07-05 RX ORDER — FAMOTIDINE 20 MG/1
20 TABLET, FILM COATED ORAL 2 TIMES DAILY
Status: DISCONTINUED | OUTPATIENT
Start: 2021-07-06 | End: 2021-07-14 | Stop reason: HOSPADM

## 2021-07-05 RX ORDER — LISINOPRIL AND HYDROCHLOROTHIAZIDE 12.5; 1 MG/1; MG/1
1 TABLET ORAL DAILY
Status: DISCONTINUED | OUTPATIENT
Start: 2021-07-06 | End: 2021-07-06 | Stop reason: CLARIF

## 2021-07-05 RX ORDER — ACETAMINOPHEN 650 MG/1
650 SUPPOSITORY RECTAL EVERY 6 HOURS PRN
Status: DISCONTINUED | OUTPATIENT
Start: 2021-07-05 | End: 2021-07-14 | Stop reason: HOSPADM

## 2021-07-05 RX ORDER — SODIUM CHLORIDE 0.9 % (FLUSH) 0.9 %
5-40 SYRINGE (ML) INJECTION PRN
Status: DISCONTINUED | OUTPATIENT
Start: 2021-07-05 | End: 2021-07-14 | Stop reason: HOSPADM

## 2021-07-05 RX ORDER — SODIUM CHLORIDE 9 MG/ML
INJECTION, SOLUTION INTRAVENOUS PRN
Status: DISCONTINUED | OUTPATIENT
Start: 2021-07-05 | End: 2021-07-09 | Stop reason: SDUPTHER

## 2021-07-05 RX ORDER — 0.9 % SODIUM CHLORIDE 0.9 %
500 INTRAVENOUS SOLUTION INTRAVENOUS ONCE
Status: COMPLETED | OUTPATIENT
Start: 2021-07-05 | End: 2021-07-05

## 2021-07-05 RX ORDER — ALBUTEROL SULFATE 2.5 MG/3ML
2.5 SOLUTION RESPIRATORY (INHALATION) 4 TIMES DAILY
Status: DISCONTINUED | OUTPATIENT
Start: 2021-07-06 | End: 2021-07-14 | Stop reason: HOSPADM

## 2021-07-05 RX ADMIN — SODIUM CHLORIDE 500 ML: 9 INJECTION, SOLUTION INTRAVENOUS at 20:44

## 2021-07-05 ASSESSMENT — PAIN SCALES - GENERAL: PAINLEVEL_OUTOF10: 6

## 2021-07-05 NOTE — ED NOTES
FIRST PROVIDER CONTACT ASSESSMENT NOTE        Department of Emergency Medicine            ED  First Provider Note            7/5/21  5:24 PM EDT    Chief Complaint: Hematuria (PT STATES HE IS ANEMIC AND STATES HE HAS BEEN URINATING BLOOD FOR 1 WEEK. PT REPORTS FEELNG WEAKNESS)      History of Present Illness:    Lauren Davila is a 80 y.o. male who presents to the emergency department for weakness, dizziness and hematuria. He is on treatment for B-cell lymphoma and anticoagulated with Xarelto. Focused Screening Exam:  Constitutional:  Alert, appears stated age and is in no distress. Strong radial pulse of regular rate and rhythm. Skin warm and dry. *ALLERGIES*     Patient has no known allergies.      ED Triage Vitals   BP Temp Temp src Pulse Resp SpO2 Height Weight   07/05/21 1718 07/05/21 1654 -- 07/05/21 1654 07/05/21 1654 07/05/21 1654 -- --   (!) 143/66 98 °F (36.7 °C)  68 18 98 %          Initial Plan of Care:  Initiate Treatment-Testing, Proceed toTreatment Area When Bed Available for ED Attending/MLP to Continue Care    -----------------[address] ASSESSMENT NOTE--------------  Electronically signed by TRACIE Shrestha CNP   DD: 7/5/21       TRACIE Shrestha CNP  07/05/21 1729

## 2021-07-05 NOTE — ED PROVIDER NOTES
ED Attending  CC: No    HPI:  7/5/21, Time: 5:51 PM EDT         Lidia Araiza is a 80 y.o. male presenting to the ED for dizzy with weakness and fatigue, beginning 1 week ago. The complaint has been persistent, moderate in severity, and worsened by nothing. Patient presents with complaints of lightheaded dizziness and concerned that he is anemic. Currently being followed by heme oncology with a diagnosis of lymphoma. He is undergoing chemotherapy at this time. He does have a right subclavian MediPort in place as well as a left pacemaker. He is on Xarelto. States he noticed some gross hematuria over the last few days. Denies any change in the color of stool. 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 States the stent was never removed and is curious whether that may be contributing to the hematuria. ROS:   Pertinent positives and negatives are stated within HPI, all other systems reviewed and are negative.  --------------------------------------------- PAST HISTORY ---------------------------------------------  Past Medical History:  has a past medical history of Arthritis, BPH (benign prostatic hyperplasia), Coronary artery disease, Difficult airway, Difficult intubation, Hyperlipidemia, Hypertension, and Sinus tachycardia. Past Surgical History:  has a past surgical history that includes Pacemaker insertion (2002); Tonsillectomy; Upper gastrointestinal endoscopy; pacemaker placement (2014 new battery); Vasectomy; other surgical history (08/12/2016); Aorta surgery; ablation of dysrhythmic focus; Aortic valve replacement; Mitral valve replacement; Upper gastrointestinal endoscopy (N/A, 2/16/2021); Bladder surgery (Right, 2/17/2021); Colonoscopy (2/19/2021); Colonoscopy (2/19/2021); hemicolectomy (N/A, 3/11/2021); and Port Surgery (Right, 4/1/2021).     Social History:  reports that he quit smoking about 7 years ago. He has a 44.00 pack-year smoking history. He has never used smokeless tobacco. He reports previous alcohol use of about 2.0 standard drinks of alcohol per week. He reports that he does not use drugs. Family History: family history includes Brain Cancer in his sister; Cancer in his maternal grandfather; Diabetes in his father and mother; Heart Disease in his father; Stroke in his brother, mother, and sister. The patients home medications have been reviewed. Allergies: Patient has no known allergies. ---------------------------------------------------PHYSICAL EXAM--------------------------------------    Constitutional/General: Alert and oriented x3, well appearing, non toxic in NAD  Head: Normocephalic and atraumatic  Eyes: PERRL, EOMI, conjunctiva is pale  Mouth: Oropharynx clear, handling secretions, no trismus  Neck: Supple, full ROM, non tender to palpation in the midline, no stridor, no crepitus, no meningeal signs  Pulmonary: Lungs clear to auscultation bilaterally, no wheezes, rales, or rhonchi. Not in respiratory distress  Cardiovascular:  Regular rate. Regular rhythm. No murmurs, gallops, or rubs. 2+ distal pulses  Chest: no chest wall tenderness  Abdomen: Soft. Non tender. Non distended. +BS. No rebound, guarding, or rigidity. No pulsatile masses appreciated. Musculoskeletal: Moves all extremities x 4. Warm and well perfused, no clubbing, cyanosis, or edema. Capillary refill <3 seconds  Skin: warm and dry. No rashes. Neurologic: GCS 15, CN 2-12 grossly intact, no focal deficits, symmetric strength 5/5 in the upper and lower extremities bilaterally  Psych: Normal Affect    -------------------------------------------------- RESULTS -------------------------------------------------  I have personally reviewed all laboratory and imaging results for this patient. Results are listed below.      LABS:  Results for orders placed or performed during the hospital encounter of 07/05/21   Comprehensive Metabolic Panel   Result Value Ref Range    Sodium 146 132 - 146 mmol/L    Potassium 3.3 (L) 3.5 - 5.0 mmol/L    Chloride 108 (H) 98 - 107 mmol/L    CO2 32 (H) 22 - 29 mmol/L    Anion Gap 6 (L) 7 - 16 mmol/L    Glucose 98 74 - 99 mg/dL    BUN 30 (H) 6 - 23 mg/dL    CREATININE 0.7 0.7 - 1.2 mg/dL    GFR Non-African American >60 >=60 mL/min/1.73    GFR African American >60     Calcium 9.4 8.6 - 10.2 mg/dL    Total Protein 5.4 (L) 6.4 - 8.3 g/dL    Albumin 3.3 (L) 3.5 - 5.2 g/dL    Total Bilirubin 1.0 0.0 - 1.2 mg/dL    Alkaline Phosphatase 67 40 - 129 U/L    ALT 19 0 - 40 U/L    AST 13 0 - 39 U/L   Lactate, Sepsis   Result Value Ref Range    Lactic Acid, Sepsis 2.0 (H) 0.5 - 1.9 mmol/L   Lactate, Sepsis   Result Value Ref Range    Lactic Acid, Sepsis 1.4 0.5 - 1.9 mmol/L   CBC Auto Differential   Result Value Ref Range    WBC 0.5 (LL) 4.5 - 11.5 E9/L    RBC 2.54 (L) 3.80 - 5.80 E12/L    Hemoglobin 7.4 (L) 12.5 - 16.5 g/dL    Hematocrit 24.5 (L) 37.0 - 54.0 %    MCV 96.5 80.0 - 99.9 fL    MCH 29.1 26.0 - 35.0 pg    MCHC 30.2 (L) 32.0 - 34.5 %    RDW 18.4 (H) 11.5 - 15.0 fL    Platelets 15 (LL) 957 - 450 E9/L    MPV 12.9 (H) 7.0 - 12.0 fL    Neutrophils % 54.8 43.0 - 80.0 %    Lymphocytes % 42.6 (H) 20.0 - 42.0 %    Monocytes % 0.9 (L) 2.0 - 12.0 %    Eosinophils % 1.7 0.0 - 6.0 %    Neutrophils Absolute 0.28 (L) 1.80 - 7.30 E9/L    Lymphocytes Absolute 0.22 (L) 1.50 - 4.00 E9/L    Monocytes Absolute 0.00 (L) 0.10 - 0.95 E9/L    Eosinophils Absolute 0.01 (L) 0.05 - 0.50 E9/L    Basophils Absolute 0.00 0.00 - 0.20 E9/L    nRBC 0.9 /100 WBC    Anisocytosis 2+     Polychromasia 2+     Hypochromia 1+     Poikilocytes 1+     Levi Cells 1+     Ovalocytes 1+     Tear Drop Cells 1+    Urinalysis with Microscopic   Result Value Ref Range    Color, UA RED (A) Straw/Yellow    Clarity, UA CLOUDY (A) Clear    Glucose, Ur Negative Negative mg/dL    Bilirubin Urine Negative Negative    Ketones, Urine Negative Negative mg/dL    Specific Gravity, UA 1.020 1.005 - 1.030    Blood, Urine LARGE (A) Negative    pH, UA 6.5 5.0 - 9.0    Protein, UA >=300 (A) Negative mg/dL    Urobilinogen, Urine 1.0 <2.0 E.U./dL    Nitrite, Urine Negative Negative    Leukocyte Esterase, Urine Negative Negative    WBC, UA NONE 0 - 5 /HPF    RBC, UA >20 0 - 2 /HPF    Bacteria, UA NONE SEEN None Seen /HPF   Platelet Confirmation   Result Value Ref Range    Platelet Confirmation CONFIRMED    EKG 12 Lead   Result Value Ref Range    Ventricular Rate 84 BPM    Atrial Rate 84 BPM    P-R Interval 120 ms    QRS Duration 80 ms    Q-T Interval 398 ms    QTc Calculation (Bazett) 470 ms    P Axis 88 degrees    R Axis 70 degrees    T Axis 47 degrees   TYPE AND SCREEN   Result Value Ref Range    ABO/Rh O POS     Antibody Screen NEG    PREPARE PLATELETS, 1 Product   Result Value Ref Range    Product Code Blood Bank [id number]     Description Blood Bank      Unit Number [id number]     Dispense Status Blood Bank issued        RADIOLOGY:  Interpreted by Radiologist.  XR CHEST PORTABLE   Final Result   Interstitial prominence bilaterally related to pulmonary vascular congestion   with pneumonia or subsegmental atelectasis in lung bases. EKG Interpretation  Interpreted by emergency department physician    Rhythm: normal sinus   Rate: normal  Axis: normal  Conduction: normal  ST Segments: no acute change  T Waves: no acute change    Clinical Impression: no acute changes  Comparison to prior EKG: stable as compared to patient's most recent EKG      ------------------------- NURSING NOTES AND VITALS REVIEWED ---------------------------   The nursing notes within the ED encounter and vital signs as below have been reviewed by myself. BP (!) 143/75   Pulse 78   Temp 98.4 °F (36.9 °C)   Resp 18   SpO2 98%   Oxygen Saturation Interpretation: Normal    The patients available past medical records and past encounters were reviewed.

## 2021-07-06 PROBLEM — E43 SEVERE PROTEIN-CALORIE MALNUTRITION (HCC): Chronic | Status: ACTIVE | Noted: 2021-07-06

## 2021-07-06 LAB
ALBUMIN SERPL-MCNC: 3.2 G/DL (ref 3.5–5.2)
ALP BLD-CCNC: 66 U/L (ref 40–129)
ALT SERPL-CCNC: 19 U/L (ref 0–40)
ANION GAP SERPL CALCULATED.3IONS-SCNC: 9 MMOL/L (ref 7–16)
ANISOCYTOSIS: ABNORMAL
APTT: 31.5 SEC (ref 24.5–35.1)
AST SERPL-CCNC: 11 U/L (ref 0–39)
BASOPHILS ABSOLUTE: 0 E9/L (ref 0–0.2)
BASOPHILS RELATIVE PERCENT: 0 % (ref 0–2)
BILIRUB SERPL-MCNC: 1.5 MG/DL (ref 0–1.2)
BLOOD BANK DISPENSE STATUS: NORMAL
BLOOD BANK PRODUCT CODE: NORMAL
BPU ID: NORMAL
BUN BLDV-MCNC: 24 MG/DL (ref 6–23)
BURR CELLS: ABNORMAL
CALCIUM SERPL-MCNC: 9.4 MG/DL (ref 8.6–10.2)
CHLORIDE BLD-SCNC: 104 MMOL/L (ref 98–107)
CHOLESTEROL, TOTAL: 128 MG/DL (ref 0–199)
CO2: 32 MMOL/L (ref 22–29)
CREAT SERPL-MCNC: 0.6 MG/DL (ref 0.7–1.2)
DESCRIPTION BLOOD BANK: NORMAL
EKG ATRIAL RATE: 83 BPM
EKG ATRIAL RATE: 84 BPM
EKG P AXIS: 80 DEGREES
EKG P AXIS: 88 DEGREES
EKG P-R INTERVAL: 120 MS
EKG P-R INTERVAL: 126 MS
EKG Q-T INTERVAL: 398 MS
EKG Q-T INTERVAL: 408 MS
EKG QRS DURATION: 80 MS
EKG QRS DURATION: 90 MS
EKG QTC CALCULATION (BAZETT): 470 MS
EKG QTC CALCULATION (BAZETT): 479 MS
EKG R AXIS: 70 DEGREES
EKG R AXIS: 80 DEGREES
EKG T AXIS: 47 DEGREES
EKG T AXIS: 64 DEGREES
EKG VENTRICULAR RATE: 83 BPM
EKG VENTRICULAR RATE: 84 BPM
EOSINOPHILS ABSOLUTE: 0 E9/L (ref 0.05–0.5)
EOSINOPHILS RELATIVE PERCENT: 0 % (ref 0–6)
GFR AFRICAN AMERICAN: >60
GFR NON-AFRICAN AMERICAN: >60 ML/MIN/1.73
GLUCOSE BLD-MCNC: 108 MG/DL (ref 74–99)
HCT VFR BLD CALC: 23.2 % (ref 37–54)
HDLC SERPL-MCNC: 70 MG/DL
HEMOGLOBIN: 7.1 G/DL (ref 12.5–16.5)
HYPOCHROMIA: ABNORMAL
INR BLD: 1.2
LACTIC ACID: 2 MMOL/L (ref 0.5–2.2)
LDL CHOLESTEROL CALCULATED: 21 MG/DL (ref 0–99)
LYMPHOCYTES ABSOLUTE: 0.07 E9/L (ref 1.5–4)
LYMPHOCYTES RELATIVE PERCENT: 67 % (ref 20–42)
MAGNESIUM: 1.8 MG/DL (ref 1.6–2.6)
MCH RBC QN AUTO: 29.3 PG (ref 26–35)
MCHC RBC AUTO-ENTMCNC: 30.6 % (ref 32–34.5)
MCV RBC AUTO: 95.9 FL (ref 80–99.9)
MONOCYTES ABSOLUTE: 0 E9/L (ref 0.1–0.95)
MONOCYTES RELATIVE PERCENT: 4 % (ref 2–12)
NEUTROPHILS ABSOLUTE: 0.03 E9/L (ref 1.8–7.3)
NEUTROPHILS RELATIVE PERCENT: 29 % (ref 43–80)
OVALOCYTES: ABNORMAL
PDW BLD-RTO: 18.1 FL (ref 11.5–15)
PLATELET # BLD: 15 E9/L (ref 130–450)
PLATELET CONFIRMATION: NORMAL
PMV BLD AUTO: 10.2 FL (ref 7–12)
POIKILOCYTES: ABNORMAL
POLYCHROMASIA: ABNORMAL
POTASSIUM REFLEX MAGNESIUM: 3.4 MMOL/L (ref 3.5–5)
PROTHROMBIN TIME: 12.8 SEC (ref 9.3–12.4)
RBC # BLD: 2.42 E12/L (ref 3.8–5.8)
SCHISTOCYTES: ABNORMAL
SODIUM BLD-SCNC: 145 MMOL/L (ref 132–146)
TEAR DROP CELLS: ABNORMAL
TOTAL PROTEIN: 5.5 G/DL (ref 6.4–8.3)
TRIGL SERPL-MCNC: 187 MG/DL (ref 0–149)
VLDLC SERPL CALC-MCNC: 37 MG/DL
WBC # BLD: 0.1 E9/L (ref 4.5–11.5)

## 2021-07-06 PROCEDURE — 93005 ELECTROCARDIOGRAM TRACING: CPT | Performed by: PHYSICIAN ASSISTANT

## 2021-07-06 PROCEDURE — 2580000003 HC RX 258: Performed by: PHYSICIAN ASSISTANT

## 2021-07-06 PROCEDURE — 6370000000 HC RX 637 (ALT 250 FOR IP): Performed by: FAMILY MEDICINE

## 2021-07-06 PROCEDURE — 94664 DEMO&/EVAL PT USE INHALER: CPT

## 2021-07-06 PROCEDURE — 83605 ASSAY OF LACTIC ACID: CPT

## 2021-07-06 PROCEDURE — 6370000000 HC RX 637 (ALT 250 FOR IP): Performed by: STUDENT IN AN ORGANIZED HEALTH CARE EDUCATION/TRAINING PROGRAM

## 2021-07-06 PROCEDURE — 99223 1ST HOSP IP/OBS HIGH 75: CPT | Performed by: STUDENT IN AN ORGANIZED HEALTH CARE EDUCATION/TRAINING PROGRAM

## 2021-07-06 PROCEDURE — 94640 AIRWAY INHALATION TREATMENT: CPT

## 2021-07-06 PROCEDURE — 83735 ASSAY OF MAGNESIUM: CPT

## 2021-07-06 PROCEDURE — 6360000002 HC RX W HCPCS: Performed by: PHYSICIAN ASSISTANT

## 2021-07-06 PROCEDURE — 80053 COMPREHEN METABOLIC PANEL: CPT

## 2021-07-06 PROCEDURE — 93010 ELECTROCARDIOGRAM REPORT: CPT | Performed by: INTERNAL MEDICINE

## 2021-07-06 PROCEDURE — 36591 DRAW BLOOD OFF VENOUS DEVICE: CPT

## 2021-07-06 PROCEDURE — 2140000000 HC CCU INTERMEDIATE R&B

## 2021-07-06 PROCEDURE — 85610 PROTHROMBIN TIME: CPT

## 2021-07-06 PROCEDURE — 97161 PT EVAL LOW COMPLEX 20 MIN: CPT

## 2021-07-06 PROCEDURE — 6370000000 HC RX 637 (ALT 250 FOR IP): Performed by: PHYSICIAN ASSISTANT

## 2021-07-06 PROCEDURE — 6360000002 HC RX W HCPCS: Performed by: INTERNAL MEDICINE

## 2021-07-06 PROCEDURE — 85730 THROMBOPLASTIN TIME PARTIAL: CPT

## 2021-07-06 PROCEDURE — 80061 LIPID PANEL: CPT

## 2021-07-06 PROCEDURE — 97530 THERAPEUTIC ACTIVITIES: CPT

## 2021-07-06 PROCEDURE — 36415 COLL VENOUS BLD VENIPUNCTURE: CPT

## 2021-07-06 PROCEDURE — 36430 TRANSFUSION BLD/BLD COMPNT: CPT

## 2021-07-06 PROCEDURE — 85025 COMPLETE CBC W/AUTO DIFF WBC: CPT

## 2021-07-06 RX ORDER — SODIUM CHLORIDE 9 MG/ML
INJECTION, SOLUTION INTRAVENOUS PRN
Status: DISCONTINUED | OUTPATIENT
Start: 2021-07-06 | End: 2021-07-09 | Stop reason: SDUPTHER

## 2021-07-06 RX ORDER — POTASSIUM CHLORIDE 7.45 MG/ML
10 INJECTION INTRAVENOUS PRN
Status: DISCONTINUED | OUTPATIENT
Start: 2021-07-06 | End: 2021-07-14 | Stop reason: HOSPADM

## 2021-07-06 RX ORDER — LISINOPRIL 10 MG/1
10 TABLET ORAL DAILY
Status: DISCONTINUED | OUTPATIENT
Start: 2021-07-06 | End: 2021-07-14 | Stop reason: HOSPADM

## 2021-07-06 RX ORDER — METOPROLOL SUCCINATE 25 MG/1
25 TABLET, EXTENDED RELEASE ORAL DAILY
Status: DISCONTINUED | OUTPATIENT
Start: 2021-07-06 | End: 2021-07-07 | Stop reason: SDUPTHER

## 2021-07-06 RX ORDER — POTASSIUM CHLORIDE 20 MEQ/1
40 TABLET, EXTENDED RELEASE ORAL PRN
Status: DISCONTINUED | OUTPATIENT
Start: 2021-07-06 | End: 2021-07-14 | Stop reason: HOSPADM

## 2021-07-06 RX ORDER — HYDROCHLOROTHIAZIDE 12.5 MG/1
12.5 TABLET ORAL DAILY
Status: DISCONTINUED | OUTPATIENT
Start: 2021-07-06 | End: 2021-07-08

## 2021-07-06 RX ADMIN — ALBUTEROL SULFATE 2.5 MG: 2.5 SOLUTION RESPIRATORY (INHALATION) at 20:33

## 2021-07-06 RX ADMIN — Medication 10 ML: at 10:00

## 2021-07-06 RX ADMIN — POTASSIUM CHLORIDE 20 MEQ: 1500 TABLET, EXTENDED RELEASE ORAL at 00:35

## 2021-07-06 RX ADMIN — AMLODIPINE BESYLATE 5 MG: 5 TABLET ORAL at 09:59

## 2021-07-06 RX ADMIN — FAMOTIDINE 20 MG: 20 TABLET ORAL at 22:00

## 2021-07-06 RX ADMIN — SOTALOL HYDROCHLORIDE 40 MG: 80 TABLET ORAL at 00:35

## 2021-07-06 RX ADMIN — FAMOTIDINE 20 MG: 20 TABLET ORAL at 00:35

## 2021-07-06 RX ADMIN — ALBUTEROL SULFATE 2.5 MG: 2.5 SOLUTION RESPIRATORY (INHALATION) at 10:36

## 2021-07-06 RX ADMIN — TBO-FILGRASTIM 480 MCG: 480 INJECTION, SOLUTION SUBCUTANEOUS at 22:00

## 2021-07-06 RX ADMIN — FAMOTIDINE 20 MG: 20 TABLET ORAL at 09:59

## 2021-07-06 RX ADMIN — METOPROLOL SUCCINATE 25 MG: 25 TABLET, EXTENDED RELEASE ORAL at 16:21

## 2021-07-06 RX ADMIN — POTASSIUM BICARBONATE 40 MEQ: 782 TABLET, EFFERVESCENT ORAL at 18:35

## 2021-07-06 RX ADMIN — HYDROCHLOROTHIAZIDE 12.5 MG: 12.5 TABLET ORAL at 09:59

## 2021-07-06 RX ADMIN — ALBUTEROL SULFATE 2.5 MG: 2.5 SOLUTION RESPIRATORY (INHALATION) at 16:50

## 2021-07-06 RX ADMIN — LISINOPRIL 10 MG: 10 TABLET ORAL at 09:59

## 2021-07-06 RX ADMIN — Medication 10 ML: at 22:01

## 2021-07-06 RX ADMIN — ACETAMINOPHEN 650 MG: 325 TABLET ORAL at 00:57

## 2021-07-06 RX ADMIN — POTASSIUM CHLORIDE 20 MEQ: 1500 TABLET, EXTENDED RELEASE ORAL at 09:59

## 2021-07-06 ASSESSMENT — PAIN SCALES - GENERAL: PAINLEVEL_OUTOF10: 4

## 2021-07-06 NOTE — CONSULTS
Diffuse large B cell lymphoma ([facility] [address] 03/31/2021    Diffuse large B-cell lymphoma ([facility] [address] 03/30/2021    Prolonged Q-T interval on ECG 03/29/2021    Hypotension 03/29/2021    CKD (chronic kidney disease) stage 3, GFR 30-59 ml/min [facility] 03/29/2021    Paroxysmal atrial fibrillation ([facility] [place] [address] 03/29/2021    Colonic mass 03/11/2021    Hypertension     Hyperlipidemia     Coronary artery disease     SUNNY (acute kidney injury) ([facility] [place] [address] 02/14/2021    Hematuria 01/16/2018    BPH (benign prostatic hyperplasia) 01/16/2018        Past Surgical History:   Procedure Laterality Date    ABLATION OF DYSRHYTHMIC FOCUS      AORTA SURGERY      aortic valve replacement    AORTIC VALVE REPLACEMENT      BLADDER SURGERY Right 2/17/2021    CYSTOSCOPY BILATERAL RETROGRADE PYELOGRAM RIGHT STENT INSERTION performed by Elizabeth Long MD at [facility]  2/19/2021    COLONOSCOPY WITH BIOPSY performed by Carlo Mesa DO at [address]  2/19/2021    COLONOSCOPY W/ ENDOSCOPIC MUCOSAL RESECTION performed by Carol Mesa DO at [address] N/A 3/11/2021    LAPAROSCOPIC RIGHT HEMICOLECTOMY performed by Mahesh Bagley MD at [address] OTHER SURGICAL HISTORY  08/12/2016    CT Myelogram, Dr. Scott Pennington  2014 new battery    last checked Dr Angel Briones 1/2016?     PORT SURGERY Right 4/1/2021    MEDI PORT INSERTION performed by Mahesh Bagley MD at [address]  [facility] ENDOSCOPY      reflux    UPPER GASTROINTESTINAL ENDOSCOPY N/A 2/16/2021    EGD BIOPSY performed by Vanessa Hough MD at [facility]         Family History  Family History   Problem Relation Age of Onset    Diabetes Mother     Stroke Mother     Diabetes Father     Heart Disease Father     Brain Cancer Sister     Stroke Sister     Stroke Brother     Cancer Maternal Grandfather Social History    TOBACCO:   reports that he quit smoking about 7 years ago. He has a 44.00 pack-year smoking history. He has never used smokeless tobacco.  ETOH:   reports previous alcohol use of about 2.0 standard drinks of alcohol per week. Home Medications  Prior to Admission medications    Medication Sig Start Date End Date Taking? Authorizing Provider   XARELTO 20 MG TABS tablet 20 mg Daily with supper  4/4/21  Yes Historical Provider, MD   sotalol (BETAPACE) 80 MG tablet Take 0.5 tablets by mouth 2 times daily 4/3/21  Yes Jaky Schneider MD   lisinopril-hydroCHLOROthiazide (PRINZIDE;ZESTORETIC) 10-12.5 MG per tablet Take 1 tablet by mouth daily    Yes Historical Provider, MD   amLODIPine (NORVASC) 5 MG tablet Take 5 mg by mouth daily    Yes Historical Provider, MD   vitamin B-12 (CYANOCOBALAMIN) 100 MCG tablet Take 50 mcg by mouth daily   Yes Historical Provider, MD   sennosides-docusate sodium (SENOKOT-S) 8.6-50 MG tablet Take 1 tablet by mouth 2 times daily   Yes Historical Provider, MD   tamsulosin (FLOMAX) 0.4 MG capsule Take 0.4 mg by mouth daily  1/4/18  Yes Historical Provider, MD   ferrous sulfate 325 (65 Fe) MG tablet Take 325 mg by mouth 2 times daily  1/8/18  Yes Historical Provider, MD   esomeprazole Magnesium (NEXIUM) 20 MG PACK Take 20 mg by mouth daily   Yes Historical Provider, MD   PRAVASTATIN SODIUM PO Take 20 mg by mouth daily    Yes Historical Provider, MD   Albuterol Sulfate (PROAIR HFA IN) Inhale into the lungs    Historical Provider, MD   ondansetron (ZOFRAN ODT) 4 MG disintegrating tablet Take 1 tablet by mouth every 8 hours as needed for Nausea or Vomiting 3/13/21   Maribel Barbosa MD       Allergies  No Known Allergies    Review of Systems: Relevant for fatigue, weakness, dizziness, lightheadedness and hematuria     Constitutional:  No fever chills or rigors.  Eyes: No changes in vision, discharge, or pain   ENT: No Headaches, hearing loss or vertigo.  No mouth sores or sore throat. No change in taste or smell.  Cardiovascular: No chest discomfort, dyspnea on exertion, palpitations or loss of consciousness. or phlebitis.  Respiratory: Has no cough or wheezing, Has no sputum production. Has no hemoptysis, Has no pleuritic pain, .  Gastrointestinal: No abdominal pain, appetite loss, blood in stools. No change in bowel habits. No hematemesis    Genitourinary: Patient acknowledges no dysuria, trouble voiding, or hematuria. No nocturia or increased frequency.  Musculoskeletal: No gait disturbance, weakness or joint complaints.  Integumentary: No rash or pruritis.  Neurological: No headache, diplopia, change in muscle strength, numbness or tingling. No change in gait, balance, coordination, mood, affect, memory, mentation, behavior.  Psychiatric: No anxiety, or depression.  Endocrine: No temperature intolerance. No excessive thirst, fluid intake, or urination. No tremor.  Hematologic/Lymphatic: No abnormal bruising or bleeding, blood clots or swollen lymph nodes.  Allergic/Immunologic: No nasal congestion or hives. Objective  BP (!) 109/55   Pulse 109   Temp 99.1 °F (37.3 °C) (Temporal)   Resp 18   Ht 5' 7\" (1.702 m)   Wt 195 lb 6.4 oz (88.6 kg)   SpO2 99%   BMI 30.60 kg/m²     Physical Exam:    General: AAO to person, place, time, in no acute distress,   Head and neck : PERRLA, EOMI . Sclera non icteric. Oropharynx : Clear  Neck: no JVD,  no adenopathy, no carotid bruit  Heart: Irregular  Lungs: Clear to auscultation   Extremities: No edema,no cyanosis, no clubbing. Abdomen: Soft, non-tender;no masses, no organomegaly  Skin:  No rash. Neurologic:Cranial nerves grossly intact. No focal motor or sensory deficits .     Recent Laboratory Data-   Lab Results   Component Value Date    WBC 0.1 (LL) 07/06/2021    HGB 7.1 (L) 07/06/2021    HCT 23.2 (L) 07/06/2021    MCV 95.9 07/06/2021    PLT 15 (LL) 07/06/2021    LYMPHOPCT 67.0 (H) 07/06/2021    RBC 2.42 (L) 07/06/2021    MCH 29.3 07/06/2021    MCHC 30.6 (L) 07/06/2021    RDW 18.1 (H) 07/06/2021    NEUTOPHILPCT 29.0 (L) 07/06/2021    MONOPCT 4.0 07/06/2021    BASOPCT 0.0 07/06/2021    NEUTROABS 0.03 (L) 07/06/2021    LYMPHSABS 0.07 (L) 07/06/2021    MONOSABS 0.00 (L) 07/06/2021    EOSABS 0.00 (L) 07/06/2021    BASOSABS 0.00 07/06/2021       Lab Results   Component Value Date     07/06/2021    K 3.4 (L) 07/06/2021     07/06/2021    CO2 32 (H) 07/06/2021    BUN 24 (H) 07/06/2021    CREATININE 0.6 (L) 07/06/2021    GLUCOSE 108 (H) 07/06/2021    CALCIUM 9.4 07/06/2021    PROT 5.5 (L) 07/06/2021    LABALBU 3.2 (L) 07/06/2021    BILITOT 1.5 (H) 07/06/2021    ALKPHOS 66 07/06/2021    AST 11 07/06/2021    ALT 19 07/06/2021    LABGLOM >60 07/06/2021    GFRAA >60 07/06/2021       Lab Results   Component Value Date    IRON 38 (L) 02/16/2021    TIBC 248 (L) 02/16/2021           Radiology-    XR CHEST PORTABLE    Result Date: 7/5/2021  EXAMINATION: ONE XRAY VIEW OF THE CHEST 7/5/2021 6:31 pm COMPARISON: April 1, 2021 HISTORY: ORDERING SYSTEM PROVIDED HISTORY: dizziness TECHNOLOGIST PROVIDED HISTORY: If in ER room at the time of exam, OK to change to portable 1 view Reason for exam:->dizziness What reading provider will be dictating this exam?->CRC FINDINGS: Left pacemaker. Median sternotomy wires are present. The heart is normal in size. There is prominence of pulmonary vasculature. Interstitial and hazy opacities are present in the lung bases. No obvious pleural effusion. No pneumothorax. Right MediPort present. Interstitial prominence bilaterally related to pulmonary vascular congestion with pneumonia or subsegmental atelectasis in lung bases. PET CT SKULL BASE TO MID THIGH    Result Date: 6/21/2021  EXAMINATION: Skull base to midthigh PET/CT 6/18/2021 TECHNIQUE: Following IV injection of 15.5 mCi of F 18 -FDG, PET  tumor imaging was acquired from the base of the skull to the mid thighs. Computed tomography was used for purposes of attenuation correction and anatomic localization. Fusion imaging was utilized for interpretation. Uptake time 52 mins. Glucose level 100 mg/dl. COMPARISON: CT abdomen pelvis dated 03/29/2021, CT neck dated 06/01/2021 HISTORY: ORDERING SYSTEM PROVIDED HISTORY: Diffuse large B-cell lymphoma of lymph nodes of multiple sites [facility]) TECHNOLOGIST PROVIDED HISTORY: Reason for exam:->Diffuse large B-cell lymphoma of lymph nodes of multiple sites [facility]) What reading provider will be dictating this exam?->CRC FINDINGS: HEAD/NECK: In the craniocervical soft tissues, physiologic activity is favored. Bilateral carotid artery calcification. CHEST: Port is seen in the right chest wall. Pacemaker in the left chest wall. Status post median sternotomy. Within the mediastinum, no kelley activity markedly greater than mediastinal background. No axillary adenopathy. Soft tissue activity about the shoulders bilaterally, typically inflammatory. Bilateral gynecomastia. Emphysema. Small bilateral pleural effusions. Scattered ground-glass opacity, likely atelectasis. No pneumothorax. The pleuroparenchymal nodule at the left lung base on the prior CT is not well seen on the current exam, potentially obscured by the pleural effusion. ABDOMEN/PELVIS: The previously demonstrated extensive abdominal and pelvic adenopathy as well as small bowel nodularity seen on the prior CT is not observed on current. No hypermetabolic adenopathy is seen. Right nephroureteral stent is demonstrated. Excretion is seen from the kidneys bilaterally and activity is seen within the urinary bladder. Bowel activity seen which can be physiologically variable, including at the distal rectum. Calcification of the abdominal aorta and iliac arteries. Diverticulosis. BONES/SOFT TISSUE: Diffuse activity is seen throughout regional bone marrow, relatively symmetrically.      With regard to patient's history of lymphoma, no hypermetabolic adenopathy is seen, compatible with favorable treatment response. Diffuse symmetric activity throughout regional bone marrow, which can be seen in the setting of recent chemotherapy, colony-stimulating factor usage, or anemia. Small bilateral pleural effusions. ASSESSMENT/PLAN : 71-year-old man    High-grade aggressive diffuse large B-cell lymphoma, non-germinal cell type, ABC type status post cycle #5 of chemotherapy with R-CHOP/Revlimid. Double hit/triple hit lymphoma have been ruled out    Pancytopenia secondary to chemotherapy  Hematuria related to thrombocytopenia and the fact that he is anticoagulated with Xarelto for his chronic A. Fib    Appreciate cardiology and EPS input    Hold Xarelto  It may be resumed when gross hematuria resolves and platelet count improves above 50    Initiate G-CSF for persistent severe neutropenia despite Neulasta prophylaxis  Monitor for potential neutropenic fevers  Transfuse with 1 unit of packed RBC because of fatigue and cardiovascular compromise and 1 unit of platelet because of his gross hematuria and thrombocytopenia  We will follow      Alida Burr M.D., F.A.C.P.   Electronically signed 7/6/2021 at 5:20 PM

## 2021-07-06 NOTE — PROGRESS NOTES
Physical Therapy  Physical Therapy Initial Assessment     Name: Reyes Lower  : 1950  MRN: [id number]      Date of Service: 2021    Evaluating PT:  Christi Colby, PT, DPT [id number]     Room #:  5621/4894-U  Diagnosis: Thrombocytopenia ([facility] [place] [address] [D69.6]  Reason for admission: dizziness, weakness   Precautions:  Falls, neutropenic   Procedure/Surgery:  none  Equipment Recommendations:  TBD    SUBJECTIVE:  Pt lives with fiance in a 1 story home with 2 stair(s) to enter and 1 rail(s). Bed is on 1st floor and bath is on 1st floor. Pt ambulated with no AD PTA. OBJECTIVE:   Initial Evaluation  Date:  Treatment   Short Term/ Long Term   Goals   AM-PAC 6 Clicks 49/36     Was pt agreeable to Eval/treatment? Yes      Does pt have pain?  7/10 throat pain     Bed Mobility  Rolling: NT  Supine to sit: SBA  Sit to supine: SBA  Scooting: SBA  Independent    Transfers Sit to stand: ModA partial height  Stand to sit: ModA  Stand pivot: NT  Independent    Ambulation    NT    >150 feet with AAD Mod I vs independent   Stair negotiation: ascended and descended  NT  2 steps 1 rail Mod I   ROM BUE:  See OT eval   BLE:  WFL     Strength BUE:  See OT eval   BLE:  knee ext 5/5  Ankle DF 5/5  Increase by 1/3 MMT grade    Balance Sitting EOB:  Independent  Dynamic Standing:  ModA  Sitting EOB:  Independent  Dynamic Standing:  Independent     -Pt is A & O x 3  -Sensation:  Pt denies numbness and tingling to extremities  -Edema:  unremarkable     Therapeutic Exercises:  Functional activity     Patient education  Pt educated on safety, sequencing of transfers, and role of PT    Patient response to education:   Pt verbalized understanding Pt demonstrated skill Pt requires further education in this area   Yes  No  Yes      ASSESSMENT:  Conditions Requiring Skilled Therapeutic Intervention:  [x]Decreased strength     []Decreased ROM  [x]Decreased functional mobility  [x]Decreased balance   [x]Decreased endurance   []Decreased posture  []Decreased sensation  []Decreased coordination   []Decreased vision  [x]Decreased safety awareness   []Increased pain       Comments:  Pt received sidelying in bed and agreeable to PT session   Pt significantly weak due to his current medical complications. Appears very uncomfortable and tolerating minimal activity. Low energy even with small transfers. Able to sit up to bedside unassisted but did not come to full standing despite heavier lift assist. Pt would benefit from reevaluation at a later time once more medically stable with higher level of energy. Pt with all needs met and call light in reach. Pt would benefit from continued PT POC to address functional deficits described above. Treatment:  Patient practiced and was instructed in the following treatment:     Patient education provided continuously throughout session for sequencing, safety maintenance, and improving any deficits found during the evaluation.  Bed mobility training - pt given verbal and tactile cues to facilitate proper sequencing and safety during rolling and supine>sit as well as provided with physical assistance to complete task     Sitting EOB for >5 minutes for upright tolerance, postural awareness and BLE ROM     Pt's/ family goals   1. Return home    Patient and or family understand(s) diagnosis, prognosis, and plan of care. yes    Prognosis is fair for reaching above PT goals. PHYSICAL THERAPY PLAN OF CARE:    PT POC is established based on physician order and patient diagnosis     Referring provider/PT Order:    07/06/21 0000  PT evaluation and treat      VENITA Lundberg     Diagnosis: Thrombocytopenia ([facility] [address] [D69.6]  Specific instructions for next treatment:  Progress transfers and ambulation. Sitting in chair.      Current Treatment Recommendations:   [x] Strengthening to improve independence with functional mobility   [] ROM to improve independence with functional mobility   [x] Balance Training to improve static/dynamic balance and to reduce fall risk  [x] Endurance Training to improve activity tolerance during functional mobility   [x] Transfer Training to improve safety and independence with all functional transfers   [x] Gait Training to improve gait mechanics, endurance and asses need for appropriate assistive device  [] Stair Training in preparation for safe discharge home and/or into the community   [x] Positioning to prevent skin breakdown and contractures  [x] Safety and Education Training   [] Patient/Caregiver Education   [] HEP  [] Other     PT long term treatment goals are located in above grid    Frequency of treatments: 2-5x/week x 1-2 weeks. Time in  1330  Time out  1340    Total Treatment Time  - minutes     Evaluation Time includes thorough review of current medical information, gathering information on past medical history/social history and prior level of function, completion of standardized testing/informal observation of tasks, assessment of data and education on plan of care and goals.     CPT codes:  [x] Low Complexity PT evaluation 34131  [] Moderate Complexity PT evaluation 05892  [] High Complexity PT evaluation 50542  [] PT Re-evaluation 60021  [] Gait training 88252 - minutes  [] Manual therapy 05247 - minutes  [] Therapeutic activities 05610 - minutes  [] Therapeutic exercises 36214 - minutes  [] Neuromuscular reeducation 48304 - minutes     Simba Thompson PT, DPT  [id number]

## 2021-07-06 NOTE — CARE COORDINATION
Transition of care: Hem/onc and ep cardio consulted. Neutropenic precautions. Hx of  lymphoma. Met with pt in room. Pt lives with his \"family\" in a 1 story home. Independent with ADLs and drives \"sometimes. \"  DME- cane. PCP is Dr. Yomaira Mckeon and pharmacy is [facility] on [address]. Pt provided brief one word answers to questions. Discharge plan is to return home when medically ready and voiced no needs for home at present.  Sw/cm will follow

## 2021-07-06 NOTE — PROGRESS NOTES
Alme Garcia NP notified of low WBC, Platelets and hemoglobin today. Defer to hem/onc at this time. This RN to notify them of low counts on rounds.

## 2021-07-06 NOTE — PLAN OF CARE
Problem: Falls - Risk of:  Goal: Will remain free from falls  Description: Will remain free from falls  Outcome: Met This Shift  Goal: Absence of physical injury  Description: Absence of physical injury  Outcome: Met This Shift     Problem: Malnutrition  (NI-5.2)  Goal: Food and/or Nutrient Delivery  Description: Individualized approach for food/nutrient provision. 7/6/2021 1437 by Farzana Fuchs RD, LD  Outcome: Met This Shift     Problem: Falls - Risk of:  Goal: Will remain free from falls  Description: Will remain free from falls  Outcome: Met This Shift  Goal: Absence of physical injury  Description: Absence of physical injury  Outcome: Met This Shift     Problem: Malnutrition  (NI-5.2)  Goal: Food and/or Nutrient Delivery  Description: Individualized approach for food/nutrient provision.   7/6/2021 1437 by Farzana Fuchs RD, LD  Outcome: Met This Shift

## 2021-07-06 NOTE — PROGRESS NOTES
Apolonia Flanagan NP notified of runs of NSVT. Patient reports fluttering in chest but is otherwise asymptomatic. Order obtained for Cardiology consult.

## 2021-07-06 NOTE — ED NOTES
Spoke with blood bank. Was told platelet supply is low and \"we were waiting to prepare them until they were absolutely necessary. \"  This RN notified blood bank that patient's platelets are 15 and they are needed now and was told blood bank would prepare them and call this RN back when they are ready. Also noted monitor to show pt having runs of v-tach. Showed the strips to JOELLEN Lemus and she states it is not v-tach and pt has pacemaker/defib in place so we do not need to do anything further at this time.       Brooke Chung, HECTOR  07/05/21 6372

## 2021-07-06 NOTE — H&P
CHOLECYSTECTOMY      COLONOSCOPY  2/19/2021    COLONOSCOPY WITH BIOPSY performed by Gertrudis Parra DO at [address]  2/19/2021    COLONOSCOPY W/ ENDOSCOPIC MUCOSAL RESECTION performed by Gertrudis Parra DO at [address] N/A 3/11/2021    LAPAROSCOPIC RIGHT HEMICOLECTOMY performed by Bang Miranda MD at [address] OTHER SURGICAL HISTORY  08/12/2016    CT Myelogram, Dr. Corina De La Rosa  2014 new battery    last checked Dr Irene Coon 1/2016?     PORT SURGERY Right 4/1/2021    MEDI PORT INSERTION performed by Bang Miranda MD at [address]  [facility] ENDOSCOPY      reflux    UPPER GASTROINTESTINAL ENDOSCOPY N/A 2/16/2021    EGD BIOPSY performed by Ric Moore MD at [facility]         Medications Prior to Admission:    Medications Prior to Admission: XARELTO 20 MG TABS tablet, 20 mg Daily with supper   sotalol (BETAPACE) 80 MG tablet, Take 0.5 tablets by mouth 2 times daily  lisinopril-hydroCHLOROthiazide (PRINZIDE;ZESTORETIC) 10-12.5 MG per tablet, Take 1 tablet by mouth daily   amLODIPine (NORVASC) 5 MG tablet, Take 5 mg by mouth daily   vitamin B-12 (CYANOCOBALAMIN) 100 MCG tablet, Take 50 mcg by mouth daily  sennosides-docusate sodium (SENOKOT-S) 8.6-50 MG tablet, Take 1 tablet by mouth 2 times daily  tamsulosin (FLOMAX) 0.4 MG capsule, Take 0.4 mg by mouth daily   ferrous sulfate 325 (65 Fe) MG tablet, Take 325 mg by mouth 2 times daily   esomeprazole Magnesium (NEXIUM) 20 MG PACK, Take 20 mg by mouth daily  PRAVASTATIN SODIUM PO, Take 20 mg by mouth daily   Albuterol Sulfate (PROAIR HFA IN), Inhale into the lungs  ondansetron (ZOFRAN ODT) 4 MG disintegrating tablet, Take 1 tablet by mouth every 8 hours as needed for Nausea or Vomiting    Note that the patient's home medications were reviewed and the above list is accurate to the best of my knowledge at the time of the exam.    Allergies:    Patient has no known allergies. Social History:    reports that he quit smoking about 7 years ago. He has a 44.00 pack-year smoking history. He has never used smokeless tobacco. He reports previous alcohol use of about 2.0 standard drinks of alcohol per week. He reports that he does not use drugs. Family History:   family history includes Brain Cancer in his sister; Cancer in his maternal grandfather; Diabetes in his father and mother; Heart Disease in his father; Stroke in his brother, mother, and sister. PHYSICAL EXAM:    Vitals:  BP (!) 109/55   Pulse 109   Temp 99.1 °F (37.3 °C) (Temporal)   Resp 18   Ht 5' 7\" (1.702 m)   Wt 195 lb 6.4 oz (88.6 kg)   SpO2 99%   BMI 30.60 kg/m²       General appearance: NAD, conversant, ill appearing  Eyes: Sclerae anicteric, PERRLA  HEENT: AT/NC, DMM  Neck: FROM, supple, no thyromegaly  Lymph: No cervical / supraclavicular lymphadenopathy  Lungs: Clear to auscultation, WOB normal  CV: RRR, no MRGs, no lower extremity edema, pacemaker right chest mediport  Abdomen: Soft, non-tender; no masses or HSM, +BS  Extremities: FROM without synovitis. No clubbing or cyanosis of the hands. Skin: no rash, induration, lesions, or ulcers  Psych: Calm and cooperative. Normal judgement and insight. Normal mood and affect. Neuro: Alert and interactive, face symmetric, speech fluent. 3/5 strength in all extremities. LABS:  All labs reviewed.   Of note:  CBC with Differential:    Lab Results   Component Value Date    WBC 0.1 07/06/2021    RBC 2.42 07/06/2021    HGB 7.1 07/06/2021    HCT 23.2 07/06/2021    PLT 15 07/06/2021    MCV 95.9 07/06/2021    MCH 29.3 07/06/2021    MCHC 30.6 07/06/2021    RDW 18.1 07/06/2021    NRBC 0.9 07/05/2021    LYMPHOPCT 67.0 07/06/2021    MONOPCT 4.0 07/06/2021    BASOPCT 0.0 07/06/2021    MONOSABS 0.00 07/06/2021    LYMPHSABS 0.07 07/06/2021    EOSABS 0.00 07/06/2021    BASOSABS 0.00 07/06/2021     CMP:    Lab Results   Component Value Date     07/06/2021    K 3.4 07/06/2021     07/06/2021    CO2 32 07/06/2021    BUN 24 07/06/2021    CREATININE 0.6 07/06/2021    GFRAA >60 07/06/2021    LABGLOM >60 07/06/2021    GLUCOSE 108 07/06/2021    GLUCOSE 100 01/28/2011    PROT 5.5 07/06/2021    LABALBU 3.2 07/06/2021    LABALBU 3.9 01/28/2011    CALCIUM 9.4 07/06/2021    BILITOT 1.5 07/06/2021    ALKPHOS 66 07/06/2021    AST 11 07/06/2021    ALT 19 07/06/2021       Imaging:  CXR: Interstitial prominence bilaterally related to pulmonary vascular congestion with pneumonia or subsegmental atelectasis in lung bases. EKG:  Sinus rhythm with PACs    Telemetry:  I've personally reviewed the patient's telemetry:      ASSESSMENT/PLAN:  Principal Problem:    Diffuse large B-cell lymphoma ([facility] [address]  Active Problems:    Hematuria    Severe protein-calorie malnutrition (HCC)    Thrombocytopenia (HCC)  Resolved Problems:    * No resolved hospital problems. *    23-year-old male active chemo for diffuse large B-cell lymphoma admitted telemetry with    Hematuria  -Monitor labs  -Monitor I&O's  -Bladder scan if no or low urinary output in 8 hour shift  -Hold all anticoagulants    Large B-cell lymphoma  -Neutropenic precautions  -Consult hem/onc - transfuse at their discretion    Arrythmia/NSVT  -Consult EP - patient with pacemaker having SVT  -Medications adjusted metoprolol XL 25 mg  -Telemetry monitoring for duration of stay    Medication for other comorbidities continue as appropriate dose adjustment as necessary. DVT prophylaxis  PT OT  Discharge planning  Case discussed with attending and agreed upon plan of care.        Code status: Full  Requires inpatient level of care  TRACIE Boone CNP    5:16 PM     7/6/2021     Admitted for evaluation of pancytopenia and hematuria in pt with B cell lymphoma on chemo in spite of neulasta   hemonc to follow for transfusions   Await [facility] tx   Hold oac / antiplatelets   Supplement k   Watch for  infection neutropenic fevers - so far no signs of infection or fevers   Daily labs     I personally saw, examined and provided care for the patient. Radiographs, labs and medication list were reviewed by me independently. The case was discussed in detail and plans for care were established. Review of [address] [place] [address], documentation was conducted and revisions were made as appropriate directly by me. I agree with the above documented exam, problem list, and plan of care.      Ivory Hernandez MD  8:44 PM  7/6/2021

## 2021-07-06 NOTE — CONSULTS
[facility],[address] and [address] [facility] Electrophysiology  Consultation Report  PATIENT: Bao Pretty  MEDICAL RECORD NUMBER: [id number]  DATE OF SERVICE:  7/6/2021  ATTENDING ELECTROPHYSIOLOGIST: Delroy Stewart DO   PRIMARY ELECTROPHYSIOLOGIST: ([facility]) Niall River MD   REFERRING PHYSICIAN: Adonna Bamberger PA and Lexis Lama DO  CHIEF COMPLAINT: Dizzy with weakness and fatigue     HPI: Bao Pretty is a 80 y.o. male with a history of SND s/p St Pramod dc PPM (implant: 2002; generator change: 2014; extraction of RA & RV leads due to noise and inappropriate sensing and implant of new device: 12/17/20), nonvalvular atrial fibrillation sp surgical MAZE and LAAO (8/12/16); CAD s/p CABG (8/12/16: LIMA-LAD), MR sp MVR (8/12/16), AI sp AVR (8/12/16), bladder tumor sp ureteral stent, diffuse large B cell lymphoma, and esophageal stricture. He is managed by Michelle Chavez and [facility] ([facility]) with Xarelto 20 daily, Lisinopril, hydrochlorothiazide, Norvasc, Flomax, Nexium, and Zofran. He was previously treated with sotalol, but discontinued in 3/2021 due to QTc prolongation. He is currently undergoing chemotherapy for lymphoma via right subclavian port. He presented on 7/5/21 with chief complaint of weakness and fatigue, as well as hematuria. He had ureteral stent a few months ago. He denies any other complaints at this time. He was noted to be pancytopenic. He was noted to have episodes of ventricular pacing v NSVT, so EP service was consulted by admitting physician. Patient denies any other complaints at this time. On review of [facility] records, he had remote interrogation of pacemaker on 6/25/21, which reported alerts for VHRE (4 episodes, longest 5 hours 6 m was 1:1 irregular tachyarrhythmia), high AT/AF burden (3.7%, longest 4 seconds, EGM consistent with AT), and PMT. PMT thought to be due to VIP with PVARP 175 - 275 msec (VA conduction = 200 msec).  Patient also noted to have elvated RA output due to irregularity of atrial intrinsic and inability to perform capture threshold testing. Dr Eloina Alex planned to test retrograde conduction and set fixed PVARP and possibly turn off VIP and turning off atrial cap confirm. Prior cardiac testing:  · TTE (4/1/21):  LVEF = 65-70%, normal #29 Bicor MVR, normal #21 Trifecta bioprosthetic AVR, and RVSP ~ 39 mmHg. · Pharmacologic Nuclear Stress (12/14/20 at [facility]): LVEF = \"normal\", mild LAD territory ischemia (< 10%), no infarct, and low risk study. Past Medical History:   Diagnosis Date    Arthritis     KNEES HIPS BACK    BPH (benign prostatic hyperplasia)     Coronary artery disease     Difficult airway     PT STATES HE WAS TOLD THIS TWICE AT [facility]    Difficult intubation 04/2017    note in care everywhere \"Clinical Summary\" 03/10/2021    History of blood transfusion     Hyperlipidemia     Hypertension     Sinus tachycardia 01/01/2002      Past Surgical History:   Procedure Laterality Date    ABLATION OF DYSRHYTHMIC FOCUS      AORTA SURGERY      aortic valve replacement    AORTIC VALVE REPLACEMENT      BLADDER SURGERY Right 2/17/2021    CYSTOSCOPY BILATERAL RETROGRADE PYELOGRAM RIGHT STENT INSERTION performed by Hilario Nuñez MD at [address] COLONOSCOPY  2/19/2021    COLONOSCOPY WITH BIOPSY performed by Shade Maldonado DO at [address]  2/19/2021    COLONOSCOPY W/ ENDOSCOPIC MUCOSAL RESECTION performed by Shade Maldonado DO at [address] N/A 3/11/2021    LAPAROSCOPIC RIGHT HEMICOLECTOMY performed by Ron Conn MD at [address] OTHER SURGICAL HISTORY  08/12/2016    CT Myelogram, Dr. Darling Ghosh  2014 new battery    last checked Dr Leopoldo Camara 1/2016?     PORT SURGERY Right 4/1/2021    MEDI PORT INSERTION performed by Ron Conn MD at [address] reflux    UPPER GASTROINTESTINAL ENDOSCOPY N/A 2021    EGD BIOPSY performed by Portillo Del Toro MD at [facility]        Family History   Problem Relation Age of Onset    Diabetes Mother     Stroke Mother     Diabetes Father     Heart Disease Father     Brain Cancer Sister     Stroke Sister     Stroke Brother     Cancer Maternal Grandfather      Social History     Tobacco Use    Smoking status: Former Smoker     Packs/day: 1.00     Years: 44.00     Pack years: 44.00     Quit date: 3/4/2014     Years since quittin.3    Smokeless tobacco: Never Used   Substance Use Topics    Alcohol use: Not Currently     Alcohol/week: 2.0 standard drinks     Types: 2 Shots of liquor per week     Comment: monthly; no caffeine     Current Facility-Administered Medications   Medication Dose Route Frequency Provider Last Rate Last Admin    lisinopril (PRINIVIL;ZESTRIL) tablet 10 mg  10 mg Oral Daily Tilden Alpers, PA        And    hydroCHLOROthiazide (HYDRODIURIL) tablet 12.5 mg  12.5 mg Oral Daily Tilden Alpers, PA        0.9 % sodium chloride infusion   Intravenous PRN TRACIE Matthew - CNP        albuterol (PROVENTIL) nebulizer solution 2.5 mg  2.5 mg Nebulization 4x daily Tilden Alpers, PA        amLODIPine (NORVASC) tablet 5 mg  5 mg Oral Daily Tilden Alpers, PA        sotalol (BETAPACE) tablet 40 mg  40 mg Oral BID Tilden Alpers, PA   40 mg at 21 0035    [Held by provider] rivaroxaban (XARELTO) tablet 20 mg  20 mg Oral Dinner Tilden Alpers, [place]        sodium chloride flush 0.9 % injection 5-40 mL  5-40 mL Intravenous 2 times per day Tilden Alpers, PA        sodium chloride flush 0.9 % injection 5-40 mL  5-40 mL Intravenous PRN Tilden Alpers, PA        0.9 % sodium chloride infusion  25 mL Intravenous PRN Tilden Alpers, PA        polyethylene glycol (GLYCOLAX) packet 17 g  17 g Oral Daily PRN Tilden Alpers, PA        acetaminophen (TYLENOL) tablet 650 mg  650 mg Oral Q6H PRN Tarri Grange, PA   650 mg at 07/06/21 0057    Or    acetaminophen (TYLENOL) suppository 650 mg  650 mg Rectal Q6H PRN Tarri Grange, PA        famotidine (PEPCID) tablet 20 mg  20 mg Oral BID Tarri Grange, PA   20 mg at 07/06/21 0035    potassium chloride (KLOR-CON M) extended release tablet 20 mEq  20 mEq Oral Daily Tarri Grange, PA   20 mEq at 07/06/21 0035    promethazine (PHENERGAN) tablet 12.5 mg  12.5 mg Oral Q6H PRN Tarri Grange, PA          No Known Allergies    ROS:  Constitutional: Negative for fever, activity change and appetite change. HENT: Negative for epistaxis. Eyes: Negative for diploplia, blurred vision. Respiratory: Negative for cough, chest tightness, shortness of breath and wheezing. Cardiovascular: pertinent positives in HPI  Gastrointestinal: Negative for abdominal pain and blood in stool. All other review of systems are negative     PHYSICAL EXAM:   Vitals:    07/05/21 2249 07/05/21 2332 07/05/21 2355 07/06/21 0702   BP: (!) 159/76 135/74 (!) 166/82 (!) 140/77   Pulse: 78 79 85 66   Resp: 16 16 19 12   Temp: 98.6 °F (37 °C) 97.5 °F (36.4 °C) 97.7 °F (36.5 °C) 96.8 °F (36 °C)   TempSrc: Oral Temporal Temporal Temporal   SpO2: 96% 98% 99% 99%   Weight:   195 lb 6.4 oz (88.6 kg)    Limited exam due to COVID-19 pandemic. Constitutional: NAD  Head: Normocephalic and atraumatic.    Neck: supple and no JVD present  Cardiovascular: RRR  Pulmonary/Chest:  No respiratory distress, no audible wheeze  Abdominal: nondistended   Musculoskeletal: Normal range of motion of all extremities  Neurological: Alert & O x 3, grossly intact   Skin: Skin is warm and dry, CIED incision C/D/I without erythema, edema, or drainage  Extremity: no edema   Psychiatric: Normal mood and affect, A&O x3     Data:    Recent Labs     07/05/21  1730 07/06/21  0849   WBC 0.5* 0.1*   HGB 7.4* 7.1*   HCT 24.5* 23.2*   PLT 15* 15*     Recent Labs     07/05/21  1730 07/06/21  0849    145   K 3. 3* 3.4*   * 104   CO2 32* 32*   BUN 30* 24*   CREATININE 0.7 0.6*   CALCIUM 9.4 9.4      Lab Results   Component Value Date    MG 1.9 03/29/2021     No results for input(s): TSH in the last 72 hours. Recent Labs     07/06/21  0849   INR 1.2     Telemetry: SR with episodes of PMT at ~120 bpm, PVCs, couplets  Device Interrogation/Reprogramming (7/6/21)  · Make/Model: St Pramod Assurity MRI 2272  · DOI: 12/17/20  · Battery: >95%  · Geovany Ripper therapy: DDD  ppm  · Pacing %: RA = 12%, RV = 6.5%  · Lead function:  · RA lead: sensing = 4.1 mV, impedance = 330 ohms, threshold = 0.5 V @ 0.5 msec  · RV lead: sensing = 10.3 mV, impedance = 430 ohms, threshold = 1.0 V @ 0.5 msec  · Lead programming:  · RA lead: sensitivity = 0.5 mV, output = 1.375 V @ 0.5 msec (AUTO)  · RV lead: sensitivity = 2.0 mV, output = 1.125 V @ 0.5 msec (AUTO)  · Arrhythmias: AT/AF burden = 1.9%, MS = 3.1%, 12 VHREs (EGM consistent with AT), PMT  · Reprogramming included: PVARP extended to 325 msec  · Overall device function is normal  · All device programmable settings were evaluated per above and in the scanned document, along with iterative adjustments (capture thresholds) to assess and select the most appropriate final programming to provide for consistent delivery of the appropriate therapy and to verify function of the device. Assessment/Plan:   1. SND s/p St Pramod dc PPM (implant: 2002; generator change: 2014; extraction of RA & RV leads due to noise and inappropriate sensing and implant of new device: 12/17/20)  -Multiple episodes of PMT. Retrograde testing interrupted by paroxysms of AT, so PVARP extended to 325 msec (fixed). -Reviewed [facility] EP notes, who wanted to turn off VIP and atrial cap confirm; so will have [employer] Pramod rep turn that off tomorrow; as well as extend pace/sense AV delays to 250/270 msec.  -Follow-up with Dr Geovanni Miller. -EP service will sign off. Please contact with questions/concerns.     2. AT  -Start metoprolol XL 25 mg daily. 3. Nonvalvular atrial fibrillation sp surgical MAZE and LAAO (8/12/16)  -CHADSVASC = 2 (age, CAD). Xarelto held 2/2 hematuria/pancytopenia. Resume when able. -Previously on sotalol, but discontinued due to QTc prolongation in 3/2021. AT/AF burden on device check was 1.9% and appears to be due to AT (see #2). 4.  Hematuria/Pancytopenia  -Management per Primary Service. I have spent a total of 55 minutes with the patient and the family reviewing the above stated recommendations. And a total of >50% of that time involved face-to-face time providing counseling and or coordination of care with the other providers. Thank you for allowing me to participate in your patient's care. Please call me if there are any questions or concerns.       TRACIE Sun - CNP  Cardiac Electrophysiology  [facility]) Physicians  [facility]  9:49 AM  7/6/2021    Attending Supervising Physicians Attestation Statement  I was present with the nurse practitioner during the history and exam. I discussed the findings and plans with the nurse practitioner and agree as documented in his note     Electronically signed by Tj Robles DO on 7/6/21 at 2:16 PM EDT

## 2021-07-06 NOTE — PROGRESS NOTES
Comprehensive Nutrition Assessment    Type and Reason for Visit:  Initial, Positive Nutrition Screen    Nutrition Recommendations/Plan: Recommend and start Ensure High Protein BID and Boost Pudding daily to help meet increased nutritional needs. Nutrition Assessment:  Pt adm w/ decreased po intake PTA 2/2 fatigue ; hx of lymphoma w/ current chemotherapy ; adm w/ hematuria ; s/p hemicolectomy 3/11/21 ; hx of CKD/CAD/esophageal stricture ; pt also meets criteria for severe malnutrition AEB poor po intake >1 month, weight loss, and muscle/fat wasting ; will start ONS    Malnutrition Assessment:  Malnutrition Status:  Severe malnutrition    Context:  Chronic Illness     Findings of the 6 clinical characteristics of malnutrition:  Energy Intake:  7 - 75% or less estimated energy requirements for 1 month or longer  Weight Loss:  7 - 10% over 6 months (11% in 4 months)     Body Fat Loss:   (moderate) Triceps, Orbital   Muscle Mass Loss:   (moderate) Temples (temporalis), Clavicles (pectoralis & deltoids)  Fluid Accumulation:  No significant fluid accumulation     Strength:  Not Performed    Estimated Daily Nutrient Needs:  Energy (kcal):  5233-3026 (REE 1605 x 1.2 SF); Weight Used for Energy Requirements:  Current     Protein (g):   (1.3-1.5g/kg IBW); Weight Used for Protein Requirements:  Ideal        Fluid (ml/day):  4322-3214; Method Used for Fluid Requirements:  1 ml/kcal      Nutrition Related Findings:  I&Os WNL, 2+ edema, A&O x 4, active BS, muscle/fat wasting, fatigue      Wounds:  None       Current Nutrition Therapies:    ADULT DIET;  Regular; Low Fat/Low Chol/High Fiber/2 gm Na    Anthropometric Measures:  · Height: 5' 7\" (170.2 cm)  · Current Body Weight: 195 lb (88.5 kg) (7/5, standing scale)   · Usual Body Weight: 219 lb (99.3 kg) (3/11/21, bedscale ; EMR shows weight loss of 24# in the past 4 months (219# to 195#) (11%))     · Ideal Body Weight: 148 lbs; % Ideal Body Weight 131.8 %   · BMI:

## 2021-07-06 NOTE — PATIENT CARE CONFERENCE
[facility] Quality Flow/Interdisciplinary Rounds Progress Note        Quality Flow Rounds held on July 6, 2021    Disciplines Attending:  Bedside Nurse, ,  and Nursing Unit Leadership    Magdaleno Brasher was admitted on 7/5/2021  5:44 PM    Anticipated Discharge Date:  Expected Discharge Date: 07/09/21    Disposition:    Osman Score:  Osman Scale Score: 17    Readmission Risk              Risk of Unplanned Readmission:  24           Discussed patient goal for the day, patient clinical progression, and barriers to discharge.   The following Goal(s) of the Day/Commitment(s) have been identified:  Diagnostics - Report Results and Labs - Report Results      Chalino Haley RN  July 6, 2021

## 2021-07-07 ENCOUNTER — APPOINTMENT (OUTPATIENT)
Dept: GENERAL RADIOLOGY | Age: 71
DRG: 314 | End: 2021-07-07
Payer: MEDICARE

## 2021-07-07 ENCOUNTER — APPOINTMENT (OUTPATIENT)
Dept: CT IMAGING | Age: 71
DRG: 314 | End: 2021-07-07
Payer: MEDICARE

## 2021-07-07 LAB
ANION GAP SERPL CALCULATED.3IONS-SCNC: 7 MMOL/L (ref 7–16)
ANISOCYTOSIS: ABNORMAL
BASOPHILS ABSOLUTE: 0 E9/L (ref 0–0.2)
BASOPHILS RELATIVE PERCENT: 0 % (ref 0–2)
BUN BLDV-MCNC: 16 MG/DL (ref 6–23)
C-REACTIVE PROTEIN: 33.9 MG/DL (ref 0–0.4)
CALCIUM SERPL-MCNC: 9.1 MG/DL (ref 8.6–10.2)
CHLORIDE BLD-SCNC: 100 MMOL/L (ref 98–107)
CO2: 34 MMOL/L (ref 22–29)
CREAT SERPL-MCNC: 0.7 MG/DL (ref 0.7–1.2)
EOSINOPHILS ABSOLUTE: 0 E9/L (ref 0.05–0.5)
EOSINOPHILS RELATIVE PERCENT: 2 % (ref 0–6)
GFR AFRICAN AMERICAN: >60
GFR NON-AFRICAN AMERICAN: >60 ML/MIN/1.73
GLUCOSE BLD-MCNC: 88 MG/DL (ref 74–99)
HCT VFR BLD CALC: 22.5 % (ref 37–54)
HCT VFR BLD CALC: 23.2 % (ref 37–54)
HEMOGLOBIN: 7.3 G/DL (ref 12.5–16.5)
HEMOGLOBIN: 7.4 G/DL (ref 12.5–16.5)
HYPOCHROMIA: ABNORMAL
LYMPHOCYTES ABSOLUTE: 0.08 E9/L (ref 1.5–4)
LYMPHOCYTES RELATIVE PERCENT: 76 % (ref 20–42)
MCH RBC QN AUTO: 29.7 PG (ref 26–35)
MCHC RBC AUTO-ENTMCNC: 31.9 % (ref 32–34.5)
MCV RBC AUTO: 93.2 FL (ref 80–99.9)
METAMYELOCYTES RELATIVE PERCENT: 1 % (ref 0–1)
MONOCYTES ABSOLUTE: 0.02 E9/L (ref 0.1–0.95)
MONOCYTES RELATIVE PERCENT: 20 % (ref 2–12)
NEUTROPHILS ABSOLUTE: 0 E9/L (ref 1.8–7.3)
NEUTROPHILS RELATIVE PERCENT: 1 % (ref 43–80)
OVALOCYTES: ABNORMAL
PDW BLD-RTO: 17 FL (ref 11.5–15)
PLATELET # BLD: 22 E9/L (ref 130–450)
PLATELET CONFIRMATION: NORMAL
PMV BLD AUTO: 10.3 FL (ref 7–12)
POIKILOCYTES: ABNORMAL
POLYCHROMASIA: ABNORMAL
POTASSIUM SERPL-SCNC: 3.1 MMOL/L (ref 3.5–5)
RBC # BLD: 2.49 E12/L (ref 3.8–5.8)
SEDIMENTATION RATE, ERYTHROCYTE: 96 MM/HR (ref 0–15)
SODIUM BLD-SCNC: 141 MMOL/L (ref 132–146)
URINE CULTURE, ROUTINE: NORMAL
WBC # BLD: 0.1 E9/L (ref 4.5–11.5)

## 2021-07-07 PROCEDURE — 85018 HEMOGLOBIN: CPT

## 2021-07-07 PROCEDURE — 85651 RBC SED RATE NONAUTOMATED: CPT

## 2021-07-07 PROCEDURE — 2580000003 HC RX 258: Performed by: INTERNAL MEDICINE

## 2021-07-07 PROCEDURE — 87186 SC STD MICRODIL/AGAR DIL: CPT

## 2021-07-07 PROCEDURE — 94760 N-INVAS EAR/PLS OXIMETRY 1: CPT

## 2021-07-07 PROCEDURE — 87077 CULTURE AEROBIC IDENTIFY: CPT

## 2021-07-07 PROCEDURE — 87040 BLOOD CULTURE FOR BACTERIA: CPT

## 2021-07-07 PROCEDURE — 97165 OT EVAL LOW COMPLEX 30 MIN: CPT

## 2021-07-07 PROCEDURE — 6370000000 HC RX 637 (ALT 250 FOR IP): Performed by: FAMILY MEDICINE

## 2021-07-07 PROCEDURE — 2580000003 HC RX 258: Performed by: PHYSICIAN ASSISTANT

## 2021-07-07 PROCEDURE — 6360000002 HC RX W HCPCS: Performed by: INTERNAL MEDICINE

## 2021-07-07 PROCEDURE — 87150 DNA/RNA AMPLIFIED PROBE: CPT

## 2021-07-07 PROCEDURE — 6370000000 HC RX 637 (ALT 250 FOR IP): Performed by: STUDENT IN AN ORGANIZED HEALTH CARE EDUCATION/TRAINING PROGRAM

## 2021-07-07 PROCEDURE — 36415 COLL VENOUS BLD VENIPUNCTURE: CPT

## 2021-07-07 PROCEDURE — 85014 HEMATOCRIT: CPT

## 2021-07-07 PROCEDURE — 71045 X-RAY EXAM CHEST 1 VIEW: CPT

## 2021-07-07 PROCEDURE — 70450 CT HEAD/BRAIN W/O DYE: CPT

## 2021-07-07 PROCEDURE — 80048 BASIC METABOLIC PNL TOTAL CA: CPT

## 2021-07-07 PROCEDURE — 94640 AIRWAY INHALATION TREATMENT: CPT

## 2021-07-07 PROCEDURE — 6370000000 HC RX 637 (ALT 250 FOR IP): Performed by: PHYSICIAN ASSISTANT

## 2021-07-07 PROCEDURE — 2140000000 HC CCU INTERMEDIATE R&B

## 2021-07-07 PROCEDURE — 85025 COMPLETE CBC W/AUTO DIFF WBC: CPT

## 2021-07-07 PROCEDURE — 6360000002 HC RX W HCPCS: Performed by: PHYSICIAN ASSISTANT

## 2021-07-07 PROCEDURE — 86140 C-REACTIVE PROTEIN: CPT

## 2021-07-07 PROCEDURE — 6370000000 HC RX 637 (ALT 250 FOR IP): Performed by: INTERNAL MEDICINE

## 2021-07-07 RX ORDER — METOPROLOL TARTRATE 5 MG/5ML
2.5 INJECTION INTRAVENOUS EVERY 6 HOURS PRN
Status: DISCONTINUED | OUTPATIENT
Start: 2021-07-07 | End: 2021-07-14 | Stop reason: HOSPADM

## 2021-07-07 RX ORDER — SODIUM CHLORIDE 9 MG/ML
INJECTION, SOLUTION INTRAVENOUS CONTINUOUS
Status: DISCONTINUED | OUTPATIENT
Start: 2021-07-07 | End: 2021-07-08

## 2021-07-07 RX ORDER — METOPROLOL SUCCINATE 25 MG/1
25 TABLET, EXTENDED RELEASE ORAL 2 TIMES DAILY
Status: DISCONTINUED | OUTPATIENT
Start: 2021-07-07 | End: 2021-07-08

## 2021-07-07 RX ADMIN — METOPROLOL SUCCINATE 25 MG: 25 TABLET, FILM COATED, EXTENDED RELEASE ORAL at 19:58

## 2021-07-07 RX ADMIN — TBO-FILGRASTIM 480 MCG: 480 INJECTION, SOLUTION SUBCUTANEOUS at 18:36

## 2021-07-07 RX ADMIN — POTASSIUM CHLORIDE 20 MEQ: 1500 TABLET, EXTENDED RELEASE ORAL at 08:35

## 2021-07-07 RX ADMIN — NYSTATIN 500000 UNITS: 100000 SUSPENSION ORAL at 19:56

## 2021-07-07 RX ADMIN — SODIUM CHLORIDE, PRESERVATIVE FREE 10 ML: 5 INJECTION INTRAVENOUS at 00:13

## 2021-07-07 RX ADMIN — ALBUTEROL SULFATE 2.5 MG: 2.5 SOLUTION RESPIRATORY (INHALATION) at 09:59

## 2021-07-07 RX ADMIN — PIPERACILLIN AND TAZOBACTAM 3375 MG: 3; .375 INJECTION, POWDER, LYOPHILIZED, FOR SOLUTION INTRAVENOUS at 22:04

## 2021-07-07 RX ADMIN — ALBUTEROL SULFATE 2.5 MG: 2.5 SOLUTION RESPIRATORY (INHALATION) at 20:23

## 2021-07-07 RX ADMIN — METOPROLOL SUCCINATE 25 MG: 25 TABLET, EXTENDED RELEASE ORAL at 08:37

## 2021-07-07 RX ADMIN — POTASSIUM CHLORIDE 40 MEQ: 1500 TABLET, EXTENDED RELEASE ORAL at 08:39

## 2021-07-07 RX ADMIN — Medication 10 ML: at 08:37

## 2021-07-07 RX ADMIN — SODIUM CHLORIDE: 9 INJECTION, SOLUTION INTRAVENOUS at 20:22

## 2021-07-07 RX ADMIN — ALBUTEROL SULFATE 2.5 MG: 2.5 SOLUTION RESPIRATORY (INHALATION) at 16:38

## 2021-07-07 RX ADMIN — FAMOTIDINE 20 MG: 20 TABLET ORAL at 19:57

## 2021-07-07 RX ADMIN — VANCOMYCIN HYDROCHLORIDE 500 MG: 500 INJECTION, POWDER, LYOPHILIZED, FOR SOLUTION INTRAVENOUS at 20:22

## 2021-07-07 RX ADMIN — FAMOTIDINE 20 MG: 20 TABLET ORAL at 08:35

## 2021-07-07 RX ADMIN — Medication 10 ML: at 19:56

## 2021-07-07 ASSESSMENT — PAIN SCALES - GENERAL
PAINLEVEL_OUTOF10: 0
PAINLEVEL_OUTOF10: 0

## 2021-07-07 NOTE — PROGRESS NOTES
Dr. Mervin Hansen notified of patient's HR sustaining 130-160s over past few hours. New order for metoprolol 25mg to be given BID instead of once daily.

## 2021-07-07 NOTE — PROGRESS NOTES
Just completed transfusion of RBCs and PLTs. No suspected reaction. Vitals done, no complaints by patient.

## 2021-07-07 NOTE — PROGRESS NOTES
Subjective: The patient is awake and alert. Feeling somewhat better today   No acute complaints   Light pink urine in varner     Objective:    /65   Pulse 110   Temp 98.5 °F (36.9 °C) (Oral)   Resp 24   Ht 5' 7\" (1.702 m)   Wt 184 lb 9.6 oz (83.7 kg)   SpO2 100%   BMI 28.91 kg/m²     In: 0   Out: 1175   In: 0   Out: 1175 [Urine:1175]    General appearance: NAD, conversant  HEENT: AT/NC, MMM  Neck: FROM, supple  Lungs: Clear to auscultation  CV: RRR, no MRGs  Vasc: Radial pulses 2+  Abdomen: Soft, non-tender; no masses or HSM  Extremities: No peripheral edema or digital cyanosis  Skin: no rash, lesions or ulcers  Psych: Alert and oriented to person, place and time  Neuro: Alert and interactive     Recent Labs     07/05/21  1730 07/05/21  1730 07/06/21  0849 07/07/21  0120 07/07/21  0550   WBC 0.5*  --  0.1*  --  0.1*   HGB 7.4*   < > 7.1* 7.3* 7.4*   HCT 24.5*   < > 23.2* 22.5* 23.2*   PLT 15*  --  15*  --  22*    < > = values in this interval not displayed. Recent Labs     07/05/21  1730 07/06/21  0849 07/07/21  0550    145 141   K 3.3* 3.4* 3.1*   * 104 100   CO2 32* 32* 34*   BUN 30* 24* 16   CREATININE 0.7 0.6* 0.7   CALCIUM 9.4 9.4 9.1       Assessment:    Principal Problem:    Diffuse large B-cell lymphoma (HCC)  Active Problems:    Hematuria    Severe protein-calorie malnutrition (HCC)    Thrombocytopenia (HCC)  Resolved Problems:    * No resolved hospital problems.  *      Plan:    Admitted for evaluation of pancytopenia and hematuria in pt with B cell lymphoma on chemo in spite of neulasta   hemonc to follow for transfusions - appreciate input   Await Gcsf tx   Hold oac / antiplatelets - o n xarelto at home - consider coming off for now   Supplement k   Watch for  infection neutropenic fevers - so far no signs of infection or fevers   Daily labs       DVT Prophylaxis   PT/OT  Discharge planning           Sofie Valladares MD  10:30 AM  7/7/2021

## 2021-07-07 NOTE — PATIENT CARE CONFERENCE
[facility] Quality Flow/Interdisciplinary Rounds Progress Note        Quality Flow Rounds held on July 7, 2021    Disciplines Attending:  Bedside Nurse, ,  and Nursing Unit Leadership    Usman Ruano was admitted on 7/5/2021  5:44 PM    Anticipated Discharge Date:  Expected Discharge Date: 07/09/21    Disposition:    Osman Score:  Osman Scale Score: 19    Readmission Risk              Risk of Unplanned Readmission:  23           Discussed patient goal for the day, patient clinical progression, and barriers to discharge. The following Goal(s) of the Day/Commitment(s) have been identified:  Neutropenic Precautions, Chemo Monitor lab work.        Alisa Pittman RN  July 7, 2021

## 2021-07-07 NOTE — PROGRESS NOTES
[facility]  Hematology/Oncology  Consult      Patient Name: Shilpi Galvin  YOB: 1950  PCP: More Lindquist DO   Referring Provider:      Reason for Consultation:   Chief Complaint   Patient presents with    Hematuria     PT STATES HE IS ANEMIC AND STATES HE HAS BEEN URINATING BLOOD FOR 1 WEEK. PT REPORTS FEELNG WEAKNESS      Subjective: Patient was confused when I went to examine the patient. He could not tell me where he was and was having trouble finding words. No focal weakness numbness. No headaches. He has been afebrile. History of Present Illness:  76-year-old man with past medical history of aggressive bulky diffuse large B-cell lymphoma, non-germinal cell type with negative FISH interface for double hit or triple hit lymphoma with bulky intra-abdominal disease on presentation. He has been on combination chemotherapy with R-CHOP along with Revlimid. This has been complicated by significant pancytopenia is despite G-CSF prophylaxis. He has completed 1 week ago cycle #5 of R-CHOP/Revlimid  He was hospitalized to the emergency room with weakness fatigue and dizziness  He also reports gross hematuria a few days duration and has been on anticoagulation with Xarelto  Patient seen by cardiology and EPS for his arrhythmias.   Xarelto held because of thrombocytopenia and concurrent hematuria and will be resumed when thrombocytopenia improves and hematuria resolves    His CBC today shows a hemoglobin of 7.1  Platelets remain low at 15 with a WBC count of 0.1 despite G-CSF prophylaxis    Diagnostic Data:     Past Medical History:   Diagnosis Date    Arthritis     KNEES HIPS BACK    BPH (benign prostatic hyperplasia)     Coronary artery disease     Difficult airway     PT STATES HE WAS TOLD THIS TW[facility]    Difficult intubation 04/2017    note in care everywhere \"Clinical Summary\" 03/10/2021    History of blood transfusion     Hyperlipidemia     Hypertension     Sinus tachycardia 01/01/2002       Patient Active Problem List    Diagnosis Date Noted    Severe protein-calorie malnutrition ([facility] 07/06/2021    Thrombocytopenia ([address] 07/05/2021    Anemia 05/06/2021    Diffuse large B cell lymphoma ([address] 03/31/2021    Diffuse large B-cell lymphoma ([facility] 03/30/2021    Prolonged Q-T interval on ECG 03/29/2021    Hypotension 03/29/2021    CKD (chronic kidney disease) stage 3, GFR 30-59 ml/min ([facility]) 03/29/2021    Paroxysmal atrial fibrillation ([address] 03/29/2021    Colonic mass 03/11/2021    Hypertension     Hyperlipidemia     Coronary artery disease     SUNNY (acute kidney injury) ([facility] 02/14/2021    Hematuria 01/16/2018    BPH (benign prostatic hyperplasia) 01/16/2018        Past Surgical History:   Procedure Laterality Date    ABLATION OF DYSRHYTHMIC FOCUS      AORTA SURGERY      aortic valve replacement    AORTIC VALVE REPLACEMENT      BLADDER SURGERY Right 2/17/2021    CYSTOSCOPY BILATERAL RETROGRADE PYELOGRAM RIGHT STENT INSERTION performed by Benitez Beltran MD at [address] [place] [address]  2/19/2021    COLONOSCOPY WITH BIOPSY performed by Amanuel Duke DO at [facility]  2/19/2021    COLONOSCOPY W/ ENDOSCOPIC MUCOSAL RESECTION performed by Amanuel Duke DO at [address] N/A 3/11/2021    LAPAROSCOPIC RIGHT HEMICOLECTOMY performed by Sidra Hickey MD at [address] OTHER SURGICAL HISTORY  08/12/2016    CT Myelogram, Dr. Jerad Masters  2014 new battery    last checked Dr Kathy Nieves 1/2016?     PORT SURGERY Right 4/1/2021    MEDI PORT INSERTION performed by Sidra Hickey MD at [facility] ENDOSCOPY      reflux    UPPER GASTROINTESTINAL ENDOSCOPY N/A 2/16/2021    EGD BIOPSY performed by Kayy Tena MD at [facility]         Family History  Family History   Problem Relation Age of Onset    Diabetes Mother     Stroke Mother     Diabetes Father     Heart Disease Father     Brain Cancer Sister     Stroke Sister     Stroke Brother     Cancer Maternal Grandfather        Social History    TOBACCO:   reports that he quit smoking about 7 years ago. He has a 44.00 pack-year smoking history. He has never used smokeless tobacco.  ETOH:   reports previous alcohol use of about 2.0 standard drinks of alcohol per week. Home Medications  Prior to Admission medications    Medication Sig Start Date End Date Taking?  Authorizing Provider   XARELTO 20 MG TABS tablet 20 mg Daily with supper  4/4/21  Yes Historical Provider, MD   sotalol (BETAPACE) 80 MG tablet Take 0.5 tablets by mouth 2 times daily 4/3/21  Yes Marquita Cosby MD   lisinopril-hydroCHLOROthiazide (PRINZIDE;ZESTORETIC) 10-12.5 MG per tablet Take 1 tablet by mouth daily    Yes Historical Provider, MD   amLODIPine (NORVASC) 5 MG tablet Take 5 mg by mouth daily    Yes Historical Provider, MD   vitamin B-12 (CYANOCOBALAMIN) 100 MCG tablet Take 50 mcg by mouth daily   Yes Historical Provider, MD   sennosides-docusate sodium (SENOKOT-S) 8.6-50 MG tablet Take 1 tablet by mouth 2 times daily   Yes Historical Provider, MD   tamsulosin (FLOMAX) 0.4 MG capsule Take 0.4 mg by mouth daily  1/4/18  Yes Historical Provider, MD   ferrous sulfate 325 (65 Fe) MG tablet Take 325 mg by mouth 2 times daily  1/8/18  Yes Historical Provider, MD   esomeprazole Magnesium (NEXIUM) 20 MG PACK Take 20 mg by mouth daily   Yes Historical Provider, MD   PRAVASTATIN SODIUM PO Take 20 mg by mouth daily    Yes Historical Provider, MD   Albuterol Sulfate (PROAIR HFA IN) Inhale into the lungs    Historical Provider, MD   ondansetron (ZOFRAN ODT) 4 MG disintegrating tablet Take 1 tablet by mouth every 8 hours as needed for Nausea or Vomiting 3/13/21   Jl Pichardo MD       Allergies  No Known Allergies    Review of Systems: Relevant for fatigue, weakness, dizziness, lightheadedness and hematuria. Confused today.  Constitutional:  No fever chills or rigors.  Eyes: No changes in vision, discharge, or pain   ENT: No Headaches, hearing loss or vertigo. No mouth sores or sore throat. No change in taste or smell.  Cardiovascular: No chest discomfort, dyspnea on exertion, palpitations or loss of consciousness. or phlebitis.  Respiratory: Has no cough or wheezing, Has no sputum production. Has no hemoptysis, Has no pleuritic pain, .  Gastrointestinal: No abdominal pain, appetite loss, blood in stools. No change in bowel habits. No hematemesis    Genitourinary: Patient acknowledges no dysuria, trouble voiding, or hematuria. No nocturia or increased frequency.  Musculoskeletal: No gait disturbance, weakness or joint complaints.  Integumentary: No rash or pruritis.  Neurological: No headache, diplopia, change in muscle strength, numbness or tingling. No change in gait, balance, coordination, mood, affect, memory, mentation, behavior.  Psychiatric: No anxiety, or depression.  Endocrine: No temperature intolerance. No excessive thirst, fluid intake, or urination. No tremor.  Hematologic/Lymphatic: No abnormal bruising or bleeding, blood clots or swollen lymph nodes.  Allergic/Immunologic: No nasal congestion or hives. Objective  BP (!) 112/92   Pulse 119   Temp 98.8 °F (37.1 °C) (Temporal)   Resp 20   Ht 5' 7\" (1.702 m)   Wt 184 lb 9.6 oz (83.7 kg)   SpO2 93%   BMI 28.91 kg/m²     Physical Exam:    General: Patient was confused when I went to examine the patient. He could not tell me where he was and was having trouble finding words. No focal weakness numbness. No headaches. He has been afebrile. Head and neck : PERRLA, EOMI . Sclera non icteric. Oropharynx : Clear  Neck: no JVD,  no adenopathy, no carotid bruit  Heart: Irregular  Lungs: Clear to auscultation   Extremities: No edema,no cyanosis, no clubbing. Abdomen: Soft, non-tender;no masses, no organomegaly  Skin:  No rash. Neurologic:Cranial nerves grossly intact. No focal motor or sensory deficits . Recent Laboratory Data-   Lab Results   Component Value Date    WBC 0.1 (LL) 07/07/2021    HGB 7.4 (L) 07/07/2021    HCT 23.2 (L) 07/07/2021    MCV 93.2 07/07/2021    PLT 22 (L) 07/07/2021    LYMPHOPCT 76.0 (H) 07/07/2021    RBC 2.49 (L) 07/07/2021    MCH 29.7 07/07/2021    MCHC 31.9 (L) 07/07/2021    RDW 17.0 (H) 07/07/2021    NEUTOPHILPCT 1.0 (L) 07/07/2021    MONOPCT 20.0 (H) 07/07/2021    BASOPCT 0.0 07/07/2021    NEUTROABS 0.00 (LL) 07/07/2021    LYMPHSABS 0.08 (L) 07/07/2021    MONOSABS 0.02 (L) 07/07/2021    EOSABS 0.00 (L) 07/07/2021    BASOSABS 0.00 07/07/2021       Lab Results   Component Value Date     07/07/2021    K 3.1 (L) 07/07/2021     07/07/2021    CO2 34 (H) 07/07/2021    BUN 16 07/07/2021    CREATININE 0.7 07/07/2021    GLUCOSE 88 07/07/2021    CALCIUM 9.1 07/07/2021    PROT 5.5 (L) 07/06/2021    LABALBU 3.2 (L) 07/06/2021    BILITOT 1.5 (H) 07/06/2021    ALKPHOS 66 07/06/2021    AST 11 07/06/2021    ALT 19 07/06/2021    LABGLOM >60 07/07/2021    GFRAA >60 07/07/2021       Lab Results   Component Value Date    IRON 38 (L) 02/16/2021    TIBC 248 (L) 02/16/2021           Radiology-    XR CHEST PORTABLE    Result Date: 7/5/2021  EXAMINATION: ONE XRAY VIEW OF THE CHEST 7/5/2021 6:31 pm COMPARISON: April 1, 2021 HISTORY: ORDERING SYSTEM PROVIDED HISTORY: dizziness TECHNOLOGIST PROVIDED HISTORY: If in ER room at the time of exam, OK to change to portable 1 view Reason for exam:->dizziness What reading provider will be dictating this exam?->CRC FINDINGS: Left pacemaker. Median sternotomy wires are present. The heart is normal in size. There is prominence of pulmonary vasculature. Interstitial and hazy opacities are present in the lung bases. No obvious pleural effusion. No pneumothorax. Right MediPort present.      Interstitial bilaterally and activity is seen within the urinary bladder. Bowel activity seen which can be physiologically variable, including at the distal rectum. Calcification of the abdominal aorta and iliac arteries. Diverticulosis. BONES/SOFT TISSUE: Diffuse activity is seen throughout regional bone marrow, relatively symmetrically. With regard to patient's history of lymphoma, no hypermetabolic adenopathy is seen, compatible with favorable treatment response. Diffuse symmetric activity throughout regional bone marrow, which can be seen in the setting of recent chemotherapy, colony-stimulating factor usage, or anemia. Small bilateral pleural effusions. ASSESSMENT/PLAN : 67-year-old man    High-grade aggressive diffuse large B-cell lymphoma, non-germinal cell type, ABC type status post cycle #5 of chemotherapy with R-CHOP/Revlimid. Double hit/triple hit lymphoma have been ruled out    Pancytopenia secondary to chemotherapy  Hematuria related to thrombocytopenia and the fact that he is anticoagulated with Xarelto for his chronic A. Fib    Appreciate cardiology and EPS input    Hold Xarelto  It may be resumed when gross hematuria resolves and platelet count improves above 50    Initiate G-CSF for persistent severe neutropenia despite Neulasta prophylaxis  Monitor for potential neutropenic fevers  Transfuse with 1 unit of packed RBC because of fatigue and cardiovascular compromise and 1 unit of platelet because of his gross hematuria and thrombocytopenia  We will follow    7/7/2021. Patient was confused when I went to examine the patient. He could not tell me where he was and was having trouble finding words. No focal weakness numbness. Neuro exam was non focal. No headaches. He has been afebrile. Will r/o sepsis. Blood cultures chest xray ct head without contrast requested. Profound neutropenia > 7 days since chemo. Continue Granix. Will start prophylactic Levaquin after cultures are drawn.    Platelets

## 2021-07-07 NOTE — PROGRESS NOTES
Occupational Therapy  OCCUPATIONAL THERAPY INITIAL EVALUATION      Date:2021  Patient Name: Aimee Quintana  MRN: [id number]  : 1950  Room: [address] [place] [address] [facility]  [address] [place] [address]         Date:2021                                                  Patient Name: Aimee Quintana    MRN: [id number]    : 1950    Room: [facility]      Evaluating OT: Rudolph Flores OTR/L   [id number]      Referring Provider:Ten Ogden    Specific Provider Orders/Date:OT eval and treat 2021      Diagnosis: thrombocytopenia       Pertinent Medical History: B cell lymphoma, chemo, lap R hemicolectomy 3/201, pacemaker,anemia,    Precautions:  Fall Risk, alarm, neutropenic       Assessment of current deficits    [x] Functional mobility  [x]ADLs  [x] Strength               [x]Cognition    [x] Functional transfers   [x] IADLs         [x] Safety Awareness   [x]Endurance    [] Fine Coordination              [x] Balance      [] Vision/perception   []Sensation     []Gross Motor Coordination  [] ROM  [] Delirium                   [] Motor Control     OT PLAN OF CARE   OT POC based on physician orders, patient diagnosis and results of clinical assessment    Frequency/Duration  1-3days/wk for 2 weeks PRN   Specific OT Treatment Interventions to include:   ADL retraining/adapted techniques and AE recommendations to increase functional independence within precautions                    Energy conservation techniques to improve tolerance for selfcare routine   Functional transfer/mobility training/DME recommendations for increased independence, safety and fall prevention         Patient/family education to increase safety and functional independence             Environmental modifications for safe mobility and completion of ADLs                             Therapeutic activity to improve functional performance during ADLs. Therapeutic exercise to improve tolerance and functional strength for ADLs   Balance retraining/tolerance tasks for facilitation of postural control with dynamic challenges during ADLs . Positioning to improve functional independence    Recommended Adaptive Equipment: TBD     Home Living: Pt lives with fiance, 1 story with 2 steps to enter  Prior Level of Function: assist as needed  with ADLs , assist  with IADLs; ambulated with no device       Pain Level: no pain this session ;   Cognition: semi alert.   Upon entering room - patient seated EOB, slightly dazed, incontinent   Unable to recall place - speech mumbled (physician and nurse  present in room)   But briefly at other times patient cognizant and speech clear    Memory:  Fair    Sequencing:  Fair    Problem solving:  Fair poor    Judgement/safety:  Fair -      Functional Assessment:  AM-PAC Daily Activity Raw Score: 11/24   Initial Eval Status  Date: 7/7/21 Treatment Status  Date: STGs = LTGs  Time frame: 10-14 days   Feeding SBA/set-up   Independent    Grooming Mod A ,seated   Decrease standing tolerance   Supervision    UB Dressing Max A   [facility] gown   Supervision    LB Dressing Max A  Min A   Bathing Max A   Min A   Toileting Dependent   Incontinence - assist with hygiene and clean linens   Min A   Bed Mobility  Mod A   Sit-supine   SBA    Functional Transfers NT   Assisted patient to bed d/t semi alert status and confusion   SBA    Functional Mobility NT   SBA  with good tolerance    Balance Sitting:     Static:  CGA/SBA     Dynamic:Mod A   Standing: NT   Supervision    Activity Tolerance Fair - with light activity   Good  with ADL activity    Visual/  Perceptual Glasses: none by bedside                 Hand Dominance right    AROM (PROM) Strength Additional Info:    VIVEKE  WFL WFL good  and wfl FMC/dexterity noted during ADL tasks       [facility] WFL good  and wfl FMC/dexterity noted during ADL tasks       Hearing: [facility]   Sensation:  No c/o numbness or tingling   Tone: WFL   Edema: none observed     Comments: Upon arrival patient sitting EOB . At end of session, patient lying in bed  with call light and phone within reach, all lines and tubes intact. *ALARM ON      Overall patient demonstrated  decreased independence and safety during completion of ADL/functional transfer/mobility tasks. Pt would benefit from continued skilled OT to increase safety and independence with completion of ADL/IADL tasks for functional independence and quality of life. Rehab Potential: good for established goals     Patient / Family Goal: none stated       Patient and/or family were instructed on functional diagnosis, prognosis/goals and OT plan of care. Demonstrated fair - understanding. Eval Complexity: Low    Time In: 1520  Time Out: 1540      Min Units   OT Eval Low 97165 x  1   OT Eval Medium 33441      OT Eval High 58394      OT Re-Eval [id number]       Therapeutic Ex 11770       Therapeutic Activities 23235       ADL/Self Care 44278       Orthotic Management 24372       Manual [id number]     Neuro Re-Ed 30314       Non-Billable Time          Evaluation Time additionally includes thorough review of current medical information, gathering information on past medical history/social history and prior level of function, interpretation of standardized testing/informal observation of tasks, assessment of data and development of plan of care and goals.             Alix Tavera  OTR/L  OT [id number]

## 2021-07-07 NOTE — PLAN OF CARE
Problem: Falls - Risk of:  Goal: Will remain free from falls  Description: Will remain free from falls  7/7/2021 1142 by Lanette Ashley RN  Outcome: Met This Shift  7/7/2021 0058 by Danilo Bergman RN  Outcome: Met This Shift     Problem: Skin Integrity:  Goal: Will show no infection signs and symptoms  Description: Will show no infection signs and symptoms  7/7/2021 1142 by Lanette Ashley RN  Outcome: Met This Shift  7/7/2021 0058 by Danilo Bergman RN  Outcome: Met This Shift     Problem: Cardiac:  Goal: Hemodynamic stability will improve  Description: Hemodynamic stability will improve  Outcome: Met This Shift

## 2021-07-07 NOTE — PLAN OF CARE
Problem: Falls - Risk of:  Goal: Will remain free from falls  Description: Will remain free from falls  7/7/2021 0058 by Valente Levy RN  Outcome: Met This Shift     Problem: Falls - Risk of:  Goal: Absence of physical injury  Description: Absence of physical injury  7/7/2021 0058 by Valente Levy RN  Outcome: Met This Shift     Problem: Skin Integrity:  Goal: Will show no infection signs and symptoms  Description: Will show no infection signs and symptoms  Outcome: Met This Shift     Problem: Skin Integrity:  Goal: Absence of new skin breakdown  Description: Absence of new skin breakdown  Outcome: Met This Shift

## 2021-07-07 NOTE — CARE COORDINATION
Wbc 0.1 hgb 7.4 hem 23.2 platelets 22. PT eval from yesterday AM-PAC 11/24 and pt did not ambulate. Met with pt in room. Went over PT eval. Pt declines rehab/coco. States wants to go home with his fiance. He is agreeable to [facility] for skilled nursing and therapy. No preference for hhc agency. Referral was made to \[facility]\"" at [facility]. Pt's pcp is Dr ANA García. Per \[facility]\"", Dr Fanta García is now a hospitalist at [facility]. I attempted to contact Dr Ailyn Porter office and was unsuccessful. I called and spoke with Laurie Lora . She said she is pt's wife and not a fiance. Laurie Lora said Dr Fanta García still sees her  as a pcp and just saw pt 3 weeks ago in his office. I notified \[facility]\"" at [facility]. I will contact Dr. Fanta García tomorrow am to see if he will follow pt at home for hhc. Laurie Lora said she will try to talk with her  about going to rehab.  Sw/cm will follow

## 2021-07-08 ENCOUNTER — APPOINTMENT (OUTPATIENT)
Dept: CT IMAGING | Age: 71
DRG: 314 | End: 2021-07-08
Payer: MEDICARE

## 2021-07-08 LAB
ANION GAP SERPL CALCULATED.3IONS-SCNC: 11 MMOL/L (ref 7–16)
ANISOCYTOSIS: ABNORMAL
BASOPHILS ABSOLUTE: 0 E9/L (ref 0–0.2)
BASOPHILS RELATIVE PERCENT: 0 % (ref 0–2)
BOTTLE TYPE: ABNORMAL
BUN BLDV-MCNC: 17 MG/DL (ref 6–23)
BURR CELLS: ABNORMAL
CALCIUM SERPL-MCNC: 9.1 MG/DL (ref 8.6–10.2)
CANDIDA ALBICANS BY PCR: NOT DETECTED
CANDIDA GLABRATA BY PCR: NOT DETECTED
CANDIDA KRUSEI BY PCR: NOT DETECTED
CANDIDA PARAPSILOSIS BY PCR: NOT DETECTED
CANDIDA TROPICALIS BY PCR: NOT DETECTED
CARBAPENEM RESISTANCE KPC BY PCR: NOT DETECTED
CHLORIDE BLD-SCNC: 103 MMOL/L (ref 98–107)
CO2: 32 MMOL/L (ref 22–29)
CREAT SERPL-MCNC: 0.9 MG/DL (ref 0.7–1.2)
ENTEROBACTER CLOACAE COMPLEX BY PCR: NOT DETECTED
ENTEROBACTERALES BY PCR: DETECTED
EOSINOPHILS ABSOLUTE: 0 E9/L (ref 0.05–0.5)
EOSINOPHILS RELATIVE PERCENT: 1 % (ref 0–6)
ESCHERICHIA COLI BY PCR: NOT DETECTED
GFR AFRICAN AMERICAN: >60
GFR NON-AFRICAN AMERICAN: >60 ML/MIN/1.73
GLUCOSE BLD-MCNC: 96 MG/DL (ref 74–99)
HAEMOPHILUS INFLUENZAE BY PCR: NOT DETECTED
HCT VFR BLD CALC: 20.8 % (ref 37–54)
HCT VFR BLD CALC: 24.3 % (ref 37–54)
HEMOGLOBIN: 6.6 G/DL (ref 12.5–16.5)
HEMOGLOBIN: 7.7 G/DL (ref 12.5–16.5)
HYPOCHROMIA: ABNORMAL
KLEBSIELLA OXYTOCA BY PCR: NOT DETECTED
KLEBSIELLA PNEUMONIAE GROUP BY PCR: DETECTED
LISTERIA MONOCYTOGENES BY PCR: NOT DETECTED
LYMPHOCYTES ABSOLUTE: 0.09 E9/L (ref 1.5–4)
LYMPHOCYTES RELATIVE PERCENT: 43 % (ref 20–42)
MAGNESIUM: 2 MG/DL (ref 1.6–2.6)
MCH RBC QN AUTO: 29.7 PG (ref 26–35)
MCHC RBC AUTO-ENTMCNC: 31.7 % (ref 32–34.5)
MCV RBC AUTO: 93.7 FL (ref 80–99.9)
MONOCYTES ABSOLUTE: 0.04 E9/L (ref 0.1–0.95)
MONOCYTES RELATIVE PERCENT: 19 % (ref 2–12)
MYELOCYTE PERCENT: 4 % (ref 0–0)
NEISSERIA MENINGITIDIS BY PCR: NOT DETECTED
NEUTROPHILS ABSOLUTE: 0.07 E9/L (ref 1.8–7.3)
NEUTROPHILS RELATIVE PERCENT: 33 % (ref 43–80)
ORDER NUMBER: ABNORMAL
OVALOCYTES: ABNORMAL
PDW BLD-RTO: 17.1 FL (ref 11.5–15)
PLATELET # BLD: 13 E9/L (ref 130–450)
PLATELET CONFIRMATION: NORMAL
PMV BLD AUTO: 11.2 FL (ref 7–12)
POIKILOCYTES: ABNORMAL
POLYCHROMASIA: ABNORMAL
POTASSIUM SERPL-SCNC: 3 MMOL/L (ref 3.5–5)
PRO-BNP: ABNORMAL PG/ML (ref 0–125)
PROTEUS SPECIES BY PCR: NOT DETECTED
PSEUDOMONAS AERUGINOSA BY PCR: DETECTED
RBC # BLD: 2.22 E12/L (ref 3.8–5.8)
SCHISTOCYTES: ABNORMAL
SERRATIA MARCESCENS BY PCR: DETECTED
SODIUM BLD-SCNC: 146 MMOL/L (ref 132–146)
SOURCE OF BLOOD CULTURE: ABNORMAL
STAPHYLOCOCCUS AUREUS BY PCR: NOT DETECTED
STAPHYLOCOCCUS SPECIES BY PCR: NOT DETECTED
STREPTOCOCCUS AGALACTIAE BY PCR: NOT DETECTED
STREPTOCOCCUS PNEUMONIAE BY PCR: NOT DETECTED
STREPTOCOCCUS PYOGENES  BY PCR: NOT DETECTED
STREPTOCOCCUS SPECIES BY PCR: NOT DETECTED
TEAR DROP CELLS: ABNORMAL
TOXIC GRANULATION: ABNORMAL
WBC # BLD: 0.2 E9/L (ref 4.5–11.5)

## 2021-07-08 PROCEDURE — 2140000000 HC CCU INTERMEDIATE R&B

## 2021-07-08 PROCEDURE — 6360000002 HC RX W HCPCS: Performed by: INTERNAL MEDICINE

## 2021-07-08 PROCEDURE — 80048 BASIC METABOLIC PNL TOTAL CA: CPT

## 2021-07-08 PROCEDURE — 6360000002 HC RX W HCPCS: Performed by: PHYSICIAN ASSISTANT

## 2021-07-08 PROCEDURE — 71250 CT THORAX DX C-: CPT

## 2021-07-08 PROCEDURE — 83735 ASSAY OF MAGNESIUM: CPT

## 2021-07-08 PROCEDURE — 85014 HEMATOCRIT: CPT

## 2021-07-08 PROCEDURE — 2580000003 HC RX 258: Performed by: PHYSICIAN ASSISTANT

## 2021-07-08 PROCEDURE — 99232 SBSQ HOSP IP/OBS MODERATE 35: CPT | Performed by: STUDENT IN AN ORGANIZED HEALTH CARE EDUCATION/TRAINING PROGRAM

## 2021-07-08 PROCEDURE — 36430 TRANSFUSION BLD/BLD COMPNT: CPT

## 2021-07-08 PROCEDURE — 85018 HEMOGLOBIN: CPT

## 2021-07-08 PROCEDURE — 99222 1ST HOSP IP/OBS MODERATE 55: CPT | Performed by: NURSE PRACTITIONER

## 2021-07-08 PROCEDURE — 6370000000 HC RX 637 (ALT 250 FOR IP): Performed by: PHYSICIAN ASSISTANT

## 2021-07-08 PROCEDURE — 99231 SBSQ HOSP IP/OBS SF/LOW 25: CPT | Performed by: FAMILY MEDICINE

## 2021-07-08 PROCEDURE — 94640 AIRWAY INHALATION TREATMENT: CPT

## 2021-07-08 PROCEDURE — 2500000003 HC RX 250 WO HCPCS: Performed by: STUDENT IN AN ORGANIZED HEALTH CARE EDUCATION/TRAINING PROGRAM

## 2021-07-08 PROCEDURE — 2580000003 HC RX 258: Performed by: INTERNAL MEDICINE

## 2021-07-08 PROCEDURE — 99223 1ST HOSP IP/OBS HIGH 75: CPT | Performed by: INTERNAL MEDICINE

## 2021-07-08 PROCEDURE — 6370000000 HC RX 637 (ALT 250 FOR IP): Performed by: STUDENT IN AN ORGANIZED HEALTH CARE EDUCATION/TRAINING PROGRAM

## 2021-07-08 PROCEDURE — 6370000000 HC RX 637 (ALT 250 FOR IP): Performed by: NURSE PRACTITIONER

## 2021-07-08 PROCEDURE — 2700000000 HC OXYGEN THERAPY PER DAY

## 2021-07-08 PROCEDURE — 6360000002 HC RX W HCPCS: Performed by: FAMILY MEDICINE

## 2021-07-08 PROCEDURE — 83880 ASSAY OF NATRIURETIC PEPTIDE: CPT

## 2021-07-08 PROCEDURE — 85025 COMPLETE CBC W/AUTO DIFF WBC: CPT

## 2021-07-08 PROCEDURE — 36415 COLL VENOUS BLD VENIPUNCTURE: CPT

## 2021-07-08 PROCEDURE — 6370000000 HC RX 637 (ALT 250 FOR IP): Performed by: INTERNAL MEDICINE

## 2021-07-08 PROCEDURE — 74176 CT ABD & PELVIS W/O CONTRAST: CPT

## 2021-07-08 RX ORDER — SODIUM CHLORIDE 9 MG/ML
INJECTION, SOLUTION INTRAVENOUS PRN
Status: DISCONTINUED | OUTPATIENT
Start: 2021-07-08 | End: 2021-07-09 | Stop reason: SDUPTHER

## 2021-07-08 RX ORDER — POTASSIUM CHLORIDE 20 MEQ/1
40 TABLET, EXTENDED RELEASE ORAL 2 TIMES DAILY WITH MEALS
Status: DISCONTINUED | OUTPATIENT
Start: 2021-07-09 | End: 2021-07-09

## 2021-07-08 RX ORDER — FUROSEMIDE 10 MG/ML
20 INJECTION INTRAMUSCULAR; INTRAVENOUS ONCE
Status: COMPLETED | OUTPATIENT
Start: 2021-07-08 | End: 2021-07-08

## 2021-07-08 RX ORDER — FUROSEMIDE 10 MG/ML
20 INJECTION INTRAMUSCULAR; INTRAVENOUS 3 TIMES DAILY
Status: DISCONTINUED | OUTPATIENT
Start: 2021-07-09 | End: 2021-07-09

## 2021-07-08 RX ORDER — METOPROLOL SUCCINATE 50 MG/1
50 TABLET, EXTENDED RELEASE ORAL 2 TIMES DAILY
Status: DISCONTINUED | OUTPATIENT
Start: 2021-07-08 | End: 2021-07-14 | Stop reason: HOSPADM

## 2021-07-08 RX ADMIN — METOPROLOL SUCCINATE 50 MG: 50 TABLET, EXTENDED RELEASE ORAL at 21:39

## 2021-07-08 RX ADMIN — POTASSIUM CHLORIDE 20 MEQ: 1500 TABLET, EXTENDED RELEASE ORAL at 08:26

## 2021-07-08 RX ADMIN — FAMOTIDINE 20 MG: 20 TABLET ORAL at 08:26

## 2021-07-08 RX ADMIN — POTASSIUM CHLORIDE 10 MEQ: 10 INJECTION, SOLUTION INTRAVENOUS at 12:06

## 2021-07-08 RX ADMIN — Medication 10 ML: at 23:01

## 2021-07-08 RX ADMIN — TBO-FILGRASTIM 480 MCG: 480 INJECTION, SOLUTION SUBCUTANEOUS at 17:24

## 2021-07-08 RX ADMIN — MEROPENEM 1000 MG: 1 INJECTION, POWDER, FOR SOLUTION INTRAVENOUS at 15:58

## 2021-07-08 RX ADMIN — FUROSEMIDE 20 MG: 10 INJECTION, SOLUTION INTRAMUSCULAR; INTRAVENOUS at 12:41

## 2021-07-08 RX ADMIN — METOPROLOL TARTRATE 2.5 MG: 1 INJECTION, SOLUTION INTRAVENOUS at 00:12

## 2021-07-08 RX ADMIN — PIPERACILLIN AND TAZOBACTAM 3375 MG: 3; .375 INJECTION, POWDER, LYOPHILIZED, FOR SOLUTION INTRAVENOUS at 07:15

## 2021-07-08 RX ADMIN — ALBUTEROL SULFATE 2.5 MG: 2.5 SOLUTION RESPIRATORY (INHALATION) at 09:42

## 2021-07-08 RX ADMIN — HYDROCHLOROTHIAZIDE 12.5 MG: 12.5 TABLET ORAL at 08:26

## 2021-07-08 RX ADMIN — NYSTATIN 500000 UNITS: 100000 SUSPENSION ORAL at 17:25

## 2021-07-08 RX ADMIN — METOPROLOL SUCCINATE 25 MG: 25 TABLET, FILM COATED, EXTENDED RELEASE ORAL at 08:26

## 2021-07-08 RX ADMIN — VANCOMYCIN HYDROCHLORIDE 500 MG: 500 INJECTION, POWDER, LYOPHILIZED, FOR SOLUTION INTRAVENOUS at 06:02

## 2021-07-08 RX ADMIN — AMLODIPINE BESYLATE 5 MG: 5 TABLET ORAL at 08:26

## 2021-07-08 RX ADMIN — FAMOTIDINE 20 MG: 20 TABLET ORAL at 21:39

## 2021-07-08 RX ADMIN — NYSTATIN 500000 UNITS: 100000 SUSPENSION ORAL at 12:41

## 2021-07-08 RX ADMIN — POTASSIUM CHLORIDE 10 MEQ: 10 INJECTION, SOLUTION INTRAVENOUS at 11:11

## 2021-07-08 RX ADMIN — Medication 10 ML: at 21:40

## 2021-07-08 RX ADMIN — LISINOPRIL 10 MG: 10 TABLET ORAL at 08:26

## 2021-07-08 RX ADMIN — POTASSIUM CHLORIDE 10 MEQ: 10 INJECTION, SOLUTION INTRAVENOUS at 14:30

## 2021-07-08 RX ADMIN — POTASSIUM CHLORIDE 10 MEQ: 10 INJECTION, SOLUTION INTRAVENOUS at 13:22

## 2021-07-08 RX ADMIN — NYSTATIN 500000 UNITS: 100000 SUSPENSION ORAL at 21:39

## 2021-07-08 RX ADMIN — ALBUTEROL SULFATE 2.5 MG: 2.5 SOLUTION RESPIRATORY (INHALATION) at 17:16

## 2021-07-08 RX ADMIN — MEROPENEM 1000 MG: 1 INJECTION, POWDER, FOR SOLUTION INTRAVENOUS at 23:01

## 2021-07-08 RX ADMIN — NYSTATIN 500000 UNITS: 100000 SUSPENSION ORAL at 08:25

## 2021-07-08 RX ADMIN — ALBUTEROL SULFATE 2.5 MG: 2.5 SOLUTION RESPIRATORY (INHALATION) at 14:40

## 2021-07-08 ASSESSMENT — PAIN SCALES - GENERAL
PAINLEVEL_OUTOF10: 0

## 2021-07-08 NOTE — PROGRESS NOTES
Subjective: The patient is awake and alert. Patient status had significantly worsened yesterday evening  I received a call indicating elevated heart rate, patient appearing lethargic and had declined through the day  Zosyn and IV fluids ordered and initiated with significant improvement in status this morning    Objective:    BP (!) 141/80   Pulse 96   Temp 98.6 °F (37 °C)   Resp 18   Ht 5' 7\" (1.702 m)   Wt 181 lb 11.2 oz (82.4 kg)   SpO2 100%   BMI 28.46 kg/m²     In: 912 [P.O.:120; I.V.:702]  Out: 200   In: 912   Out: 200 [Urine:200]    General appearance: NAD, conversant, feels like he is coughing up a fair amount of phlegm  HEENT: AT/NC, MMM  Neck: FROM, supple  Lungs: Clear to auscultation  CV: RRR, no MRGs  Vasc: Radial pulses 2+  Abdomen: Soft, non-tender; no masses or HSM  Extremities: No peripheral edema or digital cyanosis  Skin: no rash, lesions or ulcers  Psych: Alert and oriented to person, place and time  Neuro: Alert and interactive     Recent Labs     07/06/21  0849 07/06/21  0849 07/07/21  0120 07/07/21  0550 07/08/21  0550   WBC 0.1*  --   --  0.1* 0.2*   HGB 7.1*   < > 7.3* 7.4* 6.6*   HCT 23.2*   < > 22.5* 23.2* 20.8*   PLT 15*  --   --  22* 13*    < > = values in this interval not displayed. Recent Labs     07/06/21  0849 07/07/21  0550 07/08/21  0550    141 146   K 3.4* 3.1* 3.0*    100 103   CO2 32* 34* 32*   BUN 24* 16 17   CREATININE 0.6* 0.7 0.9   CALCIUM 9.4 9.1 9.1       Assessment:    Principal Problem:    Diffuse large B-cell lymphoma (HCC)  Active Problems:    Hematuria    Severe protein-calorie malnutrition (HCC)    Thrombocytopenia (HCC)  Resolved Problems:    * No resolved hospital problems.  *      Plan:    Admitted for evaluation of pancytopenia and hematuria in pt with B cell lymphoma on chemo in spite of neulasta   hemonc to follow for transfusions - appreciate input   Await Gcsf tx   Hold oac / antiplatelets - o n xarelto at home - consider coming off for now   Transfuse 1 unit PRBC 7/8/2021-monitor volume status , avoid volume  Daily labs     Gram-negative nevaeh sepsis/bacteremia  Zosyn/vancomycin  Infectious disease to follow  Will hold IV fluids for now secondary to worsening lung status, obtain chest x-ray  Diuresis 20 IV Lasix x1  Consider port removal    May benefit from bronchoscopy  Consult pulmonology      A. fib RVR/electrolyte abnormalities  Rate control-achieved with beta-blocker  Supplement electrolytes  Oral anticoagulation?   EP service following-input appreciated    DVT Prophylaxis   PT/OT  Discharge planning   Discussed with RN at bedside-I agree, likely eventually will need palliative care evaluation for goals of care          Isatu Resendez MD  10:18 AM  7/8/2021

## 2021-07-08 NOTE — CONSULTS
[facility] CONSULT NOTE    Reason for Consult: Gram-negative nevaeh bacteremia  Requested by: Miller Giordano MD     Chief complaint: Generalized weakness and fatigue    History Obtained From: Patient and EMR    HISTORY Nithya              The patient is a 80 y.o. male with history of diffuse large B cell lymphoma on chemotherapy with R-CHOP and bortezomib through right subclavian port with most recent chemotherapy 1 week prior to admission, hypertension, hyperlipidemia, CAD, pacemaker in situ (new device implantation on 12/17/2020), presented on 07/05 with generalized weakness and fatigue for about a week, found to be septic with pancytopenia and gram-negative nevaeh (Klebsiella pneumoniae, Pseudomonas aeruginosa, Serratia marcescens by PCR) bacteremia. Chest x-ray showed bilateral interstitial prominence probably related to pulmonary vascular congestion. Piperacillin-tazobactam and vancomycin were started on admission. ID service was subsequently consulted for further recommendations.     Past Medical History  Past Medical History:   Diagnosis Date    Arthritis     KNEES HIPS BACK    BPH (benign prostatic hyperplasia)     Cancer ([facility] [address]     Coronary artery disease     Difficult airway     PT STATES HE WAS TOLD THIS TWICE AT F    Difficult intubation 04/2017    note in care everywhere \"Clinical Summary\" 03/10/2021    History of blood transfusion     Hyperlipidemia     Hypertension     Sinus tachycardia 01/01/2002       Current Facility-Administered Medications   Medication Dose Route Frequency Provider Last Rate Last Admin    0.9 % sodium chloride infusion   Intravenous PRN Maylin Erazo MD        0.9 % sodium chloride infusion   Intravenous PRN Miller Giordano MD        furosemide (LASIX) injection 20 mg  20 mg Intravenous Once Miller Giordano MD        meropenem [facility]) 1,000 mg in sodium chloride 0.9 % 100 mL IVPB (mini-bag)  1,000 mg Intravenous Anupama Rivas MD        nystatin (MYCOSTATIN) 438781 UNIT/ML suspension 500,000 Units  5 mL Oral 4x Daily Sofie Valladares MD   500,000 Units at 07/08/21 0825    vancomycin (VANCOCIN) 500 mg in dextrose 5 % 100 mL IVPB  500 mg Intravenous Q12H Alka Byers MD   Stopped at 07/08/21 0702    metoprolol succinate (TOPROL XL) extended release tablet 25 mg  25 mg Oral BID Martin Celis, DO   25 mg at 07/08/21 0826    metoprolol (LOPRESSOR) injection 2.5 mg  2.5 mg Intravenous Q6H PRN Martin Celis, DO   2.5 mg at 07/08/21 0012    lisinopril (PRINIVIL;ZESTRIL) tablet 10 mg  10 mg Oral Daily VENITA Fowler   10 mg at 07/08/21 5489    And    hydroCHLOROthiazide (HYDRODIURIL) tablet 12.5 mg  12.5 mg Oral Daily VENITA Fowler   12.5 mg at 07/08/21 0826    potassium chloride (KLOR-CON M) extended release tablet 40 mEq  40 mEq Oral PRN Erasto Charlotte Learn, APRN - CNP   40 mEq at 07/07/21 7356    Or    potassium bicarb-citric acid (EFFER-K) effervescent tablet 40 mEq  40 mEq Oral PRN Erasto Charlotte Learn, APRN - CNP   40 mEq at 07/06/21 1835    Or    potassium chloride 10 mEq/100 mL IVPB (Peripheral Line)  10 mEq Intravenous PRN Erasto Charlotte Learn, APRN -  mL/hr at 07/08/21 1111 10 mEq at 07/08/21 1111    Tbo-Filgrastim (GRANIX) injection 480 mcg  480 mcg Subcutaneous QPM Debbie Bhatia MD   480 mcg at 07/07/21 1836    0.9 % sodium chloride infusion   Intravenous PRN Debbie Bhatia MD        0.9 % sodium chloride infusion   Intravenous PRN Debbie Bhatia MD        0.9 % sodium chloride infusion   Intravenous PRN Tarri Rape, APRN - CNP        albuterol (PROVENTIL) nebulizer solution 2.5 mg  2.5 mg Nebulization 4x daily VENITA Fowler   2.5 mg at 07/08/21 0942    amLODIPine (NORVASC) tablet 5 mg  5 mg Oral Daily VENITA Fowler   5 mg at 07/08/21 0826    sodium chloride flush 0.9 % injection 5-40 mL  5-40 mL Intravenous 2 times per day VENITA Fowler   10 mL at 07/07/21 1956    sodium chloride flush 0.9 % Tracie Scott MD at [address] [place] [address] History     Socioeconomic History    Marital status:    Tobacco Use    Smoking status: Former Smoker     Packs/day: 1.00     Years: 44.00     Pack years: 44.00     Quit date: 3/4/2014     Years since quittin.3    Smokeless tobacco: Never Used   Vaping Use    Vaping Use: Some days    Substances: Nicotine, Flavoring, 6% nicotine    Devices: Pre-filled or refillable cartridge   Substance and Sexual Activity    Alcohol use: Not Currently     Alcohol/week: 2.0 standard drinks     Types: 2 Shots of liquor per week     Comment: monthly; no caffeine    Drug use: No     Family Medical History  Family History   Problem Relation Age of Onset    Diabetes Mother     Stroke Mother     Diabetes Father     Heart Disease Father     Brain Cancer Sister     Stroke Sister     Stroke Brother     Cancer Maternal Grandfather        Review of Systems:  Constitutional: No fever, no chills  Eyes: No vision changes, no retroorbital pain  ENT: No hearing changes, no ear pain  Respiratory: Has cough, has dyspnea  Cardiovascular: No chest pain, no palpitations  Gastrointestinal: No abdominal pain, no diarrhea  Genitourinary: No dysuria, no hematuria  Integumentary: No rash, no itching  Musculoskeletal: No muscle pain, no joint pain  Neurologic: No headache, has numbness in hands    Physical Examination:  Vitals:    21 0608 21 0640 21 0815 21 0942   BP: 123/65  (!) 141/80    Pulse: 90  96    Resp: 16  18    Temp: 96.4 °F (35.8 °C)  98.6 °F (37 °C)    TempSrc: Temporal      SpO2: 98%  100% 100%   Weight:  181 lb 11.2 oz (82.4 kg)     Height:         Constitutional: Alert, not in distress  Eyes: Sclerae anicteric, no conjunctival erythema  ENT: No buccal lesion, no pharyngeal exudates  Neck: No nuchal rigidity, no cervical adenopathy  Lungs: Clear breath sounds, no crackles, no wheezes  Heart: Regular rate and rhythm,

## 2021-07-08 NOTE — PROGRESS NOTES
Patient's mental status and vitals improved greatly since last night. He states he is feeling much better.

## 2021-07-08 NOTE — PLAN OF CARE
Problem: Falls - Risk of:  Goal: Will remain free from falls  Description: Will remain free from falls  Outcome: Met This Shift     Problem: Falls - Risk of:  Goal: Absence of physical injury  Description: Absence of physical injury  Outcome: Met This Shift     Problem: Skin Integrity:  Goal: Will show no infection signs and symptoms  Description: Will show no infection signs and symptoms  Outcome: Met This Shift     Problem: Skin Integrity:  Goal: Absence of new skin breakdown  Description: Absence of new skin breakdown  Outcome: Met This Shift     Problem: Musculor/Skeletal Functional Status  Goal: Absence of falls  Outcome: Met This Shift

## 2021-07-08 NOTE — CARE COORDINATION
ID, pulmonary, palliative care and vascular surgery consulted today. IV merrem 1gm q 8 hrs. IV lasix 20mg x 1, CT chest and CT abd pelvis ordered. Spoke with Dr ANA Miller Y#[phone] and he will follow pt at home with [facility] if needed. Darcy at [facility] notified. Will need updated fax number and address for Dr. Kamran Miller. Spoke with pt's daughter, Marielena Dixon, OD#[phone] regarding discharge planning. Await input from all consults. Sw/cm will follow for discharge planning.

## 2021-07-08 NOTE — PROGRESS NOTES
Physician Progress Note      PATIENT:               Tanya Jennings  CSN #:                  [id number]  :                       1950  ADMIT DATE:       2021 5:44 PM  [address] [place] DATE:  RESPONDING  PROVIDER #:        SHELL Kendall MD          QUERY TEXT:    Pt admitted with Pancytopenia with hematuria. Pt noted to have confusion. If   possible, please document in the progress notes and discharge summary if you   are evaluating and / or treating any of the following: The medical record reflects the following:  Risk Factors: Pancytopenia, B cell lymphoma, Malnutrition    Clinical Indicators: A&Ox1, Confusion, Weakness, dizziness, Hgb 7.4- 6.6, Plt   <22, K 3.0, WBC's 0.2, CO2 34, chemotherapy, hematuria, Severe malnutrition   noted by dietary consult, Per nursing note  on  patients mental status has   greatly improved,  Supplemental O2    Treatment: Labs, consults, neuro assessments, Platelet infusion, 0.9 % sodium   chloride infusion CONTINUOUS @ 75 mL/hr, PRBC's, IV antibiotic therapy, O2 3L   via NC    Thank you,  Radha Hanna RN, BSN  Options provided:  -- Encephalopathy due to please specify, #.  -- Metabolic encephalopathy  -- Toxic encephalopathy  -- Toxic metabolic encephalopathy  -- Delirium due to, Please specify cause. -- Delirium  -- Other - I will add my own diagnosis  -- Disagree - Not applicable / Not valid  -- Disagree - Clinically unable to determine / Unknown  -- Refer to Clinical Documentation Reviewer    PROVIDER RESPONSE TEXT:    This patient has metabolic encephalopathy.     Query created by: Mc Ca on 2021 10:09 AM      Electronically signed by:  SHELL Kendall MD 2021 3:12 PM

## 2021-07-08 NOTE — CONSULTS
Palliative Care Department  [phone]  Palliative Care Initial Consult  Provider Charlanne Bence, APRN - CNP     Denise Fuller  [id number]  Hospital Day: 4  Date of Initial Consult: 7/8/2021  Referring Provider: Patricia Morin MD  Palliative Medicine was consulted for assistance with: Goals of Care, Code Status Discussion     HPI:   Denise Fuller is a 80 y.o. with a medical history of hypertension, hyperlipidemia, CAD, large B-cell lymphoma and BPH who was admitted on 7/5/2021 from home with a CHIEF COMPLAINT of hematuria. ER work-up significant for hemoglobin of 7.4. Patient admitted to telemetry for further work-up and management. Palliative medicine consulted for goals of care and CODE STATUS discussion. ASSESSMENT/PLAN:     Pertinent Hospital Diagnoses      B-cell lymphoma   Hematuria   Thrombocytopenia      Palliative Care Encounter / Counseling Regarding Goals of Care  Please see detailed goals of care discussion as below   At this time, Denise Fuller, Does have capacity for medical decision-making. Capacity is time limited and situation/question specific   Outcome of goals of care meeting: Continue current management, continue full code, palliative medicine will attempt to rediscuss goals of care and CODE STATUS tomorrow   Code status Full Code   Advanced Directives: no POA or living will in epic    Surrogate/Legal NOK:  Selma Alexander, child, [phone]        Spiritual assessment: no spiritual distress identified  Bereavement and grief: to be determined  Referrals to: none today   SUBJECTIVE:       Details of Conversation: Chart reviewed and patient seen. Patient resting in bed, alert and oriented, able to carry on meaningful medical conversation. Patient's son at bedside. Role of palliative medicine explained. Attempted to discuss goals of care, CODE STATUS, and advance care planning with patient. Patient states he is tired and does not want to discuss at this time.   Support provided. Will attempt to readdress goals of care and CODE STATUS tomorrow. OBJECTIVE:   Prognosis: depends upon goals    Physical Exam:  /70   Pulse 98   Temp 97.8 °F (36.6 °C) (Temporal)   Resp 16   Ht 5' 7\" (1.702 m)   Wt 181 lb 11.2 oz (82.4 kg)   SpO2 100%   BMI 28.46 kg/m²   Constitutional:  NAD, awake, alert  ENMT:  Normocephalic, atraumatic, mucosa moist, EOMI  Lungs: Nasal cannula, easy and unlabored respirations  Heart:  ST  Abd:  Soft, non tender, non distended  Ext:  + edema, pulses present  Skin:  Warm and dry  Neuro:  PERRL, Alert, grossly nonfocal; following commands    Objective data reviewed: labs, images, records, medication use, vitals and chart    Discussed patient and the plan of care with the other IDT members: Palliative Medicine IDT Team    Time/Communication  Greater than 50% of time spent, total 50 minutes in counseling and coordination of care at the bedside regarding goals of care, diagnosis and prognosis and see above. Thank you for allowing Palliative Medicine to participate in the care of Shilpi Galvin.

## 2021-07-08 NOTE — PROGRESS NOTES
and inability to perform capture threshold testing. Dr Geovanni Miller planned to test retrograde conduction and set fixed PVARP and possibly turn off VIP and turning off atrial cap confirm. During this admit, pacemaker reprogrammed with PVARP extended to 325 msec in order to avoid PMT, turned off VIP and atrial cap confirm to avoid inappropriate increases in atrial output, and extended pace/sense AV delays to 250/270 msec to limit ventricular pacing. On 7/7/21 he was noted to have sinus with frequent PAC with rates of 120-130's as well as increased confusion and lethargy. Blood cultures grew GNR, he has been given IV Zosyn and Vancomycin with an improvement in his mental status and ID has been consulted. He complains of difficulty swallowing, but denies any other complaints at this time. Prior cardiac testing:  · TTE (4/1/21):  LVEF = 65-70%, normal #29 Bicor MVR, normal #21 Trifecta bioprosthetic AVR, and RVSP ~ 39 mmHg. · Pharmacologic Nuclear Stress (12/14/20 at [facility]): LVEF = \"normal\", mild LAD territory ischemia (< 10%), no infarct, and low risk study.     Past Medical History:   Diagnosis Date    Arthritis     KNEES HIPS BACK    BPH (benign prostatic hyperplasia)     Cancer ([address]     Coronary artery disease     Difficult airway     PT STATES HE WAS [facility]OLD THIS TWICE AT [facility]    Difficult intubation 04/2017    note in care everywhere \"Clinical Summary\" 03/10/2021    History of blood transfusion     Hyperlipidemia     Hypertension     Sinus tachycardia 01/01/2002      Past Surgical History:   Procedure Laterality Date    ABLATION OF DYSRHYTHMIC FOCUS      AORTA SURGERY      aortic valve replacement    AORTIC VALVE REPLACEMENT      BLADDER SURGERY Right 2/17/2021    CYSTOSCOPY BILATERAL RETROGRADE PYELOGRAM RIGHT STENT INSERTION performed by Sha Canseco MD at [address]  2/19/2021    COLONOSCOPY WITH BIOPSY performed by Ria Estrella DO at [address] [facility] COLONOSCOPY  2021    COLONOSCOPY W/ ENDOSCOPIC MUCOSAL RESECTION performed by Ifeoma An DO at [address] N/A 3/11/2021    LAPAROSCOPIC RIGHT HEMICOLECTOMY performed by Tre Manzano MD at [address] OTHER SURGICAL HISTORY  2016    CT Myelogram, Dr. Cedeno Hidden   new battery    last checked Dr Gita Lowery 2016?     PORT SURGERY Right 2021    MEDI PORT INSERTION performed by Tre Manzano MD at [address]  [facility] ENDOSCOPY      reflux    UPPER GASTROINTESTINAL ENDOSCOPY N/A 2021    EGD BIOPSY performed by Surekha Gonzalez MD at [address] [facility]        Family History   Problem Relation Age of Onset    Diabetes Mother     Stroke Mother     Diabetes Father     Heart Disease Father     Brain Cancer Sister     Stroke Sister     Stroke Brother     Cancer Maternal Grandfather      Social History     Tobacco Use    Smoking status: Former Smoker     Packs/day: 1.00     Years: 44.00     Pack years: 44.00     Quit date: 3/4/2014     Years since quittin.3    Smokeless tobacco: Never Used   Substance Use Topics    Alcohol use: Not Currently     Alcohol/week: 2.0 standard drinks     Types: 2 Shots of liquor per week     Comment: monthly; no caffeine     Current Facility-Administered Medications   Medication Dose Route Frequency Provider Last Rate Last Admin    0.9 % sodium chloride infusion   Intravenous PRN Deja Santana MD        0.9 % sodium chloride infusion   Intravenous PRN Smitha Cordero MD        meropenem (MERREM) 1,000 mg in sodium chloride 0.9 % 100 mL IVPB (mini-bag)  1,000 mg Intravenous Q8H Sandra Benson MD        metoprolol succinate (TOPROL XL) extended release tablet 50 mg  50 mg Oral BID Modesta Farris, APRN - CNP        nystatin (MYCOSTATIN) [id number] UNIT/ML suspension 500,000 Units  5 mL Oral 4x Daily Carmela Grier MD   500,000 Units at 07/08/21 1241    metoprolol (LOPRESSOR) injection 2.5 mg  2.5 mg Intravenous Q6H PRN Nan Kent DO   2.5 mg at 07/08/21 0012    lisinopril (PRINIVIL;ZESTRIL) tablet 10 mg  10 mg Oral Daily VENITA Orozco   10 mg at 07/08/21 7976    And    hydroCHLOROthiazide (HYDRODIURIL) tablet 12.5 mg  12.5 mg Oral Daily VENITA Orozco   12.5 mg at 07/08/21 0826    potassium chloride (KLOR-CON M) extended release tablet 40 mEq  40 mEq Oral PRN Earnstine Asia Edmond, APRN - CNP   40 mEq at 07/07/21 4270    Or    potassium bicarb-citric acid (EFFER-K) effervescent tablet 40 mEq  40 mEq Oral PRN Parkertine Asia Edmond, APRN - CNP   40 mEq at 07/06/21 1835    Or    potassium chloride 10 mEq/100 mL IVPB (Peripheral Line)  10 mEq Intravenous PRN Parkertine Asia Edmond, APRN -  mL/hr at 07/08/21 1322 10 mEq at 07/08/21 1322    Tbo-Filgrastim (GRANIX) injection 480 mcg  480 mcg Subcutaneous QPM Annabelle Guerrero MD   480 mcg at 07/07/21 1836    0.9 % sodium chloride infusion   Intravenous PRN Annabelle Guerrero MD        0.9 % sodium chloride infusion   Intravenous PRN Annabelle Guerrero MD        0.9 % sodium chloride infusion   Intravenous PRN Jesus Rodgers APRN - CNP        albuterol (PROVENTIL) nebulizer solution 2.5 mg  2.5 mg Nebulization 4x daily VENITA Orozco   2.5 mg at 07/08/21 0942    amLODIPine (NORVASC) tablet 5 mg  5 mg Oral Daily VENITA Orozco   5 mg at 07/08/21 0826    sodium chloride flush 0.9 % injection 5-40 mL  5-40 mL Intravenous 2 times per day VENITA Orozco   10 mL at 07/07/21 1956    sodium chloride flush 0.9 % injection 5-40 mL  5-40 mL Intravenous PRN VENITA Orozco   10 mL at 07/07/21 0013    0.9 % sodium chloride infusion  25 mL Intravenous PRN VENITA Orozco        polyethylene glycol (GLYCOLAX) packet 17 g  17 g Oral Daily PRN VENITA Orozco        acetaminophen (TYLENOL) tablet 650 mg  650 mg Oral Q6H PRN Amrit Dietz VENITA Rubio   650 mg at 07/06/21 0057    Or    acetaminophen (TYLENOL) suppository 650 mg  650 mg Rectal Q6H PRN VENITA Orozco        famotidine (PEPCID) tablet 20 mg  20 mg Oral BID VENITA Orozco   20 mg at 07/08/21 0826    potassium chloride (KLOR-CON M) extended release tablet 20 mEq  20 mEq Oral Daily VENITA Orozco   20 mEq at 07/08/21 0826    promethazine (PHENERGAN) tablet 12.5 mg  12.5 mg Oral Q6H PRN VENITA Orozco          No Known Allergies    ROS:  Constitutional: Negative for fever, activity change and appetite change. HENT: Negative for epistaxis. Eyes: Negative for diploplia, blurred vision. Respiratory: Negative for cough, chest tightness, shortness of breath and wheezing. Cardiovascular: pertinent positives in HPI  Gastrointestinal: Negative for abdominal pain and blood in stool. All other review of systems are negative     PHYSICAL EXAM:   Vitals:    07/08/21 0608 07/08/21 0640 07/08/21 0815 07/08/21 0942   BP: 123/65  (!) 141/80    Pulse: 90  96    Resp: 16  18    Temp: 96.4 °F (35.8 °C)  98.6 °F (37 °C)    TempSrc: Temporal      SpO2: 98%  100% 100%   Weight:  181 lb 11.2 oz (82.4 kg)     Height:       Limited exam due to COVID-19 pandemic. Constitutional: NAD, Alert   Head: Normocephalic and atraumatic.    Neck: supple and no JVD present  Cardiovascular: IRIR  Pulmonary/Chest:  No respiratory distress, no audible wheeze  Abdominal: nondistended   Musculoskeletal: Normal range of motion of all extremities  Neurological: Alert & O x 3, grossly intact   Skin: Skin is warm and dry, CIED incision C/D/I without erythema, edema, or drainage  Extremity: no edema   Psychiatric: Normal mood and affect, A&O x3     Data:    Recent Labs     07/06/21  0849 07/06/21  0849 07/07/21  0120 07/07/21  0550 07/08/21  0550   WBC 0.1*  --   --  0.1* 0.2*   HGB 7.1*   < > 7.3* 7.4* 6.6*   HCT 23.2*   < > 22.5* 23.2* 20.8*   PLT 15*  --   --  22* 13*    < > = values in this interval not displayed. Recent Labs     07/06/21  0849 07/07/21  0550 07/08/21  0550    141 146   K 3.4* 3.1* 3.0*    100 103   CO2 32* 34* 32*   BUN 24* 16 17   CREATININE 0.6* 0.7 0.9   CALCIUM 9.4 9.1 9.1      Lab Results   Component Value Date    MG 2.0 07/08/2021     No results for input(s): TSH in the last 72 hours. Recent Labs     07/06/21  0849   INR 1.2     Telemetry: SR with frequent PACs/PVCs with rates      Device Interrogation/Reprogramming (7/6/21)  · Make/Model: [employer] Assurity MRI 2272  · DOI: 12/17/20  · Battery: >95%  · Shelda Frames therapy: DDD  ppm  · Pacing %: RA = 12%, RV = 6.5%  · Lead function:  · RA lead: sensing = 4.1 mV, impedance = 330 ohms, threshold = 0.5 V @ 0.5 msec  · RV lead: sensing = 10.3 mV, impedance = 430 ohms, threshold = 1.0 V @ 0.5 msec  · Lead programming:  · RA lead: sensitivity = 0.5 mV, output = 1.375 V @ 0.5 msec (AUTO)  · RV lead: sensitivity = 2.0 mV, output = 1.125 V @ 0.5 msec (AUTO)  · Arrhythmias: AT/AF burden = 1.9%, MS = 3.1%, 12 VHREs (EGM consistent with AT), PMT  · Reprogramming included: PVARP extended to 325 msec, turn off VIP and atrial cap confirm, and extend pace/sense AV delays to 250/270 msec. · Overall device function is normal  · All device programmable settings were evaluated per above and in the scanned document, along with iterative adjustments (capture thresholds) to assess and select the most appropriate final programming to provide for consistent delivery of the appropriate therapy and to verify function of the device. Assessment/Plan:   1. SND s/p [employer] dc PPM (implant: 2002; generator change: 2014; extraction of RA & RV leads due to noise and inappropriate sensing and implant of new device: 12/17/20)  -Multiple episodes of PMT. Retrograde testing interrupted by paroxysms of AT, so PVARP extended to 325 msec (fixed).   -Reviewed [facility] EP notes and additional changes made per their recommendations.  -Follow-up with Dr Hema Koehler

## 2021-07-08 NOTE — PROGRESS NOTES
[facility]  Hematology/Oncology  Consult      Patient Name: Nava Goss  YOB: 1950  PCP: Adamaris Hendrix DO   Referring Provider:      Reason for Consultation:   Chief Complaint   Patient presents with    Hematuria     PT STATES HE IS ANEMIC AND STATES HE HAS BEEN URINATING BLOOD FOR 1 WEEK. PT REPORTS FEELNG WEAKNESS      Subjective:  Mentation improved today. AAO x 3. Denies pain    History of Present Illness:  60-year-old man with past medical history of aggressive bulky diffuse large B-cell lymphoma, non-germinal cell type with negative FISH interface for double hit or triple hit lymphoma with bulky intra-abdominal disease on presentation. He has been on combination chemotherapy with R-CHOP along with Revlimid. This has been complicated by significant pancytopenia is despite G-CSF prophylaxis. He has completed 1 week ago cycle #5 of R-CHOP/Revlimid  He was hospitalized to the emergency room with weakness fatigue and dizziness  He also reports gross hematuria a few days duration and has been on anticoagulation with Xarelto  Patient seen by cardiology and EPS for his arrhythmias.   Xarelto held because of thrombocytopenia and concurrent hematuria and will be resumed when thrombocytopenia improves and hematuria resolves    His CBC today shows a hemoglobin of 7.1  Platelets remain low at 15 with a WBC count of 0.1 despite G-CSF prophylaxis    Diagnostic Data:     Past Medical History:   Diagnosis Date    Arthritis     KNEES HIPS BACK    BPH (benign prostatic hyperplasia)     Cancer ([address]     Coronary artery disease     Difficult airway     PT STATES HE WAS TOLD THIS TW[facility]    Difficult intubation 04/2017    note in care everywhere \"Clinical Summary\" 03/10/2021    History of blood transfusion     Hyperlipidemia     Hypertension     Sinus tachycardia 01/01/2002       Patient Active Problem List    Diagnosis Date Noted    Severe protein-calorie malnutrition ([address] 07/06/2021    Thrombocytopenia ([facility] 07/05/2021    Anemia 05/06/2021    Diffuse large B cell lymphoma ([facility] 03/31/2021    Diffuse large B-cell lymphoma ([facility] 03/30/2021    Prolonged Q-T interval on ECG 03/29/2021    Hypotension 03/29/2021    CKD (chronic kidney disease) stage 3, GFR 30-59 ml/min [facility] 03/29/2021    Paroxysmal atrial fibrillation ([facility] 03/29/2021    Colonic mass 03/11/2021    Hypertension     Hyperlipidemia     Coronary artery disease     SUNNY (acute kidney injury) ([facility] 02/14/2021    Hematuria 01/16/2018    BPH (benign prostatic hyperplasia) 01/16/2018        Past Surgical History:   Procedure Laterality Date    ABLATION OF DYSRHYTHMIC FOCUS      AORTA SURGERY      aortic valve replacement    AORTIC VALVE REPLACEMENT      BLADDER SURGERY Right 2/17/2021    CYSTOSCOPY BILATERAL RETROGRADE PYELOGRAM RIGHT STENT INSERTION performed by Daniel Simpson MD at [address] [place] [address]  2/19/2021    COLONOSCOPY WITH BIOPSY performed by Opal Farrar DO at [address] [facility]  2/19/2021    COLONOSCOPY W/ ENDOSCOPIC MUCOSAL RESECTION performed by Opal Farrar DO at [address] N/A 3/11/2021    LAPAROSCOPIC RIGHT HEMICOLECTOMY performed by Christel Ponce MD at [address] [facility] OTHER SURGICAL HISTORY  08/12/2016    CT Myelogram, Dr. Macrina Powers  2014 new battery    last checked Dr Yari Bower 1/2016?     PORT SURGERY Right 4/1/2021    MEDI PORT INSERTION performed by Christel Ponce MD at [address]  [facility] ENDOSCOPY      reflux    UPPER GASTROINTESTINAL ENDOSCOPY N/A 2/16/2021    EGD BIOPSY performed by Sisi Rogers MD at [facility]         Family History  Family History   Problem Relation Age of Onset    Diabetes Mother     Stroke Mother     Diabetes Father     Heart Disease Father     Brain Cancer Sister  Stroke Sister     Stroke Brother     Cancer Maternal Grandfather        Social History    TOBACCO:   reports that he quit smoking about 7 years ago. He has a 44.00 pack-year smoking history. He has never used smokeless tobacco.  ETOH:   reports previous alcohol use of about 2.0 standard drinks of alcohol per week. Home Medications  Prior to Admission medications    Medication Sig Start Date End Date Taking? Authorizing Provider   XARELTO 20 MG TABS tablet 20 mg Daily with supper  4/4/21  Yes Historical Provider, MD   sotalol (BETAPACE) 80 MG tablet Take 0.5 tablets by mouth 2 times daily 4/3/21  Yes Evangelista Almanzar MD   lisinopril-hydroCHLOROthiazide (PRINZIDE;ZESTORETIC) 10-12.5 MG per tablet Take 1 tablet by mouth daily    Yes Historical Provider, MD   amLODIPine (NORVASC) 5 MG tablet Take 5 mg by mouth daily    Yes Historical Provider, MD   vitamin B-12 (CYANOCOBALAMIN) 100 MCG tablet Take 50 mcg by mouth daily   Yes Historical Provider, MD   sennosides-docusate sodium (SENOKOT-S) 8.6-50 MG tablet Take 1 tablet by mouth 2 times daily   Yes Historical Provider, MD   tamsulosin (FLOMAX) 0.4 MG capsule Take 0.4 mg by mouth daily  1/4/18  Yes Historical Provider, MD   ferrous sulfate 325 (65 Fe) MG tablet Take 325 mg by mouth 2 times daily  1/8/18  Yes Historical Provider, MD   esomeprazole Magnesium (NEXIUM) 20 MG PACK Take 20 mg by mouth daily   Yes Historical Provider, MD   PRAVASTATIN SODIUM PO Take 20 mg by mouth daily    Yes Historical Provider, MD   Albuterol Sulfate (PROAIR HFA IN) Inhale into the lungs    Historical Provider, MD   ondansetron (ZOFRAN ODT) 4 MG disintegrating tablet Take 1 tablet by mouth every 8 hours as needed for Nausea or Vomiting 3/13/21   Christel Ponce MD       Allergies  No Known Allergies    Review of Systems: Relevant for fatigue, weakness, dizziness, lightheadedness and hematuria. Confused today.  Constitutional:  No fever chills or rigors.     Eyes: No changes in vision, discharge, or pain   ENT: No Headaches, hearing loss or vertigo. No mouth sores or sore throat. No change in taste or smell.  Cardiovascular: No chest discomfort, dyspnea on exertion, palpitations or loss of consciousness. or phlebitis.  Respiratory: Has no cough or wheezing, Has no sputum production. Has no hemoptysis, Has no pleuritic pain, .  Gastrointestinal: No abdominal pain, appetite loss, blood in stools. No change in bowel habits. No hematemesis    Genitourinary: Patient acknowledges no dysuria, trouble voiding, or hematuria. No nocturia or increased frequency.  Musculoskeletal: No gait disturbance, weakness or joint complaints.  Integumentary: No rash or pruritis.  Neurological: No headache, diplopia, change in muscle strength, numbness or tingling. No change in gait, balance, coordination, mood, affect, memory, mentation, behavior.  Psychiatric: No anxiety, or depression.  Endocrine: No temperature intolerance. No excessive thirst, fluid intake, or urination. No tremor.  Hematologic/Lymphatic: No abnormal bruising or bleeding, blood clots or swollen lymph nodes.  Allergic/Immunologic: No nasal congestion or hives. Objective  /70   Pulse 98   Temp 97.8 °F (36.6 °C) (Temporal)   Resp 16   Ht 5' 7\" (1.702 m)   Wt 181 lb 11.2 oz (82.4 kg)   SpO2 100%   BMI 28.46 kg/m²     Physical Exam:    General: Patient was confused when I went to examine the patient. He could not tell me where he was and was having trouble finding words. No focal weakness numbness. No headaches. He has been afebrile. Head and neck : PERRLA, EOMI . Sclera non icteric. Oropharynx : Clear  Neck: no JVD,  no adenopathy, no carotid bruit  Heart: Irregular  Lungs: Clear to auscultation   Extremities: No edema,no cyanosis, no clubbing. Abdomen: Soft, non-tender;no masses, no organomegaly  Skin:  No rash. Neurologic:Cranial nerves grossly intact.  No focal motor or sensory deficits .    Recent Laboratory Data-   Lab Results   Component Value Date    WBC 0.2 (LL) 07/08/2021    HGB 6.6 (LL) 07/08/2021    HCT 20.8 (L) 07/08/2021    MCV 93.7 07/08/2021    PLT 13 (LL) 07/08/2021    LYMPHOPCT 43.0 (H) 07/08/2021    RBC 2.22 (L) 07/08/2021    MCH 29.7 07/08/2021    MCHC 31.7 (L) 07/08/2021    RDW 17.1 (H) 07/08/2021    NEUTOPHILPCT 33.0 (L) 07/08/2021    MONOPCT 19.0 (H) 07/08/2021    BASOPCT 0.0 07/08/2021    NEUTROABS 0.07 (L) 07/08/2021    LYMPHSABS 0.09 (L) 07/08/2021    MONOSABS 0.04 (L) 07/08/2021    EOSABS 0.00 (L) 07/08/2021    BASOSABS 0.00 07/08/2021       Lab Results   Component Value Date     07/08/2021    K 3.0 (L) 07/08/2021     07/08/2021    CO2 32 (H) 07/08/2021    BUN 17 07/08/2021    CREATININE 0.9 07/08/2021    GLUCOSE 96 07/08/2021    CALCIUM 9.1 07/08/2021    PROT 5.5 (L) 07/06/2021    LABALBU 3.2 (L) 07/06/2021    BILITOT 1.5 (H) 07/06/2021    ALKPHOS 66 07/06/2021    AST 11 07/06/2021    ALT 19 07/06/2021    LABGLOM >60 07/08/2021    GFRAA >60 07/08/2021       Lab Results   Component Value Date    IRON 38 (L) 02/16/2021    TIBC 248 (L) 02/16/2021           Radiology-    XR CHEST PORTABLE    Result Date: 7/5/2021  EXAMINATION: ONE XRAY VIEW OF THE CHEST 7/5/2021 6:31 pm COMPARISON: April 1, 2021 HISTORY: ORDERING SYSTEM PROVIDED HISTORY: dizziness TECHNOLOGIST PROVIDED HISTORY: If in ER room at the time of exam, OK to change to portable 1 view Reason for exam:->dizziness What reading provider will be dictating this exam?->CRC FINDINGS: Left pacemaker. Median sternotomy wires are present. The heart is normal in size. There is prominence of pulmonary vasculature. Interstitial and hazy opacities are present in the lung bases. No obvious pleural effusion. No pneumothorax. Right MediPort present. Interstitial prominence bilaterally related to pulmonary vascular congestion with pneumonia or subsegmental atelectasis in lung bases.      PET CT SKULL BASE TO MID THIGH    Result Date: 6/21/2021  EXAMINATION: Skull base to midthigh PET/CT 6/18/2021 TECHNIQUE: Following IV injection of 15.5 mCi of F 18 -FDG, PET  tumor imaging was acquired from the base of the skull to the mid thighs. Computed tomography was used for purposes of attenuation correction and anatomic localization. Fusion imaging was utilized for interpretation. Uptake time 52 mins. Glucose level 100 mg/dl. COMPARISON: CT abdomen pelvis dated 03/29/2021, CT neck dated 06/01/2021 HISTORY: ORDERING SYSTEM PROVIDED HISTORY: Diffuse large B-cell lymphoma of lymph nodes of multiple sites [facility]) TECHNOLOGIST PROVIDED HISTORY: Reason for exam:->Diffuse large B-cell lymphoma of lymph nodes of multiple sites [facility]) What reading provider will be dictating this exam?->CRC FINDINGS: HEAD/NECK: In the craniocervical soft tissues, physiologic activity is favored. Bilateral carotid artery calcification. CHEST: Port is seen in the right chest wall. Pacemaker in the left chest wall. Status post median sternotomy. Within the mediastinum, no kelley activity markedly greater than mediastinal background. No axillary adenopathy. Soft tissue activity about the shoulders bilaterally, typically inflammatory. Bilateral gynecomastia. Emphysema. Small bilateral pleural effusions. Scattered ground-glass opacity, likely atelectasis. No pneumothorax. The pleuroparenchymal nodule at the left lung base on the prior CT is not well seen on the current exam, potentially obscured by the pleural effusion. ABDOMEN/PELVIS: The previously demonstrated extensive abdominal and pelvic adenopathy as well as small bowel nodularity seen on the prior CT is not observed on current. No hypermetabolic adenopathy is seen. Right nephroureteral stent is demonstrated. Excretion is seen from the kidneys bilaterally and activity is seen within the urinary bladder. Bowel activity seen which can be physiologically variable, including at the distal rectum. Calcification of the abdominal aorta and iliac arteries. Diverticulosis. BONES/SOFT TISSUE: Diffuse activity is seen throughout regional bone marrow, relatively symmetrically. With regard to patient's history of lymphoma, no hypermetabolic adenopathy is seen, compatible with favorable treatment response. Diffuse symmetric activity throughout regional bone marrow, which can be seen in the setting of recent chemotherapy, colony-stimulating factor usage, or anemia. Small bilateral pleural effusions. ASSESSMENT/PLAN : 78-year-old man    High-grade aggressive diffuse large B-cell lymphoma, non-germinal cell type, ABC type status post cycle #5 of chemotherapy with R-CHOP/Revlimid. Double hit/triple hit lymphoma have been ruled out    Pancytopenia secondary to chemotherapy  Hematuria related to thrombocytopenia and the fact that he is anticoagulated with Xarelto for his chronic A. Fib    Appreciate cardiology and EPS input    Hold Xarelto  It may be resumed when gross hematuria resolves and platelet count improves above 50    Initiate G-CSF for persistent severe neutropenia despite Neulasta prophylaxis  Monitor for potential neutropenic fevers  Transfuse with 1 unit of packed RBC because of fatigue and cardiovascular compromise and 1 unit of platelet because of his gross hematuria and thrombocytopenia  We will follow    7/7/2021. Patient was confused when I went to examine the patient. He could not tell me where he was and was having trouble finding words. No focal weakness numbness. Neuro exam was non focal. No headaches. He has been afebrile. Will r/o sepsis. Blood cultures chest xray ct head without contrast requested. Profound neutropenia > 7 days since chemo. Continue Granix. Will start prophylactic Levaquin after cultures are drawn. Platelets 29P. No active bleeding bruising. Xarelto has been held for now. 7/8/21  - Continue with profound pancytopenia. WBC 0.2, ANC 70. Hgb 6.6, platelets 13. Getting 1 unit pRBC  - Trend CBC daily, transfuse for Hgb <7, platelets <33 or <04 with active bleeding  - Continue granix 480 mcg daily  - BCx showing GNR (Klebsiella, Pseudomonas, Serratia marcescens by PCR)  - ID consulted, now on merrem.  Alejandro [place]  - Vascular consulted to remove PORT, agree    Electronically signed 7/8/2021 at 4:59 PM   Peggy Ugarte MD

## 2021-07-08 NOTE — CONSULTS
Pulmonary [facility]                             Pulmonary Consult/Progress Note :          Patient: Liam Valencia  MRN: [id number]  : 1950      Date of Admission: .2021  5:44 PM    Consulting Physician:Dr Shaw         Reason for Consultation:POssible need for Bronch,Lymphoma   CC : SOB ,cough frothy secretion   HPI:   Liam Valencia is a 80y.o. year old Patient is a 78-year-old male who has 30 PPY smoking history and has h/o  CAD, large B cell lymphoma and BPH. Patient presented to the ED on 2021 with complaints of hematuria . Patient states this has been ongoing for approximately 1 week. He  also complained of dizziness and weakness. Patient currently takes Xarelto. Patient was diagnosed with large B-cell lymphoma in March and began chemo. Patient has a left chest Mediport in her right chest pacemaker. He denies any melena, hematemesis, chest pain, shortness of breath, fever, and chills. Approximately 3 months ago patient had a cystoscopy with a stent placement and states the stent has not been removed. ER work-up reveals hemoglobin of 7.4 and WBC of 0.5, with a platelet count of 15. Patient's last chemo was on 2021. Patient was admitted to telemetry floor for further testing and treatment.     Patient had PET         PAST MEDICAL HISTORY:   Past Medical History:   Diagnosis Date    Arthritis     KNEES HIPS BACK    BPH (benign prostatic hyperplasia)     Cancer ([facility] [address]     Coronary artery disease     Difficult airway     PT STATES HE WAS TOLD THIS TW[facility]    Difficult intubation 2017    note in care everywhere \"Clinical Summary\" 03/10/2021    History of blood transfusion     Hyperlipidemia     Hypertension     Sinus tachycardia 2002       PAST SURGICAL HISTORY:   Past Surgical History:   Procedure Laterality Date    ABLATION OF DYSRHYTHMIC FOCUS      AORTA SURGERY      aortic valve replacement    AORTIC VALVE REPLACEMENT      BLADDER SURGERY Right 2021    CYSTOSCOPY BILATERAL RETROGRADE PYELOGRAM RIGHT STENT INSERTION performed by Najma Cerna MD at [address] [facility] [place] COLONOSCOPY  2021    COLONOSCOPY WITH BIOPSY performed by Tejas Donovan DO at [address]  2021    COLONOSCOPY W/ ENDOSCOPIC MUCOSAL RESECTION performed by Tejas Donovan DO at [address] N/A 3/11/2021    LAPAROSCOPIC RIGHT HEMICOLECTOMY performed by Jeff Montero MD at [address] OTHER SURGICAL HISTORY  2016    CT Myelogram, Dr. Ari Jackson  2014 new battery    last checked Dr Paulette Escudero 2016?     PORT SURGERY Right 2021    MEDI PORT INSERTION performed by Jeff Montero MD at [address]  [facility] ENDOSCOPY      reflux    UPPER GASTROINTESTINAL ENDOSCOPY N/A 2021    EGD BIOPSY performed by Tracie Scott MD at [facility]:   Family History   Problem Relation Age of Onset    Diabetes Mother     Stroke Mother     Diabetes Father     Heart Disease Father     Brain Cancer Sister     Stroke Sister     Stroke Brother     Cancer Maternal Grandfather        SOCIAL HISTORY:   Social History     Socioeconomic History    Marital status:      Spouse name: Not on file    Number of children: Not on file    Years of education: Not on file    Highest education level: Not on file   Occupational History    Not on file   Tobacco Use    Smoking status: Former Smoker     Packs/day: 1.00     Years: 44.00     Pack years: 44.00     Quit date: 3/4/2014     Years since quittin.3    Smokeless tobacco: Never Used   Vaping Use    Vaping Use: Some days    Substances: Nicotine, Flavoring, 6% nicotine    Devices: Pre-filled or refillable cartridge   Substance and Sexual Activity    Alcohol use: Not Currently Alcohol/week: 2.0 standard drinks     Types: 2 Shots of liquor per week     Comment: monthly; no caffeine    Drug use: No    Sexual activity: Not on file   Other Topics Concern    Not on file   Social History Narrative    Not on file     Social Determinants of Health     Financial Resource Strain:     Difficulty of Paying Living Expenses:    Food Insecurity:     Worried About Running Out of Food in the Last Year:     [address] in the Last Year:    Transportation Needs:     Lack of Transportation (Medical):  Lack of Transportation (Non-Medical):    Physical Activity:     Days of Exercise per Week:     Minutes of Exercise per Session:    Stress:     Feeling of Stress :    Social Connections:     Frequency of Communication with Friends and Family:     Frequency of Social Gatherings with Friends and Family:     Attends [employer] Services:     Active Member of Clubs or Organizations:     Attends Club or Organization Meetings:     Marital Status:    Intimate Partner Violence:     Fear of Current or Ex-Partner:     Emotionally Abused:     Physically Abused:     Sexually Abused:      Social History     Tobacco Use   Smoking Status Former Smoker    Packs/day: 1.00    Years: 44.00    Pack years: 44.00    Quit date: 3/4/2014    Years since quittin.3   Smokeless Tobacco Never Used     Social History     Substance and Sexual Activity   Alcohol Use Not Currently    Alcohol/week: 2.0 standard drinks    Types: 2 Shots of liquor per week    Comment: monthly; no caffeine     Social History     Substance and Sexual Activity   Drug Use No                 HOME MEDICATIONS:  Prior to Admission medications    Medication Sig Start Date End Date Taking?  Authorizing Provider   XARELTO 20 MG TABS tablet 20 mg Daily with supper  21  Yes Historical Provider, MD   sotalol (BETAPACE) 80 MG tablet Take 0.5 tablets by mouth 2 times daily 4/3/21  Yes Astrid Moore MD   lisinopril-hydroCHLOROthiazide (PRINZIDE;ZESTORETIC) 10-12.5 MG per tablet Take 1 tablet by mouth daily    Yes Historical Provider, MD   amLODIPine (NORVASC) 5 MG tablet Take 5 mg by mouth daily    Yes Historical Provider, MD   vitamin B-12 (CYANOCOBALAMIN) 100 MCG tablet Take 50 mcg by mouth daily   Yes Historical Provider, MD   sennosides-docusate sodium (SENOKOT-S) 8.6-50 MG tablet Take 1 tablet by mouth 2 times daily   Yes Historical Provider, MD   tamsulosin (FLOMAX) 0.4 MG capsule Take 0.4 mg by mouth daily  1/4/18  Yes Historical Provider, MD   ferrous sulfate 325 (65 Fe) MG tablet Take 325 mg by mouth 2 times daily  1/8/18  Yes Historical Provider, MD   esomeprazole Magnesium (NEXIUM) 20 MG PACK Take 20 mg by mouth daily   Yes Historical Provider, MD   PRAVASTATIN SODIUM PO Take 20 mg by mouth daily    Yes Historical Provider, MD   Albuterol Sulfate (PROAIR HFA IN) Inhale into the lungs    Historical Provider, MD   ondansetron (ZOFRAN ODT) 4 MG disintegrating tablet Take 1 tablet by mouth every 8 hours as needed for Nausea or Vomiting 3/13/21   Sidra Hickey MD       CURRENT MEDICATIONS:  Current Facility-Administered Medications: 0.9 % sodium chloride infusion, , Intravenous, PRN  0.9 % sodium chloride infusion, , Intravenous, PRN  meropenem (MERREM) 1,000 mg in sodium chloride 0.9 % 100 mL IVPB (mini-bag), 1,000 mg, Intravenous, Q8H  nystatin (MYCOSTATIN) 609780 UNIT/ML suspension 500,000 Units, 5 mL, Oral, 4x Daily  metoprolol succinate (TOPROL XL) extended release tablet 25 mg, 25 mg, Oral, BID  metoprolol (LOPRESSOR) injection 2.5 mg, 2.5 mg, Intravenous, Q6H PRN  lisinopril (PRINIVIL;ZESTRIL) tablet 10 mg, 10 mg, Oral, Daily **AND** hydroCHLOROthiazide (HYDRODIURIL) tablet 12.5 mg, 12.5 mg, Oral, Daily  potassium chloride (KLOR-CON M) extended release tablet 40 mEq, 40 mEq, Oral, PRN **OR** potassium bicarb-citric acid (EFFER-K) effervescent tablet 40 mEq, 40 mEq, Oral, PRN **OR** potassium chloride 10 mEq/100 mL IVPB (Peripheral Line), 10 mEq, Intravenous, PRN  Tbo-Filgrastim (GRANIX) injection 480 mcg, 480 mcg, Subcutaneous, QPM  0.9 % sodium chloride infusion, , Intravenous, PRN  0.9 % sodium chloride infusion, , Intravenous, PRN  0.9 % sodium chloride infusion, , Intravenous, PRN  albuterol (PROVENTIL) nebulizer solution 2.5 mg, 2.5 mg, Nebulization, 4x daily  amLODIPine (NORVASC) tablet 5 mg, 5 mg, Oral, Daily  sodium chloride flush 0.9 % injection 5-40 mL, 5-40 mL, Intravenous, 2 times per day  sodium chloride flush 0.9 % injection 5-40 mL, 5-40 mL, Intravenous, PRN  0.9 % sodium chloride infusion, 25 mL, Intravenous, PRN  polyethylene glycol (GLYCOLAX) packet 17 g, 17 g, Oral, Daily PRN  acetaminophen (TYLENOL) tablet 650 mg, 650 mg, Oral, Q6H PRN **OR** acetaminophen (TYLENOL) suppository 650 mg, 650 mg, Rectal, Q6H PRN  famotidine (PEPCID) tablet 20 mg, 20 mg, Oral, BID  potassium chloride (KLOR-CON M) extended release tablet 20 mEq, 20 mEq, Oral, Daily  promethazine (PHENERGAN) tablet 12.5 mg, 12.5 mg, Oral, Q6H PRN    IV MEDICATIONS:   sodium chloride      sodium chloride      sodium chloride      sodium chloride      sodium chloride      sodium chloride         ALLERGIES:  No Known Allergies    REVIEW OF SYSTEMS:  General ROS:  No weight loss ,no fatigue     ENT ROS:   No Sore throat ,no lymphoadenopathy,no nasal stuffiness     Hematological and Lymphatic ROS:   No ecchymosis ,no tendency to bleed  Respiratory ROS:   SOB   Cardiovascular ROS:   No CP,No Palpitation   Gastrointestinal ROS:   No Gi bleed,no nausea or vomiting      - Musculoskeletal ROS:      - no joint swelling ,no joint pain   Neurological ROS:     -no weakness or numbness    Dermatological ROS:   No skin rash ,no urticaria     PHYSICAL EXAMINATION:     VITAL SIGNS:  BP (!) 141/80   Pulse 96   Temp 98.6 °F (37 °C)   Resp 18   Ht 5' 7\" (1.702 m)   Wt 181 lb 11.2 oz (82.4 kg)   SpO2 100%   BMI 28.46 kg/m²   Wt Readings from Last 3 Encounters:   07/08/21 181 lb 11.2 oz (82.4 kg)   06/15/21 210 lb (95.3 kg)   03/31/21 200 lb (90.7 kg)     Temp Readings from Last 3 Encounters:   07/08/21 98.6 °F (37 °C)   06/28/21 98.9 °F (37.2 °C) (Temporal)   06/15/21 98.4 °F (36.9 °C) (Temporal)     TMAX:  BP Readings from Last 3 Encounters:   07/08/21 (!) 141/80   06/28/21 (!) 114/59   06/15/21 (!) 165/79     Pulse Readings from Last 3 Encounters:   07/08/21 96   06/28/21 70   06/15/21 77           INTAKE/OUTPUTS:  I/O last 3 completed shifts:   In: 693 [I.V.:702; IV Piggyback:90]  Out: 200 [Urine:200]    Intake/Output Summary (Last 24 hours) at 7/8/2021 1254  Last data filed at 7/8/2021 0845  Gross per 24 hour   Intake 912 ml   Output 200 ml   Net 712 ml       General Appearance: alert and oriented to person, place and time, well-developed and   well-nourished, in no acute distress   Eyes: pupils equal, round, and reactive to light, extraocular eye movements intact, conjunctivae normal and sclera anicteric   Neck: neck supple and non tender without mass, no thyromegaly, no thyroid nodules and no cervical adenopathy   Pulmonary/Chest:crackles rhonchi   Cardiovascular: normal rate, regular rhythm, normal S1 and S2, no murmurs, rubs, clicks or gallops, distal pulses intact, no carotid bruits, no murmurs, no gallops, no carotid bruits and no JVD   Abdomen: obese, soft, non-tender, non-distended, normal bowel sounds, no masses or organomegaly   Extremities:no edema   Musculoskeletal: normal range of motion, no joint swelling, deformity or tenderness   Neurologic: reflexes normal and symmetric, no cranial nerve deficit noted    LABS/IMAGING:    CBC:  Lab Results   Component Value Date    WBC 0.2 (LL) 07/08/2021    HGB 6.6 (LL) 07/08/2021    HCT 20.8 (L) 07/08/2021    MCV 93.7 07/08/2021    PLT 13 (LL) 07/08/2021    LYMPHOPCT 76.0 (H) 07/07/2021    RBC 2.22 (L) 07/08/2021    MCH 29.7 07/08/2021    MCHC 31.7 (L) 07/08/2021    RDW 17.1 (H) 07/08/2021    NEUTOPHILPCT 1.0 (L) 07/07/2021 MONOPCT 20.0 (H) 07/07/2021    BASOPCT 0.0 07/07/2021    NEUTROABS 0.00 (LL) 07/07/2021    LYMPHSABS 0.08 (L) 07/07/2021    MONOSABS 0.02 (L) 07/07/2021    EOSABS 0.00 (L) 07/07/2021    BASOSABS 0.00 07/07/2021       Recent Labs     07/08/21  0550 07/07/21  0550 07/07/21  0120 07/06/21  0849 07/06/21  0849   WBC 0.2* 0.1*  --   --  0.1*   HGB 6.6* 7.4* 7.3*   < > 7.1*   HCT 20.8* 23.2* 22.5*   < > 23.2*   MCV 93.7 93.2  --   --  95.9   PLT 13* 22*  --   --  15*    < > = values in this interval not displayed.        BMP:   Recent Labs     07/06/21  0849 07/07/21  0550 07/08/21  0550    141 146   K 3.4* 3.1* 3.0*    100 103   CO2 32* 34* 32*   BUN 24* 16 17   CREATININE 0.6* 0.7 0.9       MG:   Lab Results   Component Value Date    MG 2.0 07/08/2021     Ca/Phos:   Lab Results   Component Value Date    CALCIUM 9.1 07/08/2021    PHOS 3.2 04/03/2021     Amylase: No results found for: AMYLASE  Lipase: No results found for: LIPASE  LIVER PROFILE:   Recent Labs     07/05/21  1730 07/06/21  0849   AST 13 11   ALT 19 19   BILITOT 1.0 1.5*   ALKPHOS 67 66       PT/INR:   Recent Labs     07/06/21  0849   PROTIME 12.8*   INR 1.2     APTT:   Recent Labs     07/06/21  0849   APTT 31.5       Cardiac Enzymes:  Lab Results   Component Value Date    TROPONINI <0.01 02/14/2021               PET and CT reviewed     PROBLEM LIST:  Patient Active Problem List   Diagnosis    Hematuria    BPH (benign prostatic hyperplasia)    [facility]) ([facility] [address]    Colonic mass    Hypertension    Hyperlipidemia    Coronary artery disease    Prolonged Q-T interval on ECG    Hypotension    CKD (chronic kidney disease) stage 3, GFR 30-59 ml/min (HCC)    Paroxysmal atrial fibrillation (HCC)    Diffuse large B-cell lymphoma (HCC)    Diffuse large B cell lymphoma (HCC)    Severe protein-calorie malnutrition (HCC)    Anemia    Thrombocytopenia (HCC)               ASSESSMENT:  1.) Possible fluid overload /Pulmonary edema  2. )COPD  3.)h/p Lymphoma   4.)Pancytopenia   5.)Possible Pneumonia       PLAN:  *-in Terms of lymphoma-I doubt that there is recurrence given recent PET scan showing no active Lymph nodes  *-I agree He  may benefit from bronchoscopy giving his heavy secretions, I looked at his sputum is frothy suggestive of fluid overload and pulmonary edema I would just like to avoid bronchoscopy because of his leukopenia and low hemoglobin but if not improving I would definitely proceed with bronchoscopy  *-with very high proBNP and his heavy frothy secretions believe that he needs more diuresis  *-Work-up for COPD bronchodilator  *_hold on Steroids for now  Sputum culture  ID to see   Replace K and monitor closely   Mag as well   ICS  BD  Work up fr infection    Thank you very much for allowing me to participate in the care of this pleasant patient , should you have any questions ,please do not hesitate to contact me      [address]  Pulmonary&Critical Care Medicine   Director of [address] [place] [address] Director of [address] [place] [address]    Jing Portillo    NOTE: This report was transcribed using voice recognition software. Every effort was made to ensure accuracy; however, inadvertent computerized transcription errors may be present.

## 2021-07-08 NOTE — PATIENT CARE CONFERENCE
[facility] Quality Flow/Interdisciplinary Rounds Progress Note        Quality Flow Rounds held on July 8, 2021    Disciplines Attending:  Bedside Nurse, ,  and Nursing Unit Leadership    Bernadine Martell was admitted on 7/5/2021  5:44 PM    Anticipated Discharge Date:  Expected Discharge Date: 07/09/21    Disposition:    Osman Score:  Osman Scale Score: 20    Readmission Risk              Risk of Unplanned Readmission:  24           Discussed patient goal for the day, patient clinical progression, and barriers to discharge.   The following Goal(s) of the Day/Commitment(s) have been identified:  monitor for bleeding and report labs to physician      Srini Borrero RN  July 8, 2021

## 2021-07-09 LAB
ABO/RH: NORMAL
ANION GAP SERPL CALCULATED.3IONS-SCNC: 11 MMOL/L (ref 7–16)
ANTIBODY SCREEN: NORMAL
BLOOD BANK DISPENSE STATUS: NORMAL
BLOOD BANK PRODUCT CODE: NORMAL
BPU ID: NORMAL
BUN BLDV-MCNC: 20 MG/DL (ref 6–23)
C-REACTIVE PROTEIN: 42 MG/DL (ref 0–0.4)
CALCIUM SERPL-MCNC: 9.2 MG/DL (ref 8.6–10.2)
CHLORIDE BLD-SCNC: 100 MMOL/L (ref 98–107)
CO2: 32 MMOL/L (ref 22–29)
CREAT SERPL-MCNC: 0.9 MG/DL (ref 0.7–1.2)
DESCRIPTION BLOOD BANK: NORMAL
GFR AFRICAN AMERICAN: >60
GFR NON-AFRICAN AMERICAN: >60 ML/MIN/1.73
GLUCOSE BLD-MCNC: 102 MG/DL (ref 74–99)
HCT VFR BLD CALC: 23.7 % (ref 37–54)
HEMOGLOBIN: 7.6 G/DL (ref 12.5–16.5)
MCH RBC QN AUTO: 29.9 PG (ref 26–35)
MCHC RBC AUTO-ENTMCNC: 32.1 % (ref 32–34.5)
MCV RBC AUTO: 93.3 FL (ref 80–99.9)
PDW BLD-RTO: 16.4 FL (ref 11.5–15)
PLATELET # BLD: 11 E9/L (ref 130–450)
PLATELET CONFIRMATION: NORMAL
PMV BLD AUTO: ABNORMAL FL (ref 7–12)
POTASSIUM SERPL-SCNC: 4 MMOL/L (ref 3.5–5)
PROCALCITONIN: 9.04 NG/ML (ref 0–0.08)
RBC # BLD: 2.54 E12/L (ref 3.8–5.8)
SODIUM BLD-SCNC: 143 MMOL/L (ref 132–146)
WBC # BLD: 0.8 E9/L (ref 4.5–11.5)

## 2021-07-09 PROCEDURE — 99233 SBSQ HOSP IP/OBS HIGH 50: CPT | Performed by: INTERNAL MEDICINE

## 2021-07-09 PROCEDURE — 36430 TRANSFUSION BLD/BLD COMPNT: CPT

## 2021-07-09 PROCEDURE — 99232 SBSQ HOSP IP/OBS MODERATE 35: CPT | Performed by: STUDENT IN AN ORGANIZED HEALTH CARE EDUCATION/TRAINING PROGRAM

## 2021-07-09 PROCEDURE — 36415 COLL VENOUS BLD VENIPUNCTURE: CPT

## 2021-07-09 PROCEDURE — 6360000002 HC RX W HCPCS: Performed by: INTERNAL MEDICINE

## 2021-07-09 PROCEDURE — 6370000000 HC RX 637 (ALT 250 FOR IP): Performed by: INTERNAL MEDICINE

## 2021-07-09 PROCEDURE — 2580000003 HC RX 258: Performed by: PHYSICIAN ASSISTANT

## 2021-07-09 PROCEDURE — 2140000000 HC CCU INTERMEDIATE R&B

## 2021-07-09 PROCEDURE — 2500000003 HC RX 250 WO HCPCS: Performed by: STUDENT IN AN ORGANIZED HEALTH CARE EDUCATION/TRAINING PROGRAM

## 2021-07-09 PROCEDURE — 86140 C-REACTIVE PROTEIN: CPT

## 2021-07-09 PROCEDURE — 97530 THERAPEUTIC ACTIVITIES: CPT

## 2021-07-09 PROCEDURE — 87206 SMEAR FLUORESCENT/ACID STAI: CPT

## 2021-07-09 PROCEDURE — 6370000000 HC RX 637 (ALT 250 FOR IP): Performed by: NURSE PRACTITIONER

## 2021-07-09 PROCEDURE — 2580000003 HC RX 258: Performed by: INTERNAL MEDICINE

## 2021-07-09 PROCEDURE — 6360000002 HC RX W HCPCS: Performed by: PHYSICIAN ASSISTANT

## 2021-07-09 PROCEDURE — 86901 BLOOD TYPING SEROLOGIC RH(D): CPT

## 2021-07-09 PROCEDURE — 85027 COMPLETE CBC AUTOMATED: CPT

## 2021-07-09 PROCEDURE — 87070 CULTURE OTHR SPECIMN AEROBIC: CPT

## 2021-07-09 PROCEDURE — 87077 CULTURE AEROBIC IDENTIFY: CPT

## 2021-07-09 PROCEDURE — 87186 SC STD MICRODIL/AGAR DIL: CPT

## 2021-07-09 PROCEDURE — 84145 PROCALCITONIN (PCT): CPT

## 2021-07-09 PROCEDURE — 6370000000 HC RX 637 (ALT 250 FOR IP): Performed by: FAMILY MEDICINE

## 2021-07-09 PROCEDURE — 6370000000 HC RX 637 (ALT 250 FOR IP): Performed by: PHYSICIAN ASSISTANT

## 2021-07-09 PROCEDURE — 86923 COMPATIBILITY TEST ELECTRIC: CPT

## 2021-07-09 PROCEDURE — P9016 RBC LEUKOCYTES REDUCED: HCPCS

## 2021-07-09 PROCEDURE — 94640 AIRWAY INHALATION TREATMENT: CPT

## 2021-07-09 PROCEDURE — 80048 BASIC METABOLIC PNL TOTAL CA: CPT

## 2021-07-09 PROCEDURE — 86900 BLOOD TYPING SEROLOGIC ABO: CPT

## 2021-07-09 PROCEDURE — 2700000000 HC OXYGEN THERAPY PER DAY

## 2021-07-09 PROCEDURE — 86850 RBC ANTIBODY SCREEN: CPT

## 2021-07-09 RX ORDER — POTASSIUM CHLORIDE 20 MEQ/1
20 TABLET, EXTENDED RELEASE ORAL 2 TIMES DAILY WITH MEALS
Status: DISCONTINUED | OUTPATIENT
Start: 2021-07-09 | End: 2021-07-14 | Stop reason: HOSPADM

## 2021-07-09 RX ORDER — SODIUM CHLORIDE 9 MG/ML
INJECTION, SOLUTION INTRAVENOUS PRN
Status: DISCONTINUED | OUTPATIENT
Start: 2021-07-09 | End: 2021-07-14 | Stop reason: HOSPADM

## 2021-07-09 RX ORDER — FUROSEMIDE 10 MG/ML
20 INJECTION INTRAMUSCULAR; INTRAVENOUS 2 TIMES DAILY
Status: DISCONTINUED | OUTPATIENT
Start: 2021-07-10 | End: 2021-07-14 | Stop reason: HOSPADM

## 2021-07-09 RX ADMIN — NYSTATIN 500000 UNITS: 100000 SUSPENSION ORAL at 16:52

## 2021-07-09 RX ADMIN — AMLODIPINE BESYLATE 5 MG: 5 TABLET ORAL at 09:18

## 2021-07-09 RX ADMIN — SODIUM CHLORIDE, PRESERVATIVE FREE 10 ML: 5 INJECTION INTRAVENOUS at 14:47

## 2021-07-09 RX ADMIN — NYSTATIN 500000 UNITS: 100000 SUSPENSION ORAL at 13:01

## 2021-07-09 RX ADMIN — METOPROLOL TARTRATE 2.5 MG: 1 INJECTION, SOLUTION INTRAVENOUS at 18:23

## 2021-07-09 RX ADMIN — SODIUM CHLORIDE, PRESERVATIVE FREE 10 ML: 5 INJECTION INTRAVENOUS at 01:03

## 2021-07-09 RX ADMIN — MEROPENEM 1000 MG: 1 INJECTION, POWDER, FOR SOLUTION INTRAVENOUS at 09:17

## 2021-07-09 RX ADMIN — NYSTATIN 500000 UNITS: 100000 SUSPENSION ORAL at 09:16

## 2021-07-09 RX ADMIN — POTASSIUM CHLORIDE 20 MEQ: 1500 TABLET, EXTENDED RELEASE ORAL at 16:53

## 2021-07-09 RX ADMIN — FUROSEMIDE 20 MG: 10 INJECTION, SOLUTION INTRAMUSCULAR; INTRAVENOUS at 01:03

## 2021-07-09 RX ADMIN — ALBUTEROL SULFATE 2.5 MG: 2.5 SOLUTION RESPIRATORY (INHALATION) at 10:40

## 2021-07-09 RX ADMIN — NYSTATIN 500000 UNITS: 100000 SUSPENSION ORAL at 21:44

## 2021-07-09 RX ADMIN — METOPROLOL SUCCINATE 50 MG: 50 TABLET, EXTENDED RELEASE ORAL at 21:46

## 2021-07-09 RX ADMIN — FAMOTIDINE 20 MG: 20 TABLET ORAL at 09:17

## 2021-07-09 RX ADMIN — METOPROLOL SUCCINATE 50 MG: 50 TABLET, EXTENDED RELEASE ORAL at 09:18

## 2021-07-09 RX ADMIN — FAMOTIDINE 20 MG: 20 TABLET ORAL at 21:44

## 2021-07-09 RX ADMIN — FUROSEMIDE 20 MG: 10 INJECTION, SOLUTION INTRAMUSCULAR; INTRAVENOUS at 21:44

## 2021-07-09 RX ADMIN — MEROPENEM 1000 MG: 1 INJECTION, POWDER, FOR SOLUTION INTRAVENOUS at 16:52

## 2021-07-09 RX ADMIN — FUROSEMIDE 20 MG: 10 INJECTION, SOLUTION INTRAMUSCULAR; INTRAVENOUS at 09:18

## 2021-07-09 RX ADMIN — Medication 10 ML: at 09:16

## 2021-07-09 RX ADMIN — SODIUM CHLORIDE, PRESERVATIVE FREE 10 ML: 5 INJECTION INTRAVENOUS at 16:52

## 2021-07-09 RX ADMIN — ALBUTEROL SULFATE 2.5 MG: 2.5 SOLUTION RESPIRATORY (INHALATION) at 16:23

## 2021-07-09 RX ADMIN — LISINOPRIL 10 MG: 10 TABLET ORAL at 09:17

## 2021-07-09 RX ADMIN — POTASSIUM BICARBONATE 40 MEQ: 782 TABLET, EFFERVESCENT ORAL at 01:03

## 2021-07-09 RX ADMIN — FUROSEMIDE 20 MG: 10 INJECTION, SOLUTION INTRAMUSCULAR; INTRAVENOUS at 14:47

## 2021-07-09 RX ADMIN — ALBUTEROL SULFATE 2.5 MG: 2.5 SOLUTION RESPIRATORY (INHALATION) at 13:35

## 2021-07-09 RX ADMIN — ALBUTEROL SULFATE 2.5 MG: 2.5 SOLUTION RESPIRATORY (INHALATION) at 20:21

## 2021-07-09 RX ADMIN — Medication 10 ML: at 21:46

## 2021-07-09 ASSESSMENT — PAIN SCALES - GENERAL
PAINLEVEL_OUTOF10: 0

## 2021-07-09 NOTE — PROGRESS NOTES
Dr. Hdez Luis at bedside. Okay to give one unit of platelets. Wait 30 minutes and then proceed with new type and screen to give 1 unit PRBC's today.

## 2021-07-09 NOTE — PLAN OF CARE
Problem: Falls - Risk of:  Goal: Will remain free from falls  7/9/2021 1838 by Alem Greenwood RN  Outcome: Met This Shift     Problem: Skin Integrity:  Goal: Absence of new skin breakdown  Outcome: Met This Shift     Problem: Musculor/Skeletal Functional Status  Goal: Absence of falls  7/9/2021 1838 by Alem Greenwood RN  Outcome: Met This Shift

## 2021-07-09 NOTE — PROGRESS NOTES
High risk for mediport removal for pancytopenia, specifically bleeding risk with venous catheter removal. No platelets available at this time. Await improvement of anemia and thrombocytopenia prior to mediport removal unless emergent. NPO p midnight pending AM labs.     Daniel Zimmerman MD, MS  Minimally Invasive and Bariatric Surgery  [phone] (p)  7/8/2021  9:00 PM

## 2021-07-09 NOTE — PROGRESS NOTES
Informed consent for blood/bloodproducts verified and in soft chart. Vital signs obtained prior to platelets. One unit of platelets given. This RN present for first fifteen minutes of transfusion to monitor. No adverse effects and patient tolerated entire transfusion without difficulty and repeat vitals obtained post transfusion.

## 2021-07-09 NOTE — PROGRESS NOTES
[address] [facility] and V[facility] Electrophysiology  Progress Note   PATIENT: Shilpi Galvin  MEDICAL RECORD NUMBER: [id number]  DATE OF SERVICE:  7/9/2021  ATTENDING ELECTROPHYSIOLOGIST: Beto Freitas DO   PRIMARY ELECTROPHYSIOLOGIST: ([facility]) Geovani Azul MD   REFERRING PHYSICIAN: Mercedes NATHAN and More Lindquist DO  CHIEF COMPLAINT: Dizzy with weakness and fatigue     Subjective: Shilpi Galvin is a 80 y.o. male with a history of SND s/p St Pramod dc PPM (implant: 2002; generator change: 2014; extraction of RA & RV leads due to noise and inappropriate sensing and implant of new device: 12/17/20), nonvalvular atrial fibrillation sp surgical MAZE and LAAO (8/12/16); CAD s/p CABG (8/12/16: LIMA-LAD), MR sp MVR (8/12/16), AI sp AVR (8/12/16), bladder tumor sp ureteral stent, diffuse large B cell lymphoma, and esophageal stricture. He is managed by Michelle Flores and [facility] ([facility]) with Xarelto 20 daily, Lisinopril, hydrochlorothiazide, Norvasc, Flomax, Nexium, and Zofran. He was previously treated with sotalol, but discontinued in 3/2021 due to QTc prolongation. He is currently undergoing chemotherapy for lymphoma via right subclavian port. He presented on 7/5/21 with chief complaint of weakness and fatigue, as well as hematuria. He had ureteral stent a few months ago. He was noted to be pancytopenic. He was noted to have episodes of ventricular pacing v NSVT, so EP service was consulted by admitting physician. Patient denies any other complaints at this time. On review of [facility] records, he had remote interrogation of pacemaker on 6/25/21, which reported alerts for VHRE (4 episodes, longest 5 hours 6 m was 1:1 irregular tachyarrhythmia), high AT/AF burden (3.7%, longest 4 seconds, EGM consistent with AT), and PMT. PMT thought to be due to VIP with PVARP 175 - 275 msec (VA conduction = 200 msec).  Patient also noted to have elvated RA output due to irregularity of atrial intrinsic MUCOSAL RESECTION performed by Audrey Fang DO at [address] N/A 3/11/2021    LAPAROSCOPIC RIGHT HEMICOLECTOMY performed by Henry Broussard MD at [address] OTHER SURGICAL HISTORY  2016    CT Myelogram, Dr. Michael Byers   new battery    last checked Dr Jessica Chambers 2016?     PORT SURGERY Right 2021    MEDI PORT INSERTION performed by Henry Broussard MD at [address]  [facility] ENDOSCOPY      reflux    UPPER GASTROINTESTINAL ENDOSCOPY N/A 2021    EGD BIOPSY performed by Charla Bradford MD at [address] [facility]        Family History   Problem Relation Age of Onset    Diabetes Mother     Stroke Mother     Diabetes Father     Heart Disease Father     Brain Cancer Sister     Stroke Sister     Stroke Brother     Cancer Maternal Grandfather      Social History     Tobacco Use    Smoking status: Former Smoker     Packs/day: 1.00     Years: 44.00     Pack years: 44.00     Quit date: 3/4/2014     Years since quittin.3    Smokeless tobacco: Never Used   Substance Use Topics    Alcohol use: Not Currently     Alcohol/week: 2.0 standard drinks     Types: 2 Shots of liquor per week     Comment: monthly; no caffeine     Current Facility-Administered Medications   Medication Dose Route Frequency Provider Last Rate Last Admin    0.9 % sodium chloride infusion   Intravenous PRN Chi Galan MD        potassium chloride (KLOR-CON M) extended release tablet 20 mEq  20 mEq Oral BID  Mnuir Perez MD        0.9 % sodium chloride infusion   Intravenous PRN Rahul Narvaez MD        0.9 % sodium chloride infusion   Intravenous PRN Rahul Narvaez MD        meropenem (MERREM) 1,000 mg in sodium chloride 0.9 % 100 mL IVPB (mini-bag)  1,000 mg Intravenous Tonya Fink MD 33.3 mL/hr at 21 1,000 mg at 21    metoprolol succinate (TOPROL XL) extended release tablet 50 mg  50 mg Oral BID Junior Clayton, APRN - CNP   50 mg at 07/09/21 6680    furosemide (LASIX) injection 20 mg  20 mg Intravenous TID Grayson Polanco MD   20 mg at 07/09/21 0918    nystatin (MYCOSTATIN) 668269 UNIT/ML suspension 500,000 Units  5 mL Oral 4x Daily Yoli Felix MD   500,000 Units at 07/09/21 0916    metoprolol (LOPRESSOR) injection 2.5 mg  2.5 mg Intravenous Q6H PRN Francie Chapman DO   2.5 mg at 07/08/21 0012    lisinopril (PRINIVIL;ZESTRIL) tablet 10 mg  10 mg Oral Daily Susan Sers, PA   10 mg at 07/09/21 0917    potassium chloride (KLOR-CON M) extended release tablet 40 mEq  40 mEq Oral PRN He Jayocent Learn, APRN - CNP   40 mEq at 07/07/21 7828    Or    potassium bicarb-citric acid (EFFER-K) effervescent tablet 40 mEq  40 mEq Oral PRN He Jayocent Learn, APRN - CNP   40 mEq at 07/09/21 0103    Or    potassium chloride 10 mEq/100 mL IVPB (Peripheral Line)  10 mEq Intravenous PRN He Jayocent Learn, APRN -  mL/hr at 07/08/21 1430 10 mEq at 07/08/21 1430    albuterol (PROVENTIL) nebulizer solution 2.5 mg  2.5 mg Nebulization 4x daily Susan Sers, PA   2.5 mg at 07/09/21 1040    amLODIPine (NORVASC) tablet 5 mg  5 mg Oral Daily Susan Sers, PA   5 mg at 07/09/21 0918    sodium chloride flush 0.9 % injection 5-40 mL  5-40 mL Intravenous 2 times per day Susan Sers, PA   10 mL at 07/09/21 0916    sodium chloride flush 0.9 % injection 5-40 mL  5-40 mL Intravenous PRN Susan Sers, PA   10 mL at 07/09/21 0103    0.9 % sodium chloride infusion  25 mL Intravenous PRN Susan Sers, PA        polyethylene glycol (GLYCOLAX) packet 17 g  17 g Oral Daily PRN Susan Sers, PA        acetaminophen (TYLENOL) tablet 650 mg  650 mg Oral Q6H PRN Susan VENITA Ambrosio   650 mg at 07/06/21 0057    Or    acetaminophen (TYLENOL) suppository 650 mg  650 mg Rectal Q6H PRN Susan VENITA Ambrosio        famotidine (PEPCID) tablet 20 mg  20 mg Oral BID VENITA Mayer   20 mg at 07/09/21 0917    promethazine (PHENERGAN) tablet 12.5 mg  12.5 mg Oral Q6H PRN VENITA Mayer          No Known Allergies    ROS:  Constitutional: Negative for fever, activity change and appetite change. HENT: Negative for epistaxis. Eyes: Negative for diploplia, blurred vision. Respiratory: Negative for cough, chest tightness, shortness of breath and wheezing. Cardiovascular: pertinent positives in HPI  Gastrointestinal: Negative for abdominal pain and blood in stool. All other review of systems are negative     PHYSICAL EXAM:   Vitals:    07/09/21 0427 07/09/21 0848 07/09/21 1040 07/09/21 1110   BP: 131/83 132/67  (!) 83/51   Pulse:  93  79   Resp: 16 20  20   Temp: 98.4 °F (36.9 °C) 98.2 °F (36.8 °C)  99.3 °F (37.4 °C)   TempSrc: Oral   Temporal   SpO2: 100% 98% 93% 98%   Weight:       Height:       Limited exam due to COVID-19 pandemic. Constitutional: NAD, Alert   Head: Normocephalic and atraumatic. Neck: supple and no JVD present  Cardiovascular: IRIR  Pulmonary/Chest:  No respiratory distress, no audible wheeze  Abdominal: nondistended   Musculoskeletal: Normal range of motion of all extremities  Neurological: Alert & O x 3, grossly intact   Skin: Skin is warm and dry, CIED incision C/D/I without erythema, edema, or drainage  Extremity: no edema   Psychiatric: Normal mood and affect, A&O x3     Data:    Recent Labs     07/07/21  0550 07/07/21  0550 07/08/21  0550 07/08/21 1955 07/09/21  0340   WBC 0.1*  --  0.2*  --  0.8*   HGB 7.4*   < > 6.6* 7.7* 7.6*   HCT 23.2*   < > 20.8* 24.3* 23.7*   PLT 22*  --  13*  --  11*    < > = values in this interval not displayed.      Recent Labs     07/07/21  0550 07/08/21  0550 07/09/21  0340    146 143   K 3.1* 3.0* 4.0    103 100   CO2 34* 32* 32*   BUN 16 17 20   CREATININE 0.7 0.9 0.9   CALCIUM 9.1 9.1 9.2      Lab Results   Component Value Date    MG 2.0 07/08/2021     No results for input(s): TSH in the last 72 hours. No results for input(s): INR in the last 72 hours. Telemetry: SR with PACs/PVCs with rates      Device Interrogation/Reprogramming (7/6/21)  · Make/Model: St Pramod Assurity MRI 2272  · DOI: 12/17/20  · Battery: >95%  · Devonte Anne Marie therapy: DDD  ppm  · Pacing %: RA = 12%, RV = 6.5%  · Lead function:  · RA lead: sensing = 4.1 mV, impedance = 330 ohms, threshold = 0.5 V @ 0.5 msec  · RV lead: sensing = 10.3 mV, impedance = 430 ohms, threshold = 1.0 V @ 0.5 msec  · Lead programming:  · RA lead: sensitivity = 0.5 mV, output = 1.375 V @ 0.5 msec (AUTO)  · RV lead: sensitivity = 2.0 mV, output = 1.125 V @ 0.5 msec (AUTO)  · Arrhythmias: AT/AF burden = 1.9%, MS = 3.1%, 12 VHREs (EGM consistent with AT), PMT  · Reprogramming included: PVARP extended to 325 msec, turn off VIP and atrial cap confirm, and extend pace/sense AV delays to 250/270 msec. · Overall device function is normal  · All device programmable settings were evaluated per above and in the scanned document, along with iterative adjustments (capture thresholds) to assess and select the most appropriate final programming to provide for consistent delivery of the appropriate therapy and to verify function of the device. Assessment/Plan:   1. SND s/p St Pramod dc PPM (implant: 2002; generator change: 2014; extraction of RA & RV leads due to noise and inappropriate sensing and implant of new device: 12/17/20)  -Multiple episodes of PMT. Retrograde testing interrupted by paroxysms of AT, so PVARP extended to 325 msec (fixed). -Reviewed [facility] EP notes and additional changes made per their recommendations.  -Follow-up with Dr Elisabeth Hackett ([facility] EP). 2. GNR bacteremia  -Patient has GNR on 1/1 blood culture. PCR + Enterobacteriaceae, Klebsiella pneumoniae , Pseudomonas aeruginosa, Serratia marcescens. -ID consulted. -Plan for CARMEN. Potentially 7/12/21 to assess for CIED infection (vegetation). He complains of new dysphagia. Recommend evaluation of dysphagia prior to CARMEN.  -Plans for Mediport extraction. 3. AT/PACs  -Continue metoprolol XL 50 mg BID. 4. Nonvalvular atrial fibrillation sp surgical MAZE and LAAO (8/12/16)  -CHADSVASC = 2 (age, CAD). Xarelto held 2/2 hematuria/pancytopenia. Resume when able. -Previously on sotalol, but discontinued due to QTc prolongation in 3/2021. AT/AF burden on device check was 1.9% and appears to be due to AT (see #2). 5.  Hematuria/Pancytopenia  -Management per Primary Service. 6. Dysphagia  -Management per Primary Service.  -Unclear etiology. Thank you for allowing me to participate in your patient's care. Please call me if there are any questions or concerns. Discussed with Dr. Mary Curry.    TRACIE Borja - CNP  Cardiac Electrophysiology  [facility]) Physicians  [facility] [place]: [facility] Electrophysiology  12:42 PM  7/9/2021    Attending Supervising Physicians [facility] [address] Statement  I was present with the nurse practitioner during the history and exam. I discussed the findings and plans with the nurse practitioner and agree as documented in his note     Electronically signed by Veronique Bermudez DO on 7/9/21 at [facility] EDT

## 2021-07-09 NOTE — PLAN OF CARE
Problem: Falls - Risk of:  Goal: Will remain free from falls  Outcome: Met This Shift     Problem: Falls - Risk of:  Goal: Absence of physical injury  Outcome: Met This Shift     Problem: Skin Integrity:  Goal: Absence of new skin breakdown  Outcome: Met This Shift     Problem: Musculor/Skeletal Functional Status  Goal: Absence of falls  Outcome: Met This Shift

## 2021-07-09 NOTE — CARE COORDINATION
SOCIAL WORK/CASEMANAGEMENT TRANSITION OF CARE [id number] [facility], [address], [place], [phone]): [facility] Need orders for nsg with PT and OT. Will need updated fax number and address for Dr. Fanta García. Spoke with pt's daughter, [place], TQ#[phone] regarding discharge planning. Pt anticipated will be here over weekend. OT saw pt today with [facility] of 16/24. I met with pt and son , Neeta Sanabria, who is in from [address]. They are going to complete advance directives over the weekend and on Monday they would like the lisa/shree  to come and witness them. They are requesting a script for grab bars for around toilet and shower /tub. I explained to them that insurance doesn't usually cover these devices and they would have to contact a dme co. To come to the home and assess and install them. I provided a letter to the son for [employer] that both pt and nicola missed a flight due to hospitalization. On Monday we will need PT and OT to see pt to determine discharge needs. Also on Monday pt and family will decide if they want to proceed with c or not. Katie to follow.  JOSE Pozo.7/9/2021

## 2021-07-09 NOTE — PATIENT CARE CONFERENCE
[facility] Quality Flow/Interdisciplinary Rounds Progress Note        Quality Flow Rounds held on July 9, 2021    Disciplines Attending:  Bedside Nurse, ,  and Nursing Unit Leadership    Shilpi Galvin was admitted on 7/5/2021  5:44 PM    Anticipated Discharge Date:  Expected Discharge Date: 07/12/21    Disposition:    Osman Score:  Osman Scale Score: 19    Readmission Risk              Risk of Unplanned Readmission:  24           Discussed patient goal for the day, patient clinical progression, and barriers to discharge.   The following Goal(s) of the Day/Commitment(s) have been identified:  to administer platelets as ordered      Fernanda Orozco RN  July 9, 2021

## 2021-07-09 NOTE — PROGRESS NOTES
Coordination of care discussion and chart review with PM team. LSW met with pt to discuss advance care planning. Pt is alert,orienetd although does have telesitter. We discussed HCPOA and purpose of it, he states it would between one of his two children but would not say whom. Forms left at bedside for pt to review. No immediate PM psychosocial needs identified for Devonte Hernandez.  will remain available for on-going psychosocial support, as needed.

## 2021-07-09 NOTE — CONSULTS
GENERAL SURGERY  CONSULT NOTE  7/9/2021    Physician Consulted: Dr. Marciano Barreto  Reason for Consult: Mediport removal  Referring Physician: Dr. Tyron Lopes    Chief Complaint   Patient presents with    Hematuria     PT STATES HE IS ANEMIC AND STATES HE HAS BEEN URINATING BLOOD FOR 1 WEEK. PT REPORTS FEELNG WEAKNESS         HPI  Aimee Quintana is a 80 y.o. male who presents for evaluation of mediport removal. He was seen in the emergency department on 7/5 for hematuria and weakness. He was found to be anemic and have severe pancytopenia needing platelet transfusion. He was on Xarelto, and that was thought to be contributing to his hematuria, so it was stopped. He is currently being treated for B-cell lymphoma. He has a right subclavian mediport. He became septic most likely secondary to his neutropenia. Cultures are positive for Enterobacteriaceae, Klebsiella pneumoniae, Pseudomonas aeruginosa, Serratia marcescens. He is on meropenem. Mediport removal is being considered due to need for source control. However, due to the pancytopenia, the surgery team was consulted to evaluate. Today, platelets 11 from 13 yesterday, HgB 7.6 from 6.6 yesterday, WBC 0.8 from 0.2 yesterday. He received 2 units of platelets and 1 until of blood today.        Past Medical History:   Diagnosis Date    Arthritis     KNEES HIPS BACK    BPH (benign prostatic hyperplasia)     Cancer ([facility] [address]     Coronary artery disease     Difficult airway     PT STATES HE [facility] TOLD THIS TW[facility]    Difficult intubation 04/2017    note in care everywhere \"Clinical Summary\" 03/10/2021    History of blood transfusion     Hyperlipidemia     Hypertension     Sinus tachycardia 01/01/2002       Past Surgical History:   Procedure Laterality Date    ABLATION OF DYSRHYTHMIC FOCUS      AORTA SURGERY      aortic valve replacement    AORTIC VALVE REPLACEMENT      BLADDER SURGERY Right 2/17/2021    CYSTOSCOPY BILATERAL RETROGRADE PYELOGRAM RIGHT STENT INSERTION performed by Elizabeth Long MD at [address]  2/19/2021    COLONOSCOPY WITH BIOPSY performed by Carol Mesa DO at [address]  2/19/2021    COLONOSCOPY W/ ENDOSCOPIC MUCOSAL RESECTION performed by Carol Mesa DO at [address] N/A 3/11/2021    LAPAROSCOPIC RIGHT HEMICOLECTOMY performed by Mahesh Bagley MD at [address] OTHER SURGICAL HISTORY  08/12/2016    CT Myelogram, Dr. Scott Pennington  2014 new battery    last checked Dr Angel Briones 1/2016?  PORT SURGERY Right 4/1/2021    MEDI PORT INSERTION performed by Mahesh Bagley MD at [address]  [facility] ENDOSCOPY      reflux    UPPER GASTROINTESTINAL ENDOSCOPY N/A 2/16/2021    EGD BIOPSY performed by Vanessa Hough MD at [address] [facility]         Medications Prior to Admission    Prior to Admission medications    Medication Sig Start Date End Date Taking?  Authorizing Provider   XARELTO 20 MG TABS tablet 20 mg Daily with supper  4/4/21  Yes Historical Provider, MD   sotalol (BETAPACE) 80 MG tablet Take 0.5 tablets by mouth 2 times daily 4/3/21  Yes Yen Briseno MD   lisinopril-hydroCHLOROthiazide (PRINZIDE;ZESTORETIC) 10-12.5 MG per tablet Take 1 tablet by mouth daily    Yes Historical Provider, MD   amLODIPine (NORVASC) 5 MG tablet Take 5 mg by mouth daily    Yes Historical Provider, MD   vitamin B-12 (CYANOCOBALAMIN) 100 MCG tablet Take 50 mcg by mouth daily   Yes Historical Provider, MD   sennosides-docusate sodium (SENOKOT-S) 8.6-50 MG tablet Take 1 tablet by mouth 2 times daily   Yes Historical Provider, MD   tamsulosin (FLOMAX) 0.4 MG capsule Take 0.4 mg by mouth daily  1/4/18  Yes Historical Provider, MD   ferrous sulfate 325 (65 Fe) MG tablet Take 325 mg by mouth 2 times daily  1/8/18  Yes Historical Provider, MD   esomeprazole Magnesium (NEXIUM) 20 MG PACK Take 20 mg by mouth daily   Yes Historical Provider, MD   PRAVASTATIN SODIUM PO Take 20 mg by mouth daily    Yes Historical Provider, MD   Albuterol Sulfate (PROAIR HFA IN) Inhale into the lungs    Historical Provider, MD   ondansetron (ZOFRAN ODT) 4 MG disintegrating tablet Take 1 tablet by mouth every 8 hours as needed for Nausea or Vomiting 3/13/21   Doris Hamlin MD       No Known Allergies    Family History   Problem Relation Age of Onset    Diabetes Mother     Stroke Mother     Diabetes Father     Heart Disease Father     Brain Cancer Sister     Stroke Sister     Stroke Brother     Cancer Maternal Grandfather        Social History     Tobacco Use    Smoking status: Former Smoker     Packs/day: 1.00     Years: 44.00     Pack years: 44.00     Quit date: 3/4/2014     Years since quittin.3    Smokeless tobacco: Never Used   Vaping Use    Vaping Use: Some days    Substances: Nicotine, Flavoring, 6% nicotine    Devices: Pre-filled or refillable cartridge   Substance Use Topics    Alcohol use: Not Currently     Alcohol/week: 2.0 standard drinks     Types: 2 Shots of liquor per week     Comment: monthly; no caffeine    Drug use: No         Review of Systems:   General ROS: negative for - chills or fever  Respiratory ROS: no cough, shortness of breath, or wheezing  Cardiovascular ROS: no chest pain or dyspnea on exertion  Gastrointestinal ROS: no abdominal pain, change in bowel habits, or black or bloody stools  Genito-Urinary ROS: no dysuria, trouble voiding, or hematuria      PHYSICAL EXAM:    Vitals:    21 1420   BP: 100/62   Pulse: 87   Resp: 22   Temp: 97.9 °F (36.6 °C)   SpO2: 93%       GENERAL:  NAD. A&Ox3. HEAD:  Normocephalic. Atraumatic. EYES:   No scleral icterus. LUNGS:  No increased work of breathing. CARDIOVASCULAR: Regular rate  ABDOMEN:  Soft, non-distended, non-tender. No guarding, rigidity, rebound.  Mediport on R chest without erythema or tenderness  SKIN:  Warm and dry, no rashes or lesions  NEUROLOGIC:  no focal deficits    LABS:    CBC  Recent Labs     07/09/21  0340   WBC 0.8*   HGB 7.6*   HCT 23.7*   PLT 11*     BMP  Recent Labs     07/09/21  0340      K 4.0      CO2 32*   BUN 20   CREATININE 0.9   CALCIUM 9.2     Liver Function  No results for input(s): AMYLASE, LIPASE, BILITOT, BILIDIR, AST, ALT, ALKPHOS, PROT, LABALBU in the last 72 hours. No results for input(s): LACTATE in the last 72 hours. No results for input(s): INR, PTT in the last 72 hours. Invalid input(s): PT    RADIOLOGY    XR CHEST PORTABLE    Result Date: 7/5/2021  EXAMINATION: ONE XRAY VIEW OF THE CHEST 7/5/2021 6:31 pm COMPARISON: April 1, 2021 HISTORY: ORDERING SYSTEM PROVIDED HISTORY: dizziness TECHNOLOGIST PROVIDED HISTORY: If in ER room at the time of exam, OK to change to portable 1 view Reason for exam:->dizziness What reading provider will be dictating this exam?->CRC FINDINGS: Left pacemaker. Median sternotomy wires are present. The heart is normal in size. There is prominence of pulmonary vasculature. Interstitial and hazy opacities are present in the lung bases. No obvious pleural effusion. No pneumothorax. Right MediPort present. Interstitial prominence bilaterally related to pulmonary vascular congestion with pneumonia or subsegmental atelectasis in lung bases. ASSESSMENT/PLAN:  80 y.o. male with sepsis thought to be secondary to Doctor Wendy 91 will be:   Still pancytopenic despite platelet transplant  Plan for mediport removal sometime in the near future pending medical optimization. Patient findings and plan discussed with Dr. Segundo Mcknight.     Martha Bender MD  Resident, PGY-1  7/9/2021  2:43 PM

## 2021-07-09 NOTE — PROGRESS NOTES
Occupational Therapy  OT BEDSIDE TREATMENT NOTE      Date:2021  Patient Name: Aimee Quintana  MRN: [id number]  : 1950  Room: [address] [place] [address]     Evaluating OT: Lupevenkatesh Flores OTR/L   [id number]       Referring Provider:Baltazar Beckett, Joey Ulloa    Specific Provider Orders/Date:OT eval and treat 2021       Diagnosis: thrombocytopenia       Pertinent Medical History: B cell lymphoma, chemo, lap R hemicolectomy 3/201, pacemaker,anemia,    Precautions:  Fall Risk, alarm, neutropenic        Assessment of current deficits    [x]? Functional mobility            [x]?ADLs           [x]? Strength                  [x]? Cognition    [x]? Functional transfers          [x]? IADLs         [x]? Safety Awareness   [x]? Endurance    []? Fine Coordination                         [x]? Balance      []? Vision/perception   []? Sensation      []? Gross Motor Coordination             []? ROM           []? Delirium                   []? Motor Control      OT PLAN OF CARE   OT POC based on physician orders, patient diagnosis and results of clinical assessment     Frequency/Duration  1-3days/wk for 2 weeks PRN   Specific OT Treatment Interventions to include:   ADL retraining/adapted techniques and AE recommendations to increase functional independence within precautions                    ?Energy conservation techniques to improve tolerance for selfcare routine   Functional transfer/mobility training/DME recommendations for increased independence, safety and fall prevention         Patient/family education to increase safety and functional independence             Environmental modifications for safe mobility and completion of ADLs                             Therapeutic activity to improve functional performance during ADLs.                                          Therapeutic exercise to improve tolerance and functional strength for ADLs   Balance retraining/tolerance tasks for facilitation of postural control with dynamic challenges during ADLs .   Positioning to improve functional independence     Recommended Adaptive Equipment: TBD      Home Living: Pt lives with fiance, 1 story with 2 steps to enter  Prior Level of Function: assist as needed  with ADLs , assist  with IADLs; ambulated with no device         Pain Level: no pain this session ;   Cognition: alert, oriented x4, following commands               Memory:  good                Sequencing:  good               Problem solving:  Fair               Judgement/safety:  Fair                 Functional Assessment:  AM-PAC Daily Activity Raw Score: 16/24    Initial Eval Status  Date: 7/7/21 Treatment Status  Date:7/9/2021 STGs = LTGs  Time frame: 10-14 days   Feeding SBA/set-up    Independent    Grooming Mod A ,seated   Decrease standing tolerance  SBA/set-up   Standing at sink washing hands   Supervision    UB Dressing Max A   Don/doff hospital gown    Supervision    LB Dressing Max A Mod A  Assist with don/doff shoes   Min A   Bathing Max A    Min A   Toileting Dependent   Incontinence - assist with hygiene and clean linens  SBA/set-up   Seated on commode   Min A   Bed Mobility  Mod A   Sit-supine  SBA  Supine <>sit   SBA    Functional Transfers NT   Assisted patient to bed d/t semi alert status and confusion   CGA/SBA  Sit-stand from bed, commode  SBA    Functional Mobility NT  Min A. hurri cane   To/from bathroom and short distance in room   SBA  with good tolerance    Balance Sitting:     Static:  CGA/SBA     Dynamic:Mod A   Standing: NT   Sitting - EOB - supervision   Standing - CGA/SBA  Supervision    Activity Tolerance Fair - with light activity  Fair with light activity   No complaints of SOB  On room air   O2 sats high 90s   Good  with ADL activity    Visual/  Perceptual Glasses: none by bedside          UE AROM WFLS for ADL activity     Education: safety awareness, importance of OOB activity     Comments: Upon arrival pt lying in bed .  At end of session returned to bed  all lines and tubes

## 2021-07-09 NOTE — PROGRESS NOTES
[facility]  Hematology/Oncology  Consult      Patient Name: Devonte Hernandez  YOB: 1950  PCP: Stephanie Mckeon DO   Referring Provider:      Reason for Consultation:   Chief Complaint   Patient presents with    Hematuria     PT STATES HE IS ANEMIC AND STATES HE HAS BEEN URINATING BLOOD FOR 1 WEEK. PT REPORTS FEELNG WEAKNESS      Subjective:  Mentation improved today. AAO x 3. Denies pain  Remains weak and very tired    History of Present Illness:  67-year-old man with past medical history of aggressive bulky diffuse large B-cell lymphoma, non-germinal cell type with negative FISH interface for double hit or triple hit lymphoma with bulky intra-abdominal disease on presentation. He has been on combination chemotherapy with R-CHOP along with Revlimid. This has been complicated by significant pancytopenia is despite G-CSF prophylaxis. He has completed 1 week ago cycle #5 of R-CHOP/Revlimid  He was hospitalized to the emergency room with weakness fatigue and dizziness  He also reports gross hematuria a few days duration and has been on anticoagulation with Xarelto  Patient seen by cardiology and EPS for his arrhythmias.   Xarelto held because of thrombocytopenia and concurrent hematuria and will be resumed when thrombocytopenia improves and hematuria resolves    His CBC today shows a hemoglobin of 7.1  Platelets remain low at 15 with a WBC count of 0.1 despite G-CSF prophylaxis    Diagnostic Data:     Past Medical History:   Diagnosis Date    Arthritis     KNEES HIPS BACK    BPH (benign prostatic hyperplasia)     Cancer ([facility]     Coronary artery disease     Difficult airway     PT STATES HE [facility] THIS [facility]    Difficult intubation 04/2017    note in care everywhere \"Clinical Summary\" 03/10/2021    History of blood transfusion     Hyperlipidemia     Hypertension     Sinus tachycardia 01/01/2002       Patient Active Problem List    Diagnosis Date Noted    Severe protein-calorie malnutrition ([facility] 07/06/2021    Thrombocytopenia ([facility] 07/05/2021    Anemia 05/06/2021    Diffuse large B cell lymphoma ([facility] 03/31/2021    Diffuse large B-cell lymphoma ([facility] 03/30/2021    Prolonged Q-T interval on ECG 03/29/2021    Hypotension 03/29/2021    CKD (chronic kidney disease) stage 3, GFR 30-59 ml/min ([facility] 03/29/2021    Paroxysmal atrial fibrillation ([facility] 03/29/2021    Colonic mass 03/11/2021    Hypertension     Hyperlipidemia     Coronary artery disease     [facility] 02/14/2021    Hematuria 01/16/2018    BPH (benign prostatic hyperplasia) 01/16/2018        Past Surgical History:   Procedure Laterality Date    ABLATION OF DYSRHYTHMIC FOCUS      AORTA SURGERY      aortic valve replacement    AORTIC VALVE REPLACEMENT      BLADDER SURGERY Right 2/17/2021    CYSTOSCOPY BILATERAL RETROGRADE PYELOGRAM RIGHT STENT INSERTION performed by David Gallego MD at [address]  2/19/2021    COLONOSCOPY WITH BIOPSY performed by Martinez Holland DO at [address] [facility]  2/19/2021    COLONOSCOPY W/ ENDOSCOPIC MUCOSAL RESECTION performed by Martinez Holland DO at [address] N/A 3/11/2021    LAPAROSCOPIC RIGHT HEMICOLECTOMY performed by Paige Malagon MD at [address] OTHER SURGICAL HISTORY  08/12/2016    CT Myelogram, Dr. Cony Clayton  2014 new battery    last checked Dr Jayda Dutton 1/2016?     PORT SURGERY Right 4/1/2021    MEDI PORT INSERTION performed by Paige Malagon MD at [address]  [facility] ENDOSCOPY      reflux    UPPER GASTROINTESTINAL ENDOSCOPY N/A 2/16/2021    EGD BIOPSY performed by Rush Hahn MD at [facility]         Family History  Family History   Problem Relation Age of Onset    Diabetes Mother     Stroke Mother     Diabetes Father     Heart Disease Father     Brain Cancer Sister     Stroke Sister     Stroke Brother     Cancer Maternal Grandfather        Social History    TOBACCO:   reports that he quit smoking about 7 years ago. He has a 44.00 pack-year smoking history. He has never used smokeless tobacco.  ETOH:   reports previous alcohol use of about 2.0 standard drinks of alcohol per week. Home Medications  Prior to Admission medications    Medication Sig Start Date End Date Taking? Authorizing Provider   XARELTO 20 MG TABS tablet 20 mg Daily with supper  4/4/21  Yes Historical Provider, MD   sotalol (BETAPACE) 80 MG tablet Take 0.5 tablets by mouth 2 times daily 4/3/21  Yes Cleo Henderson MD   lisinopril-hydroCHLOROthiazide (PRINZIDE;ZESTORETIC) 10-12.5 MG per tablet Take 1 tablet by mouth daily    Yes Historical Provider, MD   amLODIPine (NORVASC) 5 MG tablet Take 5 mg by mouth daily    Yes Historical Provider, MD   vitamin B-12 (CYANOCOBALAMIN) 100 MCG tablet Take 50 mcg by mouth daily   Yes Historical Provider, MD   sennosides-docusate sodium (SENOKOT-S) 8.6-50 MG tablet Take 1 tablet by mouth 2 times daily   Yes Historical Provider, MD   tamsulosin (FLOMAX) 0.4 MG capsule Take 0.4 mg by mouth daily  1/4/18  Yes Historical Provider, MD   ferrous sulfate 325 (65 Fe) MG tablet Take 325 mg by mouth 2 times daily  1/8/18  Yes Historical Provider, MD   esomeprazole Magnesium (NEXIUM) 20 MG PACK Take 20 mg by mouth daily   Yes Historical Provider, MD   PRAVASTATIN SODIUM PO Take 20 mg by mouth daily    Yes Historical Provider, MD   Albuterol Sulfate (PROAIR HFA IN) Inhale into the lungs    Historical Provider, MD   ondansetron (ZOFRAN ODT) 4 MG disintegrating tablet Take 1 tablet by mouth every 8 hours as needed for Nausea or Vomiting 3/13/21   Henry Broussard MD       Allergies  No Known Allergies    Review of Systems: Relevant for fatigue, weakness, dizziness, lightheadedness and hematuria. Confused today.       Constitutional:  No fever chills or rigors.  Eyes: No changes in vision, discharge, or pain   ENT: No Headaches, hearing loss or vertigo. No mouth sores or sore throat. No change in taste or smell.  Cardiovascular: No chest discomfort, dyspnea on exertion, palpitations or loss of consciousness. or phlebitis.  Respiratory: Has no cough or wheezing, Has no sputum production. Has no hemoptysis, Has no pleuritic pain, .  Gastrointestinal: No abdominal pain, appetite loss, blood in stools. No change in bowel habits. No hematemesis    Genitourinary: Patient acknowledges no dysuria, trouble voiding, or hematuria. No nocturia or increased frequency.  Musculoskeletal: No gait disturbance, weakness or joint complaints.  Integumentary: No rash or pruritis.  Neurological: No headache, diplopia, change in muscle strength, numbness or tingling. No change in gait, balance, coordination, mood, affect, memory, mentation, behavior.  Psychiatric: No anxiety, or depression.  Endocrine: No temperature intolerance. No excessive thirst, fluid intake, or urination. No tremor.  Hematologic/Lymphatic: No abnormal bruising or bleeding, blood clots or swollen lymph nodes.  Allergic/Immunologic: No nasal congestion or hives. Objective  BP (!) 83/51   Pulse 79   Temp 99.3 °F (37.4 °C) (Temporal)   Resp 20   Ht 5' 7\" (1.702 m)   Wt 178 lb 9.6 oz (81 kg)   SpO2 98%   BMI 27.97 kg/m²     Physical Exam:    General: Patient was confused when I went to examine the patient. He could not tell me where he was and was having trouble finding words. No focal weakness numbness. No headaches. He has been afebrile. Head and neck : PERRLA, EOMI . Sclera non icteric. Oropharynx : Clear  Neck: no JVD,  no adenopathy, no carotid bruit  Heart: Irregular  Lungs: Clear to auscultation   Extremities: No edema,no cyanosis, no clubbing. Abdomen: Soft, non-tender;no masses, no organomegaly  Skin:  No rash.   Neurologic:Cranial nerves grossly intact. No focal motor or sensory deficits . Recent Laboratory Data-   Lab Results   Component Value Date    WBC 0.8 (LL) 07/09/2021    HGB 7.6 (L) 07/09/2021    HCT 23.7 (L) 07/09/2021    MCV 93.3 07/09/2021    PLT 11 (LL) 07/09/2021    LYMPHOPCT 43.0 (H) 07/08/2021    RBC 2.54 (L) 07/09/2021    MCH 29.9 07/09/2021    MCHC 32.1 07/09/2021    RDW 16.4 (H) 07/09/2021    NEUTOPHILPCT 33.0 (L) 07/08/2021    MONOPCT 19.0 (H) 07/08/2021    BASOPCT 0.0 07/08/2021    NEUTROABS 0.07 (L) 07/08/2021    LYMPHSABS 0.09 (L) 07/08/2021    MONOSABS 0.04 (L) 07/08/2021    EOSABS 0.00 (L) 07/08/2021    BASOSABS 0.00 07/08/2021       Lab Results   Component Value Date     07/09/2021    K 4.0 07/09/2021     07/09/2021    CO2 32 (H) 07/09/2021    BUN 20 07/09/2021    CREATININE 0.9 07/09/2021    GLUCOSE 102 (H) 07/09/2021    CALCIUM 9.2 07/09/2021    PROT 5.5 (L) 07/06/2021    LABALBU 3.2 (L) 07/06/2021    BILITOT 1.5 (H) 07/06/2021    ALKPHOS 66 07/06/2021    AST 11 07/06/2021    ALT 19 07/06/2021    LABGLOM >60 07/09/2021    GFRAA >60 07/09/2021       Lab Results   Component Value Date    IRON 38 (L) 02/16/2021    TIBC 248 (L) 02/16/2021           Radiology-    XR CHEST PORTABLE    Result Date: 7/5/2021  EXAMINATION: ONE XRAY VIEW OF THE CHEST 7/5/2021 6:31 pm COMPARISON: April 1, 2021 HISTORY: ORDERING SYSTEM PROVIDED HISTORY: dizziness TECHNOLOGIST PROVIDED HISTORY: If in ER room at the time of exam, OK to change to portable 1 view Reason for exam:->dizziness What reading provider will be dictating this exam?->CRC FINDINGS: Left pacemaker. Median sternotomy wires are present. The heart is normal in size. There is prominence of pulmonary vasculature. Interstitial and hazy opacities are present in the lung bases. No obvious pleural effusion. No pneumothorax. Right MediPort present.      Interstitial prominence bilaterally related to pulmonary vascular congestion with pneumonia or subsegmental atelectasis in lung bases.     PET CT SKULL BASE TO MID THIGH    Result Date: 6/21/2021  EXAMINATION: Skull base to midthigh PET/CT 6/18/2021 TECHNIQUE: Following IV injection of 15.5 mCi of F 18 -FDG, PET  tumor imaging was acquired from the base of the skull to the mid thighs. Computed tomography was used for purposes of attenuation correction and anatomic localization. Fusion imaging was utilized for interpretation. Uptake time 52 mins. Glucose level 100 mg/dl. COMPARISON: CT abdomen pelvis dated 03/29/2021, CT neck dated 06/01/2021 HISTORY: ORDERING SYSTEM PROVIDED HISTORY: Diffuse large B-cell lymphoma of lymph nodes of multiple sites [facility]) TECHNOLOGIST PROVIDED HISTORY: Reason for exam:->Diffuse large B-cell lymphoma of lymph nodes of multiple sites [facility]) What reading provider will be dictating this exam?->CRC FINDINGS: HEAD/NECK: In the craniocervical soft tissues, physiologic activity is favored. Bilateral carotid artery calcification. CHEST: Port is seen in the right chest wall. Pacemaker in the left chest wall. Status post median sternotomy. Within the mediastinum, no kelley activity markedly greater than mediastinal background. No axillary adenopathy. Soft tissue activity about the shoulders bilaterally, typically inflammatory. Bilateral gynecomastia. Emphysema. Small bilateral pleural effusions. Scattered ground-glass opacity, likely atelectasis. No pneumothorax. The pleuroparenchymal nodule at the left lung base on the prior CT is not well seen on the current exam, potentially obscured by the pleural effusion. ABDOMEN/PELVIS: The previously demonstrated extensive abdominal and pelvic adenopathy as well as small bowel nodularity seen on the prior CT is not observed on current. No hypermetabolic adenopathy is seen. Right nephroureteral stent is demonstrated. Excretion is seen from the kidneys bilaterally and activity is seen within the urinary bladder.   Bowel activity seen which can be physiologically variable, including at the distal rectum. Calcification of the abdominal aorta and iliac arteries. Diverticulosis. BONES/SOFT TISSUE: Diffuse activity is seen throughout regional bone marrow, relatively symmetrically. With regard to patient's history of lymphoma, no hypermetabolic adenopathy is seen, compatible with favorable treatment response. Diffuse symmetric activity throughout regional bone marrow, which can be seen in the setting of recent chemotherapy, colony-stimulating factor usage, or anemia. Small bilateral pleural effusions. ASSESSMENT/PLAN : 73-year-old man    High-grade aggressive diffuse large B-cell lymphoma, non-germinal cell type, ABC type status post cycle #5 of chemotherapy with R-CHOP/Revlimid. Double hit/triple hit lymphoma have been ruled out    Pancytopenia secondary to chemotherapy  Hematuria related to thrombocytopenia and the fact that he is anticoagulated with Xarelto for his chronic A. Fib    Appreciate cardiology and EPS input    Hold Xarelto  It may be resumed when gross hematuria resolves and platelet count improves above 50    Initiate G-CSF for persistent severe neutropenia despite Neulasta prophylaxis  Monitor for potential neutropenic fevers  Transfuse with 1 unit of packed RBC because of fatigue and cardiovascular compromise and 1 unit of platelet because of his gross hematuria and thrombocytopenia  We will follow    7/7/2021. Patient was confused when I went to examine the patient. He could not tell me where he was and was having trouble finding words. No focal weakness numbness. Neuro exam was non focal. No headaches. He has been afebrile. Will r/o sepsis. Blood cultures chest xray ct head without contrast requested. Profound neutropenia > 7 days since chemo. Continue Granix. Will start prophylactic Levaquin after cultures are drawn. Platelets 89I. No active bleeding bruising. Xarelto has been held for now. 7/8/21  - Continue with profound pancytopenia.  WBC 0.2, ANC 70. Hgb 6.6, platelets 13. Getting 1 unit pRBC  - Trend CBC daily, transfuse for Hgb <7, platelets <86 or <77 with active bleeding  - Continue granix 480 mcg daily  - BCx showing GNR (Klebsiella, Pseudomonas, Serratia marcescens by PCR)  - ID consulted, now on merrem.  Vanco DC'd  - Vascular consulted to remove PORT, agree    7/9/21  On Meropenem for gram negative bacteremia  Mediport to be removed   Remains pancytopenic with improvement in [address]   Platelets down to 11  To transfuse with 1 unit of PRBC and 1 dose of platelets in anticipation of mediport removal    Electronically signed 7/9/2021 at 11:59 AM   Joann Allison MD

## 2021-07-09 NOTE — PROGRESS NOTES
Per blood bank-Platelets ordered this morning are for current BBID dated 7/5/2021 but can be used for OR platelet transfusion today    Spoke with Dr. Mg Randolph who has order 1 unit PRBC which requires new type/screen and platelets that are held currently with previous BBID would not be valid with NEW BBID needed for blood. Per Dr. Mg Randolph- okay for platelets that are ordered for OR and once patient returns from procedure, obtain NEW type/screen for 1 unit PRBC post procedure.

## 2021-07-09 NOTE — PROGRESS NOTES
[facility] PROGRESS NOTE      Chief complaint: Follow-up of Gram-negative nevaeh bacteremia     The patient is a 80 y.o. male with history of diffuse large B cell lymphoma on chemotherapy with R-CHOP and bortezomib through right subclavian port with most recent chemotherapy 1 week prior to admission, hypertension, hyperlipidemia, CAD, pacemaker in situ (new device implantation on 12/17/2020), presented on 07/05 with generalized weakness and fatigue for about a week, found to be septic with pancytopenia and gram-negative nevaeh (Klebsiella pneumoniae, Pseudomonas aeruginosa, Serratia marcescens by PCR) bacteremia. Chest x-ray showed bilateral interstitial prominence probably related to pulmonary vascular congestion. CT chest, abdomen and pelvis showed  COPD with multifocal patchy and masslike infiltrates in the left lower lobe, distended gallbladder without acute inflammation, right ureteral stent in place with significant improvement in right hydronephrosis, status post right hemicolectomy with retained fluid and fecal matter in the left hemicolon, significant improvement and near resolution of lymphadenopathy in the retroperitoneum and mesenteric adenopathy. Piperacillin-tazobactam and vancomycin were started on admission. Subjective: Patient was seen and examined. No chills, no abdominal pain, no diarrhea, no rash, no itching. He reports feeling better. Objective:    Vitals:    07/09/21 0848   BP: 132/67   Pulse: 93   Resp: 20   Temp: 98.2 °F (36.8 °C)   SpO2: 98%     Constitutional: Alert, not in distress  Respiratory: Clear breath sounds, no crackles, no wheezes  Cardiovascular: Regular rate and rhythm, no murmurs  Gastrointestinal: Bowel sounds present, soft, nontender  Skin: Warm and dry, no active dermatoses  Musculoskeletal: No joint swelling, no joint erythema    Labs, imaging, and medical records/notes were personally reviewed.     Assessment:  Sepsis, secondary to polymicrobial gram-negative nevaeh (Pseudomonas aeruginosa, Klebsiella pneumoniae, Serratia marcescens by PCR) bacteremia, suspect associated with CLABSI  Pneumonia  Profound neutropenia  Immunocompromised state  Diffuse large B-cell lymphoma on chemotherapy    Recommendations:  Continue meropenem 1 g every 8 hours. With Pseudomonas aeruginosa CLABSI, standard of care dictates removal of port/central line involved as per treatment guidelines. Recommend removal of venous access port for source control. Per Surgery, await improvement of anemia and thrombocytopenia prior to Mediport removal unless emergent. Follow-up blood cultures. Continue supportive care. Case was discussed with Surgery.     Thank you for involving me in the care of Denisedeepthi Katescott. Dr. Rosie Lawrence will continue to follow. Please do not hesitate to call for any questions or concerns.     Electronically signed by Audrey Cordova MD on 7/9/2021 at 9:55 AM

## 2021-07-10 LAB
ANION GAP SERPL CALCULATED.3IONS-SCNC: 9 MMOL/L (ref 7–16)
BUN BLDV-MCNC: 21 MG/DL (ref 6–23)
CALCIUM SERPL-MCNC: 9.2 MG/DL (ref 8.6–10.2)
CHLORIDE BLD-SCNC: 98 MMOL/L (ref 98–107)
CO2: 36 MMOL/L (ref 22–29)
CREAT SERPL-MCNC: 0.8 MG/DL (ref 0.7–1.2)
GFR AFRICAN AMERICAN: >60
GFR NON-AFRICAN AMERICAN: >60 ML/MIN/1.73
GLUCOSE BLD-MCNC: 116 MG/DL (ref 74–99)
HCT VFR BLD CALC: 28.4 % (ref 37–54)
HCT VFR BLD CALC: 28.5 % (ref 37–54)
HEMOGLOBIN: 9.1 G/DL (ref 12.5–16.5)
HEMOGLOBIN: 9.1 G/DL (ref 12.5–16.5)
MCH RBC QN AUTO: 29.9 PG (ref 26–35)
MCHC RBC AUTO-ENTMCNC: 31.9 % (ref 32–34.5)
MCV RBC AUTO: 93.8 FL (ref 80–99.9)
PDW BLD-RTO: 15.9 FL (ref 11.5–15)
PLATELET # BLD: 29 E9/L (ref 130–450)
PLATELET CONFIRMATION: NORMAL
PMV BLD AUTO: 13.6 FL (ref 7–12)
POTASSIUM SERPL-SCNC: 2.7 MMOL/L (ref 3.5–5)
RBC # BLD: 3.04 E12/L (ref 3.8–5.8)
SODIUM BLD-SCNC: 143 MMOL/L (ref 132–146)
WBC # BLD: 2.3 E9/L (ref 4.5–11.5)

## 2021-07-10 PROCEDURE — 6370000000 HC RX 637 (ALT 250 FOR IP): Performed by: PHYSICIAN ASSISTANT

## 2021-07-10 PROCEDURE — 6360000002 HC RX W HCPCS: Performed by: PHYSICIAN ASSISTANT

## 2021-07-10 PROCEDURE — 99233 SBSQ HOSP IP/OBS HIGH 50: CPT | Performed by: INTERNAL MEDICINE

## 2021-07-10 PROCEDURE — 2580000003 HC RX 258: Performed by: PHYSICIAN ASSISTANT

## 2021-07-10 PROCEDURE — 6370000000 HC RX 637 (ALT 250 FOR IP): Performed by: INTERNAL MEDICINE

## 2021-07-10 PROCEDURE — 85027 COMPLETE CBC AUTOMATED: CPT

## 2021-07-10 PROCEDURE — 2140000000 HC CCU INTERMEDIATE R&B

## 2021-07-10 PROCEDURE — 80048 BASIC METABOLIC PNL TOTAL CA: CPT

## 2021-07-10 PROCEDURE — 36415 COLL VENOUS BLD VENIPUNCTURE: CPT

## 2021-07-10 PROCEDURE — 2700000000 HC OXYGEN THERAPY PER DAY

## 2021-07-10 PROCEDURE — 2580000003 HC RX 258: Performed by: INTERNAL MEDICINE

## 2021-07-10 PROCEDURE — 85018 HEMOGLOBIN: CPT

## 2021-07-10 PROCEDURE — 6360000002 HC RX W HCPCS: Performed by: INTERNAL MEDICINE

## 2021-07-10 PROCEDURE — 6360000002 HC RX W HCPCS: Performed by: FAMILY MEDICINE

## 2021-07-10 PROCEDURE — 85014 HEMATOCRIT: CPT

## 2021-07-10 PROCEDURE — 94640 AIRWAY INHALATION TREATMENT: CPT

## 2021-07-10 RX ADMIN — METOPROLOL SUCCINATE 50 MG: 50 TABLET, EXTENDED RELEASE ORAL at 11:23

## 2021-07-10 RX ADMIN — POTASSIUM CHLORIDE 20 MEQ: 1500 TABLET, EXTENDED RELEASE ORAL at 17:27

## 2021-07-10 RX ADMIN — NYSTATIN 500000 UNITS: 100000 SUSPENSION ORAL at 20:55

## 2021-07-10 RX ADMIN — POTASSIUM CHLORIDE 10 MEQ: 10 INJECTION, SOLUTION INTRAVENOUS at 12:22

## 2021-07-10 RX ADMIN — NYSTATIN 500000 UNITS: 100000 SUSPENSION ORAL at 14:07

## 2021-07-10 RX ADMIN — POTASSIUM CHLORIDE 10 MEQ: 10 INJECTION, SOLUTION INTRAVENOUS at 11:04

## 2021-07-10 RX ADMIN — FUROSEMIDE 20 MG: 10 INJECTION, SOLUTION INTRAMUSCULAR; INTRAVENOUS at 11:23

## 2021-07-10 RX ADMIN — POTASSIUM CHLORIDE 10 MEQ: 10 INJECTION, SOLUTION INTRAVENOUS at 15:48

## 2021-07-10 RX ADMIN — POTASSIUM CHLORIDE 10 MEQ: 10 INJECTION, SOLUTION INTRAVENOUS at 13:26

## 2021-07-10 RX ADMIN — ALBUTEROL SULFATE 2.5 MG: 2.5 SOLUTION RESPIRATORY (INHALATION) at 21:51

## 2021-07-10 RX ADMIN — Medication 10 ML: at 07:58

## 2021-07-10 RX ADMIN — FAMOTIDINE 20 MG: 20 TABLET ORAL at 20:55

## 2021-07-10 RX ADMIN — SODIUM CHLORIDE, PRESERVATIVE FREE 10 ML: 5 INJECTION INTRAVENOUS at 10:31

## 2021-07-10 RX ADMIN — ALBUTEROL SULFATE 2.5 MG: 2.5 SOLUTION RESPIRATORY (INHALATION) at 17:42

## 2021-07-10 RX ADMIN — FAMOTIDINE 20 MG: 20 TABLET ORAL at 07:57

## 2021-07-10 RX ADMIN — POTASSIUM CHLORIDE 10 MEQ: 10 INJECTION, SOLUTION INTRAVENOUS at 07:58

## 2021-07-10 RX ADMIN — MEROPENEM 1000 MG: 1 INJECTION, POWDER, FOR SOLUTION INTRAVENOUS at 23:30

## 2021-07-10 RX ADMIN — NYSTATIN 500000 UNITS: 100000 SUSPENSION ORAL at 17:27

## 2021-07-10 RX ADMIN — SODIUM CHLORIDE, PRESERVATIVE FREE 10 ML: 5 INJECTION INTRAVENOUS at 11:23

## 2021-07-10 RX ADMIN — NYSTATIN 500000 UNITS: 100000 SUSPENSION ORAL at 07:57

## 2021-07-10 RX ADMIN — POTASSIUM CHLORIDE 20 MEQ: 1500 TABLET, EXTENDED RELEASE ORAL at 07:57

## 2021-07-10 RX ADMIN — MEROPENEM 1000 MG: 1 INJECTION, POWDER, FOR SOLUTION INTRAVENOUS at 15:47

## 2021-07-10 RX ADMIN — ALBUTEROL SULFATE 2.5 MG: 2.5 SOLUTION RESPIRATORY (INHALATION) at 10:04

## 2021-07-10 RX ADMIN — Medication 10 ML: at 20:55

## 2021-07-10 RX ADMIN — POTASSIUM CHLORIDE 10 MEQ: 10 INJECTION, SOLUTION INTRAVENOUS at 14:08

## 2021-07-10 RX ADMIN — FUROSEMIDE 20 MG: 10 INJECTION, SOLUTION INTRAMUSCULAR; INTRAVENOUS at 17:27

## 2021-07-10 RX ADMIN — SODIUM CHLORIDE, PRESERVATIVE FREE 10 ML: 5 INJECTION INTRAVENOUS at 17:27

## 2021-07-10 RX ADMIN — MEROPENEM 1000 MG: 1 INJECTION, POWDER, FOR SOLUTION INTRAVENOUS at 00:50

## 2021-07-10 RX ADMIN — MEROPENEM 1000 MG: 1 INJECTION, POWDER, FOR SOLUTION INTRAVENOUS at 10:32

## 2021-07-10 ASSESSMENT — PAIN SCALES - GENERAL
PAINLEVEL_OUTOF10: 0

## 2021-07-10 NOTE — PROGRESS NOTES
[facility] PROGRESS NOTE      Chief complaint: Follow-up of Gram-negative nevaeh bacteremia     The patient is a 80 y.o. male with history of diffuse large B cell lymphoma on chemotherapy with R-CHOP and bortezomib through right subclavian port with most recent chemotherapy 1 week prior to admission, hypertension, hyperlipidemia, CAD, pacemaker in situ (new device implantation on 12/17/2020), presented on 07/05 with generalized weakness and fatigue for about a week, found to be septic with pancytopenia and gram-negative nevaeh (Klebsiella pneumoniae, Pseudomonas aeruginosa, Serratia marcescens by PCR) bacteremia. Chest x-ray showed bilateral interstitial prominence probably related to pulmonary vascular congestion. CT chest, abdomen and pelvis showed  COPD with multifocal patchy and masslike infiltrates in the left lower lobe, distended gallbladder without acute inflammation, right ureteral stent in place with significant improvement in right hydronephrosis, status post right hemicolectomy with retained fluid and fecal matter in the left hemicolon, significant improvement and near resolution of lymphadenopathy in the retroperitoneum and mesenteric adenopathy. Piperacillin-tazobactam and vancomycin were started on admission. Subjective: Patient was seen and examined. No chills, no abdominal pain, no diarrhea, no rash, no itching. He reports sob. He is frustrated that surgery has not been scheduled yet. Objective:    Vitals:    07/10/21 0754   BP: 100/69   Pulse: 64   Resp:    Temp: 98.6 °F (37 °C)   SpO2:      Constitutional: Alert, not in distress  Respiratory: Clear breath sounds, no crackles, no wheezes. decreased  Cardiovascular: Regular rate and rhythm, no murmurs  Gastrointestinal: Bowel sounds present, soft, nontender  Skin: Warm and dry, no active dermatoses  Musculoskeletal: No joint swelling, no joint erythema    Labs, imaging, and medical records/notes were personally reviewed.     Assessment:  Sepsis, secondary to polymicrobial gram-negative nevaeh (Pseudomonas aeruginosa, Klebsiella pneumoniae, Serratia marcescens by PCR) bacteremia, suspect associated with CLABSI  Pneumonia  Profound neutropenia  Immunocompromised state  Diffuse large B-cell lymphoma on chemotherapy    Recommendations:  Continue meropenem 1 g every 8 hours. With Pseudomonas aeruginosa CLABSI, standard of care dictates removal of port/central line involved as per treatment guidelines. Recommend removal of venous access port for source control. Per Surgery, await improvement of anemia and thrombocytopenia prior to Mediport removal unless emergent. Follow-up blood cultures. Continue supportive care.       Thank you for involving me in the care of Denise Fuller. .    Electronically signed by TRACIE Najera CNP on 7/10/2021 at 8:17 AM   Pt seen and examined. Above discussed agree with advanced practice nurse. Labs, cultures, and radiographs reviewed. Face to Face encounter occurred. Changes made as necessary.      Shay Zamora MD

## 2021-07-10 NOTE — PLAN OF CARE
Problem: Falls - Risk of:  Goal: Will remain free from falls  Description: Will remain free from falls  7/10/2021 0309 by Bette Barrow RN  Outcome: Met This Shift  7/9/2021 1838 by Maricruz Chaidez RN  Outcome: Met This Shift  7/9/2021 1509 by Maricruz Chaidez RN  Outcome: Met This Shift  Goal: Absence of physical injury  Description: Absence of physical injury  7/10/2021 0309 by Bette Barrow RN  Outcome: Met This Shift  7/9/2021 1838 by Maricruz Chaidez RN  Outcome: Met This Shift  7/9/2021 1509 by Maricruz Chaidez RN  Outcome: Met This Shift     Problem: Skin Integrity:  Goal: Absence of new skin breakdown  Description: Absence of new skin breakdown  7/10/2021 0309 by Bette Barrow RN  Outcome: Met This Shift  7/9/2021 1509 by Maricruz Chaidez RN  Outcome: Met This Shift     Problem: Musculor/Skeletal Functional Status  Goal: Absence of falls  7/9/2021 1838 by Maricruz Chaidez RN  Outcome: Met This Shift  7/9/2021 1509 by Maricruz Chaidez RN  Outcome: Met This Shift

## 2021-07-10 NOTE — PROGRESS NOTES
Pulmonary [facility]                             Pulmonary Consult/Progress Note :              Reason for Consultation:POssible need for Bronch,Lymphoma   CC : SOB ,cough frothy secretion   HPI:     SOB has improved a lot  Denies any fever   More comfortable  Less frothy secretions  No chest pain       PHYSICAL EXAMINATION:     VITAL SIGNS:  /82   Pulse 130   Temp 98 °F (36.7 °C) (Temporal)   Resp 20   Ht 5' 7\" (1.702 m)   Wt 178 lb 9.6 oz (81 kg)   SpO2 92%   BMI 27.97 kg/m²   Wt Readings from Last 3 Encounters:   07/09/21 178 lb 9.6 oz (81 kg)   06/15/21 210 lb (95.3 kg)   03/31/21 200 lb (90.7 kg)     Temp Readings from Last 3 Encounters:   07/09/21 98 °F (36.7 °C) (Temporal)   06/28/21 98.9 °F (37.2 °C) (Temporal)   06/15/21 98.4 °F (36.9 °C) (Temporal)     TMAX:  BP Readings from Last 3 Encounters:   07/09/21 113/82   06/28/21 (!) 114/59   06/15/21 (!) 165/79     Pulse Readings from Last 3 Encounters:   07/09/21 130   06/28/21 70   06/15/21 77           INTAKE/OUTPUTS:  I/O last 3 completed shifts:   In: 625.8 [Blood:331; IV Piggyback:294.8]  Out: 1925 [Urine:1925]    Intake/Output Summary (Last 24 hours) at 7/9/2021 2325  Last data filed at 7/9/2021 2148  Gross per 24 hour   Intake 625.8 ml   Output 1925 ml   Net -1299.2 ml       General Appearance: alert and oriented to person, place and time, well-developed and   well-nourished, in no acute distress   Eyes: pupils equal, round, and reactive to light, extraocular eye movements intact, conjunctivae normal and sclera anicteric   Neck: neck supple and non tender without mass, no thyromegaly, no thyroid nodules and no cervical adenopathy   Pulmonary/Chest:crackles rhonchi   Cardiovascular: normal rate, regular rhythm, normal S1 and S2, no murmurs, rubs, clicks or gallops, distal pulses intact, no carotid bruits, no murmurs, no gallops, no carotid bruits and no JVD   Abdomen: obese, soft, non-tender, non-distended, normal bowel sounds, no masses or organomegaly   Extremities:no edema   Musculoskeletal: normal range of motion, no joint swelling, deformity or tenderness   Neurologic: reflexes normal and symmetric, no cranial nerve deficit noted    LABS/IMAGING:    CBC:  Lab Results   Component Value Date    WBC 0.8 (LL) 07/09/2021    HGB 7.6 (L) 07/09/2021    HCT 23.7 (L) 07/09/2021    MCV 93.3 07/09/2021    PLT 11 (LL) 07/09/2021    LYMPHOPCT 43.0 (H) 07/08/2021    RBC 2.54 (L) 07/09/2021    MCH 29.9 07/09/2021    MCHC 32.1 07/09/2021    RDW 16.4 (H) 07/09/2021    NEUTOPHILPCT 33.0 (L) 07/08/2021    MONOPCT 19.0 (H) 07/08/2021    BASOPCT 0.0 07/08/2021    NEUTROABS 0.07 (L) 07/08/2021    LYMPHSABS 0.09 (L) 07/08/2021    MONOSABS 0.04 (L) 07/08/2021    EOSABS 0.00 (L) 07/08/2021    BASOSABS 0.00 07/08/2021       Recent Labs     07/09/21  0340 07/08/21  1955 07/08/21  0550 07/07/21  0550 07/07/21  0550   WBC 0.8*  --  0.2*  --  0.1*   HGB 7.6* 7.7* 6.6*   < > 7.4*   HCT 23.7* 24.3* 20.8*   < > 23.2*   MCV 93.3  --  93.7  --  93.2   PLT 11*  --  13*  --  22*    < > = values in this interval not displayed. BMP:   Recent Labs     07/07/21  0550 07/08/21  0550 07/09/21  0340    146 143   K 3.1* 3.0* 4.0    103 100   CO2 34* 32* 32*   BUN 16 17 20   CREATININE 0.7 0.9 0.9       MG:   Lab Results   Component Value Date    MG 2.0 07/08/2021     Ca/Phos:   Lab Results   Component Value Date    CALCIUM 9.2 07/09/2021    PHOS 3.2 04/03/2021     Amylase: No results found for: AMYLASE  Lipase: No results found for: LIPASE  LIVER PROFILE:   No results for input(s): AST, ALT, LIPASE, BILIDIR, BILITOT, ALKPHOS in the last 72 hours. Invalid input(s): AMYLASE,  ALB    PT/INR:   No results for input(s): PROTIME, INR in the last 72 hours. APTT:   No results for input(s): APTT in the last 72 hours.     Cardiac Enzymes:  Lab Results   Component Value Date    TROPONINI <0.01 02/14/2021               PET and CT

## 2021-07-10 NOTE — PROGRESS NOTES
Internal Medicine   Progress Note    Admit Date: 7/5/2021  Hospital day:    Hospital Day: 6  SUBJECTIVE:  No new acute problems overnight. Doing OK. No chest pain or shortness of breath. No nausea or vomiting. No fever. No abdominal pain. He came in with weakness and fatigue and hematuria. OBJECTIVE:     /65   Pulse 82   Temp 97.8 °F (36.6 °C) (Temporal)   Resp 18   Ht 5' 7\" (1.702 m)   Wt 177 lb 6.4 oz (80.5 kg)   SpO2 98%   BMI 27.78 kg/m²   Patient Vitals for the past 24 hrs:   BP Temp Temp src Pulse Resp SpO2 Weight   07/10/21 1115 -- -- -- -- -- 98 % --   07/10/21 1112 113/65 97.8 °F (36.6 °C) Temporal 82 18 -- --   07/10/21 0754 100/69 98.6 °F (37 °C) Temporal 64 18 90 % --   07/10/21 0715 (!) 97/55 98.2 °F (36.8 °C) Temporal 105 20 98 % --   07/10/21 0345 -- -- -- -- -- -- 177 lb 6.4 oz (80.5 kg)   07/10/21 0230 122/81 98.6 °F (37 °C) Temporal 103 20 98 % --   07/10/21 0045 (!) 120/53 97.7 °F (36.5 °C) Temporal 107 20 98 % --   07/09/21 2345 112/74 98.4 °F (36.9 °C) Temporal 130 20 95 % --   07/09/21 2155 113/82 98 °F (36.7 °C) Temporal 130 20 92 % --   07/09/21 2148 110/60 98.6 °F (37 °C) Temporal 119 22 94 % --   07/09/21 2120 109/73 98.7 °F (37.1 °C) Temporal 124 20 92 % --   07/09/21 2100 98/75 98.4 °F (36.9 °C) Temporal 125 20 94 % --   07/09/21 1940 114/64 98.4 °F (36.9 °C) Temporal 87 20 92 % --   07/09/21 1822 104/64 99.9 °F (37.7 °C) -- 141 18 93 % --   07/09/21 1817 -- -- -- 118 -- -- --   07/09/21 1523 (!) 132/57 98.5 °F (36.9 °C) Temporal 68 20 92 % --   07/09/21 1420 100/62 97.9 °F (36.6 °C) Temporal 87 22 93 % --     24HR INTAKE/OUTPUT:      Intake/Output Summary (Last 24 hours) at 7/10/2021 1335  Last data filed at 7/10/2021 0758  Gross per 24 hour   Intake 1483.8 ml   Output 1350 ml   Net 133.8 ml      GENERAL:  Alert,breathing easily, not in apparent acute distress. LUNGS:  No obvious increased work of breathing, clear to auscultation bilaterally.    CARDIOVASCULAR:  S1 and Chew    ASSESSMENT/PLAN:  Diffuse large B-cell lymphoma  Pancytopenia, numbers are slightly improved  Thrombocytopenia  Anemia  Hematuria  Malnutrition  Hypertension  Hyperlipidemia  Coronary artery disease  Chronic kidney disease  Atrial fibrillation  Gram-negative nevaeh bacteremia, was on antibiotics, seen by ID    PLAN:  On beta-blocker for rate control-EP is following  Heme-onc following  Possible bronch for lymphoma    Electronically signed by Erica Yusuf MD on 7/10/2021 at 1:35 PM

## 2021-07-10 NOTE — PROGRESS NOTES
[address] and Vascular [facility] Electrophysiology  Progress Note   PATIENT: Kristy Lambert  MEDICAL RECORD NUMBER: [id number]  DATE OF SERVICE:  7/10/2021  ATTENDING ELECTROPHYSIOLOGIST: Jorge Kumar DO   PRIMARY ELECTROPHYSIOLOGIST: ([facility]) Kiesha Joseph MD   REFERRING PHYSICIAN: Sharyne Burkitt PA and Mare Obrien DO  CHIEF COMPLAINT: Dizzy with weakness and fatigue     Subjective: Kristy Lambert is a 80 y.o. male with a history of SND s/p St Pramod dc PPM (implant: 2002; generator change: 2014; extraction of RA & RV leads due to noise and inappropriate sensing and implant of new device: 12/17/20), nonvalvular atrial fibrillation sp surgical MAZE and LAAO (8/12/16); CAD s/p CABG (8/12/16: LIMA-LAD), MR sp MVR (8/12/16), AI sp AVR (8/12/16), bladder tumor sp ureteral stent, diffuse large B cell lymphoma, and esophageal stricture. He presented on 7/5/21 with chief complaint of weakness and fatigue, as well as hematuria. He had ureteral stent a few months ago. He was noted to be pancytopenic. He was noted to have episodes of ventricular pacing v NSVT, so EP service was consulted by admitting physician. Blood cultures grew GNR, he has been given IV Zosyn and Vancomycin with an improvement in his mental status and ID has been consulted. He complains of difficulty swallowing at this time.  He awaits Mediport removal.         Past Medical History:   Diagnosis Date    Arthritis     KNEES HIPS BACK    BPH (benign prostatic hyperplasia)     Cancer ([facility] [address]     Coronary artery disease     Difficult airway     PT STATES HE WAS TOLD THIS TWICE AT [facility]    Difficult intubation 04/2017    note in care everywhere \"Clinical Summary\" 03/10/2021    History of blood transfusion     Hyperlipidemia     Hypertension     Sinus tachycardia 01/01/2002      Past Surgical History:   Procedure Laterality Date    ABLATION OF DYSRHYTHMIC FOCUS      AORTA SURGERY      aortic valve replacement  AORTIC VALVE REPLACEMENT      BLADDER SURGERY Right 2021    CYSTOSCOPY BILATERAL RETROGRADE PYELOGRAM RIGHT STENT INSERTION performed by Tera Estrada MD at [address] COLONOSCOPY  2021    COLONOSCOPY WITH BIOPSY performed by Ifeoma An DO at [address]  2021    COLONOSCOPY W/ ENDOSCOPIC MUCOSAL RESECTION performed by Ifeoma An DO at [address] N/A 3/11/2021    LAPAROSCOPIC RIGHT HEMICOLECTOMY performed by Tre Manzano MD at [address] OTHER SURGICAL HISTORY  2016    CT Myelogram, Dr. Cedeno Hidden   new battery    last checked Dr Gita Lowery 2016?     PORT SURGERY Right 2021    MEDI PORT INSERTION performed by Tre Manzano MD at [address] [facility] ENDOSCOPY      reflux    UPPER GASTROINTESTINAL ENDOSCOPY N/A 2021    EGD BIOPSY performed by Surekha Gonzalez MD at [facility]        Family History   Problem Relation Age of Onset    Diabetes Mother     Stroke Mother     Diabetes Father     Heart Disease Father     Brain Cancer Sister     Stroke Sister     Stroke Brother     Cancer Maternal Grandfather      Social History     Tobacco Use    Smoking status: Former Smoker     Packs/day: 1.00     Years: 44.00     Pack years: 44.00     Quit date: 3/4/2014     Years since quittin.3    Smokeless tobacco: Never Used   Substance Use Topics    Alcohol use: Not Currently     Alcohol/week: 2.0 standard drinks     Types: 2 Shots of liquor per week     Comment: monthly; no caffeine     Current Facility-Administered Medications   Medication Dose Route Frequency Provider Last Rate Last Admin    0.9 % sodium chloride infusion   Intravenous PRN Everton Martinez MD        potassium chloride (KLOR-CON M) extended release tablet 20 mEq  20 mEq Oral BID  Yvonne Ho MD 20 mEq at 07/10/21 0757    0.9 % sodium chloride infusion   Intravenous PRN Jackie Ocampo MD        0.9 % sodium chloride infusion   Intravenous PRN Jackie Ocampo MD        furosemide (LASIX) injection 20 mg  20 mg Intravenous BID Munir Ortega MD        meropenem (MERREM) 1,000 mg in sodium chloride 0.9 % 100 mL IVPB (mini-bag)  1,000 mg Intravenous Q8H Sam Bain MD 33.3 mL/hr at 07/10/21 1032 1,000 mg at 07/10/21 1032    metoprolol succinate (TOPROL XL) extended release tablet 50 mg  50 mg Oral BID TRACIE Lehman CNP   50 mg at 07/09/21 2146    nystatin (MYCOSTATIN) 356783 UNIT/ML suspension 500,000 Units  5 mL Oral 4x Daily Amanuel Andrews MD   500,000 Units at 07/10/21 0757    metoprolol (LOPRESSOR) injection 2.5 mg  2.5 mg Intravenous Q6H PRN Larraine DO Noemy   2.5 mg at 07/09/21 1823    lisinopril (PRINIVIL;ZESTRIL) tablet 10 mg  10 mg Oral Daily VENITA Bhandari   10 mg at 07/09/21 0917    potassium chloride (KLOR-CON M) extended release tablet 40 mEq  40 mEq Oral PRN Daria Edmond, APRN - CNP   40 mEq at 07/07/21 3364    Or    potassium bicarb-citric acid (EFFER-K) effervescent tablet 40 mEq  40 mEq Oral PRN Daria Edmond, APRN - CNP   40 mEq at 07/09/21 0103    Or    potassium chloride 10 mEq/100 mL IVPB (Peripheral Line)  10 mEq Intravenous PRN Daria Edmond, APRN -  mL/hr at 07/10/21 0758 10 mEq at 07/10/21 0758    albuterol (PROVENTIL) nebulizer solution 2.5 mg  2.5 mg Nebulization 4x daily VENITA Bhandari   2.5 mg at 07/10/21 1004    amLODIPine (NORVASC) tablet 5 mg  5 mg Oral Daily VENITA Bhandari   5 mg at 07/09/21 3969    sodium chloride flush 0.9 % injection 5-40 mL  5-40 mL Intravenous 2 times per day VENITA Bhandari   10 mL at 07/10/21 0758    sodium chloride flush 0.9 % injection 5-40 mL  5-40 mL Intravenous PRN VENITA Bhandari   10 mL at 07/10/21 1031    0.9 % sodium chloride infusion  25 mL Intravenous PRN Henrene Budds, PA        polyethylene glycol [facility]) packet 17 g  17 g Oral Daily PRN Henrene Budds, PA        acetaminophen (TYLENOL) tablet 650 mg  650 mg Oral Q6H PRN Henrene Budds, PA   650 mg at 07/06/21 0057    Or    acetaminophen (TYLENOL) suppository 650 mg  650 mg Rectal Q6H PRN Henrene Budds, PA        famotidine (PEPCID) tablet 20 mg  20 mg Oral BID Henrene Budds, PA   20 mg at 07/10/21 0757    promethazine (PHENERGAN) tablet 12.5 mg  12.5 mg Oral Q6H PRN Henrene Budds, PA            PHYSICAL EXAM:   Vitals:    07/10/21 0230 07/10/21 0345 07/10/21 0715 07/10/21 0754   BP: 122/81  (!) 97/55 100/69   Pulse: 103  105 64   Resp: 20  20 18   Temp: 98.6 °F (37 °C)  98.2 °F (36.8 °C) 98.6 °F (37 °C)   TempSrc: Temporal  Temporal Temporal   SpO2: 98%  98% 90%   Weight:  177 lb 6.4 oz (80.5 kg)     Height:       Constitutional: Oriented to person, place, and time. Well-developed and cooperative. Head: Normocephalic and atraumatic. Eyes: Conjunctivae are normal.  Neck: No  JVD present. Cardiovascular: S1 normal, S2 normal  A regular rhythm present. Pulmonary/Chest: Effort normal and breath sounds normal. No respiratory distress. Abdominal: Soft. Normal appearance   Musculoskeletal: Normal range of motion of all extremities, no muscle weakness. Neurological: Alert and oriented to person, place, and time. Skin: Skin is warm and dry. No bruising, no ecchymosis and no rash noted. Extremity: No clubbing or cyanosis. No edema. Psychiatric: Normal mood and affect. Thought content normal.       Data:    Recent Labs     07/08/21  0550 07/08/21  1955 07/09/21  0340 07/10/21  0140 07/10/21  0607   WBC 0.2*  --  0.8*  --  2.3*   HGB 6.6*   < > 7.6* 9.1* 9.1*   HCT 20.8*   < > 23.7* 28.4* 28.5*   PLT 13*  --  11*  --  29*    < > = values in this interval not displayed.      Recent Labs     07/08/21  0550 07/09/21  0340 07/10/21  0607    143 143   K 3.0* 4.0 2.7*    100 98   CO2 32* 32* 36*   BUN 17 20 21   CREATININE 0.9 0.9 0.8   CALCIUM 9.1 9.2 9.2      Lab Results   Component Value Date    MG 2.0 07/08/2021     Telemetry: SR with PACs/PVCs with rates      Device Interrogation/Reprogramming (7/6/21)  · Make/Model: St Pramod Assurity MRI 2272  · DOI: 12/17/20  · Battery: >95%  · Jennifer Doom therapy: DDD  ppm  · Pacing %: RA = 12%, RV = 6.5%  · Lead function:  · RA lead: sensing = 4.1 mV, impedance = 330 ohms, threshold = 0.5 V @ 0.5 msec  · RV lead: sensing = 10.3 mV, impedance = 430 ohms, threshold = 1.0 V @ 0.5 msec  · Lead programming:  · RA lead: sensitivity = 0.5 mV, output = 1.375 V @ 0.5 msec (AUTO)  · RV lead: sensitivity = 2.0 mV, output = 1.125 V @ 0.5 msec (AUTO)  · Arrhythmias: AT/AF burden = 1.9%, MS = 3.1%, 12 VHREs (EGM consistent with AT), PMT  · Reprogramming included: PVARP extended to 325 msec, turn off VIP and atrial cap confirm, and extend pace/sense AV delays to 250/270 msec. · Overall device function is normal  · All device programmable settings were evaluated per above and in the scanned document, along with iterative adjustments (capture thresholds) to assess and select the most appropriate final programming to provide for consistent delivery of the appropriate therapy and to verify function of the device. Prior cardiac testing:  · TTE (4/1/21):  LVEF = 65-70%, normal #29 Bicor MVR, normal #21 Trifecta bioprosthetic AVR, and RVSP ~ 39 mmHg. · Pharmacologic Nuclear Stress (12/14/20 at [facility] LVEF = \"normal\", mild LAD territory ischemia (< 10%), no infarct, and low risk study. Assessment/Plan:   1. SND s/p St Pramod dc PPM   - (implant: 2002; generator change: 2014; extraction of RA & RV leads due to noise and inappropriate sensing and implant of new device: 12/17/20)  -Multiple episodes of PMT. Retrograde testing interrupted by paroxysms of AT, so PVARP extended to 325 msec (fixed).   -Reviewed [facility] EP notes and additional changes made per their recommendations.  -Follow-up with Dr Phillip Kiran ([facility] EP). 2. GNR bacteremia  -Patient has GNR on 1/1 blood culture. PCR + Enterobacteriaceae, Klebsiella pneumoniae , Pseudomonas aeruginosa, Serratia marcescens. -ID consulted. -Plan for CARMEN. Potentially 7/12/21 to assess for CIED infection (vegetation). He complains of new dysphagia. Recommend evaluation of dysphagia prior to CARMEN.  -Plans for Mediport extraction. 3. AT/PACs  -Continue metoprolol XL 50 mg BID. 4. Nonvalvular atrial fibrillation sp surgical MAZE and LAAO (8/12/16)  -CHADSVASC = 2 (age, CAD). Xarelto held 2/2 hematuria/pancytopenia. Resume when able. -Previously on sotalol, but discontinued due to QTc prolongation in 3/2021. AT/AF burden on device check was 1.9% and appears to be due to AT (see #2). 5.  Hematuria/Pancytopenia  -Management per Primary Service. 6. Dysphagia  -Management per Primary Service.  -Unclear etiology. Plan :  1. Work up dysphagia  2. CARMEN Monday to assess for lead involvement  3. Antibiotics per ID for bacteremia  4. Line removal per surgery    Thank you for allowing me to participate in your patient's care. Please call me if there are any questions or concerns.         Alexis Agarwal MD  Cardiac Electrophysiology  [facility]) Physicians  [facility] [place]: [facility]  10:45 AM  7/10/2021

## 2021-07-10 NOTE — PATIENT CARE CONFERENCE
[facility] Quality Flow/Interdisciplinary Rounds Progress Note        Quality Flow Rounds held on July 10, 2021    Disciplines Attending:  Bedside Nurse, ,  and Nursing Unit Leadership    Nathanael Nelson was admitted on 7/5/2021  5:44 PM    Anticipated Discharge Date:  Expected Discharge Date: 07/12/21    Disposition:    Osman Score:  Osman Scale Score: 20    Readmission Risk              Risk of Unplanned Readmission:  25           Discussed patient goal for the day, patient clinical progression, and barriers to discharge.   The following Goal(s) of the Day/Commitment(s) have been identified:  Labs - Report Results and continue IV antibx      Nikos Vasques RN  July 10, 2021

## 2021-07-10 NOTE — PROGRESS NOTES
Subjective: The patient is awake and alert. Much more alert and awake   No acute complaint s      Objective:    /60   Pulse 119   Temp 98.6 °F (37 °C) (Temporal)   Resp 22   Ht 5' 7\" (1.702 m)   Wt 178 lb 9.6 oz (81 kg)   SpO2 94%   BMI 27.97 kg/m²     In: 625.8 [Blood:331]  Out: 1425   In: 625.8   Out: 1425 [Urine:1425]    General appearance: NAD, conversant, feels like he is coughing up a fair amount of phlegm  HEENT: AT/NC, MMM  Neck: FROM, supple  Lungs: Clear to auscultation  CV: RRR, no MRGs  Vasc: Radial pulses 2+  Abdomen: Soft, non-tender; no masses or HSM  Extremities: No peripheral edema or digital cyanosis  Skin: no rash, lesions or ulcers  Psych: Alert and oriented to person, place and time  Neuro: Alert and interactive     Recent Labs     07/07/21  0550 07/07/21  0550 07/08/21  0550 07/08/21  1955 07/09/21  0340   WBC 0.1*  --  0.2*  --  0.8*   HGB 7.4*   < > 6.6* 7.7* 7.6*   HCT 23.2*   < > 20.8* 24.3* 23.7*   PLT 22*  --  13*  --  11*    < > = values in this interval not displayed. Recent Labs     07/07/21  0550 07/08/21  0550 07/09/21  0340    146 143   K 3.1* 3.0* 4.0    103 100   CO2 34* 32* 32*   BUN 16 17 20   CREATININE 0.7 0.9 0.9   CALCIUM 9.1 9.1 9.2       Assessment:    Principal Problem:    Diffuse large B-cell lymphoma (HCC)  Active Problems:    Hematuria    Severe protein-calorie malnutrition (HCC)    Thrombocytopenia (HCC)  Resolved Problems:    * No resolved hospital problems.  *      Plan:    Admitted for evaluation of pancytopenia and hematuria in pt with B cell lymphoma on chemo in spite of neulasta   hemonc to follow for transfusions - appreciate input   Await Gcsf tx   Hold oac / antiplatelets - o n xarelto at home - consider coming off for now   Transfuse 1 unit PRBC 7/8/2021-monitor volume status , avoid volume  Daily labs     Gram-negative nevaeh sepsis/bacteremia  Zosyn/vancomycin  Infectious disease follwoing   Will hold IV fluids for now secondary to worsening lung status, obtain chest x-ray  Diuresis 20 IV Lasix x1  Consider port removal    May benefit from bronchoscopy  dw nisha. Al zoby   Bronch if no improvement with diruresis - caution w diuresis given sepsis       A. fib RVR/electrolyte abnormalities  Rate control-achieved with beta-blocker  Supplement electrolytes  Oral anticoagulation?   EP service following-input appreciated    DVT Prophylaxis   PT/OT  Discharge planning         Son at bedside case discussed     Grecia Freedman MD  9:56 PM  7/9/2021

## 2021-07-10 NOTE — PLAN OF CARE
Problem: Falls - Risk of:  Goal: Will remain free from falls  Description: Will remain free from falls  7/10/2021 1342 by Brittany Jean RN  Outcome: Met This Shift  7/10/2021 0309 by Erica Shay RN  Outcome: Met This Shift  Goal: Absence of physical injury  Description: Absence of physical injury  7/10/2021 1342 by Brittany Jean RN  Outcome: Met This Shift  7/10/2021 0309 by Erica Shay RN  Outcome: Met This Shift     Problem: Skin Integrity:  Goal: Absence of new skin breakdown  Description: Absence of new skin breakdown  7/10/2021 1342 by Brittany Jean RN  Outcome: Met This Shift  7/10/2021 0309 by Erica Shay RN  Outcome: Met This Shift

## 2021-07-11 LAB
ANION GAP SERPL CALCULATED.3IONS-SCNC: 13 MMOL/L (ref 7–16)
BUN BLDV-MCNC: 16 MG/DL (ref 6–23)
CALCIUM SERPL-MCNC: 7.6 MG/DL (ref 8.6–10.2)
CHLORIDE BLD-SCNC: 104 MMOL/L (ref 98–107)
CO2: 31 MMOL/L (ref 22–29)
CREAT SERPL-MCNC: 0.5 MG/DL (ref 0.7–1.2)
GFR AFRICAN AMERICAN: >60
GFR NON-AFRICAN AMERICAN: >60 ML/MIN/1.73
GLUCOSE BLD-MCNC: 94 MG/DL (ref 74–99)
HCT VFR BLD CALC: 29.2 % (ref 37–54)
HEMOGLOBIN: 9.2 G/DL (ref 12.5–16.5)
MAGNESIUM: 1.6 MG/DL (ref 1.6–2.6)
MCH RBC QN AUTO: 29.4 PG (ref 26–35)
MCHC RBC AUTO-ENTMCNC: 31.5 % (ref 32–34.5)
MCV RBC AUTO: 93.3 FL (ref 80–99.9)
PDW BLD-RTO: 15.8 FL (ref 11.5–15)
PLATELET # BLD: 34 E9/L (ref 130–450)
PLATELET CONFIRMATION: NORMAL
PMV BLD AUTO: 12.8 FL (ref 7–12)
POTASSIUM REFLEX MAGNESIUM: 2.7 MMOL/L (ref 3.5–5)
POTASSIUM SERPL-SCNC: 3.6 MMOL/L (ref 3.5–5)
RBC # BLD: 3.13 E12/L (ref 3.8–5.8)
SODIUM BLD-SCNC: 148 MMOL/L (ref 132–146)
WBC # BLD: 5.1 E9/L (ref 4.5–11.5)

## 2021-07-11 PROCEDURE — 2580000003 HC RX 258: Performed by: PHYSICIAN ASSISTANT

## 2021-07-11 PROCEDURE — 6360000002 HC RX W HCPCS: Performed by: INTERNAL MEDICINE

## 2021-07-11 PROCEDURE — 6370000000 HC RX 637 (ALT 250 FOR IP): Performed by: INTERNAL MEDICINE

## 2021-07-11 PROCEDURE — 80048 BASIC METABOLIC PNL TOTAL CA: CPT

## 2021-07-11 PROCEDURE — 6360000002 HC RX W HCPCS: Performed by: FAMILY MEDICINE

## 2021-07-11 PROCEDURE — 36415 COLL VENOUS BLD VENIPUNCTURE: CPT

## 2021-07-11 PROCEDURE — 2140000000 HC CCU INTERMEDIATE R&B

## 2021-07-11 PROCEDURE — 83735 ASSAY OF MAGNESIUM: CPT

## 2021-07-11 PROCEDURE — 84132 ASSAY OF SERUM POTASSIUM: CPT

## 2021-07-11 PROCEDURE — 99233 SBSQ HOSP IP/OBS HIGH 50: CPT | Performed by: INTERNAL MEDICINE

## 2021-07-11 PROCEDURE — 94640 AIRWAY INHALATION TREATMENT: CPT

## 2021-07-11 PROCEDURE — 6360000002 HC RX W HCPCS: Performed by: PHYSICIAN ASSISTANT

## 2021-07-11 PROCEDURE — 99232 SBSQ HOSP IP/OBS MODERATE 35: CPT | Performed by: INTERNAL MEDICINE

## 2021-07-11 PROCEDURE — 2580000003 HC RX 258: Performed by: INTERNAL MEDICINE

## 2021-07-11 PROCEDURE — 85027 COMPLETE CBC AUTOMATED: CPT

## 2021-07-11 PROCEDURE — 87040 BLOOD CULTURE FOR BACTERIA: CPT

## 2021-07-11 PROCEDURE — 6370000000 HC RX 637 (ALT 250 FOR IP): Performed by: PHYSICIAN ASSISTANT

## 2021-07-11 RX ADMIN — POTASSIUM CHLORIDE 20 MEQ: 1500 TABLET, EXTENDED RELEASE ORAL at 17:42

## 2021-07-11 RX ADMIN — POTASSIUM CHLORIDE 10 MEQ: 10 INJECTION, SOLUTION INTRAVENOUS at 13:03

## 2021-07-11 RX ADMIN — NYSTATIN 500000 UNITS: 100000 SUSPENSION ORAL at 17:42

## 2021-07-11 RX ADMIN — FAMOTIDINE 20 MG: 20 TABLET ORAL at 20:26

## 2021-07-11 RX ADMIN — MEROPENEM 1000 MG: 1 INJECTION, POWDER, FOR SOLUTION INTRAVENOUS at 15:58

## 2021-07-11 RX ADMIN — POTASSIUM CHLORIDE 10 MEQ: 10 INJECTION, SOLUTION INTRAVENOUS at 13:58

## 2021-07-11 RX ADMIN — POTASSIUM CHLORIDE 10 MEQ: 10 INJECTION, SOLUTION INTRAVENOUS at 15:08

## 2021-07-11 RX ADMIN — Medication 10 ML: at 20:25

## 2021-07-11 RX ADMIN — ALBUTEROL SULFATE 2.5 MG: 2.5 SOLUTION RESPIRATORY (INHALATION) at 17:19

## 2021-07-11 RX ADMIN — METOPROLOL SUCCINATE 50 MG: 50 TABLET, EXTENDED RELEASE ORAL at 08:17

## 2021-07-11 RX ADMIN — FUROSEMIDE 20 MG: 10 INJECTION, SOLUTION INTRAMUSCULAR; INTRAVENOUS at 08:15

## 2021-07-11 RX ADMIN — POTASSIUM CHLORIDE 20 MEQ: 1500 TABLET, EXTENDED RELEASE ORAL at 08:16

## 2021-07-11 RX ADMIN — ALBUTEROL SULFATE 2.5 MG: 2.5 SOLUTION RESPIRATORY (INHALATION) at 08:46

## 2021-07-11 RX ADMIN — NYSTATIN 500000 UNITS: 100000 SUSPENSION ORAL at 20:26

## 2021-07-11 RX ADMIN — POTASSIUM CHLORIDE 10 MEQ: 10 INJECTION, SOLUTION INTRAVENOUS at 16:17

## 2021-07-11 RX ADMIN — NYSTATIN 500000 UNITS: 100000 SUSPENSION ORAL at 13:00

## 2021-07-11 RX ADMIN — POTASSIUM CHLORIDE 10 MEQ: 10 INJECTION, SOLUTION INTRAVENOUS at 10:47

## 2021-07-11 RX ADMIN — Medication 10 ML: at 08:16

## 2021-07-11 RX ADMIN — FAMOTIDINE 20 MG: 20 TABLET ORAL at 08:15

## 2021-07-11 RX ADMIN — MEROPENEM 1000 MG: 1 INJECTION, POWDER, FOR SOLUTION INTRAVENOUS at 23:46

## 2021-07-11 RX ADMIN — POTASSIUM CHLORIDE 10 MEQ: 10 INJECTION, SOLUTION INTRAVENOUS at 11:48

## 2021-07-11 RX ADMIN — MEROPENEM 1000 MG: 1 INJECTION, POWDER, FOR SOLUTION INTRAVENOUS at 08:14

## 2021-07-11 RX ADMIN — ALBUTEROL SULFATE 2.5 MG: 2.5 SOLUTION RESPIRATORY (INHALATION) at 12:43

## 2021-07-11 RX ADMIN — NYSTATIN 500000 UNITS: 100000 SUSPENSION ORAL at 08:16

## 2021-07-11 ASSESSMENT — PAIN SCALES - GENERAL
PAINLEVEL_OUTOF10: 0

## 2021-07-11 NOTE — PROGRESS NOTES
[facility]  Hematology/Oncology  Consult      Patient Name: Magdaleno Brasher  YOB: 1950  PCP: Nelson Ross DO   Referring Provider:      Reason for Consultation:   Chief Complaint   Patient presents with    Hematuria     PT STATES HE IS ANEMIC AND STATES HE HAS BEEN URINATING BLOOD FOR 1 WEEK. PT REPORTS FEELNG WEAKNESS      Subjective:  Slowly feeling improved. Still with some fatigue. No acute issues overnight    History of Present Illness:  76-year-old man with past medical history of aggressive bulky diffuse large B-cell lymphoma, non-germinal cell type with negative FISH interface for double hit or triple hit lymphoma with bulky intra-abdominal disease on presentation. He has been on combination chemotherapy with R-CHOP along with Revlimid. This has been complicated by significant pancytopenia is despite G-CSF prophylaxis. He has completed 1 week ago cycle #5 of R-CHOP/Revlimid  He was hospitalized to the emergency room with weakness fatigue and dizziness  He also reports gross hematuria a few days duration and has been on anticoagulation with Xarelto  Patient seen by cardiology and EPS for his arrhythmias.   Xarelto held because of thrombocytopenia and concurrent hematuria and will be resumed when thrombocytopenia improves and hematuria resolves    His CBC today shows a hemoglobin of 7.1  Platelets remain low at 15 with a WBC count of 0.1 despite G-CSF prophylaxis    Diagnostic Data:     Past Medical History:   Diagnosis Date    Arthritis     KNEES HIPS BACK    BPH (benign prostatic hyperplasia)     Cancer ([facility] [address]     Coronary artery disease     Difficult airway     PT STATES [facility]    Difficult intubation 04/2017    note in care everywhere \"Clinical Summary\" 03/10/2021    History of blood transfusion     Hyperlipidemia     Hypertension     Sinus tachycardia 01/01/2002       Patient Active Problem List    Diagnosis Date Noted    Severe Disease Father     Brain Cancer Sister     Stroke Sister     Stroke Brother     Cancer Maternal Grandfather        Social History    TOBACCO:   reports that he quit smoking about 7 years ago. He has a 44.00 pack-year smoking history. He has never used smokeless tobacco.  ETOH:   reports previous alcohol use of about 2.0 standard drinks of alcohol per week. Home Medications  Prior to Admission medications    Medication Sig Start Date End Date Taking? Authorizing Provider   XARELTO 20 MG TABS tablet 20 mg Daily with supper  4/4/21  Yes Historical Provider, MD   sotalol (BETAPACE) 80 MG tablet Take 0.5 tablets by mouth 2 times daily 4/3/21  Yes Angeli Babcock MD   lisinopril-hydroCHLOROthiazide (PRINZIDE;ZESTORETIC) 10-12.5 MG per tablet Take 1 tablet by mouth daily    Yes Historical Provider, MD   amLODIPine (NORVASC) 5 MG tablet Take 5 mg by mouth daily    Yes Historical Provider, MD   vitamin B-12 (CYANOCOBALAMIN) 100 MCG tablet Take 50 mcg by mouth daily   Yes Historical Provider, MD   sennosides-docusate sodium (SENOKOT-S) 8.6-50 MG tablet Take 1 tablet by mouth 2 times daily   Yes Historical Provider, MD   tamsulosin (FLOMAX) 0.4 MG capsule Take 0.4 mg by mouth daily  1/4/18  Yes Historical Provider, MD   ferrous sulfate 325 (65 Fe) MG tablet Take 325 mg by mouth 2 times daily  1/8/18  Yes Historical Provider, MD   esomeprazole Magnesium (NEXIUM) 20 MG PACK Take 20 mg by mouth daily   Yes Historical Provider, MD   PRAVASTATIN SODIUM PO Take 20 mg by mouth daily    Yes Historical Provider, MD   Albuterol Sulfate (PROAIR HFA IN) Inhale into the lungs    Historical Provider, MD   ondansetron (ZOFRAN ODT) 4 MG disintegrating tablet Take 1 tablet by mouth every 8 hours as needed for Nausea or Vomiting 3/13/21   Annalise Kaplan MD       Allergies  No Known Allergies    Review of Systems: Relevant for fatigue, weakness, dizziness, lightheadedness and hematuria. Confused today.       Constitutional:  No fever chills or rigors.  Eyes: No changes in vision, discharge, or pain   ENT: No Headaches, hearing loss or vertigo. No mouth sores or sore throat. No change in taste or smell.  Cardiovascular: No chest discomfort, dyspnea on exertion, palpitations or loss of consciousness. or phlebitis.  Respiratory: Has no cough or wheezing, Has no sputum production. Has no hemoptysis, Has no pleuritic pain, .  Gastrointestinal: No abdominal pain, appetite loss, blood in stools. No change in bowel habits. No hematemesis    Genitourinary: Patient acknowledges no dysuria, trouble voiding, or hematuria. No nocturia or increased frequency.  Musculoskeletal: No gait disturbance, weakness or joint complaints.  Integumentary: No rash or pruritis.  Neurological: No headache, diplopia, change in muscle strength, numbness or tingling. No change in gait, balance, coordination, mood, affect, memory, mentation, behavior.  Psychiatric: No anxiety, or depression.  Endocrine: No temperature intolerance. No excessive thirst, fluid intake, or urination. No tremor.  Hematologic/Lymphatic: No abnormal bruising or bleeding, blood clots or swollen lymph nodes.  Allergic/Immunologic: No nasal congestion or hives. Objective  /73   Pulse 106   Temp 98.1 °F (36.7 °C) (Temporal)   Resp 20   Ht 5' 7\" (1.702 m)   Wt 175 lb 9.6 oz (79.7 kg)   SpO2 94%   BMI 27.50 kg/m²     Physical Exam:    General: Patient was confused when I went to examine the patient. He could not tell me where he was and was having trouble finding words. No focal weakness numbness. No headaches. He has been afebrile. Head and neck : PERRLA, EOMI . Sclera non icteric. Oropharynx : Clear  Neck: no JVD,  no adenopathy, no carotid bruit  Heart: Irregular  Lungs: Clear to auscultation   Extremities: No edema,no cyanosis, no clubbing. Abdomen: Soft, non-tender;no masses, no organomegaly  Skin:  No rash.   Neurologic:Cranial nerves grossly intact. No focal motor or sensory deficits . Recent Laboratory Data-   Lab Results   Component Value Date    WBC 5.1 07/11/2021    HGB 9.2 (L) 07/11/2021    HCT 29.2 (L) 07/11/2021    MCV 93.3 07/11/2021    PLT 34 (L) 07/11/2021    LYMPHOPCT 43.0 (H) 07/08/2021    RBC 3.13 (L) 07/11/2021    MCH 29.4 07/11/2021    MCHC 31.5 (L) 07/11/2021    RDW 15.8 (H) 07/11/2021    NEUTOPHILPCT 33.0 (L) 07/08/2021    MONOPCT 19.0 (H) 07/08/2021    BASOPCT 0.0 07/08/2021    NEUTROABS 0.07 (L) 07/08/2021    LYMPHSABS 0.09 (L) 07/08/2021    MONOSABS 0.04 (L) 07/08/2021    EOSABS 0.00 (L) 07/08/2021    BASOSABS 0.00 07/08/2021       Lab Results   Component Value Date     (H) 07/11/2021    K 2.7 (LL) 07/11/2021     07/11/2021    CO2 31 (H) 07/11/2021    BUN 16 07/11/2021    CREATININE 0.5 (L) 07/11/2021    GLUCOSE 94 07/11/2021    CALCIUM 7.6 (L) 07/11/2021    PROT 5.5 (L) 07/06/2021    LABALBU 3.2 (L) 07/06/2021    BILITOT 1.5 (H) 07/06/2021    ALKPHOS 66 07/06/2021    AST 11 07/06/2021    ALT 19 07/06/2021    LABGLOM >60 07/11/2021    GFRAA >60 07/11/2021       Lab Results   Component Value Date    IRON 38 (L) 02/16/2021    TIBC 248 (L) 02/16/2021           Radiology-    XR CHEST PORTABLE    Result Date: 7/5/2021  EXAMINATION: ONE XRAY VIEW OF THE CHEST 7/5/2021 6:31 pm COMPARISON: April 1, 2021 HISTORY: ORDERING SYSTEM PROVIDED HISTORY: dizziness TECHNOLOGIST PROVIDED HISTORY: If in ER room at the time of exam, OK to change to portable 1 view Reason for exam:->dizziness What reading provider will be dictating this exam?->CRC FINDINGS: Left pacemaker. Median sternotomy wires are present. The heart is normal in size. There is prominence of pulmonary vasculature. Interstitial and hazy opacities are present in the lung bases. No obvious pleural effusion. No pneumothorax. Right MediPort present.      Interstitial prominence bilaterally related to pulmonary vascular congestion with pneumonia or subsegmental atelectasis in lung bases. PET CT SKULL BASE TO MID THIGH    Result Date: 6/21/2021  EXAMINATION: Skull base to midthigh PET/CT 6/18/2021 TECHNIQUE: Following IV injection of 15.5 mCi of F 18 -FDG, PET  tumor imaging was acquired from the base of the skull to the mid thighs. Computed tomography was used for purposes of attenuation correction and anatomic localization. Fusion imaging was utilized for interpretation. Uptake time 52 mins. Glucose level 100 mg/dl. COMPARISON: CT abdomen pelvis dated 03/29/2021, CT neck dated 06/01/2021 HISTORY: ORDERING SYSTEM PROVIDED HISTORY: Diffuse large B-cell lymphoma of lymph nodes of multiple sites [facility]) TECHNOLOGIST PROVIDED HISTORY: Reason for exam:->Diffuse large B-cell lymphoma of lymph nodes of multiple sites [facility]) What reading provider will be dictating this exam?->CRC FINDINGS: HEAD/NECK: In the craniocervical soft tissues, physiologic activity is favored. Bilateral carotid artery calcification. CHEST: Port is seen in the right chest wall. Pacemaker in the left chest wall. Status post median sternotomy. Within the mediastinum, no kelley activity markedly greater than mediastinal background. No axillary adenopathy. Soft tissue activity about the shoulders bilaterally, typically inflammatory. Bilateral gynecomastia. Emphysema. Small bilateral pleural effusions. Scattered ground-glass opacity, likely atelectasis. No pneumothorax. The pleuroparenchymal nodule at the left lung base on the prior CT is not well seen on the current exam, potentially obscured by the pleural effusion. ABDOMEN/PELVIS: The previously demonstrated extensive abdominal and pelvic adenopathy as well as small bowel nodularity seen on the prior CT is not observed on current. No hypermetabolic adenopathy is seen. Right nephroureteral stent is demonstrated. Excretion is seen from the kidneys bilaterally and activity is seen within the urinary bladder.   Bowel activity seen which can be physiologically variable, including at the distal rectum. Calcification of the abdominal aorta and iliac arteries. Diverticulosis. BONES/SOFT TISSUE: Diffuse activity is seen throughout regional bone marrow, relatively symmetrically. With regard to patient's history of lymphoma, no hypermetabolic adenopathy is seen, compatible with favorable treatment response. Diffuse symmetric activity throughout regional bone marrow, which can be seen in the setting of recent chemotherapy, colony-stimulating factor usage, or anemia. Small bilateral pleural effusions. ASSESSMENT/PLAN : 80-year-old man    High-grade aggressive diffuse large B-cell lymphoma, non-germinal cell type, ABC type status post cycle #5 of chemotherapy with R-CHOP/Revlimid. Double hit/triple hit lymphoma have been ruled out    Pancytopenia secondary to chemotherapy  Hematuria related to thrombocytopenia and the fact that he is anticoagulated with Xarelto for his chronic A. Fib    Appreciate cardiology and EPS input    Hold Xarelto  It may be resumed when gross hematuria resolves and platelet count improves above 50    Initiate G-CSF for persistent severe neutropenia despite Neulasta prophylaxis  Monitor for potential neutropenic fevers  Transfuse with 1 unit of packed RBC because of fatigue and cardiovascular compromise and 1 unit of platelet because of his gross hematuria and thrombocytopenia  We will follow    7/7/2021. Patient was confused when I went to examine the patient. He could not tell me where he was and was having trouble finding words. No focal weakness numbness. Neuro exam was non focal. No headaches. He has been afebrile. Will r/o sepsis. Blood cultures chest xray ct head without contrast requested. Profound neutropenia > 7 days since chemo. Continue Granix. Will start prophylactic Levaquin after cultures are drawn. Platelets 30G. No active bleeding bruising. Xarelto has been held for now.      7/8/21  - Continue with profound pancytopenia. WBC 0.2, ANC 70. Hgb 6.6, platelets 13. Getting 1 unit pRBC  - Trend CBC daily, transfuse for Hgb <7, platelets <92 or <40 with active bleeding  - Continue granix 480 mcg daily  - BCx showing GNR (Klebsiella, Pseudomonas, Serratia marcescens by PCR)  - ID consulted, now on merrem. Vanco DC'd  - Vascular consulted to remove PORT, agree    7/9/21  On Meropenem for gram negative bacteremia  Mediport to be removed   Remains pancytopenic with improvement in [address]   Platelets down to 11  To transfuse with 1 unit of PRBC and 1 dose of platelets in anticipation of mediport removal    7/11/21  - Pancytopenia improved. WBC to 5.1, Hgb 9.2, platelets 34  - Continues on merrem. ID following. GNR bacteremia  - PORT to be removed.  OK to transfuse platelets up to surgery goal, defer count number to comfort of surgeon    Electronically signed 7/11/2021 at 12:40 PM   Courtney Em MD

## 2021-07-11 NOTE — PROGRESS NOTES
[address] and Vascular [facility] Electrophysiology  Progress Note   PATIENT: Aimee Quintana  MEDICAL RECORD NUMBER: [id number]  DATE OF SERVICE:  7/11/2021  ATTENDING ELECTROPHYSIOLOGIST: Maia Santos DO   PRIMARY ELECTROPHYSIOLOGIST: ([facility]) Romelia Manning MD   REFERRING PHYSICIAN: Sandra NATHAN and Celia Whitley DO  CHIEF COMPLAINT: Dizzy with weakness and fatigue     Subjective: Aimee Quintana is a 80 y.o. male with a history of SND S/P St Pramod D- PPM (implant: 2002; generator change: 2014; extraction of RA & RV leads due to noise and inappropriate sensing and implant of new device: 12/17/20), nonvalvular atrial fibrillation sp surgical MAZE and LAAO (8/12/16); CAD s/p CABG (8/12/16: LIMA-LAD), MR sp MVR (8/12/16), AI sp AVR (8/12/16), bladder tumor sp ureteral stent, diffuse large B cell lymphoma, and esophageal stricture. He presented on 7/5/21 with chief complaint of weakness and fatigue, as well as hematuria. He had ureteral stent a few months ago. He was noted to be pancytopenic. He was noted to have episodes of ventricular pacing v NSVT, so EP service was consulted by admitting physician. Blood cultures grew GNR, he has been given IV Zosyn and Vancomycin with an improvement in his mental status and ID has been consulted. He complains of difficulty swallowing at this time. He awaits Mediport removal.     7/11/21 : he is frustrated and tired of waiting for procedures.  No chest pain or sob    Past Medical History:   Diagnosis Date    Arthritis     KNEES HIPS BACK    BPH (benign prostatic hyperplasia)     [facility] ([facility] [address]     Coronary artery disease     Difficult airway     PT STATES HE WAS TOLD THIS TWICE AT [facility]    Difficult intubation 04/2017    note in care everywhere \"Clinical Summary\" 03/10/2021    History of blood transfusion     Hyperlipidemia     Hypertension     Sinus tachycardia 01/01/2002      Past Surgical History:   Procedure Laterality Date  ABLATION OF DYSRHYTHMIC FOCUS      AORTA SURGERY      aortic valve replacement    AORTIC VALVE REPLACEMENT      BLADDER SURGERY Right 2021    CYSTOSCOPY BILATERAL RETROGRADE PYELOGRAM RIGHT STENT INSERTION performed by Fani Sandra MD at [address] [facility] COLONOSCOPY  2021    COLONOSCOPY WITH BIOPSY performed by Amparo Parikh DO at [address]  2021    COLONOSCOPY W/ ENDOSCOPIC MUCOSAL RESECTION performed by Amparo Parikh DO at [address] N/A 3/11/2021    LAPAROSCOPIC RIGHT HEMICOLECTOMY performed by Ramirez Stratton MD at [address] OTHER SURGICAL HISTORY  2016    CT Myelogram, Dr. Leobardo Mccoy   new battery    last checked Dr Virgie Mathis 2016?     PORT SURGERY Right 2021    MEDI PORT INSERTION performed by Ramirez Stratton MD at [address]  [facility] ENDOSCOPY      reflux    UPPER GASTROINTESTINAL ENDOSCOPY N/A 2021    EGD BIOPSY performed by Jun Rojas MD at [address] [facility]        Family History   Problem Relation Age of Onset    Diabetes Mother     Stroke Mother     Diabetes Father     Heart Disease Father     Brain Cancer Sister     Stroke Sister     Stroke Brother     Cancer Maternal Grandfather      Social History     Tobacco Use    Smoking status: Former Smoker     Packs/day: 1.00     Years: 44.00     Pack years: 44.00     Quit date: 3/4/2014     Years since quittin.3    Smokeless tobacco: Never Used   Substance Use Topics    Alcohol use: Not Currently     Alcohol/week: 2.0 standard drinks     Types: 2 Shots of liquor per week     Comment: monthly; no caffeine     Current Facility-Administered Medications   Medication Dose Route Frequency Provider Last Rate Last Admin    0.9 % sodium chloride infusion   Intravenous PRN Caffie Osgood, MD        potassium chloride (KLOR-CON M) extended release tablet 20 mEq  20 mEq Oral BID  Munir Perez MD   20 mEq at 07/11/21 0816    0.9 % sodium chloride infusion   Intravenous PRN Parisa Hawthorne MD        0.9 % sodium chloride infusion   Intravenous PRN Parisa Hawthorne MD        furosemide (LASIX) injection 20 mg  20 mg Intravenous BID Court Garcia MD   20 mg at 07/11/21 0815    meropenem (MERREM) 1,000 mg in sodium chloride 0.9 % 100 mL IVPB (mini-bag)  1,000 mg Intravenous Q8H Kymberly Beckwith MD 33.3 mL/hr at 07/11/21 0814 1,000 mg at 07/11/21 0814    metoprolol succinate (TOPROL XL) extended release tablet 50 mg  50 mg Oral BID Court Garcia MD   50 mg at 07/11/21 0817    nystatin (MYCOSTATIN) 061233 UNIT/ML suspension 500,000 Units  5 mL Oral 4x Daily Isatu Resendez MD   500,000 Units at 07/11/21 0816    metoprolol (LOPRESSOR) injection 2.5 mg  2.5 mg Intravenous Q6H PRN Collette Block, DO   2.5 mg at 07/09/21 1823    lisinopril (PRINIVIL;ZESTRIL) tablet 10 mg  10 mg Oral Daily Candice Patel, PA   10 mg at 07/09/21 0917    potassium chloride (KLOR-CON M) extended release tablet 40 mEq  40 mEq Oral PRN Nita Pyo Learn, APRN - CNP   40 mEq at 07/07/21 4856    Or    potassium bicarb-citric acid (EFFER-K) effervescent tablet 40 mEq  40 mEq Oral PRN Nita Pyo Learn, APRN - CNP   40 mEq at 07/09/21 0103    Or    potassium chloride 10 mEq/100 mL IVPB (Peripheral Line)  10 mEq Intravenous PRN Nita Pyo Learn, APRN -  mL/hr at 07/10/21 1548 10 mEq at 07/10/21 1548    albuterol (PROVENTIL) nebulizer solution 2.5 mg  2.5 mg Nebulization 4x daily Candice Campoing, PA   2.5 mg at 07/11/21 0846    amLODIPine (NORVASC) tablet 5 mg  5 mg Oral Daily VENITA Abbasi   5 mg at 07/09/21 0918    sodium chloride flush 0.9 % injection 5-40 mL  5-40 mL Intravenous 2 times per day VENITA Abbasi   10 mL at 07/11/21 0816    sodium chloride flush 0.9 % injection 5-40 mL  5-40 mL Intravenous PRN VENITA Keenan   10 mL at 07/10/21 1727    0.9 % sodium chloride infusion  25 mL Intravenous PRN VENITA Keenan        polyethylene glycol (GLYCOLAX) packet 17 g  17 g Oral Daily PRN VENITA Keenan        acetaminophen (TYLENOL) tablet 650 mg  650 mg Oral Q6H PRN VENITA Keenan   650 mg at 07/06/21 0057    Or    acetaminophen (TYLENOL) suppository 650 mg  650 mg Rectal Q6H PRN VENITA Keenan        famotidine (PEPCID) tablet 20 mg  20 mg Oral BID VENITA Keenan   20 mg at 07/11/21 0815    promethazine (PHENERGAN) tablet 12.5 mg  12.5 mg Oral Q6H PRN VENITA Keenan            PHYSICAL EXAM:   Vitals:    07/10/21 2315 07/11/21 0452 07/11/21 0711 07/11/21 0846   BP: (!) 92/58  103/70    Pulse: 128  100    Resp: 20  20    Temp: 98.6 °F (37 °C)  98.2 °F (36.8 °C)    TempSrc: Temporal  Temporal    SpO2: 94%  95% 96%   Weight:  175 lb 9.6 oz (79.7 kg)     Height:       Constitutional: Oriented to person, place, and time. Well-developed and cooperative. Head: Normocephalic and atraumatic. Eyes: Conjunctivae are normal.  Neck: No  JVD present. Cardiovascular: S1 normal, S2 normal  A regular rhythm present. Pulmonary/Chest: Effort normal and breath sounds normal. No respiratory distress. Abdominal: Soft. Normal appearance   Musculoskeletal: Normal range of motion of all extremities, no muscle weakness. Neurological: Alert and oriented to person, place, and time. Skin: Skin is warm and dry. No bruising, no ecchymosis and no rash noted. Extremity: No clubbing or cyanosis. No edema. Psychiatric: Normal mood and affect. Thought content normal.       Data:    Recent Labs     07/09/21  0340 07/09/21  0340 07/10/21  0140 07/10/21  0607 07/11/21  0526   WBC 0.8*  --   --  2.3* 5.1   HGB 7.6*   < > 9.1* 9.1* 9.2*   HCT 23.7*   < > 28.4* 28.5* 29.2*   PLT 11*  --   --  29* 34*    < > = values in this interval not displayed.      Recent Labs     07/09/21  0340 07/10/21  0607    143   K 4.0 2.7*    98   CO2 32* 36*   BUN 20 21   CREATININE 0.9 0.8   CALCIUM 9.2 9.2      Lab Results   Component Value Date    MG 2.0 07/08/2021     Telemetry: SR with PACs/PVCs with rates      Device Interrogation/Reprogramming (7/6/21)  · Make/Model: St Pramod Assurity MRI 2272  · DOI: 12/17/20  · Battery: >95%  · Bernadene Mettle therapy: DDD  ppm  · Pacing %: RA = 12%, RV = 6.5%  · Lead function:  · RA lead: sensing = 4.1 mV, impedance = 330 ohms, threshold = 0.5 V @ 0.5 msec  · RV lead: sensing = 10.3 mV, impedance = 430 ohms, threshold = 1.0 V @ 0.5 msec  · Lead programming:  · RA lead: sensitivity = 0.5 mV, output = 1.375 V @ 0.5 msec (AUTO)  · RV lead: sensitivity = 2.0 mV, output = 1.125 V @ 0.5 msec (AUTO)  · Arrhythmias: AT/AF burden = 1.9%, MS = 3.1%, 12 VHREs (EGM consistent with AT), PMT  · Reprogramming included: PVARP extended to 325 msec, turn off VIP and atrial cap confirm, and extend pace/sense AV delays to 250/270 msec. · Overall device function is normal  · All device programmable settings were evaluated per above and in the scanned document, along with iterative adjustments (capture thresholds) to assess and select the most appropriate final programming to provide for consistent delivery of the appropriate therapy and to verify function of the device. Prior cardiac testing:  · TTE (4/1/21):  LVEF = 65-70%, normal #29 Bicor MVR, normal #21 Trifecta bioprosthetic AVR, and RVSP ~ 39 mmHg. · Pharmacologic Nuclear Stress (12/14/20 at [facility] LVEF = \"normal\", mild LAD territory ischemia (< 10%), no infarct, and low risk study. Assessment/Plan:   1. SND s/p St Pramod dc PPM   - (implant: 2002; generator change: 2014; extraction of RA & RV leads due to noise and inappropriate sensing and implant of new device: 12/17/20)  -Multiple episodes of PMT. Retrograde testing interrupted by paroxysms of AT, so PVARP extended to 325 msec (fixed).   -Reviewed [facility] EP notes and additional changes made per their recommendations.  -Follow-up with Dr Phillip Kirna ([facility] EP). 2. GNR bacteremia  -Patient has GNR on 1/1 blood culture. PCR + Enterobacteriaceae, Klebsiella pneumoniae , Pseudomonas aeruginosa, Serratia marcescens. -ID consulted. -Plan for CARMEN. Potentially 7/12/21 to assess for CIED infection (vegetation). He complains of new dysphagia. Recommend evaluation of dysphagia prior to CARMEN.  -Plans for Mediport extraction. 3. AT/PACs  -Continue metoprolol XL 50 mg BID. 4. Nonvalvular atrial fibrillation sp surgical MAZE and LAAO (8/12/16)  -CHADSVASC = 2 (age, CAD). Xarelto held 2/2 hematuria/pancytopenia. Resume when able. -Previously on sotalol, but discontinued due to QTc prolongation in 3/2021. AT/AF burden on device check was 1.9% and appears to be due to AT (see #2). 5.  Hematuria/Pancytopenia  -Management per Primary Service. 6. Dysphagia  -Management per Primary Service.  -Unclear etiology. Plan :  1. Work up dysphagia  2. CARMEN Monday to assess for lead involvement pending platelet count. Currently 34  3. Antibiotics per ID for bacteremia  4. Line removal per surgery when safe from bleeding standpoint    Thank you for allowing me to participate in your patient's care. Please call me if there are any questions or concerns.         Alexis Agarwal MD  Cardiac Electrophysiology  [facility]) Physicians  [facility] [place]: [place] Electrophys[facility]  10:11 AM  7/11/2021

## 2021-07-11 NOTE — PLAN OF CARE
Problem: Falls - Risk of:  Goal: Will remain free from falls  Description: Will remain free from falls  7/11/2021 1800 by Heri Foote RN  Outcome: Met This Shift  7/11/2021 0500 by Michael Juarez RN  Outcome: Met This Shift  Goal: Absence of physical injury  Description: Absence of physical injury  7/11/2021 1800 by Heri Foote RN  Outcome: Met This Shift  7/11/2021 0500 by Michael Juarez RN  Outcome: Met This Shift     Problem: Skin Integrity:  Goal: Absence of new skin breakdown  Description: Absence of new skin breakdown  7/11/2021 0500 by Michael Juarez RN  Outcome: Met This Shift

## 2021-07-11 NOTE — PROGRESS NOTES
Pulmonary [address] [facility]                             Pulmonary Consult/Progress Note :              Reason for Consultation:POssible need for Bronch,Lymphoma   CC : SOB ,cough frothy secretion   HPI:     SOB has improved a lot ,less cough   On 2 L   Denies any fever   More comfortable  Resting at bed  No chest pain       PHYSICAL EXAMINATION:     VITAL SIGNS:  BP (!) 98/56   Pulse 73   Temp 96.6 °F (35.9 °C) (Temporal)   Resp 20   Ht 5' 7\" (1.702 m)   Wt 175 lb 9.6 oz (79.7 kg)   SpO2 92%   BMI 27.50 kg/m²   Wt Readings from Last 3 Encounters:   07/11/21 175 lb 9.6 oz (79.7 kg)   06/15/21 210 lb (95.3 kg)   03/31/21 200 lb (90.7 kg)     Temp Readings from Last 3 Encounters:   07/11/21 96.6 °F (35.9 °C) (Temporal)   06/28/21 98.9 °F (37.2 °C) (Temporal)   06/15/21 98.4 °F (36.9 °C) (Temporal)     TMAX:  BP Readings from Last 3 Encounters:   07/11/21 (!) 98/56   06/28/21 (!) 114/59   06/15/21 (!) 165/79     Pulse Readings from Last 3 Encounters:   07/11/21 73   06/28/21 70   06/15/21 77           INTAKE/OUTPUTS:  I/O last 3 completed shifts:   In: 0   Out: 850 [Urine:850]    Intake/Output Summary (Last 24 hours) at 7/11/2021 1951  Last data filed at 7/11/2021 1824  Gross per 24 hour   Intake 90 ml   Output 1050 ml   Net -960 ml       General Appearance: alert and oriented to person, place and time, well-developed and   well-nourished, in no acute distress   Eyes: pupils equal, round, and reactive to light, extraocular eye movements intact, conjunctivae normal and sclera anicteric   Neck: neck supple and non tender without mass, no thyromegaly, no thyroid nodules and no cervical adenopathy   Pulmonary/Chest:crackles rhonchi   Cardiovascular: normal rate, regular rhythm, normal S1 and S2, no murmurs, rubs, clicks or gallops, distal pulses intact, no carotid bruits, no murmurs, no gallops, no carotid bruits and no JVD   Abdomen: obese, soft, non-tender, non-distended, normal bowel sounds, no masses or organomegaly   Extremities:no edema   Musculoskeletal: normal range of motion, no joint swelling, deformity or tenderness   Neurologic: reflexes normal and symmetric, no cranial nerve deficit noted    LABS/IMAGING:    CBC:  Lab Results   Component Value Date    WBC 5.1 07/11/2021    HGB 9.2 (L) 07/11/2021    HCT 29.2 (L) 07/11/2021    MCV 93.3 07/11/2021    PLT 34 (L) 07/11/2021    LYMPHOPCT 43.0 (H) 07/08/2021    RBC 3.13 (L) 07/11/2021    MCH 29.4 07/11/2021    MCHC 31.5 (L) 07/11/2021    RDW 15.8 (H) 07/11/2021    NEUTOPHILPCT 33.0 (L) 07/08/2021    MONOPCT 19.0 (H) 07/08/2021    BASOPCT 0.0 07/08/2021    NEUTROABS 0.07 (L) 07/08/2021    LYMPHSABS 0.09 (L) 07/08/2021    MONOSABS 0.04 (L) 07/08/2021    EOSABS 0.00 (L) 07/08/2021    BASOSABS 0.00 07/08/2021       Recent Labs     07/11/21  0526 07/10/21  0607 07/10/21  0140 07/09/21  0340 07/09/21  0340   WBC 5.1 2.3*  --   --  0.8*   HGB 9.2* 9.1* 9.1*   < > 7.6*   HCT 29.2* 28.5* 28.4*   < > 23.7*   MCV 93.3 93.8  --   --  93.3   PLT 34* 29*  --   --  11*    < > = values in this interval not displayed. BMP:   Recent Labs     07/09/21  0340 07/09/21  0340 07/10/21  0607 07/11/21  0921 07/11/21  1851     --  143 148*  --    K 4.0   < > 2.7* 2.7* 3.6     --  98 104  --    CO2 32*  --  36* 31*  --    BUN 20  --  21 16  --    CREATININE 0.9  --  0.8 0.5*  --     < > = values in this interval not displayed. MG:   Lab Results   Component Value Date    MG 1.6 07/11/2021     Ca/Phos:   Lab Results   Component Value Date    CALCIUM 7.6 (L) 07/11/2021    PHOS 3.2 04/03/2021     Amylase: No results found for: AMYLASE  Lipase: No results found for: LIPASE  LIVER PROFILE:   No results for input(s): AST, ALT, LIPASE, BILIDIR, BILITOT, ALKPHOS in the last 72 hours. Invalid input(s): AMYLASE,  ALB    PT/INR:   No results for input(s): PROTIME, INR in the last 72 hours. APTT:   No results for input(s):  APTT in the last 72 hours. Cardiac Enzymes:  Lab Results   Component Value Date    TROPONINI <0.01 02/14/2021               PET and CT reviewed     PROBLEM LIST:  Patient Active Problem List   Diagnosis    Hematuria    BPH (benign prostatic hyperplasia)    [facility]NY [facility]) ([facility] [address]    Colonic mass    Hypertension    Hyperlipidemia    Coronary artery disease    Prolonged Q-T interval on ECG    Hypotension    CKD (chronic kidney disease) stage 3, GFR 30-59 ml/min (HCC)    Paroxysmal atrial fibrillation (HCC)    Diffuse large B-cell lymphoma (HCC)    Diffuse large B cell lymphoma (HCC)    Severe protein-calorie malnutrition (HCC)    Anemia    Thrombocytopenia (HCC)               ASSESSMENT:  1.) Possible fluid overload /Pulmonary edema  2. )COPD  3.)h/p Lymphoma   4.)Pancytopenia   5. )GNR  Pneumonia       PLAN:  *-hold IV Lasix ,Just as neede   *-Work-up for COPD bronchodilator/pneumonia ,grow GNR  *- ab as per ID   *_hold on Steroids for now  Sputum culture,GNR   ID following   Mag as well   ICS  BD  Discuss with SON   IS          Munir Perez MD,[facility]  Pulmonary&Critical Care Medicine   Director of [address] [place] [address] Director of [address] [place] [address]    Noemy Cai    NOTE: This report was transcribed using voice recognition software. Every effort was made to ensure accuracy; however, inadvertent computerized transcription errors may be present.

## 2021-07-11 NOTE — PROGRESS NOTES
Internal Medicine   Progress Note    Admit Date: 7/5/2021  Hospital day:    Hospital Day: 7  SUBJECTIVE:  No new acute problems overnight. Doing OK. No chest pain or shortness of breath. No nausea or vomiting. No fever. No abdominal pain. OBJECTIVE:     /70   Pulse 100   Temp 98.2 °F (36.8 °C) (Temporal)   Resp 20   Ht 5' 7\" (1.702 m)   Wt 175 lb 9.6 oz (79.7 kg)   SpO2 96%   BMI 27.50 kg/m²   Patient Vitals for the past 24 hrs:   BP Temp Temp src Pulse Resp SpO2 Weight   07/11/21 0846 -- -- -- -- -- 96 % --   07/11/21 0711 103/70 98.2 °F (36.8 °C) Temporal 100 20 95 % --   07/11/21 0452 -- -- -- -- -- -- 175 lb 9.6 oz (79.7 kg)   07/10/21 2315 (!) 92/58 98.6 °F (37 °C) Temporal 128 20 94 % --   07/10/21 1945 99/64 99.3 °F (37.4 °C) Temporal 106 20 95 % --   07/10/21 1500 104/77 96.5 °F (35.8 °C) Temporal 72 16 96 % --   07/10/21 1115 -- -- -- -- -- 98 % --   07/10/21 1112 113/65 97.8 °F (36.6 °C) Temporal 82 18 -- --     24HR INTAKE/OUTPUT:      Intake/Output Summary (Last 24 hours) at 7/11/2021 1025  Last data filed at 7/11/2021 0452  Gross per 24 hour   Intake 120 ml   Output 550 ml   Net -430 ml      GENERAL:  Alert,breathing easily, not in apparent acute distress. LUNGS:  No obvious increased work of breathing, clear to auscultation bilaterally. CARDIOVASCULAR:  S1 and S2 regular to auscultate. ABDOMEN:  Soft, non-tender. Bowel sounds present  NEUROLOGIC:  Alert, and oriented. No gross focal deficit    Data      Recent Labs     07/09/21  0340 07/09/21  0340 07/10/21  0140 07/10/21  0607 07/11/21  0526   WBC 0.8*  --   --  2.3* 5.1   HGB 7.6*   < > 9.1* 9.1* 9.2*   PLT 11*  --   --  29* 34*    < > = values in this interval not displayed.      Recent Labs     07/09/21  0340 07/10/21  0607    143   K 4.0 2.7*    98   CO2 32* 36*   BUN 20 21   CREATININE 0.9 0.8   GLUCOSE 102* 116*     XR CHEST PORTABLE    Result Date: 7/5/2021  EXAMINATION: ONE XRAY VIEW OF THE CHEST 7/5/2021 6:31 pm COMPARISON: April 1, 2021 HISTORY: ORDERING SYSTEM PROVIDED HISTORY: dizziness TECHNOLOGIST PROVIDED HISTORY: If in ER room at the time of exam, OK to change to portable 1 view Reason for exam:->dizziness What reading provider will be dictating this exam?->CRC FINDINGS: Left pacemaker. Median sternotomy wires are present. The heart is normal in size. There is prominence of pulmonary vasculature. Interstitial and hazy opacities are present in the lung bases. No obvious pleural effusion. No pneumothorax. Right MediPort present. Interstitial prominence bilaterally related to pulmonary vascular congestion with pneumonia or subsegmental atelectasis in lung bases.        Medications     sodium chloride      sodium chloride      sodium chloride      sodium chloride        potassium chloride  20 mEq Oral BID WC    furosemide  20 mg Intravenous BID    meropenem  1,000 mg Intravenous Q8H    metoprolol succinate  50 mg Oral BID    nystatin  5 mL Oral 4x Daily    lisinopril  10 mg Oral Daily    albuterol  2.5 mg Nebulization 4x daily    amLODIPine  5 mg Oral Daily    sodium chloride flush  5-40 mL Intravenous 2 times per day    famotidine  20 mg Oral BID      ADULT DIET; Easy to Chew  Diet NPO    ASSESSMENT/PLAN:  Diffuse large B-cell lymphoma  Pancytopenia, numbers are slightly improved  Thrombocytopenia improved  Anemia improving  Hematuria  Malnutrition  Hypertension blood pressure controlled, monitor  Hyperlipidemia  Coronary artery disease  Chronic kidney disease  Atrial fibrillation rate fluctuating  Hypokalemia replaced yesterday  Gram-negative nevaeh bacteremia, earlier was on antibiotics, seen by ID  On beta-blocker for rate control  Heme-onc following      Electronically signed by Kenji Shetty MD on 7/11/2021 at 10:25 AM

## 2021-07-11 NOTE — PROGRESS NOTES
Pulmonary [address] [facility]                             Pulmonary Consult/Progress Note :              Reason for Consultation:POssible need for Bronch,Lymphoma   CC : SOB ,cough frothy secretion   HPI:     SOB has improved a lot  On 2 L   Denies any fever   More comfortable  Less frothy secretions  No chest pain       PHYSICAL EXAMINATION:     VITAL SIGNS:  BP 99/64   Pulse 106   Temp 99.3 °F (37.4 °C) (Temporal)   Resp 20   Ht 5' 7\" (1.702 m)   Wt 177 lb 6.4 oz (80.5 kg)   SpO2 95%   BMI 27.78 kg/m²   Wt Readings from Last 3 Encounters:   07/10/21 177 lb 6.4 oz (80.5 kg)   06/15/21 210 lb (95.3 kg)   03/31/21 200 lb (90.7 kg)     Temp Readings from Last 3 Encounters:   07/10/21 99.3 °F (37.4 °C) (Temporal)   06/28/21 98.9 °F (37.2 °C) (Temporal)   06/15/21 98.4 °F (36.9 °C) (Temporal)     TMAX:  BP Readings from Last 3 Encounters:   07/10/21 99/64   06/28/21 (!) 114/59   06/15/21 (!) 165/79     Pulse Readings from Last 3 Encounters:   07/10/21 106   06/28/21 70   06/15/21 77           INTAKE/OUTPUTS:  I/O last 3 completed shifts:   In: 1603.8 [P.O.:433; Blood:776; IV Piggyback:394.8]  Out: 1450 [Urine:1450]    Intake/Output Summary (Last 24 hours) at 7/10/2021 2243  Last data filed at 7/10/2021 2106  Gross per 24 hour   Intake 978 ml   Output 1000 ml   Net -22 ml       General Appearance: alert and oriented to person, place and time, well-developed and   well-nourished, in no acute distress   Eyes: pupils equal, round, and reactive to light, extraocular eye movements intact, conjunctivae normal and sclera anicteric   Neck: neck supple and non tender without mass, no thyromegaly, no thyroid nodules and no cervical adenopathy   Pulmonary/Chest:crackles rhonchi   Cardiovascular: normal rate, regular rhythm, normal S1 and S2, no murmurs, rubs, clicks or gallops, distal pulses intact, no carotid bruits, no murmurs, no gallops, no carotid bruits and no JVD   Abdomen: obese, soft, non-tender, non-distended, normal bowel sounds, no masses or organomegaly   Extremities:no edema   Musculoskeletal: normal range of motion, no joint swelling, deformity or tenderness   Neurologic: reflexes normal and symmetric, no cranial nerve deficit noted    LABS/IMAGING:    CBC:  Lab Results   Component Value Date    WBC 2.3 (L) 07/10/2021    HGB 9.1 (L) 07/10/2021    HCT 28.5 (L) 07/10/2021    MCV 93.8 07/10/2021    PLT 29 (L) 07/10/2021    LYMPHOPCT 43.0 (H) 07/08/2021    RBC 3.04 (L) 07/10/2021    MCH 29.9 07/10/2021    MCHC 31.9 (L) 07/10/2021    RDW 15.9 (H) 07/10/2021    NEUTOPHILPCT 33.0 (L) 07/08/2021    MONOPCT 19.0 (H) 07/08/2021    BASOPCT 0.0 07/08/2021    NEUTROABS 0.07 (L) 07/08/2021    LYMPHSABS 0.09 (L) 07/08/2021    MONOSABS 0.04 (L) 07/08/2021    EOSABS 0.00 (L) 07/08/2021    BASOSABS 0.00 07/08/2021       Recent Labs     07/10/21  0607 07/10/21  0140 07/09/21  0340 07/08/21  1955 07/08/21  0550   WBC 2.3*  --  0.8*  --  0.2*   HGB 9.1* 9.1* 7.6*   < > 6.6*   HCT 28.5* 28.4* 23.7*   < > 20.8*   MCV 93.8  --  93.3  --  93.7   PLT 29*  --  11*  --  13*    < > = values in this interval not displayed. BMP:   Recent Labs     07/08/21  0550 07/09/21  0340 07/10/21  0607    143 143   K 3.0* 4.0 2.7*    100 98   CO2 32* 32* 36*   BUN 17 20 21   CREATININE 0.9 0.9 0.8       MG:   Lab Results   Component Value Date    MG 2.0 07/08/2021     Ca/Phos:   Lab Results   Component Value Date    CALCIUM 9.2 07/10/2021    PHOS 3.2 04/03/2021     Amylase: No results found for: AMYLASE  Lipase: No results found for: LIPASE  LIVER PROFILE:   No results for input(s): AST, ALT, LIPASE, BILIDIR, BILITOT, ALKPHOS in the last 72 hours. Invalid input(s): AMYLASE,  ALB    PT/INR:   No results for input(s): PROTIME, INR in the last 72 hours. APTT:   No results for input(s): APTT in the last 72 hours.     Cardiac Enzymes:  Lab Results   Component Value Date    TROPONINI <0.01 02/14/2021 PET and CT reviewed     PROBLEM LIST:  Patient Active Problem List   Diagnosis    Hematuria    BPH (benign prostatic hyperplasia)    SUNNY [facility]acute kidney injury) ([facility] [address]    Colonic mass    Hypertension    Hyperlipidemia    Coronary artery disease    Prolonged Q-T interval on ECG    Hypotension    CKD (chronic kidney disease) stage 3, GFR 30-59 ml/min (HCC)    Paroxysmal atrial fibrillation (HCC)    Diffuse large B-cell lymphoma (HCC)    Diffuse large B cell lymphoma (HCC)    Severe protein-calorie malnutrition (HCC)    Anemia    Thrombocytopenia (HCC)               ASSESSMENT:  1.) Possible fluid overload /Pulmonary edema  2. )COPD  3.)h/p Lymphoma   4.)Pancytopenia   5. )GNR  Pneumonia       PLAN:  *-hold IV Lasix   *-Work-up for COPD bronchodilator/pneumonia ,  *- ab as per ID   *_hold on Steroids for now  Sputum culture,GNR   ID following   Mag as well   ICS  BD  Discuss with SON   IS          Munir Perez MD,[facility]  Pulmonary&Critical Care Medicine   Director of [address] [place] [address] Director of [address] [place] [address]    Maame Munoz    NOTE: This report was transcribed using voice recognition software. Every effort was made to ensure accuracy; however, inadvertent computerized transcription errors may be present.

## 2021-07-11 NOTE — PROGRESS NOTES
[facility] PROGRESS NOTE      Chief complaint: Follow-up of Gram-negative nevaeh bacteremia     The patient is a 80 y.o. male with history of diffuse large B cell lymphoma on chemotherapy with R-CHOP and bortezomib through right subclavian port with most recent chemotherapy 1 week prior to admission, hypertension, hyperlipidemia, CAD, pacemaker in situ (new device implantation on 12/17/2020), presented on 07/05 with generalized weakness and fatigue for about a week, found to be septic with pancytopenia and gram-negative nevaeh (Klebsiella pneumoniae, Pseudomonas aeruginosa, Serratia marcescens by PCR) bacteremia. Chest x-ray showed bilateral interstitial prominence probably related to pulmonary vascular congestion. CT chest, abdomen and pelvis showed  COPD with multifocal patchy and masslike infiltrates in the left lower lobe, distended gallbladder without acute inflammation, right ureteral stent in place with significant improvement in right hydronephrosis, status post right hemicolectomy with retained fluid and fecal matter in the left hemicolon, significant improvement and near resolution of lymphadenopathy in the retroperitoneum and mesenteric adenopathy. Piperacillin-tazobactam and vancomycin were started on admission. Subjective: Patient was seen and examined. No chills, no abdominal pain, no diarrhea, no rash, no itching. Objective:    Vitals:    07/11/21 0711   BP: 103/70   Pulse: 100   Resp: 20   Temp: 98.2 °F (36.8 °C)   SpO2: 95%     Constitutional: Alert, not in distress  Respiratory: Clear breath sounds, no crackles, no wheezes. decreased  Cardiovascular: Regular rate and rhythm, no murmurs  Gastrointestinal: Bowel sounds present, soft, nontender  Skin: Warm and dry, no active dermatoses  Musculoskeletal: No joint swelling, no joint erythema    Labs, imaging, and medical records/notes were personally reviewed.   resp cx 7/9 GNR  blood cx 7/7 Klebsiella and pseudomonas   Assessment:  Sepsis, secondary to polymicrobial gram-negative nevaeh (Pseudomonas aeruginosa, Klebsiella pneumoniae, Serratia marcescens by PCR) bacteremia, suspect associated with CLABSI  Pneumonia  Profound neutropenia  Immunocompromised state  Diffuse large B-cell lymphoma on chemotherapy    Recommendations:  Continue meropenem 1 g every 8 hours. With Pseudomonas aeruginosa  CLABSI, standard of care dictates removal of port/central line involved as per treatment guidelines. Recommend removal of venous access port for source control. Per Surgery, await improvement of anemia and thrombocytopenia prior to Mediport removal unless emergent. Follow-up blood cultures. Continue supportive care. WBC 5100  resp culture  Gm neg nevaeh   merrem should be good for now pending the ID        Thank you for involving me in the care of Quan Hernandez. .    Electronically signed by TRACIE Givens CNP on 7/11/2021 at 8:00 AM  Pt seen and examined. Above discussed agree with advanced practice nurse. Labs, cultures, and radiographs reviewed. Face to Face encounter occurred. Changes made as necessary.      Blu Awad MD

## 2021-07-11 NOTE — PATIENT CARE CONFERENCE
[facility] Quality Flow/Interdisciplinary Rounds Progress Note        Quality Flow Rounds held on July 11, 2021    Disciplines Attending:  Bedside Nurse, ,  and Nursing Unit Leadership    Denise Fuller was admitted on 7/5/2021  5:44 PM    Anticipated Discharge Date:  Expected Discharge Date: 07/12/21    Disposition:    Osman Score:  Osman Scale Score: 20    Readmission Risk              Risk of Unplanned Readmission:  28           Discussed patient goal for the day, patient clinical progression, and barriers to discharge.   The following Goal(s) of the Day/Commitment(s) have been identified:  repeat BC, f/up platelets      Nedra Duff RN  July 11, 2021

## 2021-07-12 ENCOUNTER — ANESTHESIA (OUTPATIENT)
Dept: OPERATING ROOM | Age: 71
DRG: 314 | End: 2021-07-12
Payer: MEDICARE

## 2021-07-12 ENCOUNTER — ANESTHESIA EVENT (OUTPATIENT)
Dept: OPERATING ROOM | Age: 71
DRG: 314 | End: 2021-07-12
Payer: MEDICARE

## 2021-07-12 VITALS — OXYGEN SATURATION: 95 % | DIASTOLIC BLOOD PRESSURE: 57 MMHG | SYSTOLIC BLOOD PRESSURE: 91 MMHG

## 2021-07-12 LAB
ANION GAP SERPL CALCULATED.3IONS-SCNC: 8 MMOL/L (ref 7–16)
BUN BLDV-MCNC: 19 MG/DL (ref 6–23)
CALCIUM SERPL-MCNC: 9 MG/DL (ref 8.6–10.2)
CHLORIDE BLD-SCNC: 100 MMOL/L (ref 98–107)
CO2: 35 MMOL/L (ref 22–29)
CREAT SERPL-MCNC: 0.7 MG/DL (ref 0.7–1.2)
GFR AFRICAN AMERICAN: >60
GFR NON-AFRICAN AMERICAN: >60 ML/MIN/1.73
GLUCOSE BLD-MCNC: 107 MG/DL (ref 74–99)
HCT VFR BLD CALC: 28.9 % (ref 37–54)
HEMOGLOBIN: 9.1 G/DL (ref 12.5–16.5)
MAGNESIUM: 2.1 MG/DL (ref 1.6–2.6)
MCH RBC QN AUTO: 29.8 PG (ref 26–35)
MCHC RBC AUTO-ENTMCNC: 31.5 % (ref 32–34.5)
MCV RBC AUTO: 94.8 FL (ref 80–99.9)
PDW BLD-RTO: 15.9 FL (ref 11.5–15)
PLATELET # BLD: 63 E9/L (ref 130–450)
PLATELET CONFIRMATION: NORMAL
PMV BLD AUTO: 12 FL (ref 7–12)
POTASSIUM REFLEX MAGNESIUM: 3.5 MMOL/L (ref 3.5–5)
RBC # BLD: 3.05 E12/L (ref 3.8–5.8)
SODIUM BLD-SCNC: 143 MMOL/L (ref 132–146)
WBC # BLD: 7.1 E9/L (ref 4.5–11.5)

## 2021-07-12 PROCEDURE — 6370000000 HC RX 637 (ALT 250 FOR IP): Performed by: INTERNAL MEDICINE

## 2021-07-12 PROCEDURE — 99232 SBSQ HOSP IP/OBS MODERATE 35: CPT | Performed by: INTERNAL MEDICINE

## 2021-07-12 PROCEDURE — 2580000003 HC RX 258: Performed by: PHYSICIAN ASSISTANT

## 2021-07-12 PROCEDURE — 6360000002 HC RX W HCPCS: Performed by: INTERNAL MEDICINE

## 2021-07-12 PROCEDURE — 6370000000 HC RX 637 (ALT 250 FOR IP): Performed by: SURGERY

## 2021-07-12 PROCEDURE — 2709999900 HC NON-CHARGEABLE SUPPLY: Performed by: SURGERY

## 2021-07-12 PROCEDURE — 2580000003 HC RX 258: Performed by: NURSE ANESTHETIST, CERTIFIED REGISTERED

## 2021-07-12 PROCEDURE — P9041 ALBUMIN (HUMAN),5%, 50ML: HCPCS | Performed by: NURSE ANESTHETIST, CERTIFIED REGISTERED

## 2021-07-12 PROCEDURE — 6360000002 HC RX W HCPCS: Performed by: SURGERY

## 2021-07-12 PROCEDURE — 94640 AIRWAY INHALATION TREATMENT: CPT

## 2021-07-12 PROCEDURE — 36415 COLL VENOUS BLD VENIPUNCTURE: CPT

## 2021-07-12 PROCEDURE — 99233 SBSQ HOSP IP/OBS HIGH 50: CPT | Performed by: INTERNAL MEDICINE

## 2021-07-12 PROCEDURE — 6360000002 HC RX W HCPCS: Performed by: NURSE ANESTHETIST, CERTIFIED REGISTERED

## 2021-07-12 PROCEDURE — 2140000000 HC CCU INTERMEDIATE R&B

## 2021-07-12 PROCEDURE — 2500000003 HC RX 250 WO HCPCS: Performed by: SURGERY

## 2021-07-12 PROCEDURE — 80048 BASIC METABOLIC PNL TOTAL CA: CPT

## 2021-07-12 PROCEDURE — 02PY33Z REMOVAL OF INFUSION DEVICE FROM GREAT VESSEL, PERCUTANEOUS APPROACH: ICD-10-PCS | Performed by: SURGERY

## 2021-07-12 PROCEDURE — 36590 REMOVAL TUNNELED CV CATH: CPT | Performed by: SURGERY

## 2021-07-12 PROCEDURE — 85027 COMPLETE CBC AUTOMATED: CPT

## 2021-07-12 PROCEDURE — 6360000002 HC RX W HCPCS: Performed by: PHYSICIAN ASSISTANT

## 2021-07-12 PROCEDURE — 6370000000 HC RX 637 (ALT 250 FOR IP): Performed by: PHYSICIAN ASSISTANT

## 2021-07-12 PROCEDURE — 3600000012 HC SURGERY LEVEL 2 ADDTL 15MIN: Performed by: SURGERY

## 2021-07-12 PROCEDURE — 87070 CULTURE OTHR SPECIMN AEROBIC: CPT

## 2021-07-12 PROCEDURE — 3700000000 HC ANESTHESIA ATTENDED CARE: Performed by: SURGERY

## 2021-07-12 PROCEDURE — 0JPT3WZ REMOVAL OF TOTALLY IMPLANTABLE VASCULAR ACCESS DEVICE FROM TRUNK SUBCUTANEOUS TISSUE AND FASCIA, PERCUTANEOUS APPROACH: ICD-10-PCS | Performed by: SURGERY

## 2021-07-12 PROCEDURE — 2580000003 HC RX 258: Performed by: INTERNAL MEDICINE

## 2021-07-12 PROCEDURE — 7100000001 HC PACU RECOVERY - ADDTL 15 MIN: Performed by: SURGERY

## 2021-07-12 PROCEDURE — 83735 ASSAY OF MAGNESIUM: CPT

## 2021-07-12 PROCEDURE — 2580000003 HC RX 258: Performed by: SURGERY

## 2021-07-12 PROCEDURE — 3700000001 HC ADD 15 MINUTES (ANESTHESIA): Performed by: SURGERY

## 2021-07-12 PROCEDURE — 3600000002 HC SURGERY LEVEL 2 BASE: Performed by: SURGERY

## 2021-07-12 PROCEDURE — 7100000000 HC PACU RECOVERY - FIRST 15 MIN: Performed by: SURGERY

## 2021-07-12 RX ORDER — FENTANYL CITRATE 50 UG/ML
INJECTION, SOLUTION INTRAMUSCULAR; INTRAVENOUS PRN
Status: DISCONTINUED | OUTPATIENT
Start: 2021-07-12 | End: 2021-07-12 | Stop reason: SDUPTHER

## 2021-07-12 RX ORDER — ALBUMIN, HUMAN INJ 5% 5 %
SOLUTION INTRAVENOUS PRN
Status: DISCONTINUED | OUTPATIENT
Start: 2021-07-12 | End: 2021-07-12 | Stop reason: SDUPTHER

## 2021-07-12 RX ORDER — SODIUM CHLORIDE 9 MG/ML
INJECTION, SOLUTION INTRAVENOUS CONTINUOUS PRN
Status: DISCONTINUED | OUTPATIENT
Start: 2021-07-12 | End: 2021-07-12 | Stop reason: SDUPTHER

## 2021-07-12 RX ORDER — BUPIVACAINE HYDROCHLORIDE AND EPINEPHRINE 2.5; 5 MG/ML; UG/ML
INJECTION, SOLUTION EPIDURAL; INFILTRATION; INTRACAUDAL; PERINEURAL PRN
Status: DISCONTINUED | OUTPATIENT
Start: 2021-07-12 | End: 2021-07-12 | Stop reason: ALTCHOICE

## 2021-07-12 RX ORDER — PROPOFOL 10 MG/ML
INJECTION, EMULSION INTRAVENOUS PRN
Status: DISCONTINUED | OUTPATIENT
Start: 2021-07-12 | End: 2021-07-12 | Stop reason: SDUPTHER

## 2021-07-12 RX ADMIN — ALBUTEROL SULFATE 2.5 MG: 2.5 SOLUTION RESPIRATORY (INHALATION) at 13:19

## 2021-07-12 RX ADMIN — FAMOTIDINE 20 MG: 20 TABLET ORAL at 20:27

## 2021-07-12 RX ADMIN — ALBUMIN (HUMAN) 25 G: 12.5 INJECTION, SOLUTION INTRAVENOUS at 17:14

## 2021-07-12 RX ADMIN — Medication 10 ML: at 20:28

## 2021-07-12 RX ADMIN — ALBUTEROL SULFATE 2.5 MG: 2.5 SOLUTION RESPIRATORY (INHALATION) at 16:11

## 2021-07-12 RX ADMIN — ALBUTEROL SULFATE 2.5 MG: 2.5 SOLUTION RESPIRATORY (INHALATION) at 22:04

## 2021-07-12 RX ADMIN — SODIUM CHLORIDE: 9 INJECTION, SOLUTION INTRAVENOUS at 17:07

## 2021-07-12 RX ADMIN — MEROPENEM 1000 MG: 1 INJECTION, POWDER, FOR SOLUTION INTRAVENOUS at 08:59

## 2021-07-12 RX ADMIN — FENTANYL CITRATE 25 MCG: 50 INJECTION, SOLUTION INTRAMUSCULAR; INTRAVENOUS at 17:21

## 2021-07-12 RX ADMIN — NYSTATIN 500000 UNITS: 100000 SUSPENSION ORAL at 20:27

## 2021-07-12 RX ADMIN — FENTANYL CITRATE 25 MCG: 50 INJECTION, SOLUTION INTRAMUSCULAR; INTRAVENOUS at 17:24

## 2021-07-12 RX ADMIN — AMLODIPINE BESYLATE 5 MG: 5 TABLET ORAL at 09:00

## 2021-07-12 RX ADMIN — POTASSIUM CHLORIDE 20 MEQ: 1500 TABLET, EXTENDED RELEASE ORAL at 09:01

## 2021-07-12 RX ADMIN — Medication 10 ML: at 08:00

## 2021-07-12 RX ADMIN — PHENYLEPHRINE HYDROCHLORIDE 100 MCG: 10 INJECTION INTRAVENOUS at 17:25

## 2021-07-12 RX ADMIN — PHENYLEPHRINE HYDROCHLORIDE 50 MCG: 10 INJECTION INTRAVENOUS at 17:12

## 2021-07-12 RX ADMIN — METOPROLOL SUCCINATE 50 MG: 50 TABLET, EXTENDED RELEASE ORAL at 09:00

## 2021-07-12 RX ADMIN — FAMOTIDINE 20 MG: 20 TABLET ORAL at 09:01

## 2021-07-12 RX ADMIN — LISINOPRIL 10 MG: 10 TABLET ORAL at 09:00

## 2021-07-12 RX ADMIN — ALBUTEROL SULFATE 2.5 MG: 2.5 SOLUTION RESPIRATORY (INHALATION) at 09:20

## 2021-07-12 RX ADMIN — NYSTATIN 500000 UNITS: 100000 SUSPENSION ORAL at 13:44

## 2021-07-12 RX ADMIN — PROPOFOL 30 MG: 10 INJECTION, EMULSION INTRAVENOUS at 17:14

## 2021-07-12 RX ADMIN — NYSTATIN 500000 UNITS: 100000 SUSPENSION ORAL at 09:01

## 2021-07-12 RX ADMIN — PROPOFOL 20 MG: 10 INJECTION, EMULSION INTRAVENOUS at 17:18

## 2021-07-12 RX ADMIN — MEROPENEM 1000 MG: 1 INJECTION, POWDER, FOR SOLUTION INTRAVENOUS at 16:00

## 2021-07-12 RX ADMIN — PROPOFOL 30 MG: 10 INJECTION, EMULSION INTRAVENOUS at 17:20

## 2021-07-12 RX ADMIN — PROPOFOL 20 MG: 10 INJECTION, EMULSION INTRAVENOUS at 17:21

## 2021-07-12 ASSESSMENT — PULMONARY FUNCTION TESTS
PIF_VALUE: 16
PIF_VALUE: 16
PIF_VALUE: 22
PIF_VALUE: 16
PIF_VALUE: 22
PIF_VALUE: 1
PIF_VALUE: 16
PIF_VALUE: 14
PIF_VALUE: 14
PIF_VALUE: 22
PIF_VALUE: 22
PIF_VALUE: 15
PIF_VALUE: 16
PIF_VALUE: 16
PIF_VALUE: 15
PIF_VALUE: 22
PIF_VALUE: 15
PIF_VALUE: 0
PIF_VALUE: 16
PIF_VALUE: 0
PIF_VALUE: 16
PIF_VALUE: 16
PIF_VALUE: 14
PIF_VALUE: 14

## 2021-07-12 ASSESSMENT — PAIN SCALES - GENERAL
PAINLEVEL_OUTOF10: 0

## 2021-07-12 ASSESSMENT — LIFESTYLE VARIABLES: SMOKING_STATUS: 0

## 2021-07-12 NOTE — PROGRESS NOTES
Subjective: The patient is awake and alert. Much more alert and awake   No acute complaint s  Port removal scheduled for later today  Telesitter in place secondary to some confusion  Otherwise he is resting comfortably no apparent acute distress and looks 100% better although indicates he feels quite weak    Objective:    BP (!) 101/52   Pulse 114   Temp 98.6 °F (37 °C) (Oral)   Resp 20   Ht 5' 7\" (1.702 m)   Wt 173 lb 12.8 oz (78.8 kg)   SpO2 94%   BMI 27.22 kg/m²     In: 90 [P.O.:90]  Out: 300   In: 90   Out: 300 [Urine:300]    General appearance: NAD, conversant, feels like he is coughing up a fair amount of phlegm  HEENT: AT/NC, MMM  Neck: FROM, supple  Lungs: Clear to auscultation  CV: RRR, no MRGs  Vasc: Radial pulses 2+  Abdomen: Soft, non-tender; no masses or HSM  Extremities: No peripheral edema or digital cyanosis  Skin: no rash, lesions or ulcers  Psych: Alert and oriented to person, place and time  Neuro: Alert and interactive     Recent Labs     07/10/21  0607 07/11/21  0526 07/12/21  0554   WBC 2.3* 5.1 7.1   HGB 9.1* 9.2* 9.1*   HCT 28.5* 29.2* 28.9*   PLT 29* 34* 63*       Recent Labs     07/10/21  0607 07/11/21  0921 07/11/21  1851 07/12/21  0554    148*  --  143   K 2.7* 2.7* 3.6 3.5   CL 98 104  --  100   CO2 36* 31*  --  35*   BUN 21 16  --  19   CREATININE 0.8 0.5*  --  0.7   CALCIUM 9.2 7.6*  --  9.0       Assessment:    Principal Problem:    Diffuse large B-cell lymphoma (HCC)  Active Problems:    Hematuria    Severe protein-calorie malnutrition (HCC)    Thrombocytopenia (HCC)  Resolved Problems:    * No resolved hospital problems.  *      Plan:    Admitted for evaluation of pancytopenia and hematuria in pt with B cell lymphoma on chemo in spite of neulasta   hemonc to follow for transfusions - appreciate input   Await Gcsf tx   Hold oac / antiplatelets - o n xarelto at home - consider coming off for now   Transfuse 1 unit PRBC 7/8/2021-monitor volume status , avoid volume  Daily labs     Gram-negative nevaeh sepsis/bacteremia-blood cultures with Klebsiella/Pseudomonas/Serratia  Merrem per infectious disease  Infectious disease follwoing   Will hold IV fluids for now secondary to worsening lung status, obtain chest x-ray  Diuresis 20 IV Lasix twice daily  Port removal scheduled for later today    May benefit from bronchoscopy  dw nisha.  Al zoby   Bronch if no improvement with diruresis - caution w diuresis given sepsis       A. fib RVR/electrolyte abnormalities  Rate control-achieved with beta-blocker  Supplement electrolytes  Oral anticoagulation?-not yet secondary to pancytopenia and increased risk of bleeding  EP service following-input appreciated    Supplement potassium    DVT Prophylaxis   PT/OT  Discharge planning         Son at bedside case discussed     Wilberto Tobias MD  10:04 AM  7/12/2021

## 2021-07-12 NOTE — PROGRESS NOTES
Palliative Care Department  [phone]  Palliative Care Progress Note  Provider Kg  [id number]  Hospital Day: 8  Date of Initial Consult: 7/8/2021  Referring Provider: Meena Lazcano MD  Palliative Medicine was consulted for assistance with: Goals of Care, Code Status Discussion     HPI:   Nava Goss is a 80 y.o. with a medical history of HTN, HLD, CAD, large aggressive bulky diffuse large B-cell lymphoma, non-germinal cell type with negative FISH with bulky intra-abdominal disease s/p R-CHOP and bortezomib, severe protein calorie malnutrition, CKD, AFib on Xarelto, R hydronephrosis s/p ureteral stent and BPH who was admitted on 7/5/2021 from home with a CHIEF COMPLAINT of hematuria, SOB, cough, weakness. ASSESSMENT/PLAN:     Pertinent Hospital Diagnoses      B-cell lymphoma   Hematuria while on Xarelto   Pancytopenia on Granix   Sepsis secondary to gram negative nevaeh bacteremia with CLABSI and LLL pneumonia (Klebsiella pneumoniae, Pseudomonas aeruginosa, Serratia marcescens by PCR)_   COPD   SUNNY on CKD   Dysphagia      Palliative Care Encounter / Counseling Regarding Goals of Care  Please see detailed goals of care discussion as below   At this time, Nava Goss, Does have capacity for medical decision-making. Capacity is time limited and situation/question specific   Outcome of goals of care meeting: Continue current management, continue full code   Code status Full Code   Advanced Directives: no POA or living will in [facility]    Surrogate/Legal NOK:  Stephen Patel (daughter) [phone]  o Tahmina Amaya (other) [phone]  o Pascual Monge (son, lives in [place])    Spiritual assessment: no spiritual distress identified  Bereavement and grief: to be determined  Referrals to: none today   SUBJECTIVE:       Details of Conversation: Chart reviewed and patient seen. Patient resting in bed, son at bedside.   Patient asked what PM was, explained palliative philosophy to patient. Patient denies any PM needs. Son stated they would like PM SW to assisted with living will/HCPOA but want to wait until after patient's surgery, request SW stop by tomorrow. OBJECTIVE:   Prognosis: depends upon goals    Physical Exam:  BP (!) 101/52   Pulse 114   Temp 98.6 °F (37 °C) (Oral)   Resp 20   Ht 5' 7\" (1.702 m)   Wt 173 lb 12.8 oz (78.8 kg)   SpO2 94%   BMI 27.22 kg/m²   Constitutional:  NAD, awake, alert, sitting up in bed  ENMT:  Normocephalic, atraumatic, mucosa moist, EOMI  Lungs: Nasal cannula, easy and unlabored respirations  Heart:  RRR, tachycardic, no murmur/rub/gallop  Abd:  Soft, non tender, non distended  Ext:  + edema, pulses present  Skin:  Warm and dry  Neuro:  PERRL, Alert, grossly nonfocal; following commands    Objective data reviewed: labs, images, records, medication use, vitals and chart    Discussed patient and the plan of care with the other IDT members: Palliative Medicine IDT Team    Time/Communication  Greater than 50% of time spent, total 15 minutes in counseling and coordination of care at the bedside regarding goals of care, diagnosis and prognosis and see above. Thank you for allowing Palliative Medicine to participate in the care of Margaret Khushboo

## 2021-07-12 NOTE — PROGRESS NOTES
LSW met at bedside with pt and his son Segundo Santana, to review advance  directives. Pt is alert, however still slightly confused. Son states he and his [address] would like pt to complete HCPOA in the next few days, once pt is fully oriented. Son states Gayatri Escalera will most likely be main agent with he as alternate. Forms provided as well as contact information. LSW will continue to follow.

## 2021-07-12 NOTE — PROGRESS NOTES
[address] and Vascular [facility] Electrophysiology  Progress Note   PATIENT: Quan Hernandez  MEDICAL RECORD NUMBER: [id number]  DATE OF SERVICE:  7/12/2021  ATTENDING ELECTROPHYSIOLOGIST: Lucille Almonte DO   PRIMARY ELECTROPHYSIOLOGIST: ([facility]) Tulio Ortiz MD   REFERRING PHYSICIAN: Em NATHAN and Danny Elise DO  CHIEF COMPLAINT: Dizzy with weakness and fatigue     Subjective: Quan Hernandez is a 80 y.o. male with a history of SND S/P St Pramod D- PPM (implant: 2002; generator change: 2014; extraction of RA & RV leads due to noise and inappropriate sensing and implant of new device: 12/17/20), nonvalvular atrial fibrillation sp surgical MAZE and LAAO (8/12/16); CAD s/p CABG (8/12/16: LIMA-LAD), MR sp MVR (8/12/16), AI sp AVR (8/12/16), bladder tumor sp ureteral stent, diffuse large B cell lymphoma, and esophageal stricture. He presented on 7/5/21 with chief complaint of weakness and fatigue, as well as hematuria. He had ureteral stent a few months ago. He was noted to be pancytopenic. He was noted to have episodes of ventricular pacing v NSVT, so EP service was consulted by admitting physician. Blood cultures grew GNR, he has been given IV Zosyn and Vancomycin with an improvement in his mental status and ID has been consulted. He complains of difficulty swallowing at this time. He awaits Mediport removal.     7/11/21 : he is frustrated and tired of waiting for procedures. No chest pain or sob    7/12/21 : He denies any chest pain, sob, dizziness, syncope.     Past Medical History:   Diagnosis Date    Arthritis     KNEES HIPS BACK    BPH (benign prostatic hyperplasia)     Cancer ([facility] [address]     Coronary artery disease     Difficult airway     PT STATES HE WAS TOLD THIS TWICE AT [facility]    Difficult intubation 04/2017    note in care everywhere \"Clinical Summary\" 03/10/2021    History of blood transfusion     Hyperlipidemia     Hypertension     Sinus tachycardia 2002      Past Surgical History:   Procedure Laterality Date    ABLATION OF DYSRHYTHMIC FOCUS      AORTA SURGERY      aortic valve replacement    AORTIC VALVE REPLACEMENT      BLADDER SURGERY Right 2021    CYSTOSCOPY BILATERAL RETROGRADE PYELOGRAM RIGHT STENT INSERTION performed by Charles Osborn MD at [address] COLONOSCOPY  2021    COLONOSCOPY WITH BIOPSY performed by Audrey Fang DO at [address]  2021    COLONOSCOPY W/ ENDOSCOPIC MUCOSAL RESECTION performed by Audrey Fang DO at [address] N/A 3/11/2021    LAPAROSCOPIC RIGHT HEMICOLECTOMY performed by Henry Broussard MD at [address] OTHER SURGICAL HISTORY  2016    CT Myelogram, Dr. Michael Byers   new battery    last checked Dr Jessica Chambers 2016?     PORT SURGERY Right 2021    MEDI PORT INSERTION performed by Henry Br[facility]ard MD at [facility] ENDOSCOPY      reflux    UPPER GASTROINTESTINAL ENDOSCOPY N/A 2021    EGD BIOPSY performed by Charla Bradford MD at [facility]        Family History   Problem Relation Age of Onset    Diabetes Mother     Stroke Mother     Diabetes Father     Heart Disease Father     Brain Cancer Sister     Stroke Sister     Stroke Brother     Cancer Maternal Grandfather      Social History     Tobacco Use    Smoking status: Former Smoker     Packs/day: 1.00     Years: 44.00     Pack years: 44.00     Quit date: 3/4/2014     Years since quittin.3    Smokeless tobacco: Never Used   Substance Use Topics    Alcohol use: Not Currently     Alcohol/week: 2.0 standard drinks     Types: 2 Shots of liquor per week     Comment: monthly; no caffeine     Current Facility-Administered Medications   Medication Dose Route Frequency Provider Last Rate Last Admin    0.9 % sodium chloride infusion chloride flush 0.9 % injection 5-40 mL  5-40 mL Intravenous PRN Louanna Grater, PA   10 mL at 07/10/21 1727    0.9 % sodium chloride infusion  25 mL Intravenous PRN Louanna Grater, PA        polyethylene glycol (GLYCOLAX) packet 17 g  17 g Oral Daily PRN Lojanessana Grater, PA        acetaminophen (TYLENOL) tablet 650 mg  650 mg Oral Q6H PRN Louanna Grater, PA   650 mg at 07/06/21 0057    Or    acetaminophen (TYLENOL) suppository 650 mg  650 mg Rectal Q6H PRN Lojanessana Grater, PA        famotidine (PEPCID) tablet 20 mg  20 mg Oral BID Louanna Katelynr, PA   20 mg at 07/12/21 0901    promethazine (PHENERGAN) tablet 12.5 mg  12.5 mg Oral Q6H PRN Lojanessana Katelynr, PA            PHYSICAL EXAM:   Vitals:    07/11/21 1901 07/11/21 2030 07/11/21 2348 07/12/21 0429   BP: (!) 98/56  (!) 101/52    Pulse: 73 128 114    Resp: 20  20    Temp: 96.6 °F (35.9 °C)  98.6 °F (37 °C)    TempSrc: Temporal  Oral    SpO2: 92%  94%    Weight:    173 lb 12.8 oz (78.8 kg)   Height:       Constitutional: Oriented to person, place, and time. Well-developed and cooperative. Head: Normocephalic and atraumatic. Eyes: Conjunctivae are normal.  Neck: No  JVD present. Cardiovascular: S1 normal, S2 normal  A regular rhythm present. Pulmonary/Chest: Effort normal and breath sounds normal. No respiratory distress. Abdominal: Soft. Normal appearance   Musculoskeletal: Normal range of motion of all extremities, no muscle weakness. Neurological: Alert and oriented to person, place, and time. Skin: Skin is warm and dry. No bruising, no ecchymosis and no rash noted. Extremity: No clubbing or cyanosis. No edema. Psychiatric: Normal mood and affect.  Thought content normal.       Data:    Recent Labs     07/10/21  0607 07/11/21  0526 07/12/21  0554   WBC 2.3* 5.1 7.1   HGB 9.1* 9.2* 9.1*   HCT 28.5* 29.2* 28.9*   PLT 29* 34* 63*     Recent Labs     07/10/21  0607 07/11/21  0921 07/11/21  1851 07/12/21  0554    148*  --  143   K 2.7* 2.7* 3.6 3.5   CL 98 104  --  100   CO2 36* 31*  --  35*   BUN 21 16  --  19   CREATININE 0.8 0.5*  --  0.7   CALCIUM 9.2 7.6*  --  9.0      Lab Results   Component Value Date    MG 2.1 07/12/2021     Telemetry: SR with PACs/PVCs with rates      Device Interrogation/Reprogramming (7/6/21)  · Make/Model: St Pramod Assurity MRI 2272  · DOI: 12/17/20  · Battery: >95%  · Darin Escudero therapy: DDD  ppm  · Pacing %: RA = 12%, RV = 6.5%  · Lead function:  · RA lead: sensing = 4.1 mV, impedance = 330 ohms, threshold = 0.5 V @ 0.5 msec  · RV lead: sensing = 10.3 mV, impedance = 430 ohms, threshold = 1.0 V @ 0.5 msec  · Lead programming:  · RA lead: sensitivity = 0.5 mV, output = 1.375 V @ 0.5 msec (AUTO)  · RV lead: sensitivity = 2.0 mV, output = 1.125 V @ 0.5 msec (AUTO)  · Arrhythmias: AT/AF burden = 1.9%, MS = 3.1%, 12 VHREs (EGM consistent with AT), PMT  · Reprogramming included: PVARP extended to 325 msec, turn off VIP and atrial cap confirm, and extend pace/sense AV delays to 250/270 msec. · Overall device function is normal  · All device programmable settings were evaluated per above and in the scanned document, along with iterative adjustments (capture thresholds) to assess and select the most appropriate final programming to provide for consistent delivery of the appropriate therapy and to verify function of the device. Prior cardiac testing:  · TTE (4/1/21):  LVEF = 65-70%, normal #29 Bicor MVR, normal #21 Trifecta bioprosthetic AVR, and RVSP ~ 39 mmHg. · Pharmacologic Nuclear Stress (12/14/20 at [facility] LVEF = \"normal\", mild LAD territory ischemia (< 10%), no infarct, and low risk study. Assessment/Plan:   1. SND s/p St Pramod dc PPM   - (implant: 2002; generator change: 2014; extraction of RA & RV leads due to noise and inappropriate sensing and implant of new device: 12/17/20)  -Multiple episodes of PMT.  Retrograde testing interrupted by paroxysms of AT, so PVARP extended to 325 msec (fixed). -Reviewed [facility] EP notes and additional changes made per their recommendations.  -Follow-up with Dr Ambrosio Nava ([facility] EP). 2. GNR bacteremia  -Patient has GNR on 1/1 blood culture. PCR + Enterobacteriaceae, Klebsiella pneumoniae , Pseudomonas aeruginosa, Serratia marcescens. -ID consulted. -Plan for CARMEN. Potentially 7/13/21 to assess for CIED infection (vegetation). He complains of new dysphagia. Recommend evaluation of dysphagia prior to CARMEN.  -Plans for Mediport extraction today     3. AT/PACs  -Continue metoprolol XL 50 mg BID. 4. Nonvalvular atrial fibrillation sp surgical MAZE and LAAO (8/12/16)  -CHADSVASC = 2 (age, CAD). Xarelto held 2/2 hematuria/pancytopenia. Resume when able. -Previously on sotalol, but discontinued due to QTc prolongation in 3/2021. AT/AF burden on device check was 1.9% and appears to be due to AT (see #2). 5.  Hematuria/Pancytopenia  -Management per Primary Service. 6. Dysphagia  -Management per Primary Service.  -Unclear etiology. Plan :  1. CARMEN tomorrow to assess for lead involvement pending platelet count. Currently 64K  3. Antibiotics per ID for bacteremia  4. Line removal per surgery when safe from bleeding standpoint- tentative today    Thank you for allowing me to participate in your patient's care. Please call me if there are any questions or concerns.         Iva Jones MD  Cardiac Electrophysiology  [facility]) Physicians  The Heart and Vascular [place]: [place] Electrophysiology  10:17 AM  7/12/2021

## 2021-07-12 NOTE — CARE COORDINATION
SOCIAL WORK/CASEMANAGEMENT TRANSITION OF CARE [id number] [place], [address], [place] Enri[place], [phone]): I met with pt in the room this a.m. he is not very happy with what is going on and the communication with doctors. He is not sure what he wants to do with [facility] or anything else at this point. [facility] is setup, will need orders for cpa, medication compliance with PT and OT. Left note last week for pcp for a script for bathtub/shower and toilet rails. The rn said he went over with pt this a.m. the tests and procedures he has planned for today. Pt for mediport removal as well as a CARMEN today. Pt is still on lasix and merrem as well. Sw/cm to follow.  Nila Bell, JOSE  7/12/2021

## 2021-07-12 NOTE — PATIENT CARE CONFERENCE
[facility] Quality Flow/Interdisciplinary Rounds Progress Note        Quality Flow Rounds held on July 12, 2021    Disciplines Attending:  Bedside Nurse, ,  and Nursing Unit Leadership    Bernadine Martell was admitted on 7/5/2021  5:44 PM    Anticipated Discharge Date:  Expected Discharge Date: 07/12/21    Disposition:    Osman Score:  Osman Scale Score: 19    Readmission Risk              Risk of Unplanned Readmission:  25           Discussed patient goal for the day, patient clinical progression, and barriers to discharge.   The following Goal(s) of the Day/Commitment(s) have been identified:  keep patient and family informed of any scheduled test today      Srini Borrero RN  July 12, 2021

## 2021-07-12 NOTE — ANESTHESIA PRE PROCEDURE
sodium chloride infusion   Intravenous PRN Heidi Portillo MD        potassium chloride (KLOR-CON M) extended release tablet 20 mEq  20 mEq Oral BID  Munir Perez MD   20 mEq at 07/12/21 0901    0.9 % sodium chloride infusion   Intravenous PRN Dandy Mcelroy MD        0.9 % sodium chloride infusion   Intravenous PRN Dandy Mcelroy MD        furosemide (LASIX) injection 20 mg  20 mg Intravenous BID Marcella Norwood MD   20 mg at 07/11/21 0815    meropenem (MERREM) 1,000 mg in sodium chloride 0.9 % 100 mL IVPB (mini-bag)  1,000 mg Intravenous Q8H Shirley Todd MD 33.3 mL/hr at 07/12/21 0859 1,000 mg at 07/12/21 0859    metoprolol succinate (TOPROL XL) extended release tablet 50 mg  50 mg Oral BID Marcella Norwood MD   50 mg at 07/12/21 0900    nystatin (MYCOSTATIN) 084719 UNIT/ML suspension 500,000 Units  5 mL Oral 4x Daily Safia Byrne MD   500,000 Units at 07/12/21 0901    metoprolol (LOPRESSOR) injection 2.5 mg  2.5 mg Intravenous Q6H PRN Vandana Bettencourt DO   2.5 mg at 07/09/21 1823    lisinopril (PRINIVIL;ZESTRIL) tablet 10 mg  10 mg Oral Daily VENITA Lundberg   10 mg at 07/12/21 0900    potassium chloride (KLOR-CON M) extended release tablet 40 mEq  40 mEq Oral PRN Jerrell Edmond, APRN - CNP   40 mEq at 07/07/21 3689    Or    potassium bicarb-citric acid (EFFER-K) effervescent tablet 40 mEq  40 mEq Oral PRN Jerrell Edmond, APRN - CNP   40 mEq at 07/09/21 0103    Or    potassium chloride 10 mEq/100 mL IVPB (Peripheral Line)  10 mEq Intravenous PRN Jerrell Edmond, APRN -  mL/hr at 07/11/21 1617 10 mEq at 07/11/21 1617    albuterol (PROVENTIL) nebulizer solution 2.5 mg  2.5 mg Nebulization 4x daily VENITA Lundberg   2.5 mg at 07/12/21 1319    amLODIPine (NORVASC) tablet 5 mg  5 mg Oral Daily VENITA Lundberg   5 mg at 07/12/21 0900    sodium chloride flush 0.9 % injection 5-40 mL  5-40 mL Intravenous 2 times per day VENITA Lundberg   10 mL at 07/12/21 0800    sodium chloride flush 0.9 % injection 5-40 mL  5-40 mL Intravenous PRN [facility] Health[facility], PA   10 mL at 07/10/21 1727    0.9 % sodium chloride infusion  25 mL Intravenous PRN [employer], PA        polyethylene glycol (GLYCOLAX) packet 17 g  17 g Oral Daily PRN Cherrymon ARX, PA        acetaminophen (TYLENOL) tablet 650 mg  650 mg Oral Q6H PRN Tilmon [employer], PA   650 mg at 07/06/21 0057    Or    acetaminophen (TYLENOL) suppository 650 mg  650 mg Rectal Q6H PRN [facility] Bio, PA        famotidine (PEPCID) tablet 20 mg  20 mg Oral BID Tilmon ARX, PA   20 mg at 07/12/21 0901    promethazine (PHENERGAN) tablet 12.5 mg  12.5 mg Oral Q6H PRN Cherrymon ARX, PA           Allergies:  No Known Allergies    Problem List:    Patient Active Problem List   Diagnosis Code    Hematuria R31.9    BPH (benign prostatic hyperplasia) N40.0    SUNNY (acute kidney injury) ([facility] [address] N17.9    Colonic mass K63.89    Hypertension I10    Hyperlipidemia E78.5    Coronary artery disease I25.10    Prolonged Q-T interval on ECG R94.31    Hypotension I95.9    CKD (chronic kidney disease) stage 3, GFR 30-59 ml/min (HCC) N18.30    Paroxysmal atrial fibrillation (HCC) I48.0    Diffuse large B-cell lymphoma (HCC) C83.30    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    Severe protein-calorie malnutrition (HCC) E43    Anemia D64.9    Thrombocytopenia (HCC) D69.6       Past Medical History:        Diagnosis Date    Arthritis     KNEES HIPS BACK    BPH (benign prostatic hyperplasia)     Cancer ([facility] [address]     Coronary artery disease     Difficult airway     PT STATES HE WAS TOLD THIS TWICE AT [facility]    Difficult intubation 04/2017    note in care everywhere \"Clinical Summary\" 03/10/2021    History of blood transfusion     Hyperlipidemia     Hypertension     Sinus tachycardia 01/01/2002       Past Surgical History:        Procedure Laterality Date    ABLATION OF DYSRHYTHMIC FOCUS      AORTA SURGERY      aortic valve replacement    AORTIC VALVE REPLACEMENT      BLADDER SURGERY Right 2021    CYSTOSCOPY BILATERAL RETROGRADE PYELOGRAM RIGHT STENT INSERTION performed by Rain Sánchez MD at [address] COLONOSCOPY  2021    COLONOSCOPY WITH BIOPSY performed by Susan Linton DO at [address]  2021    COLONOSCOPY W/ ENDOSCOPIC MUCOSAL RESECTION performed by Susan Linton DO at [address] N/A 3/11/2021    LAPAROSCOPIC RIGHT HEMICOLECTOMY performed by Sabra Mustafa MD at [address] OTHER SURGICAL HISTORY  2016    CT Myelogram, Dr. Marycarmen Morales   new battery    last checked Dr Warren White 2016?  PORT SURGERY Right 2021    MEDI PORT INSERTION performed by Sabra Mustafa MD at [address]  [facility] ENDOSCOPY      reflux    UPPER GASTROINTESTINAL ENDOSCOPY N/A 2021    EGD BIOPSY performed by Hiro Crabtree MD at [address] History:    Social History     Tobacco Use    Smoking status: Former Smoker     Packs/day: 1.00     Years: 44.00     Pack years: 44.00     Quit date: 3/4/2014     Years since quittin.3    Smokeless tobacco: Never Used   Substance Use Topics    Alcohol use: Not Currently     Alcohol/week: 2.0 standard drinks     Types: 2 Shots of liquor per week     Comment: monthly; no caffeine                                Counseling given: Not Answered      Vital Signs (Current): There were no vitals filed for this visit.                                            BP Readings from Last 3 Encounters:   21 (!) 103/58   21 (!) 114/59   06/15/21 (!) 165/79       NPO Status:  2200 21                                                                               BMI:   Wt Readings from Last 3 Encounters:   21 173 lb 12.8 oz (78.8 kg)   06/15/21 210 lb (95.3 kg)   03/31/21 200 lb (90.7 kg)     There is no height or weight on file to calculate BMI.    CBC:   Lab Results   Component Value Date    WBC 7.1 07/12/2021    RBC 3.05 07/12/2021    HGB 9.1 07/12/2021    HCT 28.9 07/12/2021    MCV 94.8 07/12/2021    RDW 15.9 07/12/2021    PLT 63 07/12/2021       CMP:   Lab Results   Component Value Date     07/12/2021    K 3.5 07/12/2021     07/12/2021    CO2 35 07/12/2021    BUN 19 07/12/2021    CREATININE 0.7 07/12/2021    GFRAA >60 07/12/2021    LABGLOM >60 07/12/2021    GLUCOSE 107 07/12/2021    GLUCOSE 100 01/28/2011    PROT 5.5 07/06/2021    CALCIUM 9.0 07/12/2021    BILITOT 1.5 07/06/2021    ALKPHOS 66 07/06/2021    AST 11 07/06/2021    ALT 19 07/06/2021       POC Tests: No results for input(s): POCGLU, POCNA, POCK, POCCL, POCBUN, POCHEMO, POCHCT in the last 72 hours.     Coags:   Lab Results   Component Value Date    PROTIME 12.8 07/06/2021    INR 1.2 07/06/2021    APTT 31.5 07/06/2021       HCG (If Applicable): No results found for: PREGTESTUR, PREGSERUM, HCG, HCGQUANT     ABGs: No results found for: PHART, PO2ART, QWH1UDT, CSF5ISC, BEART, B1MPPXZF     Type & Screen (If Applicable):  No results found for: LABABO, LABRH    Drug/Infectious Status (If Applicable):  No results found for: HIV, HEPCAB    COVID-19 Screening (If Applicable):   Lab Results   Component Value Date    COVID19 Not Detected 03/05/2021       EKG:   3/30/2021  4:53 PM - Bhupendra, Mhy Incoming Ekg Results From [facility]    Component Value Ref Range & Units Status Collected Lab   Ventricular Rate 76  BPM Final 03/30/2021  9:30 AM [facility]   Atrial Rate 76  BPM Final 03/30/2021  9:30 AM [facility]   P-R Interval 146  ms Final 03/30/2021  9:30 AM [facility]   QRS Duration 78  ms Final 03/30/2021  9:30 AM [facility]   Q-T Interval 420  ms Final 03/30/2021  9:30 AM [facility]   QTc Calculation (Bazett) 472  ms Final 03/30/2021  9:30 AM [facility]   P [place] 89  degrees Final 03/30/2021  9:30 AM [facility]   R [place] 86 degrees Final 03/30/2021  9:30 AM [facility]   T [place] 62  degrees Final 03/30/2021  9:30 AM [facility]   Testing Performed By    Lab - [address] Name Director Address Valid Date Range   360-[facility] MUSE Unknown Unknown 04/18/16 0721-Present   Narrative & Impression    Sinus rhythm with premature supraventricular complexes  Abnormal ECG  When compared with ECG of 29-MAR-2021 20:34,  No significant change was found  Confirmed by Carmelita Tapia ([id number]) on 3/30/2021 4:53:45 PM         Anesthesia Evaluation  Patient summary reviewed and Nursing notes reviewed   history of anesthetic complications: difficult airway. Airway: Mallampati: II  TM distance: >3 FB   Neck ROM: limited  Mouth opening: > = 3 FB Dental:          Pulmonary:   (+) decreased breath sounds,      (-) not a current smoker          Patient did not smoke on day of surgery. Cardiovascular:  Exercise tolerance: poor (<4 METS),   (+) hypertension:, CAD:, CABG/stent (in 2017 3 vessel, 1 stent, ):, dysrhythmias (history of afib ): atrial fibrillation, murmur, hyperlipidemia      ECG reviewed  Rhythm: regular  Rate: normal           Beta Blocker:  Dose within 24 Hrs      ROS comment: Ablation done      Neuro/Psych:   Negative Neuro/Psych ROS              GI/Hepatic/Renal:   (+) GERD: well controlled, renal disease (recent stent): CRI,           Endo/Other:    (+) : arthritis: OA., malignancy/cancer. Pt had no PAT visit       Abdominal:             Vascular: negative vascular ROS. Other Findings:        ECHO 4/1/21   Summary   Normal left ventricle size and systolic function. Ejection fraction is visually estimated at 65-70%. Septal motion consistent with post bypass surgery. Normal left ventricle wall thickness. Indeterminate diastolic function. Left atrium was not clearly visualized. Normal right ventricular size and function. 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 Physiologic and/or trace tricuspid regurgitation. Physiologic and/or trace tricuspid regurgitation. RVSP is 39 mmHg. Top normal PA systolic pressure. No evidence for hemodynamically significant pericardial effusion. Signature      ----------------------------------------------------------------   Electronically signed by Lacy Miramontes MD(Interpreting   physician) on 04/01/2021 02:12 PM   ----------------------------------------------------------------     CT Chest 7/8/21  Impression   COPD with multifocal patchy and masslike infiltrates in the left lower lobe   as noted concerning for pneumonia.  Underlying malignancy is not excluded and   close surveillance with repeat CT scan in 6-8 weeks after appropriate   treatment is recommended to see complete resolution and exclude malignancy.       CT abdomen and pelvis.       Lack of contrast limits the study.  Liver is of grossly normal architecture.    Gallbladder is distended without acute inflammation.  Spleen, pancreas, the   adrenals and the kidneys are normal.  There is a right ureteral stent with   significant improvement in the right hydronephrosis.       There is significant improvement and near resolution of the adenopathy in the   abdomen pelvis with small lymph nodes measuring up to 8 mm in the portacaval   region and smaller ones in the retroperitoneum.  Moderate mesenteric lymph   nodes are noted measuring up to 9 mm which are significantly improved.  There   is calcification aorta and mild ectasia of the iliac arteries measuring 1.4   cm each.  Degenerative changes are identified in the lumbar spine.       Pelvis.  The bladder is distended.  Patient is status post partial right   hemicolectomy with the retained fluid and fecal matter in the left hemicolon   likely diarrhea.       Impression       Significant improvement and near resolution of the lymphadenopathy in the   retroperitoneum and significant improvement likely diarrhea.                Anesthesia Plan      MAC     ASA 4       Induction: intravenous. Anesthetic plan and risks discussed with patient. Plan discussed with CRNA and attending. Oscar Barnes RN   7/12/2021    DOS STAFF ADDENDUM:    Patient seen and chart reviewed. Physical exam and history updated as indicated. NPO status confirmed. Anesthesia options and plan discussed including risks benefits with patient/legal guardian and family as available. Concerns and questions addressed. Consent verbalized to proceed.   Anesthesia plan, options and intraoperative/postoperative concerns discussed with care team.    Camila Ramirez MD, MD  7/12/2021  4:37 PM

## 2021-07-12 NOTE — PROGRESS NOTES
[facility]  Hematology/Oncology  Consult      Patient Name: Margaret Treviño  YOB: 1950  PCP: Nedra Shetty DO   Referring Provider:      Reason for Consultation:   Chief Complaint   Patient presents with    Hematuria     PT STATES HE IS ANEMIC AND STATES HE HAS BEEN URINATING BLOOD FOR 1 WEEK. PT REPORTS FEELNG WEAKNESS      Subjective:  Slowly feeling improved. More alert and conversive    History of Present Illness:  73-year-old man with past medical history of aggressive bulky diffuse large B-cell lymphoma, non-germinal cell type with negative FISH interface for double hit or triple hit lymphoma with bulky intra-abdominal disease on presentation. He has been on combination chemotherapy with R-CHOP along with Revlimid. This has been complicated by significant pancytopenia is despite G-CSF prophylaxis. He has completed 1 week ago cycle #5 of R-CHOP/Revlimid  He was hospitalized to the emergency room with weakness fatigue and dizziness  He also reports gross hematuria a few days duration and has been on anticoagulation with Xarelto  Patient seen by cardiology and EPS for his arrhythmias.   Xarelto held because of thrombocytopenia and concurrent hematuria and will be resumed when thrombocytopenia improves and hematuria resolves    His CBC today shows a hemoglobin of 7.1  Platelets remain low at 15 with a WBC count of 0.1 despite G-CSF prophylaxis    Diagnostic Data:     Past Medical History:   Diagnosis Date    Arthritis     KNEES HIPS BACK    BPH (benign prostatic hyperplasia)     Cancer ([facility]     Coronary artery disease     Difficult airway     PT STATES HE WAS TOLD THIS TWICE AT [facility]    Difficult intubation 04/2017    note in care everywhere \"Clinical Summary\" 03/10/2021    History of blood transfusion     Hyperlipidemia     Hypertension     Sinus tachycardia 01/01/2002       Patient Active Problem List    Diagnosis Date Noted    Severe protein-calorie malnutrition ([facility] [address] 07/06/2021    Thrombocytopenia ([facility] 07/05/2021    Anemia 05/06/2021    Diffuse large B cell lymphoma ([facility] [address] 03/31/2021    Diffuse large [facility]cell [facility] 03/30/2021    Prolonged Q-T interval on ECG 03/29/2021    Hypotension 03/29/2021    CKD (chronic kidney disease) stage 3, GFR 30-59 ml/min ([facility]) 03/29/2021    Paroxysmal atrial fibrillation ([facility] [address] 03/29/2021    Colonic mass 03/11/2021    Hypertension     Hyperlipidemia     Coronary artery disease     SUNNY (acute kidney injury) ([facility] 02/14/2021    Hematuria 01/16/2018    BPH (benign prostatic hyperplasia) 01/16/2018        Past Surgical History:   Procedure Laterality Date    ABLATION OF DYSRHYTHMIC FOCUS      AORTA SURGERY      aortic valve replacement    AORTIC VALVE REPLACEMENT      BLADDER SURGERY Right 2/17/2021    CYSTOSCOPY BILATERAL RETROGRADE PYELOGRAM RIGHT STENT INSERTION performed by Roberto Vyas MD at [address] [place] [address]  2/19/2021    COLONOSCOPY WITH BIOPSY performed by Jackelin Humphrey DO at [address]  2/19/2021    COLONOSCOPY W/ ENDOSCOPIC MUCOSAL RESECTION performed by Jackelin Humphrey DO at [address] N/A 3/11/2021    LAPAROSCOPIC RIGHT HEMICOLECTOMY performed by Annalise Kaplan MD at [address] OTHER SURGICAL HISTORY  08/12/2016    CT Myelogram, Dr. Shara Santos  2014 new battery    last checked Dr Armida Howell 1/2016?     PORT SURGERY Right 4/1/2021    MEDI PORT INSERTION performed by Annalise Kaplan MD at [address] ENDOSCOPY      reflux    UPPER GASTROINTESTINAL ENDOSCOPY N/A 2/16/2021    EGD BIOPSY performed by Roxy Jules MD at [facility]         Family History  Family History   Problem Relation Age of Onset    Diabetes Mother     Stroke Mother     Diabetes Father     Heart Disease Father     Brain Cancer changes in vision, discharge, or pain   ENT: No Headaches, hearing loss or vertigo. No mouth sores or sore throat. No change in taste or smell.  Cardiovascular: No chest discomfort, dyspnea on exertion, palpitations or loss of consciousness. or phlebitis.  Respiratory: Has no cough or wheezing, Has no sputum production. Has no hemoptysis, Has no pleuritic pain, .  Gastrointestinal: No abdominal pain, appetite loss, blood in stools. No change in bowel habits. No hematemesis    Genitourinary: Patient acknowledges no dysuria, trouble voiding, or hematuria. No nocturia or increased frequency.  Musculoskeletal: No gait disturbance, weakness or joint complaints.  Integumentary: No rash or pruritis.  Neurological: No headache, diplopia, change in muscle strength, numbness or tingling. No change in gait, balance, coordination, mood, affect, memory, mentation, behavior.  Psychiatric: No anxiety, or depression.  Endocrine: No temperature intolerance. No excessive thirst, fluid intake, or urination. No tremor.  Hematologic/Lymphatic: No abnormal bruising or bleeding, blood clots or swollen lymph nodes.  Allergic/Immunologic: No nasal congestion or hives. Objective  BP (!) 102/50   Pulse 103   Temp 97.2 °F (36.2 °C) (Temporal)   Resp 20   Ht 5' 7\" (1.702 m)   Wt 173 lb 12.8 oz (78.8 kg)   SpO2 93%   BMI 27.22 kg/m²     Physical Exam:    General: Patient was confused when I went to examine the patient. He could not tell me where he was and was having trouble finding words. No focal weakness numbness. No headaches. He has been afebrile. Head and neck : PERRLA, EOMI . Sclera non icteric. Oropharynx : Clear  Neck: no JVD,  no adenopathy, no carotid bruit  Heart: Irregular  Lungs: Clear to auscultation   Extremities: No edema,no cyanosis, no clubbing. Abdomen: Soft, non-tender;no masses, no organomegaly  Skin:  No rash. Neurologic:Cranial nerves grossly intact.  No focal motor or sensory deficits . Recent Laboratory Data-   Lab Results   Component Value Date    WBC 7.1 07/12/2021    HGB 9.1 (L) 07/12/2021    HCT 28.9 (L) 07/12/2021    MCV 94.8 07/12/2021    PLT 63 (L) 07/12/2021    LYMPHOPCT 43.0 (H) 07/08/2021    RBC 3.05 (L) 07/12/2021    MCH 29.8 07/12/2021    MCHC 31.5 (L) 07/12/2021    RDW 15.9 (H) 07/12/2021    NEUTOPHILPCT 33.0 (L) 07/08/2021    MONOPCT 19.0 (H) 07/08/2021    BASOPCT 0.0 07/08/2021    NEUTROABS 0.07 (L) 07/08/2021    LYMPHSABS 0.09 (L) 07/08/2021    MONOSABS 0.04 (L) 07/08/2021    EOSABS 0.00 (L) 07/08/2021    BASOSABS 0.00 07/08/2021       Lab Results   Component Value Date     07/12/2021    K 3.5 07/12/2021     07/12/2021    CO2 35 (H) 07/12/2021    BUN 19 07/12/2021    CREATININE 0.7 07/12/2021    GLUCOSE 107 (H) 07/12/2021    CALCIUM 9.0 07/12/2021    PROT 5.5 (L) 07/06/2021    LABALBU 3.2 (L) 07/06/2021    BILITOT 1.5 (H) 07/06/2021    ALKPHOS 66 07/06/2021    AST 11 07/06/2021    ALT 19 07/06/2021    LABGLOM >60 07/12/2021    GFRAA >60 07/12/2021       Lab Results   Component Value Date    IRON 38 (L) 02/16/2021    TIBC 248 (L) 02/16/2021           Radiology-    XR CHEST PORTABLE    Result Date: 7/5/2021  EXAMINATION: ONE XRAY VIEW OF THE CHEST 7/5/2021 6:31 pm COMPARISON: April 1, 2021 HISTORY: ORDERING SYSTEM PROVIDED HISTORY: dizziness TECHNOLOGIST PROVIDED HISTORY: If in ER room at the time of exam, OK to change to portable 1 view Reason for exam:->dizziness What reading provider will be dictating this exam?->CRC FINDINGS: Left pacemaker. Median sternotomy wires are present. The heart is normal in size. There is prominence of pulmonary vasculature. Interstitial and hazy opacities are present in the lung bases. No obvious pleural effusion. No pneumothorax. Right MediPort present. Interstitial prominence bilaterally related to pulmonary vascular congestion with pneumonia or subsegmental atelectasis in lung bases.      PET CT SKULL BASE TO MID THIGH    Result Date: 6/21/2021  EXAMINATION: Skull base to midthigh PET/CT 6/18/2021 TECHNIQUE: Following IV injection of 15.5 mCi of F 18 -FDG, PET  tumor imaging was acquired from the base of the skull to the mid thighs. Computed tomography was used for purposes of attenuation correction and anatomic localization. Fusion imaging was utilized for interpretation. Uptake time 52 mins. Glucose level 100 mg/dl. COMPARISON: CT abdomen pelvis dated 03/29/2021, CT neck dated 06/01/2021 HISTORY: ORDERING SYSTEM PROVIDED HISTORY: Diffuse large B-cell lymphoma of lymph nodes of multiple sites [facility]) TECHNOLOGIST PROVIDED HISTORY: Reason for exam:->Diffuse large B-cell lymphoma of lymph nodes of multiple sites [facility]) What reading provider will be dictating this exam?->CRC FINDINGS: HEAD/NECK: In the craniocervical soft tissues, physiologic activity is favored. Bilateral carotid artery calcification. CHEST: Port is seen in the right chest wall. Pacemaker in the left chest wall. Status post median sternotomy. Within the mediastinum, no kelley activity markedly greater than mediastinal background. No axillary adenopathy. Soft tissue activity about the shoulders bilaterally, typically inflammatory. Bilateral gynecomastia. Emphysema. Small bilateral pleural effusions. Scattered ground-glass opacity, likely atelectasis. No pneumothorax. The pleuroparenchymal nodule at the left lung base on the prior CT is not well seen on the current exam, potentially obscured by the pleural effusion. ABDOMEN/PELVIS: The previously demonstrated extensive abdominal and pelvic adenopathy as well as small bowel nodularity seen on the prior CT is not observed on current. No hypermetabolic adenopathy is seen. Right nephroureteral stent is demonstrated. Excretion is seen from the kidneys bilaterally and activity is seen within the urinary bladder.   Bowel activity seen which can be physiologically variable, including at the distal rectum. Calcification of the abdominal aorta and iliac arteries. Diverticulosis. BONES/SOFT TISSUE: Diffuse activity is seen throughout regional bone marrow, relatively symmetrically. With regard to patient's history of lymphoma, no hypermetabolic adenopathy is seen, compatible with favorable treatment response. Diffuse symmetric activity throughout regional bone marrow, which can be seen in the setting of recent chemotherapy, colony-stimulating factor usage, or anemia. Small bilateral pleural effusions. ASSESSMENT/PLAN : 52-year-old man    High-grade aggressive diffuse large B-cell lymphoma, non-germinal cell type, ABC type status post cycle #5 of chemotherapy with R-CHOP/Revlimid. Double hit/triple hit lymphoma have been ruled out    Pancytopenia secondary to chemotherapy  Hematuria related to thrombocytopenia and the fact that he is anticoagulated with Xarelto for his chronic A. Fib    Appreciate cardiology and EPS input    Hold Xarelto  It may be resumed when gross hematuria resolves and platelet count improves above 50    Initiate G-CSF for persistent severe neutropenia despite Neulasta prophylaxis  Monitor for potential neutropenic fevers  Transfuse with 1 unit of packed RBC because of fatigue and cardiovascular compromise and 1 unit of platelet because of his gross hematuria and thrombocytopenia  We will follow    7/7/2021. Patient was confused when I went to examine the patient. He could not tell me where he was and was having trouble finding words. No focal weakness numbness. Neuro exam was non focal. No headaches. He has been afebrile. Will r/o sepsis. Blood cultures chest xray ct head without contrast requested. Profound neutropenia > 7 days since chemo. Continue Granix. Will start prophylactic Levaquin after cultures are drawn. Platelets 83Y. No active bleeding bruising. Xarelto has been held for now. 7/8/21  - Continue with profound pancytopenia.  WBC 0.2, ANC 70. Hgb 6.6, platelets 13. Getting 1 unit pRBC  - Trend CBC daily, transfuse for Hgb <7, platelets <20 or <23 with active bleeding  - Continue granix 480 mcg daily  - BCx showing GNR (Klebsiella, Pseudomonas, Serratia marcescens by PCR)  - ID consulted, now on merrem. Vanco DC'jannette  - Vascular consulted to remove PORT, agree    7/9/21  On Meropenem for gram negative bacteremia  Mediport to be removed   Remains pancytopenic with improvement in [address]   Platelets down to 11  To transfuse with 1 unit of PRBC and 1 dose of platelets in anticipation of mediport removal    7/11/21  - Pancytopenia improved. WBC to 5.1, Hgb 9.2, platelets 34  - Continues on merrem. ID following. GNR bacteremia  - PORT to be removed. OK to transfuse platelets up to surgery goal, defer count number to comfort of surgeon    7/12/2021  - CBC is stable WBC 11.1, hemoglobin 9.1, platelet 63  - ID following. Continues on meropenem for GNR bacteremia.   - Port to be removed today  - Plan for CARMEN tomorrow per electrophysiology    Electronically signed 7/12/2021 at 3:49 PM   Noreen Almanza, APRN - CNP

## 2021-07-12 NOTE — PROGRESS NOTES
GENERAL SURGERY  DAILY PROGRESS NOTE  7/12/2021    Subjective:  No acute events overnight. Patient is reporting no pain. Objective:  BP (!) 101/52   Pulse 114   Temp 98.6 °F (37 °C) (Oral)   Resp 20   Ht 5' 7\" (1.702 m)   Wt 173 lb 12.8 oz (78.8 kg)   SpO2 94%   BMI 27.22 kg/m²     GENERAL:  Laying in bed, awake, alert, cooperative, no apparent distress  HEAD: Normocephalic, atraumatic  EYES: No sclera icterus, pupils equal  LUNGS:  No increased work of breathing  CARDIOVASCULAR:  RR  ABDOMEN:  Soft, non-tender, non-distended, Mediport site near right clavicle  EXTREMITIES: No edema or swelling  SKIN: Warm and dry    Assessment/Plan:  80 y.o. male with pancytopenia, sepsis, needing Mediport removal for potential source control.     Platelets 63,   NPO  Plan for port-removal 7/12 (will be done in the afternoon)  Discussed with Dr. Nini Yi    Electronically signed by Rajani Anaya MD on 7/12/2021 at 7:10 AM

## 2021-07-12 NOTE — PROGRESS NOTES
secondary to polymicrobial gram-negative nevaeh (Pseudomonas aeruginosa, Klebsiella pneumoniae, Serratia marcescens by PCR) bacteremia, suspect associated with CLABSI  Pneumonia  Profound neutropenia  Immunocompromised state  Diffuse large B-cell lymphoma on chemotherapy    Recommendations:  Continue meropenem 1 g every 8 hours. With Pseudomonas aeruginosa  CLABSI, standard of care dictates removal of port/central line involved as per treatment guidelines. Recommend removal of venous access port for source control. Per Surgery, await improvement of anemia and thrombocytopenia prior to Mediport removal unless emergent. Follow-up blood cultures. Continue supportive care. WBC 5100  resp culture  Gm neg nevaeh   merrem should be good for now pending the ID  pseudmonas   pansensitive  CARMEN tomorrow to assess lead involvement        Thank you for involving me in the care of Quan Hernandez.  .    Electronically signed by Cristopher Mijares MD on 7/12/2021 at 11:53 AM

## 2021-07-12 NOTE — OP NOTE
[id number]   Operative Report    DATE OF PROCEDURE: 7/12/2021    SURGEON: Dr. Maria Lee MD, M.D.     Masoud Reyes: Carloz Yeung DIAGNOSIS: Bacteremia, mediport in place    POSTOPERATIVE DIAGNOSIS: Same. OPERATION: Removal Central Venous Catheter and Subcutaneous Port. ([id number])     ANESTHESIA: LMAC     ESTIMATED BLOOD LOSS: None. COMPLICATIONS: None. HISTORY: Trenton Khalil is a  80 y.o.  male who has bactermia with mediport in [facility] 42: The patient was placed on the table in a supine position. He  was given merropenem IV preop. SCD's were placed on the legs as DVT prophylaxis. The skin was prepped with ChloroPrep and draped in the usual fashion. The skin was numbed with marcaine plus epinephrine. An incision was made though the old scar above the port. Cautery was used to dissect down around the port and it was freed from the attachments and removed and sent off the field. The catheter tunnel was closed with vicryl suture. The dermis was closed with 3-0 vicryl. Derma+flex glue was placed on the incision, no dressing. The needle and sponge count were correct. The patient tolerated the procedure well and went to recovery in stable condition.     Physician Signature: Electronically signed by Dr. Maria Lee MD, M.D.

## 2021-07-12 NOTE — PROGRESS NOTES
[facility] PROGRESS NOTE      Chief complaint: Follow-up of Gram-negative nevaeh bacteremia     The patient is a 80 y.o. male with history of diffuse large B cell lymphoma on chemotherapy with R-CHOP and bortezomib through right subclavian port with most recent chemotherapy 1 week prior to admission, hypertension, hyperlipidemia, CAD, pacemaker in situ (new device implantation on 12/17/2020), presented on 07/05 with generalized weakness and fatigue for about a week, found to be septic with pancytopenia and gram-negative nevaeh (Klebsiella pneumoniae, Pseudomonas aeruginosa, Serratia marcescens by PCR) bacteremia. Chest x-ray showed bilateral interstitial prominence probably related to pulmonary vascular congestion. CT chest, abdomen and pelvis showed  COPD with multifocal patchy and masslike infiltrates in the left lower lobe, distended gallbladder without acute inflammation, right ureteral stent in place with significant improvement in right hydronephrosis, status post right hemicolectomy with retained fluid and fecal matter in the left hemicolon, significant improvement and near resolution of lymphadenopathy in the retroperitoneum and mesenteric adenopathy. Piperacillin-tazobactam and vancomycin were started on admission. Subjective: Patient was seen and examined. No chills, no abdominal pain, no diarrhea, no rash, no itching. Objective:    Vitals:    07/12/21 1220   BP: (!) 103/58   Pulse: 58   Resp: 26   Temp: 97.5 °F (36.4 °C)   SpO2: 96%     Constitutional: Alert, not in distress  Respiratory: Clear breath sounds, no crackles, no wheezes. decreased  Cardiovascular: Regular rate and rhythm, no murmurs  Gastrointestinal: Bowel sounds present, soft, nontender  Skin: Warm and dry, no active dermatoses  Musculoskeletal: No joint swelling, no joint erythema    Labs, imaging, and medical records/notes were personally reviewed.   resp cx 7/9 GNR  blood cx 7/7 Klebsiella and pseudomonas   Assessment:  Sepsis, secondary to polymicrobial gram-negative nevaeh (Pseudomonas aeruginosa, Klebsiella pneumoniae, Serratia marcescens by PCR) bacteremia, suspect associated with CLABSI  Pneumonia  Profound neutropenia  Immunocompromised state  Diffuse large B-cell lymphoma on chemotherapy    Recommendations:  Continue meropenem 1 g every 8 hours. With Pseudomonas aeruginosa  CLABSI, standard of care dictates removal of port/central line involved as per treatment guidelines. Recommend removal of venous access port for source control. Per Surgery, await improvement of anemia and thrombocytopenia prior to Mediport removal unless emergent. Follow-up blood cultures. Continue supportive care. WBC 5100  Pseudomonas two types it appears and serratia'  CARMEN tomorrow       Thank you for involving me in the care of Dante Caceres.  .    Electronically signed by Caitie Melendez MD on 7/12/2021 at 1:31 PM

## 2021-07-12 NOTE — PLAN OF CARE
Problem: Falls - Risk of:  Goal: Will remain free from falls  Description: Will remain free from falls  7/12/2021 0507 by Elisabeth Barahona RN  Outcome: Met This Shift  7/11/2021 1800 by Lara Glasgow RN  Outcome: Met This Shift  Goal: Absence of physical injury  Description: Absence of physical injury  7/12/2021 0507 by Elisabeth Barahona RN  Outcome: Met This Shift  7/11/2021 1800 by Lara Glasgow RN  Outcome: Met This Shift     Problem: Skin Integrity:  Goal: Absence of new skin breakdown  Description: Absence of new skin breakdown  Outcome: Met This Shift

## 2021-07-13 ENCOUNTER — APPOINTMENT (OUTPATIENT)
Dept: CARDIAC CATH/INVASIVE PROCEDURES | Age: 71
DRG: 314 | End: 2021-07-13
Payer: MEDICARE

## 2021-07-13 ENCOUNTER — ANESTHESIA (OUTPATIENT)
Dept: CARDIAC CATH/INVASIVE PROCEDURES | Age: 71
DRG: 314 | End: 2021-07-13
Payer: MEDICARE

## 2021-07-13 ENCOUNTER — ANESTHESIA EVENT (OUTPATIENT)
Dept: CARDIAC CATH/INVASIVE PROCEDURES | Age: 71
DRG: 314 | End: 2021-07-13
Payer: MEDICARE

## 2021-07-13 VITALS
OXYGEN SATURATION: 93 % | SYSTOLIC BLOOD PRESSURE: 119 MMHG | RESPIRATION RATE: 19 BRPM | DIASTOLIC BLOOD PRESSURE: 70 MMHG

## 2021-07-13 LAB
ANISOCYTOSIS: ABNORMAL
BASOPHILS ABSOLUTE: 0.05 E9/L (ref 0–0.2)
BASOPHILS RELATIVE PERCENT: 0.9 % (ref 0–2)
CULTURE, RESPIRATORY: ABNORMAL
DOHLE BODIES: ABNORMAL
EOSINOPHILS ABSOLUTE: 0 E9/L (ref 0.05–0.5)
EOSINOPHILS RELATIVE PERCENT: 0.2 % (ref 0–6)
HCT VFR BLD CALC: 27.8 % (ref 37–54)
HEMOGLOBIN: 8.7 G/DL (ref 12.5–16.5)
LV EF: 68 %
LVEF MODALITY: NORMAL
LYMPHOCYTES ABSOLUTE: 0.23 E9/L (ref 1.5–4)
LYMPHOCYTES RELATIVE PERCENT: 4.3 % (ref 20–42)
MCH RBC QN AUTO: 29.8 PG (ref 26–35)
MCHC RBC AUTO-ENTMCNC: 31.3 % (ref 32–34.5)
MCV RBC AUTO: 95.2 FL (ref 80–99.9)
MONOCYTES ABSOLUTE: 0.46 E9/L (ref 0.1–0.95)
MONOCYTES RELATIVE PERCENT: 7.8 % (ref 2–12)
NEUTROPHILS ABSOLUTE: 5.05 E9/L (ref 1.8–7.3)
NEUTROPHILS RELATIVE PERCENT: 87 % (ref 43–80)
ORGANISM: ABNORMAL
ORGANISM: ABNORMAL
OVALOCYTES: ABNORMAL
PDW BLD-RTO: 15.8 FL (ref 11.5–15)
PLATELET # BLD: 115 E9/L (ref 130–450)
PMV BLD AUTO: 11 FL (ref 7–12)
POIKILOCYTES: ABNORMAL
POLYCHROMASIA: ABNORMAL
RBC # BLD: 2.92 E12/L (ref 3.8–5.8)
SMEAR, RESPIRATORY: ABNORMAL
WBC # BLD: 5.8 E9/L (ref 4.5–11.5)

## 2021-07-13 PROCEDURE — 94760 N-INVAS EAR/PLS OXIMETRY 1: CPT

## 2021-07-13 PROCEDURE — 3700000000 HC ANESTHESIA ATTENDED CARE

## 2021-07-13 PROCEDURE — 93321 DOPPLER ECHO F-UP/LMTD STD: CPT

## 2021-07-13 PROCEDURE — 36415 COLL VENOUS BLD VENIPUNCTURE: CPT

## 2021-07-13 PROCEDURE — 6360000002 HC RX W HCPCS: Performed by: SURGERY

## 2021-07-13 PROCEDURE — 2580000003 HC RX 258

## 2021-07-13 PROCEDURE — 85025 COMPLETE CBC W/AUTO DIFF WBC: CPT

## 2021-07-13 PROCEDURE — 93325 DOPPLER ECHO COLOR FLOW MAPG: CPT

## 2021-07-13 PROCEDURE — 6370000000 HC RX 637 (ALT 250 FOR IP): Performed by: SURGERY

## 2021-07-13 PROCEDURE — 93312 ECHO TRANSESOPHAGEAL: CPT

## 2021-07-13 PROCEDURE — 2580000003 HC RX 258: Performed by: SURGERY

## 2021-07-13 PROCEDURE — 94640 AIRWAY INHALATION TREATMENT: CPT

## 2021-07-13 PROCEDURE — 2140000000 HC CCU INTERMEDIATE R&B

## 2021-07-13 PROCEDURE — 99232 SBSQ HOSP IP/OBS MODERATE 35: CPT | Performed by: INTERNAL MEDICINE

## 2021-07-13 PROCEDURE — 6360000002 HC RX W HCPCS

## 2021-07-13 PROCEDURE — 3700000001 HC ADD 15 MINUTES (ANESTHESIA)

## 2021-07-13 PROCEDURE — 2709999900 HC NON-CHARGEABLE SUPPLY

## 2021-07-13 RX ORDER — SODIUM CHLORIDE 9 MG/ML
INJECTION, SOLUTION INTRAVENOUS CONTINUOUS PRN
Status: DISCONTINUED | OUTPATIENT
Start: 2021-07-13 | End: 2021-07-13 | Stop reason: SDUPTHER

## 2021-07-13 RX ORDER — PROPOFOL 10 MG/ML
INJECTION, EMULSION INTRAVENOUS PRN
Status: DISCONTINUED | OUTPATIENT
Start: 2021-07-13 | End: 2021-07-13 | Stop reason: SDUPTHER

## 2021-07-13 RX ADMIN — METOPROLOL SUCCINATE 50 MG: 50 TABLET, EXTENDED RELEASE ORAL at 20:28

## 2021-07-13 RX ADMIN — Medication 10 ML: at 20:28

## 2021-07-13 RX ADMIN — PROPOFOL 30 MG: 10 INJECTION, EMULSION INTRAVENOUS at 17:57

## 2021-07-13 RX ADMIN — LISINOPRIL 10 MG: 10 TABLET ORAL at 08:32

## 2021-07-13 RX ADMIN — PROPOFOL 30 MG: 10 INJECTION, EMULSION INTRAVENOUS at 17:48

## 2021-07-13 RX ADMIN — PROPOFOL 30 MG: 10 INJECTION, EMULSION INTRAVENOUS at 18:02

## 2021-07-13 RX ADMIN — ALBUTEROL SULFATE 2.5 MG: 2.5 SOLUTION RESPIRATORY (INHALATION) at 13:50

## 2021-07-13 RX ADMIN — FAMOTIDINE 20 MG: 20 TABLET ORAL at 08:33

## 2021-07-13 RX ADMIN — NYSTATIN 500000 UNITS: 100000 SUSPENSION ORAL at 14:01

## 2021-07-13 RX ADMIN — FUROSEMIDE 20 MG: 10 INJECTION, SOLUTION INTRAMUSCULAR; INTRAVENOUS at 08:33

## 2021-07-13 RX ADMIN — SODIUM CHLORIDE, PRESERVATIVE FREE 10 ML: 5 INJECTION INTRAVENOUS at 00:44

## 2021-07-13 RX ADMIN — ALBUTEROL SULFATE 2.5 MG: 2.5 SOLUTION RESPIRATORY (INHALATION) at 16:14

## 2021-07-13 RX ADMIN — NYSTATIN 500000 UNITS: 100000 SUSPENSION ORAL at 20:28

## 2021-07-13 RX ADMIN — AMLODIPINE BESYLATE 5 MG: 5 TABLET ORAL at 08:33

## 2021-07-13 RX ADMIN — NYSTATIN 500000 UNITS: 100000 SUSPENSION ORAL at 08:33

## 2021-07-13 RX ADMIN — METOPROLOL SUCCINATE 50 MG: 50 TABLET, EXTENDED RELEASE ORAL at 08:33

## 2021-07-13 RX ADMIN — POTASSIUM CHLORIDE 20 MEQ: 1500 TABLET, EXTENDED RELEASE ORAL at 08:33

## 2021-07-13 RX ADMIN — ALBUTEROL SULFATE 2.5 MG: 2.5 SOLUTION RESPIRATORY (INHALATION) at 09:16

## 2021-07-13 RX ADMIN — Medication 10 ML: at 11:24

## 2021-07-13 RX ADMIN — MEROPENEM 1000 MG: 1 INJECTION, POWDER, FOR SOLUTION INTRAVENOUS at 08:42

## 2021-07-13 RX ADMIN — MEROPENEM 1000 MG: 1 INJECTION, POWDER, FOR SOLUTION INTRAVENOUS at 16:00

## 2021-07-13 RX ADMIN — PROPOFOL 70 MG: 10 INJECTION, EMULSION INTRAVENOUS at 17:44

## 2021-07-13 RX ADMIN — ALBUTEROL SULFATE 2.5 MG: 2.5 SOLUTION RESPIRATORY (INHALATION) at 20:55

## 2021-07-13 RX ADMIN — FAMOTIDINE 20 MG: 20 TABLET ORAL at 20:28

## 2021-07-13 RX ADMIN — MEROPENEM 1000 MG: 1 INJECTION, POWDER, FOR SOLUTION INTRAVENOUS at 00:44

## 2021-07-13 RX ADMIN — SODIUM CHLORIDE: 9 INJECTION, SOLUTION INTRAVENOUS at 17:38

## 2021-07-13 RX ADMIN — PROPOFOL 30 MG: 10 INJECTION, EMULSION INTRAVENOUS at 17:53

## 2021-07-13 ASSESSMENT — PAIN SCALES - GENERAL: PAINLEVEL_OUTOF10: 0

## 2021-07-13 ASSESSMENT — LIFESTYLE VARIABLES: SMOKING_STATUS: 0

## 2021-07-13 NOTE — PATIENT CARE CONFERENCE
[facility] Quality Flow/Interdisciplinary Rounds Progress Note        Quality Flow Rounds held on July 13, 2021    Disciplines Attending:  Bedside Nurse, ,  and Nursing Unit Leadership    Nathanael Nelson was admitted on 7/5/2021  5:44 PM    Anticipated Discharge Date:  Expected Discharge Date: 07/12/21    Disposition:    Osman Score:  Osman Scale Score: 19    Readmission Risk              Risk of Unplanned Readmission:  25           Discussed patient goal for the day, patient clinical progression, and barriers to discharge.   The following Goal(s) of the Day/Commitment(s) have been identified:  Education/Learning - Alternate 7 Yessy Jimenez RN  July 13, 2021

## 2021-07-13 NOTE — PROGRESS NOTES
[facility] PROGRESS NOTE      Chief complaint: Follow-up of Gram-negative nevaeh bacteremia     The patient is a 80 y.o. male with history of diffuse large B cell lymphoma on chemotherapy with R-CHOP and bortezomib through right subclavian port with most recent chemotherapy 1 week prior to admission, hypertension, hyperlipidemia, CAD, pacemaker in situ (new device implantation on 12/17/2020), presented on 07/05 with generalized weakness and fatigue for about a week, found to be septic with pancytopenia and gram-negative nevaeh (Klebsiella pneumoniae, Pseudomonas aeruginosa, Serratia marcescens by PCR) bacteremia. Chest x-ray showed bilateral interstitial prominence probably related to pulmonary vascular congestion. CT chest, abdomen and pelvis showed  COPD with multifocal patchy and masslike infiltrates in the left lower lobe, distended gallbladder without acute inflammation, right ureteral stent in place with significant improvement in right hydronephrosis, status post right hemicolectomy with retained fluid and fecal matter in the left hemicolon, significant improvement and near resolution of lymphadenopathy in the retroperitoneum and mesenteric adenopathy. Piperacillin-tazobactam and vancomycin were started on admission. Subjective: Patient was seen and examined. No chills, no abdominal pain, no diarrhea, no rash, no itching. Objective:    Vitals:    07/13/21 1134   BP: (!) 109/53   Pulse: 94   Resp: 16   Temp: 97.5 °F (36.4 °C)   SpO2: 94%     Constitutional: Alert, not in distress  Respiratory: Clear breath sounds, no crackles, no wheezes. decreased  Cardiovascular: Regular rate and rhythm, no murmurs  Gastrointestinal: Bowel sounds present, soft, nontender  Skin: Warm and dry, no active dermatoses  Musculoskeletal: No joint swelling, no joint erythema    Labs, imaging, and medical records/notes were personally reviewed.   resp cx 7/9 GNR  blood cx 7/7 Klebsiella and pseudomonas   Assessment:  Sepsis, secondary to polymicrobial gram-negative nevaeh (Pseudomonas aeruginosa, Klebsiella pneumoniae, Serratia marcescens by PCR) bacteremia, suspect associated with CLABSI  Pneumonia  Profound neutropenia  Immunocompromised state  Diffuse large B-cell lymphoma on chemotherapy    Recommendations:  Continue meropenem 1 g every 8 hours. With Pseudomonas aeruginosa  CLABSI, standard of care dictates removal of port/central line involved as per treatment guidelines. Recommend removal of venous access port for source control. Per Surgery, await improvement of anemia and thrombocytopenia prior to Mediport removal unless emergent. Follow-up blood cultures. Continue supportive care. WBC 5100  Pseudomonas two types it appears and serratia'  CARMEN today   Port removed yesterday '  He is quite angry and wants to go home talked to him and his son       Thank you for involving me in the care of Salvador Gerardo.  .    Electronically signed by Argentina Medrano MD on 7/13/2021 at 11:52 AM

## 2021-07-13 NOTE — PROCEDURES
PRELIMINARY TRANSESOPHAGEAL ECHOCARDIOGRAPHY REPORT    Date of Procedure: 7/13/2021    Indication:  Bacteremia    Sedation: Propofol    Complications: None    Preliminary findings:  Normal LV function   21 mm [facility] Pramod Trifecta aortic bioprosthesis, elevated gradients  29 mm [facility] Pramod Biocor mitral bioprosthesis, elevated gradients  FRANSISCO is surgically absent s/p surgical ligation with a clip, no residual pouch  No vegetations of native valves, prosthetic valves, or visualized portions of the intracardiac device leads    No echocardiographic evidence of endocarditis    Full report to follow    Jakub Ruiz MD, [facility]

## 2021-07-13 NOTE — PROGRESS NOTES
Pulmonary [facility]                             Pulmonary Consult/Progress Note :              Reason for Consultation:POssible need for Bronch,Lymphoma   CC : SOB ,cough frothy secretion   HPI:     SOB has improved a lot ,less cough   On 2 L   Denies any fever   More comfortable  Resting at bed  No chest pain   For CARMEN      PHYSICAL EXAMINATION:     VITAL SIGNS:  BP 92/60   Pulse 87   Temp 98.3 °F (36.8 °C) (Temporal)   Resp 26   Ht 5' 7\" (1.702 m)   Wt 173 lb 12.8 oz (78.8 kg)   SpO2 95%   BMI 27.22 kg/m²   Wt Readings from Last 3 Encounters:   07/12/21 173 lb 12.8 oz (78.8 kg)   06/15/21 210 lb (95.3 kg)   03/31/21 200 lb (90.7 kg)     Temp Readings from Last 3 Encounters:   07/12/21 98.3 °F (36.8 °C) (Temporal)   06/28/21 98.9 °F (37.2 °C) (Temporal)   06/15/21 98.4 °F (36.9 °C) (Temporal)     TMAX:  BP Readings from Last 3 Encounters:   07/12/21 92/60   07/12/21 (!) 91/57   06/28/21 (!) 114/59     Pulse Readings from Last 3 Encounters:   07/12/21 87   06/28/21 70   06/15/21 77           INTAKE/OUTPUTS:  I/O last 3 completed shifts:   In: 80 [P.O.:90]  Out: 400 [Urine:400]    Intake/Output Summary (Last 24 hours) at 7/12/2021 2255  Last data filed at 7/12/2021 2211  Gross per 24 hour   Intake 300 ml   Output 200 ml   Net 100 ml       General Appearance: alert and oriented to person, place and time, well-developed and   well-nourished, in no acute distress   Eyes: pupils equal, round, and reactive to light, extraocular eye movements intact, conjunctivae normal and sclera anicteric   Neck: neck supple and non tender without mass, no thyromegaly, no thyroid nodules and no cervical adenopathy   Pulmonary/Chest:crackles rhonchi   Cardiovascular: normal rate, regular rhythm, normal S1 and S2, no murmurs, rubs, clicks or gallops, distal pulses intact, no carotid bruits, no murmurs, no gallops, no carotid bruits and no JVD   Abdomen: obese, soft, non-tender, non-distended, normal bowel sounds, no masses or organomegaly   Extremities:no edema   Musculoskeletal: normal range of motion, no joint swelling, deformity or tenderness   Neurologic: reflexes normal and symmetric, no cranial nerve deficit noted    LABS/IMAGING:    CBC:  Lab Results   Component Value Date    WBC 7.1 07/12/2021    HGB 9.1 (L) 07/12/2021    HCT 28.9 (L) 07/12/2021    MCV 94.8 07/12/2021    PLT 63 (L) 07/12/2021    LYMPHOPCT 43.0 (H) 07/08/2021    RBC 3.05 (L) 07/12/2021    MCH 29.8 07/12/2021    MCHC 31.5 (L) 07/12/2021    RDW 15.9 (H) 07/12/2021    NEUTOPHILPCT 33.0 (L) 07/08/2021    MONOPCT 19.0 (H) 07/08/2021    BASOPCT 0.0 07/08/2021    NEUTROABS 0.07 (L) 07/08/2021    LYMPHSABS 0.09 (L) 07/08/2021    MONOSABS 0.04 (L) 07/08/2021    EOSABS 0.00 (L) 07/08/2021    BASOSABS 0.00 07/08/2021       Recent Labs     07/12/21  0554 07/11/21  0526 07/10/21  0607   WBC 7.1 5.1 2.3*   HGB 9.1* 9.2* 9.1*   HCT 28.9* 29.2* 28.5*   MCV 94.8 93.3 93.8   PLT 63* 34* 29*       BMP:   Recent Labs     07/10/21  0607 07/11/21  0921 07/11/21  1851 07/12/21  0554    148*  --  143   K 2.7* 2.7* 3.6 3.5   CL 98 104  --  100   CO2 36* 31*  --  35*   BUN 21 16  --  19   CREATININE 0.8 0.5*  --  0.7       MG:   Lab Results   Component Value Date    MG 2.1 07/12/2021     Ca/Phos:   Lab Results   Component Value Date    CALCIUM 9.0 07/12/2021    PHOS 3.2 04/03/2021     Amylase: No results found for: AMYLASE  Lipase: No results found for: LIPASE  LIVER PROFILE:   No results for input(s): AST, ALT, LIPASE, BILIDIR, BILITOT, ALKPHOS in the last 72 hours. Invalid input(s): AMYLASE,  ALB    PT/INR:   No results for input(s): PROTIME, INR in the last 72 hours. APTT:   No results for input(s): APTT in the last 72 hours.     Cardiac Enzymes:  Lab Results   Component Value Date    TROPONINI <0.01 02/14/2021               PET and CT reviewed     PROBLEM LIST:  Patient Active Problem List   Diagnosis    Hematuria    BPH (benign prostatic hyperplasia)    [facility]) ([facility] [address]    Colonic mass    Hypertension    Hyperlipidemia    Coronary artery disease    Prolonged Q-T interval on ECG    Hypotension    CKD (chronic kidney disease) stage 3, GFR 30-59 ml/min (HCC)    Paroxysmal atrial fibrillation (HCC)    Diffuse large B-cell lymphoma (HCC)    Diffuse large B cell lymphoma (HCC)    Severe protein-calorie malnutrition (HCC)    Anemia    Thrombocytopenia (HCC)               ASSESSMENT:  1.) Possible fluid overload /Pulmonary edema  2. )COPD  3.)h/p Lymphoma   4.)Pancytopenia   5. )GNR  Pneumonia       PLAN:  Continue same plan   *-hold IV Lasix ,Just as neede   *-Work-up for COPD bronchodilator/pneumonia ,seittia and psedomonas  *- ab as per ID   *_hold on Steroids for now  Sputum culture,serittia and pseudomonas ,ID on board  ID following   Mag as well   ICS  BD  Discuss with SON   IS          Munir Perez MD,FCCP  Pulmonary&Critical Care Medicine   Director of [address] [place] [address] Director of [address] [place] [address]    Kendrick Burns    NOTE: This report was transcribed using voice recognition software. Every effort was made to ensure accuracy; however, inadvertent computerized transcription errors may be present.

## 2021-07-13 NOTE — PROGRESS NOTES
Subjective: The patient is awake and alert. Much more alert and awake   No acute complaint s  Had port removed yesterday  For CARMEN today  Otherwise he is resting comfortably no apparent acute distress and looks 100% better although indicates he feels quite weak    Objective:    BP (!) 109/53   Pulse 94   Temp 97.5 °F (36.4 °C) (Temporal)   Resp 16   Ht 5' 7\" (1.702 m)   Wt 172 lb 8 oz (78.2 kg)   SpO2 94%   BMI 27.02 kg/m²     In: 400 [P.O.:200; I.V.:100]  Out: 1450   In: 400   Out: 1450 [Urine:1450]    General appearance: NAD, conversant, feels like he is coughing up a fair amount of phlegm  HEENT: AT/NC, MMM  Neck: FROM, supple  Lungs: Clear to auscultation  CV: RRR, no MRGs  Vasc: Radial pulses 2+  Abdomen: Soft, non-tender; no masses or HSM  Extremities: No peripheral edema or digital cyanosis  Skin: no rash, lesions or ulcers  Psych: Alert and oriented to person, place and time  Neuro: Alert and interactive     Recent Labs     07/11/21  0526 07/12/21  0554 07/13/21  1147   WBC 5.1 7.1 5.8   HGB 9.2* 9.1* 8.7*   HCT 29.2* 28.9* 27.8*   PLT 34* 63* 115*       Recent Labs     07/11/21  0921 07/11/21  1851 07/12/21  0554   *  --  143   K 2.7* 3.6 3.5     --  100   CO2 31*  --  35*   BUN 16  --  19   CREATININE 0.5*  --  0.7   CALCIUM 7.6*  --  9.0       Assessment:    Principal Problem:    Diffuse large B-cell lymphoma (HCC)  Active Problems:    Hematuria    Severe protein-calorie malnutrition (HCC)    Thrombocytopenia (HCC)  Resolved Problems:    * No resolved hospital problems.  *      Plan:    Admitted for evaluation of pancytopenia and hematuria in pt with B cell lymphoma on chemo in spite of neulasta   hemonc to follow for transfusions - appreciate input   Await Gcsf tx   Hold oac / antiplatelets - o n xarelto at home - consider coming off for now   Transfuse 1 unit PRBC 7/8/2021-monitor volume status , avoid volume  Daily labs     Gram-negative nevaeh sepsis/bacteremia-blood cultures with Klebsiella/Pseudomonas/Serratia  Merrem per infectious disease  Will hold IV fluids for now secondary to worsening lung status, obtain chest x-ray  Diuresis 20 IV Lasix twice daily  Port removal completed on 7/12/2021  Patient awaiting CARMEN today    May benefit from bronchoscopy  dw nisha.  Al zoby   Bronch if no improvement with diruresis - caution w diuresis given sepsis       A. fib RVR/electrolyte abnormalities  Rate control-achieved with beta-blocker  Supplement electrolytes  Oral anticoagulation?-not yet secondary to pancytopenia and increased risk of bleeding  EP service following-input appreciated    Supplement potassium    DVT Prophylaxis   PT/OT  Discharge planning once work-up is completed            Darlin Motley MD  1:08 PM  7/13/2021

## 2021-07-13 NOTE — PROGRESS NOTES
[facility]  Hematology/Oncology  Consult      Patient Name: Reyna Ramirez  YOB: 1950  PCP: Kae Rodrigez DO   Referring Provider:      Reason for Consultation:   Chief Complaint   Patient presents with    Hematuria     PT STATES HE IS ANEMIC AND STATES HE HAS BEEN URINATING BLOOD FOR 1 WEEK. PT REPORTS FEELNG WEAKNESS      Subjective:  Feels ok today. States he has no acute complaints. History of Present Illness:  67-year-old man with past medical history of aggressive bulky diffuse large B-cell lymphoma, non-germinal cell type with negative FISH interface for double hit or triple hit lymphoma with bulky intra-abdominal disease on presentation. He has been on combination chemotherapy with R-CHOP along with Revlimid. This has been complicated by significant pancytopenia is despite G-CSF prophylaxis. He has completed 1 week ago cycle #5 of R-CHOP/Revlimid  He was hospitalized to the emergency room with weakness fatigue and dizziness  He also reports gross hematuria a few days duration and has been on anticoagulation with Xarelto  Patient seen by cardiology and EPS for his arrhythmias.   Xarelto held because of thrombocytopenia and concurrent hematuria and will be resumed when thrombocytopenia improves and hematuria resolves    His CBC today shows a hemoglobin of 7.1  Platelets remain low at 15 with a WBC count of 0.1 despite G-CSF prophylaxis    Diagnostic Data:     Past Medical History:   Diagnosis Date    Arthritis     KNEES HIPS BACK    BPH (benign prostatic hyperplasia)     Cancer ([facility] [address]     Coronary artery disease     Difficult airway     PT STATES [facility]    Difficult intubation 04/2017    note in care everywhere \"Clinical Summary\" 03/10/2021    History of blood transfusion     Hyperlipidemia     Hypertension     Sinus tachycardia 01/01/2002       Patient Active Problem List    Diagnosis Date Noted    Severe protein-calorie malnutrition Mother     Stroke Mother     Diabetes Father     Heart Disease Father     Brain Cancer Sister     Stroke Sister     Stroke Brother     Cancer Maternal Grandfather        Social History    TOBACCO:   reports that he quit smoking about 7 years ago. He has a 44.00 pack-year smoking history. He has never used smokeless tobacco.  ETOH:   reports previous alcohol use of about 2.0 standard drinks of alcohol per week. Home Medications  Prior to Admission medications    Medication Sig Start Date End Date Taking?  Authorizing Provider   XARELTO 20 MG TABS tablet 20 mg Daily with supper  4/4/21  Yes Historical Provider, MD   sotalol (BETAPACE) 80 MG tablet Take 0.5 tablets by mouth 2 times daily 4/3/21  Yes Jaky Schneider MD   lisinopril-hydroCHLOROthiazide (PRINZIDE;ZESTORETIC) 10-12.5 MG per tablet Take 1 tablet by mouth daily    Yes Historical Provider, MD   amLODIPine (NORVASC) 5 MG tablet Take 5 mg by mouth daily    Yes Historical Provider, MD   vitamin B-12 (CYANOCOBALAMIN) 100 MCG tablet Take 50 mcg by mouth daily   Yes Historical Provider, MD   sennosides-docusate sodium (SENOKOT-S) 8.6-50 MG tablet Take 1 tablet by mouth 2 times daily   Yes Historical Provider, MD   tamsulosin (FLOMAX) 0.4 MG capsule Take 0.4 mg by mouth daily  1/4/18  Yes Historical Provider, MD   ferrous sulfate 325 (65 Fe) MG tablet Take 325 mg by mouth 2 times daily  1/8/18  Yes Historical Provider, MD   esomeprazole Magnesium (NEXIUM) 20 MG PACK Take 20 mg by mouth daily   Yes Historical Provider, MD   PRAVASTATIN SODIUM PO Take 20 mg by mouth daily    Yes Historical Provider, MD   Albuterol Sulfate (PROAIR HFA IN) Inhale into the lungs    Historical Provider, MD   ondansetron (ZOFRAN ODT) 4 MG disintegrating tablet Take 1 tablet by mouth every 8 hours as needed for Nausea or Vomiting 3/13/21   Maribel Barbosa MD       Allergies  No Known Allergies    Review of Systems: Relevant for fatigue, weakness, dizziness, lightheadedness and hematuria. Confused today.  Constitutional:  No fever chills or rigors.  Eyes: No changes in vision, discharge, or pain   ENT: No Headaches, hearing loss or vertigo. No mouth sores or sore throat. No change in taste or smell.  Cardiovascular: No chest discomfort, dyspnea on exertion, palpitations or loss of consciousness. or phlebitis.  Respiratory: Has no cough or wheezing, Has no sputum production. Has no hemoptysis, Has no pleuritic pain, .  Gastrointestinal: No abdominal pain, appetite loss, blood in stools. No change in bowel habits. No hematemesis    Genitourinary: Patient acknowledges no dysuria, trouble voiding, or hematuria. No nocturia or increased frequency.  Musculoskeletal: No gait disturbance, weakness or joint complaints.  Integumentary: No rash or pruritis.  Neurological: No headache, diplopia, change in muscle strength, numbness or tingling. No change in gait, balance, coordination, mood, affect, memory, mentation, behavior.  Psychiatric: No anxiety, or depression.  Endocrine: No temperature intolerance. No excessive thirst, fluid intake, or urination. No tremor.  Hematologic/Lymphatic: No abnormal bruising or bleeding, blood clots or swollen lymph nodes.  Allergic/Immunologic: No nasal congestion or hives. Objective  /75   Pulse 97   Temp 98.6 °F (37 °C) (Temporal)   Resp 18   Ht 5' 7\" (1.702 m)   Wt 172 lb 8 oz (78.2 kg)   SpO2 97%   BMI 27.02 kg/m²     Physical Exam:    General: Patient was confused when I went to examine the patient. He could not tell me where he was and was having trouble finding words. No focal weakness numbness. No headaches. He has been afebrile. Head and neck : PERRLA, EOMI . Sclera non icteric. Oropharynx : Clear  Neck: no JVD,  no adenopathy, no carotid bruit  Heart: Irregular  Lungs: Clear to auscultation   Extremities: No edema,no cyanosis, no clubbing.    Abdomen: Soft, non-tender;no masses, no organomegaly  Skin:  No rash. Neurologic:Cranial nerves grossly intact. No focal motor or sensory deficits . Recent Laboratory Data-   Lab Results   Component Value Date    WBC 7.1 07/12/2021    HGB 9.1 (L) 07/12/2021    HCT 28.9 (L) 07/12/2021    MCV 94.8 07/12/2021    PLT 63 (L) 07/12/2021    LYMPHOPCT 43.0 (H) 07/08/2021    RBC 3.05 (L) 07/12/2021    MCH 29.8 07/12/2021    MCHC 31.5 (L) 07/12/2021    RDW 15.9 (H) 07/12/2021    NEUTOPHILPCT 33.0 (L) 07/08/2021    MONOPCT 19.0 (H) 07/08/2021    BASOPCT 0.0 07/08/2021    NEUTROABS 0.07 (L) 07/08/2021    LYMPHSABS 0.09 (L) 07/08/2021    MONOSABS 0.04 (L) 07/08/2021    EOSABS 0.00 (L) 07/08/2021    BASOSABS 0.00 07/08/2021       Lab Results   Component Value Date     07/12/2021    K 3.5 07/12/2021     07/12/2021    CO2 35 (H) 07/12/2021    BUN 19 07/12/2021    CREATININE 0.7 07/12/2021    GLUCOSE 107 (H) 07/12/2021    CALCIUM 9.0 07/12/2021    PROT 5.5 (L) 07/06/2021    LABALBU 3.2 (L) 07/06/2021    BILITOT 1.5 (H) 07/06/2021    ALKPHOS 66 07/06/2021    AST 11 07/06/2021    ALT 19 07/06/2021    LABGLOM >60 07/12/2021    GFRAA >60 07/12/2021       Lab Results   Component Value Date    IRON 38 (L) 02/16/2021    TIBC 248 (L) 02/16/2021           Radiology-    XR CHEST PORTABLE    Result Date: 7/5/2021  EXAMINATION: ONE XRAY VIEW OF THE CHEST 7/5/2021 6:31 pm COMPARISON: April 1, 2021 HISTORY: ORDERING SYSTEM PROVIDED HISTORY: dizziness TECHNOLOGIST PROVIDED HISTORY: If in ER room at the time of exam, OK to change to portable 1 view Reason for exam:->dizziness What reading provider will be dictating this exam?->CRC FINDINGS: Left pacemaker. Median sternotomy wires are present. The heart is normal in size. There is prominence of pulmonary vasculature. Interstitial and hazy opacities are present in the lung bases. No obvious pleural effusion. No pneumothorax. Right MediPort present.      Interstitial prominence bilaterally related to pulmonary vascular congestion with pneumonia or subsegmental atelectasis in lung bases. PET CT SKULL BASE TO MID THIGH    Result Date: 6/21/2021  EXAMINATION: Skull base to midthigh PET/CT 6/18/2021 TECHNIQUE: Following IV injection of 15.5 mCi of F 18 -FDG, PET  tumor imaging was acquired from the base of the skull to the mid thighs. Computed tomography was used for purposes of attenuation correction and anatomic localization. Fusion imaging was utilized for interpretation. Uptake time 52 mins. Glucose level 100 mg/dl. COMPARISON: CT abdomen pelvis dated 03/29/2021, CT neck dated 06/01/2021 HISTORY: ORDERING SYSTEM PROVIDED HISTORY: Diffuse large B-cell lymphoma of lymph nodes of multiple sites [facility]) TECHNOLOGIST PROVIDED HISTORY: Reason for exam:->Diffuse large B-cell lymphoma of lymph nodes of multiple sites [facility]) What reading provider will be dictating this exam?->CRC FINDINGS: HEAD/NECK: In the craniocervical soft tissues, physiologic activity is favored. Bilateral carotid artery calcification. CHEST: Port is seen in the right chest wall. Pacemaker in the left chest wall. Status post median sternotomy. Within the mediastinum, no kelley activity markedly greater than mediastinal background. No axillary adenopathy. Soft tissue activity about the shoulders bilaterally, typically inflammatory. Bilateral gynecomastia. Emphysema. Small bilateral pleural effusions. Scattered ground-glass opacity, likely atelectasis. No pneumothorax. The pleuroparenchymal nodule at the left lung base on the prior CT is not well seen on the current exam, potentially obscured by the pleural effusion. ABDOMEN/PELVIS: The previously demonstrated extensive abdominal and pelvic adenopathy as well as small bowel nodularity seen on the prior CT is not observed on current. No hypermetabolic adenopathy is seen. Right nephroureteral stent is demonstrated. Excretion is seen from the kidneys bilaterally and activity is seen within the urinary bladder. Bowel activity seen which can be physiologically variable, including at the distal rectum. Calcification of the abdominal aorta and iliac arteries. Diverticulosis. BONES/SOFT TISSUE: Diffuse activity is seen throughout regional bone marrow, relatively symmetrically. With regard to patient's history of lymphoma, no hypermetabolic adenopathy is seen, compatible with favorable treatment response. Diffuse symmetric activity throughout regional bone marrow, which can be seen in the setting of recent chemotherapy, colony-stimulating factor usage, or anemia. Small bilateral pleural effusions. ASSESSMENT/PLAN : 73-year-old man    High-grade aggressive diffuse large B-cell lymphoma, non-germinal cell type, ABC type status post cycle #5 of chemotherapy with R-CHOP/Revlimid. Double hit/triple hit lymphoma have been ruled out    Pancytopenia secondary to chemotherapy  Hematuria related to thrombocytopenia and the fact that he is anticoagulated with Xarelto for his chronic A. Fib    Appreciate cardiology and EPS input    Hold Xarelto  It may be resumed when gross hematuria resolves and platelet count improves above 50    Initiate G-CSF for persistent severe neutropenia despite Neulasta prophylaxis  Monitor for potential neutropenic fevers  Transfuse with 1 unit of packed RBC because of fatigue and cardiovascular compromise and 1 unit of platelet because of his gross hematuria and thrombocytopenia  We will follow    7/7/2021. Patient was confused when I went to examine the patient. He could not tell me where he was and was having trouble finding words. No focal weakness numbness. Neuro exam was non focal. No headaches. He has been afebrile. Will r/o sepsis. Blood cultures chest xray ct head without contrast requested. Profound neutropenia > 7 days since chemo. Continue Granix. Will start prophylactic Levaquin after cultures are drawn. Platelets 41J. No active bleeding bruising.  Xarelto has been held for

## 2021-07-13 NOTE — PROGRESS NOTES
Comprehensive Nutrition Assessment    Type and Reason for Visit:  Reassess    Nutrition Recommendations/Plan: Advance diet when medically appropriate. When diet advances, will re-start Ensure High Protein supplement BID and Boost Pudding daily to help meet increased nutritional needs d/t decreased po intake of meals. Nutrition Assessment:  Pt currenly NPO for planned CARMEN ; pt has been averaging ~50% of meals served since admission ; pt remains severely malnourished ; noted sepsis and pancytopenia ; possible bronchoscopy ; s/p port removal 7/12 ; hx of lymphoma w/ current chemotherapy ; adm w/ hematuria ; s/p hemicolectomy 3/11/21 ; hx of CKD/CAD/esophageal stricture ; will re-start ONS when diet advances    Malnutrition Assessment:  Malnutrition Status:  Severe malnutrition    Context:  Chronic Illness     Findings of the 6 clinical characteristics of malnutrition:  Energy Intake:  7 - 75% or less estimated energy requirements for 1 month or longer  Weight Loss:  7 - 10% over 6 months (11% in 4 months)     Body Fat Loss:   (moderate) Triceps, Orbital   Muscle Mass Loss:   (moderate) Temples (temporalis), Clavicles (pectoralis & deltoids)  Fluid Accumulation:  No significant fluid accumulation     Strength:  Not Performed    Estimated Daily Nutrient Needs:  Energy (kcal):  1184-9441 (REE 1500 x 1.3 SF); Weight Used for Energy Requirements:  Current     Protein (g):   (1.3-1.5g/kg IBW);  Weight Used for Protein Requirements:  Ideal        Fluid (ml/day):  8682-8616; Method Used for Fluid Requirements:  1 ml/kcal      Nutrition Related Findings:  -I&Os (-5.3 L), trace edema, active BS, missing teeth, A&O x 4, decreased appetite, muscle/fat wasting, fatigue      Wounds:  Surgical Incision (Incision x 1)       Current Nutrition Therapies:    Diet NPO    Anthropometric Measures:  · Height: 5' 7\" (170.2 cm)  · Current Body Weight: 172 lb (78 kg) (7/13, standing scale)   · Admission Body Weight: 195 lb

## 2021-07-13 NOTE — ANESTHESIA PRE PROCEDURE
Department of Anesthesiology  Preprocedure Note       Name:  Ghazala Garcia   Age:  80 y.o.  :  1950                                          MRN:  [id number]         Date:  2021      Surgeon: * Surgery not found *    Procedure: CARMEN    Medications prior to admission:   Prior to Admission medications    Medication Sig Start Date End Date Taking? Authorizing Provider   Albuterol Sulfate (PROAIR HFA IN) Inhale into the lungs    Historical Provider, MD   XARELTO 20 MG TABS tablet 20 mg Daily with supper  21   Historical Provider, MD   sotalol (BETAPACE) 80 MG tablet Take 0.5 tablets by mouth 2 times daily 4/3/21   Rosey Richards MD   ondansetron (ZOFRAN ODT) 4 MG disintegrating tablet Take 1 tablet by mouth every 8 hours as needed for Nausea or Vomiting 3/13/21   Marina Beth MD   lisinopril-hydroCHLOROthiazide (PRINZIDE;ZESTORETIC) 10-12.5 MG per tablet Take 1 tablet by mouth daily     Historical Provider, MD   amLODIPine (NORVASC) 5 MG tablet Take 5 mg by mouth daily     Historical Provider, MD   vitamin B-12 (CYANOCOBALAMIN) 100 MCG tablet Take 50 mcg by mouth daily    Historical Provider, MD   sennosides-docusate sodium (SENOKOT-S) 8.6-50 MG tablet Take 1 tablet by mouth 2 times daily    Historical Provider, MD   tamsulosin (FLOMAX) 0.4 MG capsule Take 0.4 mg by mouth daily  18   Historical Provider, MD   ferrous sulfate 325 (65 Fe) MG tablet Take 325 mg by mouth 2 times daily  18   Historical Provider, MD   esomeprazole Magnesium (NEXIUM) 20 MG PACK Take 20 mg by mouth daily    Historical Provider, MD   PRAVASTATIN SODIUM PO Take 20 mg by mouth daily     Historical Provider, MD       Current medications:    No current facility-administered medications for this visit. No current outpatient medications on file.      Facility-Administered Medications Ordered in Other Visits   Medication Dose Route Frequency Provider Last Rate Last Admin    0.9 % sodium chloride infusion Intravenous PRN Ivette Diego MD        potassium chloride (KLOR-CON M) extended release tablet 20 mEq  20 mEq Oral BID WC Ivette Diego MD   20 mEq at 07/13/21 0833    0.9 % sodium chloride infusion   Intravenous PRN Ivette Diego MD        0.9 % sodium chloride infusion   Intravenous PRN Ivette Diego MD        furosemide (LASIX) injection 20 mg  20 mg Intravenous BID Ivette Diego MD   20 mg at 07/13/21 0833    meropenem (MERREM) 1,000 mg in sodium chloride 0.9 % 100 mL IVPB (mini-bag)  1,000 mg Intravenous Q8H Ivette Diego MD 33.3 mL/hr at 07/13/21 1600 1,000 mg at 07/13/21 1600    metoprolol succinate (TOPROL XL) extended release tablet 50 mg  50 mg Oral BID Ivette Diego MD   50 mg at 07/13/21 0833    nystatin (MYCOSTATIN) 170517 UNIT/ML suspension 500,000 Units  5 mL Oral 4x Daily Ivette Diego MD   500,000 Units at 07/13/21 1401    metoprolol (LOPRESSOR) injection 2.5 mg  2.5 mg Intravenous Q6H PRN Ivette Diego MD   2.5 mg at 07/09/21 1823    lisinopril (PRINIVIL;ZESTRIL) tablet 10 mg  10 mg Oral Daily Ivette Diego MD   10 mg at 07/13/21 0832    potassium chloride (KLOR-CON M) extended release tablet 40 mEq  40 mEq Oral PRN Ivette Diego MD   40 mEq at 07/07/21 0839    Or    potassium bicarb-citric acid (EFFER-K) effervescent tablet 40 mEq  40 mEq Oral PRN Ivette Diego MD   40 mEq at 07/09/21 0103    Or    potassium chloride 10 mEq/100 mL IVPB (Peripheral Line)  10 mEq Intravenous PRN Ivette Diego  mL/hr at 07/11/21 1617 10 mEq at 07/11/21 1617    albuterol (PROVENTIL) nebulizer solution 2.5 mg  2.5 mg Nebulization 4x daily Ivette Diego MD   2.5 mg at 07/13/21 1614    amLODIPine (NORVASC) tablet 5 mg  5 mg Oral Daily Ivette Diego MD   5 mg at 07/13/21 7657    sodium chloride flush 0.9 % injection 5-40 mL  5-40 mL Intravenous 2 times per day Ivette Diego MD   10 mL at 07/13/21 1124    sodium chloride flush 0.9 % injection 5-40 mL  5-40 mL Intravenous PRN Maverick Simmons MD   10 mL at 07/13/21 0044    0.9 % sodium chloride infusion  25 mL Intravenous PRN Maverick Simmons MD        polyethylene glycol [facility]) packet 17 g  17 g Oral Daily PRN Maverick Simmons MD        acetaminophen (TYLENOL) tablet 650 mg  650 mg Oral Q6H PRN Maverick Simmons MD   650 mg at 07/06/21 0057    Or    acetaminophen (TYLENOL) suppository 650 mg  650 mg Rectal Q6H PRN Maverick Simmons MD        famotidine (PEPCID) tablet 20 mg  20 mg Oral BID Maverick Simmons MD   20 mg at 07/13/21 7860    promethazine (PHENERGAN) tablet 12.5 mg  12.5 mg Oral Q6H PRN Maverick Simmons MD           Allergies:  No Known Allergies    Problem List:    Patient Active Problem List   Diagnosis Code    Hematuria R31.9    BPH (benign prostatic hyperplasia) N40.0    SUNNY (acute kidney injury) ([facility] [address] N17.9    Colonic mass K63.89    Hypertension I10    Hyperlipidemia E78.5    Coronary artery disease I25.10    Prolonged Q-T interval on ECG R94.31    Hypotension I95.9    CKD (chronic kidney disease) stage 3, GFR 30-59 ml/min (HCC) N18.30    Paroxysmal atrial fibrillation (HCC) I48.0    Diffuse large B-cell lymphoma (HCC) C83.30    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    Severe protein-calorie malnutrition ([address] E43    Anemia D64.9    Thrombocytopenia ([address] D69.6       Past Medical History:        Diagnosis Date    Arthritis     KNEES HIPS BACK    BPH (benign prostatic hyperplasia)     Cancer ([address]     Coronary artery disease     Difficult airway     PT STATES HE [facility] T[facility] THIS TW[facility]    Difficult intubation 04/2017    note in care everywhere \"Clinical Summary\" 03/10/2021    History of blood transfusion     Hyperlipidemia     Hypertension     Sinus tachycardia 01/01/2002       Past Surgical History:        Procedure Laterality Date    ABLATION OF DYSRHYTHMIC FOCUS      AORTA SURGERY      aortic valve replacement    AORTIC VALVE REPLACEMENT      BLADDER SURGERY Right 2021    CYSTOSCOPY BILATERAL RETROGRADE PYELOGRAM RIGHT STENT INSERTION performed by Parmjit Wolf MD at [address] COLONOSCOPY  2021    COLONOSCOPY WITH BIOPSY performed by Steve Aguilar DO at [address]  2021    COLONOSCOPY W/ ENDOSCOPIC MUCOSAL RESECTION performed by Steve Aguilar DO at [address] N/A 3/11/2021    LAPAROSCOPIC RIGHT HEMICOLECTOMY performed by Deb Martines MD at [address] OTHER SURGICAL HISTORY  2016    CT Myelogram, Dr. Ryan Vasquez   new battery    last checked Dr Erika Johnson 2016?  PORT SURGERY Right 2021    MEDI PORT INSERTION performed by Deb Martines MD at [facility] 2021    PORT REMOVAL performed by Joshua Pelayo MD at [address]  [facility] ENDOSCOPY      reflux    UPPER GASTROINTESTINAL ENDOSCOPY N/A 2021    EGD BIOPSY performed by Rolf Gonsalez MD at [address] History:    Social History     Tobacco Use    Smoking status: Former Smoker     Packs/day: 1.00     Years: 44.00     Pack years: 44.00     Quit date: 3/4/2014     Years since quittin.3    Smokeless tobacco: Never Used   Substance Use Topics    Alcohol use: Not Currently     Alcohol/week: 2.0 standard drinks     Types: 2 Shots of liquor per week     Comment: monthly; no caffeine                                Counseling given: Not Answered      Vital Signs (Current): There were no vitals filed for this visit.                                            BP Readings from Last 3 Encounters:   21 (!) 119/58   21 (!) 91/57   21 (!) 114/59       NPO Status:  2200 21                                                                               BMI:   Wt Readings from Last 3 Encounters: 07/13/21 172 lb 8 oz (78.2 kg)   06/15/21 210 lb (95.3 kg)   03/31/21 200 lb (90.7 kg)     There is no height or weight on file to calculate BMI.    CBC:   Lab Results   Component Value Date    WBC 5.8 07/13/2021    RBC 2.92 07/13/2021    HGB 8.7 07/13/2021    HCT 27.8 07/13/2021    MCV 95.2 07/13/2021    RDW 15.8 07/13/2021     07/13/2021       CMP:   Lab Results   Component Value Date     07/12/2021    K 3.5 07/12/2021     07/12/2021    CO2 35 07/12/2021    BUN 19 07/12/2021    CREATININE 0.7 07/12/2021    GFRAA >60 07/12/2021    LABGLOM >60 07/12/2021    GLUCOSE 107 07/12/2021    GLUCOSE 100 01/28/2011    PROT 5.5 07/06/2021    CALCIUM 9.0 07/12/2021    BILITOT 1.5 07/06/2021    ALKPHOS 66 07/06/2021    AST 11 07/06/2021    ALT 19 07/06/2021       POC Tests: No results for input(s): POCGLU, POCNA, POCK, POCCL, POCBUN, POCHEMO, POCHCT in the last 72 hours.     Coags:   Lab Results   Component Value Date    PROTIME 12.8 07/06/2021    INR 1.2 07/06/2021    APTT 31.5 07/06/2021       HCG (If Applicable): No results found for: PREGTESTUR, PREGSERUM, HCG, HCGQUANT     ABGs: No results found for: PHART, PO2ART, KMK6XAQ, ZMS3CCK, BEART, J9BHKCQY     Type & Screen (If Applicable):  No results found for: LABABO, LABRH    Drug/Infectious Status (If Applicable):  No results found for: HIV, HEPCAB    COVID-19 Screening (If Applicable):   Lab Results   Component Value Date    COVID19 Not Detected 03/05/2021       EKG:   3/30/2021  4:53 PM - Bhupendra, Mhy Incoming Ekg Results From [facility]    Component Value Ref Range & Units Status Collected Lab   Ventricular Rate 76  BPM Final 03/30/2021  9:30 AM [facility]   Atrial Rate 76  BPM Final 03/30/2021  9:30 AM [facility]   P-R Interval 146  ms Final 03/30/2021  9:30 AM [facility]   QRS Duration 78  ms Final 03/30/2021  9:30 AM [facility]   Q-T Interval 420  ms Final 03/30/2021  9:30 AM [facility]   QTc Calculation (Bazett) 472  ms Final 03/30/2021  9:30 AM [facility]   P Normal 89 degrees Final 03/30/2021  9:30 AM [facility]   R [place] 86  degrees Final 03/30/2021  9:30 AM [facility]   T [place] 62  degrees Final 03/30/2021  9:30 AM [facility]   Testing Performed By    Lab - [address] Name Director Address Valid Date Range   360-[facility] MUSE Unknown Unknown 04/18/16 0721-Present   Narrative & Impression    Sinus rhythm with premature supraventricular complexes  Abnormal ECG  When compared with ECG of 29-MAR-2021 20:34,  No significant change was found  Confirmed by Sarah Avendaño ([id number]) on 3/30/2021 4:53:45 PM         Anesthesia Evaluation  Patient summary reviewed and Nursing notes reviewed   history of anesthetic complications: difficult airway. Airway: Mallampati: II  TM distance: >3 FB   Neck ROM: limited  Mouth opening: > = 3 FB Dental:          Pulmonary:   (+) decreased breath sounds,      (-) not a current smoker          Patient did not smoke on day of surgery. Cardiovascular:  Exercise tolerance: poor (<4 METS),   (+) hypertension:, CAD:, CABG/stent (in 2017 3 vessel, 1 stent, ):, dysrhythmias (history of afib ): atrial fibrillation, murmur, hyperlipidemia      ECG reviewed  Rhythm: regular  Rate: normal           Beta Blocker:  Dose within 24 Hrs      ROS comment: Ablation done      Neuro/Psych:   Negative Neuro/Psych ROS              GI/Hepatic/Renal:   (+) GERD: well controlled, renal disease (recent stent): CRI,           Endo/Other:    (+) blood dyscrasia (HGB 8.7): anemia, arthritis: OA., malignancy/cancer. Pt had no PAT visit       Abdominal:             Vascular: negative vascular ROS. Other Findings:        ECHO 4/1/21   Summary   Normal left ventricle size and systolic function. Ejection fraction is visually estimated at 65-70%. Septal motion consistent with post bypass surgery. Normal left ventricle wall thickness. Indeterminate diastolic function. Left atrium was not clearly visualized.    Normal right ventricular size and function. 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 Physiologic and/or trace tricuspid regurgitation. Physiologic and/or trace tricuspid regurgitation. RVSP is 39 mmHg. Top normal PA systolic pressure. No evidence for hemodynamically significant pericardial effusion. Signature      ----------------------------------------------------------------   Electronically signed by Renan Martinez MD(Interpreting   physician) on 04/01/2021 02:12 PM   ----------------------------------------------------------------     CT Chest 7/8/21  Impression   COPD with multifocal patchy and masslike infiltrates in the left lower lobe   as noted concerning for pneumonia.  Underlying malignancy is not excluded and   close surveillance with repeat CT scan in 6-8 weeks after appropriate   treatment is recommended to see complete resolution and exclude malignancy.       CT abdomen and pelvis.       Lack of contrast limits the study.  Liver is of grossly normal architecture.    Gallbladder is distended without acute inflammation.  Spleen, pancreas, the   adrenals and the kidneys are normal.  There is a right ureteral stent with   significant improvement in the right hydronephrosis.       There is significant improvement and near resolution of the adenopathy in the   abdomen pelvis with small lymph nodes measuring up to 8 mm in the portacaval   region and smaller ones in the retroperitoneum.  Moderate mesenteric lymph   nodes are noted measuring up to 9 mm which are significantly improved.  There   is calcification aorta and mild ectasia of the iliac arteries measuring 1.4   cm each.  Degenerative changes are identified in the lumbar spine.       Pelvis.  The bladder is distended.  Patient is status post partial right   hemicolectomy with the retained fluid and fecal matter in the left hemicolon   likely diarrhea.       Impression       Significant improvement and near resolution of the lymphadenopathy in the   retroperitoneum and significant improvement in the mesenteric adenopathy   compatible with the favorable response to treatment for lymphoma.  Continued   surveillance is recommended.       Status post right hemicolectomy with the dilated fluid-filled left hemicolon   likely diarrhea.           Order History    Open Order Details     CT ABD/Pelvis 7/8/21  COPD with multifocal patchy and masslike infiltrates in the left lower lobe   as noted concerning for pneumonia.  Underlying malignancy is not excluded and   close surveillance with repeat CT scan in 6-8 weeks after appropriate   treatment is recommended to see complete resolution and exclude malignancy.       CT abdomen and pelvis.       Lack of contrast limits the study.  Liver is of grossly normal architecture.    Gallbladder is distended without acute inflammation.  Spleen, pancreas, the   adrenals and the kidneys are normal.  There is a right ureteral stent with   significant improvement in the right hydronephrosis.       There is significant improvement and near resolution of the adenopathy in the   abdomen pelvis with small lymph nodes measuring up to 8 mm in the portacaval   region and smaller ones in the retroperitoneum.  Moderate mesenteric lymph   nodes are noted measuring up to 9 mm which are significantly improved.  There   is calcification aorta and mild ectasia of the iliac arteries measuring 1.4   cm each.  Degenerative changes are identified in the lumbar spine.       Pelvis.  The bladder is distended.  Patient is status post partial right   hemicolectomy with the retained fluid and fecal matter in the left hemicolon   likely diarrhea.       Impression       Significant improvement and near resolution of the lymphadenopathy in the   retroperitoneum and significant improvement in the mesenteric adenopathy   compatible with the favorable response to treatment for lymphoma.  Continued   surveillance is recommended.       Status post right hemicolectomy with the dilated fluid-filled left hemicolon   likely diarrhea.                Anesthesia Plan      MAC     ASA 4       Induction: intravenous. Anesthetic plan and risks discussed with patient. Plan discussed with CRNA.                   Haresh Acevedo MD   7/13/2021

## 2021-07-13 NOTE — PROGRESS NOTES
[address] and Vascular [address] [facility] Electrophysiology  Progress Note   PATIENT: Kristy Lambert  MEDICAL RECORD NUMBER: [id number]  DATE OF SERVICE:  7/13/2021  ATTENDING ELECTROPHYSIOLOGIST: Jorge Kumar DO   PRIMARY ELECTROPHYSIOLOGIST: ([facility]) Kiesha Joseph MD   REFERRING PHYSICIAN: Sharyne Burkitt PA and Mare Obrien DO  CHIEF COMPLAINT: Dizzy with weakness and fatigue     Subjective: Kristy Lambert is a 80 y.o. male with a history of SND S/P St Pramod D- PPM (implant: 2002; generator change: 2014; extraction of RA & RV leads due to noise and inappropriate sensing and implant of new device: 12/17/20), nonvalvular atrial fibrillation sp surgical MAZE and LAAO (8/12/16); CAD s/p CABG (8/12/16: LIMA-LAD), MR sp MVR (8/12/16), AI sp AVR (8/12/16), bladder tumor sp ureteral stent, diffuse large B cell lymphoma, and esophageal stricture. He presented on 7/5/21 with chief complaint of weakness and fatigue, as well as hematuria. He had ureteral stent a few months ago. He was noted to be pancytopenic. He was noted to have episodes of ventricular pacing v NSVT, so EP service was consulted by admitting physician. Blood cultures grew GNR, he has been given IV Zosyn and Vancomycin with an improvement in his mental status and ID has been consulted. He complains of difficulty swallowing at this time. He awaits Mediport removal.     7/11/21 : he is frustrated and tired of waiting for procedures. No chest pain or sob    7/12/21 : He denies any chest pain, sob, dizziness, syncope.     7/13/21 : Mediport removal 7/12/21    Past Medical History:   Diagnosis Date    Arthritis     KNEES HIPS BACK    BPH (benign prostatic hyperplasia)     Cancer ([facility] [address]     Coronary artery disease     Difficult airway     PT STATES HE WAS TOLD THIS TWICE AT [facility]    Difficult intubation 04/2017    note in care everywhere \"Clinical Summary\" 03/10/2021    History of blood transfusion     Hyperlipidemia     Hypertension     Sinus tachycardia 2002      Past Surgical History:   Procedure Laterality Date    ABLATION OF DYSRHYTHMIC FOCUS      AORTA SURGERY      aortic valve replacement    AORTIC VALVE REPLACEMENT      BLADDER SURGERY Right 2021    CYSTOSCOPY BILATERAL RETROGRADE PYELOGRAM RIGHT STENT INSERTION performed by Erin Berg MD at [address] COLONOSCOPY  2021    COLONOSCOPY WITH BIOPSY performed by Berkley Jimenez DO at [address]  2021    COLONOSCOPY W/ ENDOSCOPIC MUCOSAL RESECTION performed by Berkley Jimenez DO at [address] N/A 3/11/2021    LAPAROSCOPIC RIGHT HEMICOLECTOMY performed by Ulysses Silva, MD at [address] OTHER SURGICAL HISTORY  2016    CT Myelogram, Dr. Celine Bethea   new battery    last checked Dr Jamaal Gross 2016?     PORT SURGERY Right 2021    MEDI PORT INSERTION performed by Ulysses Silva, MD at [facility] Right 2021    PORT REMOVAL performed by Waqar Shannon MD at [address]  [facility] ENDOSCOPY      reflux    UPPER GASTROINTESTINAL ENDOSCOPY N/A 2021    EGD BIOPSY performed by Nina Chawla MD at [address] [facility]        Family History   Problem Relation Age of Onset    Diabetes Mother     Stroke Mother     Diabetes Father     Heart Disease Father     Brain Cancer Sister     Stroke Sister     Stroke Brother     Cancer Maternal Grandfather      Social History     Tobacco Use    Smoking status: Former Smoker     Packs/day: 1.00     Years: 44.00     Pack years: 44.00     Quit date: 3/4/2014     Years since quittin.3    Smokeless tobacco: Never Used   Substance Use Topics    Alcohol use: Not Currently     Alcohol/week: 2.0 standard drinks     Types: 2 Shots of liquor per week     Comment: monthly; no caffeine     Current Facility-Administered Medications   Medication Dose Route Frequency Provider Last Rate Last Admin    0.9 % sodium chloride infusion   Intravenous PRN Salomón Hernandez MD        potassium chloride (KLOR-CON M) extended release tablet 20 mEq  20 mEq Oral BID  Salomón Hernandez MD   20 mEq at 07/13/21 0833    0.9 % sodium chloride infusion   Intravenous PRN Salomón Hernandez MD        0.9 % sodium chloride infusion   Intravenous PRN Salomón Hernandez MD        furosemide (LASIX) injection 20 mg  20 mg Intravenous BID Salomón Hernandez MD   20 mg at 07/13/21 0833    meropenem (MERREM) 1,000 mg in sodium chloride 0.9 % 100 mL IVPB (mini-bag)  1,000 mg Intravenous Q8H Salomón Hernandez MD 33.3 mL/hr at 07/13/21 0842 1,000 mg at 07/13/21 0842    metoprolol succinate (TOPROL XL) extended release tablet 50 mg  50 mg Oral BID Salomón Hernandez MD   50 mg at 07/13/21 2877    nystatin (MYCOSTATIN) 632222 UNIT/ML suspension 500,000 Units  5 mL Oral 4x Daily Salomón Hernandez MD   500,000 Units at 07/13/21 0833    metoprolol (LOPRESSOR) injection 2.5 mg  2.5 mg Intravenous Q6H PRN Salomón Hernandez MD   2.5 mg at 07/09/21 1823    lisinopril (PRINIVIL;ZESTRIL) tablet 10 mg  10 mg Oral Daily Salomón Hernandez MD   10 mg at 07/13/21 3461    potassium chloride (KLOR-CON M) extended release tablet 40 mEq  40 mEq Oral PRN Salomón Hernandez MD   40 mEq at 07/07/21 0839    Or    potassium bicarb-citric acid (EFFER-K) effervescent tablet 40 mEq  40 mEq Oral PRN Salomón Hernandez MD   40 mEq at 07/09/21 0103    Or    potassium chloride 10 mEq/100 mL IVPB (Peripheral Line)  10 mEq Intravenous PRN Salomón Hernandez  mL/hr at 07/11/21 1617 10 mEq at 07/11/21 1617    albuterol (PROVENTIL) nebulizer solution 2.5 mg  2.5 mg Nebulization 4x daily Salomón Hernandez MD   2.5 mg at 07/13/21 0916    amLODIPine (NORVASC) tablet 5 mg  5 mg Oral Daily Salomón Hernandez MD   5 mg at 07/13/21 3841    sodium chloride flush 0.9 % injection 5-40 mL  5-40 mL Intravenous 2 times per day Tamara Granado MD   10 mL at 07/12/21 2028    sodium chloride flush 0.9 % injection 5-40 mL  5-40 mL Intravenous PRN Tamara Granado MD   10 mL at 07/13/21 0044    0.9 % sodium chloride infusion  25 mL Intravenous PRN Tamara Granado MD        polyethylene glycol [facility]) packet 17 g  17 g Oral Daily PRN Tamara Granado MD        acetaminophen (TYLENOL) tablet 650 mg  650 mg Oral Q6H PRN Tamara Granado MD   650 mg at 07/06/21 0057    Or    acetaminophen (TYLENOL) suppository 650 mg  650 mg Rectal Q6H PRN Tamara Granado MD        famotidine (PEPCID) tablet 20 mg  20 mg Oral BID Tamara Granado MD   20 mg at 07/13/21 0833    promethazine (PHENERGAN) tablet 12.5 mg  12.5 mg Oral Q6H PRN Tamara Granado MD            PHYSICAL EXAM:   Vitals:    07/12/21 2330 07/13/21 0300 07/13/21 0643 07/13/21 0706   BP: (!) 114/59 120/65  126/75   Pulse: 99 95  97   Resp: 21 18  18   Temp: 98.4 °F (36.9 °C) 98.6 °F (37 °C)  98.6 °F (37 °C)   TempSrc: Temporal Temporal  Temporal   SpO2: 94% 93%  97%   Weight:   172 lb 8 oz (78.2 kg)    Height:       Constitutional: Oriented to person, place, and time. Well-developed and cooperative. Head: Normocephalic and atraumatic. Eyes: Conjunctivae are normal.  Neck: No  JVD present. Cardiovascular: S1 normal, S2 normal  A regular rhythm present. Pulmonary/Chest: Effort normal and breath sounds normal. No respiratory distress. Abdominal: Soft. Normal appearance   Musculoskeletal: Normal range of motion of all extremities, no muscle weakness. Neurological: Alert and oriented to person, place, and time. Skin: Skin is warm and dry. No bruising, no ecchymosis and no rash noted. Extremity: No clubbing or cyanosis. No edema. Psychiatric: Normal mood and affect.  Thought content normal.       Data:    Recent Labs     07/11/21  0526 07/12/21  0554   WBC 5.1 7.1   HGB 9.2* 9.1*   HCT 29.2* 28.9*   PLT 34* interrupted by paroxysms of AT, so PVARP extended to 325 msec (fixed). -Reviewed [facility] EP notes and additional changes made per their recommendations.  -Follow-up with Dr Hernando Villa ([facility] EP). 2. GNR bacteremia  -Patient has GNR on 1/1 blood culture. PCR + Enterobacteriaceae, Klebsiella pneumoniae , Pseudomonas aeruginosa, Serratia marcescens. -ID consulted. -Plan for CARMEN. Potentially 7/13/21 to assess for CIED infection (vegetation). He complains of new dysphagia. Recommend evaluation of dysphagia prior to CARMEN.  -S/P Mediport extraction     3. AT/PACs  -Continue metoprolol XL 50 mg BID. 4. Nonvalvular atrial fibrillation sp surgical MAZE and LAAO (8/12/16)  -CHADSVASC = 2 (age, CAD). Xarelto held 2/2 hematuria/pancytopenia. Resume when able. -Previously on sotalol, but discontinued due to QTc prolongation in 3/2021. AT/AF burden on device check was 1.9% and appears to be due to AT (see #2). 5.  Pancytopenia  -Management per Primary Service. 6. Dysphagia  -Management per Primary Service.  -Unclear etiology. Plan :  1. CARMEN today to assess for lead involvement pending platelet count. 64K yesterday  3. Antibiotics per ID for bacteremia  4. Line removal per surgery when safe from bleeding standpoint- tentative today    Thank you for allowing me to participate in your patient's care. Please call me if there are any questions or concerns.         Bigg Felder MD  Cardiac Electrophysiology  [facility]) Physicians  The Heart and Vascular [place]: [place] [facility]  10:23 AM  7/13/2021

## 2021-07-14 VITALS
OXYGEN SATURATION: 94 % | HEART RATE: 51 BPM | TEMPERATURE: 97.9 F | RESPIRATION RATE: 18 BRPM | BODY MASS INDEX: 26.71 KG/M2 | DIASTOLIC BLOOD PRESSURE: 57 MMHG | SYSTOLIC BLOOD PRESSURE: 95 MMHG | WEIGHT: 170.2 LBS | HEIGHT: 67 IN

## 2021-07-14 LAB
ANION GAP SERPL CALCULATED.3IONS-SCNC: 10 MMOL/L (ref 7–16)
BLOOD CULTURE, ROUTINE: ABNORMAL
BUN BLDV-MCNC: 13 MG/DL (ref 6–23)
CALCIUM SERPL-MCNC: 9.3 MG/DL (ref 8.6–10.2)
CHLORIDE BLD-SCNC: 101 MMOL/L (ref 98–107)
CO2: 32 MMOL/L (ref 22–29)
CREAT SERPL-MCNC: 0.6 MG/DL (ref 0.7–1.2)
GFR AFRICAN AMERICAN: >60
GFR NON-AFRICAN AMERICAN: >60 ML/MIN/1.73
GLUCOSE BLD-MCNC: 86 MG/DL (ref 74–99)
HCT VFR BLD CALC: 28.5 % (ref 37–54)
HEMOGLOBIN: 8.9 G/DL (ref 12.5–16.5)
MAGNESIUM: 1.9 MG/DL (ref 1.6–2.6)
MCH RBC QN AUTO: 29.8 PG (ref 26–35)
MCHC RBC AUTO-ENTMCNC: 31.2 % (ref 32–34.5)
MCV RBC AUTO: 95.3 FL (ref 80–99.9)
ORGANISM: ABNORMAL
PDW BLD-RTO: 15.7 FL (ref 11.5–15)
PLATELET # BLD: 148 E9/L (ref 130–450)
PMV BLD AUTO: 10.5 FL (ref 7–12)
POTASSIUM REFLEX MAGNESIUM: 3.4 MMOL/L (ref 3.5–5)
RBC # BLD: 2.99 E12/L (ref 3.8–5.8)
SODIUM BLD-SCNC: 143 MMOL/L (ref 132–146)
WBC # BLD: 6.7 E9/L (ref 4.5–11.5)

## 2021-07-14 PROCEDURE — 85027 COMPLETE CBC AUTOMATED: CPT

## 2021-07-14 PROCEDURE — 80048 BASIC METABOLIC PNL TOTAL CA: CPT

## 2021-07-14 PROCEDURE — 83735 ASSAY OF MAGNESIUM: CPT

## 2021-07-14 PROCEDURE — 6360000002 HC RX W HCPCS: Performed by: SURGERY

## 2021-07-14 PROCEDURE — 6370000000 HC RX 637 (ALT 250 FOR IP): Performed by: SURGERY

## 2021-07-14 PROCEDURE — 93312 ECHO TRANSESOPHAGEAL: CPT | Performed by: INTERNAL MEDICINE

## 2021-07-14 PROCEDURE — 2580000003 HC RX 258: Performed by: SURGERY

## 2021-07-14 PROCEDURE — 36415 COLL VENOUS BLD VENIPUNCTURE: CPT

## 2021-07-14 PROCEDURE — 93325 DOPPLER ECHO COLOR FLOW MAPG: CPT | Performed by: INTERNAL MEDICINE

## 2021-07-14 PROCEDURE — 94640 AIRWAY INHALATION TREATMENT: CPT

## 2021-07-14 PROCEDURE — 99233 SBSQ HOSP IP/OBS HIGH 50: CPT | Performed by: INTERNAL MEDICINE

## 2021-07-14 PROCEDURE — 93320 DOPPLER ECHO COMPLETE: CPT | Performed by: INTERNAL MEDICINE

## 2021-07-14 RX ORDER — LEVOFLOXACIN 750 MG/1
750 TABLET ORAL DAILY
Qty: 10 TABLET | Refills: 0 | Status: SHIPPED | OUTPATIENT
Start: 2021-07-14 | End: 2021-07-24

## 2021-07-14 RX ORDER — METOPROLOL SUCCINATE 50 MG/1
50 TABLET, EXTENDED RELEASE ORAL 2 TIMES DAILY
Qty: 30 TABLET | Refills: 3 | Status: ON HOLD | OUTPATIENT
Start: 2021-07-14 | End: 2022-01-18

## 2021-07-14 RX ADMIN — FUROSEMIDE 20 MG: 10 INJECTION, SOLUTION INTRAMUSCULAR; INTRAVENOUS at 08:55

## 2021-07-14 RX ADMIN — METOPROLOL SUCCINATE 50 MG: 50 TABLET, EXTENDED RELEASE ORAL at 08:55

## 2021-07-14 RX ADMIN — ALBUTEROL SULFATE 2.5 MG: 2.5 SOLUTION RESPIRATORY (INHALATION) at 13:34

## 2021-07-14 RX ADMIN — MEROPENEM 1000 MG: 1 INJECTION, POWDER, FOR SOLUTION INTRAVENOUS at 08:47

## 2021-07-14 RX ADMIN — MEROPENEM 1000 MG: 1 INJECTION, POWDER, FOR SOLUTION INTRAVENOUS at 01:41

## 2021-07-14 RX ADMIN — AMLODIPINE BESYLATE 5 MG: 5 TABLET ORAL at 08:54

## 2021-07-14 RX ADMIN — ALBUTEROL SULFATE 2.5 MG: 2.5 SOLUTION RESPIRATORY (INHALATION) at 09:53

## 2021-07-14 RX ADMIN — FAMOTIDINE 20 MG: 20 TABLET ORAL at 08:54

## 2021-07-14 RX ADMIN — Medication 10 ML: at 08:55

## 2021-07-14 RX ADMIN — LISINOPRIL 10 MG: 10 TABLET ORAL at 08:54

## 2021-07-14 RX ADMIN — POTASSIUM CHLORIDE 20 MEQ: 1500 TABLET, EXTENDED RELEASE ORAL at 08:54

## 2021-07-14 ASSESSMENT — PAIN SCALES - GENERAL: PAINLEVEL_OUTOF10: 0

## 2021-07-14 NOTE — CARE COORDINATION
SOCIAL WORK/CASEMANAGEMENT TRANSITION OF CARE [id number] [facility] [address], Narendra Cesar, -[phone]): I met with pt in the room this a.m. the plan remains home with [facility],  Need orders for cpa, med. Compliance with PT And OT. Left note for pcp and rn. Pt had mediport removed yesterday. Pt remains on iv lasix and merrem. Waiting to determine length of abx and if to be changed to oral vs stay iv for home. Sw/cm to follow. JOSE Tobin  7/14/2021  Need to call dr. Libby Augustine tomorrow for his address and fax number, he will sign [facility] orders.  JOSE Tobin  7/14/2021

## 2021-07-14 NOTE — PROGRESS NOTES
Summary\" 03/10/2021    History of blood transfusion     Hyperlipidemia     Hypertension     Sinus tachycardia 2002      Past Surgical History:   Procedure Laterality Date    ABLATION OF DYSRHYTHMIC FOCUS      AORTA SURGERY      aortic valve replacement    AORTIC VALVE REPLACEMENT      BLADDER SURGERY Right 2021    CYSTOSCOPY BILATERAL RETROGRADE PYELOGRAM RIGHT STENT INSERTION performed by Daniel Simpson MD at [address] [facility] COLONOSCOPY  2021    COLONOSCOPY WITH BIOPSY performed by Opal Farrar DO at [address]  2021    COLONOSCOPY W/ ENDOSCOPIC MUCOSAL RESECTION performed by Opal Farrar DO at [address] N/A 3/11/2021    LAPAROSCOPIC RIGHT HEMICOLECTOMY performed by Christel Ponce MD at [address] OTHER SURGICAL HISTORY  2016    CT Myelogram, Dr. Macrina Powers   new battery    last checked Dr Yari Bower 2016?     PORT SURGERY Right 2021    MEDI PORT INSERTION performed by [facility]el [facility]once MD at [facility] 2021    PORT REMOVAL performed by Tomás Suero MD at [address]  [facility] ENDOSCOPY      reflux    UPPER GASTROINTESTINAL ENDOSCOPY N/A 2021    EGD BIOPSY performed by Sisi Rogers MD at [facility]        Family History   Problem Relation Age of Onset    Diabetes Mother     Stroke Mother     Diabetes Father     Heart Disease Father     Brain Cancer Sister     Stroke Sister     Stroke Brother     Cancer Maternal Grandfather      Social History     Tobacco Use    Smoking status: Former Smoker     Packs/day: 1.00     Years: 44.00     Pack years: 44.00     Quit date: 3/4/2014     Years since quittin.3    Smokeless tobacco: Never Used   Substance Use Topics    Alcohol use: Not Currently     Alcohol/week: 2.0 standard drinks     Types: 2 Shots of liquor per week     Comment: monthly; no caffeine     Current Facility-Administered Medications   Medication Dose Route Frequency Provider Last Rate Last Admin    0.9 % sodium chloride infusion   Intravenous PRN Heidi Portillo MD        potassium chloride (KLOR-CON M) extended release tablet 20 mEq  20 mEq Oral BID WC Heidi Portillo MD   20 mEq at 07/14/21 0854    0.9 % sodium chloride infusion   Intravenous PRN Heidi Portillo MD        0.9 % sodium chloride infusion   Intravenous PRN Heidi Portillo MD        furosemide (LASIX) injection 20 mg  20 mg Intravenous BID Heidi Portillo MD   20 mg at 07/14/21 0855    meropenem (MERREM) 1,000 mg in sodium chloride 0.9 % 100 mL IVPB (mini-bag)  1,000 mg Intravenous Q8H Heidi Portillo MD   Stopped at 07/14/21 1155    metoprolol succinate (TOPROL XL) extended release tablet 50 mg  50 mg Oral BID Heidi Portillo MD   50 mg at 07/14/21 0855    nystatin (MYCOSTATIN) 659855 UNIT/ML suspension 500,000 Units  5 mL Oral 4x Daily Heidi Portillo MD   500,000 Units at 07/13/21 2028    metoprolol (LOPRESSOR) injection 2.5 mg  2.5 mg Intravenous Q6H PRN Heidi Portillo MD   2.5 mg at 07/09/21 1823    lisinopril (PRINIVIL;ZESTRIL) tablet 10 mg  10 mg Oral Daily Heidi Portillo MD   10 mg at 07/14/21 0854    potassium chloride (KLOR-CON M) extended release tablet 40 mEq  40 mEq Oral PRN Heidi Portillo MD   40 mEq at 07/07/21 0839    Or    potassium bicarb-citric acid (EFFER-K) effervescent tablet 40 mEq  40 mEq Oral PRN Heidi Portillo MD   40 mEq at 07/09/21 0103    Or    potassium chloride 10 mEq/100 mL IVPB (Peripheral Line)  10 mEq Intravenous PRN Heidi Portillo  mL/hr at 07/11/21 1617 10 mEq at 07/11/21 1617    albuterol (PROVENTIL) nebulizer solution 2.5 mg  2.5 mg Nebulization 4x daily Heidi Portillo MD   2.5 mg at 07/14/21 0953    amLODIPine (NORVASC) tablet 5 mg  5 mg Oral Daily Heidi Portillo MD   5 mg at 07/14/21 0854    sodium chloride flush 0.9 % injection 5-40 mL  5-40 mL Intravenous 2 times per day Bill Hawk MD   10 mL at 07/14/21 0855    sodium chloride flush 0.9 % injection 5-40 mL  5-40 mL Intravenous PRN Bill Hawk MD   10 mL at 07/13/21 0044    0.9 % sodium chloride infusion  25 mL Intravenous PRN Bill Hawk MD        polyethylene glycol (GLYCOLAX) packet 17 g  17 g Oral Daily PRN Bill Hawk MD        acetaminophen (TYLENOL) tablet 650 mg  650 mg Oral Q6H PRN Bill Hawk MD   650 mg at 07/06/21 0057    Or    acetaminophen (TYLENOL) suppository 650 mg  650 mg Rectal Q6H PRN Bill Hawk MD        famotidine (PEPCID) tablet 20 mg  20 mg Oral BID Bill Hawk MD   20 mg at 07/14/21 0854    promethazine (PHENERGAN) tablet 12.5 mg  12.5 mg Oral Q6H PRN Bill Hawk MD            PHYSICAL EXAM:   Vitals:    07/14/21 0010 07/14/21 0423 07/14/21 0953 07/14/21 1101   BP: (!) 106/51 (!) 98/53  110/61   Pulse: 83 93  63   Resp: 16 16  16   Temp:  97.2 °F (36.2 °C)  97.2 °F (36.2 °C)   TempSrc:  Temporal  Temporal   SpO2:  96% 97%    Weight:  170 lb 3.2 oz (77.2 kg)     Height:       Constitutional: Oriented to person, place, and time. Well-developed and cooperative. Head: Normocephalic and atraumatic. Eyes: Conjunctivae are normal.  Neck: No  JVD present. Cardiovascular: S1 normal, S2 normal  A regular rhythm present. Pulmonary/Chest: Effort normal and breath sounds normal. No respiratory distress. Abdominal: Soft. Normal appearance   Musculoskeletal: Normal range of motion of all extremities, no muscle weakness. Neurological: Alert and oriented to person, place, and time. Skin: Skin is warm and dry. No bruising, no ecchymosis and no rash noted. Extremity: No clubbing or cyanosis. No edema. Psychiatric: Normal mood and affect.  Thought content normal.       Data:    Recent Labs     07/12/21  0554 07/13/21  1147 07/14/21  0618   WBC 7.1 5.8 6.7 HGB 9.1* 8.7* 8.9*   HCT 28.9* 27.8* 28.5*   PLT 63* 115* 148     Recent Labs     07/11/21  1851 07/12/21  0554 07/14/21  0618   NA  --  143 143   K 3.6 3.5 3.4*   CL  --  100 101   CO2  --  35* 32*   BUN  --  19 13   CREATININE  --  0.7 0.6*   CALCIUM  --  9.0 9.3      Lab Results   Component Value Date    MG 1.9 07/14/2021     Telemetry: SR with PACs/PVCs with rates      Device Interrogation/Reprogramming (7/6/21)  · Make/Model: [employer] Pramod Assurity MRI 2272  · DOI: 12/17/20  · Battery: >95%  · Aleksey Bail therapy: DDD  ppm  · Pacing %: RA = 12%, RV = 6.5%  · Lead function:  · RA lead: sensing = 4.1 mV, impedance = 330 ohms, threshold = 0.5 V @ 0.5 msec  · RV lead: sensing = 10.3 mV, impedance = 430 ohms, threshold = 1.0 V @ 0.5 msec  · Lead programming:  · RA lead: sensitivity = 0.5 mV, output = 1.375 V @ 0.5 msec (AUTO)  · RV lead: sensitivity = 2.0 mV, output = 1.125 V @ 0.5 msec (AUTO)  · Arrhythmias: AT/AF burden = 1.9%, MS = 3.1%, 12 VHREs (EGM consistent with AT), PMT  · Reprogramming included: PVARP extended to 325 msec, turn off VIP and atrial cap confirm, and extend pace/sense AV delays to 250/270 msec. · Overall device function is normal  · All device programmable settings were evaluated per above and in the scanned document, along with iterative adjustments (capture thresholds) to assess and select the most appropriate final programming to provide for consistent delivery of the appropriate therapy and to verify function of the device. Prior cardiac testing:  · TTE (4/1/21):  LVEF = 65-70%, normal #29 Bicor MVR, normal #21 Trifecta bioprosthetic AVR, and RVSP ~ 39 mmHg. · Pharmacologic Nuclear Stress (12/14/20 at [facility]): LVEF = \"normal\", mild LAD territory ischemia (< 10%), no infarct, and low risk study. Assessment/Plan:   1.  SND s/p St Pramod dc PPM   - (implant: 2002; generator change: 2014; extraction of RA & RV leads due to noise and inappropriate sensing and implant of new device: 12/17/20)  -Multiple episodes of PMT. Retrograde testing interrupted by paroxysms of AT, so PVARP extended to 325 msec (fixed). -Reviewed [facility] EP notes and additional changes made per their recommendations.  -Follow-up with Dr Eloina Alex ([facility] EP). 2. GNR bacteremia  -Patient has GNR on 1/1 blood culture. PCR + Enterobacteriaceae, Klebsiella pneumoniae , Pseudomonas aeruginosa, Serratia marcescens. -ID consulted. -Plan for CARMEN. - No endocarditis  -S/P Mediport extraction     3. AT/PACs  -Continue metoprolol XL 50 mg BID. 4. Nonvalvular atrial fibrillation sp surgical MAZE and LAAO (8/12/16)  -CHADSVASC = 2 (age, CAD). Xarelto held 2/2 hematuria/pancytopenia. Resume when able. -Previously on sotalol, but discontinued due to QTc prolongation in 3/2021. AT/AF burden on device check was 1.9% and appears to be due to AT (see #2). 5.  Pancytopenia  -Management per Primary Service. 6. Dysphagia  -Management per Primary Service.  -Unclear etiology. Plan :  1. CARMEN neg for endocarditis  2. Antibiotics per ID for bacteremia  3. Line removal per surgery done already - likely source  4. Pacemaker programming has been optimized    Pls call if any questions. I will sign off      Thank you for allowing me to participate in your patient's care. Please call me if there are any questions or concerns.         Dottie Ring MD  Cardiac Electrophysiology  [facility]) Physicians  The Heart and Vascular [place]: [place] [facility]  12:15 PM  7/14/2021

## 2021-07-14 NOTE — DISCHARGE INSTR - COC
Continuity of Care Form    Patient Name: Deysi Ying   :  1950  MRN:  [id number]    Admit date:  2021  Discharge date:  ***    Code Status Order: Full Code   Advance Directives:     Admitting Physician:  Dayton Singh MD  PCP: Thalia Mays DO    Discharging Nurse: [facility] Unit/Room#: 7125/5163-Y  Discharging Unit Phone Number: ***    Emergency Contact:   Extended Emergency Contact Information  Primary Emergency Contact: Izabel Merrill  Address: [address] [place] [address] Phone: [phone]  Relation: Other  Secondary Emergency Contact: [address]  Mobile Phone: [phone]  Relation: Child    Past Surgical History:  Past Surgical History:   Procedure Laterality Date    ABLATION OF DYSRHYTHMIC FOCUS      AORTA SURGERY      aortic valve replacement    AORTIC VALVE REPLACEMENT      BLADDER SURGERY Right 2021    CYSTOSCOPY BILATERAL RETROGRADE PYELOGRAM RIGHT STENT INSERTION performed by Maritza John MD at [facility] COLONOSCOPY  2021    COLONOSCOPY WITH BIOPSY performed by Cathy Sahni DO at [address] [facility]  2021    COLONOSCOPY W/ ENDOSCOPIC MUCOSAL RESECTION performed by Cathy Sahni DO at [address] 3/11/2021    LAPAROSCOPIC RIGHT HEMICOLECTOMY performed by Deborah Mack MD at [address] OTHER SURGICAL HISTORY  2016    CT Myelogram, Dr. Ann Brenner  2014 new battery    last checked Dr Carol Browne 2016?     PORT SURGERY Right 2021    MEDI PORT INSERTION performed by Deborah Mack MD at [facility] 2021    PORT REMOVAL performed by Candice King MD at [address] [place] [address] ENDOSCOPY      reflux    UPPER GASTROINTESTINAL ENDOSCOPY N/A 2021    EGD BIOPSY performed by Debbie Langston MD at [facility] ENDOSCOPY    VASECTOMY         Immunization History: There is no immunization history on file for this patient.     Active Problems:  Patient Active Problem List   Diagnosis Code    Hematuria R31.9    BPH (benign prostatic hyperplasia) N40.0    SUNNY (acute kidney injury) ([facility] [address] N17.9    Colonic mass K63.89    Hypertension I10    Hyperlipidemia E78.5    Coronary artery disease I25.10    Prolonged Q-T interval on ECG R94.31    Hypotension I95.9    CKD (chronic kidney disease) stage 3, GFR 30-59 ml/min (HCC) N18.30    Paroxysmal atrial fibrillation (HCC) I48.0    Diffuse large B-cell lymphoma (HCC) C83.30    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    Severe protein-calorie malnutrition (HCC) E43    Anemia D64.9    Thrombocytopenia (HCC) D69.6       Isolation/Infection:   Isolation          Neutropenic        Patient Infection Status     Infection Onset Added Last Indicated Last Indicated By Review Planned Expiration Resolved Resolved By    None active    Resolved    COVID-19 Rule Out 03/05/21 03/05/21 03/05/21 Covid-19 Ambulatory (Ordered)   03/07/21 Rule-Out Test Resulted    COVID-19 Rule Out 02/14/21 02/14/21 02/14/21 COVID-19 (Ordered)   02/15/21 Rule-Out Test Resulted          Nurse Assessment:  Last Vital Signs: BP (!) 95/57   Pulse 51   Temp 97.9 °F (36.6 °C) (Temporal)   Resp 18   Ht 5' 7\" (1.702 m)   Wt 170 lb 3.2 oz (77.2 kg)   SpO2 94%   BMI 26.66 kg/m²     Last documented pain score (0-10 scale): Pain Level: 0  Last Weight:   Wt Readings from Last 1 Encounters:   07/14/21 170 lb 3.2 oz (77.2 kg)     Mental Status:  {IP PT MENTAL STATUS:20030}    IV Access:  {[facility] IV ACCESS:791426425}    Nursing Mobility/ADLs:  Walking   {CHP DME ECHE:009193026}  Transfer  {CHP DME CDRO:876261620}  Bathing  {CHP DME TCJE:707434866}  Dressing  {CHP DME YJQM:208996056}  Toileting  {CHP DME ZTGC:841441601}  Feeding  {CHP DME EEDK:236526730}  Med Admin  {MEGHANA THAO SFTW:399074724}  Med Delivery   { DYLAN MED Delivery:302579235}    Wound Care Documentation and Therapy:  Incision 16 Back Lower;Mid (Active)   Number of days: 3328        Elimination:  Continence:   · Bowel: {YES / JM:16906}  · Bladder: {YES / PE:08606}  Urinary Catheter: {Urinary Catheter:136821493}   Colostomy/Ileostomy/Ileal Conduit: {YES / TC:02754}       Date of Last BM: ***    Intake/Output Summary (Last 24 hours) at 2021 1607  Last data filed at 2021 1343  Gross per 24 hour   Intake 570 ml   Output 1500 ml   Net -930 ml     I/O last 3 completed shifts:   In: 18 [P.O.:360; I.V.:210]  Out: 1500 [Urine:1500]    Safety Concerns:     [address] [facility] Safety Concerns:678127342}    Impairments/Disabilities:      {[facility] Impairments/Disabilities:045208752}    Nutrition Therapy:  Current Nutrition Therapy:   {[facility] Diet List:218466511}    Routes of Feeding: {[facility] Other Feedings:637753362}  Liquids: {Slp liquid thickness:46325}  Daily Fluid Restriction: {[facility] DME Yes amt example:334762402}  Last Modified Barium Swallow with Video (Video Swallowing Test): {Done Not Done XDRQ:677785649}    Treatments at the Time of Hospital Discharge:   Respiratory Treatments: ***  Oxygen Therapy:  {Therapy; copd oxygen:32440}  Ventilator:    {[facility] Vent HODX:213000793}    Rehab Therapies: {THERAPEUTIC INTERVENTION:1015721798}  Weight Bearing Status/Restrictions: [address]  Weight Bearin}  Other Medical Equipment (for information only, NOT a DME order):  {EQUIPMENT:696939764}  Other Treatments: ***    Patient's personal belongings (please select all that are sent with patient):  {[facility] DME Belongings:281372786}    RN SIGNATURE:  {Esignature:391789428}    CASE MANAGEMENT/SOCIAL WORK SECTION    Inpatient Status Date: ***    Readmission Risk Assessment Score:  Readmission Risk              Risk of Unplanned Readmission:  26           Discharging to Facility/ Agency   · Name:   · Address:  · Phone:  · Fax:    Dialysis Facility (if applicable) · Name:  · Address:  · Dialysis Schedule:  · Phone:  · Fax:    / signature: {Esignature:096556802}    PHYSICIAN SECTION    Prognosis: {Prognosis:8913840513}    Condition at Discharge: [address] Patient Condition:419240164}    Rehab Potential (if transferring to Rehab): {Prognosis:4668897012}    Recommended Labs or Other Treatments After Discharge: ***    Physician Certification: I certify the above information and transfer of Darlene Alaniz  is necessary for the continuing treatment of the diagnosis listed and that he requires {Admit to Appropriate Level of Care:39425} for {GREATER/LESS:074993630} 30 days.      Update Admission H&P: {CHP DME Changes in IABRR:428218387}    PHYSICIAN SIGNATURE:  {Esignature:707649488}

## 2021-07-14 NOTE — PLAN OF CARE
Problem: Falls - Risk of:  Goal: Will remain free from falls  Description: Will remain free from falls  Outcome: Met This Shift  Goal: Absence of physical injury  Description: Absence of physical injury  Outcome: Met This Shift     Problem: Skin Integrity:  Goal: Will show no infection signs and symptoms  Description: Will show no infection signs and symptoms  Outcome: Met This Shift  Goal: Absence of new skin breakdown  Description: Absence of new skin breakdown  Outcome: Met This Shift     Problem: Malnutrition  (NI-5.2)  Goal: Food and/or Nutrient Delivery  Description: Individualized approach for food/nutrient provision.   7/13/2021 1336 by Birdie Blevins RD, LD  Outcome: Met This Shift     Problem: Musculor/Skeletal Functional Status  Goal: Highest potential functional level  Outcome: Met This Shift  Goal: Absence of falls  Outcome: Met This Shift     Problem: Cardiac:  Goal: Hemodynamic stability will improve  Description: Hemodynamic stability will improve  Outcome: Met This Shift  Goal: Complications related to the disease process, condition or treatment will be avoided or minimized  Description: Complications related to the disease process, condition or treatment will be avoided or minimized  Outcome: Met This Shift  Goal: Cerebral tissue perfusion will improve  Description: Cerebral tissue perfusion will improve  Outcome: Met This Shift

## 2021-07-14 NOTE — PROGRESS NOTES
[facility]  Hematology/Oncology  Consult      Patient Name: Terri Ware  YOB: 1950  PCP: Handy Adan DO   Referring Provider:      Reason for Consultation:   Chief Complaint   Patient presents with    Hematuria     PT STATES HE IS ANEMIC AND STATES HE HAS BEEN URINATING BLOOD FOR 1 WEEK. PT REPORTS FEELNG WEAKNESS      Subjective:  Feels ok today. States he has no acute complaints. Has been afebrile. History of Present Illness:  71-year-old man with past medical history of aggressive bulky diffuse large B-cell lymphoma, non-germinal cell type with negative FISH interface for double hit or triple hit lymphoma with bulky intra-abdominal disease on presentation. He has been on combination chemotherapy with R-CHOP along with Revlimid. This has been complicated by significant pancytopenia is despite G-CSF prophylaxis. He has completed 1 week ago cycle #5 of R-CHOP/Revlimid  He was hospitalized to the emergency room with weakness fatigue and dizziness  He also reports gross hematuria a few days duration and has been on anticoagulation with Xarelto  Patient seen by cardiology and EPS for his arrhythmias.   Xarelto held because of thrombocytopenia and concurrent hematuria and will be resumed when thrombocytopenia improves and hematuria resolves    His CBC today shows a hemoglobin of 7.1  Platelets remain low at 15 with a WBC count of 0.1 despite G-CSF prophylaxis    Diagnostic Data:     Past Medical History:   Diagnosis Date    Arthritis     KNEES HIPS BACK    BPH (benign prostatic hyperplasia)     Cancer ([facility] [address]     Coronary artery disease     Difficult airway     PT STATES HE WAS TOLD THIS TW[facility]    Difficult intubation 04/2017    note in care everywhere \"Clinical Summary\" 03/10/2021    History of blood transfusion     Hyperlipidemia     Hypertension     Sinus tachycardia 01/01/2002       Patient Active Problem List    Diagnosis Date Noted    Severe protein-calorie malnutrition ([facility] [address] 07/06/2021    Thrombocytopenia ([facility] [address] 07/05/2021    Anemia 05/06/2021    Diffuse large B cell lymphoma ([facility] [address] 03/31/2021    [facility] 03/30/2021    Prolonged Q-T interval on ECG 03/29/2021    Hypotension 03/29/2021    CKD (chronic kidney disease) stage 3, GFR 30-59 ml/min ([facility]) 03/29/2021    Paroxysmal atrial fibrillation ([facility] [address] 03/29/2021    Colonic mass 03/11/2021    Hypertension     Hyperlipidemia     Coronary artery disease     [facility] 02/14/2021    Hematuria 01/16/2018    BPH (benign prostatic hyperplasia) 01/16/2018        Past Surgical History:   Procedure Laterality Date    ABLATION OF DYSRHYTHMIC FOCUS      AORTA SURGERY      aortic valve replacement    AORTIC VALVE REPLACEMENT      BLADDER SURGERY Right 2/17/2021    CYSTOSCOPY BILATERAL RETROGRADE PYELOGRAM RIGHT STENT INSERTION performed by Rain Sánchez MD at [facility]  2/19/2021    COLONOSCOPY WITH BIOPSY performed by Susan Linton DO at [address]  2/19/2021    COLONOSCOPY W/ ENDOSCOPIC MUCOSAL RESECTION performed by Susan Linton DO at [address] N/A 3/11/2021    LAPAROSCOPIC RIGHT HEMICOLECTOMY performed by Sabra Mustafa MD at [address] OTHER SURGICAL HISTORY  08/12/2016    CT Myelogram, Dr. Marycarmen Morales  2014 new battery    last checked Dr Warren White 1/2016?     PORT SURGERY Right 4/1/2021    MEDI PORT INSERTION performed by Sabra Mustafa MD at [address] [place] [address] [place] Right 7/12/2021    PORT REMOVAL performed by Salomón Hernandez MD at [address] ENDOSCOPY      reflux    UPPER GASTROINTESTINAL ENDOSCOPY N/A 2/16/2021    EGD BIOPSY performed by Hiro Crabtree MD at [place] [address]         Family History  Family History   Problem Relation Age of Onset    Diabetes Mother     Stroke Mother     Diabetes Father     Heart Disease Father     Brain Cancer Sister     Stroke Sister     Stroke Brother     Cancer Maternal Grandfather        Social History    TOBACCO:   reports that he quit smoking about 7 years ago. He has a 44.00 pack-year smoking history. He has never used smokeless tobacco.  ETOH:   reports previous alcohol use of about 2.0 standard drinks of alcohol per week. Home Medications  Prior to Admission medications    Medication Sig Start Date End Date Taking?  Authorizing Provider   XARELTO 20 MG TABS tablet 20 mg Daily with supper  4/4/21  Yes Historical Provider, MD   sotalol (BETAPACE) 80 MG tablet Take 0.5 tablets by mouth 2 times daily 4/3/21  Yes Gem Mueller MD   lisinopril-hydroCHLOROthiazide (PRINZIDE;ZESTORETIC) 10-12.5 MG per tablet Take 1 tablet by mouth daily    Yes Historical Provider, MD   amLODIPine (NORVASC) 5 MG tablet Take 5 mg by mouth daily    Yes Historical Provider, MD   vitamin B-12 (CYANOCOBALAMIN) 100 MCG tablet Take 50 mcg by mouth daily   Yes Historical Provider, MD   sennosides-docusate sodium (SENOKOT-S) 8.6-50 MG tablet Take 1 tablet by mouth 2 times daily   Yes Historical Provider, MD   tamsulosin (FLOMAX) 0.4 MG capsule Take 0.4 mg by mouth daily  1/4/18  Yes Historical Provider, MD   ferrous sulfate 325 (65 Fe) MG tablet Take 325 mg by mouth 2 times daily  1/8/18  Yes Historical Provider, MD   esomeprazole Magnesium (NEXIUM) 20 MG PACK Take 20 mg by mouth daily   Yes Historical Provider, MD   PRAVASTATIN SODIUM PO Take 20 mg by mouth daily    Yes Historical Provider, MD   Albuterol Sulfate (PROAIR HFA IN) Inhale into the lungs    Historical Provider, MD   ondansetron (ZOFRAN ODT) 4 MG disintegrating tablet Take 1 tablet by mouth every 8 hours as needed for Nausea or Vomiting 3/13/21   Denita Simons MD       Allergies  No Known Allergies    Review of Systems: Relevant for fatigue, weakness, dizziness, lightheadedness and hematuria. Confused today.  Constitutional:  No fever chills or rigors.  Eyes: No changes in vision, discharge, or pain   ENT: No Headaches, hearing loss or vertigo. No mouth sores or sore throat. No change in taste or smell.  Cardiovascular: No chest discomfort, dyspnea on exertion, palpitations or loss of consciousness. or phlebitis.  Respiratory: Has no cough or wheezing, Has no sputum production. Has no hemoptysis, Has no pleuritic pain, .  Gastrointestinal: No abdominal pain, appetite loss, blood in stools. No change in bowel habits. No hematemesis    Genitourinary: Patient acknowledges no dysuria, trouble voiding, or hematuria. No nocturia or increased frequency.  Musculoskeletal: No gait disturbance, weakness or joint complaints.  Integumentary: No rash or pruritis.  Neurological: No headache, diplopia, change in muscle strength, numbness or tingling. No change in gait, balance, coordination, mood, affect, memory, mentation, behavior.  Psychiatric: No anxiety, or depression.  Endocrine: No temperature intolerance. No excessive thirst, fluid intake, or urination. No tremor.  Hematologic/Lymphatic: No abnormal bruising or bleeding, blood clots or swollen lymph nodes.  Allergic/Immunologic: No nasal congestion or hives. Objective  /61   Pulse 63   Temp 97.2 °F (36.2 °C) (Temporal)   Resp 16   Ht 5' 7\" (1.702 m)   Wt 170 lb 3.2 oz (77.2 kg)   SpO2 97%   BMI 26.66 kg/m²     Physical Exam:    General: Alert awake oriented x3. No acute distress. Head and neck : PERRLA, EOMI . Sclera non icteric. Oropharynx : Clear  Neck: no JVD,  no adenopathy, no carotid bruit  Heart: Irregular  Lungs: Clear to auscultation   Extremities: No edema,no cyanosis, no clubbing. Abdomen: Soft, non-tender;no masses, no organomegaly  Skin:  No rash. Neurologic:Cranial nerves grossly intact. No focal motor or sensory deficits .     Recent Laboratory Data-   Lab Results   Component Value Date    WBC 6.7 07/14/2021    HGB 8.9 (L) 07/14/2021    HCT 28.5 (L) 07/14/2021    MCV 95.3 07/14/2021     07/14/2021    LYMPHOPCT 4.3 (L) 07/13/2021    RBC 2.99 (L) 07/14/2021    MCH 29.8 07/14/2021    MCHC 31.2 (L) 07/14/2021    RDW 15.7 (H) 07/14/2021    NEUTOPHILPCT 87.0 (H) 07/13/2021    MONOPCT 7.8 07/13/2021    BASOPCT 0.9 07/13/2021    NEUTROABS 5.05 07/13/2021    LYMPHSABS 0.23 (L) 07/13/2021    MONOSABS 0.46 07/13/2021    EOSABS 0.00 (L) 07/13/2021    BASOSABS 0.05 07/13/2021       Lab Results   Component Value Date     07/14/2021    K 3.4 (L) 07/14/2021     07/14/2021    CO2 32 (H) 07/14/2021    BUN 13 07/14/2021    CREATININE 0.6 (L) 07/14/2021    GLUCOSE 86 07/14/2021    CALCIUM 9.3 07/14/2021    PROT 5.5 (L) 07/06/2021    LABALBU 3.2 (L) 07/06/2021    BILITOT 1.5 (H) 07/06/2021    ALKPHOS 66 07/06/2021    AST 11 07/06/2021    ALT 19 07/06/2021    LABGLOM >60 07/14/2021    GFRAA >60 07/14/2021       Lab Results   Component Value Date    IRON 38 (L) 02/16/2021    TIBC 248 (L) 02/16/2021           Radiology-    XR CHEST PORTABLE    Result Date: 7/5/2021  EXAMINATION: ONE XRAY VIEW OF THE CHEST 7/5/2021 6:31 pm COMPARISON: April 1, 2021 HISTORY: ORDERING SYSTEM PROVIDED HISTORY: dizziness TECHNOLOGIST PROVIDED HISTORY: If in ER room at the time of exam, OK to change to portable 1 view Reason for exam:->dizziness What reading provider will be dictating this exam?->CRC FINDINGS: Left pacemaker. Median sternotomy wires are present. The heart is normal in size. There is prominence of pulmonary vasculature. Interstitial and hazy opacities are present in the lung bases. No obvious pleural effusion. No pneumothorax. Right MediPort present. Interstitial prominence bilaterally related to pulmonary vascular congestion with pneumonia or subsegmental atelectasis in lung bases.      PET CT SKULL BASE TO MID THIGH    Result Date: 6/21/2021  EXAMINATION: Skull base to midthigh PET/CT 6/18/2021 TECHNIQUE: Following IV injection of 15.5 mCi of F 18 -FDG, PET  tumor imaging was acquired from the base of the skull to the mid thighs. Computed tomography was used for purposes of attenuation correction and anatomic localization. Fusion imaging was utilized for interpretation. Uptake time 52 mins. Glucose level 100 mg/dl. COMPARISON: CT abdomen pelvis dated 03/29/2021, CT neck dated 06/01/2021 HISTORY: ORDERING SYSTEM PROVIDED HISTORY: Diffuse large B-cell lymphoma of lymph nodes of multiple sites [facility]) TECHNOLOGIST PROVIDED HISTORY: Reason for exam:->Diffuse large B-cell lymphoma of lymph nodes of multiple sites [facility]) What reading provider will be dictating this exam?->CRC FINDINGS: HEAD/NECK: In the craniocervical soft tissues, physiologic activity is favored. Bilateral carotid artery calcification. CHEST: Port is seen in the right chest wall. Pacemaker in the left chest wall. Status post median sternotomy. Within the mediastinum, no kelley activity markedly greater than mediastinal background. No axillary adenopathy. Soft tissue activity about the shoulders bilaterally, typically inflammatory. Bilateral gynecomastia. Emphysema. Small bilateral pleural effusions. Scattered ground-glass opacity, likely atelectasis. No pneumothorax. The pleuroparenchymal nodule at the left lung base on the prior CT is not well seen on the current exam, potentially obscured by the pleural effusion. ABDOMEN/PELVIS: The previously demonstrated extensive abdominal and pelvic adenopathy as well as small bowel nodularity seen on the prior CT is not observed on current. No hypermetabolic adenopathy is seen. Right nephroureteral stent is demonstrated. Excretion is seen from the kidneys bilaterally and activity is seen within the urinary bladder. Bowel activity seen which can be physiologically variable, including at the distal rectum.   Calcification of the abdominal aorta and iliac arteries. Diverticulosis. BONES/SOFT TISSUE: Diffuse activity is seen throughout regional bone marrow, relatively symmetrically. With regard to patient's history of lymphoma, no hypermetabolic adenopathy is seen, compatible with favorable treatment response. Diffuse symmetric activity throughout regional bone marrow, which can be seen in the setting of recent chemotherapy, colony-stimulating factor usage, or anemia. Small bilateral pleural effusions. ASSESSMENT/PLAN : 73-year-old man    High-grade aggressive diffuse large B-cell lymphoma, non-germinal cell type, ABC type status post cycle #5 of chemotherapy with R-CHOP/Revlimid. Double hit/triple hit lymphoma have been ruled out    Pancytopenia secondary to chemotherapy  Hematuria related to thrombocytopenia and the fact that he is anticoagulated with Xarelto for his chronic A. Fib    Appreciate cardiology and EPS input    Hold Xarelto  It may be resumed when gross hematuria resolves and platelet count improves above 50    Initiate G-CSF for persistent severe neutropenia despite Neulasta prophylaxis  Monitor for potential neutropenic fevers  Transfuse with 1 unit of packed RBC because of fatigue and cardiovascular compromise and 1 unit of platelet because of his gross hematuria and thrombocytopenia  We will follow    7/7/2021. Patient was confused when I went to examine the patient. He could not tell me where he was and was having trouble finding words. No focal weakness numbness. Neuro exam was non focal. No headaches. He has been afebrile. Will r/o sepsis. Blood cultures chest xray ct head without contrast requested. Profound neutropenia > 7 days since chemo. Continue Granix. Will start prophylactic Levaquin after cultures are drawn. Platelets 63M. No active bleeding bruising. Xarelto has been held for now. 7/8/21  - Continue with profound pancytopenia. WBC 0.2, ANC 70. Hgb 6.6, platelets 13.  Getting 1 unit

## 2021-07-14 NOTE — PROGRESS NOTES
[facility] PROGRESS NOTE      Chief complaint: Follow-up of Gram-negative nevaeh bacteremia     The patient is a 80 y.o. 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 Chest x-ray showed bilateral interstitial prominence probably related to pulmonary vascular congestion. CT chest, abdomen and pelvis showed  COPD with multifocal patchy and masslike infiltrates in the left lower lobe, distended gallbladder without acute inflammation, right ureteral stent in place with significant improvement in right hydronephrosis, status post right hemicolectomy with retained fluid and fecal matter in the left hemicolon, significant improvement and near resolution of lymphadenopathy in the retroperitoneum and mesenteric adenopathy. Respiratory Gram stain and culture showed no polymorphonuclear leukocytes, rare epithelial cells, no organisms, moderate growth of Pseudomonas aeruginosa and Serratia marcescens. Repeat blood cultures on 07/11 showed no growth to date. Piperacillin-tazobactam and vancomycin were started on admission. He underwent venous access port removal on 07/12. Subjective: Patient was seen and examined. No chills, no abdominal pain, no diarrhea, no rash, no itching. He reports feeling better.     Objective:    Vitals:    07/14/21 1545   BP: (!) 95/57   Pulse: 51   Resp: 18   Temp: 97.9 °F (36.6 °C)   SpO2: 94%     Constitutional: Alert, not in distress  Respiratory: Clear breath sounds, no crackles, no wheezes  Cardiovascular: Regular rate and rhythm, no murmurs  Gastrointestinal: Bowel sounds present, soft, nontender  Skin: Warm and dry, no active dermatoses  Musculoskeletal: No joint swelling, no joint erythema    Labs, imaging, and medical records/notes were personally reviewed. Assessment:  Sepsis, resolved secondary to polymicrobial gram-negative nevaeh (Pseudomonas aeruginosa, Klebsiella pneumoniae, Serratia marcescens) bacteremia, suspect associated with pneumonia and CLABSI, s/p port removal on 07/12  Pneumonia  Profound neutropenia  Immunocompromised state  Diffuse large B-cell lymphoma on chemotherapy    Recommendations:  Change meropenem to levofloxacin 750 mg q24h for 10 more days on discharge. (Per EP, may continue to hold off on taking sotalol.) Avoid taking levofloxacin with milk, antacids, multivitamins, iron, zinc but if needed, take levofloxacin at least 2 hours before or 6 hours after milk, antacids, multivitamins, iron, zinc.  Continue supportive care. Plan was discussed with Dr. Ismael Vigil. Thank you for involving me in the care of Denise Fuller. I will continue to follow. Please do not hesitate to call for any questions or concerns.     Electronically signed by Audrey Cordova MD on 7/14/2021 at 4:19 PM

## 2021-07-14 NOTE — PROGRESS NOTES
Pulmonary [facility]                             Pulmonary Consult/Progress Note :              Reason for Consultation:POssible need for Bronch,Lymphoma   CC : SOB ,cough frothy secretion   HPI:     SOB has improved a lot ,less cough   Had CARMEN with no evidence of endocarditis  On 2 L   Denies any fever   More comfortable  Resting at bed  No chest pain         PHYSICAL EXAMINATION:     VITAL SIGNS:  /65   Pulse 94   Temp 98.9 °F (37.2 °C) (Oral)   Resp 16   Ht 5' 7\" (1.702 m)   Wt 172 lb 8 oz (78.2 kg)   SpO2 96%   BMI 27.02 kg/m²   Wt Readings from Last 3 Encounters:   07/13/21 172 lb 8 oz (78.2 kg)   06/15/21 210 lb (95.3 kg)   03/31/21 200 lb (90.7 kg)     Temp Readings from Last 3 Encounters:   07/13/21 98.9 °F (37.2 °C) (Oral)   06/28/21 98.9 °F (37.2 °C) (Temporal)   06/15/21 98.4 °F (36.9 °C) (Temporal)     TMAX:  BP Readings from Last 3 Encounters:   07/13/21 122/65   07/13/21 119/70   07/12/21 (!) 91/57     Pulse Readings from Last 3 Encounters:   07/13/21 94   06/28/21 70   06/15/21 77           INTAKE/OUTPUTS:  I/O last 3 completed shifts: In: 550 [P.O.:240;  I.V.:210; IV Piggyback:100]  Out: 7704 [Urine:1650]    Intake/Output Summary (Last 24 hours) at 7/13/2021 2339  Last data filed at 7/13/2021 2211  Gross per 24 hour   Intake 550 ml   Output 1650 ml   Net -1100 ml       General Appearance: alert and oriented to person, place and time, well-developed and   well-nourished, in no acute distress   Eyes: pupils equal, round, and reactive to light, extraocular eye movements intact, conjunctivae normal and sclera anicteric   Neck: neck supple and non tender without mass, no thyromegaly, no thyroid nodules and no cervical adenopathy   Pulmonary/Chest:crackles rhonchi   Cardiovascular: normal rate, regular rhythm, normal S1 and S2, no murmurs, rubs, clicks or gallops, distal pulses intact, no carotid bruits, no murmurs, no gallops, no carotid bruits and no JVD   Abdomen: obese, soft, non-tender, non-distended, normal bowel sounds, no masses or organomegaly   Extremities:no edema   Musculoskeletal: normal range of motion, no joint swelling, deformity or tenderness   Neurologic: reflexes normal and symmetric, no cranial nerve deficit noted    LABS/IMAGING:    CBC:  Lab Results   Component Value Date    WBC 5.8 07/13/2021    HGB 8.7 (L) 07/13/2021    HCT 27.8 (L) 07/13/2021    MCV 95.2 07/13/2021     (L) 07/13/2021    LYMPHOPCT 4.3 (L) 07/13/2021    RBC 2.92 (L) 07/13/2021    MCH 29.8 07/13/2021    MCHC 31.3 (L) 07/13/2021    RDW 15.8 (H) 07/13/2021    NEUTOPHILPCT 87.0 (H) 07/13/2021    MONOPCT 7.8 07/13/2021    BASOPCT 0.9 07/13/2021    NEUTROABS 5.05 07/13/2021    LYMPHSABS 0.23 (L) 07/13/2021    MONOSABS 0.46 07/13/2021    EOSABS 0.00 (L) 07/13/2021    BASOSABS 0.05 07/13/2021       Recent Labs     07/13/21  1147 07/12/21  0554 07/11/21  0526   WBC 5.8 7.1 5.1   HGB 8.7* 9.1* 9.2*   HCT 27.8* 28.9* 29.2*   MCV 95.2 94.8 93.3   * 63* 34*       BMP:   Recent Labs     07/11/21  0921 07/11/21  1851 07/12/21  0554   *  --  143   K 2.7* 3.6 3.5     --  100   CO2 31*  --  35*   BUN 16  --  19   CREATININE 0.5*  --  0.7       MG:   Lab Results   Component Value Date    MG 2.1 07/12/2021     Ca/Phos:   Lab Results   Component Value Date    CALCIUM 9.0 07/12/2021    PHOS 3.2 04/03/2021     Amylase: No results found for: AMYLASE  Lipase: No results found for: LIPASE  LIVER PROFILE:   No results for input(s): AST, ALT, LIPASE, BILIDIR, BILITOT, ALKPHOS in the last 72 hours. Invalid input(s): AMYLASE,  ALB    PT/INR:   No results for input(s): PROTIME, INR in the last 72 hours. APTT:   No results for input(s): APTT in the last 72 hours.     Cardiac Enzymes:  Lab Results   Component Value Date    TROPONINI <0.01 02/14/2021               PET and CT reviewed     PROBLEM LIST:  Patient Active Problem List   Diagnosis    Hematuria    BPH (benign prostatic hyperplasia)    [facility]) [facility] [address]    Colonic mass    Hypertension    Hyperlipidemia    Coronary artery disease    Prolonged Q-T interval on ECG    Hypotension    CKD (chronic kidney disease) stage 3, GFR 30-59 ml/min (HCC)    Paroxysmal atrial fibrillation (HCC)    Diffuse large B-cell lymphoma (HCC)    Diffuse large B cell lymphoma (HCC)    Severe protein-calorie malnutrition (HCC)    Anemia    Thrombocytopenia (HCC)               ASSESSMENT:  1.) Possible fluid overload /Pulmonary edema  2. )COPD  3.)h/p Lymphoma   4.)Pancytopenia   5. )GNR  Pneumonia       PLAN:  CARMEN no evidence of endocarditis  *-hold IV Lasix ,Just as neede   *-Work-up for COPD bronchodilator/pneumonia ,seittia and psedomonas  *- ab as per ID  Sputum culture,serittia and pseudomonas ,ID on board  ID following   Mag as well   ICS  BD  Discuss with SON   IS    Discharge plan from the pulmonary standpoint      Munir Perez MD,FCCP  Pulmonary&Critical Care Medicine   Director of [address] [place] [address] Director of [address] [place] [address]    Scott Christianson    NOTE: This report was transcribed using voice recognition software. Every effort was made to ensure accuracy; however, inadvertent computerized transcription errors may be present.

## 2021-07-14 NOTE — PATIENT CARE CONFERENCE
P Quality Flow/Interdisciplinary Rounds Progress Note        Quality Flow Rounds held on July 14, 2021    Disciplines Attending:  Bedside Nurse, ,  and Nursing Unit Leadership    Keyla Fu was admitted on 7/5/2021  5:44 PM    Anticipated Discharge Date:  Expected Discharge Date: 07/16/21    Disposition:    Osman Score:  Osman Scale Score: 22    Readmission Risk              Risk of Unplanned Readmission:  26           Discussed patient goal for the day, patient clinical progression, and barriers to discharge.   The following Goal(s) of the Day/Commitment(s) have been identified:  check the length of need for antibiotic      Carol Vidal RN  July 14, 2021

## 2021-07-15 LAB — CULTURE CATHETER TIP: NORMAL

## 2021-07-15 NOTE — ANESTHESIA POSTPROCEDURE EVALUATION
Department of Anesthesiology  Postprocedure Note    Patient: Lidia Araiza  MRN: [id number]  YOB: 1950  Date of evaluation: 7/15/2021  Time:  4:42 PM     Procedure Summary     Date: 07/12/21 Room / Location: [facility] OR 10 / [facility]    Anesthesia Start: 7049 Anesthesia Stop: 3278    Procedure: PORT REMOVAL (Right Chest) Diagnosis: (Infection)    Surgeons: Mariella Donis MD Responsible Provider: Rodolfo Garces MD    Anesthesia Type: MAC ASA Status: 4          Anesthesia Type: MAC    Effie Phase I: Effie Score: 9    Effie Phase II:      Last vitals: Reviewed and per EMR flowsheets.        Anesthesia Post Evaluation    Patient location during evaluation: PACU  Patient participation: complete - patient participated  Level of consciousness: awake and alert  Airway patency: patent  Nausea & Vomiting: no nausea and no vomiting  Complications: no  Cardiovascular status: hemodynamically stable and blood pressure returned to baseline  Respiratory status: acceptable  Hydration status: euvolemic

## 2021-07-15 NOTE — CARE COORDINATION
SOCIAL WORK/CASEMANAGEMENT TRANSITION OF CARE [id number] [address], [place], [phone]): pt got discharged after 4 p.m. [facility] orders were not place and cm will place them today. I called dr. Michaelle Avina and left vm for him to call [facility] with  His address and fax #. I spoke with gina at [facility] and told her of this and she has dr. Yin Shukla phone number as well.  JOSE Goldberg.7/15/2021

## 2021-07-16 LAB
BLOOD CULTURE, ROUTINE: NORMAL
CULTURE, BLOOD 2: NORMAL

## 2021-08-13 ENCOUNTER — APPOINTMENT (OUTPATIENT)
Dept: GENERAL RADIOLOGY | Age: 71
End: 2021-08-13
Payer: MEDICARE

## 2021-08-13 ENCOUNTER — HOSPITAL ENCOUNTER (EMERGENCY)
Age: 71
Discharge: HOME | End: 2021-08-13
Attending: EMERGENCY MEDICINE
Payer: MEDICARE

## 2021-08-13 VITALS
RESPIRATION RATE: 14 BRPM | SYSTOLIC BLOOD PRESSURE: 138 MMHG | TEMPERATURE: 97.9 F | OXYGEN SATURATION: 96 % | DIASTOLIC BLOOD PRESSURE: 63 MMHG | WEIGHT: 166 LBS | BODY MASS INDEX: 26.06 KG/M2 | HEIGHT: 67 IN | HEART RATE: 83 BPM

## 2021-08-13 DIAGNOSIS — D69.59 CHEMOTHERAPY-INDUCED THROMBOCYTOPENIA: Primary | ICD-10-CM

## 2021-08-13 DIAGNOSIS — T45.1X5A CHEMOTHERAPY-INDUCED THROMBOCYTOPENIA: Primary | ICD-10-CM

## 2021-08-13 DIAGNOSIS — D64.9 ANEMIA, UNSPECIFIED TYPE: ICD-10-CM

## 2021-08-13 DIAGNOSIS — J18.9 PNEUMONIA OF LEFT LUNG DUE TO INFECTIOUS ORGANISM, UNSPECIFIED PART OF LUNG: ICD-10-CM

## 2021-08-13 LAB
ABO/RH: NORMAL
ALBUMIN SERPL-MCNC: 3.3 G/DL (ref 3.5–5.2)
ALP BLD-CCNC: 106 U/L (ref 40–129)
ALT SERPL-CCNC: 7 U/L (ref 0–40)
ANION GAP SERPL CALCULATED.3IONS-SCNC: 11 MMOL/L (ref 7–16)
ANTIBODY SCREEN: NORMAL
AST SERPL-CCNC: 8 U/L (ref 0–39)
ATYPICAL LYMPHOCYTE RELATIVE PERCENT: 1 % (ref 0–4)
BASOPHILS ABSOLUTE: 0.09 E9/L (ref 0–0.2)
BASOPHILS RELATIVE PERCENT: 1 % (ref 0–2)
BILIRUB SERPL-MCNC: 1 MG/DL (ref 0–1.2)
BUN BLDV-MCNC: 9 MG/DL (ref 6–23)
CALCIUM SERPL-MCNC: 9.3 MG/DL (ref 8.6–10.2)
CHLORIDE BLD-SCNC: 95 MMOL/L (ref 98–107)
CO2: 31 MMOL/L (ref 22–29)
CREAT SERPL-MCNC: 0.8 MG/DL (ref 0.7–1.2)
DOHLE BODIES: ABNORMAL
EOSINOPHILS ABSOLUTE: 0 E9/L (ref 0.05–0.5)
EOSINOPHILS RELATIVE PERCENT: 0 % (ref 0–6)
GFR AFRICAN AMERICAN: >60
GFR NON-AFRICAN AMERICAN: >60 ML/MIN/1.73
GLUCOSE BLD-MCNC: 84 MG/DL (ref 74–99)
HCT VFR BLD CALC: 28.9 % (ref 37–54)
HEMOGLOBIN: 9.1 G/DL (ref 12.5–16.5)
HYPOCHROMIA: ABNORMAL
LACTIC ACID: 1.8 MMOL/L (ref 0.5–2.2)
LYMPHOCYTES ABSOLUTE: 0.92 E9/L (ref 1.5–4)
LYMPHOCYTES RELATIVE PERCENT: 9 % (ref 20–42)
MCH RBC QN AUTO: 29.1 PG (ref 26–35)
MCHC RBC AUTO-ENTMCNC: 31.5 % (ref 32–34.5)
MCV RBC AUTO: 92.3 FL (ref 80–99.9)
METAMYELOCYTES RELATIVE PERCENT: 2 % (ref 0–1)
MONOCYTES ABSOLUTE: 0.64 E9/L (ref 0.1–0.95)
MONOCYTES RELATIVE PERCENT: 7 % (ref 2–12)
NEUTROPHILS ABSOLUTE: 7.45 E9/L (ref 1.8–7.3)
NEUTROPHILS RELATIVE PERCENT: 79 % (ref 43–80)
OVALOCYTES: ABNORMAL
PDW BLD-RTO: 15.7 FL (ref 11.5–15)
PLATELET # BLD: 35 E9/L (ref 130–450)
PLATELET CONFIRMATION: NORMAL
PMV BLD AUTO: 10.6 FL (ref 7–12)
POIKILOCYTES: ABNORMAL
POLYCHROMASIA: ABNORMAL
POTASSIUM SERPL-SCNC: 3.7 MMOL/L (ref 3.5–5)
PROMYELOCYTES PERCENT: 1 % (ref 0–0)
RBC # BLD: 3.13 E12/L (ref 3.8–5.8)
SARS-COV-2, NAAT: NOT DETECTED
SCHISTOCYTES: ABNORMAL
SODIUM BLD-SCNC: 137 MMOL/L (ref 132–146)
TOTAL PROTEIN: 6.3 G/DL (ref 6.4–8.3)
TOXIC GRANULATION: ABNORMAL
WBC # BLD: 9.2 E9/L (ref 4.5–11.5)

## 2021-08-13 PROCEDURE — 6360000002 HC RX W HCPCS: Performed by: EMERGENCY MEDICINE

## 2021-08-13 PROCEDURE — 80053 COMPREHEN METABOLIC PANEL: CPT

## 2021-08-13 PROCEDURE — 99284 EMERGENCY DEPT VISIT MOD MDM: CPT

## 2021-08-13 PROCEDURE — 93005 ELECTROCARDIOGRAM TRACING: CPT | Performed by: EMERGENCY MEDICINE

## 2021-08-13 PROCEDURE — 87635 SARS-COV-2 COVID-19 AMP PRB: CPT

## 2021-08-13 PROCEDURE — 86850 RBC ANTIBODY SCREEN: CPT

## 2021-08-13 PROCEDURE — 96365 THER/PROPH/DIAG IV INF INIT: CPT

## 2021-08-13 PROCEDURE — 2580000003 HC RX 258: Performed by: EMERGENCY MEDICINE

## 2021-08-13 PROCEDURE — 96375 TX/PRO/DX INJ NEW DRUG ADDON: CPT

## 2021-08-13 PROCEDURE — 2500000003 HC RX 250 WO HCPCS: Performed by: EMERGENCY MEDICINE

## 2021-08-13 PROCEDURE — 83605 ASSAY OF LACTIC ACID: CPT

## 2021-08-13 PROCEDURE — 85025 COMPLETE CBC W/AUTO DIFF WBC: CPT

## 2021-08-13 PROCEDURE — 86900 BLOOD TYPING SEROLOGIC ABO: CPT

## 2021-08-13 PROCEDURE — 86901 BLOOD TYPING SEROLOGIC RH(D): CPT

## 2021-08-13 PROCEDURE — 87040 BLOOD CULTURE FOR BACTERIA: CPT

## 2021-08-13 PROCEDURE — 71045 X-RAY EXAM CHEST 1 VIEW: CPT

## 2021-08-13 RX ORDER — DOXYCYCLINE HYCLATE 100 MG
100 TABLET ORAL 2 TIMES DAILY
Qty: 20 TABLET | Refills: 0 | Status: SHIPPED | OUTPATIENT
Start: 2021-08-13 | End: 2021-08-23

## 2021-08-13 RX ORDER — CEFDINIR 300 MG/1
300 CAPSULE ORAL 2 TIMES DAILY
Qty: 20 CAPSULE | Refills: 0 | Status: SHIPPED | OUTPATIENT
Start: 2021-08-13 | End: 2021-08-23

## 2021-08-13 RX ORDER — 0.9 % SODIUM CHLORIDE 0.9 %
1000 INTRAVENOUS SOLUTION INTRAVENOUS ONCE
Status: COMPLETED | OUTPATIENT
Start: 2021-08-13 | End: 2021-08-13

## 2021-08-13 RX ADMIN — DOXYCYCLINE 100 MG: 100 INJECTION, POWDER, LYOPHILIZED, FOR SOLUTION INTRAVENOUS at 19:55

## 2021-08-13 RX ADMIN — CEFTRIAXONE 1000 MG: 1 INJECTION, POWDER, FOR SOLUTION INTRAMUSCULAR; INTRAVENOUS at 19:55

## 2021-08-13 RX ADMIN — SODIUM CHLORIDE 1000 ML: 9 INJECTION, SOLUTION INTRAVENOUS at 16:10

## 2021-08-13 NOTE — ED PROVIDER NOTES
HPI:  8/13/21, Time: 2:56 PM EDT         Keyla Fu is a 80 y.o. male presenting to the ED for dizziness and generalized fatigue, beginning a couple days ago. Patient states he is anemic from his chemo and due for a blood transfusion. He denies headache, chest pain, shortness of breath, focal paresthesias, focal weakness, nausea vomiting or diarrhea, fever or chills, cough or congestion. The complaint has been persistent, moderate in severity, and worsened by light exertion. Patient denies fever/chills, sore throat, cough, congestion, chest pain, shortness of breath, edema, headache, visual disturbances, focal paresthesias, focal weakness, abdominal pain, nausea, vomiting, diarrhea, constipation, dysuria, hematuria, trauma, neck or back pain, rash or other complaints. ROS:   A complete review of systems was performed and all pertinent positives and negatives are stated within HPI, all other systems reviewed and are negative.      --------------------------------------------- PAST HISTORY ---------------------------------------------  Past Medical History:  has a past medical history of Arthritis, BPH (benign prostatic hyperplasia), [facility] Coronary artery disease, Difficult airway, Difficult intubation, History of blood transfusion, Hyperlipidemia, Hypertension, and Sinus tachycardia. Past Surgical History:  has a past surgical history that includes Pacemaker insertion (2002); Tonsillectomy; Upper gastrointestinal endoscopy; pacemaker placement (2014 new battery); Vasectomy; other surgical history (08/12/2016); Aorta surgery; ablation of dysrhythmic focus; Aortic valve replacement; Mitral valve replacement; Upper gastrointestinal endoscopy (N/A, 2/16/2021); Bladder surgery (Right, 2/17/2021); Colonoscopy (2/19/2021); Colonoscopy (2/19/2021); hemicolectomy (N/A, 3/11/2021); Port Surgery (Right, 4/1/2021); Cholecystectomy; and Port Surgery (Right, 7/12/2021).     Social History:  reports that he quit smoking about 7 years ago. He has a 44.00 pack-year smoking history. He has never used smokeless tobacco. He reports previous alcohol use of about 2.0 standard drinks of alcohol per week. He reports that he does not use drugs. Family History: family history includes Brain Cancer in his sister; Cancer in his maternal grandfather; Diabetes in his father and mother; Heart Disease in his father; Stroke in his brother, mother, and sister. The patients home medications have been reviewed. Allergies: Patient has no known allergies. ----------------------------------------PHYSICAL EXAM--------------------------------------  Constitutional:  Well developed, well nourished, no acute distress, non-toxic appearance   Eyes:  PERRL, conjunctiva pale, EOMI  HENT:  Atraumatic, external ears normal, nose normal, oropharynx dry. Neck- normal range of motion, no nuchal rigidity   Respiratory:  No respiratory distress, normal breath sounds, no rales, no wheezing   Cardiovascular:  Normal rate, normal rhythm, no murmurs, no gallops, no rubs. Radial and DP pulses 2+ bilaterally. Pacer in place in left anterior chest wall. GI:  Soft, nondistended, normal bowel sounds, nontender, no organomegaly, no mass, no rebound, no guarding   :  No costovertebral angle tenderness   Musculoskeletal:  No edema, no tenderness, no deformities. Back- no tenderness  Integument:  Well hydrated, no visible rash. Adequate perfusion. Lymphatic:  No cervical lymphadenopathy noted   Neurologic:  Alert & oriented x 3, CN 2-12 normal,  no focal deficits noted. Normal gait with cane. Psychiatric:  Speech and behavior appropriate       -------------------------------------------------- RESULTS -------------------------------------------------  I have personally reviewed all laboratory and imaging results for this patient. Results are listed below.      LABS:  Results for orders placed or performed during the hospital encounter of 08/13/21   Culture, Blood 1    Specimen: Blood   Result Value Ref Range    Blood Culture, Routine 5 Days no growth    Culture, Blood 2    Specimen: Blood   Result Value Ref Range    Culture, Blood 2 5 Days no growth    COVID-19, Rapid    Specimen: Nasopharyngeal Swab   Result Value Ref Range    SARS-CoV-2, NAAT Not Detected Not Detected   CBC auto differential   Result Value Ref Range    WBC 9.2 4.5 - 11.5 E9/L    RBC 3.13 (L) 3.80 - 5.80 E12/L    Hemoglobin 9.1 (L) 12.5 - 16.5 g/dL    Hematocrit 28.9 (L) 37.0 - 54.0 %    MCV 92.3 80.0 - 99.9 fL    MCH 29.1 26.0 - 35.0 pg    MCHC 31.5 (L) 32.0 - 34.5 %    RDW 15.7 (H) 11.5 - 15.0 fL    Platelets 35 (L) 677 - 450 E9/L    MPV 10.6 7.0 - 12.0 fL    Neutrophils % 79.0 43.0 - 80.0 %    Lymphocytes % 9.0 (L) 20.0 - 42.0 %    Monocytes % 7.0 2.0 - 12.0 %    Eosinophils % 0.0 0.0 - 6.0 %    Basophils % 1.0 0.0 - 2.0 %    Neutrophils Absolute 7.45 (H) 1.80 - 7.30 E9/L    Lymphocytes Absolute 0.92 (L) 1.50 - 4.00 E9/L    Monocytes Absolute 0.64 0.10 - 0.95 E9/L    Eosinophils Absolute 0.00 (L) 0.05 - 0.50 E9/L    Basophils Absolute 0.09 0.00 - 0.20 E9/L    Atypical Lymphocytes Relative 1.0 0.0 - 4.0 %    Metamyelocytes Relative 2.0 (H) 0.0 - 1.0 %    Promyelocytes Percent 1.0 0 - 0 %    Toxic Granulation 1+     Dohle Bodies 1+     Polychromasia 1+     Hypochromia 1+     Poikilocytes 1+     Schistocytes 1+     Ovalocytes 1+    Comprehensive Metabolic Panel   Result Value Ref Range    Sodium 137 132 - 146 mmol/L    Potassium 3.7 3.5 - 5.0 mmol/L    Chloride 95 (L) 98 - 107 mmol/L    CO2 31 (H) 22 - 29 mmol/L    Anion Gap 11 7 - 16 mmol/L    Glucose 84 74 - 99 mg/dL    BUN 9 6 - 23 mg/dL    CREATININE 0.8 0.7 - 1.2 mg/dL    GFR Non-African American >60 >=60 mL/min/1.73    GFR African American >60     Calcium 9.3 8.6 - 10.2 mg/dL    Total Protein 6.3 (L) 6.4 - 8.3 g/dL    Albumin 3.3 (L) 3.5 - 5.2 g/dL    Total Bilirubin 1.0 0.0 - 1.2 mg/dL    Alkaline Phosphatase 106 40 - 129 U/L    ALT 7 0 - 40 U/L    AST 8 0 - 39 U/L   Lactic Acid, Plasma   Result Value Ref Range    Lactic Acid 1.8 0.5 - 2.2 mmol/L   Platelet Confirmation   Result Value Ref Range    Platelet Confirmation CONFIRMED    EKG 12 Lead   Result Value Ref Range    Ventricular Rate 118 BPM    Atrial Rate 118 BPM    P-R Interval 124 ms    QRS Duration 72 ms    Q-T Interval 352 ms    QTc Calculation (Bazett) 493 ms    P Axis 96 degrees    R Axis 82 degrees    T Axis 113 degrees   TYPE AND SCREEN   Result Value Ref Range    ABO/Rh O POS     Antibody Screen NEG        RADIOLOGY:  Interpreted by Radiologist.  XR CHEST PORTABLE   Final Result   Left perihilar opacity worrisome for pneumonia. EKG Interpretation  Time: 2:58 PM EDT  Rhythm: atrial fibrillation - rapid  Rate: 118  Axis: normal  Conduction: normal  ST Segments: nonspecific changes  T Waves: non specific changes  Clinical Impression: non-specific EKG and atrial fibrillation (chronic)  Comparison to prior EKG: stable as compared to patient's most recent EKG      ------------------------- NURSING NOTES AND VITALS REVIEWED ---------------------------  The nursing notes within the ED encounter and vital signs as below have been reviewed by myself. /63   Pulse 83   Temp 97.9 °F (36.6 °C) (Oral)   Resp 14   Ht 5' 7\" (1.702 m)   Wt 166 lb (75.3 kg)   SpO2 96%   BMI 26.00 kg/m²   Oxygen Saturation Interpretation: Normal      The patients available past medical records and past encounters were reviewed. ------------------------------ ED COURSE/MEDICAL DECISION MAKING----------------------  Medications   0.9 % sodium chloride bolus (0 mLs Intravenous Stopped 8/13/21 1905)   cefTRIAXone (ROCEPHIN) 1,000 mg in sterile water 10 mL IV syringe (0 mg Intravenous Stopped 8/13/21 2000)   doxycycline (VIBRAMYCIN) 100 mg in dextrose 5 % 100 mL IVPB (0 mg Intravenous Stopped 8/13/21 2055)           Procedures:   none      Medical Decision Making:     Will treat with doxycycline for PNA. Covid negative. Not anemic enough for blood transfusion. Appears well, feels better, nontoxic, no hypoxia, neurovascularly intact and ambulatory upon discharge. Patient was explicitly instructed on specific signs and symptoms on which to return to the emergency room for. Patient was instructed to return to the ER for any new or worsening symptoms. Additional discharge instructions were given verbally. All questions were answered. Patient is comfortable and agreeable with discharge plan. Patient in no acute distress and non-toxic in appearance. This patient's ED course included: re-evaluation prior to disposition, IV medications, cardiac monitoring, continuous pulse oximetry and a personal history and physicial eaxmination    This patient has remained hemodynamically stable, improved and been closely monitored during their ED course. Re-Evaluations:  Time: 2000 PM EDT   Re-evaluation. Patients symptoms are improving  Repeat physical examination is not changed      Consultations:   7:04 PM EDT  Spoke with Dr Anaid Sanchez, discussed case, if no bleeding then no need to transfuse platelets. Fatigue is from chemo    Critical Care: none    IFei Both, DO, am the Primary Provider of Record    Counseling: The emergency provider has spoken with the patient and discussed todays results, in addition to providing specific details for the plan of care and counseling regarding the diagnosis and prognosis. Questions are answered at this time and they are agreeable with the plan.    --------------------------- IMPRESSION AND DISPOSITION ---------------------------------    IMPRESSION  1. Chemotherapy-induced thrombocytopenia    2. Anemia, unspecified type    3.  Pneumonia of left lung due to infectious organism, unspecified part of lung        DISPOSITION  Disposition: Discharge to home  Patient condition is stable             [facility] Hospital[facility]  08/21/21 6139

## 2021-08-14 LAB
EKG ATRIAL RATE: 118 BPM
EKG P AXIS: 96 DEGREES
EKG P-R INTERVAL: 124 MS
EKG Q-T INTERVAL: 352 MS
EKG QRS DURATION: 72 MS
EKG QTC CALCULATION (BAZETT): 493 MS
EKG R AXIS: 82 DEGREES
EKG T AXIS: 113 DEGREES
EKG VENTRICULAR RATE: 118 BPM

## 2021-08-14 PROCEDURE — 93010 ELECTROCARDIOGRAM REPORT: CPT | Performed by: INTERNAL MEDICINE

## 2021-08-18 LAB
BLOOD CULTURE, ROUTINE: NORMAL
CULTURE, BLOOD 2: NORMAL

## 2021-09-01 ENCOUNTER — HOSPITAL ENCOUNTER (INPATIENT)
Age: 71
LOS: 1 days | Discharge: HOME | DRG: 641 | End: 2021-09-03
Attending: EMERGENCY MEDICINE
Payer: MEDICARE

## 2021-09-01 ENCOUNTER — APPOINTMENT (OUTPATIENT)
Dept: CT IMAGING | Age: 71
DRG: 641 | End: 2021-09-01
Payer: MEDICARE

## 2021-09-01 DIAGNOSIS — Z95.0 PACEMAKER: ICD-10-CM

## 2021-09-01 DIAGNOSIS — R77.8 ELEVATED TROPONIN: ICD-10-CM
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
## 2021-09-01 DIAGNOSIS — R55 PRE-SYNCOPE: ICD-10-CM

## 2021-09-01 DIAGNOSIS — E87.6 HYPOKALEMIA: ICD-10-CM

## 2021-09-01 LAB
ALBUMIN SERPL-MCNC: 3 G/DL (ref 3.5–5.2)
ALP BLD-CCNC: 94 U/L (ref 40–129)
ALT SERPL-CCNC: 8 U/L (ref 0–40)
ANION GAP SERPL CALCULATED.3IONS-SCNC: 12 MMOL/L (ref 7–16)
ANISOCYTOSIS: ABNORMAL
AST SERPL-CCNC: 14 U/L (ref 0–39)
BACTERIA: ABNORMAL /HPF
BASOPHILS ABSOLUTE: 0.02 E9/L (ref 0–0.2)
BASOPHILS RELATIVE PERCENT: 0.2 % (ref 0–2)
BILIRUB SERPL-MCNC: 0.6 MG/DL (ref 0–1.2)
BILIRUBIN URINE: ABNORMAL
BLOOD, URINE: ABNORMAL
BUN BLDV-MCNC: 13 MG/DL (ref 6–23)
CALCIUM SERPL-MCNC: 9.3 MG/DL (ref 8.6–10.2)
CHLORIDE BLD-SCNC: 96 MMOL/L (ref 98–107)
CLARITY: CLEAR
CO2: 29 MMOL/L (ref 22–29)
COLOR: YELLOW
CREAT SERPL-MCNC: 0.8 MG/DL (ref 0.7–1.2)
EOSINOPHILS ABSOLUTE: 2.73 E9/L (ref 0.05–0.5)
EOSINOPHILS RELATIVE PERCENT: 23.9 % (ref 0–6)
GFR AFRICAN AMERICAN: >60
GFR NON-AFRICAN AMERICAN: >60 ML/MIN/1.73
GLUCOSE BLD-MCNC: 109 MG/DL (ref 74–99)
GLUCOSE URINE: NEGATIVE MG/DL
HCT VFR BLD CALC: 27.4 % (ref 37–54)
HEMOGLOBIN: 8.5 G/DL (ref 12.5–16.5)
IMMATURE GRANULOCYTES #: 0.12 E9/L
IMMATURE GRANULOCYTES %: 1 % (ref 0–5)
KETONES, URINE: ABNORMAL MG/DL
LEUKOCYTE ESTERASE, URINE: ABNORMAL
LYMPHOCYTES ABSOLUTE: 1 E9/L (ref 1.5–4)
LYMPHOCYTES RELATIVE PERCENT: 8.7 % (ref 20–42)
MAGNESIUM: 1.4 MG/DL (ref 1.6–2.6)
MCH RBC QN AUTO: 28.2 PG (ref 26–35)
MCHC RBC AUTO-ENTMCNC: 31 % (ref 32–34.5)
MCV RBC AUTO: 91 FL (ref 80–99.9)
MONOCYTES ABSOLUTE: 1.34 E9/L (ref 0.1–0.95)
MONOCYTES RELATIVE PERCENT: 11.7 % (ref 2–12)
NEUTROPHILS ABSOLUTE: 6.23 E9/L (ref 1.8–7.3)
NEUTROPHILS RELATIVE PERCENT: 54.5 % (ref 43–80)
NITRITE, URINE: NEGATIVE
OVALOCYTES: ABNORMAL
PDW BLD-RTO: 16.8 FL (ref 11.5–15)
PH UA: 6.5 (ref 5–9)
PLATELET # BLD: 168 E9/L (ref 130–450)
PMV BLD AUTO: 10.1 FL (ref 7–12)
POLYCHROMASIA: ABNORMAL
POTASSIUM REFLEX MAGNESIUM: 3.2 MMOL/L (ref 3.5–5)
PROTEIN UA: 100 MG/DL
RBC # BLD: 3.01 E12/L (ref 3.8–5.8)
RBC UA: ABNORMAL /HPF (ref 0–2)
SODIUM BLD-SCNC: 137 MMOL/L (ref 132–146)
SPECIFIC GRAVITY UA: 1.01 (ref 1–1.03)
TEAR DROP CELLS: ABNORMAL
TOTAL PROTEIN: 6.4 G/DL (ref 6.4–8.3)
TROPONIN, HIGH SENSITIVITY: 113 NG/L (ref 0–11)
UROBILINOGEN, URINE: 0.2 E.U./DL
WBC # BLD: 11.4 E9/L (ref 4.5–11.5)
WBC UA: ABNORMAL /HPF (ref 0–5)

## 2021-09-01 PROCEDURE — 93005 ELECTROCARDIOGRAM TRACING: CPT | Performed by: STUDENT IN AN ORGANIZED HEALTH CARE EDUCATION/TRAINING PROGRAM

## 2021-09-01 PROCEDURE — 87635 SARS-COV-2 COVID-19 AMP PRB: CPT

## 2021-09-01 PROCEDURE — 80053 COMPREHEN METABOLIC PANEL: CPT

## 2021-09-01 PROCEDURE — 2580000003 HC RX 258: Performed by: STUDENT IN AN ORGANIZED HEALTH CARE EDUCATION/TRAINING PROGRAM

## 2021-09-01 PROCEDURE — 99285 EMERGENCY DEPT VISIT HI MDM: CPT

## 2021-09-01 PROCEDURE — 96365 THER/PROPH/DIAG IV INF INIT: CPT

## 2021-09-01 PROCEDURE — 6360000002 HC RX W HCPCS: Performed by: STUDENT IN AN ORGANIZED HEALTH CARE EDUCATION/TRAINING PROGRAM

## 2021-09-01 PROCEDURE — 70450 CT HEAD/BRAIN W/O DYE: CPT

## 2021-09-01 PROCEDURE — 71250 CT THORAX DX C-: CPT

## 2021-09-01 PROCEDURE — 81001 URINALYSIS AUTO W/SCOPE: CPT

## 2021-09-01 PROCEDURE — 85025 COMPLETE CBC W/AUTO DIFF WBC: CPT

## 2021-09-01 PROCEDURE — 6370000000 HC RX 637 (ALT 250 FOR IP): Performed by: STUDENT IN AN ORGANIZED HEALTH CARE EDUCATION/TRAINING PROGRAM

## 2021-09-01 PROCEDURE — 36415 COLL VENOUS BLD VENIPUNCTURE: CPT

## 2021-09-01 PROCEDURE — 84484 ASSAY OF TROPONIN QUANT: CPT

## 2021-09-01 PROCEDURE — 83735 ASSAY OF MAGNESIUM: CPT

## 2021-09-01 PROCEDURE — 72125 CT NECK SPINE W/O DYE: CPT

## 2021-09-01 RX ORDER — POTASSIUM CHLORIDE 20 MEQ/1
40 TABLET, EXTENDED RELEASE ORAL ONCE
Status: COMPLETED | OUTPATIENT
Start: 2021-09-01 | End: 2021-09-01

## 2021-09-01 RX ORDER — MAGNESIUM SULFATE IN WATER 40 MG/ML
2000 INJECTION, SOLUTION INTRAVENOUS ONCE
Status: COMPLETED | OUTPATIENT
Start: 2021-09-01 | End: 2021-09-01

## 2021-09-01 RX ORDER — 0.9 % SODIUM CHLORIDE 0.9 %
1000 INTRAVENOUS SOLUTION INTRAVENOUS ONCE
Status: COMPLETED | OUTPATIENT
Start: 2021-09-01 | End: 2021-09-01

## 2021-09-01 RX ADMIN — MAGNESIUM SULFATE 2000 MG: 2 INJECTION INTRAVENOUS at 22:39

## 2021-09-01 RX ADMIN — SODIUM CHLORIDE 1000 ML: 9 INJECTION, SOLUTION INTRAVENOUS at 18:32

## 2021-09-01 RX ADMIN — POTASSIUM CHLORIDE 40 MEQ: 20 TABLET, EXTENDED RELEASE ORAL at 22:40

## 2021-09-01 ASSESSMENT — PAIN SCALES - GENERAL: PAINLEVEL_OUTOF10: 4

## 2021-09-01 ASSESSMENT — PAIN DESCRIPTION - LOCATION: LOCATION: OTHER (COMMENT)
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
## 2021-09-02 PROBLEM — I10 ESSENTIAL HYPERTENSION: Status: ACTIVE | Noted: 2021-09-02

## 2021-09-02 PROBLEM — Z95.0 PACEMAKER: Status: ACTIVE | Noted: 2021-09-02

## 2021-09-02 PROBLEM — T82.110D: Status: ACTIVE | Noted: 2021-09-02

## 2021-09-02 PROBLEM — E87.6 HYPOKALEMIA: Status: ACTIVE | Noted: 2021-09-02

## 2021-09-02 PROBLEM — Z87.891 FORMER SMOKER: Status: ACTIVE | Noted: 2021-09-02

## 2021-09-02 PROBLEM — R55 SYNCOPE: Status: ACTIVE | Noted: 2021-09-02

## 2021-09-02 PROBLEM — D64.9 CHRONIC ANEMIA: Status: ACTIVE | Noted: 2021-09-02

## 2021-09-02 PROBLEM — E83.42 HYPOMAGNESEMIA: Status: ACTIVE | Noted: 2021-09-02

## 2021-09-02 PROBLEM — K21.9 GASTROESOPHAGEAL REFLUX DISEASE WITHOUT ESOPHAGITIS: Status: ACTIVE | Noted: 2021-09-02

## 2021-09-02 PROBLEM — R77.8 ELEVATED TROPONIN: Status: ACTIVE | Noted: 2021-09-02

## 2021-09-02 PROBLEM — R79.89 ELEVATED TROPONIN: Status: ACTIVE | Noted: 2021-09-02

## 2021-09-02 LAB
ANION GAP SERPL CALCULATED.3IONS-SCNC: 10 MMOL/L (ref 7–16)
BUN BLDV-MCNC: 12 MG/DL (ref 6–23)
CALCIUM SERPL-MCNC: 9 MG/DL (ref 8.6–10.2)
CHLORIDE BLD-SCNC: 103 MMOL/L (ref 98–107)
CO2: 29 MMOL/L (ref 22–29)
CREAT SERPL-MCNC: 0.7 MG/DL (ref 0.7–1.2)
EKG ATRIAL RATE: 108 BPM
EKG ATRIAL RATE: 110 BPM
EKG P AXIS: 51 DEGREES
EKG P AXIS: 93 DEGREES
EKG P-R INTERVAL: 132 MS
EKG P-R INTERVAL: 134 MS
EKG Q-T INTERVAL: 370 MS
EKG Q-T INTERVAL: 406 MS
EKG QRS DURATION: 86 MS
EKG QRS DURATION: 88 MS
EKG QTC CALCULATION (BAZETT): 495 MS
EKG QTC CALCULATION (BAZETT): 549 MS
EKG R AXIS: 79 DEGREES
EKG R AXIS: 87 DEGREES
EKG T AXIS: 74 DEGREES
EKG T AXIS: 82 DEGREES
EKG VENTRICULAR RATE: 108 BPM
EKG VENTRICULAR RATE: 110 BPM
GFR AFRICAN AMERICAN: >60
GFR NON-AFRICAN AMERICAN: >60 ML/MIN/1.73
GLUCOSE BLD-MCNC: 102 MG/DL (ref 74–99)
HCT VFR BLD CALC: 25.2 % (ref 37–54)
HEMOGLOBIN: 7.7 G/DL (ref 12.5–16.5)
MAGNESIUM: 1.9 MG/DL (ref 1.6–2.6)
MCH RBC QN AUTO: 27.9 PG (ref 26–35)
MCHC RBC AUTO-ENTMCNC: 30.6 % (ref 32–34.5)
MCV RBC AUTO: 91.3 FL (ref 80–99.9)
METER GLUCOSE: 109 MG/DL (ref 74–99)
PDW BLD-RTO: 17.2 FL (ref 11.5–15)
PLATELET # BLD: 158 E9/L (ref 130–450)
PMV BLD AUTO: 9.4 FL (ref 7–12)
POTASSIUM REFLEX MAGNESIUM: 3.2 MMOL/L (ref 3.5–5)
PROCALCITONIN: 0.16 NG/ML (ref 0–0.08)
RBC # BLD: 2.76 E12/L (ref 3.8–5.8)
SARS-COV-2, NAAT: NOT DETECTED
SODIUM BLD-SCNC: 142 MMOL/L (ref 132–146)
TROPONIN, HIGH SENSITIVITY: 105 NG/L (ref 0–11)
TROPONIN, HIGH SENSITIVITY: 120 NG/L (ref 0–11)
WBC # BLD: 10.6 E9/L (ref 4.5–11.5)

## 2021-09-02 PROCEDURE — 93005 ELECTROCARDIOGRAM TRACING: CPT | Performed by: STUDENT IN AN ORGANIZED HEALTH CARE EDUCATION/TRAINING PROGRAM

## 2021-09-02 PROCEDURE — 6370000000 HC RX 637 (ALT 250 FOR IP): Performed by: NURSE PRACTITIONER

## 2021-09-02 PROCEDURE — 80048 BASIC METABOLIC PNL TOTAL CA: CPT

## 2021-09-02 PROCEDURE — 96366 THER/PROPH/DIAG IV INF ADDON: CPT

## 2021-09-02 PROCEDURE — 97530 THERAPEUTIC ACTIVITIES: CPT

## 2021-09-02 PROCEDURE — G0378 HOSPITAL OBSERVATION PER HR: HCPCS

## 2021-09-02 PROCEDURE — 97161 PT EVAL LOW COMPLEX 20 MIN: CPT

## 2021-09-02 PROCEDURE — 84145 PROCALCITONIN (PCT): CPT

## 2021-09-02 PROCEDURE — 6360000002 HC RX W HCPCS: Performed by: NURSE PRACTITIONER

## 2021-09-02 PROCEDURE — 82962 GLUCOSE BLOOD TEST: CPT

## 2021-09-02 PROCEDURE — 2060000000 HC ICU INTERMEDIATE R&B

## 2021-09-02 PROCEDURE — 84484 ASSAY OF TROPONIN QUANT: CPT

## 2021-09-02 PROCEDURE — 99223 1ST HOSP IP/OBS HIGH 75: CPT | Performed by: INTERNAL MEDICINE

## 2021-09-02 PROCEDURE — 36415 COLL VENOUS BLD VENIPUNCTURE: CPT

## 2021-09-02 PROCEDURE — 83735 ASSAY OF MAGNESIUM: CPT

## 2021-09-02 PROCEDURE — APPSS60 APP SPLIT SHARED TIME 46-60 MINUTES: Performed by: NURSE PRACTITIONER

## 2021-09-02 PROCEDURE — 6370000000 HC RX 637 (ALT 250 FOR IP): Performed by: STUDENT IN AN ORGANIZED HEALTH CARE EDUCATION/TRAINING PROGRAM

## 2021-09-02 PROCEDURE — 2580000003 HC RX 258: Performed by: NURSE PRACTITIONER

## 2021-09-02 PROCEDURE — 85027 COMPLETE CBC AUTOMATED: CPT

## 2021-09-02 RX ORDER — TAMSULOSIN HYDROCHLORIDE 0.4 MG/1
0.4 CAPSULE ORAL DAILY
Status: DISCONTINUED | OUTPATIENT
Start: 2021-09-02 | End: 2021-09-03

## 2021-09-02 RX ORDER — MAGNESIUM SULFATE 1 G/100ML
1000 INJECTION INTRAVENOUS ONCE
Status: COMPLETED | OUTPATIENT
Start: 2021-09-02 | End: 2021-09-02

## 2021-09-02 RX ORDER — PRAVASTATIN SODIUM 10 MG
10 TABLET ORAL NIGHTLY
Status: DISCONTINUED | OUTPATIENT
Start: 2021-09-02 | End: 2021-09-03 | Stop reason: HOSPADM

## 2021-09-02 RX ORDER — METOPROLOL SUCCINATE 50 MG/1
50 TABLET, EXTENDED RELEASE ORAL 2 TIMES DAILY
Status: DISCONTINUED | OUTPATIENT
Start: 2021-09-02 | End: 2021-09-03 | Stop reason: HOSPADM

## 2021-09-02 RX ORDER — SENNA AND DOCUSATE SODIUM 50; 8.6 MG/1; MG/1
1 TABLET, FILM COATED ORAL 2 TIMES DAILY
Status: DISCONTINUED | OUTPATIENT
Start: 2021-09-02 | End: 2021-09-03 | Stop reason: HOSPADM

## 2021-09-02 RX ORDER — SODIUM CHLORIDE 9 MG/ML
INJECTION, SOLUTION INTRAVENOUS CONTINUOUS
Status: DISCONTINUED | OUTPATIENT
Start: 2021-09-02 | End: 2021-09-03 | Stop reason: HOSPADM

## 2021-09-02 RX ORDER — SODIUM CHLORIDE 0.9 % (FLUSH) 0.9 %
5-40 SYRINGE (ML) INJECTION EVERY 12 HOURS SCHEDULED
Status: DISCONTINUED | OUTPATIENT
Start: 2021-09-02 | End: 2021-09-03 | Stop reason: HOSPADM

## 2021-09-02 RX ORDER — POLYETHYLENE GLYCOL 3350 17 G/17G
17 POWDER, FOR SOLUTION ORAL DAILY PRN
Status: DISCONTINUED | OUTPATIENT
Start: 2021-09-02 | End: 2021-09-03 | Stop reason: HOSPADM

## 2021-09-02 RX ORDER — AMLODIPINE BESYLATE 5 MG/1
5 TABLET ORAL DAILY
Status: DISCONTINUED | OUTPATIENT
Start: 2021-09-02 | End: 2021-09-03 | Stop reason: HOSPADM

## 2021-09-02 RX ORDER — FERROUS SULFATE 325(65) MG
325 TABLET ORAL 2 TIMES DAILY
Status: DISCONTINUED | OUTPATIENT
Start: 2021-09-02 | End: 2021-09-03 | Stop reason: HOSPADM

## 2021-09-02 RX ORDER — ESOMEPRAZOLE MAGNESIUM 20 MG/1
20 FOR SUSPENSION ORAL DAILY
Status: DISCONTINUED | OUTPATIENT
Start: 2021-09-02 | End: 2021-09-02 | Stop reason: CLARIF

## 2021-09-02 RX ORDER — ACETAMINOPHEN 650 MG/1
650 SUPPOSITORY RECTAL EVERY 6 HOURS PRN
Status: DISCONTINUED | OUTPATIENT
Start: 2021-09-02 | End: 2021-09-03 | Stop reason: HOSPADM

## 2021-09-02 RX ORDER — POTASSIUM CHLORIDE 20 MEQ/1
40 TABLET, EXTENDED RELEASE ORAL ONCE
Status: COMPLETED | OUTPATIENT
Start: 2021-09-02 | End: 2021-09-02

## 2021-09-02 RX ORDER — ASPIRIN 81 MG/1
324 TABLET, CHEWABLE ORAL ONCE
Status: COMPLETED | OUTPATIENT
Start: 2021-09-02 | End: 2021-09-02

## 2021-09-02 RX ORDER — UBIDECARENONE 75 MG
50 CAPSULE ORAL DAILY
Status: DISCONTINUED | OUTPATIENT
Start: 2021-09-02 | End: 2021-09-03 | Stop reason: HOSPADM

## 2021-09-02 RX ORDER — ACETAMINOPHEN 325 MG/1
650 TABLET ORAL EVERY 6 HOURS PRN
Status: DISCONTINUED | OUTPATIENT
Start: 2021-09-02 | End: 2021-09-03 | Stop reason: HOSPADM

## 2021-09-02 RX ORDER — PROCHLORPERAZINE EDISYLATE 5 MG/ML
5 INJECTION INTRAMUSCULAR; INTRAVENOUS EVERY 6 HOURS PRN
Status: DISCONTINUED | OUTPATIENT
Start: 2021-09-02 | End: 2021-09-03 | Stop reason: HOSPADM

## 2021-09-02 RX ORDER — SODIUM CHLORIDE 0.9 % (FLUSH) 0.9 %
5-40 SYRINGE (ML) INJECTION PRN
Status: DISCONTINUED | OUTPATIENT
Start: 2021-09-02 | End: 2021-09-03 | Stop reason: HOSPADM

## 2021-09-02 RX ORDER — PANTOPRAZOLE SODIUM 40 MG/1
40 TABLET, DELAYED RELEASE ORAL
Status: DISCONTINUED | OUTPATIENT
Start: 2021-09-02 | End: 2021-09-03 | Stop reason: HOSPADM

## 2021-09-02 RX ORDER — SODIUM CHLORIDE 9 MG/ML
25 INJECTION, SOLUTION INTRAVENOUS PRN
Status: DISCONTINUED | OUTPATIENT
Start: 2021-09-02 | End: 2021-09-03 | Stop reason: HOSPADM

## 2021-09-02 RX ADMIN — DOCUSATE SODIUM 50 MG AND SENNOSIDES 8.6 MG 1 TABLET: 8.6; 5 TABLET, FILM COATED ORAL at 08:50

## 2021-09-02 RX ADMIN — SODIUM CHLORIDE: 9 INJECTION, SOLUTION INTRAVENOUS at 06:34

## 2021-09-02 RX ADMIN — RIVAROXABAN 20 MG: 20 TABLET, FILM COATED ORAL at 17:25

## 2021-09-02 RX ADMIN — METOPROLOL SUCCINATE 50 MG: 50 TABLET, EXTENDED RELEASE ORAL at 20:47

## 2021-09-02 RX ADMIN — PRAVASTATIN SODIUM 10 MG: 10 TABLET ORAL at 20:47

## 2021-09-02 RX ADMIN — SODIUM CHLORIDE: 9 INJECTION, SOLUTION INTRAVENOUS at 23:11

## 2021-09-02 RX ADMIN — DOCUSATE SODIUM 50 MG AND SENNOSIDES 8.6 MG 1 TABLET: 8.6; 5 TABLET, FILM COATED ORAL at 17:25

## 2021-09-02 RX ADMIN — POTASSIUM CHLORIDE 40 MEQ: 1500 TABLET, EXTENDED RELEASE ORAL at 12:45

## 2021-09-02 RX ADMIN — FERROUS SULFATE TAB 325 MG (65 MG ELEMENTAL FE) 325 MG: 325 (65 FE) TAB at 17:25

## 2021-09-02 RX ADMIN — FERROUS SULFATE TAB 325 MG (65 MG ELEMENTAL FE) 325 MG: 325 (65 FE) TAB at 08:50

## 2021-09-02 RX ADMIN — ASPIRIN 81 MG CHEWABLE TABLET 324 MG: 81 TABLET CHEWABLE at 03:18

## 2021-09-02 RX ADMIN — MAGNESIUM SULFATE HEPTAHYDRATE 1000 MG: 1 INJECTION, SOLUTION INTRAVENOUS at 12:45

## 2021-09-02 RX ADMIN — Medication 50 MCG: at 08:51

## 2021-09-02 RX ADMIN — TAMSULOSIN HYDROCHLORIDE 0.4 MG: 0.4 CAPSULE ORAL at 08:50

## 2021-09-02 RX ADMIN — PANTOPRAZOLE SODIUM 40 MG: 40 TABLET, DELAYED RELEASE ORAL at 08:50

## 2021-09-02 ASSESSMENT — ENCOUNTER SYMPTOMS
WHEEZING: 0
BACK PAIN: 0
SHORTNESS OF BREATH: 0
COUGH: 0
EYE REDNESS: 0
ABDOMINAL PAIN: 0
EYE DISCHARGE: 0
VOMITING: 0
NAUSEA: 0
EYE PAIN: 0
SORE THROAT: 0
SINUS PRESSURE: 0
DIARRHEA: 0

## 2021-09-02 ASSESSMENT — PAIN SCALES - GENERAL
PAINLEVEL_OUTOF10: 0
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
## 2021-09-02 NOTE — PROGRESS NOTES
Physical Therapy Initial Assessment     Name: Lidia Araiza  : 1950  MRN: [id number]      Date of Service: 2021    Evaluating PT:  Linda Schofield, PT, DPT [id number]      Room #:  3655/8315-L  Diagnosis:  Elevated troponin [R77.8]  PMHx/PSHx:    Past Medical History           Date Comments     Hypertension [I10]       Hyperlipidemia [E78.5]       Sinus tachycardia [R00.0] 2002      Coronary artery disease [I25.10]       Arthritis [M19.90]  KNEES HIPS BACK     Difficult airway [T88. 4XXA]  PT STATES HE WAS TOLD THIS TWICE AT [facility]     Difficult intubation [T88. 4XXA] 2017 note in care everywhere \"Clinical Summary\" 03/10/2021     BPH (benign prostatic hyperplasia) [N40.0]       History of blood transfusion [Z92.89]       Cancer ([address] [C80.1]           Past Surgical History         Laterality Date Comments   Pacemaker insertion [SXZ466]      Tonsillectomy [SHX27]      Upper gastrointestinal endoscopy [TEO963]   reflux   PACEMAKER PLACEMENT [SHX43]   new battery last checked Dr Mary Longoria 2016?    Vasectomy [SHX75]      OTHER SURGICAL HISTORY [VMT141]  2016 CT Myelogram, Dr. Maylin Braswell surgery [[id number]]   aortic valve replacement   ABLATION OF DYSRHYTHMIC FOCUS [XAI813]      Aortic valve replacement [SHX41]      Mitral valve replacement [JJZ920]      Upper gastrointestinal endoscopy [VMF424] N/A 2021 EGD BIOPSY performed by Dorota Cobian MD at [address], [facility] surgery [[id number]] Right 2021 [facility] RIGHT STENT INSERTION performed by Laila Armas MD at [address]   Colonoscopy [XEA716]  2021 COLONOSCOPY WITH BIOPSY performed by Santi Gonzalez DO at [address]   Colonoscopy [HMX276]  2021 COLONOSCOPY W/ ENDOSCOPIC MUCOSAL RESECTION performed by Santi Gonzalez DO at [address] N/A 3/11/2021 LAPAROSCOPIC RIGHT HEMICOLECTOMY performed by Jeremias Avina MD at [facility] [[id number]] Right demonstrated skill Pt requires further education in this area   Yes  Yes with verbal cues and assist Reinforcement as needed     ASSESSMENT:    Conditions Requiring Skilled Therapeutic Intervention:     [x]Decreased strength     []Decreased ROM  [x]Decreased functional mobility  [x]Decreased balance   [x]Decreased endurance   []Decreased posture  []Decreased sensation  []Decreased coordination   []Decreased vision  []Decreased safety awareness   [x]Increased pain       Comments:  Patient cleared by RN and agreeable to treatment. Patient found seated EOB and voiced frustration with losing his 's license in the ED when admitted. Patient reported just finishing chemotherapy and experiencing weakness. Patient assisted to standing and mildly unsteady on his feet and reported mild dizziness with positional change that did not fully resolve. Patient reported that the dizziness has been happening for a while and he is using the cane for balance as needed. Patient demonstrated slower gait speed, with equal stride length and mild imbalance noted. Patient reached for the environment to steady self several times, but no overt LOB noted. Patient with increased SOB following ambulation but unable to get a pulse oximetry reading. After sitting EOB several minutes, SOB resolved. Patient remained seated EOB with call light and tray table in reach. Treatment:  Patient practiced and was instructed in the following treatment:    · Bed mobility: Independent with task. · Transfer training: Verbal/tactile cues to facilitate proper hand placement, technique and safety during sit to stand task. · Gait training: Verbal and tactile cues to facilitate upright posture and safety to complete task. · Therapeutic exercises: As noted above  · Neuromuscular education: NT    Pt's/ family goals   1. To go home. Prognosis is good for reaching above PT goals.     Patient and or family understand(s) diagnosis, prognosis, and plan of care.  Yes     PHYSICAL THERAPY PLAN OF CARE:    PT POC is established based on physician order and patient diagnosis     Referring provider/PT Order:    09/02/21 0615  PT eval and treat Start: 09/02/21 0615, End: 09/02/21 0615, ONE TIME, Standing Count: 1 Occurrences, R      Mariel McintyreElvisLui, APRN - CNP       Diagnosis:  Elevated troponin [R77.8]  Specific instructions for next treatment:  Subsequent PT sessions with focus on improved balance with ambulation, stairs    Current Treatment Recommendations:     [x] Strengthening to improve independence with functional mobility   [] ROM to improve independence with functional mobility   [x] Balance Training to improve static/dynamic balance and to reduce fall risk  [x] Endurance Training to improve activity tolerance during functional mobility   [x] Transfer Training to improve safety and independence with all functional transfers   [x] Gait Training to improve gait mechanics, endurance and asses need for appropriate assistive device  [x] Stair Training in preparation for safe discharge home and/or into the community   [x] Positioning to prevent skin breakdown and contractures  [x] Safety and Education Training   [x] Patient/Caregiver Education   [] HEP  [] Other     PT long term treatment goals are located in above grid    Frequency of treatments: 2-5x/week x 1-2 weeks. Time in  1305  Time out  1330    Total Treatment Time  15 minutes     Evaluation Time includes thorough review of current medical information, gathering information on past medical history/social history and prior level of function, completion of standardized testing/informal observation of tasks, assessment of data and education on plan of care and goals.     CPT codes:  [x] Low Complexity PT evaluation 95922  [] Moderate Complexity PT evaluation 67868  [] High Complexity PT evaluation 04297  [] PT Re-evaluation 53754  [] Gait training 71682 - minutes  [] Manual therapy 42894 - minutes  [x] Therapeutic activities 13296 15 minutes  [] Therapeutic exercises 39728 - minutes  [] Neuromuscular reeducation 69564 - minutes     Andre Farmer PT, DPT  License [id number]

## 2021-09-02 NOTE — CARE COORDINATION
Met with pt to discuss discharge planning/transtion of care. Pt lives with shikha and her daughter. Pt independent, active , uses a cane, and no other DME. Plan is home at discharge. PCP is Dr. Brody Salcedo and Andreias in [place] [facility]. Pt unsure how to get home, he thinks one of his friends should be able to transport. Discharge plan is home independent. Faina Campbell, MSW, LSW

## 2021-09-03 VITALS
HEIGHT: 67 IN | OXYGEN SATURATION: 96 % | WEIGHT: 163 LBS | SYSTOLIC BLOOD PRESSURE: 100 MMHG | RESPIRATION RATE: 16 BRPM | BODY MASS INDEX: 25.58 KG/M2 | HEART RATE: 83 BPM | TEMPERATURE: 97.6 F | DIASTOLIC BLOOD PRESSURE: 61 MMHG

## 2021-09-03 LAB
ANION GAP SERPL CALCULATED.3IONS-SCNC: 8 MMOL/L (ref 7–16)
ANISOCYTOSIS: ABNORMAL
BASOPHILS ABSOLUTE: 0.12 E9/L (ref 0–0.2)
BASOPHILS RELATIVE PERCENT: 0.9 % (ref 0–2)
BUN BLDV-MCNC: 10 MG/DL (ref 6–23)
CALCIUM SERPL-MCNC: 9.5 MG/DL (ref 8.6–10.2)
CHLORIDE BLD-SCNC: 102 MMOL/L (ref 98–107)
CO2: 29 MMOL/L (ref 22–29)
CREAT SERPL-MCNC: 0.7 MG/DL (ref 0.7–1.2)
EOSINOPHILS ABSOLUTE: 4.93 E9/L (ref 0.05–0.5)
EOSINOPHILS RELATIVE PERCENT: 36.8 % (ref 0–6)
GFR AFRICAN AMERICAN: >60
GFR NON-AFRICAN AMERICAN: >60 ML/MIN/1.73
GLUCOSE BLD-MCNC: 100 MG/DL (ref 74–99)
HCT VFR BLD CALC: 26.8 % (ref 37–54)
HEMOGLOBIN: 8.2 G/DL (ref 12.5–16.5)
LYMPHOCYTES ABSOLUTE: 1.34 E9/L (ref 1.5–4)
LYMPHOCYTES RELATIVE PERCENT: 9.6 % (ref 20–42)
MCH RBC QN AUTO: 28.6 PG (ref 26–35)
MCHC RBC AUTO-ENTMCNC: 30.6 % (ref 32–34.5)
MCV RBC AUTO: 93.4 FL (ref 80–99.9)
MONOCYTES ABSOLUTE: 0.94 E9/L (ref 0.1–0.95)
MONOCYTES RELATIVE PERCENT: 7 % (ref 2–12)
NEUTROPHILS ABSOLUTE: 6.16 E9/L (ref 1.8–7.3)
NEUTROPHILS RELATIVE PERCENT: 45.6 % (ref 43–80)
OVALOCYTES: ABNORMAL
PDW BLD-RTO: 17.2 FL (ref 11.5–15)
PLATELET # BLD: 180 E9/L (ref 130–450)
PMV BLD AUTO: 9.8 FL (ref 7–12)
POIKILOCYTES: ABNORMAL
POLYCHROMASIA: ABNORMAL
POTASSIUM SERPL-SCNC: 3.8 MMOL/L (ref 3.5–5)
RBC # BLD: 2.87 E12/L (ref 3.8–5.8)
SODIUM BLD-SCNC: 139 MMOL/L (ref 132–146)
WBC # BLD: 13.4 E9/L (ref 4.5–11.5)

## 2021-09-03 PROCEDURE — 6370000000 HC RX 637 (ALT 250 FOR IP): Performed by: NURSE PRACTITIONER

## 2021-09-03 PROCEDURE — G0378 HOSPITAL OBSERVATION PER HR: HCPCS

## 2021-09-03 PROCEDURE — 80048 BASIC METABOLIC PNL TOTAL CA: CPT

## 2021-09-03 PROCEDURE — 85025 COMPLETE CBC W/AUTO DIFF WBC: CPT

## 2021-09-03 PROCEDURE — 99232 SBSQ HOSP IP/OBS MODERATE 35: CPT | Performed by: INTERNAL MEDICINE

## 2021-09-03 PROCEDURE — 36415 COLL VENOUS BLD VENIPUNCTURE: CPT

## 2021-09-03 PROCEDURE — 2580000003 HC RX 258: Performed by: NURSE PRACTITIONER

## 2021-09-03 RX ORDER — AMLODIPINE BESYLATE 5 MG/1
2.5 TABLET ORAL DAILY
Qty: 30 TABLET | Refills: 3 | Status: ON HOLD | OUTPATIENT
Start: 2021-09-03 | End: 2022-01-18

## 2021-09-03 RX ADMIN — METOPROLOL SUCCINATE 50 MG: 50 TABLET, EXTENDED RELEASE ORAL at 09:45

## 2021-09-03 RX ADMIN — Medication 50 MCG: at 09:45

## 2021-09-03 RX ADMIN — SODIUM CHLORIDE, PRESERVATIVE FREE 10 ML: 5 INJECTION INTRAVENOUS at 09:43

## 2021-09-03 RX ADMIN — PANTOPRAZOLE SODIUM 40 MG: 40 TABLET, DELAYED RELEASE ORAL at 06:35

## 2021-09-03 RX ADMIN — SODIUM CHLORIDE, PRESERVATIVE FREE 10 ML: 5 INJECTION INTRAVENOUS at 09:45

## 2021-09-03 RX ADMIN — AMLODIPINE BESYLATE 5 MG: 5 TABLET ORAL at 09:45

## 2021-09-03 RX ADMIN — FERROUS SULFATE TAB 325 MG (65 MG ELEMENTAL FE) 325 MG: 325 (65 FE) TAB at 09:45

## 2021-09-03 ASSESSMENT — PAIN SCALES - GENERAL
PAINLEVEL_OUTOF10: 0
PAINLEVEL_OUTOF10: 0

## 2021-09-03 NOTE — PROGRESS NOTES
Message sent to Dr. Kassy Adams NP Emaline Elpidio regarding orthostatic blood pressure results. Also patient was unsure of norvasc and said he stopped flomax and then restarted. Question if anymore medication changes need to be made. Dr. Mason Dodd would like to discharge, so question if patient is ok for discharge from a cardiology stand point. Spoke with Dr. Dedra Hurst and ok for discharge no need for flomax. 1225 Also spoke with Dr. Mason Dodd and ok to discontinue flomax and patient should discharge with alisha sorto.    Nura Enciso, RN

## 2021-09-03 NOTE — PROGRESS NOTES
INPATIENT CARDIOLOGY FOLLOW-UP    Name: Kristy Lambert    Age: 80 y.o. Date of Admission: 9/1/2021  5:15 PM    Date of Service: 9/3/2021    Chief Complaint: Follow-up for syncope, CAD, VHD    Interim History:  Currently with no chest pain, respiratory distress, or palpitations. SR/ST on telemetry. No new overnight cardiac complaints. Review of Systems:   Cardiac: As per HPI  General: No fever, chills  Pulmonary: As per HPI  HEENT: No visual disturbances, difficult swallowing  GI: No nausea, vomiting  : No dysuria, hematuria  Endocrine: No thyroid disease or DM  Musculoskeletal: MERCHANT x 4, no focal motor deficits  Skin: Intact, no rashes  Neuro: No headache, seizures  Psych: Currently with no depression, anxiety    Problem List:  Patient Active Problem List   Diagnosis    Hematuria    BPH (benign prostatic hyperplasia)    SUNNY (acute kidney injury) ([address]    Colonic mass    Hypertension    Hyperlipidemia    Coronary artery disease    Prolonged Q-T interval on ECG    Hypotension    CKD (chronic kidney disease) stage 3, GFR 30-59 ml/min (HCC)    Paroxysmal atrial fibrillation (HCC)    Diffuse large B-cell lymphoma (HCC)    Diffuse large B cell lymphoma (HCC)    Severe protein-calorie malnutrition (HCC)    Anemia    Thrombocytopenia (HCC)    Syncope    Chronic anemia    Elevated troponin    Gastroesophageal reflux disease without esophagitis    Essential hypertension    Hypokalemia    Hypomagnesemia    Pacemaker    Former smoker     1. 25 pack years quit in 2014  2. BMI 31.3 on 3/30/2021  3. HTN  4. HLD  5. Dysphagia: Unclear etiology (7/2021)  6. SSS s/p PPM in 2002 with generator change in 2013 ([address] [place] [address] [facility])  ? Interrogation 7/6/2021: Pacing percent: RA equals 12%, RV equals 6.5%. A. fib burden 1.9%.   7. Problems with intermittent noise and over sensing of V lead and then over and under sensing A lead s/p pacer removal and new PPM dual chamber [facility] placed 12/2017 [facility]  8. 4/2017 LHC/RHC at [facility]; Single vessel disease involving LAD. RHS consistent with Severe pulmonary venous HTN. 9. 4/2017 CABG x 1 LIMA to LAD with AVR (Triflecta prosthetic #21). MVR (Bicor #29), MAZE and LAAC at [facility]  10. PAF ? diagnosis 2017. Previously on Sotalol --> later discontinued due to QTC prolongation in 3/2021.  ? AT/AF burden on device check 1.9% --> appeared to be due to AT. ? [address] with Xarelto  ? Patient denies any DCCV  11. 12/14/2020 TTE [facility]: EF 66%. MVR with Peak gradient 21 and Mean gradient 6. AVR with P gradient 34 and M gradient 19 with DI 0.36  12. 12/14/2020 Lexiscan MPS; Mild ischemia in LAD territory (<10%). EF 71%. Low risk scan  13. 2/17/2021 R hydronephrosis  S/p R renal stenting  14. 2/19/2021 Colonoscopy; obstructing mass in ascending colon  15. COVDI Vaccine completed 2/2021  16. CARMEN: (rule out endocarditis): LVEF 65-70%, normal RV function, left atrial appendage is surgically excluded with no residual pouch, no evidence of atrial septal defect or PFO,  29 mm [facility] Biocor mitral bioprosthesis present. Leaflets well seen and open well. Mean gradient 7-9 mmHg at  bpm. Peak velocity 2.3 m/s. VTI ratio 1.77. MVA by VTI 1.8 cm2. PHT 70-80 ms. Mild mitral regurgitation. Mild-moderate tricuspid regurgitation.  21 mm [facility] Trifecta aortic bioprosthesis present. No hemodynamically significant prosthetic aortic stenosis is present. Peak velocity 3.2 m/s. Mean gradient 24 mmHg. EOA 1.5 cm2. EOAi 0.80 cm2/m2  DVI 0.48. AT < 100 ms. Mild aortic valve regurgitation. No vegetation. Peak TR velocity 2.7 m/s. No vegetation. (Elevated gradients of mitral bioprosthesis and aortic bioprosthesis). 17. 3/14/2021 R hemicolectomy--> B-cell lymphoma  18. Aggressive bulky diffuse large B-cell lymphoma, nongerminal cell type with negative FISH interface for double hit or triple hit lymphoma with bulky intra-abdominal disease.   ? Treatment: combination chemotherapy with R-CHOP along with Revlimid.  This has been complicated by significant pancytopenia is despite G-CSF prophylaxis. ? He has completed 1 week ago cycle #5 of R-CHOP/Revlimid (as of 7/2021)  ? Blood culture showing GNR --> CARMEN negative for vegetation or mass. Mediport removed on 7/9/2021. No chemo since that time. Scheduled to follow-up with Oncology in 9/2021.      Allergies:  No Known Allergies    Current Medications:  Current Facility-Administered Medications   Medication Dose Route Frequency Provider Last Rate Last Admin    sodium chloride flush 0.9 % injection 5-40 mL  5-40 mL IntraVENous 2 times per day April Mitch, APRN - CNP        sodium chloride flush 0.9 % injection 5-40 mL  5-40 mL IntraVENous PRN April Mitch, APRN - CNP        0.9 % sodium chloride infusion  25 mL IntraVENous PRN April Mitch, APRN - CNP        polyethylene glycol (GLYCOLAX) packet 17 g  17 g Oral Daily PRN April Mitch APRN - CNP        acetaminophen (TYLENOL) tablet 650 mg  650 mg Oral Q6H PRN April Mitch APRN - CNP        Or    acetaminophen (TYLENOL) suppository 650 mg  650 mg Rectal Q6H PRN April Mitch, APRN - CNP        prochlorperazine (COMPAZINE) injection 5 mg  5 mg IntraVENous Q6H PRN April Mitch, APRN - CNP        0.9 % sodium chloride infusion   IntraVENous Continuous April TRACIE Meyer - CNP 50 mL/hr at 09/02/21 2311 New Bag at 09/02/21 2311    amLODIPine (NORVASC) tablet 5 mg  5 mg Oral Daily April Mitch APRN - CNP        ferrous sulfate (IRON 325) tablet 325 mg  325 mg Oral BID April MAU MeyerN - CNP   325 mg at 09/02/21 1725    metoprolol succinate (TOPROL XL) extended release tablet 50 mg  50 mg Oral BID April MAU MeyerN - CNP   50 mg at 09/02/21 2047    pravastatin (PRAVACHOL) tablet 10 mg  10 mg Oral Nightly April TRACIE Meyer - CNP   10 mg at 09/02/21 2047    sennosides-docusate sodium (SENOKOT-S) 8.6-50 MG tablet 1 tablet  1 tablet Oral BID April Chelsilius APRN - CNP   1 tablet at 09/02/21 1725    tamsulosin [facility]) capsule 0.4 mg  0.4 mg Oral Daily April Mitch APRN - CNP   0.4 mg at 09/02/21 0850    vitamin B-12 (CYANOCOBALAMIN) tablet 50 mcg  50 mcg Oral Daily April Mitch APRN - CNP   50 mcg at 09/02/21 0851    rivaroxaban (XARELTO) tablet 20 mg  20 mg Oral Q24H April Chelsilius, APRN - CNP   20 mg at 09/02/21 1725    pantoprazole (PROTONIX) tablet 40 mg  40 mg Oral QAM AC April Mitch APRN - CNP   40 mg at 09/03/21 5712      sodium chloride      sodium chloride 50 mL/hr at 09/02/21 2311       Physical Exam:  /64   Pulse 115   Temp 97.6 °F (36.4 °C) (Temporal)   Resp 18   Ht 5' 7\" (1.702 m)   Wt 167 lb 7 oz (75.9 kg)   SpO2 95%   BMI 26.22 kg/m²   Wt Readings from Last 3 Encounters:   09/03/21 167 lb 7 oz (75.9 kg)   08/13/21 166 lb (75.3 kg)   07/14/21 170 lb 3.2 oz (77.2 kg)     Appearance: Awake, alert, no acute respiratory distress  Skin: Intact, no rash  Head: Normocephalic, atraumatic  Eyes: EOMI, no conjunctival erythema  ENMT: No pharyngeal erythema, MMM, no rhinorrhea  Neck: Supple, no elevated JVP, no carotid bruits  Lungs: Clear to auscultation bilaterally. No wheezes, rales, or rhonchi.   Cardiac: Regular rate and rhythm, +S1S2, no murmurs apparent  Abdomen: Soft, nontender, +bowel sounds  Extremities: Moves all extremities x 4, no lower extremity edema  Neurologic: No focal motor deficits apparent, normal mood and affect       Intake/Output:    Intake/Output Summary (Last 24 hours) at 9/3/2021 0649  Last data filed at 9/3/2021 0443  Gross per 24 hour   Intake 433.33 ml   Output 950 ml   Net -516.67 ml     I/O this shift:  In: -   Out: 50 [Urine:50]    Laboratory Tests:  Recent Labs     09/01/21  1744 09/02/21  0823    142   K 3.2* 3.2*   CL 96* 103   CO2 29 29   BUN 13 12   CREATININE 0.8 0.7   GLUCOSE 109* 102*   CALCIUM 9.3 9.0     Lab Results   Component Value Date    MG 1.9 09/02/2021     Recent Labs     09/01/21  1744   ALKPHOS 94   ALT 8   AST 14   PROT 6.4   BILITOT 0.6   LABALBU 3.0*     Recent Labs     09/01/21  1744 09/02/21  0823   WBC 11.4 10.6   RBC 3.01* 2.76*   HGB 8.5* 7.7*   HCT 27.4* 25.2*   MCV 91.0 91.3   MCH 28.2 27.9   MCHC 31.0* 30.6*   RDW 16.8* 17.2*    158   MPV 10.1 9.4     Lab Results   Component Value Date    TROPONINI <0.01 02/14/2021     Recent Labs     09/01/21  1744 09/02/21  0149 09/02/21  0823   TROPHS 113* 120* 105*     Lab Results   Component Value Date    INR 1.2 07/06/2021    INR 1.2 02/14/2021    INR 1.1 08/12/2016    PROTIME 12.8 (H) 07/06/2021    PROTIME 14.7 (H) 02/14/2021    PROTIME 11.3 08/12/2016     Lab Results   Component Value Date    TSH 3.630 02/14/2021     No results found for: LABA1C  No results found for: EAG  Lab Results   Component Value Date    CHOL 128 07/06/2021    CHOL 136 01/28/2011     Lab Results   Component Value Date    TRIG 187 (H) 07/06/2021     Lab Results   Component Value Date    HDL 70 07/06/2021     Lab Results   Component Value Date    LDLCALC 21 07/06/2021     Lab Results   Component Value Date    LABVLDL 37 07/06/2021     No results found for: CHOLHDLRATIO  No results for input(s): PROBNP in the last 72 hours. Cardiac Tests:  Telemetry reviewed (date: 9/3/2021): SR/ST    CT cervical spine without contrast: 9/1/2021   There is no acute fracture or dislocation in the cervical spine.  Mild   diffuse degenerative changes are identified from C3-T1 with osteophytes and   multilevel disc bulges.  There is calcification of the posterior longitudinal   ligament, more prominent at C5-C6.  The prevertebral soft tissues are normal.   There is significant calcification and stenosis of the carotid arteries. There is emphysema in the lung apices.       Impression       No acute fracture or dislocation.       Diffuse degenerative changes with is multilevel disc bulges from C3-T1.        CT Head WO contrast: 9/2/2021  No acute intracranial abnormality. Rico Ellington is age-appropriate atrophy and   small-vessel ischemic disease.         CT Chest WO contrast: 9/2/2021  Advanced centrilobular emphysema with patchy and masslike infiltrates and   nodular densities in the left lower lobe and lingula likely multifocal   pneumonia. Marie Starring malignancy is not excluded and close surveillance with   repeat CT scan in 8-10 weeks after appropriate treatment is recommended to   see resolution.       Multiple 4-6 mm bilateral pulmonary nodules which are indeterminate. Surveillance according to Fleischner society guidelines are recommended.             CARMEN: 7/13/2021 (rule out endocarditis): LVEF 65-70%, normal RV function, left atrial appendage is surgically excluded with no residual pouch, no evidence of atrial septal defect or PFO,  29 mm [facility] Biocor mitral bioprosthesis present. Leaflets well seen and open well. Mean gradient 7-9 mmHg at  bpm. Peak velocity 2.3 m/s. VTI ratio 1.77. MVA by VTI 1.8 cm2. PHT 70-80 ms. Mild mitral regurgitation. Mild-moderate tricuspid regurgitation.  21 mm [facility] Trifecta aortic bioprosthesis present. No hemodynamically significant prosthetic aortic stenosis is present. Peak velocity 3.2 m/s. Mean gradient 24 mmHg. EOA 1.5 cm2. EOAi 0.80 cm2/m2  DVI 0.48. AT < 100 ms. Mild aortic valve regurgitation. No vegetation. Peak TR velocity 2.7 m/s. No vegetation. (Elevated gradients of mitral bioprosthesis and aortic bioprosthesis).      12/14/2020 TTE [facility]: EF 66%. MVR with Peak gradient 21 and Mean gradient 6. AVR with P gradient 34 and M gradient 19 with DI 0.3     12/14/2020 Lexiscan MPS; Mild ischemia in LAD territory (<10%). EF 71%. Low risk scan      ASSESSMENT / PLAN:  1. Syncope: PPM interrogated (9/2/2021) showing no evidence of arrhythmias (during the time of syncopal episode). Most likely secondary to dehydration (poor oral intake).  CT head negative for acute abnormalities   2. Elevated hs-cTnT (113, 120, 105): pattern not consistent with ACS. CP free. 3. CAD with history of CABG x 1 LIMA to LAD (4/2017). 12/14/2020 Lexiscan MPS; Mild ischemia in LAD territory (<10%). EF 71%. Low risk scan. 4. VHD: s/p AVR and MVR (4/2017) with mildly elevated gradients across valves on CARMEN 7/13/2021). 5. PAF with history of MAZE and LAAC (4/2017)   6. Chronic OAC (Xarelto)  7. SSS s/p PPM (2002) s/p extraction (2017) and replaced 12/2017 ([facility]). 8. Hypokalemia / Hypomagnesemia  9. Acute on chronic anemia   10. Hypoalbuminemia   11. CKD: stable  12. Hx of right renal stent   13. Aggressive bulky diffuse large B-cell lymphoma, non-germinal cell type status post chemotherapy with R-CHOP along with Revlimid (last cycle 7/2021). Mediport removed 7/9/2021 due to blood cultures positive for GNR. 14. DDD  15.  Elevated procalcitonin 0.16    - PPM interrogated on 9/2/21 (no new arrhythmias correlating with syncopal event)  - SBP 90's on admission, most recent -120  - S/p volume resuscitation with IV fluids  - Lisinopril/HCTZ stopped on admission  - Continue current medications otherwise  - K replaced -- follow-up repeat BMP  - Monitor CBC  - Prior cardiac studies reviewed  - Outpatient cardiology and EP follow-up       Jennie Uribe MD  [facility]) Cardiology
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
## 2021-09-05 NOTE — PROGRESS NOTES
Physician Progress Note      PATIENT:               Soledad Platt  CSN #:                  [id number]  :                       1950  ADMIT DATE:       2021 5:15 PM  [address] DATE:        9/3/2021 3:28 PM  RESPONDING  PROVIDER #:        SHELL Abbott MD          QUERY TEXT:    Pt admitted with syncope. Pt noted to have syncope likely secondary to   dehydration per cardiology. If possible, please document in progress notes and   discharge summary the relationship, if any, between syncope and dehydration    The medical record reflects the following:  Risk Factors: syncope  Clinical Indicators: Per H&P Admit to telemetry for evaluation of syncope in   patient with complex known history of coronary artery disease status post   CABG, pacemaker in place status; Per Cardiology Consult PPM interrogated no   evidence of arrhythmias correlating to syncopal event on 2021,Most likely   secondary to dehydration (poor oral intake). CT head negative for acute   abnormalities  Treatment: Cardiology Consult, Pacemaker interrogation, continued inpatient   monitoring on an intermediate tele unit    Thank you  Shanell NEWTON, RN, CCDS  Clinical Documentation Improvement  Options provided:  -- Syncope due to Dehydration  -- Syncope unrelated to dehydration  -- Syncope due to, Please specify. -- Other - I will add my own diagnosis  -- Disagree - Not applicable / Not valid  -- Disagree - Clinically unable to determine / Unknown  -- Refer to Clinical Documentation Reviewer    PROVIDER RESPONSE TEXT:    This patient has syncope due to dehydration.     Query created by: Franklin Fam on 3/6/5126 0:84 PM      Electronically signed by:  SHELL Abbott MD 2021 10:41 AM

## 2021-09-09 ENCOUNTER — TELEPHONE (OUTPATIENT)
Dept: ADMINISTRATIVE | Age: 71
End: 2021-09-09

## 2021-09-09 NOTE — TELEPHONE ENCOUNTER
Pt states he wants to set up hosp f/u [facility] 9/4 with Dr Hansel Machado. Seen him @ hosp 9/3. He normally sees Dr Cornelia Leblanc. He would like to change.   [phone]

## 2021-09-13 NOTE — PROGRESS NOTES
Dr. Vargas Castillo updated on pts history, last cbc results, difficult airway/intubation. Will try to look for reason of difficult airway in care everywhere. . no further orders at this time

## 2021-09-13 NOTE — PROGRESS NOTES
When speaking with pt for PAT instructions, reviewed procedure with pt whom said he thought the stent was being removed and not put back in. . I stated I was reviewing procedure that Dr. Robb De Leon placed in his chart and if he has any questions he should call and speak with Dr. Robb De Leon. . the pt then stated \" that nurse was argumentative with him and wanted to know if we were done\" Nurse stated we are done and have a great day!

## 2021-09-13 NOTE — PROGRESS NOTES
[facility] PRE-ADMISSION TESTING INSTRUCTIONS    The Preadmission Testing patient is instructed accordingly using the following criteria (check applicable):    ARRIVAL INSTRUCTIONS:  [x] Parking the day of Surgery is located in the Main Entrance lot. Upon entering the door, make an immediate right to the surgery reception desk    [x] Bring photo ID and insurance card    [x] Bring in a copy of Living will or Durable Power of  papers. [x] Please be sure to arrange for responsible adult to provide transportation to and from the hospital    [x] Please arrange for responsible adult to be with you for the 24 hour period post procedure due to having anesthesia      GENERAL INSTRUCTIONS:    [x] Nothing by mouth after midnight, including gum, candy, mints or water    [x] You may brush your teeth, but do not swallow any water    [x] Take medications as instructed with 1-2 oz of water    [x] Stop herbal supplements and vitamins 5 days prior to procedure    [] Follow preop dosing of blood thinners per physician instructions    [] Take 1/2 dose of evening insulin, but no insulin after midnight    [] No oral diabetic medications after midnight    [] If diabetic and have low blood sugar or feel symptomatic, take 1-2oz apple juice only    [x] Bring inhalers day of surgery    [] Bring C-PAP/ Bi-Pap day of surgery    [] Bring urine specimen day of surgery    [x] Shower or bath with soap, lather and rinse well, AM of Surgery, no lotion, powders or cream    [] Follow bowel prep as instructed per surgeon    [x] No tobacco products within 24 hours of surgery     [x] No alcohol or illegal drug use within 24 hours of surgery.     [x] Jewelry, body piercing's, eyeglasses, contact lenses and dentures are not permitted into surgery (bring cases)      [] Please do not wear any nail polish, make up or hair products on the day of surgery    [x] You can expect a call the business day prior to procedure to notify you if your arrival time changes    [x] If you receive a survey after surgery we would greatly appreciate your comments    [] Parent/guardian of a minor must accompany their child and remain on the premises  the entire time they are under our care     [] Pediatric patients may bring favorite toy, blanket or comfort item with them    [] A caregiver or family member must remain with the patient during their stay if they are mentally handicapped, have dementia, disoriented or unable to use a call light or would be a safety concern if left unattended    [x] Please notify surgeon if you develop any illness between now and time of surgery (cold, cough, sore throat, fever, nausea, vomiting) or any signs of infections  including skin, wounds, and dental.    [x]  The Outpatient Pharmacy is available to fill your prescription here on your day of surgery, ask your preop nurse for details    [x] Other instructions: wear loose, comfortable clothing  EDUCATIONAL MATERIALS PROVIDED:    [] PAT Preoperative Education Packet/Booklet     [] Medication List    [] Transfusion bracelet applied with instructions    [] Shower with soap, lather and rinse well, and use CHG wipes provided the evening before surgery as instructed    [] Incentive spirometer with instructions

## 2021-09-13 NOTE — PROGRESS NOTES
Have you been tested for COVID  No   vaccinated        Have you been told you were positive for COVID No  Have you had any known exposure to someone that is positive for COVID No  Do you have a cough                   No              Do you have shortness of breath No                 Do you have a sore throat            No                Are you having chills                    No                Are you having muscle aches. No                    Please come to the hospital wearing a mask and have your significant other wear a mask as well. Both of you should check your temperature before leaving to come here,  if it is 100 or higher please call [phone] for instruction.

## 2021-09-15 ENCOUNTER — ANESTHESIA EVENT (OUTPATIENT)
Dept: OPERATING ROOM | Age: 71
End: 2021-09-15
Payer: MEDICARE

## 2021-09-15 ENCOUNTER — HOSPITAL ENCOUNTER (OUTPATIENT)
Age: 71
Setting detail: OUTPATIENT SURGERY
Discharge: HOME | End: 2021-09-15
Attending: UROLOGY | Admitting: UROLOGY
Payer: MEDICARE

## 2021-09-15 ENCOUNTER — ANESTHESIA (OUTPATIENT)
Dept: OPERATING ROOM | Age: 71
End: 2021-09-15
Payer: MEDICARE

## 2021-09-15 ENCOUNTER — HOSPITAL ENCOUNTER (OUTPATIENT)
Dept: GENERAL RADIOLOGY | Age: 71
Setting detail: OUTPATIENT SURGERY
Discharge: HOME | End: 2021-09-17
Attending: UROLOGY
Payer: MEDICARE

## 2021-09-15 VITALS
WEIGHT: 161 LBS | BODY MASS INDEX: 25.27 KG/M2 | HEART RATE: 91 BPM | DIASTOLIC BLOOD PRESSURE: 76 MMHG | HEIGHT: 67 IN | OXYGEN SATURATION: 94 % | TEMPERATURE: 98 F | RESPIRATION RATE: 16 BRPM | SYSTOLIC BLOOD PRESSURE: 124 MMHG

## 2021-09-15 VITALS
OXYGEN SATURATION: 100 % | RESPIRATION RATE: 14 BRPM | SYSTOLIC BLOOD PRESSURE: 132 MMHG | DIASTOLIC BLOOD PRESSURE: 75 MMHG

## 2021-09-15 DIAGNOSIS — N13.30 HYDRONEPHROSIS OF RIGHT KIDNEY: Primary | ICD-10-CM

## 2021-09-15 DIAGNOSIS — R52 PAIN: ICD-10-CM

## 2021-09-15 PROCEDURE — 2500000003 HC RX 250 WO HCPCS: Performed by: NURSE ANESTHETIST, CERTIFIED REGISTERED

## 2021-09-15 PROCEDURE — C1758 CATHETER, URETERAL: HCPCS | Performed by: UROLOGY

## 2021-09-15 PROCEDURE — 7100000001 HC PACU RECOVERY - ADDTL 15 MIN: Performed by: UROLOGY

## 2021-09-15 PROCEDURE — C1769 GUIDE WIRE: HCPCS | Performed by: UROLOGY

## 2021-09-15 PROCEDURE — 3600000003 HC SURGERY LEVEL 3 BASE: Performed by: UROLOGY

## 2021-09-15 PROCEDURE — 87088 URINE BACTERIA CULTURE: CPT

## 2021-09-15 PROCEDURE — 7100000011 HC PHASE II RECOVERY - ADDTL 15 MIN: Performed by: UROLOGY

## 2021-09-15 PROCEDURE — 6360000002 HC RX W HCPCS: Performed by: NURSE ANESTHETIST, CERTIFIED REGISTERED

## 2021-09-15 PROCEDURE — 3700000000 HC ANESTHESIA ATTENDED CARE: Performed by: UROLOGY

## 2021-09-15 PROCEDURE — 2709999900 HC NON-CHARGEABLE SUPPLY: Performed by: UROLOGY

## 2021-09-15 PROCEDURE — 2580000003 HC RX 258: Performed by: NURSE PRACTITIONER

## 2021-09-15 PROCEDURE — 7100000010 HC PHASE II RECOVERY - FIRST 15 MIN: Performed by: UROLOGY

## 2021-09-15 PROCEDURE — 6360000002 HC RX W HCPCS: Performed by: NURSE PRACTITIONER

## 2021-09-15 PROCEDURE — C2617 STENT, NON-COR, TEM W/O DEL: HCPCS | Performed by: UROLOGY

## 2021-09-15 PROCEDURE — 3600000013 HC SURGERY LEVEL 3 ADDTL 15MIN: Performed by: UROLOGY

## 2021-09-15 PROCEDURE — 74420 UROGRAPHY RTRGR +-KUB: CPT

## 2021-09-15 PROCEDURE — 7100000000 HC PACU RECOVERY - FIRST 15 MIN: Performed by: UROLOGY

## 2021-09-15 PROCEDURE — 3700000001 HC ADD 15 MINUTES (ANESTHESIA): Performed by: UROLOGY

## 2021-09-15 DEVICE — URETERAL STENT
Type: IMPLANTABLE DEVICE | Site: URETER | Status: FUNCTIONAL
Brand: PERCUFLEX™

## 2021-09-15 RX ORDER — FENTANYL CITRATE 50 UG/ML
INJECTION, SOLUTION INTRAMUSCULAR; INTRAVENOUS PRN
Status: DISCONTINUED | OUTPATIENT
Start: 2021-09-15 | End: 2021-09-15 | Stop reason: SDUPTHER

## 2021-09-15 RX ORDER — PHENAZOPYRIDINE HYDROCHLORIDE 200 MG/1
200 TABLET, FILM COATED ORAL 2 TIMES DAILY PRN
Qty: 10 TABLET | Refills: 2 | Status: ON HOLD | OUTPATIENT
Start: 2021-09-15 | End: 2022-01-18

## 2021-09-15 RX ORDER — FENTANYL CITRATE 50 UG/ML
50 INJECTION, SOLUTION INTRAMUSCULAR; INTRAVENOUS EVERY 5 MIN PRN
Status: DISCONTINUED | OUTPATIENT
Start: 2021-09-15 | End: 2021-09-15 | Stop reason: HOSPADM

## 2021-09-15 RX ORDER — OXYCODONE HYDROCHLORIDE AND ACETAMINOPHEN 5; 325 MG/1; MG/1
1 TABLET ORAL EVERY 6 HOURS PRN
Status: DISCONTINUED | OUTPATIENT
Start: 2021-09-15 | End: 2021-09-15 | Stop reason: HOSPADM

## 2021-09-15 RX ORDER — CEPHALEXIN 500 MG/1
500 CAPSULE ORAL 2 TIMES DAILY
Qty: 10 CAPSULE | Refills: 1 | Status: SHIPPED | OUTPATIENT
Start: 2021-09-15 | End: 2021-09-20

## 2021-09-15 RX ORDER — SODIUM CHLORIDE 9 MG/ML
25 INJECTION, SOLUTION INTRAVENOUS PRN
Status: DISCONTINUED | OUTPATIENT
Start: 2021-09-15 | End: 2021-09-15 | Stop reason: HOSPADM

## 2021-09-15 RX ORDER — SODIUM CHLORIDE 0.9 % (FLUSH) 0.9 %
5-40 SYRINGE (ML) INJECTION EVERY 12 HOURS SCHEDULED
Status: DISCONTINUED | OUTPATIENT
Start: 2021-09-15 | End: 2021-09-15 | Stop reason: HOSPADM

## 2021-09-15 RX ORDER — HYDROCODONE BITARTRATE AND ACETAMINOPHEN 5; 325 MG/1; MG/1
1 TABLET ORAL EVERY 4 HOURS PRN
Qty: 10 TABLET | Refills: 0 | Status: SHIPPED | OUTPATIENT
Start: 2021-09-15 | End: 2021-09-22

## 2021-09-15 RX ORDER — TAMSULOSIN HYDROCHLORIDE 0.4 MG/1
0.4 CAPSULE ORAL DAILY
Qty: 10 CAPSULE | Refills: 1 | Status: ON HOLD | OUTPATIENT
Start: 2021-09-15 | End: 2022-01-26 | Stop reason: HOSPADM

## 2021-09-15 RX ORDER — OXYCODONE HYDROCHLORIDE AND ACETAMINOPHEN 5; 325 MG/1; MG/1
1 TABLET ORAL
Status: DISCONTINUED | OUTPATIENT
Start: 2021-09-15 | End: 2021-09-15 | Stop reason: HOSPADM

## 2021-09-15 RX ORDER — FENTANYL CITRATE 50 UG/ML
25 INJECTION, SOLUTION INTRAMUSCULAR; INTRAVENOUS EVERY 5 MIN PRN
Status: DISCONTINUED | OUTPATIENT
Start: 2021-09-15 | End: 2021-09-15 | Stop reason: HOSPADM

## 2021-09-15 RX ORDER — HYDROCODONE BITARTRATE AND ACETAMINOPHEN 5; 325 MG/1; MG/1
1 TABLET ORAL EVERY 4 HOURS PRN
Qty: 10 TABLET | Refills: 0 | Status: SHIPPED | OUTPATIENT
Start: 2021-09-15 | End: 2021-09-15 | Stop reason: SDUPTHER

## 2021-09-15 RX ORDER — LABETALOL HYDROCHLORIDE 5 MG/ML
2.5 INJECTION, SOLUTION INTRAVENOUS EVERY 10 MIN PRN
Status: DISCONTINUED | OUTPATIENT
Start: 2021-09-15 | End: 2021-09-15 | Stop reason: HOSPADM

## 2021-09-15 RX ORDER — ONDANSETRON 2 MG/ML
4 INJECTION INTRAMUSCULAR; INTRAVENOUS
Status: DISCONTINUED | OUTPATIENT
Start: 2021-09-15 | End: 2021-09-15 | Stop reason: HOSPADM

## 2021-09-15 RX ORDER — SODIUM CHLORIDE 0.9 % (FLUSH) 0.9 %
5-40 SYRINGE (ML) INJECTION PRN
Status: DISCONTINUED | OUTPATIENT
Start: 2021-09-15 | End: 2021-09-15 | Stop reason: HOSPADM

## 2021-09-15 RX ORDER — MORPHINE SULFATE 2 MG/ML
2 INJECTION, SOLUTION INTRAMUSCULAR; INTRAVENOUS EVERY 5 MIN PRN
Status: DISCONTINUED | OUTPATIENT
Start: 2021-09-15 | End: 2021-09-15 | Stop reason: HOSPADM

## 2021-09-15 RX ORDER — LIDOCAINE HYDROCHLORIDE 20 MG/ML
INJECTION, SOLUTION EPIDURAL; INFILTRATION; INTRACAUDAL; PERINEURAL PRN
Status: DISCONTINUED | OUTPATIENT
Start: 2021-09-15 | End: 2021-09-15 | Stop reason: SDUPTHER

## 2021-09-15 RX ORDER — ONDANSETRON 2 MG/ML
4 INJECTION INTRAMUSCULAR; INTRAVENOUS EVERY 6 HOURS PRN
Status: DISCONTINUED | OUTPATIENT
Start: 2021-09-15 | End: 2021-09-15 | Stop reason: HOSPADM

## 2021-09-15 RX ORDER — MEPERIDINE HYDROCHLORIDE 25 MG/ML
12.5 INJECTION INTRAMUSCULAR; INTRAVENOUS; SUBCUTANEOUS EVERY 5 MIN PRN
Status: DISCONTINUED | OUTPATIENT
Start: 2021-09-15 | End: 2021-09-15 | Stop reason: HOSPADM

## 2021-09-15 RX ORDER — MORPHINE SULFATE 2 MG/ML
1 INJECTION, SOLUTION INTRAMUSCULAR; INTRAVENOUS EVERY 5 MIN PRN
Status: DISCONTINUED | OUTPATIENT
Start: 2021-09-15 | End: 2021-09-15 | Stop reason: HOSPADM

## 2021-09-15 RX ORDER — HYDRALAZINE HYDROCHLORIDE 20 MG/ML
5 INJECTION INTRAMUSCULAR; INTRAVENOUS
Status: DISCONTINUED | OUTPATIENT
Start: 2021-09-15 | End: 2021-09-15 | Stop reason: HOSPADM

## 2021-09-15 RX ORDER — PROPOFOL 10 MG/ML
INJECTION, EMULSION INTRAVENOUS CONTINUOUS PRN
Status: DISCONTINUED | OUTPATIENT
Start: 2021-09-15 | End: 2021-09-15 | Stop reason: SDUPTHER

## 2021-09-15 RX ORDER — SODIUM CHLORIDE 9 MG/ML
INJECTION, SOLUTION INTRAVENOUS CONTINUOUS
Status: DISCONTINUED | OUTPATIENT
Start: 2021-09-15 | End: 2021-09-15 | Stop reason: HOSPADM

## 2021-09-15 RX ADMIN — FENTANYL CITRATE 25 MCG: 50 INJECTION, SOLUTION INTRAMUSCULAR; INTRAVENOUS at 15:06

## 2021-09-15 RX ADMIN — LIDOCAINE HYDROCHLORIDE 40 MG: 20 INJECTION, SOLUTION EPIDURAL; INFILTRATION; INTRACAUDAL; PERINEURAL at 15:16

## 2021-09-15 RX ADMIN — PROPOFOL 150 MCG/KG/MIN: 10 INJECTION, EMULSION INTRAVENOUS at 15:16

## 2021-09-15 RX ADMIN — Medication 2000 MG: at 15:19

## 2021-09-15 RX ADMIN — FENTANYL CITRATE 25 MCG: 50 INJECTION, SOLUTION INTRAMUSCULAR; INTRAVENOUS at 14:35

## 2021-09-15 RX ADMIN — SODIUM CHLORIDE: 9 INJECTION, SOLUTION INTRAVENOUS at 15:06

## 2021-09-15 ASSESSMENT — PULMONARY FUNCTION TESTS
PIF_VALUE: 1
PIF_VALUE: 1
PIF_VALUE: 0
PIF_VALUE: 0
PIF_VALUE: 1
PIF_VALUE: 0
PIF_VALUE: 1
PIF_VALUE: 0
PIF_VALUE: 1
PIF_VALUE: 1
PIF_VALUE: 0
PIF_VALUE: 1

## 2021-09-15 ASSESSMENT — LIFESTYLE VARIABLES: SMOKING_STATUS: 0

## 2021-09-15 ASSESSMENT — PAIN - FUNCTIONAL ASSESSMENT: PAIN_FUNCTIONAL_ASSESSMENT: 0-10

## 2021-09-15 ASSESSMENT — PAIN SCALES - GENERAL
PAINLEVEL_OUTOF10: 0
PAINLEVEL_OUTOF10: 0

## 2021-09-15 NOTE — LETTER
[facility]  [address] [address]  Phone: [phone]             September 15, 2021    Patient: Kristy Lambert   YOB: 1950   Date of Visit: 9/15/2021       To Whom It May Concern:    Princess Krishna was seen and treated in our facility  9/15/2021 . Adan Nelson accompanied Mildred Hernandez during his procedure.  .      Sincerely,       Joseph Begum RN         Signature:__________________________________

## 2021-09-15 NOTE — ANESTHESIA PRE PROCEDURE
Department of Anesthesiology  Preprocedure Note       Name:  Camille Candelario   Age:  80 y.o.  :  1950                                          MRN:  [id number]         Date:  9/15/2021      Surgeon: Jenny Lamas):  Sindy Magallanes MD    Procedure: CARMEN    Medications prior to admission:   Prior to Admission medications    Medication Sig Start Date End Date Taking? Authorizing Provider   amLODIPine (NORVASC) 5 MG tablet Take 0.5 tablets by mouth daily 9/3/21   Darlin Motley MD   rivaroxaban (XARELTO) 20 MG TABS tablet Take 20 mg by mouth daily (with breakfast)     Historical Provider, MD   metoprolol succinate (TOPROL XL) 50 MG extended release tablet Take 1 tablet by mouth 2 times daily 21   Darlin Motley MD   Albuterol Sulfate (PROAIR HFA IN) Inhale into the lungs 2 times daily     Historical Provider, MD   vitamin B-12 (CYANOCOBALAMIN) 100 MCG tablet Take 50 mcg by mouth daily    Historical Provider, MD   sennosides-docusate sodium (SENOKOT-S) 8.6-50 MG tablet Take 1 tablet by mouth 2 times daily    Historical Provider, MD   ferrous sulfate 325 (65 Fe) MG tablet Take 325 mg by mouth 2 times daily  18   Historical Provider, MD   esomeprazole Magnesium (NEXIUM) 20 MG PACK Take 20 mg by mouth daily    Historical Provider, MD   PRAVASTATIN SODIUM PO Take 20 mg by mouth daily     Historical Provider, MD       Current medications:    No current facility-administered medications for this visit. No current outpatient medications on file.      Facility-Administered Medications Ordered in Other Visits   Medication Dose Route Frequency Provider Last Rate Last Admin    0.9 % sodium chloride infusion   IntraVENous Continuous TRACIE Schwarz CNP        0.9 % sodium chloride infusion  25 mL IntraVENous PRN TRACIE Schwarz CNP        ceFAZolin (ANCEF) 2000 mg in sterile water 20 mL IV syringe  2,000 mg IntraVENous On Call to [address], APRN - CNP        sodium chloride flush 0.9 % injection 5-40 mL  5-40 mL IntraVENous 2 times per day TRACIE Schwarz CNP        sodium chloride flush 0.9 % injection 5-40 mL  5-40 mL IntraVENous PRN TRACIE Schwarz CNP           Allergies:  No Known Allergies    Problem List:    Patient Active Problem List   Diagnosis Code    Hematuria R31.9    BPH (benign prostatic hyperplasia) N40.0    SUNNY (acute kidney injury) ([facility] [address] N17.9    Colonic mass K63.89    Hypertension I10    Hyperlipidemia E78.5    Coronary artery disease I25.10    Prolonged Q-T interval on ECG R94.31    Hypotension I95.9    CKD (chronic kidney disease) stage 3, GFR 30-59 ml/min (HCC) N18.30    Paroxysmal atrial fibrillation (HCC) I48.0    Diffuse large B-cell lymphoma (HCC) C83.30    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    Severe protein-calorie malnutrition (HCC) E43    Anemia D64.9    Thrombocytopenia (HCC) D69.6    Syncope R55    Chronic anemia D64.9    Elevated troponin R77.8    Gastroesophageal reflux disease without esophagitis K21.9    Essential hypertension I10    Hypokalemia E87.6    Hypomagnesemia E83.42    Pacemaker Z95.0    Former smoker Z87.891    Hydronephrosis of right kidney N13.30       Past Medical History:        Diagnosis Date    Arthritis     KNEES HIPS BACK    Atrial fibrillation (HCC)     BPH (benign prostatic hyperplasia)     Cancer (HCC)     large B cell lymphoma    Coronary artery disease     Difficult airway     PT STATES HE [facility] TW[facility]    Difficult intubation 04/2017    note in care everywhere \"Clinical Summary\" 03/10/2021    History of blood transfusion 2021    Hydronephrosis of right kidney 9/15/2021    Hyperlipidemia     Hypertension     Sinus tachycardia 01/01/2002       Past Surgical History:        Procedure Laterality Date    ABLATION OF DYSRHYTHMIC FOCUS      AORTA SURGERY      aortic valve replacement    AORTIC VALVE REPLACEMENT      BLADDER SURGERY Right 2/17/2021    CYSTOSCOPY BILATERAL RETROGRADE PYELOGRAM RIGHT STENT INSERTION performed by Reynaldo Mcdonald MD at [address]  2021    COLONOSCOPY WITH BIOPSY performed by Leonard Booker DO at [address]  2021    COLONOSCOPY W/ ENDOSCOPIC MUCOSAL RESECTION performed by Leonard Booker DO at [address] N/A 3/11/2021    LAPAROSCOPIC RIGHT HEMICOLECTOMY performed by Edilson Ribeiro MD at [address] OTHER SURGICAL HISTORY  2016    CT Myelogram, Dr. Patricia Jean-Baptiste   new battery    last checked Dr Hulda Gitelman 2016?  PORT SURGERY Right 2021    MEDI PORT INSERTION performed by Edilson Ribeiro MD at [address] Right 2021    PORT REMOVAL performed by Martine Louis MD at [address] ENDOSCOPY      reflux    UPPER GASTROINTESTINAL ENDOSCOPY N/A 2021    EGD BIOPSY performed by Portillo Del Toro MD at [address] History:    Social History     Tobacco Use    Smoking status: Former Smoker     Packs/day: 1.00     Years: 44.00     Pack years: 44.00     Types: Cigarettes     Quit date: 3/4/2014     Years since quittin.5    Smokeless tobacco: Never Used   Substance Use Topics    Alcohol use: Not Currently                                Counseling given: Not Answered      Vital Signs (Current): There were no vitals filed for this visit.                                            BP Readings from Last 3 Encounters:   09/15/21 138/69   21 100/61   21 138/63       NPO Status:  2200 21                                                                               BMI:   Wt Readings from Last 3 Encounters:   09/15/21 161 lb (73 kg)   21 163 lb (73.9 kg)   21 166 lb (75.3 kg)     There is no height or weight on file to calculate BMI.    CBC:   Lab Results   Component Value Date WBC 13.4 09/03/2021    RBC 2.87 09/03/2021    HGB 8.2 09/03/2021    HCT 26.8 09/03/2021    MCV 93.4 09/03/2021    RDW 17.2 09/03/2021     09/03/2021       CMP:   Lab Results   Component Value Date     09/03/2021    K 3.8 09/03/2021    K 3.2 09/02/2021     09/03/2021    CO2 29 09/03/2021    BUN 10 09/03/2021    CREATININE 0.7 09/03/2021    GFRAA >60 09/03/2021    LABGLOM >60 09/03/2021    GLUCOSE 100 09/03/2021    GLUCOSE 100 01/28/2011    PROT 6.4 09/01/2021    CALCIUM 9.5 09/03/2021    BILITOT 0.6 09/01/2021    ALKPHOS 94 09/01/2021    AST 14 09/01/2021    ALT 8 09/01/2021       POC Tests: No results for input(s): POCGLU, POCNA, POCK, POCCL, POCBUN, POCHEMO, POCHCT in the last 72 hours.     Coags:   Lab Results   Component Value Date    PROTIME 12.8 07/06/2021    INR 1.2 07/06/2021    APTT 31.5 07/06/2021       HCG (If Applicable): No results found for: PREGTESTUR, PREGSERUM, HCG, HCGQUANT     ABGs: No results found for: PHART, PO2ART, DIL6UBU, QJJ7OCD, BEART, N3XPWXSX     Type & Screen (If Applicable):  No results found for: LABABO, LABRH    Drug/Infectious Status (If Applicable):  No results found for: HIV, HEPCAB    COVID-19 Screening (If Applicable):   Lab Results   Component Value Date    COVID19 Not Detected 09/02/2021    COVID19 Not Detected 03/05/2021       EKG:   3/30/2021  4:53 PM - Bhupendra, Mhy Incoming Ekg Results From [facility]    Component Value Ref Range & Units Status Collected Lab   Ventricular Rate 76  BPM Final 03/30/2021  9:30 AM [facility]   Atrial Rate 76  BPM Final 03/30/2021  9:30 AM [facility]   P-R Interval 146  ms Final 03/30/2021  9:30 AM [facility]   QRS Duration 78  ms Final 03/30/2021  9:30 AM [facility]   Q-T Interval 420  ms Final 03/30/2021  9:30 AM [facility]   QTc Calculation (Bazett) 472  ms Final 03/30/2021  9:30 AM [facility]   P [place] 89  degrees Final 03/30/2021  9:30 AM [facility]   R [place]iman 86  degrees Final 03/30/2021  9:30 AM [facility]   T [place]iman 62  degrees Final 03/30/2021  9:30 AM [facility]   Testing Performed By    Lab - Abbreviation Name Director Address Valid Date Range   360-[facility] MUSE Unknown Unknown 04/18/16 0721-Present   Narrative & Impression    Sinus rhythm with premature supraventricular complexes  Abnormal ECG  When compared with ECG of 29-MAR-2021 20:34,  No significant change was found  Confirmed by Carmelita Tapia ([id number]) on 3/30/2021 4:53:45 PM         Anesthesia Evaluation  Patient summary reviewed and Nursing notes reviewed   history of anesthetic complications: difficult airway. Airway: Mallampati: II  TM distance: >3 FB   Neck ROM: limited  Mouth opening: > = 3 FB Dental:          Pulmonary:   (+) decreased breath sounds,      (-) not a current smoker          Patient did not smoke on day of surgery. Cardiovascular:  Exercise tolerance: poor (<4 METS),   (+) hypertension:, pacemaker: pacemaker, CAD:, CABG/stent (in 2017 3 vessel, 1 stent, ):, dysrhythmias (history of afib ): atrial fibrillation, murmur, hyperlipidemia      ECG reviewed  Rhythm: regular  Rate: normal           Beta Blocker:  Dose within 24 Hrs      ROS comment: Ablation done      Neuro/Psych:               GI/Hepatic/Renal:   (+) GERD: well controlled, renal disease (recent stent): CRI,           Endo/Other:    (+) blood dyscrasia (HGB 8.7): anemia, arthritis: OA., malignancy/cancer. Pt had no PAT visit       Abdominal:             Vascular: negative vascular ROS. Other Findings:        ECHO 4/1/21   Summary   Normal left ventricle size and systolic function. Ejection fraction is visually estimated at 65-70%. Septal motion consistent with post bypass surgery. Normal left ventricle wall thickness. Indeterminate diastolic function. Left atrium was not clearly visualized. Normal right ventricular size and function. 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    Physiologic and/or trace tricuspid regurgitation. Physiologic and/or trace tricuspid regurgitation. RVSP is 39 mmHg. Top normal PA systolic pressure. No evidence for hemodynamically significant pericardial effusion. Signature      ----------------------------------------------------------------   Electronically signed by Carmina Robles MD(Interpreting   physician) on 04/01/2021 02:12 PM   ----------------------------------------------------------------     CT Chest 7/8/21  Impression   COPD with multifocal patchy and masslike infiltrates in the left lower lobe   as noted concerning for pneumonia.  Underlying malignancy is not excluded and   close surveillance with repeat CT scan in 6-8 weeks after appropriate   treatment is recommended to see complete resolution and exclude malignancy.       CT abdomen and pelvis.       Lack of contrast limits the study.  Liver is of grossly normal architecture.    Gallbladder is distended without acute inflammation.  Spleen, pancreas, the   adrenals and the kidneys are normal.  There is a right ureteral stent with   significant improvement in the right hydronephrosis.       There is significant improvement and near resolution of the adenopathy in the   abdomen pelvis with small lymph nodes measuring up to 8 mm in the portacaval   region and smaller ones in the retroperitoneum.  Moderate mesenteric lymph   nodes are noted measuring up to 9 mm which are significantly improved.  There   is calcification aorta and mild ectasia of the iliac arteries measuring 1.4   cm each.  Degenerative changes are identified in the lumbar spine.       Pelvis.  The bladder is distended.  Patient is status post partial right   hemicolectomy with the retained fluid and fecal matter in the left hemicolon   likely diarrhea.       Impression       Significant improvement and near resolution of the lymphadenopathy in the   retroperitoneum and significant improvement in the mesenteric adenopathy   compatible with the favorable response to treatment for lymphoma.  Continued   surveillance is recommended.       Status post right hemicolectomy with the dilated fluid-filled left hemicolon   likely diarrhea.           Order History    Open Order Details     CT ABD/Pelvis 7/8/21  COPD with multifocal patchy and masslike infiltrates in the left lower lobe   as noted concerning for pneumonia.  Underlying malignancy is not excluded and   close surveillance with repeat CT scan in 6-8 weeks after appropriate   treatment is recommended to see complete resolution and exclude malignancy.       CT abdomen and pelvis.       Lack of contrast limits the study.  Liver is of grossly normal architecture.    Gallbladder is distended without acute inflammation.  Spleen, pancreas, the   adrenals and the kidneys are normal.  There is a right ureteral stent with   significant improvement in the right hydronephrosis.       There is significant improvement and near resolution of the adenopathy in the   abdomen pelvis with small lymph nodes measuring up to 8 mm in the portacaval   region and smaller ones in the retroperitoneum.  Moderate mesenteric lymph   nodes are noted measuring up to 9 mm which are significantly improved.  There   is calcification aorta and mild ectasia of the iliac arteries measuring 1.4   cm each.  Degenerative changes are identified in the lumbar spine.       Pelvis.  The bladder is distended.  Patient is status post partial right   hemicolectomy with the retained fluid and fecal matter in the left hemicolon   likely diarrhea.       Impression       Significant improvement and near resolution of the lymphadenopathy in the   retroperitoneum and significant improvement in the mesenteric adenopathy   compatible with the favorable response to treatment for lymphoma.  Continued   surveillance is recommended.       Status post right hemicolectomy with the dilated fluid-filled left hemicolon   likely diarrhea.                Anesthesia Plan      MAC ASA 4       Induction: intravenous. Anesthetic plan and risks discussed with patient. Plan discussed with CRNA. Kim Srivastava MD   9/15/2021    Patient seen and examined, chart reviewed, agree with above findings. Anesthetic plan, risks, benefits, alternatives, and personnel involved discussed with patient. Patient verbalized an understanding and agreed to proceed. NPO status confirmed. Anesthetic plan discussed with care team members and agreed upon.     Mook Griffin DO   9/15/2021  1:30 PM

## 2021-09-16 NOTE — OP NOTE
[address] [address]                                OPERATIVE REPORT    PATIENT NAME: Shantelle Arreaga                  :        1950  MED REC NO:   [id number]                            ROOM:  ACCOUNT NO:   [de-identified]                           ADMIT DATE: 09/15/2021  PROVIDER:     Kevin Magallanes MD    DATE OF PROCEDURE:  09/15/2021    PREOPERATIVE DIAGNOSES:  Hydronephrosis associated with lymphoma with  extrinsic compression of the ureter. POSTOPERATIVE DIAGNOSES:  Suspect resolving obstruction, calcification  of the stent, and to evaluate for urinary tract infection. OPERATION PERFORMED:  Cystopanendoscopy, retrograde pyelogram, and stent  exchange. ANESTHESIA:  Monitored sedation. CONDITION:  Stable. COMPLICATIONS:  None. DISPOSITION:  PACU, then home. ESTIMATED BLOOD LOSS:  Less than 5 mL. DESCRIPTION OF PROCEDURE:  The time-out was read by me, the Anesthesia,  and the operating room staff. We reviewed the history and physical,  allergy, and medication. We all were in agreement. A 2 gm of Ancef was  given upon induction. A #21 panendoscope with a 30-degree angle lens  with direct vision obturator and video assistance examined the urethra. No strictures, false passages, abrasions, ulcerations, cystic, or solid  lesions. The verumontanum was intact. Prostatic fossa was patulous. Bladder was entered. The distal end of the stent was calcified. The  bladder showed no lesions compatible with serous, inflammatory,  premalignant, or obviously malignant disease. Nothing needed to be  biopsied. No cystitis cystica, enterovesical fistula, or extrinsic  imprints. No trabeculation, cellule, or diverticular formation. Trigone was well developed.   Both ureteral orifices appeared to be  singular and no need for left retrograde pyelogram.  A 5 open-ended  catheter and a Glidewire were inserted along the stent. The stent was  then removed intact. Despite the calcification, I had no difficulty  removing it. Retrograde pyelogram actually demonstrated a delicate  right collecting system. There may be still some narrowing of the  ureter at the pelvic brim. Because of the risk of possible infection  associated with calcification, I elected to just exchange the stent at  this time. I placed a 6 x 28 Polaris stent. Renal end positioned with  fluoroscopy and the bladder end under direct visualization. We will be  sure that he does not have a urine infection. I did send the urine for  culture from the bladder. Once we were sure he is infection free in a  month, I will do a cysto and stent removal in the office on a Monday. On a Tuesday, we will do an ultrasound and see if he is free of pain and  free of hydronephrosis. I talked to the patient's wife and the  patient's consent and she was agreeable to this approach. We will call  _____ and make those arrangements for the procedure. Rectal:  Good anal  tone. No hemorrhoids, no masses, no impaction. Prostate is 25 gm size,  normal is 15 to 20. Blood loss in this case was less than 5 mL.         Twyla Byers MD    D: 09/15/2021 16:57:27       T: 09/15/2021 17:00:28     [email or web address]  Job#: [id number]     Doc#: [id number]    CC:  Michelle Garcia MD

## 2021-09-16 NOTE — ANESTHESIA POSTPROCEDURE EVALUATION
Department of Anesthesiology  Postprocedure Note    Patient: Salvador Gerardo  MRN: [id number]  YOB: 1950  Date of evaluation: 9/16/2021  Time:  12:42 PM     Procedure Summary     Date: 09/15/21 Room / Location: [facility] 06 / [facility]    Anesthesia Start: 4477 Anesthesia Stop: 5169    Procedure: CYSTOSCOPY RETROGRADE  RIGHT STENT EXCHANGE (Right Bladder) Diagnosis: (HYDRONEPHROSIS)    Surgeons: Parmjit Wolf MD Responsible Provider: Susan Casas MD    Anesthesia Type: MAC ASA Status: 4          Anesthesia Type: MAC    Effie Phase I: Effie Score: 10    Effie Phase II: Effie Score: 10    Last vitals: Reviewed and per EMR flowsheets.        Anesthesia Post Evaluation    Patient location during evaluation: PACU  Patient participation: complete - patient participated  Level of consciousness: awake and alert  Airway patency: patent  Nausea & Vomiting: no vomiting and no nausea  Complications: no  Cardiovascular status: blood pressure returned to baseline  Respiratory status: acceptable  Hydration status: euvolemic

## 2021-09-17 LAB — URINE CULTURE, ROUTINE: NORMAL

## 2021-10-02 PROBLEM — R77.8 ELEVATED TROPONIN: Status: RESOLVED | Noted: 2021-09-02 | Resolved: 2021-10-02

## 2021-10-02 PROBLEM — R79.89 ELEVATED TROPONIN: Status: RESOLVED | Noted: 2021-09-02 | Resolved: 2021-10-02

## 2021-10-19 ENCOUNTER — HOSPITAL ENCOUNTER (OUTPATIENT)
Dept: NUCLEAR MEDICINE | Age: 71
Discharge: HOME | End: 2021-10-21
Payer: MEDICARE

## 2021-10-19 DIAGNOSIS — R11.2 NAUSEA AND VOMITING, INTRACTABILITY OF VOMITING NOT SPECIFIED, UNSPECIFIED VOMITING TYPE: ICD-10-CM

## 2021-10-19 DIAGNOSIS — C83.38 DIFFUSE LARGE B-CELL LYMPHOMA OF LYMPH NODES OF MULTIPLE SITES (HCC): ICD-10-CM

## 2021-10-19 DIAGNOSIS — D50.9 IRON DEFICIENCY ANEMIA, UNSPECIFIED IRON DEFICIENCY ANEMIA TYPE: ICD-10-CM

## 2021-10-19 PROCEDURE — A9552 F18 FDG: HCPCS | Performed by: RADIOLOGY

## 2021-10-19 PROCEDURE — 78815 PET IMAGE W/CT SKULL-THIGH: CPT

## 2021-10-19 PROCEDURE — 78815 PET IMAGE W/CT SKULL-THIGH: CPT | Performed by: RADIOLOGY

## 2021-10-19 PROCEDURE — 3430000000 HC RX DIAGNOSTIC RADIOPHARMACEUTICAL: Performed by: RADIOLOGY

## 2021-10-19 RX ORDER — FLUDEOXYGLUCOSE F 18 200 MCI/ML
17.3 INJECTION, SOLUTION INTRAVENOUS
Status: COMPLETED | OUTPATIENT
Start: 2021-10-19 | End: 2021-10-19

## 2021-10-19 RX ADMIN — FLUDEOXYGLUCOSE F 18 17.3 MILLICURIE: 200 INJECTION, SOLUTION INTRAVENOUS at 10:09

## 2021-10-27 ENCOUNTER — OFFICE VISIT (OUTPATIENT)
Dept: CARDIOLOGY CLINIC | Age: 71
End: 2021-10-27
Payer: MEDICARE

## 2021-10-27 VITALS
HEART RATE: 87 BPM | DIASTOLIC BLOOD PRESSURE: 72 MMHG | BODY MASS INDEX: 26.4 KG/M2 | WEIGHT: 168.2 LBS | HEIGHT: 67 IN | SYSTOLIC BLOOD PRESSURE: 90 MMHG | RESPIRATION RATE: 18 BRPM

## 2021-10-27 DIAGNOSIS — I10 ESSENTIAL HYPERTENSION: ICD-10-CM

## 2021-10-27 DIAGNOSIS — I25.10 CORONARY ARTERY DISEASE INVOLVING NATIVE CORONARY ARTERY OF NATIVE HEART WITHOUT ANGINA PECTORIS: Primary | ICD-10-CM

## 2021-10-27 DIAGNOSIS — I48.0 PAROXYSMAL ATRIAL FIBRILLATION (HCC): ICD-10-CM

## 2021-10-27 DIAGNOSIS — Z95.0 ARTIFICIAL CARDIAC PACEMAKER: ICD-10-CM

## 2021-10-27 PROCEDURE — 93000 ELECTROCARDIOGRAM COMPLETE: CPT | Performed by: INTERNAL MEDICINE

## 2021-10-27 PROCEDURE — 99214 OFFICE O/P EST MOD 30 MIN: CPT | Performed by: INTERNAL MEDICINE

## 2021-10-27 RX ORDER — ACYCLOVIR 400 MG/1
TABLET ORAL DAILY
Status: ON HOLD | COMMUNITY
Start: 2021-10-01 | End: 2022-01-18 | Stop reason: ALTCHOICE

## 2021-10-27 RX ORDER — ALBUTEROL SULFATE 90 UG/1
1 AEROSOL, METERED RESPIRATORY (INHALATION) 2 TIMES DAILY
Qty: 18 G | Refills: 3 | Status: SHIPPED | OUTPATIENT
Start: 2021-10-27

## 2021-10-27 NOTE — PROGRESS NOTES
OUTPATIENT CARDIOLOGY FOLLOW-UP    Name: Andrew Eid    Age: 80 y.o. Date of Service: 10/27/2021    Chief Complaint: Follow-up for syncope, CAD, VHD    Interim History:  No new cardiac complaints since hospital discharge. He denies recent chest pain, respiratory distress, palpitations, or orthopnea. No further syncope. +intermittent lightheadedness. SR on EKG. BP today 90/72. Review of Systems:   Cardiac: As per HPI  General: No fever, chills  Pulmonary: As per HPI  HEENT: No visual disturbances, difficult swallowing  GI: No nausea, vomiting  : No dysuria, hematuria  Endocrine: No thyroid disease or DM  Musculoskeletal: MERCHANT x 4, no focal motor deficits  Skin: Intact, no rashes  Neuro: No headache, seizures  Psych: Currently with no depression, anxiety    Problem List:  Patient Active Problem List   Diagnosis    Hematuria    BPH (benign prostatic hyperplasia)    SUNNY [facility]acute kidney injury) ([facility] [address]    Colonic mass    Hypertension    Hyperlipidemia    Coronary artery disease    Prolonged Q-T interval on ECG    Hypotension    CKD (chronic kidney disease) stage 3, GFR 30-59 ml/min (HCC)    Paroxysmal atrial fibrillation (HCC)    Diffuse large B-cell lymphoma (HCC)    Diffuse large B cell lymphoma (HCC)    Severe protein-calorie malnutrition (HCC)    Anemia    Thrombocytopenia (HCC)    Syncope    Chronic anemia    Gastroesophageal reflux disease without esophagitis    Essential hypertension    Hypokalemia    Hypomagnesemia    Pacemaker    Former smoker    Hydronephrosis of right kidney     1. 25 pack years quit in 2014  2. BMI 31.3 on 3/30/2021  3. HTN  4. HLD  5. Dysphagia: Unclear etiology (7/2021)  6. SSS s/p PPM in 2002 with generator change in 2013 ([facility])  ? Interrogation 7/6/2021: Pacing percent: RA equals 12%, RV equals 6.5%. A. fib burden 1.9%.   7. Problems with intermittent noise and over sensing of V lead and then over and under sensing A lead s/p pacer removal and new PPM dual chamber [employer] placed 12/2017 [facility]  8. 4/2017 LHC/RHC at [facility]; Single vessel disease involving LAD. RHS consistent with Severe pulmonary venous HTN. 9. 4/2017 CABG x 1 LIMA to LAD with AVR (Triflecta prosthetic #21). MVR (Bicor #29), MAZE and LAAC at [facility]  10. PAF ? diagnosis 2017. Previously on Sotalol --> later discontinued due to QTC prolongation in 3/2021.  ? AT/AF burden on device check 1.9% --> appeared to be due to AT. ? [facility] with Xarelto  ? Patient denies any DCCV  11. 12/14/2020 TTE [facility]: EF 66%. MVR with Peak gradient 21 and Mean gradient 6. AVR with P gradient 34 and M gradient 19 with DI 0.36  12. 12/14/2020 Lexiscan MPS; Mild ischemia in LAD territory (<10%). EF 71%. Low risk scan  13. 2/17/2021 R hydronephrosis  S/p R renal stenting  14. 2/19/2021 Colonoscopy; obstructing mass in ascending colon  15. COVDI Vaccine completed 2/2021  16. CARMEN: (rule out endocarditis): LVEF 65-70%, normal RV function, left atrial appendage is surgically excluded with no residual pouch, no evidence of atrial septal defect or PFO,  29 mm [facility] Pramod Biocor mitral bioprosthesis present. Leaflets well seen and open well. Mean gradient 7-9 mmHg at  bpm. Peak velocity 2.3 m/s. VTI ratio 1.77. MVA by VTI 1.8 cm2. PHT 70-80 ms. Mild mitral regurgitation. Mild-moderate tricuspid regurgitation.  21 mm [facility] Pramod Trifecta aortic bioprosthesis present. No hemodynamically significant prosthetic aortic stenosis is present. Peak velocity 3.2 m/s. Mean gradient 24 mmHg. EOA 1.5 cm2. EOAi 0.80 cm2/m2  DVI 0.48. AT < 100 ms. Mild aortic valve regurgitation. No vegetation. Peak TR velocity 2.7 m/s. No vegetation. (Elevated gradients of mitral bioprosthesis and aortic bioprosthesis). 17. 3/14/2021 R hemicolectomy--> B-cell lymphoma  18. Aggressive bulky diffuse large B-cell lymphoma, nongerminal cell type with negative FISH interface for double hit or triple hit lymphoma with bulky intra-abdominal disease.   ? Treatment: combination chemotherapy with R-CHOP along with Revlimid.  This has been complicated by significant pancytopenia is despite G-CSF prophylaxis. ? He has completed 1 week ago cycle #5 of R-CHOP/Revlimid (as of 7/2021)  ? Blood culture showing GNR --> CARMEN negative for vegetation or mass. Mediport removed on 7/9/2021. No chemo since that time. Scheduled to follow-up with Oncology in 9/2021. Allergies:  No Known Allergies    Current Medications:  Current Outpatient Medications   Medication Sig Dispense Refill    phenazopyridine (PYRIDIUM) 200 MG tablet Take 1 tablet by mouth 2 times daily as needed for Pain 10 tablet 2    tamsulosin (FLOMAX) 0.4 MG capsule Take 1 capsule by mouth daily for 10 doses 10 capsule 1    amLODIPine (NORVASC) 5 MG tablet Take 0.5 tablets by mouth daily 30 tablet 3    rivaroxaban (XARELTO) 20 MG TABS tablet Take 20 mg by mouth daily (with breakfast)       metoprolol succinate (TOPROL XL) 50 MG extended release tablet Take 1 tablet by mouth 2 times daily 30 tablet 3    Albuterol Sulfate (PROAIR HFA IN) Inhale into the lungs 2 times daily       vitamin B-12 (CYANOCOBALAMIN) 100 MCG tablet Take 50 mcg by mouth daily      sennosides-docusate sodium (SENOKOT-S) 8.6-50 MG tablet Take 1 tablet by mouth 2 times daily      ferrous sulfate 325 (65 Fe) MG tablet Take 325 mg by mouth 2 times daily       esomeprazole Magnesium (NEXIUM) 20 MG PACK Take 20 mg by mouth daily      PRAVASTATIN SODIUM PO Take 20 mg by mouth daily        No current facility-administered medications for this visit. Physical Exam:  There were no vitals taken for this visit.   Wt Readings from Last 3 Encounters:   09/15/21 161 lb (73 kg)   09/03/21 163 lb (73.9 kg)   08/13/21 166 lb (75.3 kg)     Appearance: Awake, alert, no acute respiratory distress  Skin: Intact, no rash  Head: Normocephalic, atraumatic  Eyes: EOMI, no conjunctival erythema  ENMT: No pharyngeal erythema, MMM, no rhinorrhea  Neck: Supple, no elevated JVP, no carotid bruits  Lungs: Clear to auscultation bilaterally. No wheezes, rales, or rhonchi. Cardiac: Regular rate and rhythm, +S1S2, no murmurs apparent  Abdomen: Soft, nontender, +bowel sounds  Extremities: Moves all extremities x 4, no lower extremity edema  Neurologic: No focal motor deficits apparent, normal mood and affect       Intake/Output:  No intake or output data in the 24 hours ending 10/27/21 0650  I/O this shift:  In: -   Out: 50 [Urine:50]    Laboratory Tests:  Lab Results   Component Value Date    CREATININE 0.7 09/03/2021    BUN 10 09/03/2021     09/03/2021    K 3.8 09/03/2021     09/03/2021    CO2 29 09/03/2021     Lab Results   Component Value Date    MG 1.9 09/02/2021     Lab Results   Component Value Date    ALT 8 09/01/2021    AST 14 09/01/2021    ALKPHOS 94 09/01/2021    BILITOT 0.6 09/01/2021     Lab Results   Component Value Date    WBC 13.4 (H) 09/03/2021    HGB 8.2 (L) 09/03/2021    HCT 26.8 (L) 09/03/2021    MCV 93.4 09/03/2021     09/03/2021     Lab Results   Component Value Date    TROPONINI <0.01 02/14/2021     No results for input(s): CKTOTAL, CKMB, CKMBINDEX, TROPHS in the last 72 hours. Lab Results   Component Value Date    INR 1.2 07/06/2021    INR 1.2 02/14/2021    INR 1.1 08/12/2016    PROTIME 12.8 (H) 07/06/2021    PROTIME 14.7 (H) 02/14/2021    PROTIME 11.3 08/12/2016     Lab Results   Component Value Date    TSH 3.630 02/14/2021     No results found for: LABA1C  No results found for: EAG  Lab Results   Component Value Date    CHOL 128 07/06/2021    CHOL 136 01/28/2011     Lab Results   Component Value Date    TRIG 187 (H) 07/06/2021     Lab Results   Component Value Date    HDL 70 07/06/2021     Lab Results   Component Value Date    LDLCALC 21 07/06/2021     Lab Results   Component Value Date    LABVLDL 37 07/06/2021     No results found for: CHOLHDLRATIO  No results for input(s): PROBNP in the last 72 hours.     Cardiac Tests:  EKG reviewed (10/27/21): SR, rate 87, PVC's    Telemetry reviewed (date: 9/2021): SR/ST    CT cervical spine without contrast: 9/1/2021   There is no acute fracture or dislocation in the cervical spine.  Mild   diffuse degenerative changes are identified from C3-T1 with osteophytes and   multilevel disc bulges.  There is calcification of the posterior longitudinal   ligament, more prominent at C5-C6.  The prevertebral soft tissues are normal.   There is significant calcification and stenosis of the carotid arteries. There is emphysema in the lung apices.       Impression       No acute fracture or dislocation.       Diffuse degenerative changes with is multilevel disc bulges from C3-T1.          CT Head WO contrast: 9/2/2021  No acute intracranial abnormality. Robertha Mikel is age-appropriate atrophy and   small-vessel ischemic disease.         CT Chest WO contrast: 9/2/2021  Advanced centrilobular emphysema with patchy and masslike infiltrates and   nodular densities in the left lower lobe and lingula likely multifocal   pneumonia. Salima Mocha malignancy is not excluded and close surveillance with   repeat CT scan in 8-10 weeks after appropriate treatment is recommended to   see resolution.       Multiple 4-6 mm bilateral pulmonary nodules which are indeterminate. Surveillance according to Fleischner society guidelines are recommended.             CARMEN: 7/13/2021 (rule out endocarditis): LVEF 65-70%, normal RV function, left atrial appendage is surgically excluded with no residual pouch, no evidence of atrial septal defect or PFO,  29 mm [facility] Pramod Biocor mitral bioprosthesis present. Leaflets well seen and open well. Mean gradient 7-9 mmHg at  bpm. Peak velocity 2.3 m/s. VTI ratio 1.77. MVA by VTI 1.8 cm2. PHT 70-80 ms. Mild mitral regurgitation. Mild-moderate tricuspid regurgitation.  21 mm St Pramod Trifecta aortic bioprosthesis present. No hemodynamically significant prosthetic aortic stenosis is present.  Peak velocity 3.2 m/s. Mean gradient 24 mmHg. EOA 1.5 cm2. EOAi 0.80 cm2/m2  DVI 0.48. AT < 100 ms. Mild aortic valve regurgitation. No vegetation. Peak TR velocity 2.7 m/s. No vegetation. (Elevated gradients of mitral bioprosthesis and aortic bioprosthesis).      12/14/2020 TTE [facility]: EF 66%. MVR with Peak gradient 21 and Mean gradient 6. AVR with P gradient 34 and M gradient 19 with DI 0.3     12/14/2020 Lexiscan MPS; Mild ischemia in LAD territory (<10%). EF 71%. Low risk scan      ASSESSMENT / PLAN:  1. Syncope: PPM interrogated (9/2/2021) showing no evidence of arrhythmias during the time of syncopal episode. Most likely secondary to dehydration (poor oral intake). CT head negative for acute abnormalities   2. Elevated hs-cTnT (113, 120, 105): pattern not consistent with ACS. CP free. 3. CAD with history of CABG x 1 LIMA to LAD (4/2017). 12/14/2020 Lexiscan MPS; Mild ischemia in LAD territory (<10%). EF 71%. Low risk scan. 4. VHD: s/p AVR and MVR (4/2017) with mildly elevated gradients across valves on CARMEN 7/13/2021). 5. PAF with history of MAZE and LAAC (4/2017)   6. Chronic OAC (Xarelto)  7. SSS s/p PPM (2002) s/p extraction (2017) and replaced 12/2017 ([facility]). 8. Hypokalemia / Hypomagnesemia  9. Anemia -- most recent Hgb 8.2  10. Hypoalbuminemia   11. CKD: stable  12. Hx of right renal stent   13. Aggressive bulky diffuse large B-cell lymphoma, non-germinal cell type status post chemotherapy with R-CHOP along with Revlimid (last cycle 7/2021). Mediport removed 7/9/2021 due to blood cultures positive for GNR. 14. DDD  15.  Elevated procalcitonin 0.16    - PPM interrogated on 9/2/21 (no new arrhythmias correlating with syncopal event)  - SBP 90's at the time of 9/2021 admission --> ACE-I/HCTZ stopped at that time and IV fluids given --> repeat -120 00> follow-up hospital discharge, lisinopril/HCTZ was restarted by heme/onc (per patient) --> BP today 90/72 (recommended stopping medication again)  - Albuterol inhaler refilled today  - Continue current medications otherwise  - Monitor BMP and CBC  - Script for handicap placard provided to the patient today  - Prior cardiac studies reviewed  - Follow-up with EP as scheduled     Greater than 30 minutes was spent counseling the patient, reviewing the rationale for the above recommendations and reviewing the patient's current medication list, problem list and results of all previously ordered testing.     Gregoria Moon MD  [facility] ([facility]) Cardiology

## 2021-11-16 RX ORDER — PRAVASTATIN SODIUM 20 MG
20 TABLET ORAL DAILY
Qty: 90 TABLET | Refills: 3 | Status: SHIPPED | OUTPATIENT
Start: 2021-11-16

## 2022-01-10 ENCOUNTER — HOSPITAL ENCOUNTER (OUTPATIENT)
Age: 72
Discharge: HOME | End: 2022-01-10
Payer: MEDICARE

## 2022-01-10 LAB
ALBUMIN SERPL-MCNC: 3.7 G/DL (ref 3.5–5.2)
ALP BLD-CCNC: 104 U/L (ref 40–129)
ALT SERPL-CCNC: 6 U/L (ref 0–40)
ANION GAP SERPL CALCULATED.3IONS-SCNC: 16 MMOL/L (ref 7–16)
AST SERPL-CCNC: 19 U/L (ref 0–39)
BILIRUB SERPL-MCNC: 0.5 MG/DL (ref 0–1.2)
BUN BLDV-MCNC: 15 MG/DL (ref 6–23)
CALCIUM SERPL-MCNC: 10.4 MG/DL (ref 8.6–10.2)
CHLORIDE BLD-SCNC: 100 MMOL/L (ref 98–107)
CO2: 26 MMOL/L (ref 22–29)
CREAT SERPL-MCNC: 0.6 MG/DL (ref 0.7–1.2)
GFR AFRICAN AMERICAN: >60
GFR NON-AFRICAN AMERICAN: >60 ML/MIN/1.73
GLUCOSE BLD-MCNC: 95 MG/DL (ref 74–99)
POTASSIUM SERPL-SCNC: 4.5 MMOL/L (ref 3.5–5)
SODIUM BLD-SCNC: 142 MMOL/L (ref 132–146)
TOTAL PROTEIN: 7.5 G/DL (ref 6.4–8.3)
TSH SERPL DL<=0.05 MIU/L-ACNC: 3.42 UIU/ML (ref 0.27–4.2)
URIC ACID, SERUM: 7.1 MG/DL (ref 3.4–7)

## 2022-01-10 PROCEDURE — 84443 ASSAY THYROID STIM HORMONE: CPT

## 2022-01-10 PROCEDURE — 80053 COMPREHEN METABOLIC PANEL: CPT

## 2022-01-10 PROCEDURE — 36415 COLL VENOUS BLD VENIPUNCTURE: CPT

## 2022-01-10 PROCEDURE — 84550 ASSAY OF BLOOD/URIC ACID: CPT

## 2022-01-14 ENCOUNTER — HOSPITAL ENCOUNTER (OUTPATIENT)
Dept: CT IMAGING | Age: 72
Discharge: HOME | End: 2022-01-14
Payer: MEDICARE

## 2022-01-14 DIAGNOSIS — R11.2 NAUSEA AND VOMITING, INTRACTABILITY OF VOMITING NOT SPECIFIED, UNSPECIFIED VOMITING TYPE: ICD-10-CM

## 2022-01-14 DIAGNOSIS — D50.9 IRON DEFICIENCY ANEMIA, UNSPECIFIED IRON DEFICIENCY ANEMIA TYPE: ICD-10-CM

## 2022-01-14 DIAGNOSIS — C83.38 RETICULOSARCOMA OF LYMPH NODES OF MULTIPLE SITES (HCC): ICD-10-CM

## 2022-01-14 DIAGNOSIS — R11.12 PROJECTILE VOMITING, PRESENCE OF NAUSEA NOT SPECIFIED: ICD-10-CM

## 2022-01-14 PROCEDURE — 6360000004 HC RX CONTRAST MEDICATION: Performed by: RADIOLOGY

## 2022-01-14 PROCEDURE — 74177 CT ABD & PELVIS W/CONTRAST: CPT

## 2022-01-14 PROCEDURE — 71260 CT THORAX DX C+: CPT

## 2022-01-14 RX ADMIN — IOHEXOL 50 ML: 240 INJECTION, SOLUTION INTRATHECAL; INTRAVASCULAR; INTRAVENOUS; ORAL at 16:38

## 2022-01-14 RX ADMIN — IOPAMIDOL 75 ML: 755 INJECTION, SOLUTION INTRAVENOUS at 16:38

## 2022-01-18 ENCOUNTER — TELEPHONE (OUTPATIENT)
Dept: PULMONOLOGY | Age: 72
End: 2022-01-18

## 2022-01-18 ENCOUNTER — APPOINTMENT (OUTPATIENT)
Dept: GENERAL RADIOLOGY | Age: 72
DRG: 823 | End: 2022-01-18
Payer: MEDICARE

## 2022-01-18 ENCOUNTER — HOSPITAL ENCOUNTER (INPATIENT)
Age: 72
LOS: 8 days | Discharge: HOME HEALTH | DRG: 823 | End: 2022-01-26
Attending: EMERGENCY MEDICINE | Admitting: INTERNAL MEDICINE
Payer: MEDICARE

## 2022-01-18 DIAGNOSIS — R09.02 HYPOXIA: Primary | ICD-10-CM

## 2022-01-18 DIAGNOSIS — Z85.79 HISTORY OF LYMPHOMA: ICD-10-CM

## 2022-01-18 PROBLEM — J96.01 ACUTE RESPIRATORY FAILURE WITH HYPOXIA (HCC): Status: ACTIVE | Noted: 2022-01-18

## 2022-01-18 PROBLEM — Z95.1 H/O SINGLE VESSEL CORONARY ARTERY BYPASS: Status: ACTIVE | Noted: 2022-01-18

## 2022-01-18 LAB
ALBUMIN SERPL-MCNC: 3.6 G/DL (ref 3.5–5.2)
ALP BLD-CCNC: 88 U/L (ref 40–129)
ALT SERPL-CCNC: 7 U/L (ref 0–40)
ANION GAP SERPL CALCULATED.3IONS-SCNC: 20 MMOL/L (ref 7–16)
AST SERPL-CCNC: 14 U/L (ref 0–39)
BASOPHILS ABSOLUTE: 0.04 E9/L (ref 0–0.2)
BASOPHILS RELATIVE PERCENT: 0.7 % (ref 0–2)
BILIRUB SERPL-MCNC: 0.7 MG/DL (ref 0–1.2)
BUN BLDV-MCNC: 17 MG/DL (ref 6–23)
CALCIUM SERPL-MCNC: 11 MG/DL (ref 8.6–10.2)
CHLORIDE BLD-SCNC: 99 MMOL/L (ref 98–107)
CO2: 25 MMOL/L (ref 22–29)
CREAT SERPL-MCNC: 0.6 MG/DL (ref 0.7–1.2)
EOSINOPHILS ABSOLUTE: 0.02 E9/L (ref 0.05–0.5)
EOSINOPHILS RELATIVE PERCENT: 0.3 % (ref 0–6)
GFR AFRICAN AMERICAN: >60
GFR NON-AFRICAN AMERICAN: >60 ML/MIN/1.73
GLUCOSE BLD-MCNC: 72 MG/DL (ref 74–99)
HCT VFR BLD CALC: 41.4 % (ref 37–54)
HEMOGLOBIN: 12.5 G/DL (ref 12.5–16.5)
IMMATURE GRANULOCYTES #: 0.05 E9/L
IMMATURE GRANULOCYTES %: 0.8 % (ref 0–5)
INR BLD: 1.1
LYMPHOCYTES ABSOLUTE: 0.7 E9/L (ref 1.5–4)
LYMPHOCYTES RELATIVE PERCENT: 11.6 % (ref 20–42)
MAGNESIUM: 1.9 MG/DL (ref 1.6–2.6)
MCH RBC QN AUTO: 25.7 PG (ref 26–35)
MCHC RBC AUTO-ENTMCNC: 30.2 % (ref 32–34.5)
MCV RBC AUTO: 85.2 FL (ref 80–99.9)
MONOCYTES ABSOLUTE: 1.25 E9/L (ref 0.1–0.95)
MONOCYTES RELATIVE PERCENT: 20.7 % (ref 2–12)
NEUTROPHILS ABSOLUTE: 3.99 E9/L (ref 1.8–7.3)
NEUTROPHILS RELATIVE PERCENT: 65.9 % (ref 43–80)
PDW BLD-RTO: 16.1 FL (ref 11.5–15)
PLATELET # BLD: 200 E9/L (ref 130–450)
PMV BLD AUTO: 9.9 FL (ref 7–12)
POTASSIUM SERPL-SCNC: 4.1 MMOL/L (ref 3.5–5)
PRO-BNP: 3235 PG/ML (ref 0–125)
PROCALCITONIN: 0.13 NG/ML (ref 0–0.08)
PROTHROMBIN TIME: 12.1 SEC (ref 9.3–12.4)
RBC # BLD: 4.86 E12/L (ref 3.8–5.8)
SARS-COV-2, NAAT: NOT DETECTED
SODIUM BLD-SCNC: 144 MMOL/L (ref 132–146)
TOTAL PROTEIN: 7.3 G/DL (ref 6.4–8.3)
TROPONIN, HIGH SENSITIVITY: 44 NG/L (ref 0–11)
WBC # BLD: 6.1 E9/L (ref 4.5–11.5)

## 2022-01-18 PROCEDURE — 2580000003 HC RX 258: Performed by: NURSE PRACTITIONER

## 2022-01-18 PROCEDURE — 83735 ASSAY OF MAGNESIUM: CPT

## 2022-01-18 PROCEDURE — 85610 PROTHROMBIN TIME: CPT

## 2022-01-18 PROCEDURE — 93005 ELECTROCARDIOGRAM TRACING: CPT | Performed by: PHYSICIAN ASSISTANT

## 2022-01-18 PROCEDURE — 83880 ASSAY OF NATRIURETIC PEPTIDE: CPT

## 2022-01-18 PROCEDURE — 2140000000 HC CCU INTERMEDIATE R&B

## 2022-01-18 PROCEDURE — 84484 ASSAY OF TROPONIN QUANT: CPT

## 2022-01-18 PROCEDURE — 6370000000 HC RX 637 (ALT 250 FOR IP): Performed by: NURSE PRACTITIONER

## 2022-01-18 PROCEDURE — 99283 EMERGENCY DEPT VISIT LOW MDM: CPT

## 2022-01-18 PROCEDURE — 80053 COMPREHEN METABOLIC PANEL: CPT

## 2022-01-18 PROCEDURE — 71045 X-RAY EXAM CHEST 1 VIEW: CPT

## 2022-01-18 PROCEDURE — 87635 SARS-COV-2 COVID-19 AMP PRB: CPT

## 2022-01-18 PROCEDURE — 85025 COMPLETE CBC W/AUTO DIFF WBC: CPT

## 2022-01-18 PROCEDURE — 84145 PROCALCITONIN (PCT): CPT

## 2022-01-18 RX ORDER — AMLODIPINE BESYLATE 2.5 MG/1
2.5 TABLET ORAL DAILY
Status: DISCONTINUED | OUTPATIENT
Start: 2022-01-19 | End: 2022-01-24

## 2022-01-18 RX ORDER — ESOMEPRAZOLE MAGNESIUM 20 MG/1
20 FOR SUSPENSION ORAL DAILY
Status: DISCONTINUED | OUTPATIENT
Start: 2022-01-19 | End: 2022-01-18 | Stop reason: CLARIF

## 2022-01-18 RX ORDER — SODIUM CHLORIDE 9 MG/ML
25 INJECTION, SOLUTION INTRAVENOUS PRN
Status: DISCONTINUED | OUTPATIENT
Start: 2022-01-18 | End: 2022-01-26 | Stop reason: HOSPADM

## 2022-01-18 RX ORDER — ACETAMINOPHEN 325 MG/1
650 TABLET ORAL EVERY 6 HOURS PRN
Status: DISCONTINUED | OUTPATIENT
Start: 2022-01-18 | End: 2022-01-26 | Stop reason: HOSPADM

## 2022-01-18 RX ORDER — POTASSIUM CHLORIDE 20 MEQ/1
40 TABLET, EXTENDED RELEASE ORAL PRN
Status: DISCONTINUED | OUTPATIENT
Start: 2022-01-18 | End: 2022-01-26 | Stop reason: HOSPADM

## 2022-01-18 RX ORDER — ALBUTEROL SULFATE 90 UG/1
1 AEROSOL, METERED RESPIRATORY (INHALATION) 2 TIMES DAILY
Status: DISCONTINUED | OUTPATIENT
Start: 2022-01-19 | End: 2022-01-18 | Stop reason: CLARIF

## 2022-01-18 RX ORDER — PRAVASTATIN SODIUM 20 MG
20 TABLET ORAL DAILY
Status: DISCONTINUED | OUTPATIENT
Start: 2022-01-19 | End: 2022-01-26 | Stop reason: HOSPADM

## 2022-01-18 RX ORDER — ALBUTEROL SULFATE 2.5 MG/3ML
2.5 SOLUTION RESPIRATORY (INHALATION) 2 TIMES DAILY
Status: DISCONTINUED | OUTPATIENT
Start: 2022-01-18 | End: 2022-01-26 | Stop reason: HOSPADM

## 2022-01-18 RX ORDER — FERROUS SULFATE 325(65) MG
325 TABLET ORAL 2 TIMES DAILY
Status: DISCONTINUED | OUTPATIENT
Start: 2022-01-18 | End: 2022-01-26 | Stop reason: HOSPADM

## 2022-01-18 RX ORDER — METOPROLOL SUCCINATE 50 MG/1
50 TABLET, EXTENDED RELEASE ORAL 2 TIMES DAILY
Status: DISCONTINUED | OUTPATIENT
Start: 2022-01-18 | End: 2022-01-23

## 2022-01-18 RX ORDER — AMLODIPINE BESYLATE 5 MG/1
5 TABLET ORAL DAILY
Status: ON HOLD | COMMUNITY
End: 2022-01-26 | Stop reason: HOSPADM

## 2022-01-18 RX ORDER — SODIUM CHLORIDE 0.9 % (FLUSH) 0.9 %
5-40 SYRINGE (ML) INJECTION PRN
Status: DISCONTINUED | OUTPATIENT
Start: 2022-01-18 | End: 2022-01-26 | Stop reason: HOSPADM

## 2022-01-18 RX ORDER — PANTOPRAZOLE SODIUM 40 MG/1
40 TABLET, DELAYED RELEASE ORAL
Status: DISCONTINUED | OUTPATIENT
Start: 2022-01-19 | End: 2022-01-26 | Stop reason: HOSPADM

## 2022-01-18 RX ORDER — SODIUM CHLORIDE 0.9 % (FLUSH) 0.9 %
5-40 SYRINGE (ML) INJECTION EVERY 12 HOURS SCHEDULED
Status: DISCONTINUED | OUTPATIENT
Start: 2022-01-18 | End: 2022-01-26 | Stop reason: HOSPADM

## 2022-01-18 RX ORDER — POTASSIUM CHLORIDE 7.45 MG/ML
10 INJECTION INTRAVENOUS PRN
Status: DISCONTINUED | OUTPATIENT
Start: 2022-01-18 | End: 2022-01-26 | Stop reason: HOSPADM

## 2022-01-18 RX ORDER — ACETAMINOPHEN 650 MG/1
650 SUPPOSITORY RECTAL EVERY 6 HOURS PRN
Status: DISCONTINUED | OUTPATIENT
Start: 2022-01-18 | End: 2022-01-26 | Stop reason: HOSPADM

## 2022-01-18 RX ORDER — SENNA PLUS 8.6 MG/1
1 TABLET ORAL DAILY PRN
Status: DISCONTINUED | OUTPATIENT
Start: 2022-01-18 | End: 2022-01-26 | Stop reason: HOSPADM

## 2022-01-18 RX ADMIN — FERROUS SULFATE TAB 325 MG (65 MG ELEMENTAL FE) 325 MG: 325 (65 FE) TAB at 23:04

## 2022-01-18 RX ADMIN — Medication 10 ML: at 23:04

## 2022-01-18 ASSESSMENT — PAIN SCALES - GENERAL: PAINLEVEL_OUTOF10: 0

## 2022-01-18 NOTE — ED NOTES
Department of Emergency Medicine  FIRST PROVIDER TRIAGE NOTE             Independent MLP           1/18/22  4:23 PM EST    Date of Encounter: 1/18/22   MRN: [id number]      HPI: Vinod Alan is a 80 y.o. male who presents to the ED for Shortness of Breath (pt states he was sent in for a bronch by Dr Michael Daniel)     Patient is a 29-year-old male that is presenting from Dr. Margot Dockery office. Patient was having worsening shortness of breath and was 84% upon arrival.  Patient did drive himself from the office. Patient was placed on 4 L of oxygen is now at 93%. There is concern for possible cancer and patient was sent over for a bronc which is causing his shortness of    ROS: Negative for fever, cough, vomiting or diarrhea. PE: Gen Appearance/Constitutional: alert  HEENT: NC/NT. PERRLA,  Airway patent. Neck: supple     Initial Plan of Care: All treatment areas with department are currently occupied. Plan to order/Initiate the following while awaiting opening in ED: labs, EKG and imaging studies.     Initial Plan of Care: Initiate Treatment-Testing, Proceed toTreatment Area When Bed Available for ED Attending/MLP to Continue Care    Electronically signed by Concepcion Bruno PA-C   DD: 1/18/22         Concepcion Bruno PA-C  01/18/22 1511

## 2022-01-18 NOTE — LETTER
[address]  Phone: [phone]             January 22, 2022    Patient: Gemma Kunz   YOB: 1950   Date of Visit: 1/18/2022       To Whom It May Concern:    Randi Gray was seen and treated in our facility  beginning 1/18/2022 until current 1/22/2022      Sincerely,       Charlie Guido RN         Signature:__________________________________

## 2022-01-18 NOTE — TELEPHONE ENCOUNTER
Call to to pt and advised that pt is a new referral from Dr. Alba Garcia. Left VM requesting pt call office to discuss plan for biopsy needed and arrange for  procedure. Left office number and requested call back.

## 2022-01-19 PROBLEM — E79.0 HYPERURICEMIA: Status: ACTIVE | Noted: 2022-01-19

## 2022-01-19 PROBLEM — E43 SEVERE PROTEIN-CALORIE MALNUTRITION (HCC): Chronic | Status: ACTIVE | Noted: 2022-01-19

## 2022-01-19 LAB
ALBUMIN SERPL-MCNC: 3.2 G/DL (ref 3.5–5.2)
ALP BLD-CCNC: 80 U/L (ref 40–129)
ALT SERPL-CCNC: 10 U/L (ref 0–40)
ANION GAP SERPL CALCULATED.3IONS-SCNC: 19 MMOL/L (ref 7–16)
ANISOCYTOSIS: ABNORMAL
AST SERPL-CCNC: 15 U/L (ref 0–39)
BASOPHILS ABSOLUTE: 0.1 E9/L (ref 0–0.2)
BASOPHILS RELATIVE PERCENT: 1.7 % (ref 0–2)
BILIRUB SERPL-MCNC: 0.6 MG/DL (ref 0–1.2)
BUN BLDV-MCNC: 18 MG/DL (ref 6–23)
C-REACTIVE PROTEIN: 13.4 MG/DL (ref 0–0.4)
CALCIUM SERPL-MCNC: 10.7 MG/DL (ref 8.6–10.2)
CHLORIDE BLD-SCNC: 98 MMOL/L (ref 98–107)
CO2: 25 MMOL/L (ref 22–29)
CREAT SERPL-MCNC: 0.5 MG/DL (ref 0.7–1.2)
EKG ATRIAL RATE: 79 BPM
EKG Q-T INTERVAL: 372 MS
EKG QRS DURATION: 72 MS
EKG QTC CALCULATION (BAZETT): 500 MS
EKG R AXIS: 91 DEGREES
EKG T AXIS: 53 DEGREES
EKG VENTRICULAR RATE: 109 BPM
EOSINOPHILS ABSOLUTE: 0 E9/L (ref 0.05–0.5)
EOSINOPHILS RELATIVE PERCENT: 1.5 % (ref 0–6)
GFR AFRICAN AMERICAN: >60
GFR NON-AFRICAN AMERICAN: >60 ML/MIN/1.73
GLUCOSE BLD-MCNC: 70 MG/DL (ref 74–99)
HCT VFR BLD CALC: 36 % (ref 37–54)
HEMOGLOBIN: 10.9 G/DL (ref 12.5–16.5)
LACTATE DEHYDROGENASE: 353 U/L (ref 135–225)
LYMPHOCYTES ABSOLUTE: 0.36 E9/L (ref 1.5–4)
LYMPHOCYTES RELATIVE PERCENT: 6.1 % (ref 20–42)
MAGNESIUM: 1.8 MG/DL (ref 1.6–2.6)
MCH RBC QN AUTO: 26 PG (ref 26–35)
MCHC RBC AUTO-ENTMCNC: 30.3 % (ref 32–34.5)
MCV RBC AUTO: 85.7 FL (ref 80–99.9)
MONOCYTES ABSOLUTE: 1.38 E9/L (ref 0.1–0.95)
MONOCYTES RELATIVE PERCENT: 22.6 % (ref 2–12)
MYELOCYTE PERCENT: 2.6 % (ref 0–0)
NEUTROPHILS ABSOLUTE: 4.2 E9/L (ref 1.8–7.3)
NEUTROPHILS RELATIVE PERCENT: 67 % (ref 43–80)
OVALOCYTES: ABNORMAL
PDW BLD-RTO: 16 FL (ref 11.5–15)
PHOSPHORUS: 3.5 MG/DL (ref 2.5–4.5)
PLATELET # BLD: 162 E9/L (ref 130–450)
PMV BLD AUTO: 10.1 FL (ref 7–12)
POIKILOCYTES: ABNORMAL
POLYCHROMASIA: ABNORMAL
POTASSIUM REFLEX MAGNESIUM: 3.7 MMOL/L (ref 3.5–5)
RBC # BLD: 4.2 E12/L (ref 3.8–5.8)
SCHISTOCYTES: ABNORMAL
SEDIMENTATION RATE, ERYTHROCYTE: 75 MM/HR (ref 0–15)
SODIUM BLD-SCNC: 142 MMOL/L (ref 132–146)
TOTAL PROTEIN: 6.2 G/DL (ref 6.4–8.3)
URIC ACID, SERUM: 10.5 MG/DL (ref 3.4–7)
WBC # BLD: 6 E9/L (ref 4.5–11.5)

## 2022-01-19 PROCEDURE — 6370000000 HC RX 637 (ALT 250 FOR IP): Performed by: NURSE PRACTITIONER

## 2022-01-19 PROCEDURE — 83735 ASSAY OF MAGNESIUM: CPT

## 2022-01-19 PROCEDURE — 86140 C-REACTIVE PROTEIN: CPT

## 2022-01-19 PROCEDURE — 6370000000 HC RX 637 (ALT 250 FOR IP): Performed by: INTERNAL MEDICINE

## 2022-01-19 PROCEDURE — 84550 ASSAY OF BLOOD/URIC ACID: CPT

## 2022-01-19 PROCEDURE — 80053 COMPREHEN METABOLIC PANEL: CPT

## 2022-01-19 PROCEDURE — 36415 COLL VENOUS BLD VENIPUNCTURE: CPT

## 2022-01-19 PROCEDURE — 94640 AIRWAY INHALATION TREATMENT: CPT

## 2022-01-19 PROCEDURE — 85025 COMPLETE CBC W/AUTO DIFF WBC: CPT

## 2022-01-19 PROCEDURE — 87449 NOS EACH ORGANISM AG IA: CPT

## 2022-01-19 PROCEDURE — 84100 ASSAY OF PHOSPHORUS: CPT

## 2022-01-19 PROCEDURE — 97165 OT EVAL LOW COMPLEX 30 MIN: CPT

## 2022-01-19 PROCEDURE — 2700000000 HC OXYGEN THERAPY PER DAY

## 2022-01-19 PROCEDURE — 94664 DEMO&/EVAL PT USE INHALER: CPT

## 2022-01-19 PROCEDURE — 6360000002 HC RX W HCPCS: Performed by: INTERNAL MEDICINE

## 2022-01-19 PROCEDURE — 99223 1ST HOSP IP/OBS HIGH 75: CPT | Performed by: INTERNAL MEDICINE

## 2022-01-19 PROCEDURE — 85651 RBC SED RATE NONAUTOMATED: CPT

## 2022-01-19 PROCEDURE — 83615 LACTATE (LD) (LDH) ENZYME: CPT

## 2022-01-19 PROCEDURE — 2140000000 HC CCU INTERMEDIATE R&B

## 2022-01-19 PROCEDURE — 2580000003 HC RX 258: Performed by: NURSE PRACTITIONER

## 2022-01-19 PROCEDURE — 93010 ELECTROCARDIOGRAM REPORT: CPT | Performed by: INTERNAL MEDICINE

## 2022-01-19 PROCEDURE — 97530 THERAPEUTIC ACTIVITIES: CPT

## 2022-01-19 PROCEDURE — 97161 PT EVAL LOW COMPLEX 20 MIN: CPT

## 2022-01-19 RX ORDER — ALLOPURINOL 100 MG/1
100 TABLET ORAL DAILY
Status: DISCONTINUED | OUTPATIENT
Start: 2022-01-19 | End: 2022-01-26 | Stop reason: HOSPADM

## 2022-01-19 RX ADMIN — Medication 10 ML: at 21:03

## 2022-01-19 RX ADMIN — ALBUTEROL SULFATE 2.5 MG: 2.5 SOLUTION RESPIRATORY (INHALATION) at 20:57

## 2022-01-19 RX ADMIN — FERROUS SULFATE TAB 325 MG (65 MG ELEMENTAL FE) 325 MG: 325 (65 FE) TAB at 21:03

## 2022-01-19 RX ADMIN — RIVAROXABAN 20 MG: 20 TABLET, FILM COATED ORAL at 08:46

## 2022-01-19 RX ADMIN — ALBUTEROL SULFATE 2.5 MG: 2.5 SOLUTION RESPIRATORY (INHALATION) at 09:06

## 2022-01-19 RX ADMIN — PRAVASTATIN SODIUM 20 MG: 20 TABLET ORAL at 08:45

## 2022-01-19 RX ADMIN — Medication 10 ML: at 10:00

## 2022-01-19 RX ADMIN — AMLODIPINE BESYLATE 2.5 MG: 2.5 TABLET ORAL at 08:45

## 2022-01-19 RX ADMIN — FERROUS SULFATE TAB 325 MG (65 MG ELEMENTAL FE) 325 MG: 325 (65 FE) TAB at 08:45

## 2022-01-19 RX ADMIN — PANTOPRAZOLE SODIUM 40 MG: 40 TABLET, DELAYED RELEASE ORAL at 06:29

## 2022-01-19 RX ADMIN — ALLOPURINOL 100 MG: 100 TABLET ORAL at 14:20

## 2022-01-19 ASSESSMENT — PAIN SCALES - GENERAL
PAINLEVEL_OUTOF10: 0

## 2022-01-19 NOTE — ED NOTES
Bed: 06  Expected date:   Expected time:   Means of arrival:   Comments:  Andry Khan RN  01/18/22 2015

## 2022-01-19 NOTE — PROGRESS NOTES
[address]        Date:2022                                                  Patient Name: Jenni Chacon    MRN: [id number]    : 1950    Room: [address] [place] [address]      Evaluating OT: Kelle Ng, 83 Yessy Fermin OTR/L; [id number]      Referring Provider: TRACIE Bonilla CNP    Specific Provider Orders/Date: OT Eval and Treat 22      Diagnosis:  Acute respiratory failure with hypoxia    Surgery: none during this admission    Pertinent Medical History:   has a past medical history of Arthritis, Atrial fibrillation ([facility] [address] BPH (benign prostatic hyperplasia), Cancer ([facility] [address] Coronary artery disease, Difficult airway, Difficult intubation, History of blood transfusion, Hydronephrosis of right kidney, Hyperlipidemia, Hypertension, and Sinus tachycardia.     Recommended Adaptive Equipment: none at this time     Precautions:  Fall Risk, O2 (no O2 at baseline)     Assessment of current deficits    [x] Functional mobility  [x]ADLs  [x] Strength               []Cognition    [x] Functional transfers   [x] IADLs         [x] Safety Awareness   [x]Endurance    [] Fine Coordination              [x] Balance      [] Vision/perception   []Sensation     []Gross Motor Coordination  [] ROM  [] Delirium                   [] Motor Control     OT PLAN OF CARE   OT POC based on physician orders, patient diagnosis and results of clinical assessment    Frequency/Duration: 1-3 days/wk for 2 weeks PRN   Specific OT Treatment Interventions to include:   * Instruction/training on adapted ADL techniques and AE recommendations to increase functional independence within precautions       * Training on energy conservation strategies, correct breathing pattern and techniques to improve independence/tolerance for self-care routine  * Functional transfer/mobility training/DME recommendations for increased independence, safety, and fall prevention  * Patient/Family education to increase follow through with safety techniques and functional independence  * Recommendation of environmental modifications for increased safety with functional transfers/mobility and ADLs  * Therapeutic exercise to improve motor endurance, ROM, and functional strength for ADLs/functional transfers  * Therapeutic activities to facilitate/challenge dynamic balance, stand tolerance for increased safety and independence with ADLs    Home Living: Pt lives with fimauro in 1 story hoe with 3 steps and BHR to enter. Laundry located in basement with full flight of stairs and 1HR to access.    Bathroom setup: tub/shower unit (pt reports owning shower chair however not using previously)   Equipment owned: shower chair    Prior Level of Function: independent with ADLs   IADLs  ambulated with no AD prior  Driving: no - pt reports fiance able to drive  Occupation: retired    Pain Level: 0/10  Cognition: A&O: 4/4; Follows multi step directions   Memory:  good   Sequencing:  fair   Problem solving:  fair   Judgement/safety:  fair     Functional Assessment:  AM-PAC Daily Activity Raw Score: 19/24   Initial Eval Status  Date: 1/19/22 Treatment Status  Date: STGs = LTGs  Time frame: 10-14 days   Feeding Independent       Grooming Stand by Assist   Hand hygiene standing sink side   Pt demonstrated safety with dynamic standing tasks  Independent    UB Dressing Stand by Assist   Simulated overhead reach for donning UB clothing  Independent    LB Dressing Minimal Assist   Educated on figure 4 technique seated EOB to luz socks   Limited R LE ROM - pt reports chronic hip discomfort   Independent    Bathing Minimal Assist  Educated on benefits of shower chair to improve safety and endurance during functional task  Independent    Toileting Stand By Assist  Pt completed task standing and educated on rest breaks as needed to increase endurance during task   No noted LOB Independent    Bed Mobility  Log Roll: Supervision  Supine to sit: Supervision   Sit to supine: Supervision   Supine to sit: Independent   Sit to supine: Independent    Functional Transfers Sit to stand: SBA no AD   Stand to sit: SBA no AD   Stand pivot:  SBA no AD   Commode:  SBA no AD   Sit to stand: Ind   Stand to sit: Ind  Stand pivot: Ind  Commode: Ind    Functional Mobility SBA no AD   Pt required assist to manage O2 to bathroom   No noted LOB during functional mobility within room  Independent    Balance Sitting:     Static - IND     Dynamic - IND  Standing: SBA  Standing: IND   Activity Tolerance Fair   Limited d/t SOB  Good   Visual/  Perceptual Glasses: yes - reading    pt able to read clock on wall          Vitals SpO2 on 3 L NC: 92-93% during session     HR: 100-105bpm       Hand Dominance: Right   AROM (PROM) Strength Additional Info:  Goal:   RUE  WFL 4+/5 good  and wfl FMC/dexterity noted during ADL tasks   Improve overall RUE strength to 5/5 for participation in functional tasks       LUE WFL 4+/5 Good  and wfl FMC/dexterity noted during ADL tasks   Improve overall LUE strength to 5/5 for participation in functional tasks       Hearing: [facility]   Sensation:  No c/o numbness or tingling   Tone: WFL   Edema: unremarkable    Comment: Cleared by RN to see pt. Upon arrival patient lying supine in bed and agreeable to OT session. At end of session, patient lying supine in bed with call light and phone within reach, all lines and tubes intact. Overall patient demonstrated  decreased independence and safety during completion of ADL/functional transfer/mobility tasks. Pt would benefit from continued skilled OT to increase safety and independence with completion of ADL/IADL tasks for functional independence and quality of life.     Treatment: OT treatment provided this date includes:    ADL-  Instruction/training on safety and adapted techniques for completion of ADLs - pt educated on task modifications as needed, educted on figure 4 technique to improve independence with donning socks seated EOB, pt completed toileting task standing demonstrated fair dynamic standing balance    Mobility-  Instruction/training on safety and improved independence with bed mobility/functional transfers and functional mobility - educated on proper body mechanics and posture/gait speed noted fatigue following functional mobility to bathroom educated on rest breaks, pt required assist to manage O2 line during functional mobility     Sitting EOB x 5 minutes to improve dynamic sitting balance and activity tolerance during ADLs.   Activity tolerance- Instruction/training on energy conservation/work simplification for completion of ADLs - educated on rest breaks and overall energy conservation to improve endurance during functional tasks and decreased overall SOB with exertion     Rehab Potential: Good  for established goals     LTG: maximize independence with ADLs to return to PLOF    Patient and/or family were instructed on functional diagnosis, prognosis/goals and OT plan of care. Demonstrated fair understanding. [] Malnutrition indicators have been identified and nursing has been notified to ensure a dietitian consult is ordered. ·  Eval Complexity: Low     Evaluation time includes thorough review of current medical information, gathering information on past medical & social history & PLOF, completion of standardized testing, informal observation of tasks, consultation with other medical professions/disciplines, assessment of data & development of POC/goals.      Time In: 1500  Time Out: 1515  Total Treatment Time: none    Min Units   OT Eval Low 65272  x     OT Eval Medium 77541      OT Eval High 47456      OT Re-Eval [id number]       Therapeutic Ex [id number]       Therapeutic Activities 06580       ADL/Self Care 14660       Orthotic Management 46714       Manual [id number]     Neuro Re-Ed 00106       Non-Billable Time Evaluation Time additionally includes thorough review of current medical information, gathering information on past medical history/social history and prior level of function, interpretation of standardized testing/informal observation of tasks, assessment of data and development of plan of care and goals.           ROSARIO Dunn OTR/L; [id number]

## 2022-01-19 NOTE — ED PROVIDER NOTES
HPI:  1/18/22, Time: 8:16 PM EST         Queen Tashia is a 80 y.o. male presenting to the ED for sob, beginning days ago. The complaint has been persistent, moderate in severity, and worsened by nothing. Patient does have history of cancer. Patient was sent here because of hypoxia. Patient does have a history of atrial fibrillation. Patient reporting no chest pain. Patient reporting cough. Patient was seen by his oncologist sent here because he was hypoxic. Patient was noted to be hypoxic in the waiting room he was placed on oxygen out in the waiting room. Patient reporting no abdominal pain. He does have a history of cancer in his colon as well. Patient reported no black or tarry stools he reports over a 20 pound weight loss over several weeks. ROS:   Pertinent positives and negatives are stated within HPI, all other systems reviewed and are negative.  --------------------------------------------- PAST HISTORY ---------------------------------------------  Past Medical History:  has a past medical history of Arthritis, Atrial fibrillation ([facility] [address] BPH (benign prostatic hyperplasia), [facility] Coronary artery disease, Difficult airway, Difficult intubation, History of blood transfusion, Hydronephrosis of right kidney, Hyperlipidemia, Hypertension, and Sinus tachycardia. Past Surgical History:  has a past surgical history that includes Pacemaker insertion (2002); Tonsillectomy; Upper gastrointestinal endoscopy; pacemaker placement (2014 new battery); Vasectomy; other surgical history (08/12/2016); Aorta surgery; ablation of dysrhythmic focus; Aortic valve replacement; Mitral valve replacement; Upper gastrointestinal endoscopy (N/A, 2/16/2021); Bladder surgery (Right, 2/17/2021); Colonoscopy (2/19/2021); Colonoscopy (2/19/2021); hemicolectomy (N/A, 3/11/2021); Port Surgery (Right, 4/1/2021); Port Surgery (Right, 7/12/2021); and Bladder surgery (Right, 9/15/2021).     Social History:  reports that he quit smoking about 7 years ago. His smoking use included cigarettes. He has a 44.00 pack-year smoking history. He has never used smokeless tobacco. He reports current alcohol use. He reports previous drug use. Family History: family history includes Brain Cancer in his sister; Cancer in his maternal grandfather; Diabetes in his father and mother; Heart Disease in his father; Stroke in his brother, mother, and sister. The patients home medications have been reviewed. Allergies: Patient has no known allergies. ---------------------------------------------------PHYSICAL EXAM--------------------------------------    Constitutional/General: Alert and oriented x3, well appearing, non toxic in NAD  Head: Normocephalic and atraumatic  Eyes: PERRL, EOMI  Mouth: Oropharynx clear, handling secretions, no trismus  Neck: Supple, full ROM, non tender to palpation in the midline, no stridor, no crepitus, no meningeal signs  Pulmonary: Lungs clear to auscultation bilaterally, no wheezes, rales, or rhonchi. Not in respiratory distress  Cardiovascular:  Regular rate. Irregular rhythm no murmurs, gallops, or rubs. 2+ distal pulses  Chest: no chest wall tenderness  Abdomen: Soft. Non tender. Non distended. +BS. No rebound, guarding, or rigidity. No pulsatile masses appreciated. Musculoskeletal: Moves all extremities x 4. Warm and well perfused, no clubbing, cyanosis, or edema. Capillary refill <3 seconds  Skin: warm and dry. No rashes. Neurologic: GCS 15, CN 2-12 grossly intact, no focal deficits, symmetric strength 5/5 in the upper and lower extremities bilaterally  Psych: Normal Affect    -------------------------------------------------- RESULTS -------------------------------------------------  I have personally reviewed all laboratory and imaging results for this patient. Results are listed below.      LABS:  Results for orders placed or performed during the hospital encounter of 01/18/22   COVID-19, Rapid    Specimen: Nasopharyngeal Swab   Result Value Ref Range    SARS-CoV-2, NAAT Not Detected Not Detected   Comprehensive Metabolic Panel   Result Value Ref Range    Sodium 144 132 - 146 mmol/L    Potassium 4.1 3.5 - 5.0 mmol/L    Chloride 99 98 - 107 mmol/L    CO2 25 22 - 29 mmol/L    Anion Gap 20 (H) 7 - 16 mmol/L    Glucose 72 (L) 74 - 99 mg/dL    BUN 17 6 - 23 mg/dL    CREATININE 0.6 (L) 0.7 - 1.2 mg/dL    GFR Non-African American >60 >=60 mL/min/1.73    GFR African American >60     Calcium 11.0 (H) 8.6 - 10.2 mg/dL    Total Protein 7.3 6.4 - 8.3 g/dL    Albumin 3.6 3.5 - 5.2 g/dL    Total Bilirubin 0.7 0.0 - 1.2 mg/dL    Alkaline Phosphatase 88 40 - 129 U/L    ALT 7 0 - 40 U/L    AST 14 0 - 39 U/L   CBC Auto Differential   Result Value Ref Range    WBC 6.1 4.5 - 11.5 E9/L    RBC 4.86 3.80 - 5.80 E12/L    Hemoglobin 12.5 12.5 - 16.5 g/dL    Hematocrit 41.4 37.0 - 54.0 %    MCV 85.2 80.0 - 99.9 fL    MCH 25.7 (L) 26.0 - 35.0 pg    MCHC 30.2 (L) 32.0 - 34.5 %    RDW 16.1 (H) 11.5 - 15.0 fL    Platelets 096 139 - 470 E9/L    MPV 9.9 7.0 - 12.0 fL    Neutrophils % 65.9 43.0 - 80.0 %    Immature Granulocytes % 0.8 0.0 - 5.0 %    Lymphocytes % 11.6 (L) 20.0 - 42.0 %    Monocytes % 20.7 (H) 2.0 - 12.0 %    Eosinophils % 0.3 0.0 - 6.0 %    Basophils % 0.7 0.0 - 2.0 %    Neutrophils Absolute 3.99 1.80 - 7.30 E9/L    Immature Granulocytes # 0.05 E9/L    Lymphocytes Absolute 0.70 (L) 1.50 - 4.00 E9/L    Monocytes Absolute 1.25 (H) 0.10 - 0.95 E9/L    Eosinophils Absolute 0.02 (L) 0.05 - 0.50 E9/L    Basophils Absolute 0.04 0.00 - 0.20 E9/L   Troponin   Result Value Ref Range    Troponin, High Sensitivity 44 (H) 0 - 11 ng/L   Protime-INR   Result Value Ref Range    Protime 12.1 9.3 - 12.4 sec    INR 1.1    Brain Natriuretic Peptide   Result Value Ref Range    Pro-BNP 3,235 (H) 0 - 125 pg/mL   Magnesium   Result Value Ref Range    Magnesium 1.9 1.6 - 2.6 mg/dL   EKG 12 Lead   Result Value Ref Range    Ventricular Rate 115 BPM    Atrial Rate 127 BPM    QRS Duration 72 ms    Q-T Interval 348 ms    QTc Calculation (Bazett) 481 ms    R Axis 95 degrees    T Axis 47 degrees       RADIOLOGY:  Interpreted by Radiologist.  XR CHEST PORTABLE   Final Result   1. Multifocal nodular ill-defined opacities in the left mid and upper lung   and right lower lung. Allowing for differences in technique, similar to   recent CT scan. Broad differential which includes infectious/inflammatory   processes. Malignancy not excluded      2. Background lung hyperinflation with coarse interstitial markings. Atherosclerotic disease. Postprocedural changes as above             EKG:  This EKG is signed and interpreted by me. Rate: 109  Rhythm: Atrial fibrillation  Interpretation: non-specific EKG  Comparison: stable as compared to patient's most recent EKG        ------------------------- NURSING NOTES AND VITALS REVIEWED ---------------------------   The nursing notes within the ED encounter and vital signs as below have been reviewed by myself. /70   Pulse 104   Resp 18   SpO2 93%   Oxygen Saturation Interpretation: Normal    The patients available past medical records and past encounters were reviewed. ------------------------------ ED COURSE/MEDICAL DECISION MAKING----------------------  Medications - No data to display          Medical Decision Making:      Patient presenting here because of history of shortness of breath. He does have a history of cancer. Patient was noted to be hypoxic at doctor's office. Patient was sent here. Chest x-ray does look worse compared to his prior x-ray he did had a recent CT without contrast he is currently on should and here his pulse ox was reportedly in the 80s. Patient is on anticoagulation does have history of atrial fibrillation. Patient in no respiratory distress here. Patient labs noted reviewed. Patient will be admitted to monitored bed.   Re-Evaluations: Re-evaluation. Patients symptoms show no change  Patient is currently on supplemental oxygen is in no respiratory distress. Patient reporting no chest pain    Consultations:             Internal medicine    Critical Care: This patient's ED course included: a personal history and physicial eaxmination    This patient has been closely monitored during their ED course. Counseling: The emergency provider has spoken with the patient and discussed todays results, in addition to providing specific details for the plan of care and counseling regarding the diagnosis and prognosis. Questions are answered at this time and they are agreeable with the plan.       --------------------------------- IMPRESSION AND DISPOSITION ---------------------------------    IMPRESSION  1. Hypoxia    2. History of lymphoma        DISPOSITION  Disposition: admit  Patient condition is stable        NOTE: This report was transcribed using voice recognition software.  Every effort was made to ensure accuracy; however, inadvertent computerized transcription errors may be present          Anatoly Gayle MD  01/18/22 [id number]

## 2022-01-19 NOTE — PROGRESS NOTES
Comprehensive Nutrition Assessment    Type and Reason for Visit:  Initial,Consult    Nutrition Recommendations/Plan: Recommend and start Ensure Enlive supplement BID and Boost Pudding supplement daily to help meet increased nutritional needs and to help maintain weight status. Nutrition Assessment:  Patient adm w/ decreased po intake of meals PTA ; adm w/ SOB and acute respiratory failure with hypoxia ; hx of large B-cell Lymphoma s/p chemotherapy/radiation ; lung masses noted ; hx of CAD and CKD ; pt also meets criteria for severe malnutrition ; will start ONS    Malnutrition Assessment:  Malnutrition Status:  Severe malnutrition    Context:  Chronic Illness     Findings of the 6 clinical characteristics of malnutrition:  Energy Intake:  7 - 75% or less estimated energy requirements for 1 month or longer  Weight Loss:  7 - Greater than 20% over 1 year (36%)     Body Fat Loss:  7 - Severe body fat loss Orbital,Triceps   Muscle Mass Loss:  7 - Severe muscle mass loss Temples (temporalis),Clavicles (pectoralis & deltoids),Hand (interosseous)  Fluid Accumulation:  No significant fluid accumulation     Strength:  Not Performed    Estimated Daily Nutrient Needs:  Energy (kcal):  4267-6438 (REE 1337 x 1.3 SF); Weight Used for Energy Requirements:  Current     Protein (g):  80-95 (1.3-1.5g/kg CBW); Weight Used for Protein Requirements:  Current        Fluid (ml/day):  9965-0175; Method Used for Fluid Requirements:  1 ml/kcal      Nutrition Related Findings:  I&Os WNL, trace edema, active BS, A&O x 4, cachexia, SOB      Wounds:  None       Current Nutrition Therapies:    ADULT DIET; Regular; 3 carb choices (45 gm/meal);  Low Sodium (2 gm)    Anthropometric Measures:  · Height: 5' 7\" (170.2 cm)  · Current Body Weight: 136 lb (61.7 kg) (1/19, standing scale)   · Admission Body Weight: 136 lb (61.7 kg) (1/18, standing scale)    · Usual Body Weight: 214 lb (97.1 kg) (2/14/21, standing scale ; EMR shows weight loss of 78# in the past 11 months (214# to 136#) (36%) ; EMR also shows past weight of 168# actual on 10/27/21)     · Ideal Body Weight: 148 lbs; % Ideal Body Weight 91.9 %   · BMI: 21.3  · BMI Categories: Underweight (BMI less than 22) age over 72       Nutrition Diagnosis:   · Severe malnutrition,In context of chronic illness related to catabolic illness (hx of lymphoma) as evidenced by poor intake prior to admission,weight loss greater than or equal to 20% in 1 year,severe loss of subcutaneous fat,severe muscle loss      Nutrition Interventions:   Food and/or Nutrient Delivery:  Continue Current Diet,Start Oral Nutrition Supplement  Nutrition Education/Counseling:  Education not indicated   Coordination of Nutrition Care:  Continue to monitor while inpatient    Goals:  Patient will consume 50-75% of meals served       Nutrition Monitoring and Evaluation:   Behavioral-Environmental Outcomes:  None Identified   Food/Nutrient Intake Outcomes:  Food and Nutrient Intake,Supplement Intake  Physical Signs/Symptoms Outcomes:  Biochemical Data,Chewing or Swallowing,GI Status,Fluid Status or Edema,Hemodynamic Status,Meal Time Behavior,Nutrition Focused Physical Findings,Skin,Weight     Discharge Planning:     Too soon to determine     Electronically signed by Vanessa Sousa RD, LD on 1/19/22 at 1:02 PM EST    Contact: [phone]

## 2022-01-19 NOTE — CONSULTS
[facility] CONSULT NOTE    Reason for Consult: Pulmonary masses   Requested by: Robert Thomas MD     Chief complaint: Abnormal chest CT    History Obtained From: Patient and EMR     HISTORY OFPRESENT ILLNESS              The patient is a 80 y.o. 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 He reports no fever, no chills, has weight loss and cough productive of light yellowish sputum with occasional blood streaks. He reports having traveled to [address] [place] [address] to visit family within the past 3 months. On admission, he was afebrile and hemodynamically stable with no leukocytosis. Procalcitonin level was not elevated at 0.13 ng/mL. SARS-CoV-2 PCR was not detected. CT chest from 01/14 showed interval development of large areas of multifocal dense consolidation within bilateral lungs especially within the posterior lower lobes and posterior left lung lobe, marked interval increase in number and size of multifocal bilateral lung nodules, interval worsening of diffuse mediastinal and bilateral hilar lymphadenopathy, centrilobular emphysema. CT abdomen and pelvis on 01/14 showed significant interval increase in pulmonary nodules bilaterally with mass consolidation concerning for progression of neoplastic disease, soft tissue mass in the presacral space suggesting neoplasm. ID service was subsequently consulted for further recommendations.     Past Medical History  Past Medical History:   Diagnosis Date    Arthritis     KNEES HIPS BACK    Atrial fibrillation (HCC)     BPH (benign prostatic hyperplasia)     Cancer ([facility] [address]     large B cell lymphoma    Coronary artery disease     Difficult airway     PT STATES HE WAS TOLD THIS TWICE AT [facility]    Difficult intubation 04/2017    note in care everywhere \"Clinical Summary\" 03/10/2021    History of blood transfusion 2021    Hydronephrosis of right kidney 9/15/2021    Hyperlipidemia     Hypertension     Sinus tachycardia 01/01/2002       Current Facility-Administered Medications   Medication Dose Route Frequency Provider Last Rate Last Admin    allopurinol (ZYLOPRIM) tablet 100 mg  100 mg Oral Daily Melquiades Han MD        amLODIPine (NORVASC) tablet 2.5 mg  2.5 mg Oral Daily Jorge Luis Palomo, APRN - CNP   2.5 mg at 01/19/22 0845    ferrous sulfate (IRON 325) tablet 325 mg  325 mg Oral BID Jorge Luis Palomo, APRN - CNP   325 mg at 01/19/22 0845    metoprolol succinate (TOPROL XL) extended release tablet 50 mg  50 mg Oral BID Jorge Luis Palomo, APRN - CNP        pravastatin (PRAVACHOL) tablet 20 mg  20 mg Oral Daily Jorge Luis Palomo, APRN - CNP   20 mg at 01/19/22 0845    [Held by provider] rivaroxaban (XARELTO) tablet 20 mg  20 mg Oral Daily with breakfast Jorge Luis Palomo, APRN - CNP   20 mg at 01/19/22 0846    sodium chloride flush 0.9 % injection 5-40 mL  5-40 mL IntraVENous 2 times per day Jorge Luis Palomo, APRN - CNP   10 mL at 01/19/22 1000    sodium chloride flush 0.9 % injection 5-40 mL  5-40 mL IntraVENous PRN Jorge Luis Palomo, APRN - CNP        0.9 % sodium chloride infusion  25 mL IntraVENous PRN Jorge Luis Palomo, APRN - CNP        acetaminophen (TYLENOL) tablet 650 mg  650 mg Oral Q6H PRN Jorge Luis Palomo, APRN - CNP        Or    acetaminophen (TYLENOL) suppository 650 mg  650 mg Rectal Q6H PRN Jorge Luis Garcial, APRN - CNP        potassium chloride (KLOR-CON M) extended release tablet 40 mEq  40 mEq Oral PRN Jorge Luis Garcial, APRN - CNP        Or    potassium bicarb-citric acid (EFFER-K) effervescent tablet 40 mEq  40 mEq Oral PRN Derrell Morales, APRN - CNP        Or    potassium chloride 10 mEq/100 mL IVPB (Peripheral Line)  10 mEq IntraVENous PRN Derrell Handleycal, APRN - CNP        senna (SENOKOT) tablet 8.6 mg  1 tablet Oral Daily PRN Derrell Morales, APRN - CNP        albuterol (PROVENTIL) nebulizer solution 2.5 mg  2.5 mg Nebulization BID Kenyatta Owens MD   2.5 mg at 01/19/22 0906    pantoprazole (PROTONIX) tablet 40 mg  40 mg Oral QAM AC Kenyatta Owens MD   40 mg at 01/19/22 7646       No Known Allergies    Surgical History  Past Surgical History:   Procedure Laterality Date    ABLATION OF DYSRHYTHMIC FOCUS      AORTA SURGERY      aortic valve replacement    AORTIC VALVE REPLACEMENT      BLADDER SURGERY Right 2/17/2021    CYSTOSCOPY BILATERAL RETROGRADE PYELOGRAM RIGHT STENT INSERTION performed by Cammie Cameron MD at [facility] Right 9/15/2021    CYSTOSCOPY RETROGRADE  RIGHT STENT EXCHANGE performed by Cammie Cameron MD at [facility] COLONOSCOPY  2/19/2021    COLONOSCOPY WITH BIOPSY performed by Lalita Del Cid DO at [address]  2/19/2021    COLONOSCOPY W/ ENDOSCOPIC MUCOSAL RESECTION performed by Lalita Del Cid DO at [address] 3/11/2021    LAPAROSCOPIC RIGHT HEMICOLECTOMY performed by Leann Moyer MD at [facility] OTHER SURGICAL HISTORY  08/12/2016    CT Myelogram, Dr. Orta Distance  2014 new battery    last checked Dr Raza Dutton 1/2016?     PORT SURGERY Right 4/1/2021    MEDI PORT INSERTION performed by Leann Moyer MD at [facility] Right 7/12/2021    PORT REMOVAL performed by Claudia Ott MD at [address] [place] [address] ENDOSCOPY      reflux    UPPER GASTROINTESTINAL ENDOSCOPY N/A 2/16/2021    EGD BIOPSY performed by Graciela Cunningham MD at [address] [place] [address] Marital status:    Tobacco Use    Smoking status: Former Smoker     Packs/day: 1.00     Years: 44.00     Pack years: 44.00     Types: Cigarettes     Quit date: 3/4/2014     Years since quittin.8    Smokeless tobacco: Never Used   Vaping Use    Vaping Use: Some days    Substances: Nicotine, Flavoring, 6% nicotine    Devices: Pre-filled or refillable cartridge   Substance and Sexual Activity    Alcohol use: Yes     Comment: occassionally    Drug use: Not Currently         Family Medical History  Family History   Problem Relation Age of Onset    Diabetes Mother     Stroke Mother     Diabetes Father     Heart Disease Father     Brain Cancer Sister     Stroke Sister     Stroke Brother     Cancer Maternal Grandfather        Review of Systems:  Constitutional: No fever, no chills  Eyes: No vision changes, no retroorbital pain  ENT: No hearing changes, no ear pain  Respiratory: Has cough, has dyspnea  Cardiovascular: No chest pain, no palpitations  Gastrointestinal: No abdominal pain, no diarrhea  Genitourinary: No dysuria, no hematuria  Integumentary: No rash, no itching  Musculoskeletal: No muscle pain, no joint pain  Neurologic: No headache, has numbness on fingers    Physical Examination:  Vitals:    22 2220 22 0028 22 0629 22 0801   BP: (!) 147/69 130/78  139/83   Pulse: 102 90  87   Resp: 16 16  18   Temp: 98.4 °F (36.9 °C) 97.5 °F (36.4 °C)  97.7 °F (36.5 °C)   TempSrc: Oral Temporal  Temporal   SpO2: 92% 93%  96%   Weight: 136 lb 6.4 oz (61.9 kg)  136 lb 3.2 oz (61.8 kg)    Height: 5' 7\" (1.702 m)        Constitutional: Alert, not in distress  Eyes: Sclerae anicteric, no conjunctival erythema  ENT: No buccal lesion, no pharyngeal exudates  Neck: No nuchal rigidity, no cervical adenopathy  Lungs: Clear breath sounds, no crackles, no wheezes  Heart: Regular rate and rhythm, no murmurs  Abdomen:  Bowel sounds present, soft, nontender  Skin: Warm and dry, no active dermatoses  Musculoskeletal: No joint erythema, no joint swelling    Labs, imaging, and medical records/notes were personally reviewed. Assessment:  Lung masses, infectious vs neoplastic in etiology  Immunocompromised state  Diffuse large B-cell lymphoma on chemotherapy    Plan:  Follow up Pulmonology recommendations regarding tissue diagnosis. Monitor clinically off antibiotics for now. Check urine Streptococcus pneumoniae and Legionella antigens, sputum Gram stain and culture. Check serum 1,3 beta-d-glucan. Continue supportive care. Thank you for involving me in the care of Kelle Rhoades. I will continue to follow. Please do not hesitate to call for any questions or concerns.     Electronically signed by Jarrell Thompson MD on 1/19/2022 at 11:08 AM

## 2022-01-19 NOTE — PATIENT CARE CONFERENCE
P Quality Flow/Interdisciplinary Rounds Progress Note        Quality Flow Rounds held on January 19, 2022    Disciplines Attending:  Bedside Nurse, ,  and Nursing Unit Leadership    Ivet Emmanuel was admitted on 1/18/2022  8:15 PM    Anticipated Discharge Date:       Disposition:    Osman Score:  Osman Scale Score: 19    Readmission Risk              Risk of Unplanned Readmission:  26           Discussed patient goal for the day, patient clinical progression, and barriers to discharge.   The following Goal(s) of the Day/Commitment(s) have been identified:  Lab/test results     Jocelyn Guallpa RN  January 19, 2022

## 2022-01-19 NOTE — H&P
[address] [facility]  History and Physical      CHIEF COMPLAINT:    Chief Complaint   Patient presents with    Shortness of Breath     pt states he was sent in for a bronch by Dr Stephnai Ray        Patient of Grecia Bearden DO presents with:  Acute respiratory failure with hypoxia ([facility]    History of Present Illness:   Patient complains of several months of severe shortness of breath. No fevers. Minimal cough. Exacerbated with exertion. No other modifying factors. He has been losing a lot of weight. REVIEW OF SYSTEMS:  Pertinent negatives are above in HPI. 10 point ROS otherwise negative.       Past Medical History:   Diagnosis Date    Arthritis     KNEES HIPS BACK    Atrial fibrillation ([address]     BPH (benign prostatic hyperplasia)     Cancer (HCC)     large B cell lymphoma    Coronary artery disease     Difficult airway     PT STATES HE WAS TOLD THIS TWICE AT [facility]    Difficult intubation 04/2017    note in care everywhere \"Clinical Summary\" 03/10/2021    History of blood transfusion 2021    Hydronephrosis of right kidney 9/15/2021    Hyperlipidemia     Hypertension     Sinus tachycardia 01/01/2002         Past Surgical History:   Procedure Laterality Date    ABLATION OF DYSRHYTHMIC FOCUS      AORTA SURGERY      aortic valve replacement    AORTIC VALVE REPLACEMENT      BLADDER SURGERY Right 2/17/2021    CYSTOSCOPY BILATERAL RETROGRADE PYELOGRAM RIGHT STENT INSERTION performed by Rafa Briceno MD at [facility] 119 Right 9/15/2021    CYSTOSCOPY RETROGRADE  RIGHT STENT EXCHANGE performed by Rafa Briceno MD at [facility] 22 COLONOSCOPY  2/19/2021    COLONOSCOPY WITH BIOPSY performed by Brad Metcalf DO at [address]  2/19/2021    COLONOSCOPY W/ ENDOSCOPIC MUCOSAL RESECTION performed by Brad Metcalf DO at [address] N/A 3/11/2021    LAPAROSCOPIC RIGHT HEMICOLECTOMY performed by Vinh Lion MD at [address] VALVE REPLACEMENT      OTHER SURGICAL HISTORY  08/12/2016    CT Myelogram, Dr. Alec Rodriguez  2014 new battery    last checked Dr Nydia Christianson 1/2016?     PORT SURGERY Right 4/1/2021    MEDI PORT INSERTION performed by Jhoan Victoria MD at [facility] Right 7/12/2021    PORT REMOVAL performed by Judi Cronin MD at [address] [facility] ENDOSCOPY      reflux    UPPER GASTROINTESTINAL ENDOSCOPY N/A 2/16/2021    EGD BIOPSY performed by Robert Wiley MD at [facility]         Medications Prior to Admission:    Medications Prior to Admission: amLODIPine (NORVASC) 5 MG tablet, Take 5 mg by mouth daily  pravastatin (PRAVACHOL) 20 MG tablet, Take 1 tablet by mouth daily  rivaroxaban (XARELTO) 20 MG TABS tablet, Take 20 mg by mouth daily (with breakfast)   vitamin B-12 (CYANOCOBALAMIN) 100 MCG tablet, Take 50 mcg by mouth daily  sennosides-docusate sodium (SENOKOT-S) 8.6-50 MG tablet, Take 1 tablet by mouth 2 times daily  ferrous sulfate 325 (65 Fe) MG tablet, Take 325 mg by mouth 2 times daily   esomeprazole Magnesium (NEXIUM) 20 MG PACK, Take 20 mg by mouth daily  albuterol sulfate HFA (PROAIR HFA) 108 (90 Base) MCG/ACT inhaler, Inhale 1 puff into the lungs 2 times daily  tamsulosin (FLOMAX) 0.4 MG capsule, Take 1 capsule by mouth daily for 10 doses (Patient not taking: Reported on 10/27/2021)  [DISCONTINUED] phenazopyridine (PYRIDIUM) 200 MG tablet, Take 1 tablet by mouth 2 times daily as needed for Pain (Patient not taking: Reported on 10/27/2021)  [DISCONTINUED] amLODIPine (NORVASC) 5 MG tablet, Take 0.5 tablets by mouth daily  [DISCONTINUED] metoprolol succinate (TOPROL XL) 50 MG extended release tablet, Take 1 tablet by mouth 2 times daily (Patient not taking: Reported on 10/27/2021)    Note that the patient's home medications were reviewed and the above list is accurate to the best of my knowledge at the time of the exam.    Allergies:    Patient has no known allergies. Social History:    reports that he quit smoking about 7 years ago. His smoking use included cigarettes. He has a 44.00 pack-year smoking history. He has never used smokeless tobacco. He reports current alcohol use. He reports previous drug use. Family History:   family history includes Brain Cancer in his sister; Cancer in his maternal grandfather; Diabetes in his father and mother; Heart Disease in his father; Stroke in his brother, mother, and sister. PHYSICAL EXAM:    Vitals:  /83   Pulse 87   Temp 97.7 °F (36.5 °C) (Temporal)   Resp 18   Ht 5' 7\" (1.702 m)   Wt 136 lb 3.2 oz (61.8 kg)   SpO2 96%   BMI 21.33 kg/m²       General appearance: NAD, conversant, frail / cachectic appearing  Eyes: Sclerae anicteric, PERRLA  HEENT: AT/NC, MMM  Neck: FROM, supple, no thyromegaly  Lymph: No cervical / supraclavicular lymphadenopathy  Lungs: Dull b/l L>R, WOB mildly elevated  CV: RRR, no MRGs, no lower extremity edema  Abdomen: Soft, non-tender; no masses or HSM, +BS  Extremities: FROM without synovitis. No clubbing or cyanosis of the hands. Skin: no rash, induration, lesions, or ulcers  Psych: Calm and cooperative. Normal judgement and insight. Normal mood and affect. Neuro: Alert and interactive, face symmetric, speech fluent. LABS:  All labs reviewed.   Of note:  CBC with Differential:    Lab Results   Component Value Date    WBC 6.0 01/19/2022    RBC 4.20 01/19/2022    HGB 10.9 01/19/2022    HCT 36.0 01/19/2022     01/19/2022    MCV 85.7 01/19/2022    MCH 26.0 01/19/2022    MCHC 30.3 01/19/2022    RDW 16.0 01/19/2022    NRBC 0.9 07/05/2021    METASPCT 2.0 08/13/2021    LYMPHOPCT 6.1 01/19/2022    PROMYELOPCT 1.0 08/13/2021    MONOPCT 22.6 01/19/2022    MYELOPCT 2.6 01/19/2022    BASOPCT 1.7 01/19/2022    MONOSABS 1.38 01/19/2022    LYMPHSABS 0.36 01/19/2022    EOSABS 0.00 01/19/2022    BASOSABS 0.10 01/19/2022     CMP: Atypical infection?     Procal/WBC/temp don't support active aggressive infection, hold abx    LDH/Uric/CRP elevated c/w active metabolic process    Uric acid 10.5 start allopurinol    Pulm, ID, onc c/s    Hold Xarelto in case needs bronch    Continue BB    DVT prophylaxis: as above  Code status: full  Requires inpatient level of care  Chilango Piedra MD    9:34 AM  1/19/2022

## 2022-01-19 NOTE — CARE COORDINATION
SOCIAL WORK/CASEMANAGEMENT TRANSITION OF CARE [id number] Suzie Deleon, [address], [place], [phone]): I met with pt who lives with his fiance who works. Pt said he is independent and drives. No c pta. Pt has a cane and shower chair. He lives in a ranch home with 3 garage steps to enter. Pt said he is done with chemo /radiation about 3 months ago. Plan is home with no anticipated needs. Sw/shree to follow.  JOSE Curiel  .1/19/2022

## 2022-01-19 NOTE — CONSULTS
[facility]  Hematology/Oncology  Consult      Patient Name: Emily Cleary  YOB: 1950  PCP: Jason Morales DO   Referring Provider:      Reason for Consultation:   Chief Complaint   Patient presents with    Shortness of Breath     pt states he was sent in for a bronch by Dr Rico Alexander        History of Present Illness: 62-year-old man with past medical history of aggressive bulky diffuse large B-cell lymphoma, non-germinal cell type with negative FISH interface for double hit or triple hit lymphoma with bulky intra-abdominal disease on presentation. He has been on combination chemotherapy with R-CHOP along with Revlimid. This has been complicated by significant pancytopenia is despite G-CSF prophylaxis. S/p 6 cycles of R-CHOP/Revlimid with IT methotrexate for CNS prophylaxis. He was to continue with Revlimid maintenance however wanted to wait until follow up scans. CT scan of the chest shows significant interval worsening with multifocal dense consolidation most prominent within the posterior lower  lung lobes and left upper lung lobe with associated noncalcified pulmonary nodules largest in left lower lobe measuring 2.2 cm with associated centrilobular emphysema. Moderate hilar and mediastinal lymphadenopathy is described. CT scan of abdomen and pelvis shows diffuse thickening of the gastric mucosa and a small soft tissue mass in the presacral space measuring 2 cm. Patient was seen yesterday in the office and was hypoxic on room air. He was sent to the hospital for evaluation by pulmonology for possible EBUS for BAL washing cultures and biopsies for tissue diagnosis for concern for relapsed lymphoma with involvement of the lung parenchyma versus pneumonia in immunocompromised host.  ID consulted and pending evaluation. No antibiotics at this time. CBC at baseline no leukocytosis with anemia.  Consultation for further evaluation of patient with known lymphoma post treatment with possible relapse vs infection. Patient has lost 30 pounds in last 3 months. Review of systems: Over 10 systems were reviewed and all were negative except as mentioned above.     Diagnostic Data:     Past Medical History:   Diagnosis Date    Arthritis     KNEES HIPS BACK    Atrial fibrillation ([address]     BPH (benign prostatic hyperplasia)     Cancer ([address]     large B cell lymphoma    Coronary artery disease     Difficult airway     PT STATES HE [facility]    Difficult intubation 04/2017    note in care everywhere \"Clinical Summary\" 03/10/2021    History of blood transfusion 2021    Hydronephrosis of right kidney 9/15/2021    Hyperlipidemia     Hypertension     Sinus tachycardia 01/01/2002       Patient Active Problem List    Diagnosis Date Noted    Acute respiratory failure with hypoxia ([address] 01/18/2022    CABG x 1 (LIMA-LAD) 01/18/2022    Hydronephrosis of right kidney 09/15/2021    Syncope 09/02/2021    Chronic anemia 09/02/2021    Gastroesophageal reflux disease without esophagitis 09/02/2021    Essential hypertension 09/02/2021    Hypokalemia 09/02/2021    Hypomagnesemia 09/02/2021    Pacemaker 09/02/2021    Former smoker 09/02/2021    Severe protein-calorie malnutrition (HCC) 07/06/2021    Thrombocytopenia ([address] 07/05/2021    Anemia 05/06/2021    Diffuse large B cell lymphoma (HCC) 03/31/2021    Diffuse large B-cell lymphoma (HCC) 03/30/2021    Prolonged Q-T interval on ECG 03/29/2021    Hypotension 03/29/2021    CKD (chronic kidney disease) stage 3, GFR 30-59 ml/min ([facility]) 03/29/2021    Paroxysmal atrial fibrillation ([address] 03/29/2021    Colonic mass 03/11/2021    Hypertension     Hyperlipidemia     Coronary artery disease     SUNNY (acute kidney injury) ([address] 02/14/2021    Hematuria 01/16/2018    BPH (benign prostatic hyperplasia) 01/16/2018        Past Surgical History:   Procedure Laterality Date    ABLATION OF DYSRHYTHMIC FOCUS      AORTA SURGERY aortic valve replacement    AORTIC VALVE REPLACEMENT      BLADDER SURGERY Right 2/17/2021    CYSTOSCOPY BILATERAL RETROGRADE PYELOGRAM RIGHT STENT INSERTION performed by Augie Lucero MD at [facility] 119 Right 9/15/2021    CYSTOSCOPY RETROGRADE  RIGHT STENT EXCHANGE performed by Augie Lucero MD at [facility] [address] COLONOSCOPY  2/19/2021    COLONOSCOPY WITH BIOPSY performed by Fiorella Armstrong DO at [address]  2/19/2021    COLONOSCOPY W/ ENDOSCOPIC MUCOSAL RESECTION performed by Fiorella Armstrong DO at [address] N/A 3/11/2021    LAPAROSCOPIC RIGHT HEMICOLECTOMY performed by Toan Mcfarland MD at [address] OTHER SURGICAL HISTORY  08/12/2016    CT Myelogram, Dr. Marlee Mejias  2014 new battery    last checked Dr Cordie Collet 1/2016?  PORT SURGERY Right 4/1/2021    MEDI PORT INSERTION performed by Toan Mcfarland MD at [facility] Right 7/12/2021    PORT REMOVAL performed by Alesha Chowdhury MD at [address]  [facility] ENDOSCOPY      reflux    UPPER GASTROINTESTINAL ENDOSCOPY N/A 2/16/2021    EGD BIOPSY performed by Marcial Romero MD at [facility]         Family History  Family History   Problem Relation Age of Onset    Diabetes Mother     Stroke Mother     Diabetes Father     Heart Disease Father     Brain Cancer Sister     Stroke Sister     Stroke Brother     Cancer Maternal Grandfather        Social History    TOBACCO:   reports that he quit smoking about 7 years ago. His smoking use included cigarettes. He has a 44.00 pack-year smoking history. He has never used smokeless tobacco.  ETOH:   reports current alcohol use. Home Medications  Prior to Admission medications    Medication Sig Start Date End Date Taking?  Authorizing Provider   amLODIPine (NORVASC) 5 MG tablet Take 5 mg by mouth daily   Yes Historical Provider, MD   pravastatin (PRAVACHOL) 20 MG tablet Take 1 tablet by mouth daily 11/16/21  Yes Manuel Brenner MD   rivaroxaban (XARELTO) 20 MG TABS tablet Take 20 mg by mouth daily (with breakfast)    Yes Historical Provider, MD   vitamin B-12 (CYANOCOBALAMIN) 100 MCG tablet Take 50 mcg by mouth daily   Yes Historical Provider, MD   sennosides-docusate sodium (SENOKOT-S) 8.6-50 MG tablet Take 1 tablet by mouth 2 times daily   Yes Historical Provider, MD   ferrous sulfate 325 (65 Fe) MG tablet Take 325 mg by mouth 2 times daily  1/8/18  Yes Historical Provider, MD   esomeprazole Magnesium (NEXIUM) 20 MG PACK Take 20 mg by mouth daily   Yes Historical Provider, MD   albuterol sulfate HFA (PROAIR HFA) 108 (90 Base) MCG/ACT inhaler Inhale 1 puff into the lungs 2 times daily 10/27/21   Manuel Brenner MD   tamsulosin (FLOMAX) 0.4 MG capsule Take 1 capsule by mouth daily for 10 doses  Patient not taking: Reported on 10/27/2021 9/15/21 9/25/21  Violeta Magallanes MD       Allergies  No Known Allergies        Objective  /83   Pulse 87   Temp 97.7 °F (36.5 °C) (Temporal)   Resp 18   Ht 5' 7\" (1.702 m)   Wt 136 lb 3.2 oz (61.8 kg)   SpO2 96%   BMI 21.33 kg/m²     Physical Exam:   Performance Status:  General: AAO to person, place, time, in no acute distress,   Head and neck : PERRLA, EOMI . Sclera non icteric. Oropharynx : Clear  Neck: no JVD,  no adenopathy  Heart: Regular rate and regular rhythm, no murmur  Lungs: Clear to auscultation   Extremities: No edema,no cyanosis, no clubbing. Abdomen: Soft, non-tender;no masses, no organomegaly  Skin:  No rash. Neurologic:Cranial nerves grossly intact. No focal motor or sensory deficits .     Recent Laboratory Data-   Lab Results   Component Value Date    WBC 6.0 01/19/2022    HGB 10.9 (L) 01/19/2022    HCT 36.0 (L) 01/19/2022    MCV 85.7 01/19/2022     01/19/2022    LYMPHOPCT 6.1 (L) 01/19/2022    RBC 4.20 01/19/2022    MCH 26.0 01/19/2022    MCHC 30.3 (L) 01/19/2022    RDW 16.0 (H) 01/19/2022    NEUTOPHILPCT 67.0 01/19/2022    MONOPCT 22.6 (H) 01/19/2022    BASOPCT 1.7 01/19/2022    NEUTROABS 4.20 01/19/2022    LYMPHSABS 0.36 (L) 01/19/2022    MONOSABS 1.38 (H) 01/19/2022    EOSABS 0.00 (L) 01/19/2022    BASOSABS 0.10 01/19/2022       Lab Results   Component Value Date     01/19/2022    K 3.7 01/19/2022    CL 98 01/19/2022    CO2 25 01/19/2022    BUN 18 01/19/2022    CREATININE 0.5 (L) 01/19/2022    GLUCOSE 70 (L) 01/19/2022    CALCIUM 10.7 (H) 01/19/2022    PROT 6.2 (L) 01/19/2022    LABALBU 3.2 (L) 01/19/2022    BILITOT 0.6 01/19/2022    ALKPHOS 80 01/19/2022    AST 15 01/19/2022    ALT 10 01/19/2022    LABGLOM >60 01/19/2022    GFRAA >60 01/19/2022       Lab Results   Component Value Date    IRON 38 (L) 02/16/2021    TIBC 248 (L) 02/16/2021           Radiology-    CT CHEST W CONTRAST    Result Date: 1/15/2022  EXAMINATION: CT OF THE CHEST WITH CONTRAST 1/14/2022 4:07 pm TECHNIQUE: CT of the chest was performed with the administration of intravenous contrast. Multiplanar reformatted images are provided for review. Dose modulation, iterative reconstruction, and/or weight based adjustment of the mA/kV was utilized to reduce the radiation dose to as low as reasonably achievable. COMPARISON: CT chest without contrast September 1, 2021. HISTORY: ORDERING SYSTEM PROVIDED HISTORY: Nausea and vomiting, intractability of vomiting not specified, unspecified vomiting type FINDINGS: Mediastinum: The heart is normal in size and configuration. No evidence of pericardial effusion is seen. No diffuse mediastinal and bilateral hilar scattered enlarged lymph nodes are identified with largest node at the aortopulmonary window within the superior mediastinum measuring up to 22 mm in short axis diameter. . Lungs/pleura: Multifocal dense consolidation is seen throughout the bilateral lungs greatest within the posterior lower lung lobes and posterior left upper lung lobe. Multifocal bilateral lung scattered noncalcified pulmonary nodules are seen with largest seen within the left lower lung lobe measuring up to 22 mm in diameter. Redemonstration of centrilobular emphysema is present. Upper Abdomen: Visualized upper abdominal organs on chest CT examination are grossly unremarkable in appearance. Soft Tissues/Bones: Bilateral gynecomastia is evident. The bones, skeletal muscle bundles, fascial planes and subcutaneous soft tissues are otherwise unremarkable in appearance. 1. Interval development large areas of multifocal dense consolidation within the bilateral lungs, greatest within the posterior lung lobes and posterior left lumbar lung lobe consistent with pneumonia versus pulmonary lymphoma. 2. Marked interval increase in number and size of multifocal bilateral lung multifocal rounded noncalcified pulmonary nodules with largest seen within the left lower lung lobe measuring up to 22 mm in diameter. Differential diagnostic considerations include association with pulmonary lymphoma versus association with pneumonia. Recommend clinical correlation. 3. Interval worsening of diffuse mediastinal and bilateral hilar lymphadenopathy, as discussed above. Differential diagnostic considerations include association with infectious versus neoplastic disease. 4. Redemonstration centrilobular emphysema. 5. Gynecomastia. RECOMMENDATIONS: Multiple pulmonary nodules. Most severe: 22 mm solid pulmonary nodule. Recommend a non-contrast Chest CT at 3-6 months. If patient is high risk for malignancy, recommend an additional non-contrast Chest CT at 18-24 months; if patient is low risk for malignancy a non-contrast Chest CT at 18-24 months is optional. These guidelines do not apply to immunocompromised patients and patients with cancer. Follow up in patients with significant comorbidities as clinically warranted. For lung cancer screening, adhere to Lung-RADS guidelines. Reference: Radiology. The urinary bladder reveals a rounded contour. The prostate gland does not appear enlarged. There are no signs of significant lymphadenopathy or ascites within the pelvis. There is a soft tissue mass in the presacral space measuring 21 x 26 mm, best seen on axial image 199 concerning for neoplasm. Peritoneum/Retroperitoneum: There are no signs of retroperitoneal lymphadenopathy or aneurysm present. Bones/Soft Tissues:  Images of the lumbosacral spine demonstrates disc height loss and vacuum phenomena at the L2-L3 through L4-L5 levels suggest multilevel disc disease. There are degenerative changes of the right hip noted incidentally. No abnormal sclerotic or lytic lesions are identified otherwise. 1.  There has been a significant interval increase is in pulmonary nodules bilaterally mass consolidation concerning for progression of neoplastic disease. 2.  Soft tissue mass in the presacral space suggests neoplasm 3. Diffuse thickening of the gastric mucosa that could represent a gastritis but if of clinical concern, direct visualization may be helpful in better characterization. RECOMMENDATIONS: Unavailable     XR CHEST PORTABLE    Result Date: 1/18/2022  EXAMINATION: ONE XRAY VIEW OF THE CHEST 1/18/2022 6:05 pm COMPARISON: Chest series from August 13, 2021. CT chest from January 14, 2022 HISTORY: ORDERING SYSTEM PROVIDED HISTORY: SOB TECHNOLOGIST PROVIDED HISTORY: Reason for exam:->SOB What reading provider will be dictating this exam?->CRC FINDINGS: Left anterior chest wall cardiac support device with distal leads terminating in the vicinity of the right atrium and right ventricle. Left atrial appendage closure device noted. Postsurgical changes consistent with prior CABG. Atherosclerotic disease. Remaining cardiomediastinal silhouette appears grossly unremarkable. Normal pulmonary vascularity. Background lung hyperinflation and coarse interstitial markings.   There are ill-defined nodular opacities in the left mid to upper lung and right lower lung. These are of uncertain significance. No obvious pleural effusion or pneumothorax. Midline sternotomy hardware. Osseous and thoracic soft tissue structures demonstrate no acute findings. 1.  Multifocal nodular ill-defined opacities in the left mid and upper lung and right lower lung. Allowing for differences in technique, similar to recent CT scan. Broad differential which includes infectious/inflammatory processes. Malignancy not excluded 2. Background lung hyperinflation with coarse interstitial markings. Atherosclerotic disease. Postprocedural changes as above         ASSESSMENT/PLAN :  61-year-old man -  Aggressive bulky diffuse large B-cell lymphoma, non-germinal cell type with negative FISH interface for double hit or triple hit lymphoma with bulky intra-abdominal disease on presentation.   - S/p 6 cycles of R-CHOP/Revlimid. He was to continue with Revlimid maintenance however wanted to wait until follow up scans.  - CT chest with significant interval worsening with multifocal dense consolidation most prominent within the posterior lower lung lobes and left upper lung lobe with associated noncalcified pulmonary nodules largest in left lower lobe measuring 2.2 cm with associated centrilobular emphysema. Moderate hilar and mediastinal lymphadenopathy is described. - CT A/P shows diffuse thickening of the gastric mucosa and a small soft tissue mass in the presacral space measuring 2 cm.    - Pulmonology consulted for possible EBUS for BAL washing cultures and biopsies for tissue diagnosis for concern for relapsed lymphoma with involvement of the lung parenchyma versus pneumonia in immunocompromised host.    - ID consulted and pending evaluation. No antibiotics at this time. - CBC at baseline no leukocytosis with anemia.   -will follow,     TRACIE Valero - CNP  Electronically signed 1/19/2022 at 8:34 AM   Patient seen and examined.   Agree with CNP's assessment and plan. Note updated.   Maryanne Park MD

## 2022-01-19 NOTE — PROGRESS NOTES
Physical Therapy  Physical Therapy Initial Assessment     Name: Felicia Heredia  : 1950  MRN: [id number]      Date of Service: 2022    Evaluating PT:  Meseret Bernal, PT, DPT [id number]     Room #:  6063/9774-K  Diagnosis:  Hypoxia [R09.02]  Acute respiratory failure with hypoxia ([address] [J96.01]  History of lymphoma [Z85.79]  Reason for admission: SOB, hypoxia   Precautions:  Falls, O2  Procedure/Surgery:  None   Equipment Recommendations:  TBD     SUBJECTIVE:  Pt lives alone but fiance is there frequently in a 1 story home with 3 [address]ACE[address] 1 rail. Pt ambulated with [employer] cane recently. OBJECTIVE:   Initial Evaluation  Date:  Treatment   Short Term/ Long Term   Goals   AM-PAC 6 Clicks 91/38     Was pt agreeable to Eval/treatment?  Yes      Does pt have pain? denies     Bed Mobility  Rolling: NT  Supine to sit: SBA  Sit to supine: SBA  Scooting: SBA  Independent    Transfers Sit to stand: SBA  Stand to sit: SBA  Stand pivot: NT  Independent    Ambulation    80 feet with [employer] SBA    >300 feet with no AD independent    Stair negotiation: ascended and descended  NT  4 steps with 1 rail Mod I      LE ROM: WFL    LE Strength:   knee ext: 5/5  ankle DF: 5/5    Balance:   Sitting static: Independent  Sitting dynamic: Independent  Standing static: SBA  Standing dynamic: SBA [employer]      -Pt is A & O x 3  -Sensation:  Pt denies numbness and tingling to extremities  -Edema:  unremarkable     Therapeutic Exercises:  Functional activity     Patient education  Pt educated on safety, sequencing of transfers, and role of PT    Patient response to education:   Pt verbalized understanding Pt demonstrated skill Pt requires further education in this area   Yes  Yes  Yes      ASSESSMENT:  Conditions Requiring Skilled Therapeutic Intervention:  []Decreased strength     []Decreased ROM  [x]Decreased functional mobility  [x]Decreased balance   [x]Decreased endurance   []Decreased posture  []Decreased sensation  []Decreased coordination   []Decreased vision  [x]Decreased safety awareness   []Increased pain       Comments:  Pt received supine in bed and agreeable to PT session   Pt is able to complete all mobility without hands on assistance. He is cooperative and pleasant. His ambulation is slow but steady with use of walker. He endorses some light headedness with standing but subsides with time. Overall fatigues quickly    Pt with all needs met and call light in reach. Pt would benefit from continued PT POC to address functional deficits described above. Treatment:  Patient practiced and was instructed in the following treatment:     Therapeutic activity  o Patient education provided continuously throughout session for sequencing, safety maintenance, and improving any deficits found during the evaluation. o Bed mobility training - pt given verbal and tactile cues to facilitate proper sequencing and safety during rolling and supine>sit as well as provided with physical assistance to complete task    o STS and pivot transfer training - pt educated on proper hand and foot placement, safety and sequencing, and use of proper technique to safely complete sit<>stand and pivot transfers with hands on assistance to complete task safely   o Gait training- pt was given verbal and tactile cues to facilitate improved endurance during ambulation as well as provided with physical assistance. Pt's/ family goals   1. Return home    Patient and or family understand(s) diagnosis, prognosis, and plan of care. Yes     Prognosis is good for reaching above PT goals.     PHYSICAL THERAPY PLAN OF CARE:    PT POC is established based on physician order and patient diagnosis     Referring provider/PT Order:  01/18/22 2230   PT evaluation and treat Start: 01/18/22 2230, End: 01/18/22 2230, ONE TIME, Standing Count: 1 Occurrences, R    Carmen Florez, APRN - CNP    Diagnosis:  Hypoxia [R09.02]  Acute respiratory failure with hypoxia ([facility] [address] [J96.01]  History of lymphoma [Z85.79]  Specific instructions for next treatment:  Progress ambulation distance with AAD if needed    Current Treatment Recommendations:   [] Strengthening to improve independence with functional mobility   [] ROM to improve independence with functional mobility   [x] Balance Training to improve static/dynamic balance and to reduce fall risk  [x] Endurance Training to improve activity tolerance during functional mobility   [x] Transfer Training to improve safety and independence with all functional transfers   [x] Gait Training to improve gait mechanics, endurance and asses need for appropriate assistive device  [x] Stair Training in preparation for safe discharge home and/or into the community   [] Positioning to prevent skin breakdown and contractures  [] Safety and Education Training   [] Patient/Caregiver Education   [] HEP  [] Other     PT long term treatment goals are located in above grid    Frequency of treatments: 2-5x/week x 1-2 weeks. Time in  1315  Time out  1335    Total Treatment Time  10 minutes     Evaluation Time includes thorough review of current medical information, gathering information on past medical history/social history and prior level of function, completion of standardized testing/informal observation of tasks, assessment of data and education on plan of care and goals.     CPT codes:  [x] Low Complexity PT evaluation 98280  [] Moderate Complexity PT evaluation 29983  [] High Complexity PT evaluation 23564  [] PT Re-evaluation 19541  [] Gait training 65721 - minutes  [] Manual therapy 29784 - minutes  [x] Therapeutic activities 06853 10 minutes  [] Therapeutic exercises 96340 - minutes  [] Neuromuscular reeducation 40432 - minutes     Katherine Jeter, PT, DPT  [id number]

## 2022-01-20 ENCOUNTER — ANESTHESIA EVENT (OUTPATIENT)
Dept: INPATIENT UNIT | Age: 72
DRG: 823 | End: 2022-01-20
Payer: MEDICARE

## 2022-01-20 ENCOUNTER — ANESTHESIA (OUTPATIENT)
Dept: INPATIENT UNIT | Age: 72
DRG: 823 | End: 2022-01-20
Payer: MEDICARE

## 2022-01-20 LAB
ALBUMIN SERPL-MCNC: 3.3 G/DL (ref 3.5–5.2)
ALP BLD-CCNC: 83 U/L (ref 40–129)
ALT SERPL-CCNC: 6 U/L (ref 0–40)
ANION GAP SERPL CALCULATED.3IONS-SCNC: 18 MMOL/L (ref 7–16)
AST SERPL-CCNC: 16 U/L (ref 0–39)
BASOPHILS ABSOLUTE: 0.06 E9/L (ref 0–0.2)
BASOPHILS RELATIVE PERCENT: 0.9 % (ref 0–2)
BILIRUB SERPL-MCNC: 0.6 MG/DL (ref 0–1.2)
BUN BLDV-MCNC: 17 MG/DL (ref 6–23)
CALCIUM SERPL-MCNC: 10.6 MG/DL (ref 8.6–10.2)
CHLORIDE BLD-SCNC: 97 MMOL/L (ref 98–107)
CO2: 25 MMOL/L (ref 22–29)
CREAT SERPL-MCNC: 0.6 MG/DL (ref 0.7–1.2)
EOSINOPHILS ABSOLUTE: 0.23 E9/L (ref 0.05–0.5)
EOSINOPHILS RELATIVE PERCENT: 3.5 % (ref 0–6)
GFR AFRICAN AMERICAN: >60
GFR NON-AFRICAN AMERICAN: >60 ML/MIN/1.73
GLUCOSE BLD-MCNC: 66 MG/DL (ref 74–99)
HCT VFR BLD CALC: 36.9 % (ref 37–54)
HEMOGLOBIN: 11.3 G/DL (ref 12.5–16.5)
IMMATURE GRANULOCYTES #: 0.04 E9/L
IMMATURE GRANULOCYTES %: 0.6 % (ref 0–5)
L. PNEUMOPHILA SEROGP 1 UR AG: NORMAL
LYMPHOCYTES ABSOLUTE: 0.88 E9/L (ref 1.5–4)
LYMPHOCYTES RELATIVE PERCENT: 13.4 % (ref 20–42)
MCH RBC QN AUTO: 26.2 PG (ref 26–35)
MCHC RBC AUTO-ENTMCNC: 30.6 % (ref 32–34.5)
MCV RBC AUTO: 85.6 FL (ref 80–99.9)
METER GLUCOSE: 66 MG/DL (ref 74–99)
METER GLUCOSE: 74 MG/DL (ref 74–99)
MONOCYTES ABSOLUTE: 1.33 E9/L (ref 0.1–0.95)
MONOCYTES RELATIVE PERCENT: 20.2 % (ref 2–12)
NEUTROPHILS ABSOLUTE: 4.03 E9/L (ref 1.8–7.3)
NEUTROPHILS RELATIVE PERCENT: 61.4 % (ref 43–80)
PDW BLD-RTO: 15.9 FL (ref 11.5–15)
PLATELET # BLD: 150 E9/L (ref 130–450)
PMV BLD AUTO: 10 FL (ref 7–12)
POTASSIUM SERPL-SCNC: 3.7 MMOL/L (ref 3.5–5)
RBC # BLD: 4.31 E12/L (ref 3.8–5.8)
SODIUM BLD-SCNC: 140 MMOL/L (ref 132–146)
STREP PNEUMONIAE ANTIGEN, URINE: NORMAL
TOTAL PROTEIN: 6.6 G/DL (ref 6.4–8.3)
WBC # BLD: 6.6 E9/L (ref 4.5–11.5)

## 2022-01-20 PROCEDURE — 6360000002 HC RX W HCPCS: Performed by: INTERNAL MEDICINE

## 2022-01-20 PROCEDURE — 6370000000 HC RX 637 (ALT 250 FOR IP): Performed by: NURSE PRACTITIONER

## 2022-01-20 PROCEDURE — 2580000003 HC RX 258: Performed by: NURSE PRACTITIONER

## 2022-01-20 PROCEDURE — 6370000000 HC RX 637 (ALT 250 FOR IP): Performed by: INTERNAL MEDICINE

## 2022-01-20 PROCEDURE — 94640 AIRWAY INHALATION TREATMENT: CPT

## 2022-01-20 PROCEDURE — 99233 SBSQ HOSP IP/OBS HIGH 50: CPT | Performed by: INTERNAL MEDICINE

## 2022-01-20 PROCEDURE — 80053 COMPREHEN METABOLIC PANEL: CPT

## 2022-01-20 PROCEDURE — 2140000000 HC CCU INTERMEDIATE R&B

## 2022-01-20 PROCEDURE — 36415 COLL VENOUS BLD VENIPUNCTURE: CPT

## 2022-01-20 PROCEDURE — 2700000000 HC OXYGEN THERAPY PER DAY

## 2022-01-20 PROCEDURE — 82962 GLUCOSE BLOOD TEST: CPT

## 2022-01-20 PROCEDURE — 85025 COMPLETE CBC W/AUTO DIFF WBC: CPT

## 2022-01-20 PROCEDURE — 87449 NOS EACH ORGANISM AG IA: CPT

## 2022-01-20 RX ORDER — HEPARIN SODIUM 10000 [USP'U]/ML
5000 INJECTION, SOLUTION INTRAVENOUS; SUBCUTANEOUS EVERY 8 HOURS SCHEDULED
Status: DISPENSED | OUTPATIENT
Start: 2022-01-21 | End: 2022-01-23

## 2022-01-20 RX ADMIN — PRAVASTATIN SODIUM 20 MG: 20 TABLET ORAL at 09:34

## 2022-01-20 RX ADMIN — FERROUS SULFATE TAB 325 MG (65 MG ELEMENTAL FE) 325 MG: 325 (65 FE) TAB at 20:38

## 2022-01-20 RX ADMIN — ALBUTEROL SULFATE 2.5 MG: 2.5 SOLUTION RESPIRATORY (INHALATION) at 08:45

## 2022-01-20 RX ADMIN — FERROUS SULFATE TAB 325 MG (65 MG ELEMENTAL FE) 325 MG: 325 (65 FE) TAB at 09:34

## 2022-01-20 RX ADMIN — ALBUTEROL SULFATE 2.5 MG: 2.5 SOLUTION RESPIRATORY (INHALATION) at 20:43

## 2022-01-20 RX ADMIN — AMLODIPINE BESYLATE 2.5 MG: 2.5 TABLET ORAL at 09:34

## 2022-01-20 RX ADMIN — ALLOPURINOL 100 MG: 100 TABLET ORAL at 09:34

## 2022-01-20 RX ADMIN — PANTOPRAZOLE SODIUM 40 MG: 40 TABLET, DELAYED RELEASE ORAL at 05:53

## 2022-01-20 RX ADMIN — Medication 10 ML: at 20:38

## 2022-01-20 ASSESSMENT — PAIN SCALES - GENERAL
PAINLEVEL_OUTOF10: 0
PAINLEVEL_OUTOF10: 0

## 2022-01-20 NOTE — PROGRESS NOTES
Occupational Therapy  OT SESSION ATTEMPT     Date:2022  Patient Name: Gonsalo Ayala  MRN: [id number]  : 1950  Room: [address] [place] [address]A     Attempted OT session this date:    [] unavailable due to other medical staff currently with pt   [] on hold per nursing staff   [] on hold per nursing staff secondary to lab / radiology results    [x] Kath Aguilar declined treatment this date due to patient supposed to have surgery however he is not going to have surgery until Monday. Patient is very upset and will not participate in therapy. [] off unit    [] Other:     Will reattempt OT session at a later time.       Joel Kamara 47, [address]

## 2022-01-20 NOTE — PROGRESS NOTES
Pt refusing repeat blood sugar, pt refusing morning meds, pt said they will call when ready for meds. Requesting to speak with doctor.

## 2022-01-20 NOTE — PROGRESS NOTES
[facility]  Hematology/Oncology  Consult      Patient Name: Queen Tashia  YOB: 1950  PCP: Siria Kang DO   Referring Provider:      Reason for Consultation:   Chief Complaint   Patient presents with    Shortness of Breath     pt states he was sent in for a bronch by Dr Chambers Drafts: feels better since admission. On supplemental oxygen. No acute complaints     History of Present Illness: 45-year-old man with past medical history of aggressive bulky diffuse large B-cell lymphoma, non-germinal cell type with negative FISH interface for double hit or triple hit lymphoma with bulky intra-abdominal disease on presentation. He has been on combination chemotherapy with R-CHOP along with Revlimid. This has been complicated by significant pancytopenia is despite G-CSF prophylaxis. S/p 6 cycles of R-CHOP/Revlimid with IT methotrexate for CNS prophylaxis. He was to continue with Revlimid maintenance however wanted to wait until follow up scans. CT scan of the chest shows significant interval worsening with multifocal dense consolidation most prominent within the posterior lower  lung lobes and left upper lung lobe with associated noncalcified pulmonary nodules largest in left lower lobe measuring 2.2 cm with associated centrilobular emphysema. Moderate hilar and mediastinal lymphadenopathy is described. CT scan of abdomen and pelvis shows diffuse thickening of the gastric mucosa and a small soft tissue mass in the presacral space measuring 2 cm. Patient was seen yesterday in the office and was hypoxic on room air. He was sent to the hospital for evaluation by pulmonology for possible EBUS for BAL washing cultures and biopsies for tissue diagnosis for concern for relapsed lymphoma with involvement of the lung parenchyma versus pneumonia in immunocompromised host.  ID consulted and pending evaluation. No antibiotics at this time. CBC at baseline no leukocytosis with anemia. Consultation for further evaluation of patient with known lymphoma post treatment with possible relapse vs infection. Patient has lost 30 pounds in last 3 months. Review of systems: Over 10 systems were reviewed and all were negative except as mentioned above.     Diagnostic Data:     Past Medical History:   Diagnosis Date    Arthritis     KNEES HIPS BACK    Atrial fibrillation ([address]     BPH (benign prostatic hyperplasia)     Cancer ([address]     large B cell lymphoma    Coronary artery disease     Difficult airway     PT STATES HE [facility] T[facility] THIS [facility]    Difficult intubation 04/2017    note in care everywhere \"Clinical Summary\" 03/10/2021    History of blood transfusion 2021    Hydronephrosis of right kidney 9/15/2021    Hyperlipidemia     Hypertension     Sinus tachycardia 01/01/2002       Patient Active Problem List    Diagnosis Date Noted    Severe protein-calorie malnutrition ([address] 01/19/2022    Hyperuricemia 01/19/2022    Acute respiratory failure with hypoxia ([facility] 01/18/2022    CABG x 1 (LIMA-LAD) 01/18/2022    Hydronephrosis of right kidney 09/15/2021    Syncope 09/02/2021    Chronic anemia 09/02/2021    Gastroesophageal reflux disease without esophagitis 09/02/2021    Essential hypertension 09/02/2021    Hypokalemia 09/02/2021    Hypomagnesemia 09/02/2021    Pacemaker 09/02/2021    Former smoker 09/02/2021    Thrombocytopenia ([address] 07/05/2021    Anemia 05/06/2021    Diffuse large B cell lymphoma ([facility] 03/31/2021    Diffuse large B-cell lymphoma (HCC) 03/30/2021    Prolonged Q-T interval on ECG 03/29/2021    Hypotension 03/29/2021    CKD (chronic kidney disease) stage 3, GFR 30-59 ml/min (HCC) 03/29/2021    Paroxysmal atrial fibrillation ([address] 03/29/2021    Colonic mass 03/11/2021    Hypertension     Hyperlipidemia     Coronary artery disease     SUNNY (acute kidney injury) ([address] 02/14/2021    Hematuria 01/16/2018    BPH (benign prostatic hyperplasia) 01/16/2018 Past Surgical History:   Procedure Laterality Date    ABLATION OF DYSRHYTHMIC FOCUS      AORTA SURGERY      aortic valve replacement    AORTIC VALVE REPLACEMENT      BLADDER SURGERY Right 2/17/2021    CYSTOSCOPY BILATERAL RETROGRADE PYELOGRAM RIGHT STENT INSERTION performed by Lucas Evans MD at [address] Right 9/15/2021    CYSTOSCOPY RETROGRADE  RIGHT STENT EXCHANGE performed by Lucas Evans MD at [address] COLONOSCOPY  2/19/2021    COLONOSCOPY WITH BIOPSY performed by Maegan Villarreal DO at [address]  2/19/2021    COLONOSCOPY W/ ENDOSCOPIC MUCOSAL RESECTION performed by Maegan Villarreal DO at [address] 3/11/2021    LAPAROSCOPIC RIGHT HEMICOLECTOMY performed by Adri Arenas MD at [address] OTHER SURGICAL HISTORY  08/12/2016    CT Myelogram, Dr. Cecil Castillo  2014 new battery    last checked Dr Juliocesar Fitzpatrick 1/2016?  PORT SURGERY Right 4/1/2021    MEDI PORT INSERTION performed by Adri Arenas MD at [address] [place] [address] Right 7/12/2021    PORT REMOVAL performed by Rebeca Talamantes MD at [place] [address] ENDOSCOPY      reflux    UPPER GASTROINTESTINAL ENDOSCOPY N/A 2/16/2021    EGD BIOPSY performed by Khang Luis MD at [address] [facility]         Family History  Family History   Problem Relation Age of Onset    Diabetes Mother     Stroke Mother     Diabetes Father     Heart Disease Father     Brain Cancer Sister     Stroke Sister     Stroke Brother     Cancer Maternal Grandfather        Social History    TOBACCO:   reports that he quit smoking about 7 years ago. His smoking use included cigarettes. He has a 44.00 pack-year smoking history. He has never used smokeless tobacco.  ETOH:   reports current alcohol use.     Home Medications  Prior to Admission medications    Medication Sig Start Date End Date Taking? Authorizing Provider   amLODIPine (NORVASC) 5 MG tablet Take 5 mg by mouth daily   Yes Historical Provider, MD   pravastatin (PRAVACHOL) 20 MG tablet Take 1 tablet by mouth daily 11/16/21  Yes Opal Chávez MD   rivaroxaban (XARELTO) 20 MG TABS tablet Take 20 mg by mouth daily (with breakfast)    Yes Historical Provider, MD   vitamin B-12 (CYANOCOBALAMIN) 100 MCG tablet Take 50 mcg by mouth daily   Yes Historical Provider, MD   sennosides-docusate sodium (SENOKOT-S) 8.6-50 MG tablet Take 1 tablet by mouth 2 times daily   Yes Historical Provider, MD   ferrous sulfate 325 (65 Fe) MG tablet Take 325 mg by mouth 2 times daily  1/8/18  Yes Historical Provider, MD   esomeprazole Magnesium (NEXIUM) 20 MG PACK Take 20 mg by mouth daily   Yes Historical Provider, MD   albuterol sulfate HFA (PROAIR HFA) 108 (90 Base) MCG/ACT inhaler Inhale 1 puff into the lungs 2 times daily 10/27/21   Opal Chávez MD   tamsulosin (FLOMAX) 0.4 MG capsule Take 1 capsule by mouth daily for 10 doses  Patient not taking: Reported on 10/27/2021 9/15/21 9/25/21  Brigitte Magallanes MD       Allergies  No Known Allergies        Objective  /69   Pulse 84   Temp 98.2 °F (36.8 °C) (Oral)   Resp 18   Ht 5' 7\" (1.702 m)   Wt 135 lb 9.6 oz (61.5 kg)   SpO2 95%   BMI 21.24 kg/m²     Physical Exam:   Performance Status:  General: AAO to person, place, time, in no acute distress,   Head and neck : PERRLA, EOMI . Sclera non icteric. Oropharynx : Clear  Neck: no JVD,  no adenopathy  Heart: Regular rate and regular rhythm, no murmur  Lungs: Clear to auscultation   Extremities: No edema,no cyanosis, no clubbing. Abdomen: Soft, non-tender;no masses, no organomegaly  Skin:  No rash. Neurologic:Cranial nerves grossly intact. No focal motor or sensory deficits .     Recent Laboratory Data-   Lab Results   Component Value Date    WBC 6.6 01/20/2022    HGB 11.3 (L) 01/20/2022    HCT 36.9 (L) 01/20/2022    MCV 85.6 01/20/2022     01/20/2022    LYMPHOPCT 13.4 (L) 01/20/2022    RBC 4.31 01/20/2022    MCH 26.2 01/20/2022    MCHC 30.6 (L) 01/20/2022    RDW 15.9 (H) 01/20/2022    NEUTOPHILPCT 61.4 01/20/2022    MONOPCT 20.2 (H) 01/20/2022    BASOPCT 0.9 01/20/2022    NEUTROABS 4.03 01/20/2022    LYMPHSABS 0.88 (L) 01/20/2022    MONOSABS 1.33 (H) 01/20/2022    EOSABS 0.23 01/20/2022    BASOSABS 0.06 01/20/2022       Lab Results   Component Value Date     01/20/2022    K 3.7 01/20/2022    CL 97 (L) 01/20/2022    CO2 25 01/20/2022    BUN 17 01/20/2022    CREATININE 0.6 (L) 01/20/2022    GLUCOSE 66 (L) 01/20/2022    CALCIUM 10.6 (H) 01/20/2022    PROT 6.6 01/20/2022    LABALBU 3.3 (L) 01/20/2022    BILITOT 0.6 01/20/2022    ALKPHOS 83 01/20/2022    AST 16 01/20/2022    ALT 6 01/20/2022    LABGLOM >60 01/20/2022    GFRAA >60 01/20/2022       Lab Results   Component Value Date    IRON 38 (L) 02/16/2021    TIBC 248 (L) 02/16/2021           Radiology-    CT CHEST W CONTRAST    Result Date: 1/15/2022  EXAMINATION: CT OF THE CHEST WITH CONTRAST 1/14/2022 4:07 pm TECHNIQUE: CT of the chest was performed with the administration of intravenous contrast. Multiplanar reformatted images are provided for review. Dose modulation, iterative reconstruction, and/or weight based adjustment of the mA/kV was utilized to reduce the radiation dose to as low as reasonably achievable. COMPARISON: CT chest without contrast September 1, 2021. HISTORY: ORDERING SYSTEM PROVIDED HISTORY: Nausea and vomiting, intractability of vomiting not specified, unspecified vomiting type FINDINGS: Mediastinum: The heart is normal in size and configuration. No evidence of pericardial effusion is seen. No diffuse mediastinal and bilateral hilar scattered enlarged lymph nodes are identified with largest node at the aortopulmonary window within the superior mediastinum measuring up to 22 mm in short axis diameter. . Lungs/pleura: Multifocal dense consolidation is seen throughout the bilateral lungs greatest within the posterior lower lung lobes and posterior left upper lung lobe. Multifocal bilateral lung scattered noncalcified pulmonary nodules are seen with largest seen within the left lower lung lobe measuring up to 22 mm in diameter. Redemonstration of centrilobular emphysema is present. Upper Abdomen: Visualized upper abdominal organs on chest CT examination are grossly unremarkable in appearance. Soft Tissues/Bones: Bilateral gynecomastia is evident. The bones, skeletal muscle bundles, fascial planes and subcutaneous soft tissues are otherwise unremarkable in appearance. 1. Interval development large areas of multifocal dense consolidation within the bilateral lungs, greatest within the posterior lung lobes and posterior left lumbar lung lobe consistent with pneumonia versus pulmonary lymphoma. 2. Marked interval increase in number and size of multifocal bilateral lung multifocal rounded noncalcified pulmonary nodules with largest seen within the left lower lung lobe measuring up to 22 mm in diameter. Differential diagnostic considerations include association with pulmonary lymphoma versus association with pneumonia. Recommend clinical correlation. 3. Interval worsening of diffuse mediastinal and bilateral hilar lymphadenopathy, as discussed above. Differential diagnostic considerations include association with infectious versus neoplastic disease. 4. Redemonstration centrilobular emphysema. 5. Gynecomastia. RECOMMENDATIONS: Multiple pulmonary nodules. Most severe: 22 mm solid pulmonary nodule. Recommend a non-contrast Chest CT at 3-6 months. If patient is high risk for malignancy, recommend an additional non-contrast Chest CT at 18-24 months; if patient is low risk for malignancy a non-contrast Chest CT at 18-24 months is optional. These guidelines do not apply to immunocompromised patients and patients with cancer.  Follow up in patients with significant comorbidities as clinically warranted. For lung cancer screening, adhere to Lung-RADS guidelines. Reference: Radiology. 2017; 284(1):228-43. CT ABDOMEN PELVIS W IV CONTRAST Additional Contrast? Oral    Result Date: 1/15/2022  EXAMINATION: CT OF THE ABDOMEN AND PELVIS WITH CONTRAST 1/14/2022 4:07 pm TECHNIQUE: CT of the abdomen and pelvis was performed with the administration of intravenous contrast. Multiplanar reformatted images are provided for review. Dose modulation, iterative reconstruction, and/or weight based adjustment of the mA/kV was utilized to reduce the radiation dose to as low as reasonably achievable. COMPARISON: 07/08/2021 HISTORY: ORDERING SYSTEM PROVIDED HISTORY: Projectile vomiting, presence of nausea not specified TECHNOLOGIST PROVIDED HISTORY: Additional Contrast?->Oral FINDINGS: Lower Chest: There is extensive soft tissue density at the lung bases concerning for neoplastic disease. This has become more consolidative at the lung bases bilaterally extending into the left hilar region. There is a low-density focus in the center of the left lower lung density that could represent focal necrosis. No large effusions are identified. There is extensive bibasilar atelectasis. The heart is not appear enlarged. Organs: The liver reveals a homogeneous appearance. No discrete mass is observed. There are no signs of duct dilation. The gallbladder, pancreas, spleen and adrenal glands revealed no acute abnormalities. There is symmetrical renal density without signs of stones or hydronephrosis. The right kidney reveals a tiny low-density focus in the lower pole measuring 5 mm in this could represent a cyst GI/Bowel: There is a fuse mucosal thickening of the stomach. There is a small volume of enteric contrast present and direct visualization may be helpful in better characterization if clinically indicated. The small bowel pattern is within the normal range and is nonobstructive.   The area of the terminal ileum and cecum appears within the normal range. No acute abnormalities of the colon are delineated. Pelvis: The urinary bladder reveals a rounded contour. The prostate gland does not appear enlarged. There are no signs of significant lymphadenopathy or ascites within the pelvis. There is a soft tissue mass in the presacral space measuring 21 x 26 mm, best seen on axial image 199 concerning for neoplasm. Peritoneum/Retroperitoneum: There are no signs of retroperitoneal lymphadenopathy or aneurysm present. Bones/Soft Tissues:  Images of the lumbosacral spine demonstrates disc height loss and vacuum phenomena at the L2-L3 through L4-L5 levels suggest multilevel disc disease. There are degenerative changes of the right hip noted incidentally. No abnormal sclerotic or lytic lesions are identified otherwise. 1.  There has been a significant interval increase is in pulmonary nodules bilaterally mass consolidation concerning for progression of neoplastic disease. 2.  Soft tissue mass in the presacral space suggests neoplasm 3. Diffuse thickening of the gastric mucosa that could represent a gastritis but if of clinical concern, direct visualization may be helpful in better characterization. RECOMMENDATIONS: Unavailable     XR CHEST PORTABLE    Result Date: 1/18/2022  EXAMINATION: ONE XRAY VIEW OF THE CHEST 1/18/2022 6:05 pm COMPARISON: Chest series from August 13, 2021. CT chest from January 14, 2022 HISTORY: ORDERING SYSTEM PROVIDED HISTORY: SOB TECHNOLOGIST PROVIDED HISTORY: Reason for exam:->SOB What reading provider will be dictating this exam?->CRC FINDINGS: Left anterior chest wall cardiac support device with distal leads terminating in the vicinity of the right atrium and right ventricle. Left atrial appendage closure device noted. Postsurgical changes consistent with prior CABG. Atherosclerotic disease. Remaining cardiomediastinal silhouette appears grossly unremarkable. Normal pulmonary vascularity. Background lung hyperinflation and coarse interstitial markings. There are ill-defined nodular opacities in the left mid to upper lung and right lower lung. These are of uncertain significance. No obvious pleural effusion or pneumothorax. Midline sternotomy hardware. Osseous and thoracic soft tissue structures demonstrate no acute findings. 1.  Multifocal nodular ill-defined opacities in the left mid and upper lung and right lower lung. Allowing for differences in technique, similar to recent CT scan. Broad differential which includes infectious/inflammatory processes. Malignancy not excluded 2. Background lung hyperinflation with coarse interstitial markings. Atherosclerotic disease. Postprocedural changes as above         ASSESSMENT/PLAN :  61-year-old man -  Aggressive bulky diffuse large B-cell lymphoma, non-germinal cell type with negative FISH interface for double hit or triple hit lymphoma with bulky intra-abdominal disease on presentation.   - S/p 6 cycles of R-CHOP/Revlimid. He was to continue with Revlimid maintenance however wanted to wait until follow up scans.  - CT chest with significant interval worsening with multifocal dense consolidation most prominent within the posterior lower lung lobes and left upper lung lobe with associated noncalcified pulmonary nodules largest in left lower lobe measuring 2.2 cm with associated centrilobular emphysema. Moderate hilar and mediastinal lymphadenopathy is described. - CT A/P shows diffuse thickening of the gastric mucosa and a small soft tissue mass in the presacral space measuring 2 cm.    - Pulmonology consulted for possible EBUS for BAL washing cultures and biopsies for tissue diagnosis for concern for relapsed lymphoma with involvement of the lung parenchyma versus pneumonia in immunocompromised host.    - ID consulted and pending evaluation. No antibiotics at this time.    - CBC at baseline no leukocytosis with anemia.   -will follow, 1/20/22  - CBC at baseline no leukocytosis with anemia  - Pulmonology following. Plan for bronch with EBUS and biopsy Monday. Needs to be off Xarelto for 3 days and was given dose today. We will follow pathology. Infection vs recurrent DLBCL-ABC subtype  - ID following plans to monitor off of antibiotics for now  - We will follow      TRACIE Fernandez - CNP  Electronically signed 1/20/2022 at 1:01 PM   Patient seen and examined. Agree with CNP's assessment and plan. Note updated.   Uyen Bryan MD

## 2022-01-20 NOTE — PATIENT CARE CONFERENCE
P Quality Flow/Interdisciplinary Rounds Progress Note        Quality Flow Rounds held on January 20, 2022    Disciplines Attending:  Bedside Nurse, ,  and Nursing Unit Leadership    Maria Isabel Reyes was admitted on 1/18/2022  8:15 PM    Anticipated Discharge Date:  Expected Discharge Date: 01/20/22    Disposition:    Osman Score:  Osman Scale Score: 19    Readmission Risk              Risk of Unplanned Readmission:  29           Discussed patient goal for the day, patient clinical progression, and barriers to discharge.   The following Goal(s) of the Day/Commitment(s) have been identified:  Labs - Report Results      Joshua Dickens RN  January 20, 2022

## 2022-01-20 NOTE — PROGRESS NOTES
[facility] PROGRESS NOTE      Chief complaint: Follow-up of lung mass    The patient is a 80 y.o. 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 He reports no fever, no chills, has weight loss and cough productive of light yellowish sputum with occasional blood streaks. He reports having traveled to [address] [place] [address] to visit family within the past 3 months. On admission, he was afebrile and hemodynamically stable with no leukocytosis. Procalcitonin level was not elevated at 0.13 ng/mL. SARS-CoV-2 PCR was not detected. CT chest from 01/14 showed interval development of large areas of multifocal dense consolidation within bilateral lungs especially within the posterior lower lobes and posterior left lung lobe, marked interval increase in number and size of multifocal bilateral lung nodules, interval worsening of diffuse mediastinal and bilateral hilar lymphadenopathy, centrilobular emphysema. CT abdomen and pelvis on 01/14 showed significant interval increase in pulmonary nodules bilaterally with mass consolidation concerning for progression of neoplastic disease, soft tissue mass in the presacral space suggesting neoplasm. Urine Streptococcus pneumoniae and Legionella antigens were negative. Subjective: Patient was seen and examined. No chills, no abdominal pain, no diarrhea, no rash, no itching. He is disappointed that bronchoscopy procedure was postponed to Monday due to him having been on anticoagulant.  He is asking if he can go home and return for the procedure. Objective:    Vitals:    01/20/22 0810   BP: 118/69   Pulse: 74   Resp: 18   Temp: 96.1 °F (35.6 °C)   SpO2: 91%     Constitutional: Alert, not in distress  Respiratory: Clear breath sounds, no crackles, no wheezes  Cardiovascular: Regular rate and rhythm, no murmurs  Gastrointestinal: Bowel sounds present, soft, nontender  Skin: Warm and dry, no active dermatoses  Musculoskeletal: No joint swelling, no joint erythema    Labs, imaging, and medical records/notes were personally reviewed. Assessment:  Lung masses, infectious vs neoplastic in etiology  Immunocompromised state  Diffuse large B-cell lymphoma on chemotherapy    Recommendations:  Plan for bronchoscopy is noted. Monitor clinically off antibiotics for now. Check sputum Gram stain and culture. Follow up serum 1,3 beta-d-glucan. Continue supportive care.     Thank you for involving me in the care of Ivet Emmanuel. I will continue to follow. Please do not hesitate to call for any questions or concerns.     Electronically signed by Sylvester Livingston MD on 1/20/2022 at 9:01 AM

## 2022-01-21 LAB
ALBUMIN SERPL-MCNC: 3 G/DL (ref 3.5–5.2)
ALP BLD-CCNC: 86 U/L (ref 40–129)
ALT SERPL-CCNC: 7 U/L (ref 0–40)
ANION GAP SERPL CALCULATED.3IONS-SCNC: 14 MMOL/L (ref 7–16)
AST SERPL-CCNC: 15 U/L (ref 0–39)
BASOPHILS ABSOLUTE: 0.05 E9/L (ref 0–0.2)
BASOPHILS RELATIVE PERCENT: 0.8 % (ref 0–2)
BILIRUB SERPL-MCNC: 0.7 MG/DL (ref 0–1.2)
BUN BLDV-MCNC: 14 MG/DL (ref 6–23)
CALCIUM SERPL-MCNC: 10.2 MG/DL (ref 8.6–10.2)
CHLORIDE BLD-SCNC: 97 MMOL/L (ref 98–107)
CO2: 29 MMOL/L (ref 22–29)
CREAT SERPL-MCNC: 0.5 MG/DL (ref 0.7–1.2)
EOSINOPHILS ABSOLUTE: 0.32 E9/L (ref 0.05–0.5)
EOSINOPHILS RELATIVE PERCENT: 4.8 % (ref 0–6)
GFR AFRICAN AMERICAN: >60
GFR NON-AFRICAN AMERICAN: >60 ML/MIN/1.73
GLUCOSE BLD-MCNC: 99 MG/DL (ref 74–99)
HCT VFR BLD CALC: 39.3 % (ref 37–54)
HEMOGLOBIN: 11.8 G/DL (ref 12.5–16.5)
IMMATURE GRANULOCYTES #: 0.06 E9/L
IMMATURE GRANULOCYTES %: 0.9 % (ref 0–5)
LYMPHOCYTES ABSOLUTE: 0.86 E9/L (ref 1.5–4)
LYMPHOCYTES RELATIVE PERCENT: 12.9 % (ref 20–42)
MCH RBC QN AUTO: 25.7 PG (ref 26–35)
MCHC RBC AUTO-ENTMCNC: 30 % (ref 32–34.5)
MCV RBC AUTO: 85.4 FL (ref 80–99.9)
MONOCYTES ABSOLUTE: 1.28 E9/L (ref 0.1–0.95)
MONOCYTES RELATIVE PERCENT: 19.2 % (ref 2–12)
NEUTROPHILS ABSOLUTE: 4.08 E9/L (ref 1.8–7.3)
NEUTROPHILS RELATIVE PERCENT: 61.4 % (ref 43–80)
PDW BLD-RTO: 15.9 FL (ref 11.5–15)
PLATELET # BLD: 137 E9/L (ref 130–450)
PMV BLD AUTO: 9.8 FL (ref 7–12)
POTASSIUM SERPL-SCNC: 3.6 MMOL/L (ref 3.5–5)
RBC # BLD: 4.6 E12/L (ref 3.8–5.8)
SODIUM BLD-SCNC: 140 MMOL/L (ref 132–146)
TOTAL PROTEIN: 6.5 G/DL (ref 6.4–8.3)
WBC # BLD: 6.7 E9/L (ref 4.5–11.5)

## 2022-01-21 PROCEDURE — 85025 COMPLETE CBC W/AUTO DIFF WBC: CPT

## 2022-01-21 PROCEDURE — 94640 AIRWAY INHALATION TREATMENT: CPT

## 2022-01-21 PROCEDURE — 6360000002 HC RX W HCPCS: Performed by: INTERNAL MEDICINE

## 2022-01-21 PROCEDURE — 2700000000 HC OXYGEN THERAPY PER DAY

## 2022-01-21 PROCEDURE — 2580000003 HC RX 258: Performed by: NURSE PRACTITIONER

## 2022-01-21 PROCEDURE — 6370000000 HC RX 637 (ALT 250 FOR IP): Performed by: INTERNAL MEDICINE

## 2022-01-21 PROCEDURE — 99233 SBSQ HOSP IP/OBS HIGH 50: CPT | Performed by: INTERNAL MEDICINE

## 2022-01-21 PROCEDURE — 80053 COMPREHEN METABOLIC PANEL: CPT

## 2022-01-21 PROCEDURE — 2140000000 HC CCU INTERMEDIATE R&B

## 2022-01-21 PROCEDURE — 6370000000 HC RX 637 (ALT 250 FOR IP): Performed by: NURSE PRACTITIONER

## 2022-01-21 PROCEDURE — 36415 COLL VENOUS BLD VENIPUNCTURE: CPT

## 2022-01-21 RX ADMIN — AMLODIPINE BESYLATE 2.5 MG: 2.5 TABLET ORAL at 09:11

## 2022-01-21 RX ADMIN — PRAVASTATIN SODIUM 20 MG: 20 TABLET ORAL at 09:11

## 2022-01-21 RX ADMIN — ALLOPURINOL 100 MG: 100 TABLET ORAL at 09:11

## 2022-01-21 RX ADMIN — FERROUS SULFATE TAB 325 MG (65 MG ELEMENTAL FE) 325 MG: 325 (65 FE) TAB at 21:23

## 2022-01-21 RX ADMIN — HEPARIN SODIUM 5000 UNITS: 10000 INJECTION INTRAVENOUS; SUBCUTANEOUS at 21:23

## 2022-01-21 RX ADMIN — FERROUS SULFATE TAB 325 MG (65 MG ELEMENTAL FE) 325 MG: 325 (65 FE) TAB at 09:10

## 2022-01-21 RX ADMIN — Medication 10 ML: at 09:11

## 2022-01-21 RX ADMIN — Medication 10 ML: at 21:24

## 2022-01-21 RX ADMIN — HEPARIN SODIUM 5000 UNITS: 10000 INJECTION INTRAVENOUS; SUBCUTANEOUS at 05:33

## 2022-01-21 RX ADMIN — HEPARIN SODIUM 5000 UNITS: 10000 INJECTION INTRAVENOUS; SUBCUTANEOUS at 15:20

## 2022-01-21 RX ADMIN — ALBUTEROL SULFATE 2.5 MG: 2.5 SOLUTION RESPIRATORY (INHALATION) at 09:22

## 2022-01-21 RX ADMIN — PANTOPRAZOLE SODIUM 40 MG: 40 TABLET, DELAYED RELEASE ORAL at 05:33

## 2022-01-21 RX ADMIN — ALBUTEROL SULFATE 2.5 MG: 2.5 SOLUTION RESPIRATORY (INHALATION) at 20:40

## 2022-01-21 ASSESSMENT — PAIN SCALES - GENERAL
PAINLEVEL_OUTOF10: 0

## 2022-01-21 NOTE — PROGRESS NOTES
CINDY PROGRESS NOTE      Chief complaint: Follow-up of lung mass    The patient is a 80 y.o. 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 He reports no fever, no chills, has weight loss and cough productive of light yellowish sputum with occasional blood streaks. He reports having traveled to [address] [place] [address] to visit family within the past 3 months. On admission, he was afebrile and hemodynamically stable with no leukocytosis. Procalcitonin level was not elevated at 0.13 ng/mL. SARS-CoV-2 PCR was not detected. CT chest from 01/14 showed interval development of large areas of multifocal dense consolidation within bilateral lungs especially within the posterior lower lobes and posterior left lung lobe, marked interval increase in number and size of multifocal bilateral lung nodules, interval worsening of diffuse mediastinal and bilateral hilar lymphadenopathy, centrilobular emphysema. CT abdomen and pelvis on 01/14 showed significant interval increase in pulmonary nodules bilaterally with mass consolidation concerning for progression of neoplastic disease, soft tissue mass in the presacral space suggesting neoplasm. Urine Streptococcus pneumoniae and Legionella antigens were negative. Subjective: Patient was seen and examined. No chills, no abdominal pain, no diarrhea, no rash, no itching. He is waiting for bronchoscopy procedure which was postponed to Monday due to him having been on anticoagulant.      Objective:    Vitals:    01/21/22 0757   BP: 117/64   Pulse: 93 Resp: 16   Temp: 97.9 °F (36.6 °C)   SpO2: 94%     Constitutional: Alert, not in distress  Respiratory: Clear breath sounds, no crackles, no wheezes  Cardiovascular: Regular rate and rhythm, no murmurs  Gastrointestinal: Bowel sounds present, soft, nontender  Skin: Warm and dry, no active dermatoses  Musculoskeletal: No joint swelling, no joint erythema    Labs, imaging, and medical records/notes were personally reviewed. Assessment:  Lung masses, infectious vs neoplastic in etiology  Immunocompromised state  Diffuse large B-cell lymphoma on chemotherapy    Recommendations:  Plan for bronchoscopy is noted. Monitor clinically off antibiotics for now. Check sputum Gram stain and culture. Follow up serum 1,3 beta-d-glucan. Continue supportive care.     Thank you for involving me in the care of Valeriy Monteiro. I will continue to follow peripherally. Please do not hesitate to call for any questions or concerns.     Electronically signed by Manpreet Cesar MD on 1/21/2022 at 10:05 AM

## 2022-01-21 NOTE — PROGRESS NOTES
Pulmonary [facility]                             Pulmonary Consult/Progress Note :            Reason for Consultation:Possible need for Bronch,Lymphoma   CC : SOB ,cough frothy secretion   HPI:   Doing fair   Still with SOB   Still need O2  Not sure about some confusion  Want to go home     PHYSICAL EXAMINATION:     VITAL SIGNS:  /89   Pulse 92   Temp 97.4 °F (36.3 °C) (Oral)   Resp 18   Ht 5' 7\" (1.702 m)   Wt 135 lb 9.6 oz (61.5 kg)   SpO2 95%   BMI 21.24 kg/m²   Wt Readings from Last 3 Encounters:   01/20/22 135 lb 9.6 oz (61.5 kg)   10/27/21 168 lb 3.2 oz (76.3 kg)   09/15/21 161 lb (73 kg)     Temp Readings from Last 3 Encounters:   01/20/22 97.4 °F (36.3 °C) (Oral)   09/15/21 98 °F (36.7 °C) (Temporal)   09/03/21 97.6 °F (36.4 °C) (Temporal)     TMAX:  BP Readings from Last 3 Encounters:   01/20/22 128/89   10/27/21 90/72   09/15/21 124/76     Pulse Readings from Last 3 Encounters:   01/20/22 92   10/27/21 87   09/15/21 91           INTAKE/OUTPUTS:  I/O last 3 completed shifts:   In: 1440 [P.O.:1440]  Out: 700 [Urine:700]    Intake/Output Summary (Last 24 hours) at 1/20/2022 2023  Last data filed at 1/20/2022 1859  Gross per 24 hour   Intake 240 ml   Output 450 ml   Net -210 ml       General Appearance: alert and oriented to person, place and time, well-developed and   well-nourished, in no acute distress   Eyes: pupils equal, round, and reactive to light, extraocular eye movements intact, conjunctivae normal and sclera anicteric   Neck: neck supple and non tender without mass, no thyromegaly, no thyroid nodules and no cervical adenopathy   Pulmonary/Chest:crackles rhonchi   Cardiovascular: normal rate, regular rhythm, normal S1 and S2, no murmurs, rubs, clicks or gallops, distal pulses intact, no carotid bruits, no murmurs, no gallops, no carotid bruits and no JVD   Abdomen: obese, soft, non-tender, non-distended, normal bowel sounds, no masses or organomegaly   Extremities:no edema   Musculoskeletal: normal range of motion, no joint swelling, deformity or tenderness   Neurologic: reflexes normal and symmetric, no cranial nerve deficit noted    LABS/IMAGING:    CBC:  Lab Results   Component Value Date    WBC 6.6 01/20/2022    HGB 11.3 (L) 01/20/2022    HCT 36.9 (L) 01/20/2022    MCV 85.6 01/20/2022     01/20/2022    LYMPHOPCT 13.4 (L) 01/20/2022    RBC 4.31 01/20/2022    MCH 26.2 01/20/2022    MCHC 30.6 (L) 01/20/2022    RDW 15.9 (H) 01/20/2022    NEUTOPHILPCT 61.4 01/20/2022    MONOPCT 20.2 (H) 01/20/2022    BASOPCT 0.9 01/20/2022    NEUTROABS 4.03 01/20/2022    LYMPHSABS 0.88 (L) 01/20/2022    MONOSABS 1.33 (H) 01/20/2022    EOSABS 0.23 01/20/2022    BASOSABS 0.06 01/20/2022       Recent Labs     01/20/22  0459 01/19/22  0519 01/18/22  1630   WBC 6.6 6.0 6.1   HGB 11.3* 10.9* 12.5   HCT 36.9* 36.0* 41.4   MCV 85.6 85.7 85.2    162 200       BMP:   Recent Labs     01/18/22  1630 01/19/22  0519 01/20/22  0459    142 140   K 4.1 3.7 3.7   CL 99 98 97*   CO2 25 25 25   PHOS  --  3.5  --    BUN 17 18 17   CREATININE 0.6* 0.5* 0.6*       MG:   Lab Results   Component Value Date    MG 1.8 01/19/2022     Ca/Phos:   Lab Results   Component Value Date    CALCIUM 10.6 (H) 01/20/2022    PHOS 3.5 01/19/2022     Amylase: No results found for: AMYLASE  Lipase: No results found for: LIPASE  LIVER PROFILE:   Recent Labs     01/18/22  1630 01/19/22  0519 01/20/22  0459   AST 14 15 16   ALT 7 10 6   BILITOT 0.7 0.6 0.6   ALKPHOS 88 80 83       PT/INR:   Recent Labs     01/18/22  1630   PROTIME 12.1   INR 1.1     APTT:   No results for input(s): APTT in the last 72 hours.     Cardiac Enzymes:  Lab Results   Component Value Date    TROPONINI <0.01 02/14/2021               PET and CT reviewed     PROBLEM LIST:  Patient Active Problem List   Diagnosis    Hematuria    BPH (benign prostatic hyperplasia)    [facility]) ([facility] [address]    Colonic mass    Hypertension    Hyperlipidemia    Coronary artery disease    Prolonged Q-T interval on ECG    Hypotension    CKD (chronic kidney disease) stage 3, GFR 30-59 ml/min (HCC)    Paroxysmal atrial fibrillation (HCC)    Diffuse large B-cell lymphoma (HCC)    Diffuse large B cell lymphoma (HCC)    Severe protein-calorie malnutrition (HCC)    Anemia    Thrombocytopenia (HCC)    Syncope    Chronic anemia    Gastroesophageal reflux disease without esophagitis    Essential hypertension    Hypokalemia    Hypomagnesemia    Pacemaker    Former smoker    Hydronephrosis of right kidney    Acute respiratory failure with hypoxia (HCC)    CABG x 1 ([facility]A-LAD)    Hyperuricemia               ASSESSMENT:  1.) Large Cell B Lymphoma  2-diffuse consolidation /mass /Lung concern for pulmonary Lymphoma   2. )COPD  3.)h/p Lymphoma   4.)Pancytopenia   5.)Possible Pneumonia       PLAN:  Clinical suspicipus is high for pulmonary Lymophoma ,while onfection is possible with this much infection in lung expect to see him in shock and acute respiratory failure . regardless need tissue and bronch ,unfortauntely   His Vicenta Laughter was not stopped and we need 72 h off Xarelleto   On 3 L now   I will schedule bronch On Monday as need 3 days of Xarelleto  Will need Bronch ,ebus ,and transbronchial biopsy if ebus gave no answer     *-Work-up for COPD bronchodilator  abx ,antifungal as per ID   *_hold on Steroids for now  Sputum culture  BD  Work up fr infection per ID  Poor prognosis in general    Discuss with Dr Trini Woodward  I will discuss with family       Thank you very much for allowing me to participate in the care of this pleasant patient , should you have any questions ,please do not hesitate to contact me      Vijay Molina  Pulmonary&Critical Care Medicine   Director of [address] [place] [address] Director of [address] [place] [address]    Christine Taylor    NOTE: This report was transcribed using voice recognition software. Every effort was made to ensure accuracy; however, inadvertent computerized transcription errors may be present.

## 2022-01-21 NOTE — PROGRESS NOTES
Chief Complaint:  Chief Complaint   Patient presents with    Shortness of Breath     pt states he was sent in for a bronch by Dr Juan Pablo Scherer     Acute respiratory failure with hypoxia ([address]     Subjective:    Await bronchoscopy Monday    No acute complaints except for right-sided chest discomfort which has been present since admission  Objective:    /64   Pulse 93   Temp 97.9 °F (36.6 °C) (Temporal)   Resp 16   Ht 5' 7\" (1.702 m)   Wt 135 lb 9.6 oz (61.5 kg)   SpO2 94%   BMI 21.24 kg/m²     Current medications that patient is taking have been reviewed.     General appearance: NAD, conversant, very frail appearing  HEENT: AT/NC, MMM  Neck: FROM, supple  Lungs: Clear to auscultation, WOB normal  CV: RRR, no MRGs  Abdomen: Soft, non-tender; no masses or HSM, +BS  Extremities: No peripheral edema or digital cyanosis  Skin: no rash, lesions or ulcers  Psych: Calm and cooperative  Neuro: Alert and interactive, face symmetric, moving all extremities, speech fluent    Labs:  CBC with Differential:    Lab Results   Component Value Date    WBC 6.7 01/21/2022    RBC 4.60 01/21/2022    HGB 11.8 01/21/2022    HCT 39.3 01/21/2022     01/21/2022    MCV 85.4 01/21/2022    MCH 25.7 01/21/2022    MCHC 30.0 01/21/2022    RDW 15.9 01/21/2022    NRBC 0.9 07/05/2021    METASPCT 2.0 08/13/2021    LYMPHOPCT 12.9 01/21/2022    PROMYELOPCT 1.0 08/13/2021    MONOPCT 19.2 01/21/2022    MYELOPCT 2.6 01/19/2022    BASOPCT 0.8 01/21/2022    MONOSABS 1.28 01/21/2022    LYMPHSABS 0.86 01/21/2022    EOSABS 0.32 01/21/2022    BASOSABS 0.05 01/21/2022     CMP:    Lab Results   Component Value Date     01/21/2022    K 3.6 01/21/2022    K 3.7 01/19/2022    CL 97 01/21/2022    CO2 29 01/21/2022    BUN 14 01/21/2022    CREATININE 0.5 01/21/2022    GFRAA >60 01/21/2022    LABGLOM >60 01/21/2022    GLUCOSE 99 01/21/2022    GLUCOSE 100 01/28/2011    PROT 6.5 01/21/2022    LABALBU 3.0 01/21/2022    LABALBU 3.9 01/28/2011    CALCIUM 10.2 01/21/2022    BILITOT 0.7 01/21/2022    ALKPHOS 86 01/21/2022    AST 15 01/21/2022    ALT 7 01/21/2022        Assessment/Plan:  Principal Problem:    Acute respiratory failure with hypoxia (HCC)  Active Problems:    Coronary artery disease    CKD (chronic kidney disease) stage 3, GFR 30-59 ml/min (HCC)    Paroxysmal atrial fibrillation (HCC)    Diffuse large B-cell lymphoma (HCC)    Severe protein-calorie malnutrition (HCC)    Pacemaker    Hyperuricemia  Resolved Problems:    * No resolved hospital problems.  *       Await bronchscheduled Monday secondary to patient receiving oral anticoagulation    Afebrile / no WBC    Allopurinol for hyperuricemia    Continue BB / Brynn Lease held    DVT prophylaxis: heparin till Sunday, resume oral anticoagulation post bronc when okay with pulmonology    Tanya Han MD    11:26 AM  1/21/2022

## 2022-01-21 NOTE — CARE COORDINATION
SOCIAL WORK/CASEMANAGEMENT TRANSITION OF CARE [id number] [facility], [address], [place], [phone]): pt is on 3l o2 nc. Without home o2. Pt for bronch with biopsy and ebus with pulmonary on Monday. Sw/shree to follow. And plan is home.  JOSE Kaur  1/21/2022

## 2022-01-21 NOTE — PROGRESS NOTES
[facility]  Hematology/Oncology  Consult      Patient Name: Osvaldo Lauren  YOB: 1950  PCP: Mitali Geller DO   Referring Provider:      Reason for Consultation:   Chief Complaint   Patient presents with    Shortness of Breath     pt states he was sent in for a bronch by Dr Vick Ross: feels well today. Continues on supplemental oxygen. No acute complaints at this time. History of Present Illness: 73-year-old man with past medical history of aggressive bulky diffuse large B-cell lymphoma, non-germinal cell type with negative FISH interphase for double hit or triple hit lymphoma with bulky intra-abdominal disease on presentation. He has been on combination chemotherapy with R-CHOP along with Revlimid. This has been complicated by significant pancytopenia is despite G-CSF prophylaxis. S/p 6 cycles of R-CHOP/Revlimid with IT methotrexate for CNS prophylaxis. He was to continue with Revlimid maintenance however wanted to wait until follow up scans. CT scan of the chest shows significant interval worsening with multifocal dense consolidation most prominent within the posterior lower  lung lobes and left upper lung lobe with associated noncalcified pulmonary nodules largest in left lower lobe measuring 2.2 cm with associated centrilobular emphysema. Moderate hilar and mediastinal lymphadenopathy is described. CT scan of abdomen and pelvis shows diffuse thickening of the gastric mucosa and a small soft tissue mass in the presacral space measuring 2 cm. Patient was seen yesterday in the office and was hypoxic on room air. He was sent to the hospital for evaluation by pulmonology for possible EBUS for BAL washing cultures and biopsies for tissue diagnosis for concern for relapsed lymphoma with involvement of the lung parenchyma versus pneumonia in immunocompromised host.  ID consulted and pending evaluation. No antibiotics at this time.  CBC at baseline no leukocytosis with anemia. Consultation for further evaluation of patient with known lymphoma post treatment with possible relapse vs infection. Patient has lost 30 pounds in last 3 months. Review of systems: Over 10 systems were reviewed and all were negative except as mentioned above.     Diagnostic Data:     Past Medical History:   Diagnosis Date    Arthritis     KNEES HIPS BACK    Atrial fibrillation ([address]     BPH (benign prostatic hyperplasia)     Cancer ([address]     large B cell lymphoma    Coronary artery disease     Difficult airway     PT STATES HE WAS TOLD THIS TW[facility]    Difficult intubation 04/2017    note in care everywhere \"Clinical Summary\" 03/10/2021    History of blood transfusion 2021    Hydronephrosis of right kidney 9/15/2021    Hyperlipidemia     Hypertension     Sinus tachycardia 01/01/2002       Patient Active Problem List    Diagnosis Date Noted    Severe protein-calorie malnutrition ([address] 01/19/2022    Hyperuricemia 01/19/2022    Acute respiratory failure with hypoxia ([address] 01/18/2022    CABG x 1 (LIMA-LAD) 01/18/2022    Hydronephrosis of right kidney 09/15/2021    Syncope 09/02/2021    Chronic anemia 09/02/2021    Gastroesophageal reflux disease without esophagitis 09/02/2021    Essential hypertension 09/02/2021    Hypokalemia 09/02/2021    Hypomagnesemia 09/02/2021    Pacemaker 09/02/2021    Former smoker 09/02/2021    Thrombocytopenia ([address] 07/05/2021    Anemia 05/06/2021    Diffuse large B cell lymphoma ([address] 03/31/2021    Diffuse large B-cell lymphoma (HCC) 03/30/2021    Prolonged Q-T interval on ECG 03/29/2021    Hypotension 03/29/2021    CKD (chronic kidney disease) stage 3, GFR 30-59 ml/min (HCC) 03/29/2021    Paroxysmal atrial fibrillation ([address] 03/29/2021    Colonic mass 03/11/2021    Hypertension     Hyperlipidemia     Coronary artery disease     SUNNY (acute kidney injury) ([address] 02/14/2021    Hematuria 01/16/2018    BPH (benign prostatic hyperplasia) 01/16/2018        Past Surgical History:   Procedure Laterality Date    ABLATION OF DYSRHYTHMIC FOCUS      AORTA SURGERY      aortic valve replacement    AORTIC VALVE REPLACEMENT      BLADDER SURGERY Right 2/17/2021    CYSTOSCOPY BILATERAL RETROGRADE PYELOGRAM RIGHT STENT INSERTION performed by Yao Andrade MD [facility] Right 9/15/2021    CYSTOSCOPY RETROGRADE  RIGHT STENT EXCHANGE performed by Yao Andrade MD at [facility] COLONOSCOPY  2/19/2021    COLONOSCOPY WITH BIOPSY performed by Dru Long DO at [address]  2/19/2021    COLONOSCOPY W/ ENDOSCOPIC MUCOSAL RESECTION performed by Dru Long DO at [address] 3/11/2021    LAPAROSCOPIC RIGHT HEMICOLECTOMY performed by Brad Vann MD at [facility] OTHER SURGICAL HISTORY  08/12/2016    CT Myelogram, Dr. Georges Foote  2014 new battery    last checked Dr Hanh Jackson 1/2016?  PORT SURGERY Right 4/1/2021    MEDI PORT INSERTION performed by Brad Vann MD at [facility] Right 7/12/2021    PORT REMOVAL performed by Kristla Banda MD at [address] [facility] ENDOSCOPY      reflux    UPPER GASTROINTESTINAL ENDOSCOPY N/A 2/16/2021    EGD BIOPSY performed by Mahesh Mckeon MD at [facility]         Family History  Family History   Problem Relation Age of Onset    Diabetes Mother     Stroke Mother     Diabetes Father     Heart Disease Father     Brain Cancer Sister     Stroke Sister     Stroke Brother     Cancer Maternal Grandfather        Social History    TOBACCO:   reports that he quit smoking about 7 years ago. His smoking use included cigarettes. He has a 44.00 pack-year smoking history. He has never used smokeless tobacco.  ETOH:   reports current alcohol use.     Home Medications  Prior to Admission medications    Medication Sig Start Date End Date Taking? Authorizing Provider   amLODIPine (NORVASC) 5 MG tablet Take 5 mg by mouth daily   Yes Historical Provider, MD   pravastatin (PRAVACHOL) 20 MG tablet Take 1 tablet by mouth daily 11/16/21  Yes Donald Solis MD   rivaroxaban (XARELTO) 20 MG TABS tablet Take 20 mg by mouth daily (with breakfast)    Yes Historical Provider, MD   vitamin B-12 (CYANOCOBALAMIN) 100 MCG tablet Take 50 mcg by mouth daily   Yes Historical Provider, MD   sennosides-docusate sodium (SENOKOT-S) 8.6-50 MG tablet Take 1 tablet by mouth 2 times daily   Yes Historical Provider, MD   ferrous sulfate 325 (65 Fe) MG tablet Take 325 mg by mouth 2 times daily  1/8/18  Yes Historical Provider, MD   esomeprazole Magnesium (NEXIUM) 20 MG PACK Take 20 mg by mouth daily   Yes Historical Provider, MD   albuterol sulfate HFA (PROAIR HFA) 108 (90 Base) MCG/ACT inhaler Inhale 1 puff into the lungs 2 times daily 10/27/21   Donald Solis MD   tamsulosin (FLOMAX) 0.4 MG capsule Take 1 capsule by mouth daily for 10 doses  Patient not taking: Reported on 10/27/2021 9/15/21 9/25/21  Tram dash Magallanes MD       Allergies  No Known Allergies        Objective  /68   Pulse 100   Temp 96.7 °F (35.9 °C) (Temporal)   Resp 20   Ht 5' 7\" (1.702 m)   Wt 135 lb 9.6 oz (61.5 kg)   SpO2 96%   BMI 21.24 kg/m²     Physical Exam:   Performance Status:  General: AAO to person, place, time, in no acute distress,   Head and neck : PERRLA, EOMI . Sclera non icteric. Oropharynx : Clear  Neck: no JVD,  no adenopathy  Heart: Regular rate and regular rhythm, no murmur  Lungs: Clear to auscultation   Extremities: No edema,no cyanosis, no clubbing. Abdomen: Soft, non-tender;no masses, no organomegaly  Skin:  No rash. Neurologic:Cranial nerves grossly intact. No focal motor or sensory deficits .     Recent Laboratory Data-   Lab Results   Component Value Date    WBC 6.7 01/21/2022    HGB 11.8 (L) 01/21/2022    HCT 39.3 01/21/2022    MCV 85.4 01/21/2022     01/21/2022    LYMPHOPCT 12.9 (L) 01/21/2022    RBC 4.60 01/21/2022    MCH 25.7 (L) 01/21/2022    MCHC 30.0 (L) 01/21/2022    RDW 15.9 (H) 01/21/2022    NEUTOPHILPCT 61.4 01/21/2022    MONOPCT 19.2 (H) 01/21/2022    BASOPCT 0.8 01/21/2022    NEUTROABS 4.08 01/21/2022    LYMPHSABS 0.86 (L) 01/21/2022    MONOSABS 1.28 (H) 01/21/2022    EOSABS 0.32 01/21/2022    BASOSABS 0.05 01/21/2022       Lab Results   Component Value Date     01/21/2022    K 3.6 01/21/2022    CL 97 (L) 01/21/2022    CO2 29 01/21/2022    BUN 14 01/21/2022    CREATININE 0.5 (L) 01/21/2022    GLUCOSE 99 01/21/2022    CALCIUM 10.2 01/21/2022    PROT 6.5 01/21/2022    LABALBU 3.0 (L) 01/21/2022    BILITOT 0.7 01/21/2022    ALKPHOS 86 01/21/2022    AST 15 01/21/2022    ALT 7 01/21/2022    LABGLOM >60 01/21/2022    GFRAA >60 01/21/2022       Lab Results   Component Value Date    IRON 38 (L) 02/16/2021    TIBC 248 (L) 02/16/2021           Radiology-    CT CHEST W CONTRAST    Result Date: 1/15/2022  EXAMINATION: CT OF THE CHEST WITH CONTRAST 1/14/2022 4:07 pm TECHNIQUE: CT of the chest was performed with the administration of intravenous contrast. Multiplanar reformatted images are provided for review. Dose modulation, iterative reconstruction, and/or weight based adjustment of the mA/kV was utilized to reduce the radiation dose to as low as reasonably achievable. COMPARISON: CT chest without contrast September 1, 2021. HISTORY: ORDERING SYSTEM PROVIDED HISTORY: Nausea and vomiting, intractability of vomiting not specified, unspecified vomiting type FINDINGS: Mediastinum: The heart is normal in size and configuration. No evidence of pericardial effusion is seen. No diffuse mediastinal and bilateral hilar scattered enlarged lymph nodes are identified with largest node at the aortopulmonary window within the superior mediastinum measuring up to 22 mm in short axis diameter. . Lungs/pleura: Multifocal dense consolidation is seen comorbidities as clinically warranted. For lung cancer screening, adhere to Lung-RADS guidelines. Reference: Radiology. 2017; 284(1):228-43. CT ABDOMEN PELVIS W IV CONTRAST Additional Contrast? Oral    Result Date: 1/15/2022  EXAMINATION: CT OF THE ABDOMEN AND PELVIS WITH CONTRAST 1/14/2022 4:07 pm TECHNIQUE: CT of the abdomen and pelvis was performed with the administration of intravenous contrast. Multiplanar reformatted images are provided for review. Dose modulation, iterative reconstruction, and/or weight based adjustment of the mA/kV was utilized to reduce the radiation dose to as low as reasonably achievable. COMPARISON: 07/08/2021 HISTORY: ORDERING SYSTEM PROVIDED HISTORY: Projectile vomiting, presence of nausea not specified TECHNOLOGIST PROVIDED HISTORY: Additional Contrast?->Oral FINDINGS: Lower Chest: There is extensive soft tissue density at the lung bases concerning for neoplastic disease. This has become more consolidative at the lung bases bilaterally extending into the left hilar region. There is a low-density focus in the center of the left lower lung density that could represent focal necrosis. No large effusions are identified. There is extensive bibasilar atelectasis. The heart is not appear enlarged. Organs: The liver reveals a homogeneous appearance. No discrete mass is observed. There are no signs of duct dilation. The gallbladder, pancreas, spleen and adrenal glands revealed no acute abnormalities. There is symmetrical renal density without signs of stones or hydronephrosis. The right kidney reveals a tiny low-density focus in the lower pole measuring 5 mm in this could represent a cyst GI/Bowel: There is a fuse mucosal thickening of the stomach. There is a small volume of enteric contrast present and direct visualization may be helpful in better characterization if clinically indicated. The small bowel pattern is within the normal range and is nonobstructive.   The area of the terminal ileum and cecum appears within the normal range. No acute abnormalities of the colon are delineated. Pelvis: The urinary bladder reveals a rounded contour. The prostate gland does not appear enlarged. There are no signs of significant lymphadenopathy or ascites within the pelvis. There is a soft tissue mass in the presacral space measuring 21 x 26 mm, best seen on axial image 199 concerning for neoplasm. Peritoneum/Retroperitoneum: There are no signs of retroperitoneal lymphadenopathy or aneurysm present. Bones/Soft Tissues:  Images of the lumbosacral spine demonstrates disc height loss and vacuum phenomena at the L2-L3 through L4-L5 levels suggest multilevel disc disease. There are degenerative changes of the right hip noted incidentally. No abnormal sclerotic or lytic lesions are identified otherwise. 1.  There has been a significant interval increase is in pulmonary nodules bilaterally mass consolidation concerning for progression of neoplastic disease. 2.  Soft tissue mass in the presacral space suggests neoplasm 3. Diffuse thickening of the gastric mucosa that could represent a gastritis but if of clinical concern, direct visualization may be helpful in better characterization. RECOMMENDATIONS: Unavailable     XR CHEST PORTABLE    Result Date: 1/18/2022  EXAMINATION: ONE XRAY VIEW OF THE CHEST 1/18/2022 6:05 pm COMPARISON: Chest series from August 13, 2021. CT chest from January 14, 2022 HISTORY: ORDERING SYSTEM PROVIDED HISTORY: SOB TECHNOLOGIST PROVIDED HISTORY: Reason for exam:->SOB What reading provider will be dictating this exam?->CRC FINDINGS: Left anterior chest wall cardiac support device with distal leads terminating in the vicinity of the right atrium and right ventricle. Left atrial appendage closure device noted. Postsurgical changes consistent with prior CABG. Atherosclerotic disease. Remaining cardiomediastinal silhouette appears grossly unremarkable.   Normal pulmonary vascularity. Background lung hyperinflation and coarse interstitial markings. There are ill-defined nodular opacities in the left mid to upper lung and right lower lung. These are of uncertain significance. No obvious pleural effusion or pneumothorax. Midline sternotomy hardware. Osseous and thoracic soft tissue structures demonstrate no acute findings. 1.  Multifocal nodular ill-defined opacities in the left mid and upper lung and right lower lung. Allowing for differences in technique, similar to recent CT scan. Broad differential which includes infectious/inflammatory processes. Malignancy not excluded 2. Background lung hyperinflation with coarse interstitial markings. Atherosclerotic disease. Postprocedural changes as above         ASSESSMENT/PLAN :  60-year-old man -  Aggressive bulky diffuse large B-cell lymphoma, non-germinal cell type with negative FISH interface for double hit or triple hit lymphoma with bulky intra-abdominal disease on presentation.   - S/p 6 cycles of R-CHOP/Revlimid. He was to continue with Revlimid maintenance however he stopped due to side effects and wanted to wait until follow up scans.  - CT chest with significant interval worsening with multifocal dense consolidation most prominent within the posterior lower lung lobes and left upper lung lobe with associated noncalcified pulmonary nodules largest in left lower lobe measuring 2.2 cm with associated centrilobular emphysema. Moderate hilar and mediastinal lymphadenopathy is described. - CT A/P shows diffuse thickening of the gastric mucosa and a small soft tissue mass in the presacral space measuring 2 cm.    - Pulmonology consulted for possible EBUS for BAL washing cultures and biopsies for tissue diagnosis for concern for relapsed lymphoma with involvement of the lung parenchyma versus pneumonia in immunocompromised host.    - ID consulted and pending evaluation. No antibiotics at this time.    - CBC at baseline no leukocytosis with anemia.   -will follow,     1/20/22  - CBC at baseline no leukocytosis with anemia  - Pulmonology following. Plan for bronch with EBUS and biopsy Monday. Needs to be off Xarelto for 3 days and was given dose today. We will follow pathology. Infection vs recurrent DLBCL-ABC subtype  - ID following plans to monitor off of antibiotics for now  - We will follow    1/21/22  - CBC at baseline no leukocytosis with anemia  - Pulmonology following. Plan for bronch with EBUS and biopsy Monday. Needs to be off Xarelto for 3 days. We will follow pathology. Infection vs recurrent DLBCL-ABC subtype  - ID following continues to monitor off of antibiotics for now  - We will follow      TRACIE Rudolph - CNP  Electronically signed 1/21/2022 at 3:54 PM   Patient seen and examined. Agree with CNP's assessment and plan. Note updated.   Duncan Castro MD

## 2022-01-21 NOTE — PROGRESS NOTES
Chief Complaint:  Chief Complaint   Patient presents with    Shortness of Breath     pt states he was sent in for a bronch by Dr Dania Gonzalez     Acute respiratory failure with hypoxia ([address]     Subjective:    He is very upset his bronch is delayed til Monday due to Xarelto    I had stopped it yesterday morning when I did his admission but he already received a dose     C/O stable mild R sided chest discomfort    Objective:    /72   Pulse 103   Temp 97 °F (36.1 °C) (Temporal)   Resp 16   Ht 5' 7\" (1.702 m)   Wt 135 lb 9.6 oz (61.5 kg)   SpO2 93%   BMI 21.24 kg/m²     Current medications that patient is taking have been reviewed.     General appearance: NAD, conversant, very frail appearing  HEENT: AT/NC, MMM  Neck: FROM, supple  Lungs: Clear to auscultation, WOB normal  CV: RRR, no MRGs  Abdomen: Soft, non-tender; no masses or HSM, +BS  Extremities: No peripheral edema or digital cyanosis  Skin: no rash, lesions or ulcers  Psych: Calm and cooperative  Neuro: Alert and interactive, face symmetric, moving all extremities, speech fluent    Labs:  CBC with Differential:    Lab Results   Component Value Date    WBC 6.6 01/20/2022    RBC 4.31 01/20/2022    HGB 11.3 01/20/2022    HCT 36.9 01/20/2022     01/20/2022    MCV 85.6 01/20/2022    MCH 26.2 01/20/2022    MCHC 30.6 01/20/2022    RDW 15.9 01/20/2022    NRBC 0.9 07/05/2021    METASPCT 2.0 08/13/2021    LYMPHOPCT 13.4 01/20/2022    PROMYELOPCT 1.0 08/13/2021    MONOPCT 20.2 01/20/2022    MYELOPCT 2.6 01/19/2022    BASOPCT 0.9 01/20/2022    MONOSABS 1.33 01/20/2022    LYMPHSABS 0.88 01/20/2022    EOSABS 0.23 01/20/2022    BASOSABS 0.06 01/20/2022     CMP:    Lab Results   Component Value Date     01/20/2022    K 3.7 01/20/2022    K 3.7 01/19/2022    CL 97 01/20/2022    CO2 25 01/20/2022    BUN 17 01/20/2022    CREATININE 0.6 01/20/2022    GFRAA >60 01/20/2022    LABGLOM >60 01/20/2022    GLUCOSE 66 01/20/2022    GLUCOSE 100 01/28/2011    PROT 6.6 01/20/2022    LABALBU 3.3 01/20/2022    LABALBU 3.9 01/28/2011    CALCIUM 10.6 01/20/2022    BILITOT 0.6 01/20/2022    ALKPHOS 83 01/20/2022    AST 16 01/20/2022    ALT 6 01/20/2022        Assessment/Plan:  Principal Problem:    Acute respiratory failure with hypoxia (HCC)  Active Problems:    Coronary artery disease    CKD (chronic kidney disease) stage 3, GFR 30-59 ml/min (HCC)    Paroxysmal atrial fibrillation (HCC)    Diffuse large B-cell lymphoma (HCC)    Severe protein-calorie malnutrition (HCC)    Pacemaker    Hyperuricemia  Resolved Problems:    * No resolved hospital problems.  *       Await bronch    Fungal testing pending though seems unlikely    Aferile / no WBC    Possible this is a very atypical manifestation of his DLBCL    Allopurinol for hyperuricemia    Continue BB / Marie Dillard held    DVT prophylaxis: heparin til Sunday    Juani Kong MD    11:55 PM  1/20/2022

## 2022-01-22 LAB
ALBUMIN SERPL-MCNC: 3 G/DL (ref 3.5–5.2)
ALP BLD-CCNC: 85 U/L (ref 40–129)
ALT SERPL-CCNC: 6 U/L (ref 0–40)
ANION GAP SERPL CALCULATED.3IONS-SCNC: 15 MMOL/L (ref 7–16)
AST SERPL-CCNC: 13 U/L (ref 0–39)
BASOPHILS ABSOLUTE: 0.06 E9/L (ref 0–0.2)
BASOPHILS RELATIVE PERCENT: 0.7 % (ref 0–2)
BILIRUB SERPL-MCNC: 0.7 MG/DL (ref 0–1.2)
BUN BLDV-MCNC: 13 MG/DL (ref 6–23)
CALCIUM SERPL-MCNC: 10.3 MG/DL (ref 8.6–10.2)
CHLORIDE BLD-SCNC: 97 MMOL/L (ref 98–107)
CO2: 30 MMOL/L (ref 22–29)
CREAT SERPL-MCNC: 0.5 MG/DL (ref 0.7–1.2)
EOSINOPHILS ABSOLUTE: 0.34 E9/L (ref 0.05–0.5)
EOSINOPHILS RELATIVE PERCENT: 4.2 % (ref 0–6)
GFR AFRICAN AMERICAN: >60
GFR NON-AFRICAN AMERICAN: >60 ML/MIN/1.73
GLUCOSE BLD-MCNC: 92 MG/DL (ref 74–99)
HCT VFR BLD CALC: 39.2 % (ref 37–54)
HEMOGLOBIN: 12 G/DL (ref 12.5–16.5)
IMMATURE GRANULOCYTES #: 0.08 E9/L
IMMATURE GRANULOCYTES %: 1 % (ref 0–5)
LYMPHOCYTES ABSOLUTE: 1.1 E9/L (ref 1.5–4)
LYMPHOCYTES RELATIVE PERCENT: 13.7 % (ref 20–42)
MCH RBC QN AUTO: 26.4 PG (ref 26–35)
MCHC RBC AUTO-ENTMCNC: 30.6 % (ref 32–34.5)
MCV RBC AUTO: 86.2 FL (ref 80–99.9)
MONOCYTES ABSOLUTE: 1.23 E9/L (ref 0.1–0.95)
MONOCYTES RELATIVE PERCENT: 15.3 % (ref 2–12)
NEUTROPHILS ABSOLUTE: 5.23 E9/L (ref 1.8–7.3)
NEUTROPHILS RELATIVE PERCENT: 65.1 % (ref 43–80)
PDW BLD-RTO: 16.1 FL (ref 11.5–15)
PLATELET # BLD: 143 E9/L (ref 130–450)
PMV BLD AUTO: 10.7 FL (ref 7–12)
POTASSIUM SERPL-SCNC: 3.7 MMOL/L (ref 3.5–5)
RBC # BLD: 4.55 E12/L (ref 3.8–5.8)
SODIUM BLD-SCNC: 142 MMOL/L (ref 132–146)
TOTAL PROTEIN: 6.6 G/DL (ref 6.4–8.3)
WBC # BLD: 8 E9/L (ref 4.5–11.5)

## 2022-01-22 PROCEDURE — 2700000000 HC OXYGEN THERAPY PER DAY

## 2022-01-22 PROCEDURE — 6370000000 HC RX 637 (ALT 250 FOR IP): Performed by: INTERNAL MEDICINE

## 2022-01-22 PROCEDURE — 6360000002 HC RX W HCPCS: Performed by: INTERNAL MEDICINE

## 2022-01-22 PROCEDURE — 85025 COMPLETE CBC W/AUTO DIFF WBC: CPT

## 2022-01-22 PROCEDURE — 36415 COLL VENOUS BLD VENIPUNCTURE: CPT

## 2022-01-22 PROCEDURE — 2140000000 HC CCU INTERMEDIATE R&B

## 2022-01-22 PROCEDURE — 94640 AIRWAY INHALATION TREATMENT: CPT

## 2022-01-22 PROCEDURE — 6370000000 HC RX 637 (ALT 250 FOR IP): Performed by: NURSE PRACTITIONER

## 2022-01-22 PROCEDURE — 80053 COMPREHEN METABOLIC PANEL: CPT

## 2022-01-22 PROCEDURE — 87070 CULTURE OTHR SPECIMN AEROBIC: CPT

## 2022-01-22 PROCEDURE — 87206 SMEAR FLUORESCENT/ACID STAI: CPT

## 2022-01-22 PROCEDURE — 2580000003 HC RX 258: Performed by: NURSE PRACTITIONER

## 2022-01-22 RX ADMIN — AMLODIPINE BESYLATE 2.5 MG: 2.5 TABLET ORAL at 08:43

## 2022-01-22 RX ADMIN — HEPARIN SODIUM 5000 UNITS: 10000 INJECTION INTRAVENOUS; SUBCUTANEOUS at 06:09

## 2022-01-22 RX ADMIN — HEPARIN SODIUM 5000 UNITS: 10000 INJECTION INTRAVENOUS; SUBCUTANEOUS at 14:41

## 2022-01-22 RX ADMIN — PANTOPRAZOLE SODIUM 40 MG: 40 TABLET, DELAYED RELEASE ORAL at 06:09

## 2022-01-22 RX ADMIN — ALLOPURINOL 100 MG: 100 TABLET ORAL at 08:43

## 2022-01-22 RX ADMIN — ALBUTEROL SULFATE 2.5 MG: 2.5 SOLUTION RESPIRATORY (INHALATION) at 08:17

## 2022-01-22 RX ADMIN — Medication 10 ML: at 21:14

## 2022-01-22 RX ADMIN — HEPARIN SODIUM 5000 UNITS: 10000 INJECTION INTRAVENOUS; SUBCUTANEOUS at 21:14

## 2022-01-22 RX ADMIN — Medication 10 ML: at 08:43

## 2022-01-22 RX ADMIN — FERROUS SULFATE TAB 325 MG (65 MG ELEMENTAL FE) 325 MG: 325 (65 FE) TAB at 08:43

## 2022-01-22 RX ADMIN — ALBUTEROL SULFATE 2.5 MG: 2.5 SOLUTION RESPIRATORY (INHALATION) at 19:36

## 2022-01-22 RX ADMIN — FERROUS SULFATE TAB 325 MG (65 MG ELEMENTAL FE) 325 MG: 325 (65 FE) TAB at 21:14

## 2022-01-22 RX ADMIN — PRAVASTATIN SODIUM 20 MG: 20 TABLET ORAL at 08:43

## 2022-01-22 ASSESSMENT — PAIN SCALES - GENERAL
PAINLEVEL_OUTOF10: 0

## 2022-01-22 NOTE — PROGRESS NOTES
Pulmonary [facility]                             Pulmonary Consult/Progress Note :            Reason for Consultation:Possible need for Bronch,Lymphoma   CC : SOB ,cough frothy secretion   HPI:   Doing fair   On 2 L   Still with SOB   Still need O2 ,2 L   Not sure about some confusion  Want to go home     PHYSICAL EXAMINATION:     VITAL SIGNS:  /64   Pulse 95   Temp 99.6 °F (37.6 °C) (Temporal)   Resp 16   Ht 5' 7\" (1.702 m)   Wt 135 lb 9.6 oz (61.5 kg)   SpO2 95%   BMI 21.24 kg/m²   Wt Readings from Last 3 Encounters:   01/21/22 135 lb 9.6 oz (61.5 kg)   10/27/21 168 lb 3.2 oz (76.3 kg)   09/15/21 161 lb (73 kg)     Temp Readings from Last 3 Encounters:   01/21/22 99.6 °F (37.6 °C) (Temporal)   09/15/21 98 °F (36.7 °C) (Temporal)   09/03/21 97.6 °F (36.4 °C) (Temporal)     TMAX:  BP Readings from Last 3 Encounters:   01/21/22 101/64   10/27/21 90/72   09/15/21 124/76     Pulse Readings from Last 3 Encounters:   01/21/22 95   10/27/21 87   09/15/21 91           INTAKE/OUTPUTS:  I/O last 3 completed shifts: In: 65 [P.O.:840;  I.V.:10]  Out: 675 [Urine:675]    Intake/Output Summary (Last 24 hours) at 1/21/2022 2011  Last data filed at 1/21/2022 1645  Gross per 24 hour   Intake 850 ml   Output 475 ml   Net 375 ml       General Appearance: alert and oriented to person, place and time, well-developed and   well-nourished, in no acute distress   Eyes: pupils equal, round, and reactive to light, extraocular eye movements intact, conjunctivae normal and sclera anicteric   Neck: neck supple and non tender without mass, no thyromegaly, no thyroid nodules and no cervical adenopathy   Pulmonary/Chest:crackles rhonchi   Cardiovascular: normal rate, regular rhythm, normal S1 and S2, no murmurs, rubs, clicks or gallops, distal pulses intact, no carotid bruits, no murmurs, no gallops, no carotid bruits and no JVD   Abdomen: obese, soft, non-tender, non-distended, normal bowel sounds, no masses or organomegaly   Extremities:no edema   Musculoskeletal: normal range of motion, no joint swelling, deformity or tenderness   Neurologic: reflexes normal and symmetric, no cranial nerve deficit noted    LABS/IMAGING:    CBC:  Lab Results   Component Value Date    WBC 6.7 01/21/2022    HGB 11.8 (L) 01/21/2022    HCT 39.3 01/21/2022    MCV 85.4 01/21/2022     01/21/2022    LYMPHOPCT 12.9 (L) 01/21/2022    RBC 4.60 01/21/2022    MCH 25.7 (L) 01/21/2022    MCHC 30.0 (L) 01/21/2022    RDW 15.9 (H) 01/21/2022    NEUTOPHILPCT 61.4 01/21/2022    MONOPCT 19.2 (H) 01/21/2022    BASOPCT 0.8 01/21/2022    NEUTROABS 4.08 01/21/2022    LYMPHSABS 0.86 (L) 01/21/2022    MONOSABS 1.28 (H) 01/21/2022    EOSABS 0.32 01/21/2022    BASOSABS 0.05 01/21/2022       Recent Labs     01/21/22 0521 01/20/22 0459 01/19/22 0519   WBC 6.7 6.6 6.0   HGB 11.8* 11.3* 10.9*   HCT 39.3 36.9* 36.0*   MCV 85.4 85.6 85.7    150 162       BMP:   Recent Labs     01/19/22  0519 01/20/22 0459 01/21/22 0521    140 140   K 3.7 3.7 3.6   CL 98 97* 97*   CO2 25 25 29   PHOS 3.5  --   --    BUN 18 17 14   CREATININE 0.5* 0.6* 0.5*       MG:   Lab Results   Component Value Date    MG 1.8 01/19/2022     Ca/Phos:   Lab Results   Component Value Date    CALCIUM 10.2 01/21/2022    PHOS 3.5 01/19/2022     Amylase: No results found for: AMYLASE  Lipase: No results found for: LIPASE  LIVER PROFILE:   Recent Labs     01/19/22  0519 01/20/22  0459 01/21/22  0521   AST 15 16 15   ALT 10 6 7   BILITOT 0.6 0.6 0.7   ALKPHOS 80 83 86       PT/INR:   No results for input(s): PROTIME, INR in the last 72 hours. APTT:   No results for input(s): APTT in the last 72 hours.     Cardiac Enzymes:  Lab Results   Component Value Date    TROPONINI <0.01 02/14/2021               PET and CT reviewed     PROBLEM LIST:  Patient Active Problem List   Diagnosis    Hematuria    BPH (benign prostatic hyperplasia)    SUNNY (acute kidney injury) ([address]    Colonic mass    Hypertension    Hyperlipidemia    Coronary artery disease    Prolonged Q-T interval on ECG    Hypotension    CKD (chronic kidney disease) stage 3, GFR 30-59 ml/min (HCC)    Paroxysmal atrial fibrillation (HCC)    Diffuse large B-cell lymphoma (HCC)    Diffuse large B cell lymphoma (HCC)    Severe protein-calorie malnutrition (HCC)    Anemia    Thrombocytopenia (HCC)    Syncope    Chronic anemia    Gastroesophageal reflux disease without esophagitis    Essential hypertension    Hypokalemia    Hypomagnesemia    Pacemaker    Former smoker    Hydronephrosis of right kidney    Acute respiratory failure with hypoxia (HCC)    CABG x 1 (LIMA-LAD)    Hyperuricemia               ASSESSMENT:  1.) Large Cell B Lymphoma  2-diffuse consolidation /mass /Lung concern for pulmonary Lymphoma   2. )COPD  3.)h/p Lymphoma   4.)Pancytopenia   5.)Possible Pneumonia       PLAN:  For Bronch/ebus ,transbronchial biopsy 12 pm Monday   If anticoagulation ,ok for Lovenox and stop on Joselo morning ,primary to address    Clinical suspicipus is high for pulmonary Lymophoma ,while onfection is possible with this much infection in lung expect to see him in shock and acute respiratory failure . regardless need tissue and bronch ,unfortauntely     *-Work-up for COPD bronchodilator  abx ,antifungal as per ID   *_hold on Steroids for now  Sputum culture  BD  Work up fr infection per ID  Poor prognosis in general    Discuss with Dr Carter Dee  I will discuss with family       Thank you very much for allowing me to participate in the care of this pleasant patient , should you have any questions ,please do not hesitate to contact me      [address]  Pulmonary&Critical Care Medicine   Director of [address] [place] [address] Director of [address] [place] [address]    Sana Doan    NOTE: This report was transcribed using voice recognition software.  Every effort was made to ensure accuracy; however, inadvertent computerized transcription errors may be present.

## 2022-01-22 NOTE — PATIENT CARE CONFERENCE
P Quality Flow/Interdisciplinary Rounds Progress Note        Quality Flow Rounds held on January 22, 2022    Disciplines Attending:  Bedside Nurse, ,  and Nursing Unit Leadership    Osvaldo Lauren was admitted on 1/18/2022  8:15 PM    Anticipated Discharge Date:  Expected Discharge Date: 01/20/22    Disposition:    Osman Score:  Osman Scale Score: 20    Readmission Risk              Risk of Unplanned Readmission:  28           Discussed patient goal for the day, patient clinical progression, and barriers to discharge.   The following Goal(s) of the Day/Commitment(s) have been identified:  Results/labs       Claude Shanks RN  January 22, 2022

## 2022-01-22 NOTE — PROGRESS NOTES
CALCIUM 10.3 01/22/2022    BILITOT 0.7 01/22/2022    ALKPHOS 85 01/22/2022    AST 13 01/22/2022    ALT 6 01/22/2022        Assessment/Plan:  Principal Problem:    Acute respiratory failure with hypoxia (HCC)  Active Problems:    Coronary artery disease    CKD (chronic kidney disease) stage 3, GFR 30-59 ml/min (HCC)    Paroxysmal atrial fibrillation (HCC)    Diffuse large B-cell lymphoma (HCC)    Severe protein-calorie malnutrition (HCC)    Pacemaker    Hyperuricemia  Resolved Problems:    * No resolved hospital problems.  *       Await bronchscheduled Monday secondary to patient receiving oral anticoagulation    Afebrile / no WBC    Allopurinol for hyperuricemia    Continue BB / Sneha Lainez on hold for procedure on Monday    DVT prophylaxis: heparin till Sunday, resume oral anticoagulation post bronc when okay with pulmonology    Rosy Larkin MD    1:56 PM  1/22/2022

## 2022-01-23 LAB
(1,3)-BETA-D-GLUCAN (FUNGITELL) INTERPRETATION: NEGATIVE
(1,3)-BETA-D-GLUCAN (FUNGITELL): <31 PG/ML
ALBUMIN SERPL-MCNC: 3.3 G/DL (ref 3.5–5.2)
ALP BLD-CCNC: 89 U/L (ref 40–129)
ALT SERPL-CCNC: 6 U/L (ref 0–40)
ANION GAP SERPL CALCULATED.3IONS-SCNC: 13 MMOL/L (ref 7–16)
AST SERPL-CCNC: 15 U/L (ref 0–39)
BASOPHILS ABSOLUTE: 0.09 E9/L (ref 0–0.2)
BASOPHILS RELATIVE PERCENT: 0.9 % (ref 0–2)
BILIRUB SERPL-MCNC: 0.7 MG/DL (ref 0–1.2)
BUN BLDV-MCNC: 14 MG/DL (ref 6–23)
CALCIUM SERPL-MCNC: 10.6 MG/DL (ref 8.6–10.2)
CHLORIDE BLD-SCNC: 97 MMOL/L (ref 98–107)
CO2: 32 MMOL/L (ref 22–29)
CREAT SERPL-MCNC: 0.5 MG/DL (ref 0.7–1.2)
EOSINOPHILS ABSOLUTE: 0.48 E9/L (ref 0.05–0.5)
EOSINOPHILS RELATIVE PERCENT: 4.8 % (ref 0–6)
GFR AFRICAN AMERICAN: >60
GFR NON-AFRICAN AMERICAN: >60 ML/MIN/1.73
GLUCOSE BLD-MCNC: 87 MG/DL (ref 74–99)
HCT VFR BLD CALC: 39.6 % (ref 37–54)
HEMOGLOBIN: 11.9 G/DL (ref 12.5–16.5)
IMMATURE GRANULOCYTES #: 0.09 E9/L
IMMATURE GRANULOCYTES %: 0.9 % (ref 0–5)
LYMPHOCYTES ABSOLUTE: 1.3 E9/L (ref 1.5–4)
LYMPHOCYTES RELATIVE PERCENT: 13 % (ref 20–42)
MCH RBC QN AUTO: 26 PG (ref 26–35)
MCHC RBC AUTO-ENTMCNC: 30.1 % (ref 32–34.5)
MCV RBC AUTO: 86.5 FL (ref 80–99.9)
MONOCYTES ABSOLUTE: 1.26 E9/L (ref 0.1–0.95)
MONOCYTES RELATIVE PERCENT: 12.6 % (ref 2–12)
NEUTROPHILS ABSOLUTE: 6.79 E9/L (ref 1.8–7.3)
NEUTROPHILS RELATIVE PERCENT: 67.8 % (ref 43–80)
PDW BLD-RTO: 16.4 FL (ref 11.5–15)
PLATELET # BLD: 141 E9/L (ref 130–450)
PMV BLD AUTO: 10 FL (ref 7–12)
POTASSIUM SERPL-SCNC: 4 MMOL/L (ref 3.5–5)
RBC # BLD: 4.58 E12/L (ref 3.8–5.8)
SODIUM BLD-SCNC: 142 MMOL/L (ref 132–146)
TOTAL PROTEIN: 6.9 G/DL (ref 6.4–8.3)
WBC # BLD: 10 E9/L (ref 4.5–11.5)

## 2022-01-23 PROCEDURE — 2140000000 HC CCU INTERMEDIATE R&B

## 2022-01-23 PROCEDURE — 6370000000 HC RX 637 (ALT 250 FOR IP): Performed by: INTERNAL MEDICINE

## 2022-01-23 PROCEDURE — 6370000000 HC RX 637 (ALT 250 FOR IP): Performed by: NURSE PRACTITIONER

## 2022-01-23 PROCEDURE — 36415 COLL VENOUS BLD VENIPUNCTURE: CPT

## 2022-01-23 PROCEDURE — 2700000000 HC OXYGEN THERAPY PER DAY

## 2022-01-23 PROCEDURE — 6360000002 HC RX W HCPCS: Performed by: INTERNAL MEDICINE

## 2022-01-23 PROCEDURE — 94640 AIRWAY INHALATION TREATMENT: CPT

## 2022-01-23 PROCEDURE — 80053 COMPREHEN METABOLIC PANEL: CPT

## 2022-01-23 PROCEDURE — 2580000003 HC RX 258: Performed by: NURSE PRACTITIONER

## 2022-01-23 PROCEDURE — 85025 COMPLETE CBC W/AUTO DIFF WBC: CPT

## 2022-01-23 RX ORDER — METOPROLOL SUCCINATE 25 MG/1
25 TABLET, EXTENDED RELEASE ORAL 2 TIMES DAILY
Status: DISCONTINUED | OUTPATIENT
Start: 2022-01-23 | End: 2022-01-23

## 2022-01-23 RX ORDER — METOPROLOL SUCCINATE 25 MG/1
25 TABLET, EXTENDED RELEASE ORAL 2 TIMES DAILY
Status: DISCONTINUED | OUTPATIENT
Start: 2022-01-23 | End: 2022-01-26 | Stop reason: HOSPADM

## 2022-01-23 RX ADMIN — FERROUS SULFATE TAB 325 MG (65 MG ELEMENTAL FE) 325 MG: 325 (65 FE) TAB at 09:08

## 2022-01-23 RX ADMIN — FERROUS SULFATE TAB 325 MG (65 MG ELEMENTAL FE) 325 MG: 325 (65 FE) TAB at 20:35

## 2022-01-23 RX ADMIN — Medication 10 ML: at 09:08

## 2022-01-23 RX ADMIN — ALBUTEROL SULFATE 2.5 MG: 2.5 SOLUTION RESPIRATORY (INHALATION) at 19:21

## 2022-01-23 RX ADMIN — PANTOPRAZOLE SODIUM 40 MG: 40 TABLET, DELAYED RELEASE ORAL at 06:16

## 2022-01-23 RX ADMIN — ALBUTEROL SULFATE 2.5 MG: 2.5 SOLUTION RESPIRATORY (INHALATION) at 08:52

## 2022-01-23 RX ADMIN — ALLOPURINOL 100 MG: 100 TABLET ORAL at 09:08

## 2022-01-23 RX ADMIN — PRAVASTATIN SODIUM 20 MG: 20 TABLET ORAL at 09:08

## 2022-01-23 RX ADMIN — HEPARIN SODIUM 5000 UNITS: 10000 INJECTION INTRAVENOUS; SUBCUTANEOUS at 06:16

## 2022-01-23 RX ADMIN — AMLODIPINE BESYLATE 2.5 MG: 2.5 TABLET ORAL at 09:08

## 2022-01-23 RX ADMIN — METOPROLOL SUCCINATE 25 MG: 25 TABLET, FILM COATED, EXTENDED RELEASE ORAL at 15:48

## 2022-01-23 RX ADMIN — Medication 10 ML: at 20:35

## 2022-01-23 RX ADMIN — SENNOSIDES 8.6 MG: 8.6 TABLET, COATED ORAL at 15:48

## 2022-01-23 ASSESSMENT — PAIN SCALES - GENERAL
PAINLEVEL_OUTOF10: 0

## 2022-01-23 NOTE — PLAN OF CARE
Problem: Respiratory:  Goal: Ability to maintain adequate ventilation will improve  Description: Ability to maintain adequate ventilation will improve  Outcome: Met This Shift

## 2022-01-23 NOTE — PATIENT CARE CONFERENCE
P Quality Flow/Interdisciplinary Rounds Progress Note        Quality Flow Rounds held on January 23, 2022    Disciplines Attending:  Bedside Nurse, ,  and Nursing Unit Leadership    Valeriy Monteiro was admitted on 1/18/2022  8:15 PM    Anticipated Discharge Date:  Expected Discharge Date: 01/20/22    Disposition:    Osman Score:  Osman Scale Score: 19    Readmission Risk              Risk of Unplanned Readmission:  28           Discussed patient goal for the day, patient clinical progression, and barriers to discharge.   The following Goal(s) of the Day/Commitment(s) have been identified:  Diagnostics - Report Results      Jessica Stuart RN  January 23, 2022

## 2022-01-23 NOTE — PROGRESS NOTES
Pt continues to refuse Beta blocker. Educated on importance of med. He states it \"makes him too dizzy and his oncologist said to stop taking it. \"

## 2022-01-23 NOTE — PROGRESS NOTES
Chief Complaint:  Chief Complaint   Patient presents with    Shortness of Breath     pt states he was sent in for a bronch by Dr Person Aus     Acute respiratory failure with hypoxia ([facility] [address] [facility]     Subjective:    Awaitin bronchoscopy Monday  Pleasant, resting comfortably no apparent acute distress  Has brought up a fair amount of sputum to be sent down to the lab  Sinus tach today  Objective:    BP (!) 112/59   Pulse 115   Temp 98.3 °F (36.8 °C) (Temporal)   Resp 18   Ht 5' 7\" (1.702 m)   Wt 135 lb 3.2 oz (61.3 kg)   SpO2 95%   BMI 21.18 kg/m²     Current medications that patient is taking have been reviewed.     General appearance: NAD, conversant, very frail appearing  HEENT: AT/NC, MMM  Neck: FROM, supple  Lungs: Clear to auscultation, WOB normal  CV: Sinus tach on monitor, no MRGs  Abdomen: Soft, non-tender; no masses or HSM, +BS  Extremities: No peripheral edema or digital cyanosis  Skin: no rash, lesions or ulcers  Psych: Calm and cooperative  Neuro: Alert and interactive, face symmetric, moving all extremities, speech fluent    Labs:  CBC with Differential:    Lab Results   Component Value Date    WBC 10.0 01/23/2022    RBC 4.58 01/23/2022    HGB 11.9 01/23/2022    HCT 39.6 01/23/2022     01/23/2022    MCV 86.5 01/23/2022    MCH 26.0 01/23/2022    MCHC 30.1 01/23/2022    RDW 16.4 01/23/2022    NRBC 0.9 07/05/2021    METASPCT 2.0 08/13/2021    LYMPHOPCT 13.0 01/23/2022    PROMYELOPCT 1.0 08/13/2021    MONOPCT 12.6 01/23/2022    MYELOPCT 2.6 01/19/2022    BASOPCT 0.9 01/23/2022    MONOSABS 1.26 01/23/2022    LYMPHSABS 1.30 01/23/2022    EOSABS 0.48 01/23/2022    BASOSABS 0.09 01/23/2022     CMP:    Lab Results   Component Value Date     01/23/2022    K 4.0 01/23/2022    K 3.7 01/19/2022    CL 97 01/23/2022    CO2 32 01/23/2022    BUN 14 01/23/2022    CREATININE 0.5 01/23/2022    GFRAA >60 01/23/2022    LABGLOM >60 01/23/2022    GLUCOSE 87 01/23/2022    GLUCOSE 100 01/28/2011    PROT 6.9 01/23/2022 LABALBU 3.3 01/23/2022    LABALBU 3.9 01/28/2011    CALCIUM 10.6 01/23/2022    BILITOT 0.7 01/23/2022    ALKPHOS 89 01/23/2022    AST 15 01/23/2022    ALT 6 01/23/2022        Assessment/Plan:  Principal Problem:    Acute respiratory failure with hypoxia (HCC)  Active Problems:    Coronary artery disease    CKD (chronic kidney disease) stage 3, GFR 30-59 ml/min (HCC)    Paroxysmal atrial fibrillation (HCC)    Diffuse large B-cell lymphoma (HCC)    Severe protein-calorie malnutrition (HCC)    Pacemaker    Hyperuricemia  Resolved Problems:    * No resolved hospital problems.  *       Await bronchscheduled Monday secondary to patient receiving oral anticoagulation    Afebrile / no WBC    Allopurinol for hyperuricemia    Continue BB, patient refused this earlier but now has agreed to take given heart rate being elevated/ xarelto on hold for procedure on Monday    DVT prophylaxis: heparin till Sunday, resume oral anticoagulation post bronc when okay with pulmonology    Asia Rapp MD    4:45 PM  1/23/2022

## 2022-01-24 ENCOUNTER — APPOINTMENT (OUTPATIENT)
Dept: GENERAL RADIOLOGY | Age: 72
DRG: 823 | End: 2022-01-24
Payer: MEDICARE

## 2022-01-24 ENCOUNTER — ANESTHESIA EVENT (OUTPATIENT)
Dept: ENDOSCOPY | Age: 72
DRG: 823 | End: 2022-01-24
Payer: MEDICARE

## 2022-01-24 ENCOUNTER — ANESTHESIA (OUTPATIENT)
Dept: ENDOSCOPY | Age: 72
DRG: 823 | End: 2022-01-24
Payer: MEDICARE

## 2022-01-24 VITALS — OXYGEN SATURATION: 84 % | SYSTOLIC BLOOD PRESSURE: 119 MMHG | DIASTOLIC BLOOD PRESSURE: 75 MMHG

## 2022-01-24 LAB
ALBUMIN SERPL-MCNC: 2.7 G/DL (ref 3.5–5.2)
ALP BLD-CCNC: 84 U/L (ref 40–129)
ALT SERPL-CCNC: 6 U/L (ref 0–40)
ANION GAP SERPL CALCULATED.3IONS-SCNC: 18 MMOL/L (ref 7–16)
AST SERPL-CCNC: 15 U/L (ref 0–39)
BASOPHILS ABSOLUTE: 0.06 E9/L (ref 0–0.2)
BASOPHILS RELATIVE PERCENT: 0.6 % (ref 0–2)
BILIRUB SERPL-MCNC: 0.6 MG/DL (ref 0–1.2)
BUN BLDV-MCNC: 14 MG/DL (ref 6–23)
CALCIUM SERPL-MCNC: 10.7 MG/DL (ref 8.6–10.2)
CHLORIDE BLD-SCNC: 98 MMOL/L (ref 98–107)
CO2: 26 MMOL/L (ref 22–29)
CREAT SERPL-MCNC: 0.5 MG/DL (ref 0.7–1.2)
CULTURE, RESPIRATORY: NORMAL
EOSINOPHILS ABSOLUTE: 0.54 E9/L (ref 0.05–0.5)
EOSINOPHILS RELATIVE PERCENT: 5 % (ref 0–6)
GFR AFRICAN AMERICAN: >60
GFR NON-AFRICAN AMERICAN: >60 ML/MIN/1.73
GLUCOSE BLD-MCNC: 91 MG/DL (ref 74–99)
HCT VFR BLD CALC: 36.7 % (ref 37–54)
HEMOGLOBIN: 11.3 G/DL (ref 12.5–16.5)
IMMATURE GRANULOCYTES #: 0.09 E9/L
IMMATURE GRANULOCYTES %: 0.8 % (ref 0–5)
LYMPHOCYTES ABSOLUTE: 1.26 E9/L (ref 1.5–4)
LYMPHOCYTES RELATIVE PERCENT: 11.6 % (ref 20–42)
MCH RBC QN AUTO: 26.3 PG (ref 26–35)
MCHC RBC AUTO-ENTMCNC: 30.8 % (ref 32–34.5)
MCV RBC AUTO: 85.3 FL (ref 80–99.9)
MONOCYTES ABSOLUTE: 1.48 E9/L (ref 0.1–0.95)
MONOCYTES RELATIVE PERCENT: 13.6 % (ref 2–12)
NEUTROPHILS ABSOLUTE: 7.45 E9/L (ref 1.8–7.3)
NEUTROPHILS RELATIVE PERCENT: 68.4 % (ref 43–80)
PDW BLD-RTO: 16.5 FL (ref 11.5–15)
PLATELET # BLD: 161 E9/L (ref 130–450)
PMV BLD AUTO: 10.1 FL (ref 7–12)
POTASSIUM SERPL-SCNC: 4.1 MMOL/L (ref 3.5–5)
RBC # BLD: 4.3 E12/L (ref 3.8–5.8)
SMEAR, RESPIRATORY: NORMAL
SODIUM BLD-SCNC: 142 MMOL/L (ref 132–146)
TOTAL PROTEIN: 6.8 G/DL (ref 6.4–8.3)
WBC # BLD: 10.9 E9/L (ref 4.5–11.5)

## 2022-01-24 PROCEDURE — 87116 MYCOBACTERIA CULTURE: CPT

## 2022-01-24 PROCEDURE — 3609020000 HC BRONCHOSCOPY W/EBUS FNA: Performed by: INTERNAL MEDICINE

## 2022-01-24 PROCEDURE — 85025 COMPLETE CBC W/AUTO DIFF WBC: CPT

## 2022-01-24 PROCEDURE — 87205 SMEAR GRAM STAIN: CPT

## 2022-01-24 PROCEDURE — 2580000003 HC RX 258: Performed by: NURSE ANESTHETIST, CERTIFIED REGISTERED

## 2022-01-24 PROCEDURE — 2580000003 HC RX 258: Performed by: NURSE PRACTITIONER

## 2022-01-24 PROCEDURE — 2700000000 HC OXYGEN THERAPY PER DAY

## 2022-01-24 PROCEDURE — 2580000003 HC RX 258: Performed by: PHYSICIAN ASSISTANT

## 2022-01-24 PROCEDURE — 6370000000 HC RX 637 (ALT 250 FOR IP): Performed by: NURSE ANESTHETIST, CERTIFIED REGISTERED

## 2022-01-24 PROCEDURE — 0BJ08ZZ INSPECTION OF TRACHEOBRONCHIAL TREE, VIA NATURAL OR ARTIFICIAL OPENING ENDOSCOPIC: ICD-10-PCS | Performed by: INTERNAL MEDICINE

## 2022-01-24 PROCEDURE — 7100000001 HC PACU RECOVERY - ADDTL 15 MIN: Performed by: INTERNAL MEDICINE

## 2022-01-24 PROCEDURE — 3700000001 HC ADD 15 MINUTES (ANESTHESIA): Performed by: INTERNAL MEDICINE

## 2022-01-24 PROCEDURE — 87015 SPECIMEN INFECT AGNT CONCNTJ: CPT

## 2022-01-24 PROCEDURE — 7100000000 HC PACU RECOVERY - FIRST 15 MIN: Performed by: INTERNAL MEDICINE

## 2022-01-24 PROCEDURE — 88173 CYTOPATH EVAL FNA REPORT: CPT

## 2022-01-24 PROCEDURE — 80053 COMPREHEN METABOLIC PANEL: CPT

## 2022-01-24 PROCEDURE — 36415 COLL VENOUS BLD VENIPUNCTURE: CPT

## 2022-01-24 PROCEDURE — 87206 SMEAR FLUORESCENT/ACID STAI: CPT

## 2022-01-24 PROCEDURE — 6360000002 HC RX W HCPCS: Performed by: NURSE ANESTHETIST, CERTIFIED REGISTERED

## 2022-01-24 PROCEDURE — 6370000000 HC RX 637 (ALT 250 FOR IP): Performed by: INTERNAL MEDICINE

## 2022-01-24 PROCEDURE — 3700000000 HC ANESTHESIA ATTENDED CARE: Performed by: INTERNAL MEDICINE

## 2022-01-24 PROCEDURE — 94640 AIRWAY INHALATION TREATMENT: CPT

## 2022-01-24 PROCEDURE — 2500000003 HC RX 250 WO HCPCS: Performed by: NURSE ANESTHETIST, CERTIFIED REGISTERED

## 2022-01-24 PROCEDURE — 99232 SBSQ HOSP IP/OBS MODERATE 35: CPT | Performed by: INTERNAL MEDICINE

## 2022-01-24 PROCEDURE — 87102 FUNGUS ISOLATION CULTURE: CPT

## 2022-01-24 PROCEDURE — 87070 CULTURE OTHR SPECIMN AEROBIC: CPT

## 2022-01-24 PROCEDURE — 89051 BODY FLUID CELL COUNT: CPT

## 2022-01-24 PROCEDURE — 88185 FLOWCYTOMETRY/TC ADD-ON: CPT

## 2022-01-24 PROCEDURE — 2140000000 HC CCU INTERMEDIATE R&B

## 2022-01-24 PROCEDURE — 88184 FLOWCYTOMETRY/ TC 1 MARKER: CPT

## 2022-01-24 PROCEDURE — 3609027000 HC BRONCHOSCOPY: Performed by: INTERNAL MEDICINE

## 2022-01-24 PROCEDURE — 71045 X-RAY EXAM CHEST 1 VIEW: CPT

## 2022-01-24 PROCEDURE — 2709999900 HC NON-CHARGEABLE SUPPLY: Performed by: INTERNAL MEDICINE

## 2022-01-24 PROCEDURE — 88112 CYTOPATH CELL ENHANCE TECH: CPT

## 2022-01-24 PROCEDURE — 07B74ZX EXCISION OF THORAX LYMPHATIC, PERCUTANEOUS ENDOSCOPIC APPROACH, DIAGNOSTIC: ICD-10-PCS | Performed by: INTERNAL MEDICINE

## 2022-01-24 PROCEDURE — 87281 PNEUMOCYSTIS CARINII AG IF: CPT

## 2022-01-24 PROCEDURE — 6370000000 HC RX 637 (ALT 250 FOR IP): Performed by: NURSE PRACTITIONER

## 2022-01-24 PROCEDURE — 6360000002 HC RX W HCPCS: Performed by: INTERNAL MEDICINE

## 2022-01-24 PROCEDURE — 88305 TISSUE EXAM BY PATHOLOGIST: CPT

## 2022-01-24 RX ORDER — MIDAZOLAM HYDROCHLORIDE 1 MG/ML
INJECTION INTRAMUSCULAR; INTRAVENOUS PRN
Status: DISCONTINUED | OUTPATIENT
Start: 2022-01-24 | End: 2022-01-24 | Stop reason: SDUPTHER

## 2022-01-24 RX ORDER — PROMETHAZINE HYDROCHLORIDE 25 MG/ML
6.25 INJECTION, SOLUTION INTRAMUSCULAR; INTRAVENOUS
Status: DISCONTINUED | OUTPATIENT
Start: 2022-01-24 | End: 2022-01-24 | Stop reason: HOSPADM

## 2022-01-24 RX ORDER — LIDOCAINE HYDROCHLORIDE 20 MG/ML
SOLUTION OROPHARYNGEAL PRN
Status: DISCONTINUED | OUTPATIENT
Start: 2022-01-24 | End: 2022-01-24 | Stop reason: SDUPTHER

## 2022-01-24 RX ORDER — ONDANSETRON 2 MG/ML
4 INJECTION INTRAMUSCULAR; INTRAVENOUS
Status: DISCONTINUED | OUTPATIENT
Start: 2022-01-24 | End: 2022-01-24 | Stop reason: HOSPADM

## 2022-01-24 RX ORDER — DEXAMETHASONE SODIUM PHOSPHATE 10 MG/ML
INJECTION, EMULSION INTRAMUSCULAR; INTRAVENOUS PRN
Status: DISCONTINUED | OUTPATIENT
Start: 2022-01-24 | End: 2022-01-24 | Stop reason: SDUPTHER

## 2022-01-24 RX ORDER — SODIUM CHLORIDE 9 MG/ML
INJECTION, SOLUTION INTRAVENOUS CONTINUOUS PRN
Status: DISCONTINUED | OUTPATIENT
Start: 2022-01-24 | End: 2022-01-24 | Stop reason: SDUPTHER

## 2022-01-24 RX ORDER — ALBUTEROL SULFATE 2.5 MG/3ML
2.5 SOLUTION RESPIRATORY (INHALATION)
Status: CANCELLED | OUTPATIENT
Start: 2022-01-24 | End: 2022-01-24

## 2022-01-24 RX ORDER — PHENYLEPHRINE HYDROCHLORIDE 10 MG/ML
INJECTION INTRAVENOUS PRN
Status: DISCONTINUED | OUTPATIENT
Start: 2022-01-24 | End: 2022-01-24 | Stop reason: SDUPTHER

## 2022-01-24 RX ORDER — ROCURONIUM BROMIDE 10 MG/ML
INJECTION, SOLUTION INTRAVENOUS PRN
Status: DISCONTINUED | OUTPATIENT
Start: 2022-01-24 | End: 2022-01-24 | Stop reason: SDUPTHER

## 2022-01-24 RX ORDER — ONDANSETRON 2 MG/ML
INJECTION INTRAMUSCULAR; INTRAVENOUS PRN
Status: DISCONTINUED | OUTPATIENT
Start: 2022-01-24 | End: 2022-01-24 | Stop reason: SDUPTHER

## 2022-01-24 RX ORDER — 0.9 % SODIUM CHLORIDE 0.9 %
250 INTRAVENOUS SOLUTION INTRAVENOUS ONCE
Status: COMPLETED | OUTPATIENT
Start: 2022-01-24 | End: 2022-01-24

## 2022-01-24 RX ORDER — FENTANYL CITRATE 50 UG/ML
INJECTION, SOLUTION INTRAMUSCULAR; INTRAVENOUS PRN
Status: DISCONTINUED | OUTPATIENT
Start: 2022-01-24 | End: 2022-01-24 | Stop reason: SDUPTHER

## 2022-01-24 RX ORDER — PROPOFOL 10 MG/ML
INJECTION, EMULSION INTRAVENOUS PRN
Status: DISCONTINUED | OUTPATIENT
Start: 2022-01-24 | End: 2022-01-24 | Stop reason: SDUPTHER

## 2022-01-24 RX ADMIN — ALBUTEROL SULFATE 2.5 MG: 2.5 SOLUTION RESPIRATORY (INHALATION) at 09:25

## 2022-01-24 RX ADMIN — SUGAMMADEX 200 MG: 100 INJECTION, SOLUTION INTRAVENOUS at 14:44

## 2022-01-24 RX ADMIN — FERROUS SULFATE TAB 325 MG (65 MG ELEMENTAL FE) 325 MG: 325 (65 FE) TAB at 09:07

## 2022-01-24 RX ADMIN — DEXAMETHASONE SODIUM PHOSPHATE 10 MG: 10 INJECTION, EMULSION INTRAMUSCULAR; INTRAVENOUS at 13:59

## 2022-01-24 RX ADMIN — LIDOCAINE HYDROCHLORIDE 80 ML: 20 SOLUTION ORAL; TOPICAL at 13:51

## 2022-01-24 RX ADMIN — Medication 10 ML: at 09:10

## 2022-01-24 RX ADMIN — ALLOPURINOL 100 MG: 100 TABLET ORAL at 09:07

## 2022-01-24 RX ADMIN — ALBUTEROL SULFATE 2.5 MG: 2.5 SOLUTION RESPIRATORY (INHALATION) at 15:09

## 2022-01-24 RX ADMIN — MIDAZOLAM 1 MG: 1 INJECTION INTRAMUSCULAR; INTRAVENOUS at 13:39

## 2022-01-24 RX ADMIN — PHENYLEPHRINE HYDROCHLORIDE 100 MCG: 10 INJECTION, SOLUTION INTRAVENOUS at 14:18

## 2022-01-24 RX ADMIN — PANTOPRAZOLE SODIUM 40 MG: 40 TABLET, DELAYED RELEASE ORAL at 06:07

## 2022-01-24 RX ADMIN — ROCURONIUM BROMIDE 40 MG: 10 INJECTION, SOLUTION INTRAVENOUS at 13:51

## 2022-01-24 RX ADMIN — SODIUM CHLORIDE: 9 INJECTION, SOLUTION INTRAVENOUS at 13:39

## 2022-01-24 RX ADMIN — FENTANYL CITRATE 50 MCG: 50 INJECTION, SOLUTION INTRAMUSCULAR; INTRAVENOUS at 13:51

## 2022-01-24 RX ADMIN — Medication 10 ML: at 21:36

## 2022-01-24 RX ADMIN — FERROUS SULFATE TAB 325 MG (65 MG ELEMENTAL FE) 325 MG: 325 (65 FE) TAB at 21:35

## 2022-01-24 RX ADMIN — PROPOFOL 120 MG: 10 INJECTION, EMULSION INTRAVENOUS at 13:51

## 2022-01-24 RX ADMIN — SODIUM CHLORIDE 250 ML: 9 INJECTION, SOLUTION INTRAVENOUS at 00:21

## 2022-01-24 RX ADMIN — ONDANSETRON HYDROCHLORIDE 4 MG: 2 SOLUTION INTRAMUSCULAR; INTRAVENOUS at 13:59

## 2022-01-24 RX ADMIN — PRAVASTATIN SODIUM 20 MG: 20 TABLET ORAL at 09:07

## 2022-01-24 ASSESSMENT — PULMONARY FUNCTION TESTS
PIF_VALUE: 23
PIF_VALUE: 24
PIF_VALUE: 22
PIF_VALUE: 37
PIF_VALUE: 21
PIF_VALUE: 32
PIF_VALUE: 20
PIF_VALUE: 22
PIF_VALUE: 32
PIF_VALUE: 41
PIF_VALUE: 41
PIF_VALUE: 1
PIF_VALUE: 26
PIF_VALUE: 32
PIF_VALUE: 45
PIF_VALUE: 35
PIF_VALUE: 42
PIF_VALUE: 4
PIF_VALUE: 38
PIF_VALUE: 42
PIF_VALUE: 22
PIF_VALUE: 1
PIF_VALUE: 24
PIF_VALUE: 23
PIF_VALUE: 22
PIF_VALUE: 1
PIF_VALUE: 21
PIF_VALUE: 15
PIF_VALUE: 42
PIF_VALUE: 32
PIF_VALUE: 28
PIF_VALUE: 1
PIF_VALUE: 2
PIF_VALUE: 28
PIF_VALUE: 5
PIF_VALUE: 22
PIF_VALUE: 41
PIF_VALUE: 1
PIF_VALUE: 21
PIF_VALUE: 1
PIF_VALUE: 40
PIF_VALUE: 41
PIF_VALUE: 32
PIF_VALUE: 1
PIF_VALUE: 1
PIF_VALUE: 3
PIF_VALUE: 18
PIF_VALUE: 41
PIF_VALUE: 1
PIF_VALUE: 42
PIF_VALUE: 47
PIF_VALUE: 33
PIF_VALUE: 24
PIF_VALUE: 2
PIF_VALUE: 37
PIF_VALUE: 24
PIF_VALUE: 32
PIF_VALUE: 42
PIF_VALUE: 26
PIF_VALUE: 21
PIF_VALUE: 0
PIF_VALUE: 23
PIF_VALUE: 30
PIF_VALUE: 1
PIF_VALUE: 22
PIF_VALUE: 38
PIF_VALUE: 1
PIF_VALUE: 1
PIF_VALUE: 21
PIF_VALUE: 42
PIF_VALUE: 2
PIF_VALUE: 24
PIF_VALUE: 20
PIF_VALUE: 38
PIF_VALUE: 1
PIF_VALUE: 0
PIF_VALUE: 41
PIF_VALUE: 1
PIF_VALUE: 12
PIF_VALUE: 22
PIF_VALUE: 0
PIF_VALUE: 5

## 2022-01-24 ASSESSMENT — PAIN SCALES - GENERAL
PAINLEVEL_OUTOF10: 0

## 2022-01-24 ASSESSMENT — LIFESTYLE VARIABLES: SMOKING_STATUS: 0

## 2022-01-24 NOTE — PROGRESS NOTES
Chief Complaint:  Chief Complaint   Patient presents with    Shortness of Breath     pt states he was sent in for a bronch by Dr Pimentel [facility]     Acute respiratory failure with hypoxia ([address]     Subjective:    Breathing stable, no complaints today    Objective:    /72   Pulse 101   Temp 98.8 °F (37.1 °C) (Temporal)   Resp 18   Ht 5' 7\" (1.702 m)   Wt 135 lb (61.2 kg)   SpO2 96%   BMI 21.14 kg/m²     Current medications that patient is taking have been reviewed.     General appearance: NAD, conversant, frail appearing  HEENT: AT/NC, MMM  Neck: FROM, supple  Lungs: Clear to auscultation, WOB normal  CV: RRR, no MRGs  Abdomen: Soft, non-tender; no masses or HSM, +BS  Extremities: No peripheral edema or digital cyanosis  Skin: no rash, lesions or ulcers  Psych: Calm and cooperative  Neuro: Alert and interactive, face symmetric, moving all extremities, speech fluent    Labs:  CBC with Differential:    Lab Results   Component Value Date    WBC 10.9 01/24/2022    RBC 4.30 01/24/2022    HGB 11.3 01/24/2022    HCT 36.7 01/24/2022     01/24/2022    MCV 85.3 01/24/2022    MCH 26.3 01/24/2022    MCHC 30.8 01/24/2022    RDW 16.5 01/24/2022    NRBC 0.9 07/05/2021    METASPCT 2.0 08/13/2021    LYMPHOPCT 11.6 01/24/2022    PROMYELOPCT 1.0 08/13/2021    MONOPCT 13.6 01/24/2022    MYELOPCT 2.6 01/19/2022    BASOPCT 0.6 01/24/2022    MONOSABS 1.48 01/24/2022    LYMPHSABS 1.26 01/24/2022    EOSABS 0.54 01/24/2022    BASOSABS 0.06 01/24/2022     CMP:    Lab Results   Component Value Date     01/24/2022    K 4.1 01/24/2022    K 3.7 01/19/2022    CL 98 01/24/2022    CO2 26 01/24/2022    BUN 14 01/24/2022    CREATININE 0.5 01/24/2022    GFRAA >60 01/24/2022    LABGLOM >60 01/24/2022    GLUCOSE 91 01/24/2022    GLUCOSE 100 01/28/2011    PROT 6.8 01/24/2022    LABALBU 2.7 01/24/2022    LABALBU 3.9 01/28/2011    CALCIUM 10.7 01/24/2022    BILITOT 0.6 01/24/2022    ALKPHOS 84 01/24/2022    AST 15 01/24/2022    ALT 6 01/24/2022         Assessment/Plan:  Principal Problem:    Acute respiratory failure with hypoxia (HCC)  Active Problems:    Coronary artery disease    CKD (chronic kidney disease) stage 3, GFR 30-59 ml/min (HCC)    Paroxysmal atrial fibrillation (HCC)    Diffuse large B-cell lymphoma (HCC)    Severe protein-calorie malnutrition (HCC)    Pacemaker    Hyperuricemia  Resolved Problems:    * No resolved hospital problems.  *       Bronch today    Labs stable    DOAC on hold    Continue metoprolol    DVT prophylaxis: held  Requires continued inpatient level of care   D/w pulm, need to observe at least overnight after Tigist White MD    1:04 PM  1/24/2022

## 2022-01-24 NOTE — ANESTHESIA PRE PROCEDURE
Department of Anesthesiology  Preprocedure Note       Name:  Oscar Augustine   Age:  80 y.o.  :  1950                                          MRN:  [id number]         Date:  2022      Surgeon: Jean Paul Sánchez):  Alyssa Gardiner MD    Procedure: Bronchoscopy    Medications prior to admission:   Prior to Admission medications    Medication Sig Start Date End Date Taking? Authorizing Provider   amLODIPine (NORVASC) 5 MG tablet Take 5 mg by mouth daily    Historical Provider, MD   pravastatin (PRAVACHOL) 20 MG tablet Take 1 tablet by mouth daily 21   Thor Juarez MD   albuterol sulfate HFA (PROAIR HFA) 108 (90 Base) MCG/ACT inhaler Inhale 1 puff into the lungs 2 times daily 10/27/21   Thor Juarez MD   tamsulosin [facility]) 0.4 MG capsule Take 1 capsule by mouth daily for 10 doses  Patient not taking: Reported on 10/27/2021 9/15/21 9/25/21  Dyana Magallanes MD   rivaroxaban (XARELTO) 20 MG TABS tablet Take 20 mg by mouth daily (with breakfast)     Historical Provider, MD   vitamin B-12 (CYANOCOBALAMIN) 100 MCG tablet Take 50 mcg by mouth daily    Historical Provider, MD   sennosides-docusate sodium (SENOKOT-S) 8.6-50 MG tablet Take 1 tablet by mouth 2 times daily    Historical Provider, MD   ferrous sulfate 325 (65 Fe) MG tablet Take 325 mg by mouth 2 times daily  18   Historical Provider, MD   esomeprazole Magnesium (NEXIUM) 20 MG PACK Take 20 mg by mouth daily    Historical Provider, MD       Current medications:    No current facility-administered medications for this visit. No current outpatient medications on file.      Facility-Administered Medications Ordered in Other Visits   Medication Dose Route Frequency Provider Last Rate Last Admin    metoprolol succinate (TOPROL XL) extended release tablet 25 mg  25 mg Oral BID Jay Lin MD   25 mg at 22 1543    allopurinol (ZYLOPRIM) tablet 100 mg  100 mg Oral Daily Shoaib Gonsalez MD   100 mg at 22 4057    ferrous sulfate (IRON 325) tablet 325 mg  325 mg Oral BID Asmita Nicky, APRN - CNP   325 mg at 01/24/22 1915    pravastatin (PRAVACHOL) tablet 20 mg  20 mg Oral Daily Asmita Nicky, APRN - CNP   20 mg at 01/24/22 0907    [Held by provider] rivaroxaban (XARELTO) tablet 20 mg  20 mg Oral Daily with breakfast Asmita Nicky, APRN - CNP   20 mg at 01/19/22 0846    sodium chloride flush 0.9 % injection 5-40 mL  5-40 mL IntraVENous 2 times per day Asmita Nicky, APRN - CNP   10 mL at 01/24/22 0910    sodium chloride flush 0.9 % injection 5-40 mL  5-40 mL IntraVENous PRN Asmita Nicky, APRN - CNP        0.9 % sodium chloride infusion  25 mL IntraVENous PRN Asmita Nicky, APRN - CNP        acetaminophen (TYLENOL) tablet 650 mg  650 mg Oral Q6H PRN Asmita Nicky, APRN - CNP        Or    acetaminophen (TYLENOL) suppository 650 mg  650 mg Rectal Q6H PRN Asmita Nicky, APRN - CNP        potassium chloride (KLOR-CON M) extended release tablet 40 mEq  40 mEq Oral PRN Asmita Nicky, APRN - CNP        Or    potassium bicarb-citric acid (EFFER-K) effervescent tablet 40 mEq  40 mEq Oral PRN Asmita Nicky, APRN - CNP        Or    potassium chloride 10 mEq/100 mL IVPB (Peripheral Line)  10 mEq IntraVENous PRN Asmita Nicky, APRN - CNP        senna (SENOKOT) tablet 8.6 mg  1 tablet Oral Daily PRN Asmita Nicky, APRN - CNP   8.6 mg at 01/23/22 1548    albuterol (PROVENTIL) nebulizer solution 2.5 mg  2.5 mg Nebulization BID Sarbjit Ferreira MD   2.5 mg at 01/24/22 0925    pantoprazole (PROTONIX) tablet 40 mg  40 mg Oral QAM AC Sarbjit Ferreira MD   40 mg at 01/24/22 1587       Allergies:  No Known Allergies    Problem List:    Patient Active Problem List   Diagnosis Code    Hematuria R31.9    BPH (benign prostatic hyperplasia) N40.0    SUNNY (acute kidney injury) ([facility] [address] N17.9    Colonic mass K63.89    Hypertension I10    Hyperlipidemia E78.5    Coronary artery disease I25.10    Prolonged Q-T interval on ECG R94.31    Hypotension I95.9    CKD (chronic kidney disease) stage 3, GFR 30-59 ml/min (HCC) N18.30    Paroxysmal atrial fibrillation (HCC) I48.0    Diffuse large B-cell lymphoma (HCC) C83.30    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    Severe protein-calorie malnutrition (HCC) E43    Anemia D64.9    Thrombocytopenia (HCC) D69.6    Syncope R55    Chronic anemia D64.9    Gastroesophageal reflux disease without esophagitis K21.9    Essential hypertension I10    Hypokalemia E87.6    Hypomagnesemia E83.42    Pacemaker Z95.0    Former smoker Z87.891    Hydronephrosis of right kidney N13.30    Acute respiratory failure with hypoxia (HCC) J96.01    CABG x 1 (LIMA-LAD) Z95.1    Hyperuricemia E79.0       Past Medical History:        Diagnosis Date    Arthritis     KNEES HIPS BACK    Atrial fibrillation (HCC)     BPH (benign prostatic hyperplasia)     Cancer (HCC)     large B cell lymphoma    Coronary artery disease     Difficult airway     PT STATES HE WAS TOLD THIS TW[facility] AT [facility]    Difficult intubation 04/2017    note in care everywhere \"Clinical Summary\" 03/10/2021    History of blood transfusion 2021    Hydronephrosis of right kidney 9/15/2021    Hyperlipidemia     Hypertension     Sinus tachycardia 01/01/2002       Past Surgical History:        Procedure Laterality Date    ABLATION OF DYSRHYTHMIC FOCUS      AORTA SURGERY      aortic valve replacement    AORTIC VALVE REPLACEMENT      BLADDER SURGERY Right 2/17/2021    CYSTOSCOPY BILATERAL RETROGRADE PYELOGRAM RIGHT STENT INSERTION performed by Ananda Weinberg MD at [place] [address] Right 9/15/2021    CYSTOSCOPY RETROGRADE  RIGHT STENT EXCHANGE performed by Ananda Weinberg MD at [facility] COLONOSCOPY  2/19/2021    COLONOSCOPY WITH BIOPSY performed by Mir Bills DO at [address] [facility]  2/19/2021    COLONOSCOPY W/ ENDOSCOPIC MUCOSAL RESECTION performed by Mir Bills DO at [address] N/A 3/11/2021 LAPAROSCOPIC RIGHT HEMICOLECTOMY performed by Vinh Lion MD at [address] [facility] OTHER SURGICAL HISTORY  2016    CT Myelogram, Dr. Mary Chavira   new battery    last checked Dr Jesenia Calixto 2016?  PORT SURGERY Right 2021    MEDI PORT INSERTION performed by Vinh Lion MD at [facility] Right 2021    PORT REMOVAL performed by Lieutenant Jeet MD at [address]  [facility] ENDOSCOPY      reflux    UPPER GASTROINTESTINAL ENDOSCOPY N/A 2021    EGD BIOPSY performed by Sarita Cranker, MD at [address] [facility] History:    Social History     Tobacco Use    Smoking status: Former Smoker     Packs/day: 1.00     Years: 44.00     Pack years: 44.00     Types: Cigarettes     Quit date: 3/4/2014     Years since quittin.8    Smokeless tobacco: Never Used   Substance Use Topics    Alcohol use: Yes     Comment: occassionally                                Counseling given: Not Answered      Vital Signs (Current): There were no vitals filed for this visit.                                            BP Readings from Last 3 Encounters:   22 114/72   10/27/21 90/72   09/15/21 124/76       NPO Status:  2200 21                                                                               BMI:   Wt Readings from Last 3 Encounters:   22 135 lb (61.2 kg)   10/27/21 168 lb 3.2 oz (76.3 kg)   09/15/21 161 lb (73 kg)     There is no height or weight on file to calculate BMI.    CBC:   Lab Results   Component Value Date    WBC 10.9 2022    RBC 4.30 2022    HGB 11.3 2022    HCT 36.7 2022    MCV 85.3 2022    RDW 16.5 2022     2022       CMP:   Lab Results   Component Value Date     2022    K 4.1 2022    K 3.7 2022    CL 98 2022    CO2 26 2022    BUN 14 2022 CREATININE 0.5 01/24/2022    GFRAA >60 01/24/2022    LABGLOM >60 01/24/2022    GLUCOSE 91 01/24/2022    GLUCOSE 100 01/28/2011    PROT 6.8 01/24/2022    CALCIUM 10.7 01/24/2022    BILITOT 0.6 01/24/2022    ALKPHOS 84 01/24/2022    AST 15 01/24/2022    ALT 6 01/24/2022       POC Tests: No results for input(s): POCGLU, POCNA, POCK, POCCL, POCBUN, POCHEMO, POCHCT in the last 72 hours.     Coags:   Lab Results   Component Value Date    PROTIME 12.1 01/18/2022    INR 1.1 01/18/2022    APTT 31.5 07/06/2021       HCG (If Applicable): No results found for: PREGTESTUR, PREGSERUM, HCG, HCGQUANT     ABGs: No results found for: PHART, PO2ART, HAN2MML, NMJ2RZK, BEART, F7SDUTOM     Type & Screen (If Applicable):  No results found for: LABABO, LABRH    Drug/Infectious Status (If Applicable):  No results found for: HIV, HEPCAB    COVID-19 Screening (If Applicable):   Lab Results   Component Value Date    COVID19 Not Detected 01/18/2022    COVID19 Not Detected 03/05/2021       EKG:   3/30/2021  4:53 PM - Bhupendra, Mhy Incoming Ekg Results From [facility]    Component Value Ref Range & Units Status Collected Lab   Ventricular Rate 76  BPM Final 03/30/2021  9:30 AM [facility]   Atrial Rate 76  BPM Final 03/30/2021  9:30 AM [facility]   P-R Interval 146  ms Final 03/30/2021  9:30 AM [facility]   QRS Duration 78  ms Final 03/30/2021  9:30 AM [facility]   Q-T Interval 420  ms Final 03/30/2021  9:30 AM [facility]   QTc Calculation (Bazett) 472  ms Final 03/30/2021  9:30 AM [facility]   P [place] 89  degrees Final 03/30/2021  9:30 AM [facility]   R [place] 86  degrees Final 03/30/2021  9:30 AM [facility]   T [place] 62  degrees Final 03/30/2021  9:30 AM [facility]   Testing Performed By    Lab - Abbreviation Name Director Address Valid Date Range   360-[facility] MUSE Unknown Unknown 04/18/16 0721-Present   Narrative & Impression    Sinus rhythm with premature supraventricular complexes  Abnormal ECG  When compared with ECG of 29-MAR-2021 20:34,  No significant change was found  Confirmed by Buzz Quyen ([id number]) on 3/30/2021 4:53:45 PM         Anesthesia Evaluation  Patient summary reviewed and Nursing notes reviewed   history of anesthetic complications: difficult airway. Airway: Mallampati: II  TM distance: >3 FB   Neck ROM: limited  Mouth opening: > = 3 FB Dental:          Pulmonary:   (+) decreased breath sounds,      (-) not a current smoker          Patient did not smoke on day of surgery. Cardiovascular:  Exercise tolerance: poor (<4 METS),   (+) hypertension:, pacemaker: pacemaker, CAD:, CABG/stent (in 2017 3 vessel, 1 stent, ):, dysrhythmias (history of afib ): atrial fibrillation, murmur, hyperlipidemia      ECG reviewed  Rhythm: regular  Rate: normal           Beta Blocker:  Dose within 24 Hrs      ROS comment: Ablation done      Neuro/Psych:               GI/Hepatic/Renal:   (+) GERD: well controlled, renal disease (recent stent): CRI,           Endo/Other:    (+) blood dyscrasia (HGB 8.7): anemia, arthritis: OA., malignancy/cancer. Pt had no PAT visit       Abdominal:             Vascular: negative vascular ROS. Other Findings:        ECHO 4/1/21   Summary   Normal left ventricle size and systolic function. Ejection fraction is visually estimated at 65-70%. Septal motion consistent with post bypass surgery. Normal left ventricle wall thickness. Indeterminate diastolic function. Left atrium was not clearly visualized. Normal right ventricular size and function. 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 Physiologic and/or trace tricuspid regurgitation. Physiologic and/or trace tricuspid regurgitation. RVSP is 39 mmHg. Top normal PA systolic pressure. No evidence for hemodynamically significant pericardial effusion.       Signature      ----------------------------------------------------------------   Electronically signed by Mark Bearden MD(Interpreting   physician) on 04/01/2021 02:12 PM   ----------------------------------------------------------------     CT Chest 7/8/21  Impression   COPD with multifocal patchy and masslike infiltrates in the left lower lobe   as noted concerning for pneumonia.  Underlying malignancy is not excluded and   close surveillance with repeat CT scan in 6-8 weeks after appropriate   treatment is recommended to see complete resolution and exclude malignancy.       CT abdomen and pelvis.       Lack of contrast limits the study.  Liver is of grossly normal architecture.    Gallbladder is distended without acute inflammation.  Spleen, pancreas, the   adrenals and the kidneys are normal.  There is a right ureteral stent with   significant improvement in the right hydronephrosis.       There is significant improvement and near resolution of the adenopathy in the   abdomen pelvis with small lymph nodes measuring up to 8 mm in the portacaval   region and smaller ones in the retroperitoneum.  Moderate mesenteric lymph   nodes are noted measuring up to 9 mm which are significantly improved.  There   is calcification aorta and mild ectasia of the iliac arteries measuring 1.4   cm each.  Degenerative changes are identified in the lumbar spine.       Pelvis.  The bladder is distended.  Patient is status post partial right   hemicolectomy with the retained fluid and fecal matter in the left hemicolon   likely diarrhea.       Impression       Significant improvement and near resolution of the lymphadenopathy in the   retroperitoneum and significant improvement in the mesenteric adenopathy   compatible with the favorable response to treatment for lymphoma.  Continued   surveillance is recommended.       Status post right hemicolectomy with the dilated fluid-filled left hemicolon   likely diarrhea.           Order History    Open Order Details     CT ABD/Pelvis 7/8/21  COPD with multifocal patchy and masslike infiltrates in the left lower lobe   as noted concerning for pneumonia.  Underlying malignancy is not excluded and   close surveillance with repeat CT scan in 6-8 weeks after appropriate   treatment is recommended to see complete resolution and exclude malignancy.       CT abdomen and pelvis.       Lack of contrast limits the study.  Liver is of grossly normal architecture. Gallbladder is distended without acute inflammation.  Spleen, pancreas, the   adrenals and the kidneys are normal.  There is a right ureteral stent with   significant improvement in the right hydronephrosis.       There is significant improvement and near resolution of the adenopathy in the   abdomen pelvis with small lymph nodes measuring up to 8 mm in the portacaval   region and smaller ones in the retroperitoneum.  Moderate mesenteric lymph   nodes are noted measuring up to 9 mm which are significantly improved.  There   is calcification aorta and mild ectasia of the iliac arteries measuring 1.4   cm each.  Degenerative changes are identified in the lumbar spine.       Pelvis.  The bladder is distended.  Patient is status post partial right   hemicolectomy with the retained fluid and fecal matter in the left hemicolon   likely diarrhea.       Impression       Significant improvement and near resolution of the lymphadenopathy in the   retroperitoneum and significant improvement in the mesenteric adenopathy   compatible with the favorable response to treatment for lymphoma.  Continued   surveillance is recommended.       Status post right hemicolectomy with the dilated fluid-filled left hemicolon   likely diarrhea.                Anesthesia Plan      general     ASA 4       Induction: intravenous. Anesthetic plan and risks discussed with patient. Plan discussed with CRNA. Chart reviewed & patient seen. I agree with the above note.   TRACIE Hope - CRNA        Odette Cortez MD   1/24/2022    Patient seen and examined, chart reviewed, agree with above findings. Anesthetic plan, risks, benefits, alternatives, and personnel involved discussed with patient. Patient verbalized an understanding and agreed to proceed. NPO status confirmed. Anesthetic plan discussed with care team members and agreed upon.     Lara White MD   1/24/2022  11:28 AM

## 2022-01-24 NOTE — CARE COORDINATION
SOCIAL WORK/CASEMANAGEMENT TRANSITION OF CARE [id number] Suzie Deleon, [address], [place], [phone]): I met with pt this a.m. and he was very irritated. He said he hasnt been up out of bed ambulating over weekend and plan is home not coco. He was upset about getting a medication last night. Pt is for a bronch with biopsy today. I asked the rn to get PT and OT ordered when he returns. Lien/shree to follow.  JOSE Holloway  '1/24/2022

## 2022-01-24 NOTE — PATIENT CARE CONFERENCE
P Quality Flow/Interdisciplinary Rounds Progress Note        Quality Flow Rounds held on January 24, 2022    Disciplines Attending:  Bedside Nurse, ,  and Nursing Unit Leadership    Hayley Kruse was admitted on 1/18/2022  8:15 PM    Anticipated Discharge Date:  Expected Discharge Date: 01/20/22    Disposition:    Osman Score:  Osman Scale Score: 20    Readmission Risk              Risk of Unplanned Readmission:  28           Discussed patient goal for the day, patient clinical progression, and barriers to discharge.   The following Goal(s) of the Day/Commitment(s) have been identified:  Diagnostics - Report Results  And be ready for bronchoscopy at noon      Reymundo Trejo RN  January 24, 2022

## 2022-01-24 NOTE — PROGRESS NOTES
Pulmonary [address] [facility]                             Pulmonary Consult/Progress Note :            Reason for Consultation:Possible need for Bronch,Lymphoma   CC : SOB ,cough frothy secretion   HPI:   Doing fair   On 3 L   Still with SOB   Not sure about some confusion,per daughter ,more frequent now        PHYSICAL EXAMINATION:     VITAL SIGNS:  /72   Pulse 101   Temp 98.8 °F (37.1 °C) (Temporal)   Resp 18   Ht 5' 7\" (1.702 m)   Wt 135 lb (61.2 kg)   SpO2 96%   BMI 21.14 kg/m²   Wt Readings from Last 3 Encounters:   01/24/22 135 lb (61.2 kg)   10/27/21 168 lb 3.2 oz (76.3 kg)   09/15/21 161 lb (73 kg)     Temp Readings from Last 3 Encounters:   01/24/22 98.8 °F (37.1 °C) (Temporal)   09/15/21 98 °F (36.7 °C) (Temporal)   09/03/21 97.6 °F (36.4 °C) (Temporal)     TMAX:  BP Readings from Last 3 Encounters:   01/24/22 114/72   10/27/21 90/72   09/15/21 124/76     Pulse Readings from Last 3 Encounters:   01/24/22 101   10/27/21 87   09/15/21 91           INTAKE/OUTPUTS:  I/O last 3 completed shifts:   In: 80 [P.O.:780]  Out: 650 [Urine:650]    Intake/Output Summary (Last 24 hours) at 1/24/2022 1058  Last data filed at 1/24/2022 1056  Gross per 24 hour   Intake 600 ml   Output 475 ml   Net 125 ml       General Appearance: alert and oriented to person, place and time, well-developed and   well-nourished, in no acute distress   Eyes: pupils equal, round, and reactive to light, extraocular eye movements intact, conjunctivae normal and sclera anicteric   Neck: neck supple and non tender without mass, no thyromegaly, no thyroid nodules and no cervical adenopathy   Pulmonary/Chest:crackles rhonchi   Cardiovascular: normal rate, regular rhythm, normal S1 and S2, no murmurs, rubs, clicks or gallops, distal pulses intact, no carotid bruits, no murmurs, no gallops, no carotid bruits and no JVD   Abdomen: obese, soft, non-tender, non-distended, normal bowel sounds, no masses or organomegaly   Extremities:no edema   Musculoskeletal: normal range of motion, no joint swelling, deformity or tenderness   Neurologic: reflexes normal and symmetric, no cranial nerve deficit noted    LABS/IMAGING:    CBC:  Lab Results   Component Value Date    WBC 10.9 01/24/2022    HGB 11.3 (L) 01/24/2022    HCT 36.7 (L) 01/24/2022    MCV 85.3 01/24/2022     01/24/2022    LYMPHOPCT 11.6 (L) 01/24/2022    RBC 4.30 01/24/2022    MCH 26.3 01/24/2022    MCHC 30.8 (L) 01/24/2022    RDW 16.5 (H) 01/24/2022    NEUTOPHILPCT 68.4 01/24/2022    MONOPCT 13.6 (H) 01/24/2022    BASOPCT 0.6 01/24/2022    NEUTROABS 7.45 (H) 01/24/2022    LYMPHSABS 1.26 (L) 01/24/2022    MONOSABS 1.48 (H) 01/24/2022    EOSABS 0.54 (H) 01/24/2022    BASOSABS 0.06 01/24/2022       Recent Labs     01/24/22  0615 01/23/22  0708 01/22/22  0652   WBC 10.9 10.0 8.0   HGB 11.3* 11.9* 12.0*   HCT 36.7* 39.6 39.2   MCV 85.3 86.5 86.2    141 143       BMP:   Recent Labs     01/22/22  0652 01/23/22  0708 01/24/22  0615    142 142   K 3.7 4.0 4.1   CL 97* 97* 98   CO2 30* 32* 26   BUN 13 14 14   CREATININE 0.5* 0.5* 0.5*       MG:   Lab Results   Component Value Date    MG 1.8 01/19/2022     Ca/Phos:   Lab Results   Component Value Date    CALCIUM 10.7 (H) 01/24/2022    PHOS 3.5 01/19/2022     Amylase: No results found for: AMYLASE  Lipase: No results found for: LIPASE  LIVER PROFILE:   Recent Labs     01/22/22  0652 01/23/22  0708 01/24/22  0615   AST 13 15 15   ALT 6 6 6   BILITOT 0.7 0.7 0.6   ALKPHOS 85 89 84       PT/INR:   No results for input(s): PROTIME, INR in the last 72 hours. APTT:   No results for input(s): APTT in the last 72 hours.     Cardiac Enzymes:  Lab Results   Component Value Date    TROPONINI <0.01 02/14/2021               PET and CT reviewed     PROBLEM LIST:  Patient Active Problem List   Diagnosis    Hematuria    BPH (benign prostatic hyperplasia)    [facility]) ([facility] [address]    Colonic mass    Hypertension    Hyperlipidemia    Coronary artery disease    Prolonged Q-T interval on ECG    Hypotension    CKD (chronic kidney disease) stage 3, GFR 30-59 ml/min (HCC)    Paroxysmal atrial fibrillation (HCC)    Diffuse large B-cell lymphoma (HCC)    Diffuse large B cell lymphoma (HCC)    Severe protein-calorie malnutrition (HCC)    Anemia    Thrombocytopenia (HCC)    Syncope    Chronic anemia    Gastroesophageal reflux disease without esophagitis    Essential hypertension    Hypokalemia    Hypomagnesemia    Pacemaker    Former smoker    Hydronephrosis of right kidney    Acute respiratory failure with hypoxia (HCC)    CABG x 1 (LIMA-LAD)    Hyperuricemia               ASSESSMENT:  1.) Large Cell B Lymphoma  2-diffuse consolidation /mass /Lung concern for pulmonary Lymphoma   2. )COPD  3.)h/p Lymphoma   4.)Pancytopenia   5.)Possible Pneumonia       PLAN:    Discuss with patient daughter Justin Paula CT finding and condition   Explain need for diagnosis and all risk that come with procedure and she understand      All risks, benefits, alternatives and potential complications explained thoroughly including, but not limited to, bleeding, infection, lung injury, pneumothorax . , heart attack, prolonged ventilation,  and even death, and the patient and his daughter agrees to proceed. could not reach son ,message left        Munir Perez MD,[facility]  Pulmonary&Critical Care Medicine   Director of [address] [place] [address] Director of [address] [place] [address]    Gifty Alfaro    NOTE: This report was transcribed using voice recognition software. Every effort was made to ensure accuracy; however, inadvertent computerized transcription errors may be present.

## 2022-01-24 NOTE — PROGRESS NOTES
1458 admit to PACU. Hob 60 degrees elevation. Coughing nonproductively. sao2 88 % on simple mask 8 liters. Increased to 12 liters and albuterol treatment called for. 1505 chest xray done. 1510 albuterol treatment given. 1522coughing less intense. Breath sounds remain rhonchi.

## 2022-01-24 NOTE — PROGRESS NOTES
Discuss with patient daughter Estiven Robbins CT finding and condition   Explain need for diagnosis and all risk that come with procedure and she understand     All risks, benefits, alternatives and potential complications explained thoroughly including, but not limited to, bleeding, infection, lung injury, pneumothorax . , heart attack, prolonged ventilation,  and even death, and the patient and his daughter agrees to proceed. could not reach son ,message left

## 2022-01-24 NOTE — OP NOTE
Operative Note      Patient: Belia Borrero  YOB: 1950  MRN: [id number]    Date of Procedure: 1/24/2022    Pre-Op Diagnosis:Lymphoma ,     Post-Op Diagnosis: Same Lymphoma ,Lung and Lymph node ,recurernce ,extensive        Procedure(s):  BRONCHOSCOPY DIAGNOSTIC OR CELL [address] ONLY  BRONCHOSCOPY W/EBUS FNA station 7     Surgeon(s):  José Miguel Hoskins MD    Assistant:   * No surgical staff found *    Anesthesia: General    Estimated Blood Loss (mL): Minimal    Complications: None    Specimens:   ID Type Source Tests Collected by Time Destination   1 : bronch wash [facility] add galactamannin, BD glucan Respiratory Bronchial Washing BODY FLUID CELL COUNT WITH DIFFERENTIAL, CULTURE, FUNGUS, GRAM STAIN, CULTURE WITH SMEAR, ACID FAST BACILLIUS, PNEUMOCYSTIS STAIN, CULTURE, RESPIRATORY José Miguel Hoskins MD 1/24/2022 1435    A : bronch FNA [facility] mass Tissue Fine Needle Aspirate CYTOLOGY, NON-GYN José Miguel Hoskins MD 1/24/2022 1407    B : bronch FNA  station 7 Tissue Fine Needle Aspirate CYTOLOGY, NON-GYN José Miguel Hoskins MD 1/24/2022 1422    C : bronch wash [facility]  Respiratory Bronchial Washing CYTOLOGY, NON-GYN Munir Turner MD 1/24/2022 1439              SPECIMENS:  [x] Bronchial sample(s) for      Fungal smear & culture,   Acid-fast bacillus Smear and Culture,    Gram stain, C&S,    PCP               Cytology               BD glucan              Galactomanin      Description of Procedure: The patient was taken to the endoscopy suite, [facility]  and the procedure verified as Flexible Fiberoptic Bronchoscopy with EBUS        After the patient was controlled with sedation bronchoscope was introduced through ET without difficulty. The scope was then passed into the trachea. Additional 2% lidocaine was used topically within the airway. Careful inspection of the tracheal lumen was accomplished. The scope was sequentially passed into all bronchial airways.            Endobronchial findings:     Trachea: Normal mucosa, he the part seen outside the ET tube  Bridget  Normal mucosa     Right Main Stem Bronchus  Normal mucosa  Right Upper Lobe Bronchi Normal mucosa  Right Middle Lobe Bronchi  Normal mucosa  Right Lower Lobe Bronchi (including the Superior segment)  Normal mucosa     Left Main Stem Bronchus Normal mucosa  Left Upper Lobe Bronchus, Upper Division Normal mucosa  Left Upper Lobe Bronchus, Lingula  Normal mucosa  Left Lower Lobe Bronchus (including the Superior segment)  ,the superior segment with blocked  extrinsic      BAL sent from [facility] culture from [facility] for bacterial and fungal  EBUS scope was inserted  We did 1 pass from Left lung mass as I saw with EBUS,.start bleed  So I decide to look at Lymph node and station 7 was 2 lymph nodes 3 X 3 and another one 1.5 X1.5 cm and was positive for malignant cell,possible lymphoma ,send for flow cytimetery                      Yasmin Montoya MD        Electronically signed by Alonso Orona MD on 1/24/2022 at 2:45 PM

## 2022-01-25 LAB
ALBUMIN SERPL-MCNC: 3.2 G/DL (ref 3.5–5.2)
ALP BLD-CCNC: 82 U/L (ref 40–129)
ALT SERPL-CCNC: 7 U/L (ref 0–40)
ANION GAP SERPL CALCULATED.3IONS-SCNC: 16 MMOL/L (ref 7–16)
APPEARANCE FLUID: NORMAL
AST SERPL-CCNC: 15 U/L (ref 0–39)
BASO FLUID: 0 %
BASOPHILS ABSOLUTE: 0.03 E9/L (ref 0–0.2)
BASOPHILS RELATIVE PERCENT: 0.3 % (ref 0–2)
BILIRUB SERPL-MCNC: 0.4 MG/DL (ref 0–1.2)
BUN BLDV-MCNC: 30 MG/DL (ref 6–23)
CALCIUM SERPL-MCNC: 10.7 MG/DL (ref 8.6–10.2)
CELL COUNT FLUID TYPE: NORMAL
CHLORIDE BLD-SCNC: 97 MMOL/L (ref 98–107)
CO2: 29 MMOL/L (ref 22–29)
COLOR FLUID: NORMAL
CREAT SERPL-MCNC: 0.8 MG/DL (ref 0.7–1.2)
EOSINOPHIL FLUID: 1 %
EOSINOPHILS ABSOLUTE: 0.01 E9/L (ref 0.05–0.5)
EOSINOPHILS RELATIVE PERCENT: 0.1 % (ref 0–6)
GFR AFRICAN AMERICAN: >60
GFR NON-AFRICAN AMERICAN: >60 ML/MIN/1.73
GLUCOSE BLD-MCNC: 82 MG/DL (ref 74–99)
GRAM STAIN ORDERABLE: NORMAL
HCT VFR BLD CALC: 37.3 % (ref 37–54)
HEMOGLOBIN: 11 G/DL (ref 12.5–16.5)
IMMATURE GRANULOCYTES #: 0.09 E9/L
IMMATURE GRANULOCYTES %: 0.9 % (ref 0–5)
LYMPHOCYTES ABSOLUTE: 0.64 E9/L (ref 1.5–4)
LYMPHOCYTES RELATIVE PERCENT: 6.4 % (ref 20–42)
LYMPHOCYTES, BODY FLUID: 34 %
MCH RBC QN AUTO: 25.8 PG (ref 26–35)
MCHC RBC AUTO-ENTMCNC: 29.5 % (ref 32–34.5)
MCV RBC AUTO: 87.6 FL (ref 80–99.9)
MONOCYTE, FLUID: 30 %
MONOCYTES ABSOLUTE: 0.77 E9/L (ref 0.1–0.95)
MONOCYTES RELATIVE PERCENT: 7.7 % (ref 2–12)
NEUTROPHIL, FLUID: 35 %
NEUTROPHILS ABSOLUTE: 8.4 E9/L (ref 1.8–7.3)
NEUTROPHILS RELATIVE PERCENT: 84.6 % (ref 43–80)
NUCLEATED CELLS FLUID: 93 /UL
PDW BLD-RTO: 16.1 FL (ref 11.5–15)
PLATELET # BLD: 160 E9/L (ref 130–450)
PMV BLD AUTO: 10.1 FL (ref 7–12)
PNEUMOCYSTIS DFA: NORMAL
POTASSIUM SERPL-SCNC: 4.9 MMOL/L (ref 3.5–5)
RBC # BLD: 4.26 E12/L (ref 3.8–5.8)
RBC FLUID: NORMAL /UL
SODIUM BLD-SCNC: 142 MMOL/L (ref 132–146)
TOTAL PROTEIN: 6.6 G/DL (ref 6.4–8.3)
WBC # BLD: 9.9 E9/L (ref 4.5–11.5)

## 2022-01-25 PROCEDURE — 2700000000 HC OXYGEN THERAPY PER DAY

## 2022-01-25 PROCEDURE — 6360000002 HC RX W HCPCS: Performed by: INTERNAL MEDICINE

## 2022-01-25 PROCEDURE — 6370000000 HC RX 637 (ALT 250 FOR IP): Performed by: INTERNAL MEDICINE

## 2022-01-25 PROCEDURE — 6370000000 HC RX 637 (ALT 250 FOR IP): Performed by: NURSE PRACTITIONER

## 2022-01-25 PROCEDURE — 2580000003 HC RX 258: Performed by: NURSE PRACTITIONER

## 2022-01-25 PROCEDURE — 80053 COMPREHEN METABOLIC PANEL: CPT

## 2022-01-25 PROCEDURE — 94640 AIRWAY INHALATION TREATMENT: CPT

## 2022-01-25 PROCEDURE — 36415 COLL VENOUS BLD VENIPUNCTURE: CPT

## 2022-01-25 PROCEDURE — 97530 THERAPEUTIC ACTIVITIES: CPT

## 2022-01-25 PROCEDURE — 99232 SBSQ HOSP IP/OBS MODERATE 35: CPT | Performed by: INTERNAL MEDICINE

## 2022-01-25 PROCEDURE — 85025 COMPLETE CBC W/AUTO DIFF WBC: CPT

## 2022-01-25 PROCEDURE — 2140000000 HC CCU INTERMEDIATE R&B

## 2022-01-25 RX ADMIN — FERROUS SULFATE TAB 325 MG (65 MG ELEMENTAL FE) 325 MG: 325 (65 FE) TAB at 08:32

## 2022-01-25 RX ADMIN — PRAVASTATIN SODIUM 20 MG: 20 TABLET ORAL at 08:32

## 2022-01-25 RX ADMIN — ALBUTEROL SULFATE 2.5 MG: 2.5 SOLUTION RESPIRATORY (INHALATION) at 19:56

## 2022-01-25 RX ADMIN — METOPROLOL SUCCINATE 25 MG: 25 TABLET, FILM COATED, EXTENDED RELEASE ORAL at 08:32

## 2022-01-25 RX ADMIN — Medication 10 ML: at 08:33

## 2022-01-25 RX ADMIN — RIVAROXABAN 20 MG: 20 TABLET, FILM COATED ORAL at 13:43

## 2022-01-25 RX ADMIN — ALLOPURINOL 100 MG: 100 TABLET ORAL at 08:32

## 2022-01-25 RX ADMIN — Medication 10 ML: at 20:23

## 2022-01-25 RX ADMIN — PANTOPRAZOLE SODIUM 40 MG: 40 TABLET, DELAYED RELEASE ORAL at 05:35

## 2022-01-25 RX ADMIN — FERROUS SULFATE TAB 325 MG (65 MG ELEMENTAL FE) 325 MG: 325 (65 FE) TAB at 20:22

## 2022-01-25 RX ADMIN — ALBUTEROL SULFATE 2.5 MG: 2.5 SOLUTION RESPIRATORY (INHALATION) at 12:03

## 2022-01-25 ASSESSMENT — PAIN SCALES - GENERAL
PAINLEVEL_OUTOF10: 0

## 2022-01-25 NOTE — ANESTHESIA POSTPROCEDURE EVALUATION
Department of Anesthesiology  Postprocedure Note    Patient: Silvia Lowery  MRN: [id number]  YOB: 1950  Date of evaluation: 1/25/2022  Time:  11:46 AM     Procedure Summary     Date: 01/24/22 Room / Location: [place] / [facility]    Anesthesia Start: 1330 Anesthesia Stop: 4600    Procedures:       BRONCHOSCOPY DIAGNOSTIC OR CELL [address] (N/A )      BRONCHOSCOPY W/EBUS FNA (N/A ) Diagnosis: (/)    Surgeons: Hanny Amaya MD Responsible Provider: Pablo Iraheta MD    Anesthesia Type: general ASA Status: 4          Anesthesia Type: general    Effie Phase I: Effie Score: 9    Effie Phase II:      Last vitals: Reviewed and per EMR flowsheets.        Anesthesia Post Evaluation    Patient location during evaluation: PACU  Patient participation: complete - patient participated  Level of consciousness: awake  Pain score: 0  Airway patency: patent  Nausea & Vomiting: no nausea  Complications: no  Cardiovascular status: hemodynamically stable  Respiratory status: acceptable  Hydration status: stable

## 2022-01-25 NOTE — PROGRESS NOTES
Physical Therapy  Treatment Note     Name: Saman Deng  : 1950  MRN: [id number]      Date of Service: 2022    Evaluating PT:  Keyanna Miller PT, DPT [id number]     Room #:  9623/8563-E  Diagnosis:  Hypoxia [R09.02]  Acute respiratory failure with hypoxia ([address] [J96.01]  History of lymphoma [Z85.79]  Reason for admission: SOB, hypoxia   Precautions:  Falls, O2  Procedure/Surgery:  None   Equipment Recommendations:  TBD     SUBJECTIVE:  Pt lives alone but fiance is there frequently in a 1 story home with 3 [address]ACE[address] 1 rail. Pt ambulated with hurry cane recently. OBJECTIVE:   Initial Evaluation  Date:  Treatment  Date: 22 Short Term/ Long Term   Goals   AM-PAC 6 Clicks  99/51    Was pt agreeable to Eval/treatment? Yes  Yes    Does pt have pain? denies Denied    Bed Mobility  Rolling: NT  Supine to sit: SBA  Sit to supine: SBA  Scooting: SBA NT Independent    Transfers Sit to stand: SBA  Stand to sit: SBA  Stand pivot: NT Sit<>stand: Independent  Stand pivot: Independent Independent    Ambulation    80 feet with [employer] SBA   100 feet with SPC with Supervision >300 feet with no AD independent    Stair negotiation: ascended and descended  NT NT 4 steps with 1 rail Mod I      LE ROM: WFL    LE Strength:   Grossly 5/5    Balance:   Sitting static: Independent  Sitting dynamic: Independent  Standing static: Independent  Standing dynamic: Supervision with SPC      Pt is A & O x 4  Sensation:  Denied numbness/tingling  Edema:  None noted    Patient education  Pt educated on line management, progressive increase in activity, energy conservation. Patient response to education:   Pt verbalized understanding Pt demonstrated skill Pt requires further education in this area   Yes Yes Reinforce      ASSESSMENT:    Comments: The pt was seen sitting up at the EOB, [address] was retrieved for the pt to use, states he has been using a cane PTA.  The pt was able to demonstrate good technique with the [address] with appropriate sequencing, required cues for O2 line management only. The pt's SpO2 at rest was 93% with 5 lpm, decreased to 89%<>90% during ambulaltion, recovered to 92% with seated rest.     Treatment:  Patient practiced and was instructed in the following treatment:     Gait training: verbal cues for line management and energy conservation to ensure SpO2 remains at safe levels. PLAN:    Patient is making good progress towards established goals. Will continue with current POC. Time in  1430  Time out  1445    Total Treatment Time  15 minutes     CPT codes:  [] Gait training 78860 0 minutes  [] Manual therapy 44174 0 minutes  [x] Therapeutic activities 81782 15 minutes  [] Therapeutic exercises 02743 0 minutes  [] Neuromuscular reeducation 69339 0 minutes    Jose Daniel Zamora.  [facility] [place], [address]  number:  PT [id number]

## 2022-01-25 NOTE — PROGRESS NOTES
[facility]  Hematology/Oncology  Consult      Patient Name: Saman Deng  YOB: 1950  PCP: Grecia Bearden DO   Referring Provider:      Reason for Consultation:   Chief Complaint   Patient presents with    Shortness of Breath     pt states he was sent in for a bronch by Dr Nelly Beckham: feels well today. Continues on supplemental oxygen. No acute complaints at this time. History of Present Illness: 61-year-old man with past medical history of aggressive bulky diffuse large B-cell lymphoma, non-germinal cell type with negative FISH interphase for double hit or triple hit lymphoma with bulky intra-abdominal disease on presentation. He has been on combination chemotherapy with R-CHOP along with Revlimid. This has been complicated by significant pancytopenia is despite G-CSF prophylaxis. S/p 6 cycles of R-CHOP/Revlimid with IT methotrexate for CNS prophylaxis. He was to continue with Revlimid maintenance however wanted to wait until follow up scans and discontinued it to toxicities. CT scan of the chest shows significant interval worsening with multifocal dense consolidation most prominent within the posterior lower  lung lobes and left upper lung lobe with associated noncalcified pulmonary nodules largest in left lower lobe measuring 2.2 cm with associated centrilobular emphysema. Moderate hilar and mediastinal lymphadenopathy is described. CT scan of abdomen and pelvis shows diffuse thickening of the gastric mucosa and a small soft tissue mass in the presacral space measuring 2 cm. Patient was seen yesterday in the office and was hypoxic on room air. He was sent to the hospital for evaluation by pulmonology for possible EBUS for BAL washing cultures and biopsies for tissue diagnosis for concern for relapsed lymphoma with involvement of the lung parenchyma versus pneumonia in immunocompromised host.  ID consulted and pending evaluation. No antibiotics at this time. CBC at baseline no leukocytosis with anemia. Consultation for further evaluation of patient with known lymphoma post treatment with possible relapse vs infection. Patient has lost 30 pounds in last 3 months. Review of systems: Over 10 systems were reviewed and all were negative except as mentioned above.     Diagnostic Data:     Past Medical History:   Diagnosis Date    Arthritis     KNEES HIPS BACK    Atrial fibrillation ([address]     BPH (benign prostatic hyperplasia)     Cancer ([address]     large B cell lymphoma    Coronary artery disease     Difficult airway     PT STATES HE WAS T[facility] THIS [facility]    Difficult intubation 04/2017    note in care everywhere \"Clinical Summary\" 03/10/2021    History of blood transfusion 2021    Hydronephrosis of right kidney 9/15/2021    Hyperlipidemia     Hypertension     Sinus tachycardia 01/01/2002       Patient Active Problem List    Diagnosis Date Noted    Severe protein-calorie malnutrition ([address] 01/19/2022    Hyperuricemia 01/19/2022    Acute respiratory failure with hypoxia ([address] 01/18/2022    CABG x 1 (LIMA-LAD) 01/18/2022    Hydronephrosis of right kidney 09/15/2021    Syncope 09/02/2021    Chronic anemia 09/02/2021    Gastroesophageal reflux disease without esophagitis 09/02/2021    Essential hypertension 09/02/2021    Hypokalemia 09/02/2021    Hypomagnesemia 09/02/2021    Pacemaker 09/02/2021    Former smoker 09/02/2021    Thrombocytopenia ([address] 07/05/2021    Anemia 05/06/2021    Diffuse large B cell lymphoma ([address] 03/31/2021    Diffuse large B-cell lymphoma (HCC) 03/30/2021    Prolonged Q-T interval on ECG 03/29/2021    Hypotension 03/29/2021    CKD (chronic kidney disease) stage 3, GFR 30-59 ml/min (HCC) 03/29/2021    Paroxysmal atrial fibrillation ([address] 03/29/2021    Colonic mass 03/11/2021    Hypertension     Hyperlipidemia     Coronary artery disease     SUNNY (acute kidney injury) ([address] 02/14/2021    Hematuria 01/16/2018    BPH (benign prostatic hyperplasia) 01/16/2018        Past Surgical History:   Procedure Laterality Date    ABLATION OF DYSRHYTHMIC FOCUS      AORTA SURGERY      aortic valve replacement    AORTIC VALVE REPLACEMENT      BLADDER SURGERY Right 2/17/2021    CYSTOSCOPY BILATERAL RETROGRADE PYELOGRAM RIGHT STENT INSERTION performed by Radha Romeo MD at [address] Right 9/15/2021    CYSTOSCOPY RETROGRADE  RIGHT STENT EXCHANGE performed by Radha Romeo MD at [address] [facility] N/A 1/24/2022    BRONCHOSCOPY DIAGNOSTIC OR CELL [address] ONLY performed by Jovanna Laguerre MD at [address] [facility] N/A 1/24/2022    BRONCHOSCOPY W/EBUS FNA performed by Jovanna Laguerre MD at [address] COLONOSCOPY  2/19/2021    COLONOSCOPY WITH BIOPSY performed by Licha Spencer DO at [address]  2/19/2021    COLONOSCOPY W/ ENDOSCOPIC MUCOSAL RESECTION performed by Licha Spencer DO at [address] 3/11/2021    LAPAROSCOPIC RIGHT HEMICOLECTOMY performed by Prudence Yates MD at [address] [facility] OTHER SURGICAL HISTORY  08/12/2016    CT Myelogram, Dr. Emily Waters  2014 new battery    last checked Dr Linda Strauss 1/2016?     PORT SURGERY Right 4/1/2021    MEDI PORT INSERTION performed by Prudence Yates MD at [facility] Right 7/12/2021    PORT REMOVAL performed by Shakila Hewitt MD at [address]  [facility] ENDOSCOPY      reflux    UPPER GASTROINTESTINAL ENDOSCOPY N/A 2/16/2021    EGD BIOPSY performed by Atif Venegas MD at [address] [facility]         Family History  Family History   Problem Relation Age of Onset    Diabetes Mother     Stroke Mother     Diabetes Father     Heart Disease Father     Brain Cancer Sister     Stroke Sister     Stroke Brother     Cancer Maternal Grandfather        Social History    TOBACCO:   reports organomegaly  Skin:  No rash. Neurologic:Cranial nerves grossly intact. No focal motor or sensory deficits . Recent Laboratory Data-   Lab Results   Component Value Date    WBC 9.9 01/25/2022    HGB 11.0 (L) 01/25/2022    HCT 37.3 01/25/2022    MCV 87.6 01/25/2022     01/25/2022    LYMPHOPCT 6.4 (L) 01/25/2022    RBC 4.26 01/25/2022    MCH 25.8 (L) 01/25/2022    MCHC 29.5 (L) 01/25/2022    RDW 16.1 (H) 01/25/2022    NEUTOPHILPCT 84.6 (H) 01/25/2022    MONOPCT 7.7 01/25/2022    BASOPCT 0.3 01/25/2022    NEUTROABS 8.40 (H) 01/25/2022    LYMPHSABS 0.64 (L) 01/25/2022    MONOSABS 0.77 01/25/2022    EOSABS 0.01 (L) 01/25/2022    BASOSABS 0.03 01/25/2022       Lab Results   Component Value Date     01/25/2022    K 4.9 01/25/2022    CL 97 (L) 01/25/2022    CO2 29 01/25/2022    BUN 30 (H) 01/25/2022    CREATININE 0.8 01/25/2022    GLUCOSE 82 01/25/2022    CALCIUM 10.7 (H) 01/25/2022    PROT 6.6 01/25/2022    LABALBU 3.2 (L) 01/25/2022    BILITOT 0.4 01/25/2022    ALKPHOS 82 01/25/2022    AST 15 01/25/2022    ALT 7 01/25/2022    LABGLOM >60 01/25/2022    GFRAA >60 01/25/2022       Lab Results   Component Value Date    IRON 38 (L) 02/16/2021    TIBC 248 (L) 02/16/2021           Radiology-    CT CHEST W CONTRAST    Result Date: 1/15/2022  EXAMINATION: CT OF THE CHEST WITH CONTRAST 1/14/2022 4:07 pm TECHNIQUE: CT of the chest was performed with the administration of intravenous contrast. Multiplanar reformatted images are provided for review. Dose modulation, iterative reconstruction, and/or weight based adjustment of the mA/kV was utilized to reduce the radiation dose to as low as reasonably achievable. COMPARISON: CT chest without contrast September 1, 2021. HISTORY: ORDERING SYSTEM PROVIDED HISTORY: Nausea and vomiting, intractability of vomiting not specified, unspecified vomiting type FINDINGS: Mediastinum: The heart is normal in size and configuration. No evidence of pericardial effusion is seen.   No diffuse mediastinal and bilateral hilar scattered enlarged lymph nodes are identified with largest node at the aortopulmonary window within the superior mediastinum measuring up to 22 mm in short axis diameter. . Lungs/pleura: Multifocal dense consolidation is seen throughout the bilateral lungs greatest within the posterior lower lung lobes and posterior left upper lung lobe. Multifocal bilateral lung scattered noncalcified pulmonary nodules are seen with largest seen within the left lower lung lobe measuring up to 22 mm in diameter. Redemonstration of centrilobular emphysema is present. Upper Abdomen: Visualized upper abdominal organs on chest CT examination are grossly unremarkable in appearance. Soft Tissues/Bones: Bilateral gynecomastia is evident. The bones, skeletal muscle bundles, fascial planes and subcutaneous soft tissues are otherwise unremarkable in appearance. 1. Interval development large areas of multifocal dense consolidation within the bilateral lungs, greatest within the posterior lung lobes and posterior left lumbar lung lobe consistent with pneumonia versus pulmonary lymphoma. 2. Marked interval increase in number and size of multifocal bilateral lung multifocal rounded noncalcified pulmonary nodules with largest seen within the left lower lung lobe measuring up to 22 mm in diameter. Differential diagnostic considerations include association with pulmonary lymphoma versus association with pneumonia. Recommend clinical correlation. 3. Interval worsening of diffuse mediastinal and bilateral hilar lymphadenopathy, as discussed above. Differential diagnostic considerations include association with infectious versus neoplastic disease. 4. Redemonstration centrilobular emphysema. 5. Gynecomastia. RECOMMENDATIONS: Multiple pulmonary nodules. Most severe: 22 mm solid pulmonary nodule. Recommend a non-contrast Chest CT at 3-6 months.  If patient is high risk for malignancy, recommend an additional non-contrast Chest CT at 18-24 months; if patient is low risk for malignancy a non-contrast Chest CT at 18-24 months is optional. These guidelines do not apply to immunocompromised patients and patients with cancer. Follow up in patients with significant comorbidities as clinically warranted. For lung cancer screening, adhere to Lung-RADS guidelines. Reference: Radiology. 2017; 284(1):228-43. CT ABDOMEN PELVIS W IV CONTRAST Additional Contrast? Oral    Result Date: 1/15/2022  EXAMINATION: CT OF THE ABDOMEN AND PELVIS WITH CONTRAST 1/14/2022 4:07 pm TECHNIQUE: CT of the abdomen and pelvis was performed with the administration of intravenous contrast. Multiplanar reformatted images are provided for review. Dose modulation, iterative reconstruction, and/or weight based adjustment of the mA/kV was utilized to reduce the radiation dose to as low as reasonably achievable. COMPARISON: 07/08/2021 HISTORY: ORDERING SYSTEM PROVIDED HISTORY: Projectile vomiting, presence of nausea not specified TECHNOLOGIST PROVIDED HISTORY: Additional Contrast?->Oral FINDINGS: Lower Chest: There is extensive soft tissue density at the lung bases concerning for neoplastic disease. This has become more consolidative at the lung bases bilaterally extending into the left hilar region. There is a low-density focus in the center of the left lower lung density that could represent focal necrosis. No large effusions are identified. There is extensive bibasilar atelectasis. The heart is not appear enlarged. Organs: The liver reveals a homogeneous appearance. No discrete mass is observed. There are no signs of duct dilation. The gallbladder, pancreas, spleen and adrenal glands revealed no acute abnormalities. There is symmetrical renal density without signs of stones or hydronephrosis. The right kidney reveals a tiny low-density focus in the lower pole measuring 5 mm in this could represent a cyst GI/Bowel:  There is a fuse mucosal thickening of the stomach. There is a small volume of enteric contrast present and direct visualization may be helpful in better characterization if clinically indicated. The small bowel pattern is within the normal range and is nonobstructive. The area of the terminal ileum and cecum appears within the normal range. No acute abnormalities of the colon are delineated. Pelvis: The urinary bladder reveals a rounded contour. The prostate gland does not appear enlarged. There are no signs of significant lymphadenopathy or ascites within the pelvis. There is a soft tissue mass in the presacral space measuring 21 x 26 mm, best seen on axial image 199 concerning for neoplasm. Peritoneum/Retroperitoneum: There are no signs of retroperitoneal lymphadenopathy or aneurysm present. Bones/Soft Tissues:  Images of the lumbosacral spine demonstrates disc height loss and vacuum phenomena at the L2-L3 through L4-L5 levels suggest multilevel disc disease. There are degenerative changes of the right hip noted incidentally. No abnormal sclerotic or lytic lesions are identified otherwise. 1.  There has been a significant interval increase is in pulmonary nodules bilaterally mass consolidation concerning for progression of neoplastic disease. 2.  Soft tissue mass in the presacral space suggests neoplasm 3. Diffuse thickening of the gastric mucosa that could represent a gastritis but if of clinical concern, direct visualization may be helpful in better characterization. RECOMMENDATIONS: Unavailable     XR CHEST PORTABLE    Result Date: 1/18/2022  EXAMINATION: ONE XRAY VIEW OF THE CHEST 1/18/2022 6:05 pm COMPARISON: Chest series from August 13, 2021.   CT chest from January 14, 2022 HISTORY: ORDERING SYSTEM PROVIDED HISTORY: SOB TECHNOLOGIST PROVIDED HISTORY: Reason for exam:->SOB What reading provider will be dictating this exam?->CRC FINDINGS: Left anterior chest wall cardiac support device with distal leads terminating in the vicinity of the right atrium and right ventricle. Left atrial appendage closure device noted. Postsurgical changes consistent with prior CABG. Atherosclerotic disease. Remaining cardiomediastinal silhouette appears grossly unremarkable. Normal pulmonary vascularity. Background lung hyperinflation and coarse interstitial markings. There are ill-defined nodular opacities in the left mid to upper lung and right lower lung. These are of uncertain significance. No obvious pleural effusion or pneumothorax. Midline sternotomy hardware. Osseous and thoracic soft tissue structures demonstrate no acute findings. 1.  Multifocal nodular ill-defined opacities in the left mid and upper lung and right lower lung. Allowing for differences in technique, similar to recent CT scan. Broad differential which includes infectious/inflammatory processes. Malignancy not excluded 2. Background lung hyperinflation with coarse interstitial markings. Atherosclerotic disease. Postprocedural changes as above         ASSESSMENT/PLAN :  73-year-old man -  Aggressive bulky diffuse large B-cell lymphoma, non-germinal cell type with negative FISH interface for double hit or triple hit lymphoma with bulky intra-abdominal disease on presentation.   - S/p 6 cycles of R-CHOP/Revlimid. He was to continue with Revlimid maintenance however he stopped due to side effects and wanted to wait until follow up scans.  - CT chest with significant interval worsening with multifocal dense consolidation most prominent within the posterior lower lung lobes and left upper lung lobe with associated noncalcified pulmonary nodules largest in left lower lobe measuring 2.2 cm with associated centrilobular emphysema. Moderate hilar and mediastinal lymphadenopathy is described.    - CT A/P shows diffuse thickening of the gastric mucosa and a small soft tissue mass in the presacral space measuring 2 cm.    - Pulmonology consulted for possible EBUS for BAL washing cultures and biopsies for tissue diagnosis for concern for relapsed lymphoma with involvement of the lung parenchyma versus pneumonia in immunocompromised host.    - ID consulted and pending evaluation. No antibiotics at this time. - CBC at baseline no leukocytosis with anemia.   -will follow,     1/20/22  - CBC at baseline no leukocytosis with anemia  - Pulmonology following. Plan for bronch with EBUS and biopsy Monday. Needs to be off Xarelto for 3 days and was given dose today. We will follow pathology. Infection vs recurrent DLBCL-ABC subtype  - ID following plans to monitor off of antibiotics for now  - We will follow    1/21/22  - CBC at baseline no leukocytosis with anemia  - Pulmonology following. Plan for bronch with EBUS and biopsy Monday. Needs to be off Xarelto for 3 days. We will follow pathology. Infection vs recurrent DLBCL-ABC subtype  - ID following continues to monitor off of antibiotics for now  - We will follow    1/25/22  - CBC at baseline no leukocytosis with anemia  - Pulmonology following. S/p bronch with EBUS and biopsy. 1 lymph node at station 7 positive for malignant cells and was sent for cytometry. We will follow final results. - ID following continues to monitor off of antibiotics for now  - We will follow    Preliminary cytology highly suspicious for relapsed diffuse large cell lymphoma    Patient with bulky disease and poor prognosis. His options would include Monjuvi/Revlimid or Zynlonta,Loncastuximab as a potential bridge for Car T cell therapy  And he will follow at [place] [facility] with Dr Katherine Jeter as a potential candidate for CAR T Cell therapy with lisocabtagene maraleucel or axicabtagene ciloleucel. May be discharged tomorrow on Oxygen ,will follow as outpatient          TRACIE Oviedo CNP  Electronically signed 1/25/2022 at 2:03 PM   Patient seen and examined. Agree with CNP's assessment and plan. Note updated.   Zac Swanson MD

## 2022-01-25 NOTE — PROGRESS NOTES
LABALBU 3.2 01/25/2022    LABALBU 3.9 01/28/2011    CALCIUM 10.7 01/25/2022    BILITOT 0.4 01/25/2022    ALKPHOS 82 01/25/2022    AST 15 01/25/2022    ALT 7 01/25/2022         Assessment/Plan:  Principal Problem:    Acute respiratory failure with hypoxia (HCC)  Active Problems:    Coronary artery disease    CKD (chronic kidney disease) stage 3, GFR 30-59 ml/min (HCC)    Paroxysmal atrial fibrillation (HCC)    Diffuse large B-cell lymphoma (HCC)    Severe protein-calorie malnutrition (HCC)    Pacemaker    Hyperuricemia  Resolved Problems:    * No resolved hospital problems. *       Bronch done, case d/w pulm, positive for malignancy representing relapsed lymphoma    D/w onc as well. Waiting to hear back if he has any treatment optinos at all. He's pretty frail    New CXR personally reviewed showing increase in L sided opacities. Overall very high disease burden    Labs stable    Resume DOAC when ok'ed by pulm    Continue metoprolol    DVT prophylaxis: held  Requires continued inpatient level of care   Could go home I suppose if treatment is planned for outpatient but he looks so frail. PT, OT consults.   May need placement    Elizabeth Sam MD    1:33 PM  1/25/2022

## 2022-01-25 NOTE — CARE COORDINATION
SOCIAL WORK/CASEMANAGEMENT TRANSITION OF CARE [id number] [facility], [address], [place], [phone]): per rn pt is up and about room and to the bathroom independently. The plan per pt is home without hhc and he is not interested in coco at all. S/p bronch with ebus and bx yesterday. Pt is on 4l nc with out home o2. Sw/cm to follow.  JOSE Curiel  1/25/2022

## 2022-01-25 NOTE — PATIENT CARE CONFERENCE
[facility] Quality Flow/Interdisciplinary Rounds Progress Note        Quality Flow Rounds held on January 25, 2022    Disciplines Attending:  Bedside Nurse, ,  and Nursing Unit Leadership    Veall Ayala was admitted on 1/18/2022  8:15 PM    Anticipated Discharge Date:  Expected Discharge Date: 01/20/22    Disposition:    Osman Score:  Osman Scale Score: 19    Readmission Risk              Risk of Unplanned Readmission:  32           Discussed patient goal for the day, patient clinical progression, and barriers to discharge.   The following Goal(s) of the Day/Commitment(s) have been identified:  to keep him informed      Roberto Amin RN  January 25, 2022

## 2022-01-26 VITALS
SYSTOLIC BLOOD PRESSURE: 91 MMHG | HEART RATE: 100 BPM | WEIGHT: 135.2 LBS | BODY MASS INDEX: 21.22 KG/M2 | TEMPERATURE: 97.3 F | RESPIRATION RATE: 23 BRPM | DIASTOLIC BLOOD PRESSURE: 58 MMHG | HEIGHT: 67 IN | OXYGEN SATURATION: 94 %

## 2022-01-26 LAB
ALBUMIN SERPL-MCNC: 3.1 G/DL (ref 3.5–5.2)
ALP BLD-CCNC: 79 U/L (ref 40–129)
ALT SERPL-CCNC: 8 U/L (ref 0–40)
ANION GAP SERPL CALCULATED.3IONS-SCNC: 14 MMOL/L (ref 7–16)
AST SERPL-CCNC: 17 U/L (ref 0–39)
BASOPHILS ABSOLUTE: 0.04 E9/L (ref 0–0.2)
BASOPHILS RELATIVE PERCENT: 0.4 % (ref 0–2)
BILIRUB SERPL-MCNC: 0.5 MG/DL (ref 0–1.2)
BUN BLDV-MCNC: 28 MG/DL (ref 6–23)
CALCIUM SERPL-MCNC: 10.5 MG/DL (ref 8.6–10.2)
CHLORIDE BLD-SCNC: 96 MMOL/L (ref 98–107)
CO2: 31 MMOL/L (ref 22–29)
CREAT SERPL-MCNC: 0.6 MG/DL (ref 0.7–1.2)
CULTURE, RESPIRATORY: NORMAL
EOSINOPHILS ABSOLUTE: 0.37 E9/L (ref 0.05–0.5)
EOSINOPHILS RELATIVE PERCENT: 3.6 % (ref 0–6)
GFR AFRICAN AMERICAN: >60
GFR NON-AFRICAN AMERICAN: >60 ML/MIN/1.73
GLUCOSE BLD-MCNC: 85 MG/DL (ref 74–99)
HCT VFR BLD CALC: 35.4 % (ref 37–54)
HEMOGLOBIN: 10.7 G/DL (ref 12.5–16.5)
IMMATURE GRANULOCYTES #: 0.07 E9/L
IMMATURE GRANULOCYTES %: 0.7 % (ref 0–5)
LYMPHOCYTES ABSOLUTE: 0.67 E9/L (ref 1.5–4)
LYMPHOCYTES RELATIVE PERCENT: 6.5 % (ref 20–42)
MCH RBC QN AUTO: 26 PG (ref 26–35)
MCHC RBC AUTO-ENTMCNC: 30.2 % (ref 32–34.5)
MCV RBC AUTO: 86.1 FL (ref 80–99.9)
MONOCYTES ABSOLUTE: 1.37 E9/L (ref 0.1–0.95)
MONOCYTES RELATIVE PERCENT: 13.3 % (ref 2–12)
NEUTROPHILS ABSOLUTE: 7.76 E9/L (ref 1.8–7.3)
NEUTROPHILS RELATIVE PERCENT: 75.5 % (ref 43–80)
PDW BLD-RTO: 16.1 FL (ref 11.5–15)
PLATELET # BLD: 164 E9/L (ref 130–450)
PMV BLD AUTO: 10.4 FL (ref 7–12)
POTASSIUM SERPL-SCNC: 4.1 MMOL/L (ref 3.5–5)
RBC # BLD: 4.11 E12/L (ref 3.8–5.8)
SMEAR, RESPIRATORY: NORMAL
SODIUM BLD-SCNC: 141 MMOL/L (ref 132–146)
TOTAL PROTEIN: 6.5 G/DL (ref 6.4–8.3)
WBC # BLD: 10.3 E9/L (ref 4.5–11.5)

## 2022-01-26 PROCEDURE — C1751 CATH, INF, PER/CENT/MIDLINE: HCPCS

## 2022-01-26 PROCEDURE — 6370000000 HC RX 637 (ALT 250 FOR IP): Performed by: INTERNAL MEDICINE

## 2022-01-26 PROCEDURE — 36415 COLL VENOUS BLD VENIPUNCTURE: CPT

## 2022-01-26 PROCEDURE — 94640 AIRWAY INHALATION TREATMENT: CPT

## 2022-01-26 PROCEDURE — 02HV33Z INSERTION OF INFUSION DEVICE INTO SUPERIOR VENA CAVA, PERCUTANEOUS APPROACH: ICD-10-PCS | Performed by: INTERNAL MEDICINE

## 2022-01-26 PROCEDURE — 2700000000 HC OXYGEN THERAPY PER DAY

## 2022-01-26 PROCEDURE — 6370000000 HC RX 637 (ALT 250 FOR IP): Performed by: NURSE PRACTITIONER

## 2022-01-26 PROCEDURE — 2580000003 HC RX 258: Performed by: NURSE PRACTITIONER

## 2022-01-26 PROCEDURE — 6360000002 HC RX W HCPCS: Performed by: INTERNAL MEDICINE

## 2022-01-26 PROCEDURE — 76937 US GUIDE VASCULAR ACCESS: CPT

## 2022-01-26 PROCEDURE — 36569 INSJ PICC 5 YR+ W/O IMAGING: CPT

## 2022-01-26 PROCEDURE — 80053 COMPREHEN METABOLIC PANEL: CPT

## 2022-01-26 PROCEDURE — 85025 COMPLETE CBC W/AUTO DIFF WBC: CPT

## 2022-01-26 PROCEDURE — 99232 SBSQ HOSP IP/OBS MODERATE 35: CPT | Performed by: INTERNAL MEDICINE

## 2022-01-26 RX ORDER — ALLOPURINOL 100 MG/1
100 TABLET ORAL DAILY
Qty: 30 TABLET | Refills: 3 | Status: SHIPPED | OUTPATIENT
Start: 2022-01-27

## 2022-01-26 RX ORDER — SODIUM CHLORIDE 9 MG/ML
25 INJECTION, SOLUTION INTRAVENOUS PRN
Status: DISCONTINUED | OUTPATIENT
Start: 2022-01-26 | End: 2022-01-26 | Stop reason: HOSPADM

## 2022-01-26 RX ORDER — SODIUM CHLORIDE 0.9 % (FLUSH) 0.9 %
5-40 SYRINGE (ML) INJECTION PRN
Status: DISCONTINUED | OUTPATIENT
Start: 2022-01-26 | End: 2022-01-26 | Stop reason: HOSPADM

## 2022-01-26 RX ORDER — HEPARIN SODIUM (PORCINE) LOCK FLUSH IV SOLN 100 UNIT/ML 100 UNIT/ML
3 SOLUTION INTRAVENOUS PRN
Status: DISCONTINUED | OUTPATIENT
Start: 2022-01-26 | End: 2022-01-26 | Stop reason: HOSPADM

## 2022-01-26 RX ORDER — LIDOCAINE HYDROCHLORIDE 10 MG/ML
5 INJECTION, SOLUTION EPIDURAL; INFILTRATION; INTRACAUDAL; PERINEURAL ONCE
Status: DISCONTINUED | OUTPATIENT
Start: 2022-01-26 | End: 2022-01-26 | Stop reason: HOSPADM

## 2022-01-26 RX ORDER — HEPARIN SODIUM (PORCINE) LOCK FLUSH IV SOLN 100 UNIT/ML 100 UNIT/ML
3 SOLUTION INTRAVENOUS EVERY 12 HOURS SCHEDULED
Status: DISCONTINUED | OUTPATIENT
Start: 2022-01-26 | End: 2022-01-26 | Stop reason: HOSPADM

## 2022-01-26 RX ORDER — SODIUM CHLORIDE 0.9 % (FLUSH) 0.9 %
5-40 SYRINGE (ML) INJECTION EVERY 12 HOURS SCHEDULED
Status: DISCONTINUED | OUTPATIENT
Start: 2022-01-26 | End: 2022-01-26 | Stop reason: HOSPADM

## 2022-01-26 RX ADMIN — ALBUTEROL SULFATE 2.5 MG: 2.5 SOLUTION RESPIRATORY (INHALATION) at 08:07

## 2022-01-26 RX ADMIN — METOPROLOL SUCCINATE 25 MG: 25 TABLET, FILM COATED, EXTENDED RELEASE ORAL at 08:11

## 2022-01-26 RX ADMIN — RIVAROXABAN 20 MG: 20 TABLET, FILM COATED ORAL at 08:11

## 2022-01-26 RX ADMIN — Medication 10 ML: at 08:00

## 2022-01-26 RX ADMIN — FERROUS SULFATE TAB 325 MG (65 MG ELEMENTAL FE) 325 MG: 325 (65 FE) TAB at 08:11

## 2022-01-26 RX ADMIN — PRAVASTATIN SODIUM 20 MG: 20 TABLET ORAL at 08:11

## 2022-01-26 RX ADMIN — ALLOPURINOL 100 MG: 100 TABLET ORAL at 08:11

## 2022-01-26 RX ADMIN — PANTOPRAZOLE SODIUM 40 MG: 40 TABLET, DELAYED RELEASE ORAL at 06:31

## 2022-01-26 ASSESSMENT — PAIN SCALES - GENERAL
PAINLEVEL_OUTOF10: 0

## 2022-01-26 NOTE — PROGRESS NOTES
PULSE OX ON ROOM AIR SITTING _92___%   PULSE OX ON ROOM AIR AMBULATING _86__%   PULSE OX ON _3__LITERS SITTING RECOVERY _92__%   PULSE OX ON _3__ LITERS AMBULATING RECOVERY _92__%     Shantelle Cage RN

## 2022-01-26 NOTE — CARE COORDINATION
SOCIAL WORK/CASEMANAGEMENT TRANSITION OF CARE [id number] [place]  [facility] [address], [place], [phone]): met with pt who is anticipated to go home today. he is in agreement to hhc for nsg. Choices offered and pt has no preference for hhc or dme. [facility] is setup  Need orders for cpa and medication compliance . Also, [facility] to setup home o2 to the room today. JOSE Elliott  1/26/2022  Called ember and left vm and called amita the daughters and they are working on picking pt up after 4 p.m. they are to call the floor with a  time. JOSE Elliott  1/26/2022      The Plan for Transition of Care is related to the following treatment goals: hhc and dme     The Patient and/or patient representative  was provided with a choice of provider and agrees   with the discharge plan. [x] Yes [] No    Freedom of choice list was provided with basic dialogue that supports the patient's individualized plan of care/goals, treatment preferences and shares the quality data associated with the providers.  [x] Yes [] No

## 2022-01-26 NOTE — PROGRESS NOTES
Pulmonary [facility]                             Pulmonary Consult/Progress Note :            Reason for Consultation:Possible need for Bronch,Lymphoma   CC : SOB ,cough frothy secretion   HPI:   Doing well  Post Bronch/ebus   On 2 L   Still with SOB   Up to chair    PHYSICAL EXAMINATION:     VITAL SIGNS:  BP (!) 91/58   Pulse 100   Temp 97.3 °F (36.3 °C) (Temporal)   Resp 23   Ht 5' 7\" (1.702 m)   Wt 135 lb 3.2 oz (61.3 kg)   SpO2 94%   BMI 21.18 kg/m²   Wt Readings from Last 3 Encounters:   01/26/22 135 lb 3.2 oz (61.3 kg)   10/27/21 168 lb 3.2 oz (76.3 kg)   09/15/21 161 lb (73 kg)     Temp Readings from Last 3 Encounters:   01/26/22 97.3 °F (36.3 °C) (Temporal)   09/15/21 98 °F (36.7 °C) (Temporal)   09/03/21 97.6 °F (36.4 °C) (Temporal)     TMAX:  BP Readings from Last 3 Encounters:   01/26/22 (!) 91/58   01/24/22 119/75   10/27/21 90/72     Pulse Readings from Last 3 Encounters:   01/26/22 100   10/27/21 87   09/15/21 91           INTAKE/OUTPUTS:  I/O last 3 completed shifts:   In: 1080 [P.O.:1080]  Out: 700 [Urine:700]    Intake/Output Summary (Last 24 hours) at 1/26/2022 1208  Last data filed at 1/26/2022 1004  Gross per 24 hour   Intake 480 ml   Output 475 ml   Net 5 ml       General Appearance: alert and oriented to person, place and time, well-developed and   well-nourished, in no acute distress   Eyes: pupils equal, round, and reactive to light, extraocular eye movements intact, conjunctivae normal and sclera anicteric   Neck: neck supple and non tender without mass, no thyromegaly, no thyroid nodules and no cervical adenopathy   Pulmonary/Chest:crackles rhonchi   Cardiovascular: normal rate, regular rhythm, normal S1 and S2, no murmurs, rubs, clicks or gallops, distal pulses intact, no carotid bruits, no murmurs, no gallops, no carotid bruits and no JVD   Abdomen: obese, soft, non-tender, non-distended, normal bowel sounds, no masses or organomegaly Extremities:no edema   Musculoskeletal: normal range of motion, no joint swelling, deformity or tenderness   Neurologic: reflexes normal and symmetric, no cranial nerve deficit noted    LABS/IMAGING:    CBC:  Lab Results   Component Value Date    WBC 10.3 01/26/2022    HGB 10.7 (L) 01/26/2022    HCT 35.4 (L) 01/26/2022    MCV 86.1 01/26/2022     01/26/2022    LYMPHOPCT 6.5 (L) 01/26/2022    RBC 4.11 01/26/2022    MCH 26.0 01/26/2022    MCHC 30.2 (L) 01/26/2022    RDW 16.1 (H) 01/26/2022    NEUTOPHILPCT 75.5 01/26/2022    MONOPCT 13.3 (H) 01/26/2022    BASOPCT 0.4 01/26/2022    NEUTROABS 7.76 (H) 01/26/2022    LYMPHSABS 0.67 (L) 01/26/2022    MONOSABS 1.37 (H) 01/26/2022    EOSABS 0.37 01/26/2022    BASOSABS 0.04 01/26/2022       Recent Labs     01/26/22  0624 01/25/22  0459 01/24/22 0615   WBC 10.3 9.9 10.9   HGB 10.7* 11.0* 11.3*   HCT 35.4* 37.3 36.7*   MCV 86.1 87.6 85.3    160 161       BMP:   Recent Labs     01/24/22  0615 01/25/22  0459 01/26/22  0624    142 141   K 4.1 4.9 4.1   CL 98 97* 96*   CO2 26 29 31*   BUN 14 30* 28*   CREATININE 0.5* 0.8 0.6*       MG:   Lab Results   Component Value Date    MG 1.8 01/19/2022     Ca/Phos:   Lab Results   Component Value Date    CALCIUM 10.5 (H) 01/26/2022    PHOS 3.5 01/19/2022     Amylase: No results found for: AMYLASE  Lipase: No results found for: LIPASE  LIVER PROFILE:   Recent Labs     01/24/22  0615 01/25/22  0459 01/26/22  0624   AST 15 15 17   ALT 6 7 8   BILITOT 0.6 0.4 0.5   ALKPHOS 84 82 79       PT/INR:   No results for input(s): PROTIME, INR in the last 72 hours. APTT:   No results for input(s): APTT in the last 72 hours.     Cardiac Enzymes:  Lab Results   Component Value Date    TROPONINI <0.01 02/14/2021               PET and CT reviewed     PROBLEM LIST:  Patient Active Problem List   Diagnosis    Hematuria    BPH (benign prostatic hyperplasia)    SUN[facility]acute kidney injury) ([facility] [address]    Colonic mass    Hypertension    Hyperlipidemia    Coronary artery disease    Prolonged Q-T interval on ECG    Hypotension    CKD (chronic kidney disease) stage 3, GFR 30-59 ml/min (HCC)    Paroxysmal atrial fibrillation (HCC)    Diffuse large B-cell lymphoma (HCC)    Diffuse large B cell lymphoma (HCC)    Severe protein-calorie malnutrition (HCC)    Anemia    Thrombocytopenia (HCC)    Syncope    Chronic anemia    Gastroesophageal reflux disease without esophagitis    Essential hypertension    Hypokalemia    Hypomagnesemia    Pacemaker    Former smoker    Hydronephrosis of right kidney    Acute respiratory failure with hypoxia (HCC)    CABG x 1 (LIMA-LAD)    Hyperuricemia               ASSESSMENT:  1.) Large Cell B Lymphoma  2-diffuse consolidation /mass /Lung concern for pulmonary Lymphoma   2. )COPD  3.)h/p Lymphoma   4.)Pancytopenia   5.)Possible Pneumonia       PLAN:  May need 3 L on discharge DME ordered placed  resume  Xareleto,ordered  Cytology shows  lymphoma recurrence ,waiting final   No evidence if infection  BAL more lymphocyte/+malignat cell  Discussed with Dr Patience Bolanos  Pulmonary&Critical Care Medicine   Director of [address] [place] [address] Director of [address] [place] [address]    Narendra Tate    NOTE: This report was transcribed using voice recognition software. Every effort was made to ensure accuracy; however, inadvertent computerized transcription errors may be present.

## 2022-01-26 NOTE — DISCHARGE SUMMARY
+BS  Extremities: No peripheral edema or digital cyanosis  Skin: no rash, lesions or ulcers  Psych: Calm and cooperative  Neuro: Alert and interactive, nonfocal     Condition:  Stable     Disposition: home    Patient Instructions:   Current Discharge Medication List      START taking these medications    Details   allopurinol (ZYLOPRIM) 100 MG tablet Take 1 tablet by mouth daily  Qty: 30 tablet, Refills: 3         CONTINUE these medications which have NOT CHANGED    Details   pravastatin (PRAVACHOL) 20 MG tablet Take 1 tablet by mouth daily  Qty: 90 tablet, Refills: 3      rivaroxaban (XARELTO) 20 MG TABS tablet Take 20 mg by mouth daily (with breakfast)       vitamin B-12 (CYANOCOBALAMIN) 100 MCG tablet Take 50 mcg by mouth daily      sennosides-docusate sodium (SENOKOT-S) 8.6-50 MG tablet Take 1 tablet by mouth 2 times daily      ferrous sulfate 325 (65 Fe) MG tablet Take 325 mg by mouth 2 times daily       esomeprazole Magnesium (NEXIUM) 20 MG PACK Take 20 mg by mouth daily      albuterol sulfate HFA (PROAIR HFA) 108 (90 Base) MCG/ACT inhaler Inhale 1 puff into the lungs 2 times daily  Qty: 18 g, Refills: 3    Associated Diagnoses: Coronary artery disease involving native coronary artery of native heart without angina pectoris         STOP taking these medications       amLODIPine (NORVASC) 5 MG tablet Comments:   Reason for Stopping:         phenazopyridine (PYRIDIUM) 200 MG tablet Comments:   Reason for Stopping:         tamsulosin (FLOMAX) 0.4 MG capsule Comments:   Reason for Stopping:         amLODIPine (NORVASC) 5 MG tablet Comments:   Reason for Stopping:         metoprolol succinate (TOPROL XL) 50 MG extended release tablet Comments:   Reason for Stopping:             Activity: activity as tolerated  Diet: regular diet    Follow-up with PCP in 1 week.     Note that over 30 minutes was spent in preparing discharge papers, discussing discharge with patient, medication review, etc.    Signed:  Sergey Mercer MD    1/26/2022  1:18 PM

## 2022-01-26 NOTE — PROGRESS NOTES
[facility]  Hematology/Oncology  Consult      Patient Name: Maria Isabel Reyes  YOB: 1950  PCP: Granville Boast, DO   Referring Provider:      Reason for Consultation:   Chief Complaint   Patient presents with    Shortness of Breath     pt states he was sent in for a bronch by Dr Stephania Sorenson      Subjective: No new complaints. Patient ready for discharge. History of Present Illness: 21-year-old man with past medical history of aggressive bulky diffuse large B-cell lymphoma, non-germinal cell type with negative FISH interphase for double hit or triple hit lymphoma with bulky intra-abdominal disease on presentation. He has been on combination chemotherapy with R-CHOP along with Revlimid. This has been complicated by significant pancytopenia is despite G-CSF prophylaxis. S/p 6 cycles of R-CHOP/Revlimid with IT methotrexate for CNS prophylaxis. He was to continue with Revlimid maintenance however wanted to wait until follow up scans and discontinued it to toxicities. CT scan of the chest shows significant interval worsening with multifocal dense consolidation most prominent within the posterior lower  lung lobes and left upper lung lobe with associated noncalcified pulmonary nodules largest in left lower lobe measuring 2.2 cm with associated centrilobular emphysema. Moderate hilar and mediastinal lymphadenopathy is described. CT scan of abdomen and pelvis shows diffuse thickening of the gastric mucosa and a small soft tissue mass in the presacral space measuring 2 cm. Patient was seen yesterday in the office and was hypoxic on room air. He was sent to the hospital for evaluation by pulmonology for possible EBUS for BAL washing cultures and biopsies for tissue diagnosis for concern for relapsed lymphoma with involvement of the lung parenchyma versus pneumonia in immunocompromised host.  ID consulted and pending evaluation. No antibiotics at this time.  CBC at baseline no leukocytosis with anemia. Consultation for further evaluation of patient with known lymphoma post treatment with possible relapse vs infection. Patient has lost 30 pounds in last 3 months. Review of systems: Over 10 systems were reviewed and all were negative except as mentioned above.     Diagnostic Data:     Past Medical History:   Diagnosis Date    Arthritis     KNEES HIPS BACK    Atrial fibrillation ([address]     BPH (benign prostatic hyperplasia)     Cancer ([address]     large B cell lymphoma    Coronary artery disease     Difficult airway     PT STATES HE [facility]    Difficult intubation 04/2017    note in care everywhere \"Clinical Summary\" 03/10/2021    History of blood transfusion 2021    Hydronephrosis of right kidney 9/15/2021    Hyperlipidemia     Hypertension     Sinus tachycardia 01/01/2002       Patient Active Problem List    Diagnosis Date Noted    Severe protein-calorie malnutrition ([address] 01/19/2022    Hyperuricemia 01/19/2022    Acute respiratory failure with hypoxia ([address] 01/18/2022    CABG x 1 (LIMA-LAD) 01/18/2022    Hydronephrosis of right kidney 09/15/2021    Syncope 09/02/2021    Chronic anemia 09/02/2021    Gastroesophageal reflux disease without esophagitis 09/02/2021    Essential hypertension 09/02/2021    Hypokalemia 09/02/2021    Hypomagnesemia 09/02/2021    Pacemaker 09/02/2021    Former smoker 09/02/2021    Thrombocytopenia ([address] 07/05/2021    Anemia 05/06/2021    Diffuse large B cell lymphoma ([address] 03/31/2021    Diffuse large B-cell lymphoma (HCC) 03/30/2021    Prolonged Q-T interval on ECG 03/29/2021    Hypotension 03/29/2021    CKD (chronic kidney disease) stage 3, GFR 30-59 ml/min (HCC) 03/29/2021    Paroxysmal atrial fibrillation ([address] 03/29/2021    Colonic mass 03/11/2021    Hypertension     Hyperlipidemia     Coronary artery disease     SUNNY (acute kidney injury) ([address] 02/14/2021    Hematuria 01/16/2018    BPH (benign prostatic hyperplasia) 01/16/2018        Past Surgical History:   Procedure Laterality Date    ABLATION OF DYSRHYTHMIC FOCUS      AORTA SURGERY      aortic valve replacement    AORTIC VALVE REPLACEMENT      BLADDER SURGERY Right 2/17/2021    CYSTOSCOPY BILATERAL RETROGRADE PYELOGRAM RIGHT STENT INSERTION performed by Duncan Osborn MD at [address] Right 9/15/2021    CYSTOSCOPY RETROGRADE  RIGHT STENT EXCHANGE performed by Duncan Osborn MD at [address] [facility] N/A 1/24/2022    BRONCHOSCOPY DIAGNOSTIC OR CELL [address] ONLY performed by Buster Tsai MD at [address] [facility] N/A 1/24/2022    BRONCHOSCOPY W/EBUS FNA performed by Buster Tsai MD at [address] [facility] COLONOSCOPY  2/19/2021    COLONOSCOPY WITH BIOPSY performed by Dinora Hernandez DO at [address]  2/19/2021    COLONOSCOPY W/ ENDOSCOPIC MUCOSAL RESECTION performed by Dinora Hernandez DO at [address] 3/11/2021    LAPAROSCOPIC RIGHT HEMICOLECTOMY performed by Raquel Montgomery MD at [facility] OTHER SURGICAL HISTORY  08/12/2016    CT Myelogram, Dr. Andrew Novak  2014 new battery    last checked Dr Dusty Bowles 1/2016?     PORT SURGERY Right 4/1/2021    MEDI PORT INSERTION performed by Raquel Montgomery MD at [facility] Right 7/12/2021    PORT REMOVAL performed by Heriberto Johnson MD at [address] ENDOSCOPY      reflux    UPPER GASTROINTESTINAL ENDOSCOPY N/A 2/16/2021    EGD BIOPSY performed by Mayte Mcduffie MD at [facility]         Family History  Family History   Problem Relation Age of Onset    Diabetes Mother     Stroke Mother     Diabetes Father     Heart Disease Father     Brain Cancer Sister     Stroke Sister     Stroke Brother     Cancer Maternal Grandfather        Social History    TOBACCO:   reports that he quit smoking about 7 years ago. His smoking use included cigarettes. He has a 44.00 pack-year smoking history. He has never used smokeless tobacco.  ETOH:   reports current alcohol use. Home Medications  Prior to Admission medications    Medication Sig Start Date End Date Taking? Authorizing Provider   allopurinol (ZYLOPRIM) 100 MG tablet Take 1 tablet by mouth daily 1/27/22  Yes Nilo Brasher MD   pravastatin (PRAVACHOL) 20 MG tablet Take 1 tablet by mouth daily 11/16/21  Yes Jean Srinivasan MD   rivaroxaban (XARELTO) 20 MG TABS tablet Take 20 mg by mouth daily (with breakfast)    Yes Historical Provider, MD   vitamin B-12 (CYANOCOBALAMIN) 100 MCG tablet Take 50 mcg by mouth daily   Yes Historical Provider, MD   sennosides-docusate sodium (SENOKOT-S) 8.6-50 MG tablet Take 1 tablet by mouth 2 times daily   Yes Historical Provider, MD   ferrous sulfate 325 (65 Fe) MG tablet Take 325 mg by mouth 2 times daily  1/8/18  Yes Historical Provider, MD   esomeprazole Magnesium (NEXIUM) 20 MG PACK Take 20 mg by mouth daily   Yes Historical Provider, MD   albuterol sulfate HFA (PROAIR HFA) 108 (90 Base) MCG/ACT inhaler Inhale 1 puff into the lungs 2 times daily 10/27/21   Jean Srinivasan MD       Allergies  No Known Allergies        Objective  BP (!) 91/58   Pulse 100   Temp 97.3 °F (36.3 °C) (Temporal)   Resp 23   Ht 5' 7\" (1.702 m)   Wt 135 lb 3.2 oz (61.3 kg)   SpO2 94%   BMI 21.18 kg/m²     Physical Exam:   Performance Status:  General: AAO to person, place, time, in no acute distress,   Head and neck : PERRLA, EOMI . Sclera non icteric. Oropharynx : Clear  Neck: no JVD,  no adenopathy  Heart: Regular rate and regular rhythm, no murmur  Lungs: Clear to auscultation   Extremities: No edema,no cyanosis, no clubbing. Abdomen: Soft, non-tender;no masses, no organomegaly  Skin:  No rash. Neurologic:Cranial nerves grossly intact. No focal motor or sensory deficits .     Recent Laboratory Data-   Lab Results   Component Value Date    WBC 10.3 01/26/2022    HGB 10.7 (L) 01/26/2022    HCT 35.4 (L) 01/26/2022    MCV 86.1 01/26/2022     01/26/2022    LYMPHOPCT 6.5 (L) 01/26/2022    RBC 4.11 01/26/2022    MCH 26.0 01/26/2022    MCHC 30.2 (L) 01/26/2022    RDW 16.1 (H) 01/26/2022    NEUTOPHILPCT 75.5 01/26/2022    MONOPCT 13.3 (H) 01/26/2022    BASOPCT 0.4 01/26/2022    NEUTROABS 7.76 (H) 01/26/2022    LYMPHSABS 0.67 (L) 01/26/2022    MONOSABS 1.37 (H) 01/26/2022    EOSABS 0.37 01/26/2022    BASOSABS 0.04 01/26/2022       Lab Results   Component Value Date     01/26/2022    K 4.1 01/26/2022    CL 96 (L) 01/26/2022    CO2 31 (H) 01/26/2022    BUN 28 (H) 01/26/2022    CREATININE 0.6 (L) 01/26/2022    GLUCOSE 85 01/26/2022    CALCIUM 10.5 (H) 01/26/2022    PROT 6.5 01/26/2022    LABALBU 3.1 (L) 01/26/2022    BILITOT 0.5 01/26/2022    ALKPHOS 79 01/26/2022    AST 17 01/26/2022    ALT 8 01/26/2022    LABGLOM >60 01/26/2022    GFRAA >60 01/26/2022       Lab Results   Component Value Date    IRON 38 (L) 02/16/2021    TIBC 248 (L) 02/16/2021           Radiology-    CT CHEST W CONTRAST    Result Date: 1/15/2022  EXAMINATION: CT OF THE CHEST WITH CONTRAST 1/14/2022 4:07 pm TECHNIQUE: CT of the chest was performed with the administration of intravenous contrast. Multiplanar reformatted images are provided for review. Dose modulation, iterative reconstruction, and/or weight based adjustment of the mA/kV was utilized to reduce the radiation dose to as low as reasonably achievable. COMPARISON: CT chest without contrast September 1, 2021. HISTORY: ORDERING SYSTEM PROVIDED HISTORY: Nausea and vomiting, intractability of vomiting not specified, unspecified vomiting type FINDINGS: Mediastinum: The heart is normal in size and configuration. No evidence of pericardial effusion is seen.   No diffuse mediastinal and bilateral hilar scattered enlarged lymph nodes are identified with largest node at the aortopulmonary window within the superior mediastinum measuring immunocompromised patients and patients with cancer. Follow up in patients with significant comorbidities as clinically warranted. For lung cancer screening, adhere to Lung-RADS guidelines. Reference: Radiology. 2017; 284(1):228-43. CT ABDOMEN PELVIS W IV CONTRAST Additional Contrast? Oral    Result Date: 1/15/2022  EXAMINATION: CT OF THE ABDOMEN AND PELVIS WITH CONTRAST 1/14/2022 4:07 pm TECHNIQUE: CT of the abdomen and pelvis was performed with the administration of intravenous contrast. Multiplanar reformatted images are provided for review. Dose modulation, iterative reconstruction, and/or weight based adjustment of the mA/kV was utilized to reduce the radiation dose to as low as reasonably achievable. COMPARISON: 07/08/2021 HISTORY: ORDERING SYSTEM PROVIDED HISTORY: Projectile vomiting, presence of nausea not specified TECHNOLOGIST PROVIDED HISTORY: Additional Contrast?->Oral FINDINGS: Lower Chest: There is extensive soft tissue density at the lung bases concerning for neoplastic disease. This has become more consolidative at the lung bases bilaterally extending into the left hilar region. There is a low-density focus in the center of the left lower lung density that could represent focal necrosis. No large effusions are identified. There is extensive bibasilar atelectasis. The heart is not appear enlarged. Organs: The liver reveals a homogeneous appearance. No discrete mass is observed. There are no signs of duct dilation. The gallbladder, pancreas, spleen and adrenal glands revealed no acute abnormalities. There is symmetrical renal density without signs of stones or hydronephrosis. The right kidney reveals a tiny low-density focus in the lower pole measuring 5 mm in this could represent a cyst GI/Bowel: There is a fuse mucosal thickening of the stomach. There is a small volume of enteric contrast present and direct visualization may be helpful in better characterization if clinically indicated. The small bowel pattern is within the normal range and is nonobstructive. The area of the terminal ileum and cecum appears within the normal range. No acute abnormalities of the colon are delineated. Pelvis: The urinary bladder reveals a rounded contour. The prostate gland does not appear enlarged. There are no signs of significant lymphadenopathy or ascites within the pelvis. There is a soft tissue mass in the presacral space measuring 21 x 26 mm, best seen on axial image 199 concerning for neoplasm. Peritoneum/Retroperitoneum: There are no signs of retroperitoneal lymphadenopathy or aneurysm present. Bones/Soft Tissues:  Images of the lumbosacral spine demonstrates disc height loss and vacuum phenomena at the L2-L3 through L4-L5 levels suggest multilevel disc disease. There are degenerative changes of the right hip noted incidentally. No abnormal sclerotic or lytic lesions are identified otherwise. 1.  There has been a significant interval increase is in pulmonary nodules bilaterally mass consolidation concerning for progression of neoplastic disease. 2.  Soft tissue mass in the presacral space suggests neoplasm 3. Diffuse thickening of the gastric mucosa that could represent a gastritis but if of clinical concern, direct visualization may be helpful in better characterization. RECOMMENDATIONS: Unavailable     XR CHEST PORTABLE    Result Date: 1/18/2022  EXAMINATION: ONE XRAY VIEW OF THE CHEST 1/18/2022 6:05 pm COMPARISON: Chest series from August 13, 2021. CT chest from January 14, 2022 HISTORY: ORDERING SYSTEM PROVIDED HISTORY: SOB TECHNOLOGIST PROVIDED HISTORY: Reason for exam:->SOB What reading provider will be dictating this exam?->CRC FINDINGS: Left anterior chest wall cardiac support device with distal leads terminating in the vicinity of the right atrium and right ventricle. Left atrial appendage closure device noted. Postsurgical changes consistent with prior CABG. Atherosclerotic disease. Remaining cardiomediastinal silhouette appears grossly unremarkable. Normal pulmonary vascularity. Background lung hyperinflation and coarse interstitial markings. There are ill-defined nodular opacities in the left mid to upper lung and right lower lung. These are of uncertain significance. No obvious pleural effusion or pneumothorax. Midline sternotomy hardware. Osseous and thoracic soft tissue structures demonstrate no acute findings. 1.  Multifocal nodular ill-defined opacities in the left mid and upper lung and right lower lung. Allowing for differences in technique, similar to recent CT scan. Broad differential which includes infectious/inflammatory processes. Malignancy not excluded 2. Background lung hyperinflation with coarse interstitial markings. Atherosclerotic disease. Postprocedural changes as above         ASSESSMENT/PLAN :  23-year-old man -  Aggressive bulky diffuse large B-cell lymphoma, non-germinal cell type with negative FISH interface for double hit or triple hit lymphoma with bulky intra-abdominal disease on presentation.   - S/p 6 cycles of R-CHOP/Revlimid. He was to continue with Revlimid maintenance however he stopped due to side effects and wanted to wait until follow up scans.  - CT chest with significant interval worsening with multifocal dense consolidation most prominent within the posterior lower lung lobes and left upper lung lobe with associated noncalcified pulmonary nodules largest in left lower lobe measuring 2.2 cm with associated centrilobular emphysema. Moderate hilar and mediastinal lymphadenopathy is described.    - CT A/P shows diffuse thickening of the gastric mucosa and a small soft tissue mass in the presacral space measuring 2 cm.    - Pulmonology consulted for possible EBUS for BAL washing cultures and biopsies for tissue diagnosis for concern for relapsed lymphoma with involvement of the lung parenchyma versus pneumonia in immunocompromised host.    - ID consulted and pending evaluation. No antibiotics at this time. - CBC at baseline no leukocytosis with anemia.   -will follow,     1/20/22  - CBC at baseline no leukocytosis with anemia  - Pulmonology following. Plan for bronch with EBUS and biopsy Monday. Needs to be off Xarelto for 3 days and was given dose today. We will follow pathology. Infection vs recurrent DLBCL-ABC subtype  - ID following plans to monitor off of antibiotics for now  - We will follow    1/21/22  - CBC at baseline no leukocytosis with anemia  - Pulmonology following. Plan for bronch with EBUS and biopsy Monday. Needs to be off Xarelto for 3 days. We will follow pathology. Infection vs recurrent DLBCL-ABC subtype  - ID following continues to monitor off of antibiotics for now  - We will follow    1/25/22  - CBC at baseline no leukocytosis with anemia  - Pulmonology following. S/p bronch with EBUS and biopsy. 1 lymph node at station 7 positive for malignant cells and was sent for cytometry. We will follow final results. - ID following continues to monitor off of antibiotics for now  - We will follow    Preliminary cytology highly suspicious for relapsed diffuse large cell lymphoma    Patient with bulky disease and poor prognosis. His options would include Monjuvi/Revlimid or Zynlonta,Loncastuximab as a potential bridge for Car T cell therapy  And he will follow at [facility] with Dr Devon Clayton as a potential candidate for CAR T Cell therapy with lisocabtagene maraleucel or axicabtagene ciloleucel. May be discharged tomorrow on Oxygen ,will follow as outpatient      1/26/2022. Relapsed non-germinal center diffuse large B-cell lymphoma. To be evaluated for CAR T-cell at [address] [place] [address] next week. Will start chemotherapy tomorrow Rituxan gemcitabine oxaliplatin. PICC line prior to discharge        Jessica Alan MD  Electronically signed 1/26/2022 at 3:23 PM   Patient seen and examined. Agree with CNP's assessment and plan.   Note updated.   Leonila Arnett MD

## 2022-01-26 NOTE — PROGRESS NOTES
Pulmonary [address] [facility]                             Pulmonary Consult/Progress Note :            Reason for Consultation:Possible need for Bronch,Lymphoma   CC : SOB ,cough frothy secretion   HPI:   Doing well  Post Bronch/ebus   On 3 -4L   Still with SOB   Not sure about some confusion,per daughter ,more frequent now        PHYSICAL EXAMINATION:     VITAL SIGNS:  /88   Pulse 102   Temp 98.2 °F (36.8 °C)   Resp 20   Ht 5' 7\" (1.702 m)   Wt 134 lb (60.8 kg)   SpO2 95%   BMI 20.99 kg/m²   Wt Readings from Last 3 Encounters:   01/25/22 134 lb (60.8 kg)   10/27/21 168 lb 3.2 oz (76.3 kg)   09/15/21 161 lb (73 kg)     Temp Readings from Last 3 Encounters:   01/25/22 98.2 °F (36.8 °C)   09/15/21 98 °F (36.7 °C) (Temporal)   09/03/21 97.6 °F (36.4 °C) (Temporal)     TMAX:  BP Readings from Last 3 Encounters:   01/25/22 125/88   01/24/22 119/75   10/27/21 90/72     Pulse Readings from Last 3 Encounters:   01/25/22 102   10/27/21 87   09/15/21 91           INTAKE/OUTPUTS:  I/O last 3 completed shifts: In: 56 [P.O.:720;  I.V.:300]  Out: 400 [Urine:400]    Intake/Output Summary (Last 24 hours) at 1/25/2022 1937  Last data filed at 1/25/2022 1341  Gross per 24 hour   Intake 720 ml   Output 375 ml   Net 345 ml       General Appearance: alert and oriented to person, place and time, well-developed and   well-nourished, in no acute distress   Eyes: pupils equal, round, and reactive to light, extraocular eye movements intact, conjunctivae normal and sclera anicteric   Neck: neck supple and non tender without mass, no thyromegaly, no thyroid nodules and no cervical adenopathy   Pulmonary/Chest:crackles rhonchi   Cardiovascular: normal rate, regular rhythm, normal S1 and S2, no murmurs, rubs, clicks or gallops, distal pulses intact, no carotid bruits, no murmurs, no gallops, no carotid bruits and no JVD   Abdomen: obese, soft, non-tender, non-distended, normal bowel sounds, no masses or organomegaly   Extremities:no edema   Musculoskeletal: normal range of motion, no joint swelling, deformity or tenderness   Neurologic: reflexes normal and symmetric, no cranial nerve deficit noted    LABS/IMAGING:    CBC:  Lab Results   Component Value Date    WBC 9.9 01/25/2022    HGB 11.0 (L) 01/25/2022    HCT 37.3 01/25/2022    MCV 87.6 01/25/2022     01/25/2022    LYMPHOPCT 6.4 (L) 01/25/2022    RBC 4.26 01/25/2022    MCH 25.8 (L) 01/25/2022    MCHC 29.5 (L) 01/25/2022    RDW 16.1 (H) 01/25/2022    NEUTOPHILPCT 84.6 (H) 01/25/2022    MONOPCT 7.7 01/25/2022    BASOPCT 0.3 01/25/2022    NEUTROABS 8.40 (H) 01/25/2022    LYMPHSABS 0.64 (L) 01/25/2022    MONOSABS 0.77 01/25/2022    EOSABS 0.01 (L) 01/25/2022    BASOSABS 0.03 01/25/2022       Recent Labs     01/25/22  0459 01/24/22  0615 01/23/22  0708   WBC 9.9 10.9 10.0   HGB 11.0* 11.3* 11.9*   HCT 37.3 36.7* 39.6   MCV 87.6 85.3 86.5    161 141       BMP:   Recent Labs     01/23/22  0708 01/24/22  0615 01/25/22  0459    142 142   K 4.0 4.1 4.9   CL 97* 98 97*   CO2 32* 26 29   BUN 14 14 30*   CREATININE 0.5* 0.5* 0.8       MG:   Lab Results   Component Value Date    MG 1.8 01/19/2022     Ca/Phos:   Lab Results   Component Value Date    CALCIUM 10.7 (H) 01/25/2022    PHOS 3.5 01/19/2022     Amylase: No results found for: AMYLASE  Lipase: No results found for: LIPASE  LIVER PROFILE:   Recent Labs     01/23/22  0708 01/24/22  0615 01/25/22  0459   AST 15 15 15   ALT 6 6 7   BILITOT 0.7 0.6 0.4   ALKPHOS 89 84 82       PT/INR:   No results for input(s): PROTIME, INR in the last 72 hours. APTT:   No results for input(s): APTT in the last 72 hours.     Cardiac Enzymes:  Lab Results   Component Value Date    TROPONINI <0.01 02/14/2021               PET and CT reviewed     PROBLEM LIST:  Patient Active Problem List   Diagnosis    Hematuria    BPH (benign prostatic hyperplasia)    [facility]) [facility]    Colonic mass    Hypertension    Hyperlipidemia    Coronary artery disease    Prolonged Q-T interval on ECG    Hypotension    CKD (chronic kidney disease) stage 3, GFR 30-59 ml/min (HCC)    Paroxysmal atrial fibrillation (HCC)    Diffuse large B-cell lymphoma (HCC)    Diffuse large B cell lymphoma (HCC)    Severe protein-calorie malnutrition (HCC)    Anemia    Thrombocytopenia (HCC)    Syncope    Chronic anemia    Gastroesophageal reflux disease without esophagitis    Essential hypertension    Hypokalemia    Hypomagnesemia    Pacemaker    Former smoker    Hydronephrosis of right kidney    Acute respiratory failure with hypoxia (HCC)    CABG x 1 (LIMA-LAD)    Hyperuricemia               ASSESSMENT:  1.) Large Cell B Lymphoma  2-diffuse consolidation /mass /Lung concern for pulmonary Lymphoma   2. )COPD  3.)h/p Lymphoma   4.)Pancytopenia   5.)Possible Pneumonia       PLAN:  May need 3 L on discharge DME ordered placed  Start Xareleto  Prelim ,compatible with lymphoma recurrence ,waiting final   No evidence if infection  BAL more lymphocyte  Discussed with Dr Lesly Gardner  Pulmonary&Critical Care Medicine   Director of [address] [place] [address] Director of [address] [place] [address]    Zandra Foss    NOTE: This report was transcribed using voice recognition software. Every effort was made to ensure accuracy; however, inadvertent computerized transcription errors may be present.

## 2022-01-26 NOTE — PROGRESS NOTES
Vascular Access Procedure Note    Procedure Date:   1/26/2022    Pre-procedure Verification/Time-Out:  The proposed risks versus benefits of this procedure were discussed in detail by the physician with patient. written consent was obtained from the patient. Relevant documentation was reviewed prior to procedure including signed consent form and medications. All necessary equipment for procedure is available at time of procedure yes. An audible time out was done at 1500PM by team members, correctly identifying patients name, medical record number, correct side, correct site, and correct procedure to be performed with registered nurse members of the procedure team all in agreement.         Indication for Procedure:   Reason for Insertion: other chemo    ASA Assessment (Required for Moderate & Deep Sedation):      Procedure:   Reason for Consultation: power PICC line    Clinician Performing Procedure:   Kristine Davila rn    Assistant:  none    Sedation:   Analgesia Used: lidocaine 1%    Procedure Details/Findings:  Catheter Mal[place] Size: 4.5  Lot Number: [id number]  Product #: [id number]  Expiration Date: 09/30/2022  Maximum Barrier Precautions: cap, eye shield, full body drape, gloves, gown, handwashing and mask  Skin Prep: chlorhexidine  Technique: modified seldinger and ultrasound guided with VPS  Attempts: 1  Exposed (cm): 1  Total (cm): 41  Placement Site: right brachial vein  Vessel Size: 0.52  Dressing: securement device, transparent dressing and biopatch  Blood Return: Yes   Ultrasound Guidance: Yes   Arm Circumference Mid-Bicep (cm): 23  Chest X-Ray Ordered: VasoNova VPS  End Placement: caj    Complications:   none     Post-operative Condition:  stable  Patient Tolerated Procedure: well     Comments/Post-operative Education:   Post Procedure Interventions: patient verbalized understanding of education    Shira Walker RN  01/26/22  3:42 PM

## 2022-01-26 NOTE — PROGRESS NOTES
CLINICAL PHARMACY NOTE: MEDS TO BEDS    Total # of Prescriptions Filled: 1   The following medications were delivered to the patient:  · Allopurinol 100 mg    Additional Documentation:  meds delivered @2:37 pm

## 2022-01-26 NOTE — PATIENT CARE CONFERENCE
P Quality Flow/Interdisciplinary Rounds Progress Note        Quality Flow Rounds held on January 26, 2022    Disciplines Attending:  Bedside Nurse, ,  and Nursing Unit Leadership    Valeriy Monteiro was admitted on 1/18/2022  8:15 PM    Anticipated Discharge Date:  Expected Discharge Date: 01/26/22    Disposition:    Osman Score:  Osman Scale Score: 19    Readmission Risk              Risk of Unplanned Readmission:  32           Discussed patient goal for the day, patient clinical progression, and barriers to discharge.   The following Goal(s) of the Day/Commitment(s) have been identified:  Labs - Report Results      Jessica Stuart RN  January 26, 2022

## 2022-01-28 LAB
Lab: NORMAL
REPORT: NORMAL
THIS TEST SENT TO: NORMAL

## 2022-02-28 LAB
FUNGUS (MYCOLOGY) CULTURE: NORMAL
FUNGUS STAIN: NORMAL

## 2022-03-15 LAB
AFB CULTURE (MYCOBACTERIA): NORMAL
AFB SMEAR: NORMAL

## 2023-04-18 NOTE — PROGRESS NOTES
Comprehensive Nutrition Assessment    Type and Reason for Visit:  Reassess    Nutrition Recommendations/Plan: Recommend to continue Ensure Enlive supplement BID and Magic Cup supplement daily to help meet increased nutritional needs and to help maintain weight status. Nutrition Assessment:  Patients po intake has been sporadic, averaging ~50% of meals served ; s/p bronchoscopy 1/24 ; possible PNA ; adm w/ SOB and acute respiratory failure with hypoxia ; hx of large B-cell Lymphoma s/p chemotherapy/radiation ; lung masses noted ; hx of CAD and CKD ; pt also meets criteria for severe malnutrition ; noted COPD ;  will continue ONS    Malnutrition Assessment:  Malnutrition Status:  Severe malnutrition    Context:  Chronic Illness     Findings of the 6 clinical characteristics of malnutrition:  Energy Intake:  7 - 75% or less estimated energy requirements for 1 month or longer  Weight Loss:  7 - Greater than 20% over 1 year (36%)     Body Fat Loss:  7 - Severe body fat loss Orbital,Triceps   Muscle Mass Loss:  7 - Severe muscle mass loss Temples (temporalis),Clavicles (pectoralis & deltoids),Hand (interosseous)  Fluid Accumulation:  No significant fluid accumulation     Strength:  Not Performed    Estimated Daily Nutrient Needs:  Energy (kcal):  7266-3204 (REE 1332 x 1.3 SF); Weight Used for Energy Requirements:  Current     Protein (g):  80-95 (1.3-1.5g/kg CBW); Weight Used for Protein Requirements:  Current        Fluid (ml/day):  6364-0991; Method Used for Fluid Requirements:  1 ml/kcal      Nutrition Related Findings:  +I&Os (+1.5 L), no edema, hypoactive BS, A&O x 4, cachexia      Wounds:  None       Current Nutrition Therapies:    ADULT DIET; Regular; 3 carb choices (45 gm/meal); Low Sodium (2 gm)  ADULT ORAL NUTRITION SUPPLEMENT; Breakfast, Dinner; Standard High Calorie/High Protein Oral Supplement  ADULT ORAL NUTRITION SUPPLEMENT; Lunch;  Fortified Pudding Oral Supplement    Anthropometric Measures:  · Height: 5' 7\" (170.2 cm)  · Current Body Weight: 135 lb (61.2 kg) (1/26, actual)   · Admission Body Weight: 136 lb (61.7 kg) (1/18, standing scale)    · Usual Body Weight: 214 lb (97.1 kg) (2/14/21, standing scale ; EMR shows weight loss of 78# in the past 11 months (214# to 136#) (36%) ; EMR also shows past weight of 168# actual on 10/27/21)     · Ideal Body Weight: 148 lbs; % Ideal Body Weight 91.2 %   · BMI: 21.1  · BMI Categories: Underweight (BMI less than 22) age over 72       Nutrition Diagnosis:   · Severe malnutrition,In context of chronic illness related to catabolic illness (hx of lymphoma) as evidenced by poor intake prior to admission,weight loss greater than or equal to 20% in 1 year,severe loss of subcutaneous fat,severe muscle loss      Nutrition Interventions:   Food and/or Nutrient Delivery:  Continue Current Diet,Continue Oral Nutrition Supplement  Nutrition Education/Counseling:  Education not indicated   Coordination of Nutrition Care:  Continue to monitor while inpatient    Goals:  Patient will consume ~75% of meals served       Nutrition Monitoring and Evaluation:   Behavioral-Environmental Outcomes:  None Identified   Food/Nutrient Intake Outcomes:  Food and Nutrient Intake,Supplement Intake  Physical Signs/Symptoms Outcomes:  Biochemical Data,Chewing or Swallowing,GI Status,Fluid Status or Edema,Hemodynamic Status,Meal Time Behavior,Nutrition Focused Physical Findings,Skin,Weight     Discharge Planning:     Too soon to determine     Electronically signed by Mari Hardy RD, AMAYA on 1/26/22 at 10:38 AM EST    Contact: [phone] No

## 2023-09-07 VITALS
TEMPERATURE: 97.3 F | DIASTOLIC BLOOD PRESSURE: 66 MMHG | BODY MASS INDEX: 31.14 KG/M2 | SYSTOLIC BLOOD PRESSURE: 116 MMHG | OXYGEN SATURATION: 98 % | WEIGHT: 193.78 LBS | RESPIRATION RATE: 18 BRPM | HEIGHT: 66 IN | HEART RATE: 104 BPM

## 2023-12-09 NOTE — CONSULTS
Pulmonary [facility]                             Pulmonary Consult/Progress Note :          Patient: Marco Bankssin  MRN: [id number]  : 1950      Date of Admission: .2022  8:15 PM    Consulting Physician:Dr Mayo Severe         Reason for Consultation:Possible need for Bronch,Lymphoma   CC : SOB ,cough frothy secretion   HPI:   Marco Cousin is a 80y.o. year old Patient is a 44-year-old male who has 30 PPY smoking history and has h/o  CAD, large B cell lymphoma and BPH.      Admitted on  for abnormal chest CT done for shortness of breath worsening for the past month. He reports no fever, no chills, has weight loss and cough productive of light yellowish sputum with occasional blood streaks. Recent travel  [address] [facility] to visit family within the past 3 months. On admission, he was afebrile and hemodynamically stable with no leukocytosis. Procalcitonin level was not elevated at 0.13 ng/mL. SARS-CoV-2 PCR was not detected. CT chest from  showed interval development of large areas of multifocal dense consolidation within bilateral lungs especially within the posterior lower lobes and posterior left lung lobe, marked interval increase in number and size of multifocal bilateral lung nodules, interval worsening of diffuse mediastinal and bilateral hilar lymphadenopathy, centrilobular emphysema. CT abdomen and pelvis on  showed significant interval increase in pulmonary nodules bilaterally with mass consolidation concerning for progression of neoplastic disease, soft tissue mass in the presacral space suggesting neoplasm.    He is on Wyn[facility] Reji   He was seen in  but CT has progressed significantly     PAST MEDICAL HISTORY:     Past Medical History:   Diagnosis Date    Arthritis     KNEES HIPS BACK    Atrial fibrillation ([address]     BPH (benign prostatic hyperplasia)     Cancer ([address]     large B cell lymphoma    Coronary artery disease     Difficult airway     PT STATES HE [facility] THIS TW[facility]    Difficult intubation 04/2017    note in care everywhere \"Clinical Summary\" 03/10/2021    History of blood transfusion 2021    Hydronephrosis of right kidney 9/15/2021    Hyperlipidemia     Hypertension     Sinus tachycardia 01/01/2002       PAST SURGICAL HISTORY:   Past Surgical History:   Procedure Laterality Date    ABLATION OF DYSRHYTHMIC FOCUS      AORTA SURGERY      aortic valve replacement    AORTIC VALVE REPLACEMENT      BLADDER SURGERY Right 2/17/2021    CYSTOSCOPY BILATERAL RETROGRADE PYELOGRAM RIGHT STENT INSERTION performed by Vivi Miranda MD at [facility] [address] Right 9/15/2021    CYSTOSCOPY RETROGRADE  RIGHT STENT EXCHANGE performed by Vivi Miranda MD at [facility] COLONOSCOPY  2/19/2021    COLONOSCOPY WITH BIOPSY performed by Cristopher Scott DO at [address] [facility] [address] [facility] [address]  2/19/2021    COLONOSCOPY W/ ENDOSCOPIC MUCOSAL RESECTION performed by Cristopher Scott DO at [address] 3/11/2021    LAPAROSCOPIC RIGHT HEMICOLECTOMY performed by Jocelyne Gomez MD at [address] [facility] OTHER SURGICAL HISTORY  08/12/2016    CT Myelogram, Dr. Danielle Bella  2014 new battery    last checked Dr Alok Harkins 1/2016?     PORT SURGERY Right 4/1/2021    MEDI PORT INSERTION performed by Jocelyne Gomez MD at [facility] Right 7/12/2021    PORT REMOVAL performed by Brijesh Benito MD at [place] [address] ENDOSCOPY      reflux    UPPER GASTROINTESTINAL ENDOSCOPY N/A 2/16/2021    EGD BIOPSY performed by Anastasia Neumann MD at [address] [place] [address]:   Family History   Problem Relation Age of Onset    Diabetes Mother     Stroke Mother     Diabetes Father     Heart Disease Father     Brain Cancer Sister     Stroke Sister     Stroke Brother     Cancer Maternal Grandfather        SOCIAL HISTORY:   Social History     Socioeconomic History    Marital status:      Spouse name: Not on file    Number of children: Not on file    Years of education: Not on file    Highest education level: Not on file   Occupational History    Not on file   Tobacco Use    Smoking status: Former Smoker     Packs/day: 1.00     Years: 44.00     Pack years: 44.00     Types: Cigarettes     Quit date: 3/4/2014     Years since quittin.8    Smokeless tobacco: Never Used   Vaping Use    Vaping Use: Some days    Substances: Nicotine, Flavoring, 6% nicotine    Devices: Pre-filled or refillable cartridge   Substance and Sexual Activity    Alcohol use: Yes     Comment: occassionally    Drug use: Not Currently    Sexual activity: Not on file   Other Topics Concern    Not on file   Social History Narrative    Not on file     Social Determinants of Health     Financial Resource Strain:     Difficulty of Paying Living Expenses: Not on file   Food Insecurity:     Worried About [address] in the Last Year: Not on file    Sommer of Food in the Last Year: Not on file   Transportation Needs:     Lack of Transportation (Medical): Not on file    Lack of Transportation (Non-Medical):  Not on file   Physical Activity:     Days of Exercise per Week: Not on file    Minutes of Exercise per Session: Not on file   Stress:     Feeling of Stress : Not on file   Social Connections:     Frequency of Communication with Friends and Family: Not on file    Frequency of Social Gatherings with Friends and Family: Not on file    Attends [employer] Services: Not on file    Active Member of Clubs or Organizations: Not on file    Attends Club or Organization Meetings: Not on file    Marital Status: Not on file   Intimate Partner Violence:     Fear of Current or Ex-Partner: Not on file    Emotionally Abused: Not on file    Physically Abused: Not on file    Sexually Abused: Not on file Housing Stability:     Unable to Pay for Housing in the Last Year: Not on file    Number of Places Lived in the Last Year: Not on file    Unstable Housing in the Last Year: Not on file     Social History     Tobacco Use   Smoking Status Former Smoker    Packs/day: 1.00    Years: 44.00    Pack years: 44.00    Types: Cigarettes    Quit date: 3/4/2014    Years since quittin.8   Smokeless Tobacco Never Used     Social History     Substance and Sexual Activity   Alcohol Use Yes    Comment: occassionally     Social History     Substance and Sexual Activity   Drug Use Not Currently                 HOME MEDICATIONS:  Prior to Admission medications    Medication Sig Start Date End Date Taking?  Authorizing Provider   amLODIPine (NORVASC) 5 MG tablet Take 5 mg by mouth daily   Yes Historical Provider, MD   pravastatin (PRAVACHOL) 20 MG tablet Take 1 tablet by mouth daily 21  Yes Geno Phan MD   rivaroxaban (XARELTO) 20 MG TABS tablet Take 20 mg by mouth daily (with breakfast)    Yes Historical Provider, MD   vitamin B-12 (CYANOCOBALAMIN) 100 MCG tablet Take 50 mcg by mouth daily   Yes Historical Provider, MD   sennosides-docusate sodium (SENOKOT-S) 8.6-50 MG tablet Take 1 tablet by mouth 2 times daily   Yes Historical Provider, MD   ferrous sulfate 325 (65 Fe) MG tablet Take 325 mg by mouth 2 times daily  18  Yes Historical Provider, MD   esomeprazole Magnesium (NEXIUM) 20 MG PACK Take 20 mg by mouth daily   Yes Historical Provider, MD   albuterol sulfate HFA (PROAIR HFA) 108 (90 Base) MCG/ACT inhaler Inhale 1 puff into the lungs 2 times daily 10/27/21   Geno Phan MD   tamsulosin (FLOMAX) 0.4 MG capsule Take 1 capsule by mouth daily for 10 doses  Patient not taking: Reported on 10/27/2021 9/15/21 9/25/21  Declan Magallanes MD       CURRENT MEDICATIONS:  Current Facility-Administered Medications: allopurinol (ZYLOPRIM) tablet 100 mg, 100 mg, Oral, Daily  amLODIPine (NORVASC) tablet 2.5 mg, 2.5 mg, Oral, Daily  ferrous sulfate (IRON 325) tablet 325 mg, 325 mg, Oral, BID  metoprolol succinate (TOPROL XL) extended release tablet 50 mg, 50 mg, Oral, BID  pravastatin (PRAVACHOL) tablet 20 mg, 20 mg, Oral, Daily  [Held by provider] rivaroxaban (XARELTO) tablet 20 mg, 20 mg, Oral, Daily with breakfast  sodium chloride flush 0.9 % injection 5-40 mL, 5-40 mL, IntraVENous, 2 times per day  sodium chloride flush 0.9 % injection 5-40 mL, 5-40 mL, IntraVENous, PRN  0.9 % sodium chloride infusion, 25 mL, IntraVENous, PRN  acetaminophen (TYLENOL) tablet 650 mg, 650 mg, Oral, Q6H PRN **OR** acetaminophen (TYLENOL) suppository 650 mg, 650 mg, Rectal, Q6H PRN  potassium chloride (KLOR-CON M) extended release tablet 40 mEq, 40 mEq, Oral, PRN **OR** potassium bicarb-citric acid (EFFER-K) effervescent tablet 40 mEq, 40 mEq, Oral, PRN **OR** potassium chloride 10 mEq/100 mL IVPB (Peripheral Line), 10 mEq, IntraVENous, PRN  senna (SENOKOT) tablet 8.6 mg, 1 tablet, Oral, Daily PRN  albuterol (PROVENTIL) nebulizer solution 2.5 mg, 2.5 mg, Nebulization, BID  pantoprazole (PROTONIX) tablet 40 mg, 40 mg, Oral, QAM AC    IV MEDICATIONS:   sodium chloride         ALLERGIES:  No Known Allergies    REVIEW OF SYSTEMS:  General ROS:  No weight loss ,no fatigue     ENT ROS:   No Sore throat ,no lymphoadenopathy,no nasal stuffiness     Hematological and Lymphatic ROS:   No ecchymosis ,no tendency to bleed  Respiratory ROS:   SOB   Cardiovascular ROS:   No CP,No Palpitation   Gastrointestinal ROS:   No Gi bleed,no nausea or vomiting      - Musculoskeletal ROS:      - no joint swelling ,no joint pain   Neurological ROS:     -no weakness or numbness    Dermatological ROS:   No skin rash ,no urticaria     PHYSICAL EXAMINATION:     VITAL SIGNS:  /66   Pulse 96   Temp 97.3 °F (36.3 °C) (Oral)   Resp 24   Ht 5' 7\" (1.702 m)   Wt 136 lb 3.2 oz (61.8 kg)   SpO2 96%   BMI 21.33 kg/m²   Wt Readings from Last 3 Encounters:   01/19/22 136 lb 3.2 oz (61.8 kg)   10/27/21 168 lb 3.2 oz (76.3 kg)   09/15/21 161 lb (73 kg)     Temp Readings from Last 3 Encounters:   01/19/22 97.3 °F (36.3 °C) (Oral)   09/15/21 98 °F (36.7 °C) (Temporal)   09/03/21 97.6 °F (36.4 °C) (Temporal)     TMAX:  BP Readings from Last 3 Encounters:   01/19/22 117/66   10/27/21 90/72   09/15/21 124/76     Pulse Readings from Last 3 Encounters:   01/19/22 96   10/27/21 87   09/15/21 91           INTAKE/OUTPUTS:  I/O last 3 completed shifts:  In: -   Out: 200 [Urine:200]    Intake/Output Summary (Last 24 hours) at 1/19/2022 1247  Last data filed at 1/19/2022 1052  Gross per 24 hour   Intake 240 ml   Output 450 ml   Net -210 ml       General Appearance: alert and oriented to person, place and time, well-developed and   well-nourished, in no acute distress   Eyes: pupils equal, round, and reactive to light, extraocular eye movements intact, conjunctivae normal and sclera anicteric   Neck: neck supple and non tender without mass, no thyromegaly, no thyroid nodules and no cervical adenopathy   Pulmonary/Chest:crackles rhonchi   Cardiovascular: normal rate, regular rhythm, normal S1 and S2, no murmurs, rubs, clicks or gallops, distal pulses intact, no carotid bruits, no murmurs, no gallops, no carotid bruits and no JVD   Abdomen: obese, soft, non-tender, non-distended, normal bowel sounds, no masses or organomegaly   Extremities:no edema   Musculoskeletal: normal range of motion, no joint swelling, deformity or tenderness   Neurologic: reflexes normal and symmetric, no cranial nerve deficit noted    LABS/IMAGING:    CBC:  Lab Results   Component Value Date    WBC 6.0 01/19/2022    HGB 10.9 (L) 01/19/2022    HCT 36.0 (L) 01/19/2022    MCV 85.7 01/19/2022     01/19/2022    LYMPHOPCT 6.1 (L) 01/19/2022    RBC 4.20 01/19/2022    MCH 26.0 01/19/2022    MCHC 30.3 (L) 01/19/2022    RDW 16.0 (H) 01/19/2022    NEUTOPHILPCT 67.0 01/19/2022    MONOPCT 22.6 (H) 01/19/2022    BASOPCT 1.7 01/19/2022    NEUTROABS 4.20 01/19/2022    LYMPHSABS 0.36 (L) 01/19/2022    MONOSABS 1.38 (H) 01/19/2022    EOSABS 0.00 (L) 01/19/2022    BASOSABS 0.10 01/19/2022       Recent Labs     01/19/22  0519 01/18/22  1630 01/10/22  1120   WBC 6.0 6.1 6.2   HGB 10.9* 12.5 11.8*   HCT 36.0* 41.4 39.6   MCV 85.7 85.2 88.2    200 196       BMP:   Recent Labs     01/18/22  1630 01/19/22  0519    142   K 4.1 3.7   CL 99 98   CO2 25 25   PHOS  --  3.5   BUN 17 18   CREATININE 0.6* 0.5*       MG:   Lab Results   Component Value Date    MG 1.8 01/19/2022     Ca/Phos:   Lab Results   Component Value Date    CALCIUM 10.7 (H) 01/19/2022    PHOS 3.5 01/19/2022     Amylase: No results found for: AMYLASE  Lipase: No results found for: LIPASE  LIVER PROFILE:   Recent Labs     01/18/22  1630 01/19/22  0519   AST 14 15   ALT 7 10   BILITOT 0.7 0.6   ALKPHOS 88 80       PT/INR:   Recent Labs     01/18/22  1630   PROTIME 12.1   INR 1.1     APTT:   No results for input(s): APTT in the last 72 hours.     Cardiac Enzymes:  Lab Results   Component Value Date    TROPONINI <0.01 02/14/2021               PET and CT reviewed     PROBLEM LIST:  Patient Active Problem List   Diagnosis    Hematuria    BPH (benign prostatic hyperplasia)    [facility]NY [facility]) [facility]    Colonic mass    Hypertension    Hyperlipidemia    Coronary artery disease    Prolonged Q-T interval on ECG    Hypotension    CKD (chronic kidney disease) stage 3, GFR 30-59 ml/min (HCC)    Paroxysmal atrial fibrillation (HCC)    Diffuse large B-cell lymphoma (HCC)    Diffuse large B cell lymphoma (HCC)    Severe protein-calorie malnutrition (HCC)    Anemia    Thrombocytopenia (HCC)    Syncope    Chronic anemia    Gastroesophageal reflux disease without esophagitis    Essential hypertension    Hypokalemia    Hypomagnesemia    Pacemaker    Former smoker    Hydronephrosis of right kidney    Acute respiratory failure with hypoxia (HCC)    CABG x 1 (LIMA-LAD)    Hyperuricemia               ASSESSMENT:  1.) Large Cell B Lymphoma  2-diffuse consolidation /mass /Lung concern for pulmonary Lymphoma   2. )COPD  3.)h/p Lymphoma   4.)Pancytopenia   5.)Possible Pneumonia       PLAN:  Clinical suspicipus is high for pulmonary Lymophoma ,while onfection is possible with this much infection in lung expect to see him in shock and acute respiratory failure . regardless need tissue and bronch ,unfortauntely   His Delaney Ards was not stopped and we need 72 h off Xarelleto     I will schedule bronch On Saturday if endo allow me ,otherwise has to be Monday as need 3 days of Xarelleto  Will need Bronch ,ebus ,and transbronchial biopsy     *-Work-up for COPD bronchodilator  abx ,antifungal as per ID   *_hold on Steroids for now  Sputum culture  BD  Work up fr infection per ID  Poor prognosis in general    Discuss with Dr Ariel Bowden  I will discuss with family       Thank you very much for allowing me to participate in the care of this pleasant patient , should you have any questions ,please do not hesitate to contact me      V[address]ay [address]  Pulmonary&Critical Care Medicine   Director of [address] [place] [address] Director of [address] [place] [address]   Professor Arsenio Mathew    NOTE: This report was transcribed using voice recognition software. Every effort was made to ensure accuracy; however, inadvertent computerized transcription errors may be present. (+) Epigastric tenderness, Abdomen soft, non-distended, no rebound, no guarding
